# Patient Record
Sex: FEMALE | Race: WHITE | NOT HISPANIC OR LATINO | Employment: OTHER | URBAN - METROPOLITAN AREA
[De-identification: names, ages, dates, MRNs, and addresses within clinical notes are randomized per-mention and may not be internally consistent; named-entity substitution may affect disease eponyms.]

---

## 2017-07-28 ENCOUNTER — RECORDS - HEALTHEAST (OUTPATIENT)
Dept: LAB | Facility: CLINIC | Age: 67
End: 2017-07-28

## 2017-07-28 LAB
CHOLEST SERPL-MCNC: 140 MG/DL
FASTING STATUS PATIENT QL REPORTED: ABNORMAL
HDLC SERPL-MCNC: 29 MG/DL
LDLC SERPL CALC-MCNC: 86 MG/DL
TRIGL SERPL-MCNC: 124 MG/DL

## 2018-01-30 ENCOUNTER — RECORDS - HEALTHEAST (OUTPATIENT)
Dept: LAB | Facility: CLINIC | Age: 68
End: 2018-01-30

## 2018-01-30 LAB
ANION GAP SERPL CALCULATED.3IONS-SCNC: 10 MMOL/L (ref 5–18)
BUN SERPL-MCNC: 16 MG/DL (ref 8–22)
CALCIUM SERPL-MCNC: 9 MG/DL (ref 8.5–10.5)
CHLORIDE BLD-SCNC: 102 MMOL/L (ref 98–107)
CO2 SERPL-SCNC: 28 MMOL/L (ref 22–31)
CREAT SERPL-MCNC: 0.75 MG/DL (ref 0.6–1.1)
GFR SERPL CREATININE-BSD FRML MDRD: >60 ML/MIN/1.73M2
GLUCOSE BLD-MCNC: 167 MG/DL (ref 70–125)
POTASSIUM BLD-SCNC: 3.9 MMOL/L (ref 3.5–5)
SODIUM SERPL-SCNC: 140 MMOL/L (ref 136–145)

## 2018-07-31 ENCOUNTER — RECORDS - HEALTHEAST (OUTPATIENT)
Dept: LAB | Facility: CLINIC | Age: 68
End: 2018-07-31

## 2018-07-31 LAB
CHOLEST SERPL-MCNC: 157 MG/DL
CREAT UR-MCNC: 452.5 MG/DL
FASTING STATUS PATIENT QL REPORTED: ABNORMAL
HDLC SERPL-MCNC: 36 MG/DL
LDLC SERPL CALC-MCNC: 93 MG/DL
MICROALBUMIN UR-MCNC: 40.53 MG/DL (ref 0–1.99)
MICROALBUMIN/CREAT UR: 89.6 MG/G
TRIGL SERPL-MCNC: 140 MG/DL

## 2019-02-05 ENCOUNTER — RECORDS - HEALTHEAST (OUTPATIENT)
Dept: LAB | Facility: CLINIC | Age: 69
End: 2019-02-05

## 2019-02-05 LAB
ANION GAP SERPL CALCULATED.3IONS-SCNC: 12 MMOL/L (ref 5–18)
BUN SERPL-MCNC: 17 MG/DL (ref 8–22)
CALCIUM SERPL-MCNC: 9.6 MG/DL (ref 8.5–10.5)
CHLORIDE BLD-SCNC: 102 MMOL/L (ref 98–107)
CO2 SERPL-SCNC: 27 MMOL/L (ref 22–31)
CREAT SERPL-MCNC: 1.11 MG/DL (ref 0.6–1.1)
GFR SERPL CREATININE-BSD FRML MDRD: 49 ML/MIN/1.73M2
GLUCOSE BLD-MCNC: 118 MG/DL (ref 70–125)
POTASSIUM BLD-SCNC: 3.7 MMOL/L (ref 3.5–5)
SODIUM SERPL-SCNC: 141 MMOL/L (ref 136–145)
TSH SERPL DL<=0.005 MIU/L-ACNC: 4.24 UIU/ML (ref 0.3–5)

## 2019-08-19 ENCOUNTER — RECORDS - HEALTHEAST (OUTPATIENT)
Dept: LAB | Facility: CLINIC | Age: 69
End: 2019-08-19

## 2019-08-19 LAB
ANION GAP SERPL CALCULATED.3IONS-SCNC: 12 MMOL/L (ref 5–18)
BUN SERPL-MCNC: 20 MG/DL (ref 8–22)
CALCIUM SERPL-MCNC: 10.6 MG/DL (ref 8.5–10.5)
CHLORIDE BLD-SCNC: 98 MMOL/L (ref 98–107)
CHOLEST SERPL-MCNC: 150 MG/DL
CO2 SERPL-SCNC: 28 MMOL/L (ref 22–31)
CREAT SERPL-MCNC: 1.09 MG/DL (ref 0.6–1.1)
FASTING STATUS PATIENT QL REPORTED: ABNORMAL
GFR SERPL CREATININE-BSD FRML MDRD: 50 ML/MIN/1.73M2
GLUCOSE BLD-MCNC: 152 MG/DL (ref 70–125)
HDLC SERPL-MCNC: 34 MG/DL
LDLC SERPL CALC-MCNC: 71 MG/DL
POTASSIUM BLD-SCNC: 3.6 MMOL/L (ref 3.5–5)
SODIUM SERPL-SCNC: 138 MMOL/L (ref 136–145)
TRIGL SERPL-MCNC: 223 MG/DL

## 2020-02-25 ENCOUNTER — RECORDS - HEALTHEAST (OUTPATIENT)
Dept: LAB | Facility: CLINIC | Age: 70
End: 2020-02-25

## 2020-02-25 LAB
CREAT UR-MCNC: 154.8 MG/DL
MICROALBUMIN UR-MCNC: 32.85 MG/DL (ref 0–1.99)
MICROALBUMIN/CREAT UR: 212.2 MG/G

## 2020-08-19 ENCOUNTER — RECORDS - HEALTHEAST (OUTPATIENT)
Dept: LAB | Facility: CLINIC | Age: 70
End: 2020-08-19

## 2020-08-19 LAB
ANION GAP SERPL CALCULATED.3IONS-SCNC: 8 MMOL/L (ref 5–18)
BUN SERPL-MCNC: 12 MG/DL (ref 8–22)
CALCIUM SERPL-MCNC: 9.2 MG/DL (ref 8.5–10.5)
CHLORIDE BLD-SCNC: 102 MMOL/L (ref 98–107)
CHOLEST SERPL-MCNC: 132 MG/DL
CO2 SERPL-SCNC: 30 MMOL/L (ref 22–31)
CREAT SERPL-MCNC: 0.87 MG/DL (ref 0.6–1.1)
FASTING STATUS PATIENT QL REPORTED: ABNORMAL
GFR SERPL CREATININE-BSD FRML MDRD: >60 ML/MIN/1.73M2
GLUCOSE BLD-MCNC: 269 MG/DL (ref 70–125)
HDLC SERPL-MCNC: 29 MG/DL
LDLC SERPL CALC-MCNC: 79 MG/DL
POTASSIUM BLD-SCNC: 3.5 MMOL/L (ref 3.5–5)
SODIUM SERPL-SCNC: 140 MMOL/L (ref 136–145)
TRIGL SERPL-MCNC: 122 MG/DL

## 2021-10-04 ENCOUNTER — LAB REQUISITION (OUTPATIENT)
Dept: LAB | Facility: CLINIC | Age: 71
End: 2021-10-04
Payer: COMMERCIAL

## 2021-10-04 DIAGNOSIS — I10 ESSENTIAL (PRIMARY) HYPERTENSION: ICD-10-CM

## 2021-10-04 DIAGNOSIS — E78.00 PURE HYPERCHOLESTEROLEMIA, UNSPECIFIED: ICD-10-CM

## 2021-10-04 LAB
ANION GAP SERPL CALCULATED.3IONS-SCNC: 12 MMOL/L (ref 5–18)
BUN SERPL-MCNC: 12 MG/DL (ref 8–28)
CALCIUM SERPL-MCNC: 9.4 MG/DL (ref 8.5–10.5)
CHLORIDE BLD-SCNC: 100 MMOL/L (ref 98–107)
CHOLEST SERPL-MCNC: 130 MG/DL
CO2 SERPL-SCNC: 25 MMOL/L (ref 22–31)
CREAT SERPL-MCNC: 0.94 MG/DL (ref 0.6–1.1)
FASTING STATUS PATIENT QL REPORTED: ABNORMAL
GFR SERPL CREATININE-BSD FRML MDRD: 62 ML/MIN/1.73M2
GLUCOSE BLD-MCNC: 326 MG/DL (ref 70–125)
HDLC SERPL-MCNC: 27 MG/DL
LDLC SERPL CALC-MCNC: 76 MG/DL
POTASSIUM BLD-SCNC: 3.3 MMOL/L (ref 3.5–5)
SODIUM SERPL-SCNC: 137 MMOL/L (ref 136–145)
TRIGL SERPL-MCNC: 136 MG/DL

## 2021-10-04 PROCEDURE — 80061 LIPID PANEL: CPT | Performed by: FAMILY MEDICINE

## 2021-10-04 PROCEDURE — 80048 BASIC METABOLIC PNL TOTAL CA: CPT | Mod: ORL | Performed by: FAMILY MEDICINE

## 2022-01-12 ENCOUNTER — APPOINTMENT (OUTPATIENT)
Dept: RADIOLOGY | Facility: HOSPITAL | Age: 72
DRG: 085 | End: 2022-01-12
Attending: EMERGENCY MEDICINE
Payer: COMMERCIAL

## 2022-01-12 ENCOUNTER — HOSPITAL ENCOUNTER (INPATIENT)
Facility: HOSPITAL | Age: 72
LOS: 8 days | Discharge: HOME HEALTH | DRG: 085 | End: 2022-01-20
Attending: EMERGENCY MEDICINE | Admitting: HOSPITALIST
Payer: COMMERCIAL

## 2022-01-12 ENCOUNTER — APPOINTMENT (OUTPATIENT)
Dept: CT IMAGING | Facility: HOSPITAL | Age: 72
DRG: 085 | End: 2022-01-12
Attending: EMERGENCY MEDICINE
Payer: COMMERCIAL

## 2022-01-12 ENCOUNTER — APPOINTMENT (OUTPATIENT)
Dept: MRI IMAGING | Facility: HOSPITAL | Age: 72
DRG: 085 | End: 2022-01-12
Attending: HOSPITALIST
Payer: COMMERCIAL

## 2022-01-12 DIAGNOSIS — J18.9 PNEUMONIA DUE TO INFECTIOUS ORGANISM, UNSPECIFIED LATERALITY, UNSPECIFIED PART OF LUNG: ICD-10-CM

## 2022-01-12 DIAGNOSIS — D32.9 MENINGIOMA (H): ICD-10-CM

## 2022-01-12 DIAGNOSIS — S06.5XAA SDH (SUBDURAL HEMATOMA) (H): ICD-10-CM

## 2022-01-12 DIAGNOSIS — I10 PRIMARY HYPERTENSION: ICD-10-CM

## 2022-01-12 DIAGNOSIS — I63.9 ACUTE STROKE DUE TO ISCHEMIA (H): ICD-10-CM

## 2022-01-12 DIAGNOSIS — S09.90XA INJURY OF HEAD, INITIAL ENCOUNTER: ICD-10-CM

## 2022-01-12 DIAGNOSIS — Z78.9 ALCOHOL USE: ICD-10-CM

## 2022-01-12 DIAGNOSIS — R07.89 CHEST WALL PAIN: Primary | ICD-10-CM

## 2022-01-12 DIAGNOSIS — R55 SYNCOPE, UNSPECIFIED SYNCOPE TYPE: ICD-10-CM

## 2022-01-12 DIAGNOSIS — K21.00 GASTROESOPHAGEAL REFLUX DISEASE WITH ESOPHAGITIS WITHOUT HEMORRHAGE: ICD-10-CM
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
## 2022-01-12 LAB
ALBUMIN SERPL-MCNC: 3.2 G/DL (ref 3.5–5)
ALP SERPL-CCNC: 147 U/L (ref 45–120)
ALT SERPL W P-5'-P-CCNC: 33 U/L (ref 0–45)
ANION GAP SERPL CALCULATED.3IONS-SCNC: 13 MMOL/L (ref 5–18)
AST SERPL W P-5'-P-CCNC: 47 U/L (ref 0–40)
ATRIAL RATE - MUSE: 79 BPM
ATRIAL RATE - MUSE: 91 BPM
BASE EXCESS BLDV CALC-SCNC: 9.1 MMOL/L
BASOPHILS # BLD AUTO: 0 10E3/UL (ref 0–0.2)
BASOPHILS NFR BLD AUTO: 1 %
BILIRUB SERPL-MCNC: 0.8 MG/DL (ref 0–1)
BUN SERPL-MCNC: 13 MG/DL (ref 8–28)
CALCIUM SERPL-MCNC: 9.7 MG/DL (ref 8.5–10.5)
CHLORIDE BLD-SCNC: 101 MMOL/L (ref 98–107)
CO2 SERPL-SCNC: 25 MMOL/L (ref 22–31)
CREAT SERPL-MCNC: 0.86 MG/DL (ref 0.6–1.1)
DIASTOLIC BLOOD PRESSURE - MUSE: NORMAL MMHG
DIASTOLIC BLOOD PRESSURE - MUSE: NORMAL MMHG
EOSINOPHIL # BLD AUTO: 0.1 10E3/UL (ref 0–0.7)
EOSINOPHIL NFR BLD AUTO: 2 %
ERYTHROCYTE [DISTWIDTH] IN BLOOD BY AUTOMATED COUNT: 13.9 % (ref 10–15)
FLUAV RNA SPEC QL NAA+PROBE: NEGATIVE
FLUBV RNA RESP QL NAA+PROBE: NEGATIVE
GFR SERPL CREATININE-BSD FRML MDRD: 72 ML/MIN/1.73M2
GLUCOSE BLD-MCNC: 181 MG/DL (ref 70–125)
GLUCOSE BLDC GLUCOMTR-MCNC: 213 MG/DL (ref 70–99)
HBA1C MFR BLD: 7 %
HCO3 BLDV-SCNC: 32 MMOL/L (ref 24–30)
HCT VFR BLD AUTO: 39.1 % (ref 35–47)
HGB BLD-MCNC: 12.7 G/DL (ref 11.7–15.7)
IMM GRANULOCYTES # BLD: 0 10E3/UL
IMM GRANULOCYTES NFR BLD: 1 %
INTERPRETATION ECG - MUSE: NORMAL
INTERPRETATION ECG - MUSE: NORMAL
LACTATE SERPL-SCNC: 1.2 MMOL/L (ref 0.7–2)
LYMPHOCYTES # BLD AUTO: 1 10E3/UL (ref 0.8–5.3)
LYMPHOCYTES NFR BLD AUTO: 16 %
MCH RBC QN AUTO: 32.2 PG (ref 26.5–33)
MCHC RBC AUTO-ENTMCNC: 32.5 G/DL (ref 31.5–36.5)
MCV RBC AUTO: 99 FL (ref 78–100)
MONOCYTES # BLD AUTO: 0.6 10E3/UL (ref 0–1.3)
MONOCYTES NFR BLD AUTO: 10 %
NEUTROPHILS # BLD AUTO: 4.4 10E3/UL (ref 1.6–8.3)
NEUTROPHILS NFR BLD AUTO: 70 %
NRBC # BLD AUTO: 0 10E3/UL
NRBC BLD AUTO-RTO: 0 /100
OXYHGB MFR BLDV: 88.3 % (ref 70–75)
P AXIS - MUSE: 41 DEGREES
P AXIS - MUSE: 55 DEGREES
PCO2 BLDV: 37 MM HG (ref 35–50)
PH BLDV: 7.54 [PH] (ref 7.35–7.45)
PLATELET # BLD AUTO: 167 10E3/UL (ref 150–450)
PO2 BLDV: 94 MM HG (ref 25–47)
POTASSIUM BLD-SCNC: 3.4 MMOL/L (ref 3.5–5)
PR INTERVAL - MUSE: 162 MS
PR INTERVAL - MUSE: 164 MS
PROT SERPL-MCNC: 7.8 G/DL (ref 6–8)
QRS DURATION - MUSE: 90 MS
QRS DURATION - MUSE: 94 MS
QT - MUSE: 362 MS
QT - MUSE: 408 MS
QTC - MUSE: 445 MS
QTC - MUSE: 467 MS
R AXIS - MUSE: -19 DEGREES
R AXIS - MUSE: -27 DEGREES
RBC # BLD AUTO: 3.95 10E6/UL (ref 3.8–5.2)
SAO2 % BLDV: 90.1 % (ref 70–75)
SARS-COV-2 RNA RESP QL NAA+PROBE: NEGATIVE
SODIUM SERPL-SCNC: 139 MMOL/L (ref 136–145)
SYSTOLIC BLOOD PRESSURE - MUSE: NORMAL MMHG
SYSTOLIC BLOOD PRESSURE - MUSE: NORMAL MMHG
T AXIS - MUSE: 124 DEGREES
T AXIS - MUSE: 141 DEGREES
TROPONIN I SERPL-MCNC: 0.03 NG/ML (ref 0–0.29)
VENTRICULAR RATE- MUSE: 79 BPM
VENTRICULAR RATE- MUSE: 91 BPM
WBC # BLD AUTO: 6 10E3/UL (ref 4–11)

## 2022-01-12 PROCEDURE — 87636 SARSCOV2 & INF A&B AMP PRB: CPT | Performed by: EMERGENCY MEDICINE

## 2022-01-12 PROCEDURE — 93005 ELECTROCARDIOGRAM TRACING: CPT | Performed by: STUDENT IN AN ORGANIZED HEALTH CARE EDUCATION/TRAINING PROGRAM

## 2022-01-12 PROCEDURE — 96375 TX/PRO/DX INJ NEW DRUG ADDON: CPT

## 2022-01-12 PROCEDURE — 71045 X-RAY EXAM CHEST 1 VIEW: CPT

## 2022-01-12 PROCEDURE — 36415 COLL VENOUS BLD VENIPUNCTURE: CPT | Performed by: EMERGENCY MEDICINE

## 2022-01-12 PROCEDURE — 96374 THER/PROPH/DIAG INJ IV PUSH: CPT | Mod: 59

## 2022-01-12 PROCEDURE — 83036 HEMOGLOBIN GLYCOSYLATED A1C: CPT | Performed by: HOSPITALIST

## 2022-01-12 PROCEDURE — 250N000011 HC RX IP 250 OP 636: Performed by: EMERGENCY MEDICINE

## 2022-01-12 PROCEDURE — 36415 COLL VENOUS BLD VENIPUNCTURE: CPT | Performed by: HOSPITALIST

## 2022-01-12 PROCEDURE — 84145 PROCALCITONIN (PCT): CPT | Performed by: HOSPITALIST

## 2022-01-12 PROCEDURE — 84484 ASSAY OF TROPONIN QUANT: CPT | Performed by: EMERGENCY MEDICINE

## 2022-01-12 PROCEDURE — 93005 ELECTROCARDIOGRAM TRACING: CPT | Performed by: EMERGENCY MEDICINE

## 2022-01-12 PROCEDURE — C9803 HOPD COVID-19 SPEC COLLECT: HCPCS

## 2022-01-12 PROCEDURE — 80053 COMPREHEN METABOLIC PANEL: CPT | Performed by: EMERGENCY MEDICINE

## 2022-01-12 PROCEDURE — 82805 BLOOD GASES W/O2 SATURATION: CPT | Performed by: EMERGENCY MEDICINE

## 2022-01-12 PROCEDURE — 250N000013 HC RX MED GY IP 250 OP 250 PS 637: Performed by: EMERGENCY MEDICINE

## 2022-01-12 PROCEDURE — 96376 TX/PRO/DX INJ SAME DRUG ADON: CPT

## 2022-01-12 PROCEDURE — 120N000001 HC R&B MED SURG/OB

## 2022-01-12 PROCEDURE — 99285 EMERGENCY DEPT VISIT HI MDM: CPT | Mod: 25

## 2022-01-12 PROCEDURE — 83735 ASSAY OF MAGNESIUM: CPT | Performed by: HOSPITALIST

## 2022-01-12 PROCEDURE — 250N000013 HC RX MED GY IP 250 OP 250 PS 637: Performed by: HOSPITALIST

## 2022-01-12 PROCEDURE — 87040 BLOOD CULTURE FOR BACTERIA: CPT | Performed by: EMERGENCY MEDICINE

## 2022-01-12 PROCEDURE — 255N000002 HC RX 255 OP 636: Performed by: HOSPITALIST

## 2022-01-12 PROCEDURE — 70553 MRI BRAIN STEM W/O & W/DYE: CPT

## 2022-01-12 PROCEDURE — 250N000011 HC RX IP 250 OP 636: Performed by: HOSPITALIST

## 2022-01-12 PROCEDURE — 85025 COMPLETE CBC W/AUTO DIFF WBC: CPT | Performed by: EMERGENCY MEDICINE

## 2022-01-12 PROCEDURE — 250N000009 HC RX 250: Performed by: HOSPITALIST

## 2022-01-12 PROCEDURE — 70450 CT HEAD/BRAIN W/O DYE: CPT

## 2022-01-12 PROCEDURE — A9585 GADOBUTROL INJECTION: HCPCS | Performed by: HOSPITALIST

## 2022-01-12 PROCEDURE — 250N000012 HC RX MED GY IP 250 OP 636 PS 637: Performed by: HOSPITALIST

## 2022-01-12 PROCEDURE — 83605 ASSAY OF LACTIC ACID: CPT | Performed by: EMERGENCY MEDICINE

## 2022-01-12 PROCEDURE — 72125 CT NECK SPINE W/O DYE: CPT

## 2022-01-12 PROCEDURE — 99223 1ST HOSP IP/OBS HIGH 75: CPT | Mod: AI | Performed by: HOSPITALIST

## 2022-01-12 PROCEDURE — 258N000003 HC RX IP 258 OP 636: Performed by: EMERGENCY MEDICINE

## 2022-01-12 RX ORDER — POTASSIUM CHLORIDE 1500 MG/1
40 TABLET, EXTENDED RELEASE ORAL ONCE
Status: COMPLETED | OUTPATIENT
Start: 2022-01-12 | End: 2022-01-13

## 2022-01-12 RX ORDER — VITAMIN E 268 MG
400 CAPSULE ORAL DAILY
COMMUNITY

## 2022-01-12 RX ORDER — GLIMEPIRIDE 2 MG/1
2 TABLET ORAL
COMMUNITY
End: 2022-02-04

## 2022-01-12 RX ORDER — HYDRALAZINE HYDROCHLORIDE 20 MG/ML
5 INJECTION INTRAMUSCULAR; INTRAVENOUS ONCE
Status: COMPLETED | OUTPATIENT
Start: 2022-01-12 | End: 2022-01-12

## 2022-01-12 RX ORDER — DIAZEPAM 10 MG/2ML
5 INJECTION, SOLUTION INTRAMUSCULAR; INTRAVENOUS
Status: CANCELLED | OUTPATIENT
Start: 2022-01-12

## 2022-01-12 RX ORDER — MULTIPLE VITAMINS W/ MINERALS TAB 9MG-400MCG
1 TAB ORAL DAILY
COMMUNITY

## 2022-01-12 RX ORDER — DEXAMETHASONE SODIUM PHOSPHATE 4 MG/ML
4 INJECTION, SOLUTION INTRA-ARTICULAR; INTRALESIONAL; INTRAMUSCULAR; INTRAVENOUS; SOFT TISSUE EVERY 6 HOURS
Status: DISCONTINUED | OUTPATIENT
Start: 2022-01-12 | End: 2022-01-14

## 2022-01-12 RX ORDER — HYDROCHLOROTHIAZIDE 25 MG/1
25 TABLET ORAL DAILY
Status: DISCONTINUED | OUTPATIENT
Start: 2022-01-12 | End: 2022-01-20 | Stop reason: HOSPADM

## 2022-01-12 RX ORDER — PROCHLORPERAZINE MALEATE 5 MG
5 TABLET ORAL EVERY 6 HOURS PRN
Status: DISCONTINUED | OUTPATIENT
Start: 2022-01-12 | End: 2022-01-20 | Stop reason: HOSPADM

## 2022-01-12 RX ORDER — ACETAMINOPHEN 325 MG/1
650 TABLET ORAL EVERY 6 HOURS PRN
Status: DISCONTINUED | OUTPATIENT
Start: 2022-01-12 | End: 2022-01-18

## 2022-01-12 RX ORDER — CALCIUM CARBONATE 500 MG/1
1 TABLET, CHEWABLE ORAL 2 TIMES DAILY PRN
COMMUNITY

## 2022-01-12 RX ORDER — AMLODIPINE BESYLATE 10 MG/1
10 TABLET ORAL DAILY
COMMUNITY

## 2022-01-12 RX ORDER — ACETAMINOPHEN 650 MG/1
650 SUPPOSITORY RECTAL EVERY 6 HOURS PRN
Status: DISCONTINUED | OUTPATIENT
Start: 2022-01-12 | End: 2022-01-18

## 2022-01-12 RX ORDER — DEXTROSE MONOHYDRATE 25 G/50ML
25-50 INJECTION, SOLUTION INTRAVENOUS
Status: DISCONTINUED | OUTPATIENT
Start: 2022-01-12 | End: 2022-01-20 | Stop reason: HOSPADM

## 2022-01-12 RX ORDER — LIDOCAINE 4 G/G
1 PATCH TOPICAL
Status: DISCONTINUED | OUTPATIENT
Start: 2022-01-12 | End: 2022-01-20 | Stop reason: HOSPADM

## 2022-01-12 RX ORDER — AMLODIPINE BESYLATE 5 MG/1
10 TABLET ORAL DAILY
Status: DISCONTINUED | OUTPATIENT
Start: 2022-01-12 | End: 2022-01-20 | Stop reason: HOSPADM

## 2022-01-12 RX ORDER — POLYETHYLENE GLYCOL 3350 17 G/17G
17 POWDER, FOR SOLUTION ORAL DAILY
Status: DISCONTINUED | OUTPATIENT
Start: 2022-01-12 | End: 2022-01-20 | Stop reason: HOSPADM

## 2022-01-12 RX ORDER — NICOTINE POLACRILEX 4 MG
15-30 LOZENGE BUCCAL
Status: DISCONTINUED | OUTPATIENT
Start: 2022-01-12 | End: 2022-01-20 | Stop reason: HOSPADM

## 2022-01-12 RX ORDER — HYDRALAZINE HYDROCHLORIDE 20 MG/ML
10 INJECTION INTRAMUSCULAR; INTRAVENOUS EVERY 4 HOURS PRN
Status: DISCONTINUED | OUTPATIENT
Start: 2022-01-12 | End: 2022-01-17

## 2022-01-12 RX ORDER — LIDOCAINE 40 MG/G
CREAM TOPICAL
Status: DISCONTINUED | OUTPATIENT
Start: 2022-01-12 | End: 2022-01-20 | Stop reason: HOSPADM

## 2022-01-12 RX ORDER — OXYCODONE HYDROCHLORIDE 5 MG/1
5 TABLET ORAL EVERY 4 HOURS PRN
Status: DISCONTINUED | OUTPATIENT
Start: 2022-01-12 | End: 2022-01-20 | Stop reason: HOSPADM

## 2022-01-12 RX ORDER — ONDANSETRON 2 MG/ML
4 INJECTION INTRAMUSCULAR; INTRAVENOUS EVERY 6 HOURS PRN
Status: DISCONTINUED | OUTPATIENT
Start: 2022-01-12 | End: 2022-01-20 | Stop reason: HOSPADM

## 2022-01-12 RX ORDER — PROCHLORPERAZINE 25 MG
12.5 SUPPOSITORY, RECTAL RECTAL EVERY 12 HOURS PRN
Status: DISCONTINUED | OUTPATIENT
Start: 2022-01-12 | End: 2022-01-20 | Stop reason: HOSPADM

## 2022-01-12 RX ORDER — CITALOPRAM HYDROBROMIDE 40 MG/1
40 TABLET ORAL DAILY
COMMUNITY

## 2022-01-12 RX ORDER — GADOBUTROL 604.72 MG/ML
10 INJECTION INTRAVENOUS ONCE
Status: COMPLETED | OUTPATIENT
Start: 2022-01-12 | End: 2022-01-12

## 2022-01-12 RX ORDER — LEVOFLOXACIN 5 MG/ML
750 INJECTION, SOLUTION INTRAVENOUS EVERY 24 HOURS
Status: COMPLETED | OUTPATIENT
Start: 2022-01-13 | End: 2022-01-16

## 2022-01-12 RX ORDER — HYDROCHLOROTHIAZIDE 25 MG/1
25 TABLET ORAL DAILY
COMMUNITY
End: 2023-12-28

## 2022-01-12 RX ORDER — ONDANSETRON 4 MG/1
4 TABLET, ORALLY DISINTEGRATING ORAL EVERY 6 HOURS PRN
Status: DISCONTINUED | OUTPATIENT
Start: 2022-01-12 | End: 2022-01-20 | Stop reason: HOSPADM

## 2022-01-12 RX ORDER — CITALOPRAM HYDROBROMIDE 40 MG/1
40 TABLET ORAL DAILY
Status: DISCONTINUED | OUTPATIENT
Start: 2022-01-12 | End: 2022-01-20 | Stop reason: HOSPADM

## 2022-01-12 RX ORDER — GLIMEPIRIDE 1 MG/1
2 TABLET ORAL
Status: DISCONTINUED | OUTPATIENT
Start: 2022-01-13 | End: 2022-01-20 | Stop reason: HOSPADM

## 2022-01-12 RX ORDER — LEVOFLOXACIN 750 MG/1
750 TABLET, FILM COATED ORAL ONCE
Status: COMPLETED | OUTPATIENT
Start: 2022-01-12 | End: 2022-01-12

## 2022-01-12 RX ORDER — LORAZEPAM 2 MG/ML
1 INJECTION INTRAMUSCULAR EVERY 6 HOURS PRN
Status: DISCONTINUED | OUTPATIENT
Start: 2022-01-12 | End: 2022-01-20 | Stop reason: HOSPADM

## 2022-01-12 RX ORDER — DEXAMETHASONE SODIUM PHOSPHATE 10 MG/ML
10 INJECTION, SOLUTION INTRAMUSCULAR; INTRAVENOUS ONCE
Status: COMPLETED | OUTPATIENT
Start: 2022-01-12 | End: 2022-01-12

## 2022-01-12 RX ADMIN — CITALOPRAM HYDROBROMIDE 40 MG: 40 TABLET ORAL at 22:44

## 2022-01-12 RX ADMIN — LIDOCAINE 1 PATCH: 246 PATCH TOPICAL at 22:49

## 2022-01-12 RX ADMIN — LEVOFLOXACIN 750 MG: 750 TABLET, FILM COATED ORAL at 16:35

## 2022-01-12 RX ADMIN — INSULIN ASPART 1 UNITS: 100 INJECTION, SOLUTION INTRAVENOUS; SUBCUTANEOUS at 22:47

## 2022-01-12 RX ADMIN — DEXAMETHASONE SODIUM PHOSPHATE 4 MG: 4 INJECTION, SOLUTION INTRA-ARTICULAR; INTRALESIONAL; INTRAMUSCULAR; INTRAVENOUS; SOFT TISSUE at 22:45

## 2022-01-12 RX ADMIN — ENOXAPARIN SODIUM 40 MG: 40 INJECTION SUBCUTANEOUS at 22:49

## 2022-01-12 RX ADMIN — AMLODIPINE BESYLATE 10 MG: 5 TABLET ORAL at 22:43

## 2022-01-12 RX ADMIN — DEXAMETHASONE SODIUM PHOSPHATE 10 MG: 10 INJECTION, SOLUTION INTRAMUSCULAR; INTRAVENOUS at 17:21

## 2022-01-12 RX ADMIN — HYDRALAZINE HYDROCHLORIDE 10 MG: 20 INJECTION INTRAMUSCULAR; INTRAVENOUS at 19:17

## 2022-01-12 RX ADMIN — GADOBUTROL 10 ML: 604.72 INJECTION INTRAVENOUS at 20:54

## 2022-01-12 RX ADMIN — FAMOTIDINE 20 MG: 10 INJECTION, SOLUTION INTRAVENOUS at 19:20

## 2022-01-12 RX ADMIN — HYDROCHLOROTHIAZIDE 25 MG: 25 TABLET ORAL at 22:43

## 2022-01-12 RX ADMIN — HYDRALAZINE HYDROCHLORIDE 5 MG: 20 INJECTION INTRAMUSCULAR; INTRAVENOUS at 18:08

## 2022-01-12 RX ADMIN — ACETAMINOPHEN 650 MG: 325 TABLET ORAL at 19:19

## 2022-01-12 RX ADMIN — LEVETIRACETAM 1000 MG: 100 INJECTION, SOLUTION INTRAVENOUS at 17:36

## 2022-01-12 RX ADMIN — POLYETHYLENE GLYCOL 3350 17 G: 17 POWDER, FOR SOLUTION ORAL at 22:42

## 2022-01-12 ASSESSMENT — ACTIVITIES OF DAILY LIVING (ADL)
DOING_ERRANDS_INDEPENDENTLY_DIFFICULTY: NO
DRESSING/BATHING_DIFFICULTY: NO
NUMBER_OF_TIMES_PATIENT_HAS_FALLEN_WITHIN_LAST_SIX_MONTHS: 1
ADLS_ACUITY_SCORE: 6
CONCENTRATING,_REMEMBERING_OR_MAKING_DECISIONS_DIFFICULTY: NO
WALKING_OR_CLIMBING_STAIRS_DIFFICULTY: NO
HEARING_DIFFICULTY_OR_DEAF: NO
VISION_MANAGEMENT: GLASSES
ADLS_ACUITY_SCORE: 6
TOILETING_ISSUES: NO
ADLS_ACUITY_SCORE: 6
DIFFICULTY_EATING/SWALLOWING: NO
FALL_HISTORY_WITHIN_LAST_SIX_MONTHS: YES
ADLS_ACUITY_SCORE: 6
DIFFICULTY_COMMUNICATING: NO
ADLS_ACUITY_SCORE: 6
WHICH_OF_THE_ABOVE_FUNCTIONAL_RISKS_HAD_A_RECENT_ONSET_OR_CHANGE?: FALL HISTORY
WEAR_GLASSES_OR_BLIND: YES
PATIENT'S_PREFERRED_MEANS_OF_COMMUNICATION: VERBAL

## 2022-01-12 NOTE — ED NOTES
Patient was updated on the plan of care and states that she can not do MRI as she is exteremly claustrophobic.

## 2022-01-12 NOTE — ED TRIAGE NOTES
Pt arrives per medics from her apt where she was amb in the hallway when she fell. Hitting the back of her head on a wall. She is not on thinners does not remember the fall. Upon arrival to ED she is alert/ oriented x 3. She reports chest pain , upper back pain. She was given 324 mg ASA and NTG x 2 with relief of pain per medics.

## 2022-01-12 NOTE — PHARMACY-ADMISSION MEDICATION HISTORY
Pharmacy Note - Admission Medication History    Pertinent Provider Information: None     ______________________________________________________________________    Prior To Admission (PTA) med list completed and updated in EMR.       PTA Med List   Medication Sig Last Dose     amLODIPine (NORVASC) 10 MG tablet Take 10 mg by mouth daily 1/11/2022 at Unknown time     calcium carbonate (TUMS) 500 MG chewable tablet Take 1 chew tab by mouth 2 times daily as needed for heartburn      citalopram (CELEXA) 40 MG tablet Take 40 mg by mouth daily 1/11/2022 at Unknown time     glimepiride (AMARYL) 2 MG tablet Take 2 mg by mouth every morning (before breakfast) 1/11/2022 at Unknown time     hydrochlorothiazide (HYDRODIURIL) 25 MG tablet Take 25 mg by mouth daily 1/11/2022 at Unknown time     multivitamin w/minerals (THERA-VIT-M) tablet Take 1 tablet by mouth daily 1/11/2022 at Unknown time     vitamin E (TOCOPHEROL) 400 units (180 mg) capsule Take 400 Units by mouth daily 1/11/2022 at Unknown time       Information source(s): Patient and CareEverywhere/SureScripts  Method of interview communication: in-person    Summary of Changes to PTA Med List  New: All  Discontinued: None  Changed: None    Patient was asked about OTC/herbal products specifically.  PTA med list reflects this.    In the past week, patient estimated taking medication this percent of the time:  greater than 90%.    Allergies were reviewed, assessed, and updated with the patient.      Patient does not use any multi-dose medications prior to admission.    The information provided in this note is only as accurate as the sources available at the time of the update(s).    Thank you for the opportunity to participate in the care of this patient.    Maxine Zelaya, [facility]  1/12/2022 4:51 PM

## 2022-01-12 NOTE — ED PROVIDER NOTES
EMERGENCY DEPARTMENT ENCOUNTER            IMPRESSION:  Fall -syncope versus seizure  Hypertension  Pneumonia  Head injury  Meningioma      MEDICAL DECISION MAKING:  Patient brought in by ambulance after an unwitnessed collapse and fall.  The patient was not able to provide further details nor was there any witness or collateral history.  She does not have a seizure history.  She reports head injury and pain.  She has been well recently.  She has not taken her daily medications for high blood pressure    On exam her blood pressure is elevated over 200.  She has a occiput soft tissue hematoma.  She is otherwise awake and alert.  Normal cardiopulmonary and neurologic exam.  No evidence of trauma or injury.    She was seen in triage.    She was then roomed and placed on a cardiac monitor.  Blood pressure was noted to be persistently elevated and her home meds and a dose of hydralazine were ordered.    CT of the head showed evidence of a meningioma with some surrounding edema.  I spoke with on-call neurosurgery who recommended steroids, antiepileptics, and MRI.  They will formally consult    COVID was negative    Chest x-ray showed patchy infiltrates.  She has a fever so she was treated for infection.  She was given Levaquin.  She has a penicillin allergy.    Chemistry shows mildly depressed potassium    CBC is normal    CT cervical spine is unremarkable    Patient will be admitted to the hospital.    Patient is collapse and fall could be syncopal or possibly a seizure.  The meningioma with edema could be a potential source.    Patient treated for CAP pneumonia.    She has a history of hypertension.  She was noncompliant with her medication today.      =================================================================  CHIEF COMPLAINT:  Chief Complaint   Patient presents with     Fall         HPI  Josefina Dumont is a 71 year old female with a history of diverticulitis who presents to the ED by EMS for evaluation after  a fall.     Per EMS, patient was at her apartment walking through the hallway when she fell, hitting the back of her head on the wall. Patient does not recall the fall. Patient is not currently on blood thinners.     Here in the ED, the patient endorses chest pain and upper back pain.     I, Elilena Patricio am serving as a scribe to document services personally performed by Dr. Félix Mai MD, based on my observation and the provider's statements to me. I, Dr. Félix Mai MD attest that Eli Patricio is acting in a scribe capacity, has observed my performance of the services and has documented them in accordance with my direction.      REVIEW OF SYSTEMS   Constitutional: Denies fever, chills, unintentional weight loss or fatigue   Positive for fall   Eyes: Denies visual changes or discharge    HENT: Denies sore throat, ear pain or neck pain  Respiratory: Denies cough or shortness of breath    Cardiovascular: Denies palpitations or leg swelling  Positive for chest pain  GI: Denies abdominal pain, nausea, vomiting, or dark, bloody stools.    : Denies hematuria, dysuria, or flank pain  Musculoskeletal: Denies any new muscle/joint pains  Positive for back pain (upper)  Skin: Denies rash or wound  Neurologic: Denies current headache, new weakness, focal weakness, or sensory changes    Lymphatic: Denies swollen glands    Psychiatric: Denies depression, suicidal ideation or homicidal ideation.    Remainder of systems reviewed, unless noted in HPI all others negative.      PAST MEDICAL HISTORY:  No past medical history on file.    PAST SURGICAL HISTORY:  No past surgical history on file.    CURRENT MEDICATIONS:    amLODIPine (NORVASC) 10 MG tablet  calcium carbonate (TUMS) 500 MG chewable tablet  citalopram (CELEXA) 40 MG tablet  glimepiride (AMARYL) 2 MG tablet  hydrochlorothiazide (HYDRODIURIL) 25 MG tablet  multivitamin w/minerals (THERA-VIT-M) tablet  vitamin E (TOCOPHEROL) 400 units (180 mg)  capsule      ALLERGIES:  Allergies   Allergen Reactions     Atenolol Other (See Comments)     abd pain     Penicillins Hives     FAMILY HISTORY:  No family history on file.    SOCIAL HISTORY:   Social History     Socioeconomic History     Marital status:      Spouse name: Not on file     Number of children: Not on file     Years of education: Not on file     Highest education level: Not on file   Occupational History     Not on file   Tobacco Use     Smoking status: Not on file     Smokeless tobacco: Not on file   Substance and Sexual Activity     Alcohol use: Not on file     Drug use: Not on file     Sexual activity: Not on file   Other Topics Concern     Not on file   Social History Narrative     Not on file     Social Determinants of Health     Financial Resource Strain: Not on file   Food Insecurity: Not on file   Transportation Needs: Not on file   Physical Activity: Not on file   Stress: Not on file   Social Connections: Not on file   Intimate Partner Violence: Not on file   Housing Stability: Not on file       PHYSICAL EXAM:    BP (!) 221/94   Pulse 78   Temp 99.1  F (37.3  C) (Oral)   Resp 16   Wt 104.8 kg (231 lb)   SpO2 95%     Constitutional: Awake, alert, in no acute distress  Head: Occipital soft tissue hematoma  ENT: Mucous membranes moist. Posterior oropharynx appears normal.  Eyes: Pupils midrange and reactive ,no conjunctival discharge  Neck: No lymphadenopathy, no stridor, supple, soft tissue swelling  Chest: No tenderness   Respiratory: Respirations even, unlabored. Lungs clear to ascultation bilaterally, in no acute respiratory distress.  No chest wall or back tenderness  Cardiovascular: Regular rate and rhythm.+2 radial pulses, equal bilaterally.  No murmurs.   GI: Abdomen soft, non-tender to palpation in all 4 quadrants. No guarding or rebound. Bowel sounds intact on all 4 quadrants.   Back: No CVA tenderness.    No spinal tenderness  Musculoskeletal: Moves all 4 extremities  equally, strength symmetrical on bilateral uppers and lowers.  No peripheral edema  Integument: Warm, dry. No rash. No bruising or petechiae.  Lymphatic: No cervical lymphadenopathy  Neurologic: Alert & oriented x 3. Normal speech. Grossly normal motor and sensory function. No focal deficits noted.  NIHSS = 0  Psychiatric: Normal mood and affect. Normal judgement.      ED COURSE:    1:30 PM I met with the patient for the initial interview and physical examination. Discussed plan for treatment and workup in the ED.    4:32 PM I consulted neurology.   4:40 PM I consulted Sapna Aleman with neuro surgery.   5:45 PM I discussed the case with hospitalist, Dr Reardon, who accepts the patient for admission.        LAB:  All pertinent labs reviewed and interpreted.  Results for orders placed or performed during the hospital encounter of 01/12/22   CT Head w/o Contrast    Impression    CONCLUSION:  HEAD CT:  1.  Partially calcified left anterior parafalcine extra-axial mass lesion measuring 1.5 x 2.6 x 3.0 cm, with appearance most suggestive of meningioma. There is a low-attenuation parenchymal change in the adjacent left frontal lobe, likely vasogenic   edema. Recommend MRI brain without and with IV contrast for further characterization.  2.  No acute intracranial hemorrhage or CT evidence for acute infarction.  3.  No acute calvarial fracture or scalp hematoma.  4.  Mild global brain parenchymal volume loss with additional presumed sequelae of mild chronic small vessel ischemic disease.    CERVICAL SPINE CT:  1.  No acute fracture or traumatic subluxation of the cervical spine by CT imaging.  2.  No high-grade spinal canal or neural foraminal stenosis.   CT Cervical Spine w/o Contrast    Impression    CONCLUSION:  HEAD CT:  1.  Partially calcified left anterior parafalcine extra-axial mass lesion measuring 1.5 x 2.6 x 3.0 cm, with appearance most suggestive of meningioma. There is a low-attenuation parenchymal change in  the adjacent left frontal lobe, likely vasogenic   edema. Recommend MRI brain without and with IV contrast for further characterization.  2.  No acute intracranial hemorrhage or CT evidence for acute infarction.  3.  No acute calvarial fracture or scalp hematoma.  4.  Mild global brain parenchymal volume loss with additional presumed sequelae of mild chronic small vessel ischemic disease.    CERVICAL SPINE CT:  1.  No acute fracture or traumatic subluxation of the cervical spine by CT imaging.  2.  No high-grade spinal canal or neural foraminal stenosis.   XR Chest Port 1 View    Impression    IMPRESSION: Stable cardiomegaly with normal vascularity. Patchy right basilar opacities suggesting pneumonia. Left lung clear. No pneumothorax nor pleural effusion.    Consider follow-up radiographs to ensure resolution.   Comprehensive metabolic panel   Result Value Ref Range    Sodium 139 136 - 145 mmol/L    Potassium 3.4 (L) 3.5 - 5.0 mmol/L    Chloride 101 98 - 107 mmol/L    Carbon Dioxide (CO2) 25 22 - 31 mmol/L    Anion Gap 13 5 - 18 mmol/L    Urea Nitrogen 13 8 - 28 mg/dL    Creatinine 0.86 0.60 - 1.10 mg/dL    Calcium 9.7 8.5 - 10.5 mg/dL    Glucose 181 (H) 70 - 125 mg/dL    Alkaline Phosphatase 147 (H) 45 - 120 U/L    AST 47 (H) 0 - 40 U/L    ALT 33 0 - 45 U/L    Protein Total 7.8 6.0 - 8.0 g/dL    Albumin 3.2 (L) 3.5 - 5.0 g/dL    Bilirubin Total 0.8 0.0 - 1.0 mg/dL    GFR Estimate 72 >60 mL/min/1.73m2   Lactic acid whole blood   Result Value Ref Range    Lactic Acid 1.2 0.7 - 2.0 mmol/L   Blood gas venous   Result Value Ref Range    pH Venous 7.54 (H) 7.35 - 7.45    pCO2 Venous 37 35 - 50 mm Hg    pO2 Venous 94 (H) 25 - 47 mm Hg    Bicarbonate Venous 32 (H) 24 - 30 mmol/L    Base Excess/Deficit (+/-) 9.1   mmol/L    Oxyhemoglobin Venous 88.3 (H) 70.0 - 75.0 %    O2 Sat, Venous 90.1 (H) 70.0 - 75.0 %   Symptomatic; Unknown Influenza A/B & SARS-CoV2 (COVID-19) Virus PCR Multiplex Nasopharyngeal    Specimen:  Nasopharyngeal; Swab   Result Value Ref Range    Influenza A PCR Negative Negative    Influenza B PCR Negative Negative    SARS CoV2 PCR Negative Negative   CBC with platelets and differential   Result Value Ref Range    WBC Count 6.0 4.0 - 11.0 10e3/uL    RBC Count 3.95 3.80 - 5.20 10e6/uL    Hemoglobin 12.7 11.7 - 15.7 g/dL    Hematocrit 39.1 35.0 - 47.0 %    MCV 99 78 - 100 fL    MCH 32.2 26.5 - 33.0 pg    MCHC 32.5 31.5 - 36.5 g/dL    RDW 13.9 10.0 - 15.0 %    Platelet Count 167 150 - 450 10e3/uL    % Neutrophils 70 %    % Lymphocytes 16 %    % Monocytes 10 %    % Eosinophils 2 %    % Basophils 1 %    % Immature Granulocytes 1 %    NRBCs per 100 WBC 0 <1 /100    Absolute Neutrophils 4.4 1.6 - 8.3 10e3/uL    Absolute Lymphocytes 1.0 0.8 - 5.3 10e3/uL    Absolute Monocytes 0.6 0.0 - 1.3 10e3/uL    Absolute Eosinophils 0.1 0.0 - 0.7 10e3/uL    Absolute Basophils 0.0 0.0 - 0.2 10e3/uL    Absolute Immature Granulocytes 0.0 <=0.4 10e3/uL    Absolute NRBCs 0.0 10e3/uL   Result Value Ref Range    Troponin I 0.03 0.00 - 0.29 ng/mL       RADIOLOGY:  Reviewed all pertinent imaging. Please see official radiology report.  XR Chest Port 1 View   Final Result   IMPRESSION: Stable cardiomegaly with normal vascularity. Patchy right basilar opacities suggesting pneumonia. Left lung clear. No pneumothorax nor pleural effusion.      Consider follow-up radiographs to ensure resolution.      CT Cervical Spine w/o Contrast   Final Result   CONCLUSION:   HEAD CT:   1.  Partially calcified left anterior parafalcine extra-axial mass lesion measuring 1.5 x 2.6 x 3.0 cm, with appearance most suggestive of meningioma. There is a low-attenuation parenchymal change in the adjacent left frontal lobe, likely vasogenic    edema. Recommend MRI brain without and with IV contrast for further characterization.   2.  No acute intracranial hemorrhage or CT evidence for acute infarction.   3.  No acute calvarial fracture or scalp hematoma.   4.  Mild  global brain parenchymal volume loss with additional presumed sequelae of mild chronic small vessel ischemic disease.      CERVICAL SPINE CT:   1.  No acute fracture or traumatic subluxation of the cervical spine by CT imaging.   2.  No high-grade spinal canal or neural foraminal stenosis.      CT Head w/o Contrast   Final Result   CONCLUSION:   HEAD CT:   1.  Partially calcified left anterior parafalcine extra-axial mass lesion measuring 1.5 x 2.6 x 3.0 cm, with appearance most suggestive of meningioma. There is a low-attenuation parenchymal change in the adjacent left frontal lobe, likely vasogenic    edema. Recommend MRI brain without and with IV contrast for further characterization.   2.  No acute intracranial hemorrhage or CT evidence for acute infarction.   3.  No acute calvarial fracture or scalp hematoma.   4.  Mild global brain parenchymal volume loss with additional presumed sequelae of mild chronic small vessel ischemic disease.      CERVICAL SPINE CT:   1.  No acute fracture or traumatic subluxation of the cervical spine by CT imaging.   2.  No high-grade spinal canal or neural foraminal stenosis.           EKG:    Time: 12-JAN-2022 14:38:34    Impression: Sinus rhythm. Left ventricular hypertrophy with repolarization abnormality.   Rate: 91 BPM  QRS: 90 ms  QTC: 445 ms    No previous available for comparison.    I have independently reviewed and interpreted this ECG and agree with the above findings. See scanned document for further details.       PROCEDURES:   Critical Care Time 30 minutes excluding procedures      MEDICATIONS GIVEN IN THE EMERGENCY:  Medications   sodium chloride (PF) 0.9% PF flush 3 mL (has no administration in time range)   sodium chloride (PF) 0.9% PF flush 3 mL (3 mLs Intracatheter Not Given 1/12/22 1624)   hydrALAZINE (APRESOLINE) injection 5 mg (has no administration in time range)   levofloxacin (LEVAQUIN) tablet 750 mg (750 mg Oral Given 1/12/22 1635)   dexamethasone PF  (DECADRON) injection 10 mg (10 mg Intravenous Given 1/12/22 1721)   levETIRAcetam (KEPPRA) 1,000 mg in sodium chloride 0.9 % 100 mL intermittent infusion (1,000 mg Intravenous New Bag 1/12/22 1736)         NEW PRESCRIPTIONS STARTED AT TODAY'S ER VISIT:  New Prescriptions    No medications on file       FINAL DIAGNOSIS:    ICD-10-CM    1. Fall, initial encounter  W19.XXXA    2. Injury of head, initial encounter  S09.90XA    3. Syncope, unspecified syncope type  R55    4. Primary hypertension  I10    5. Pneumonia due to infectious organism, unspecified laterality, unspecified part of lung  J18.9    6. Meningioma (H)  D32.9             At the conclusion of the encounter I discussed the results of all of the tests and the disposition. The questions were answered. The patient or family acknowledged understanding and was agreeable with the care plan.     NAME: Josefina Dumont  AGE: 71 year old female  YOB: 1950  MRN: [id number]  EVALUATION DATE & TIME: 1/12/2022  2:52 PM    PCP: No primary care provider on file.    ED PROVIDER: Félix Mai M.D.      Eli MOONEY, am serving as a scribe to document services personally performed by Dr. Félix Mai based on my observation and the provider's statements to me. IFélix MD attest that Eli Patricio is acting in a scribe capacity, has observed my performance of the services and has documented them in accordance with my direction.    Félix Mai M.D.  Emergency Medicine  [facility] EMERGENCY DEPARTMENT  [address] [place] [id number]  [phone]  Dept: [phone]  1/12/2022        Félix Mai MD  01/12/22 [id number]

## 2022-01-12 NOTE — PROGRESS NOTES
Neurosurgery Note:    Called by Dr. Mai from [place] ED regarding 71 year old female with likely syncopal episode vs seizure, head trauma. She underwent a head CT that revealed left anterior parafalcine meningioma with surrounding vasogenic edema measuring 1.5 x 2.6 x 3.0 cm. She is also febrile, possible pneumonia. Imaging reviewed with Dr. Cullen who agrees with the following plan:    1. MRI with and without contrast head  2. Okay for dexamethasone 10mg now, then 4q6  3. Pepcid BID  4. Neurology consult for possible seizure episode  5. Load with keppra  6. No planned procedure while at [facility], okay for patient to remain at [facility].   7. Will have rad/onc weigh in after MRI to see if cyberknife candidate otherwise would plan discharge and return for surgical resection  8. Defer to hospitalist if she also needs work up for syncope, unclear if syncopal episode vs seizure    Attending: Dr. Cullen.     Sapna Aleman, CNP  [facility] Neurosurgery  O: [phone]

## 2022-01-13 ENCOUNTER — APPOINTMENT (OUTPATIENT)
Dept: PHYSICAL THERAPY | Facility: HOSPITAL | Age: 72
DRG: 085 | End: 2022-01-13
Attending: HOSPITALIST
Payer: COMMERCIAL

## 2022-01-13 ENCOUNTER — APPOINTMENT (OUTPATIENT)
Dept: CT IMAGING | Facility: HOSPITAL | Age: 72
DRG: 085 | End: 2022-01-13
Attending: PSYCHIATRY & NEUROLOGY
Payer: COMMERCIAL

## 2022-01-13 ENCOUNTER — APPOINTMENT (OUTPATIENT)
Dept: OCCUPATIONAL THERAPY | Facility: HOSPITAL | Age: 72
DRG: 085 | End: 2022-01-13
Attending: HOSPITALIST
Payer: COMMERCIAL

## 2022-01-13 ENCOUNTER — APPOINTMENT (OUTPATIENT)
Dept: CARDIOLOGY | Facility: HOSPITAL | Age: 72
DRG: 085 | End: 2022-01-13
Attending: INTERNAL MEDICINE
Payer: COMMERCIAL

## 2022-01-13 PROBLEM — I05.9 MITRAL VALVE DISEASE, RHEUMATIC: Status: ACTIVE | Noted: 2022-01-13

## 2022-01-13 PROBLEM — E11.9 TYPE 2 DIABETES MELLITUS, WITHOUT LONG-TERM CURRENT USE OF INSULIN (H): Status: ACTIVE | Noted: 2022-01-13

## 2022-01-13 LAB
ALBUMIN SERPL-MCNC: 3.2 G/DL (ref 3.5–5)
ALBUMIN UR-MCNC: 50 MG/DL
ALP SERPL-CCNC: 142 U/L (ref 45–120)
ALT SERPL W P-5'-P-CCNC: 30 U/L (ref 0–45)
ANION GAP SERPL CALCULATED.3IONS-SCNC: 12 MMOL/L (ref 5–18)
APPEARANCE UR: CLEAR
AST SERPL W P-5'-P-CCNC: 39 U/L (ref 0–40)
BACTERIA #/AREA URNS HPF: ABNORMAL /HPF
BILIRUB SERPL-MCNC: 0.6 MG/DL (ref 0–1)
BILIRUB UR QL STRIP: NEGATIVE
BUN SERPL-MCNC: 13 MG/DL (ref 8–28)
CALCIUM SERPL-MCNC: 9.6 MG/DL (ref 8.5–10.5)
CHLORIDE BLD-SCNC: 102 MMOL/L (ref 98–107)
CHOLEST SERPL-MCNC: 158 MG/DL
CO2 SERPL-SCNC: 22 MMOL/L (ref 22–31)
COLOR UR AUTO: ABNORMAL
CREAT SERPL-MCNC: 0.89 MG/DL (ref 0.6–1.1)
ERYTHROCYTE [DISTWIDTH] IN BLOOD BY AUTOMATED COUNT: 13.9 % (ref 10–15)
GFR SERPL CREATININE-BSD FRML MDRD: 69 ML/MIN/1.73M2
GLUCOSE BLD-MCNC: 244 MG/DL (ref 70–125)
GLUCOSE BLDC GLUCOMTR-MCNC: 195 MG/DL (ref 70–99)
GLUCOSE BLDC GLUCOMTR-MCNC: 199 MG/DL (ref 70–99)
GLUCOSE BLDC GLUCOMTR-MCNC: 206 MG/DL (ref 70–99)
GLUCOSE BLDC GLUCOMTR-MCNC: 229 MG/DL (ref 70–99)
GLUCOSE UR STRIP-MCNC: NEGATIVE MG/DL
HCT VFR BLD AUTO: 43 % (ref 35–47)
HDLC SERPL-MCNC: 35 MG/DL
HGB BLD-MCNC: 13.8 G/DL (ref 11.7–15.7)
HGB UR QL STRIP: ABNORMAL
HOLD SPECIMEN: NORMAL
KETONES UR STRIP-MCNC: ABNORMAL MG/DL
LDLC SERPL CALC-MCNC: 99 MG/DL
LEUKOCYTE ESTERASE UR QL STRIP: ABNORMAL
LVEF ECHO: NORMAL
MAGNESIUM SERPL-MCNC: 1.6 MG/DL (ref 1.8–2.6)
MAGNESIUM SERPL-MCNC: 1.7 MG/DL (ref 1.8–2.6)
MCH RBC QN AUTO: 32.2 PG (ref 26.5–33)
MCHC RBC AUTO-ENTMCNC: 32.1 G/DL (ref 31.5–36.5)
MCV RBC AUTO: 101 FL (ref 78–100)
NITRATE UR QL: NEGATIVE
PH UR STRIP: 7 [PH] (ref 5–7)
PLATELET # BLD AUTO: 168 10E3/UL (ref 150–450)
POTASSIUM BLD-SCNC: 4 MMOL/L (ref 3.5–5)
PROCALCITONIN SERPL-MCNC: 0.05 NG/ML (ref 0–0.49)
PROT SERPL-MCNC: 8 G/DL (ref 6–8)
RBC # BLD AUTO: 4.28 10E6/UL (ref 3.8–5.2)
RBC URINE: 1 /HPF
SODIUM SERPL-SCNC: 136 MMOL/L (ref 136–145)
SP GR UR STRIP: 1.02 (ref 1–1.03)
SQUAMOUS EPITHELIAL: 1 /HPF
TRIGL SERPL-MCNC: 118 MG/DL
UROBILINOGEN UR STRIP-MCNC: <2 MG/DL
WBC # BLD AUTO: 4.2 10E3/UL (ref 4–11)
WBC URINE: 9 /HPF

## 2022-01-13 PROCEDURE — 82040 ASSAY OF SERUM ALBUMIN: CPT | Performed by: HOSPITALIST

## 2022-01-13 PROCEDURE — 85027 COMPLETE CBC AUTOMATED: CPT | Performed by: HOSPITALIST

## 2022-01-13 PROCEDURE — 250N000013 HC RX MED GY IP 250 OP 250 PS 637: Performed by: FAMILY MEDICINE

## 2022-01-13 PROCEDURE — 93306 TTE W/DOPPLER COMPLETE: CPT | Mod: 26 | Performed by: INTERNAL MEDICINE

## 2022-01-13 PROCEDURE — 99221 1ST HOSP IP/OBS SF/LOW 40: CPT | Performed by: NURSE PRACTITIONER

## 2022-01-13 PROCEDURE — 97535 SELF CARE MNGMENT TRAINING: CPT | Mod: GO

## 2022-01-13 PROCEDURE — 80053 COMPREHEN METABOLIC PANEL: CPT | Performed by: HOSPITALIST

## 2022-01-13 PROCEDURE — 97116 GAIT TRAINING THERAPY: CPT | Mod: GP

## 2022-01-13 PROCEDURE — 250N000009 HC RX 250: Performed by: HOSPITALIST

## 2022-01-13 PROCEDURE — 99223 1ST HOSP IP/OBS HIGH 75: CPT | Performed by: STUDENT IN AN ORGANIZED HEALTH CARE EDUCATION/TRAINING PROGRAM

## 2022-01-13 PROCEDURE — 83735 ASSAY OF MAGNESIUM: CPT | Performed by: INTERNAL MEDICINE

## 2022-01-13 PROCEDURE — 97162 PT EVAL MOD COMPLEX 30 MIN: CPT | Mod: GP

## 2022-01-13 PROCEDURE — 97165 OT EVAL LOW COMPLEX 30 MIN: CPT | Mod: GO

## 2022-01-13 PROCEDURE — 80061 LIPID PANEL: CPT | Performed by: PSYCHIATRY & NEUROLOGY

## 2022-01-13 PROCEDURE — 999N000032 HC STATISTIC CHRONIC DISEASE SPECIALIST RT CONSULT

## 2022-01-13 PROCEDURE — 36415 COLL VENOUS BLD VENIPUNCTURE: CPT | Performed by: HOSPITALIST

## 2022-01-13 PROCEDURE — 250N000011 HC RX IP 250 OP 636: Performed by: INTERNAL MEDICINE

## 2022-01-13 PROCEDURE — 70496 CT ANGIOGRAPHY HEAD: CPT

## 2022-01-13 PROCEDURE — 250N000011 HC RX IP 250 OP 636: Performed by: HOSPITALIST

## 2022-01-13 PROCEDURE — 250N000013 HC RX MED GY IP 250 OP 250 PS 637: Performed by: PSYCHIATRY & NEUROLOGY

## 2022-01-13 PROCEDURE — 93306 TTE W/DOPPLER COMPLETE: CPT

## 2022-01-13 PROCEDURE — 99233 SBSQ HOSP IP/OBS HIGH 50: CPT | Performed by: INTERNAL MEDICINE

## 2022-01-13 PROCEDURE — 99223 1ST HOSP IP/OBS HIGH 75: CPT | Performed by: INTERNAL MEDICINE

## 2022-01-13 PROCEDURE — 250N000013 HC RX MED GY IP 250 OP 250 PS 637: Performed by: HOSPITALIST

## 2022-01-13 PROCEDURE — 97530 THERAPEUTIC ACTIVITIES: CPT | Mod: GO

## 2022-01-13 PROCEDURE — 81001 URINALYSIS AUTO W/SCOPE: CPT | Performed by: EMERGENCY MEDICINE

## 2022-01-13 PROCEDURE — 120N000001 HC R&B MED SURG/OB

## 2022-01-13 PROCEDURE — 97530 THERAPEUTIC ACTIVITIES: CPT | Mod: GP

## 2022-01-13 PROCEDURE — 70498 CT ANGIOGRAPHY NECK: CPT

## 2022-01-13 PROCEDURE — 99222 1ST HOSP IP/OBS MODERATE 55: CPT | Performed by: PSYCHIATRY & NEUROLOGY

## 2022-01-13 RX ORDER — CLOPIDOGREL BISULFATE 75 MG/1
300 TABLET ORAL ONCE
Status: COMPLETED | OUTPATIENT
Start: 2022-01-13 | End: 2022-01-13

## 2022-01-13 RX ORDER — CLOPIDOGREL BISULFATE 75 MG/1
75 TABLET ORAL DAILY
Status: DISCONTINUED | OUTPATIENT
Start: 2022-01-14 | End: 2022-01-15

## 2022-01-13 RX ORDER — MAGNESIUM SULFATE HEPTAHYDRATE 40 MG/ML
2 INJECTION, SOLUTION INTRAVENOUS ONCE
Status: COMPLETED | OUTPATIENT
Start: 2022-01-13 | End: 2022-01-13

## 2022-01-13 RX ORDER — IOPAMIDOL 755 MG/ML
75 INJECTION, SOLUTION INTRAVASCULAR ONCE
Status: COMPLETED | OUTPATIENT
Start: 2022-01-13 | End: 2022-01-13

## 2022-01-13 RX ORDER — LISINOPRIL 5 MG/1
10 TABLET ORAL DAILY
Status: DISCONTINUED | OUTPATIENT
Start: 2022-01-14 | End: 2022-01-15

## 2022-01-13 RX ADMIN — HYDROCHLOROTHIAZIDE 25 MG: 25 TABLET ORAL at 08:39

## 2022-01-13 RX ADMIN — HYDRALAZINE HYDROCHLORIDE 10 MG: 20 INJECTION INTRAMUSCULAR; INTRAVENOUS at 21:19

## 2022-01-13 RX ADMIN — CITALOPRAM HYDROBROMIDE 40 MG: 40 TABLET ORAL at 08:38

## 2022-01-13 RX ADMIN — POTASSIUM CHLORIDE 40 MEQ: 20 TABLET, EXTENDED RELEASE ORAL at 00:33

## 2022-01-13 RX ADMIN — IOPAMIDOL 75 ML: 755 INJECTION, SOLUTION INTRAVENOUS at 20:50

## 2022-01-13 RX ADMIN — GLIMEPIRIDE 2 MG: 1 TABLET ORAL at 08:38

## 2022-01-13 RX ADMIN — DEXAMETHASONE SODIUM PHOSPHATE 4 MG: 4 INJECTION, SOLUTION INTRA-ARTICULAR; INTRALESIONAL; INTRAMUSCULAR; INTRAVENOUS; SOFT TISSUE at 23:12

## 2022-01-13 RX ADMIN — POLYETHYLENE GLYCOL 3350 17 G: 17 POWDER, FOR SOLUTION ORAL at 08:37

## 2022-01-13 RX ADMIN — FAMOTIDINE 20 MG: 10 INJECTION, SOLUTION INTRAVENOUS at 18:01

## 2022-01-13 RX ADMIN — ENOXAPARIN SODIUM 40 MG: 40 INJECTION SUBCUTANEOUS at 18:00

## 2022-01-13 RX ADMIN — DEXAMETHASONE SODIUM PHOSPHATE 4 MG: 4 INJECTION, SOLUTION INTRA-ARTICULAR; INTRALESIONAL; INTRAMUSCULAR; INTRAVENOUS; SOFT TISSUE at 04:52

## 2022-01-13 RX ADMIN — ACETAMINOPHEN 650 MG: 325 TABLET ORAL at 10:04

## 2022-01-13 RX ADMIN — CLOPIDOGREL BISULFATE 300 MG: 75 TABLET ORAL at 17:59

## 2022-01-13 RX ADMIN — DEXAMETHASONE SODIUM PHOSPHATE 4 MG: 4 INJECTION, SOLUTION INTRA-ARTICULAR; INTRALESIONAL; INTRAMUSCULAR; INTRAVENOUS; SOFT TISSUE at 18:00

## 2022-01-13 RX ADMIN — ACETAMINOPHEN 650 MG: 325 TABLET ORAL at 23:09

## 2022-01-13 RX ADMIN — DEXAMETHASONE SODIUM PHOSPHATE 4 MG: 4 INJECTION, SOLUTION INTRA-ARTICULAR; INTRALESIONAL; INTRAMUSCULAR; INTRAVENOUS; SOFT TISSUE at 11:59

## 2022-01-13 RX ADMIN — LISINOPRIL 10 MG: 5 TABLET ORAL at 23:09

## 2022-01-13 RX ADMIN — INSULIN ASPART 1 UNITS: 100 INJECTION, SOLUTION INTRAVENOUS; SUBCUTANEOUS at 21:25

## 2022-01-13 RX ADMIN — FAMOTIDINE 20 MG: 10 INJECTION, SOLUTION INTRAVENOUS at 08:38

## 2022-01-13 RX ADMIN — AMLODIPINE BESYLATE 10 MG: 5 TABLET ORAL at 08:38

## 2022-01-13 RX ADMIN — MAGNESIUM SULFATE HEPTAHYDRATE 2 G: 40 INJECTION, SOLUTION INTRAVENOUS at 08:26

## 2022-01-13 RX ADMIN — HYDRALAZINE HYDROCHLORIDE 10 MG: 20 INJECTION INTRAMUSCULAR; INTRAVENOUS at 04:53

## 2022-01-13 RX ADMIN — LIDOCAINE 1 PATCH: 246 PATCH TOPICAL at 21:20

## 2022-01-13 RX ADMIN — LEVOFLOXACIN 750 MG: 5 INJECTION, SOLUTION INTRAVENOUS at 18:02

## 2022-01-13 ASSESSMENT — ACTIVITIES OF DAILY LIVING (ADL)
ADLS_ACUITY_SCORE: 11
ADLS_ACUITY_SCORE: 6
PREVIOUS_RESPONSIBILITIES: MEAL PREP;HOUSEKEEPING;LAUNDRY;SHOPPING;MEDICATION MANAGEMENT;FINANCES
ADLS_ACUITY_SCORE: 6
ADLS_ACUITY_SCORE: 9
ADLS_ACUITY_SCORE: 11
ADLS_ACUITY_SCORE: 11
ADLS_ACUITY_SCORE: 6
ADLS_ACUITY_SCORE: 9
ADLS_ACUITY_SCORE: 11
ADLS_ACUITY_SCORE: 11
ADLS_ACUITY_SCORE: 6
ADLS_ACUITY_SCORE: 6
ADLS_ACUITY_SCORE: 11
ADLS_ACUITY_SCORE: 6
ADLS_ACUITY_SCORE: 6
ADLS_ACUITY_SCORE: 11
ADLS_ACUITY_SCORE: 6
ADLS_ACUITY_SCORE: 11
ADLS_ACUITY_SCORE: 6
ADLS_ACUITY_SCORE: 11

## 2022-01-13 ASSESSMENT — MIFFLIN-ST. JEOR: SCORE: 1568.69

## 2022-01-13 NOTE — CONSULTS
NEUROSURGERY CONSULTATION NOTE    Josefina Dumont   [id number]   [facility] [place] [id number]  71 year old female  Admission Date/Time: 1/12/2022  2:52 PM  Primary Care Provider: Rafat Lee  [facility] Attending Physician: Nichole Aguilar MD    Neurosurgery was asked to see this patient by Nichole Aguilar MD for evaluation of brain lesion.     PROBLEM LIST:  Active Problems:    Primary hypertension    Meningioma (H)    Injury of head, initial encounter    Fall, initial encounter    Syncope, unspecified syncope type    Pneumonia due to infectious organism, unspecified laterality, unspecified part of lung       Neurosurgery Attending: The patient's clinical examination, laboratory data, and plan was discussed with Dr Cullen.     CONSULTATION ASSESSMENT AND PLAN:  Josefina Dumont is a 71 year old with what sounds like mechanical fall 1/12/2022. Part of her work up in the ED included head CT which reveals partially calcified dural lesion left frontal convexity, likely meningioma 1.5 x 2.6 x 3 cm. Vasogenic edema present - started on steroids. Mild mass effect. No midline herniation. Right mid frontal centrum semiovale shows 4 mm focus of acute infarct on MRI. Neurology is following. Defer to neurology benefit of keppra for meningioma and question of seizure prior to fall but talking to patient, sounds like she tripped and fell backwards.     No surgical plans for meningioma resection at this time. Continue steroids for now with PPI for protection. We will see patient in our clinic OP and determine next plan of care. Re-image in 6-12 weeks vs resection vs rad onc for cyberknife.     HPI:  Josefina Dumont is a 71 year old female who presented to the ED 1/12/2022 after a fall at home. She describes a mechanical fall backwards, tripping on her feet. She did not lose consciousness. As she was falling she was wondering why her sister who was in front of her did not reach out an arm to catch her. Part  of her work up included head CT and MRI. Incidental finding of partially calcified dural lesion left frontal convexity, likely meningioma 1.5 x 2.6 x 3 cm. Vasogenic edema present - started on steroids. Mild mass effect. No midline herniation. Right mid frontal centrum semiovale shows 4 mm focus of acute infarct. Keppra loaded in the ED. On exam today, she is alert. Oriented. CORRALES. Speech is at her baseline. Denies headache or visual disturbance. Denies numbness or tingling. Denies bowel or bladder issues. Seems a little unsteady with her gait during observed move from bed to chair. A little impulsive. Steroids?. Interestingly she states her sister had some brain surgery for a non cancerous lesion but she is not sure the pathology. Asked her to find out. Answered her questions today in terms of potential diagnosis and options of treatment to include monitoring vs removal. PMH: DM with A1C 7, HTN, mood disorder.     No past medical history on file.  No past surgical history on file.    REVIEW OF SYSTEMS:  Negative with exception of HPI    MEDICATIONS:  No current outpatient medications on file.         ALLERGIES/SENSITIVITIES:     Allergies   Allergen Reactions     Atenolol Other (See Comments)     abd pain     Penicillins Hives       PERTINENT SOCIAL HISTORY: personally reviewed   Social History     Socioeconomic History     Marital status:      Spouse name: None     Number of children: None     Years of education: None     Highest education level: None   Occupational History     None   Tobacco Use     Smoking status: Former Smoker     Quit date: 2012     Years since quitting: 10.0     Smokeless tobacco: Never Used   Substance and Sexual Activity     Alcohol use: None     Drug use: None     Sexual activity: None   Other Topics Concern     None   Social History Narrative     None     Social Determinants of Health     Financial Resource Strain: Not on file   Food Insecurity: Not on file   Transportation Needs:  "Not on file   Physical Activity: Not on file   Stress: Not on file   Social Connections: Not on file   Intimate Partner Violence: Not on file   Housing Stability: Not on file         FAMILY HISTORY:  No family history on file.     PHYSICAL EXAM:   Constitutional: BP (!) 156/81 (BP Location: Right arm)   Pulse 103   Temp 98  F (36.7  C) (Oral)   Resp 18   Ht 1.626 m (5' 4\")   Wt 104.8 kg (231 lb)   SpO2 94%   BMI 39.65 kg/m       Mental Status: A & O in no acute distress. Affect is appropriate. Speech is fluent. Recent and remote memory are intact.  Attention span and concentration are normal.     Cranial Nerves: CN2: No funduscopic exam performed. CN3,4 & 6: Pupillary light response, lateral and vertical gaze normal.  No nystagmus.  Visual fields are full to confrontation. CN5: Intact to touch CN7: No facial weakness, smile, facial symmetry intact. CN8: Intact to spoken voice. CN9&10: Gag reflex, uvula midline, palate rises with phonation. CN11: Shoulder shrug 5/5 intact bilaterally. CN12: Tongue midline and moves freely from side to side.     Motor: No pronator drift of upper extremity. Normal bulk and tone all muscle groups of upper and lower extremities.    Strength: slight weakness left hand grasp compared to right otherwise equal strength in all extremities      Sensory: Sensation intact bilaterally to light touch.     Coordination: finger to nose, heel to shin. Gait slightly unsteady.      Reflexes:  No hoffmans/babinski/ clonus.    IMAGING:  I personally reviewed all radiographic images, shared with patient.     (non critical care) I spent more than 45 minutes in this apt, examining the pt, reviewing the scans, reviewing notes from chart, discussing treatment options with risks and benefits and coordinating care. >50 % clinic time was spent in face to face counseling and coordinating care    THAD Pappas [facility] Neurosurgery        Cc:   No referring provider " defined for this encounter.    Rafat Lee  @[facility]@

## 2022-01-13 NOTE — PROGRESS NOTES
Occupational Therapy      01/13/22 1345   Quick Adds   Type of Visit Initial Occupational Therapy Evaluation   Living Environment   People in home alone   Current Living Arrangements independent living facility   Home Accessibility no concerns   Transportation Anticipated family or friend will provide   Living Environment Comments Tub/shower w/ assess to shower chair    Self-Care   Usual Activity Tolerance moderate   Current Activity Tolerance fair   Equipment Currently Used at Home walker, rolling   Activity/Exercise/Self-Care Comment Ind. w/ all ADLs    Instrumental Activities of Daily Living (IADL)   Previous Responsibilities meal prep;housekeeping;laundry;shopping;medication management;finances   Disability/Function   Hearing Difficulty or Deaf no   Wear Glasses or Blind yes   Vision Management glasses   Walking or Climbing Stairs Difficulty yes   Walking or Climbing Stairs ambulation difficulty, requires equipment   Dressing/Bathing Difficulty no   Toileting issues no   Doing Errands Independently Difficulty (such as shopping) no   Fall history within last six months yes   Number of times patient has fallen within last six months 1   General Information   Onset of Illness/Injury or Date of Surgery 01/12/22   Referring Physician Nichole Aguilar MD    Patient/Family Therapy Goal Statement (OT) To get back home and be safe/independent    Additional Occupational Profile Info/Pertinent History of Current Problem Pt presenting for fall w/ syncope episode, CT of head indicates dural lesion in left frontal lobe- Meningioma. PMHx DM, HTN, Mood disorder   Existing Precautions/Restrictions fall   Cognitive Status Examination   Orientation Status person;place   Range of Motion Comprehensive   General Range of Motion no range of motion deficits identified   Strength Comprehensive (MMT)   General Manual Muscle Testing (MMT) Assessment upper extremity strength deficits identified   Upper Extremity (Manual Muscle Testing)    Upper Extremity: Manual Muscle Testing (MMT) left shoulder strength deficit;right shoulder strength deficit   Transfers   Transfers toilet transfer;sit-stand transfer;bed-chair transfer   Transfer Skill: Bed to Chair/Chair to Bed   Bed-Chair [place] (Transfers) contact guard;verbal cues   Assistive Device (Bed-Chair Transfers) standard walker   Sit-Stand Transfer   Sit-Stand [place] (Transfers) contact guard;verbal cues;supervision   Assistive Device (Sit-Stand Transfers) walker, standard   Toilet Transfer   Type (Toilet Transfer) stand-sit;sit-stand   [place] Level (Toilet Transfer) contact guard;supervision   Assistive Device (Toilet Transfer) walker, standard   Balance   Balance Assessment standing balance: static;standing balance: dynamic   Standing Balance: Static fair balance;good balance   Standing Balance: Dynamic fair balance   Activities of Daily Living   BADL Assessment lower body dressing;grooming;toileting   Lower Body Dressing Assessment   [place] Level (Lower Body Dressing) verbal cues;supervision   Position (Lower Body Dressing) unsupported sitting   Grooming Assessment   [place] Level (Grooming) contact guard assist;verbal cues   Position (Grooming) unsupported standing   Toileting   [place] Level (Toileting) contact guard assist;supervision;verbal cues   Position (Toileting) unsupported sitting   Clinical Impression   Criteria for Skilled Therapeutic Interventions Met (OT) yes;skilled treatment is necessary   OT Diagnosis dizziness/decreased balance, transfer safety    OT Problem List-Impairments impacting ADL problems related to;activity tolerance impaired;balance;strength;mobility   Assessment of Occupational Performance 1-3 Performance Deficits   Identified Performance Deficits balance, trnf/mobility, toileting   Planned Therapy Interventions (OT) ADL retraining;IADL retraining;balance training;strengthening   Clinical Decision Making Complexity (OT) low  complexity   Therapy Frequency (OT) Daily   Predicted Duration of Therapy 3   Anticipated Equipment Needs Upon Discharge (OT) shower chair   Risk & Benefits of therapy have been explained evaluation/treatment results reviewed;patient   OT Discharge Planning    OT Discharge Recommendation (DC Rec) Home with assist   OT Rationale for DC Rec Assistx1 for trnf/mobility/self cares, pt demonstrating occasional dizziness/slight LOB w/ functional mobility/self cares. Pt lives alone pt stated her sister may be able to assist upon d/c   Total Evaluation Time (Minutes)   Total Evaluation Time (Minutes) 6

## 2022-01-13 NOTE — PLAN OF CARE
"  Problem: Pain Acute  Goal: Acceptable Pain Control and Functional Ability  Outcome: No Change     Problem: Hypertension Acute  Goal: Blood Pressure Within Desired Range  Outcome: No Change     Problem: Diabetes Comorbidity  Goal: Blood Glucose Level Within Targeted Range  Outcome: No Change     Pt admitted to unit shortly before 2100. BP elevated at 202/85, otherwise VSS. BP down to 181/81 at 2300. Pt alert and oriented x 4 with some intermittent confusion. Orthostatic BP note obtained as pt states she is feeling \"very dizzy\" and visibly swaying while seated at bedside. Pt stated pain 7/10 in back and shoulders, lidocaine patch applied to mid-lower back. HS , 1 unit sliding scale insulin admin. Will continue to monitor BP and dizziness; neurology consult ordered.     Esperanza Ferreira RN 11:33 PM 1/12/2022   "

## 2022-01-13 NOTE — H&P
[facility]    History and Physical - Hospitalist Service       Date of Admission:  1/12/2022    Assessment & Plan      Josefina Dumont is a 71 year old female admitted on 1/12/2022. She has history of diabetes, hypertension, mood disorder who presented today with unwitnessed fall, syncope.    Unwitnessed fall vrs syncope - unclear etiology  -Pt had a unwitnessed fall, unsure if fall vrs syncope vrs seizure  -CT head showed lesion measuring 1.5 x 2.6 x 3 cm s/o meningioma  -MRI brain with and without contrast ordered.  Patient states she is claustrophobic, Ativan ordered prior to MRI.  -Monitor on telemetry for any arrhythmias  -Obtain orthostatic vitals  -Placed on fall precautions,seizure precautions  -Neurology consulted  -PT/OT consulted    Intracranial lesion, suggestive of meningioma  -CT head showed a lesion measuring 1.5 x 2.6 x 3 cm most suggestive of meningioma with vasogenic edema.  -MRI with and without contrast ordered  -Continue Decadron 4 mg every 6 hours as per neurosurgery recommendation  -On Pepcid twice daily  -Keppra loading dose given in the ER, will defer further seizure prophylaxis to neurology/NSG      Back pain and chest pain-likely secondary to fall  -CT C-spine negative for any acute fractures  -Lidocaine patch and Oxy prn ordered  -Placed on fall precautions    CAP  -CXR shows R basilar opacity, s/o pneumonia  -Patient afebrile, no leukocytosis.  No respiratory symptoms  -Continue Levaquin for now, check Pro-Yamil.  Can discontinue antibiotics if Pro-Yamil normal.  -COVID, influenza AMB negative    Diabetes with hyperglycemia  -A1c 7  -Resume home glimepiride.  -Placed on medium resistance sliding scale as patient now on dexamethasone.  Can likely switch NovoLog to NPH if persistent hyperglycemia while on steroids.  -Monitor blood sugars closely    Hypertension-blood pressure significantly elevated on arrival to ED likely secondary to missing her medications  today  -Resume home amlodipine and hydrochlorothiazide  -Hydralazine as needed ordered      Mood disorder-  -Patient very tearful during my encounter.  -Continue PTA Celexa.  -Ativan as needed ordered    Hypokalemia-replete per protocol         Diet: Diabetic diet  DVT Prophylaxis: Enoxaparin (Lovenox) SQ  Machuca Catheter: Not present  Central Lines: None  Code Status:  Full code, d/w patient on admission    Disposition Plan   Expected Discharge: 2-3 days  Anticipated discharge location:  Awaiting care coordination huddle  Delays: Pending work up, consult evaluation     The patient's care was discussed with the Patient, bedside RN    Teresa Reardon MD  [facility]  ______________________________________________________________________    Chief Complaint   Syncope    History is obtained from the patient and chart review    History of Present Illness   Josefina Dumont is a 71 year old female with history of hypertension, diabetes, mood disorder presented from her apartment with possible syncope and unwitnessed fall.  Patient denies any recall of any events.  Patient states she was found by her sister and woke up when EMS arrived.  Does not remember for how long she was passed out.  Reports having back pain and chest pain due to the fall.  Reports having mild headaches. Emesis X1 in ER. Denies any  shortness of breath, abdominal pain, nausea, cough, diarrhea, constipation, dysuria or polyuria.  No dizziness, palpitations.  No tingling or numbness.  No recent travel.  No known exposures to Covid.  No sick contact.    ER, on arrival patient found to have significant elevated blood pressure.  Labs unremarkable except for mild hypokalemia.  Influenza AMB, COVID test negative.  Chest x-ray shows right basilar infiltrates suggestive of pneumonia.  CT head showed 1.5 x 2.6 x 3 cm suggestive of meningioma with vasogenic edema.  CT C-spine shows no acute injury or fracture.      Review of Systems     The 10 point Review of Systems is negative other than noted in the HPI or here.     Past Medical History    I have reviewed this patient's medical history and updated it with pertinent information if needed.   DM, HTN, Mood disorder    Past Surgical History   I have reviewed this patient's surgical history and updated it with pertinent information if needed.  Reviewed and noncontributory    Social History   I have reviewed this patient's social history and updated it with pertinent information if needed.  Social History     Tobacco Use     Smoking status: Not on file     Smokeless tobacco: Not on file   Substance Use Topics     Alcohol use: Not on file     Drug use: Not on file       Family History   Reviewed and noncontributory    Prior to Admission Medications   Prior to Admission Medications   Prescriptions Last Dose Informant Patient Reported? Taking?   amLODIPine (NORVASC) 10 MG tablet 1/11/2022 at Unknown time  Yes Yes   Sig: Take 10 mg by mouth daily   calcium carbonate (TUMS) 500 MG chewable tablet   Yes Yes   Sig: Take 1 chew tab by mouth 2 times daily as needed for heartburn   citalopram (CELEXA) 40 MG tablet 1/11/2022 at Unknown time  Yes Yes   Sig: Take 40 mg by mouth daily   glimepiride (AMARYL) 2 MG tablet 1/11/2022 at Unknown time  Yes Yes   Sig: Take 2 mg by mouth every morning (before breakfast)   hydrochlorothiazide (HYDRODIURIL) 25 MG tablet 1/11/2022 at Unknown time  Yes Yes   Sig: Take 25 mg by mouth daily   multivitamin w/minerals (THERA-VIT-M) tablet 1/11/2022 at Unknown time  Yes Yes   Sig: Take 1 tablet by mouth daily   vitamin E (TOCOPHEROL) 400 units (180 mg) capsule 1/11/2022 at Unknown time  Yes Yes   Sig: Take 400 Units by mouth daily      Facility-Administered Medications: None     Allergies   Allergies   Allergen Reactions     Atenolol Other (See Comments)     abd pain     Penicillins Hives       Physical Exam   Vital Signs: Temp: 99.1  F (37.3  C) Temp src: Oral BP: (!) 227/98  Pulse: 83   Resp: 21 SpO2: 93 % O2 Device: None (Room air)    Weight: 231 lbs 0 oz    General:  Appears stated age,  A&O x 3.  Skin:  Warm, dry. No rashes or lesions on exposed skin.  HEENT:  Normocephalic, atraumatic; EOMs grossly intact.  Neck:  Supple.  Chest:  Breath sounds CTA and no increased work of breathing on room air.Chest tenderness on palpation  Cardiovascular:  RRR,.No peripheral edema.  Abdomen:  Soft, non-tender, non-distended.  Musculoskeletal:  Moves all four extremities. No muscle atrophy.  Neurological:  CN 2-12 grossly intact.  Psychiatry:Pt tearful and very anxious    Data   Data reviewed today: I reviewed all medications, new labs and imaging results over the last 24 hours.  I personally reviewed labs, CT head      Recent Labs   Lab 01/12/22  1534   WBC 6.0   HGB 12.7   MCV 99         POTASSIUM 3.4*   CHLORIDE 101   CO2 25   BUN 13   CR 0.86   ANIONGAP 13   ALBARO 9.7   *   ALBUMIN 3.2*   PROTTOTAL 7.8   BILITOTAL 0.8   ALKPHOS 147*   ALT 33   AST 47*       Imaging:  Recent Results (from the past 24 hour(s))   CT Head w/o Contrast    [facility]  CT HEAD W/O CONTRAST, CT CERVICAL SPINE W/O CONTRAST  1/12/2022 2:23 PM     INDICATION: Traumatic head and neck injury. Fell and hit back of head on wall. Amnestic to event.  TECHNIQUE:   HEAD CT: Head CT without IV contrast. Dose reduction techniques were used.  CERVICAL SPINE CT: Helical images were obtained through the cervical spine and were evaluated in the axial plane with sagittal and coronal reformations. Dose reduction techniques were used.  SEDATION: None.  COMPARISON: None.      FINDINGS:   HEAD CT: No intracranial hemorrhage, extraaxial collection, or CT evidence of acute infarct. There is a partially calcified extra-axial mass lesion along the left aspect of the anterior hemispheric falx and anterior/inferior left frontal convexity   measuring up to 1.5 x 2.6 cm in maximum transaxial  dimensions and 3.0 cm CC. There is low-attenuation change in the adjacent parasagittal left frontal lobe. Mild presumed chronic small vessel ischemic changes. Mild generalized volume loss. The ventricles   are proportional to the sulci. Atherosclerotic calcifications of the cavernous internal carotid arteries. No acute calvarial fracture or scalp hematoma. The visualized orbits, paranasal sinuses, and mastoid air cells are free of significant disease.     CERVICAL SPINE CT:   Normal vertebral body heights. Straightening of usual cervical lordosis with 1 mm anterolistheses of C3 on C4 and C4 on C5. No evidence for acute fracture or posttraumatic subluxation. Mild-to-moderate degenerative change at the anterior C1-C2   articulation. Ankylosis of the left C3 facet joint. No high-grade spinal canal or neural foraminal stenosis. Grossly normal paraspinal soft tissues. Normal lung apices.         Impression    CONCLUSION:  HEAD CT:  1.  Partially calcified left anterior parafalcine extra-axial mass lesion measuring 1.5 x 2.6 x 3.0 cm, with appearance most suggestive of meningioma. There is a low-attenuation parenchymal change in the adjacent left frontal lobe, likely vasogenic   edema. Recommend MRI brain without and with IV contrast for further characterization.  2.  No acute intracranial hemorrhage or CT evidence for acute infarction.  3.  No acute calvarial fracture or scalp hematoma.  4.  Mild global brain parenchymal volume loss with additional presumed sequelae of mild chronic small vessel ischemic disease.    CERVICAL SPINE CT:  1.  No acute fracture or traumatic subluxation of the cervical spine by CT imaging.  2.  No high-grade spinal canal or neural foraminal stenosis.   CT Cervical Spine w/o Contrast    [facility] [place]  CT HEAD W/O CONTRAST, CT CERVICAL SPINE W/O CONTRAST  1/12/2022 2:23 PM     INDICATION: Traumatic head and neck injury. Fell and hit back of head on wall.  Amnestic to event.  TECHNIQUE:   HEAD CT: Head CT without IV contrast. Dose reduction techniques were used.  CERVICAL SPINE CT: Helical images were obtained through the cervical spine and were evaluated in the axial plane with sagittal and coronal reformations. Dose reduction techniques were used.  SEDATION: None.  COMPARISON: None.      FINDINGS:   HEAD CT: No intracranial hemorrhage, extraaxial collection, or CT evidence of acute infarct. There is a partially calcified extra-axial mass lesion along the left aspect of the anterior hemispheric falx and anterior/inferior left frontal convexity   measuring up to 1.5 x 2.6 cm in maximum transaxial dimensions and 3.0 cm CC. There is low-attenuation change in the adjacent parasagittal left frontal lobe. Mild presumed chronic small vessel ischemic changes. Mild generalized volume loss. The ventricles   are proportional to the sulci. Atherosclerotic calcifications of the cavernous internal carotid arteries. No acute calvarial fracture or scalp hematoma. The visualized orbits, paranasal sinuses, and mastoid air cells are free of significant disease.     CERVICAL SPINE CT:   Normal vertebral body heights. Straightening of usual cervical lordosis with 1 mm anterolistheses of C3 on C4 and C4 on C5. No evidence for acute fracture or posttraumatic subluxation. Mild-to-moderate degenerative change at the anterior C1-C2   articulation. Ankylosis of the left C3 facet joint. No high-grade spinal canal or neural foraminal stenosis. Grossly normal paraspinal soft tissues. Normal lung apices.         Impression    CONCLUSION:  HEAD CT:  1.  Partially calcified left anterior parafalcine extra-axial mass lesion measuring 1.5 x 2.6 x 3.0 cm, with appearance most suggestive of meningioma. There is a low-attenuation parenchymal change in the adjacent left frontal lobe, likely vasogenic   edema. Recommend MRI brain without and with IV contrast for further characterization.  2.  No acute  intracranial hemorrhage or CT evidence for acute infarction.  3.  No acute calvarial fracture or scalp hematoma.  4.  Mild global brain parenchymal volume loss with additional presumed sequelae of mild chronic small vessel ischemic disease.    CERVICAL SPINE CT:  1.  No acute fracture or traumatic subluxation of the cervical spine by CT imaging.  2.  No high-grade spinal canal or neural foraminal stenosis.   XR Chest Port 1 View    Narrative    EXAM: XR CHEST PORT 1 VIEW  LOCATION: [facility]  DATE/TIME: 1/12/2022 3:37 PM    INDICATION: Fever  COMPARISON: None.      Impression    IMPRESSION: Stable cardiomegaly with normal vascularity. Patchy right basilar opacities suggesting pneumonia. Left lung clear. No pneumothorax nor pleural effusion.    Consider follow-up radiographs to ensure resolution.

## 2022-01-13 NOTE — CONSULTS
CARDIOLOGY CONSULT NOTE         Assessment:   1.  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  Difficult to assess symptoms, patient chronically short of breath for 10 years maybe worse over the last year.  Given difficulty to assess symptoms as well as patient's feeling overwhelmed with diagnosis of meningioma, would anticipate evaluation of mitral valve as an outpatient.  Could arrange for outpatient LUC after more discussion of her.  Given that it is only mild to moderate with 3 mm gradient, doubt surgical intervention would not be needed.  Would also need to evaluate Garcia score to see possibility of potentially pursuing percutaneous valvuloplasty if needed.  2.  Primary hypertension-blood pressure elevated, on amlodipine 10 mg a day and given preserved renal function we will add some ACE inhibitor.  3.  Meningioma (H)-small to moderate anterior left frontal convexity meningioma with associated vasogenic edema resulting mid localized mass-effect.  No midline shift or herniation or hydrocephalus, defer to neurosurgery.     Plan:   1.  Add low-dose lisinopril for blood pressure control.  2.  We will discuss with patient and sister as an outpatient mitral stenosis, at that point time I consider scheduling LUC.  3.  Can follow with me as primary cardiologist     Current History:   [facility] has been requested by Dr. Nichole Aguilar to evaluate Josefina Dumont  who is a 71 year old year old white female for mitral valve disease.  Patient has been in her usual state of health which over the last 10 years is included some shortness of breath on activity.  She really cannot be very specific about this.  Maybe over the last year she thinks she is more short of breath.  There has been no syncope, presyncope, chest discomfort, palpitations, PND, orthopnea or peripheral edema or sudden weight change.  Day of presentation, apparently she was backing away  from her sister and her sisters apartment hallway, lost footing and fell backwards.  She hit her head and apparently had a syncopal spell and was out for quite some time until the paramedics arrived.  She was brought to the hospital, noted to have a new diagnosis of meningioma as well as echo suggesting mitral stenosis and cardiology consultation requested.    Past Medical History:   Benign essential hypertension  Diabetes mellitus    Past history is negative for cancer, tuberculosis, myocardial infarction,  rheumatic fever,  cerebrovascular accident, chronic kidney disease, peptic ulcer disease, chronic obstructive pulmonary disease, or thyroid disorder  and no lipid disorder.    Past Surgical History:   No past surgical history on file. She  has no past surgical history on file.    Family History:   Reviewed, and negative for coronary artery disease or cardiac issues in the family    Social History:   Reviewed, and she  reports that she quit smoking about 10 years ago.  She used to smoke up to a pack a day for about 30 years she has never used smokeless tobacco.  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 Lee.    Meds:       amLODIPine  10 mg Oral Daily     citalopram  40 mg Oral Daily     clopidogrel  300 mg Oral Once     [START ON 1/14/2022] clopidogrel  75 mg Oral Daily     dexamethasone  4 mg Intravenous Q6H     enoxaparin ANTICOAGULANT  40 mg Subcutaneous Q24H     famotidine  20 mg Intravenous Q12H     glimepiride  2 mg Oral QAM AC     hydrochlorothiazide  25 mg Oral Daily     insulin aspart  1-7 Units Subcutaneous TID AC     insulin aspart  1-5 Units Subcutaneous At Bedtime     levofloxacin  750 mg Intravenous Q24H     lidocaine  1 patch Transdermal Q24h    And     lidocaine   Transdermal Q8H     polyethylene glycol  17 g Oral Daily     sodium chloride (PF)  3 mL Intracatheter Q8H     sodium  "chloride (PF)  3 mL Intracatheter Q8H     Allergies:   Atenolol and Penicillins    Review of Systems:   A 12 point comprehensive review of systems was  as follows:   General: negative for fatigue, weight loss or weight gain  Constitutional: negative for anorexia, chills, fevers, malaise, night sweats   Eyes: negative for cataracts, color blindness, contacts/glasses, glaucoma, icterus, irritation, redness and visual disturbance  Ears, nose, mouth, throat, and face: negative for ear drainage, earaches, epistaxis, facial trauma, hearing loss, hoarseness, nasal congestion, snoring, sore mouth, sore throat, tinnitus and voice change  Respiratory: Positive dyspnea on exertion, negative hemoptysis, sputum production, pleurisy, stridor, wheezing, PND, orthopnea  Cardiovascular: negative for chest pain, chest pressure/discomfort, claudication, dyspnea, exertional chest pressure/discomfort, fatigue, irregular heart beat, lower extremity edema, near-syncope,  palpitations,  syncope   Gastrointestinal: negative for abdominal pain, change in bowel haibits, constipation, diarrhea, dyspepsia, dysphagia, jaundice, melena, nausea, odynophagia, reflux symptoms and vomiting  Genitourinary: negative for decreased stream  Hematologic/lymphatic: negative for bleeding  Musculoskeletal: negative for arthralgias, back pain, bone pain, muscle weakness, myalgias, neck pain and stiff joints  Neurological: negative for syncope, presyncope, coordination problems, dizziness, gait problems, headaches, memory problems, paresthesia, seizures, speech problems, tremors, vertigo and weakness    Objective:     Physical Exam:  BP (!) 179/79 (BP Location: Right arm)   Pulse 66   Temp 97.9  F (36.6  C) (Oral)   Resp 18   Ht 1.626 m (5' 4\")   Wt 104.8 kg (231 lb)   SpO2 96%   BMI 39.65 kg/m       Head:    Normocephalic, without obvious abnormality, atraumatic   Eyes:    PERRL, conjunctiva/corneas clear, EOM's intact,           Nose:   Nares normal, " septum midline, mucosa normal, no drainage  or sinus tenderness   Throat:   Lips, mucosa, and tongue normal; teeth and gums normal   Neck:   Supple, symmetrical, trachea midline, no adenopathy;        thyroid:  No enlargement/tenderness/nodules; no carotid    bruit or JVD   Back:     Symmetric, no curvature, ROM normal, no CVA tenderness   Lungs:     Clear to auscultation bilaterally, respirations unlabored   Chest wall:    No tenderness or deformity   Heart:    Regular rate and rhythm, S1 and S2 normal, no murmur, rub   or gallop   Abdomen:     Soft, non-tender, bowel sounds active all four quadrants,     no masses, no organomegaly   Extremities:   Extremities normal, atraumatic, no cyanosis or edema   Pulses:   2+ and symmetric all extremities   Skin:   Skin color, texture, turgor normal, no rashes or lesions   Neurologic:   CNII-XII intact. Normal strength, sensation and reflexes       throughout     Cardiographics and Imaging  Radiology Results: Chest x-ray shows Stable cardiomegaly with normal vascularity. Patchy right basilar opacities suggesting pneumonia. Left lung clear. No pneumothorax nor pleural effusion.   Consider follow-up radiographs to ensure resolution.    EKG Results: personally reviewed sinus rhythm, leftward axis, late transition, possible left ventricular hypertrophy by voltage.    Lab Review   Pertinent Labs  Lab Results: personally reviewed       Lab Results   Component Value Date    TROPONINI 0.03 01/12/2022       Lab Results   Component Value Date    BUN 13 01/13/2022     01/13/2022    CO2 22 01/13/2022       Lab Results   Component Value Date    WBC 4.2 01/13/2022    HGB 13.8 01/13/2022    HCT 43.0 01/13/2022     (H) 01/13/2022     01/13/2022       No results found for: BNP

## 2022-01-13 NOTE — PLAN OF CARE
Problem: Hypertension Comorbidity  Goal: Blood Pressure in Desired Range  Outcome: Not  Progressing.   BP continues to be elevated. 181/81 and 222/96. PRN hydralazine given, recheck 210/91. Resident notified. Awaiting orders.    Mag 1.6. Recheck tomorrow morning. Potassium 3.4, replacement given recheck this morning was 4.0.  Pt is up to the bathroom with A-1. Pt is dizzy when up ambulating and is very unsteady. Pt answers questions appropriately but has trouble finding the right words at times.   Randa Hernández RN

## 2022-01-13 NOTE — PROGRESS NOTES
[facility]    Medicine Progress Note - Hospitalist Service       Date of Admission:  1/12/2022    Assessment & Plan          Josefina Dumont is a 71 year old female admitted on 1/12/2022. She has history of diabetes, hypertension, mood disorder who presented today with unwitnessed fall, syncope.     1.Unwitnessed fall vrs syncope - unclear etiology  -Pt had a unwitnessed fall, unsure if fall vs syncope vrs seizure  -CT head showed lesion measuring 1.5 x 2.6 x 3 cm s/o meningioma  -MRI brain--showed mass suggestive of meningioma and acute cva  -Monitor on telemetry for any arrhythmias  -Obtain orthostatic vitals  -Placed on fall precautions,seizure precautions  -Neurology consulted-pending  -PT/OT consulted     2.Intracranial lesion, suggestive of meningioma  -CT head showed a lesion measuring 1.5 x 2.6 x 3 cm most suggestive of meningioma with vasogenic edema.  -MRI with and without contrast ordered  -Continue Decadron 4 mg every 6 hours as per neurosurgery recommendation  -On Pepcid twice daily  -Keppra loading dose given in the ER, will defer further seizure prophylaxis to neurology    3.Acute CVA  -on mri last night small cva  -tele  -check echo  -neurology to see  -pt/ot     4.Back pain and chest pain-likely secondary to fall  -CT C-spine negative for any acute fractures  -Lidocaine patch and Oxy prn ordered  -Placed on fall precautions     5.CAP  -CXR shows R basilar opacity, s/o pneumonia  -Patient afebrile, no leukocytosis.  No respiratory symptoms  -Continue Levaquin  -COVID, influenza AMB negative     6.Diabetes with hyperglycemia  -A1c 7  -Resume home glimepiride.  -Placed on medium resistance sliding scale as patient now on dexamethasone.  Can likely switch NovoLog to NPH if persistent hyperglycemia while on steroids.  -Monitor blood sugars closely     7.Hypertension-blood pressure significantly elevated on arrival to ED  -maybe from CVA  -Resume home amlodipine and  hydrochlorothiazide  -Hydralazine as needed ordered     8.Mood disorder-  -Continue PTA Celexa.  -Ativan as needed ordered     9.Hypokalemia-replete per protocol  -mag was still low replaced iv today    10. Obesity- BMI 39               Diet: Consistent Carbohydrate Diet Low Consistent Carb (45 g CHO per Meal) Diet    DVT Prophylaxis: Enoxaparin (Lovenox) SQ  Machuca Catheter: Not present  Central Lines: None  Code Status: Full Code      Disposition Plan   Expected Discharge: 01/15/2022     Anticipated discharge location:home vs tcu, she lives alone     The patient's care was discussed with the Care Coordinator/ and Patient. I called sister to update at 1434    Nichole Aguilar MD  Hospitalist Service  [facility]  Securely message with the Vocera Web Console (learn more here)  Text page via frooly Paging/Directory        ______________________________________________________________________    Interval History   She notes mild headache on top of head  Denied focal weakness, speech issues, or numbness  Eating ok      Data reviewed today: I reviewed all medications, new labs and imaging results over the last 24 hours. I personally reviewed no images or EKG's today.    Physical Exam   Vital Signs: Temp: 98.5  F (36.9  C) Temp src: Oral BP: (!) 158/75 Pulse: 73   Resp: 18 SpO2: 94 % O2 Device: None (Room air)    Weight: 231 lbs 0 oz  Constitutional: awake, fatigued, alert, cooperative and no apparent distress  Eyes: sclera clear  Respiratory: No increased work of breathing, good air exchange, clear to auscultation bilaterally, no crackles or wheezing  Cardiovascular: Normal apical impulse, regular rate and rhythm, normal S1 and S2, no S3 or S4, and no murmur noted  GI: normal bowel sounds, soft, non-distended and non-tender  Skin: no bruising or bleeding  Musculoskeletal: no lower extremity pitting edema present  Neurologic: Mental Status Exam:  Level of Alertness:   awake  Motor  Exam:  moves all extremities well and symmetrically, speech fluent,hand  equal  Neuropsychiatric: General: normal, calm and normal eye contact    Data   Recent Labs   Lab 01/13/22  1145 01/13/22  0758 01/13/22  0429 01/12/22  2201 01/12/22  1534   WBC  --   --  4.2  --  6.0   HGB  --   --  13.8  --  12.7   MCV  --   --  101*  --  99   PLT  --   --  168  --  167   NA  --   --  136  --  139   POTASSIUM  --   --  4.0  --  3.4*   CHLORIDE  --   --  102  --  101   CO2  --   --  22  --  25   BUN  --   --  13  --  13   CR  --   --  0.89  --  0.86   ANIONGAP  --   --  12  --  13   ALBARO  --   --  9.6  --  9.7   * 206* 244*   < > 181*   ALBUMIN  --   --  3.2*  --  3.2*   PROTTOTAL  --   --  8.0  --  7.8   BILITOTAL  --   --  0.6  --  0.8   ALKPHOS  --   --  142*  --  147*   ALT  --   --  30  --  33   AST  --   --  39  --  47*    < > = values in this interval not displayed.     Recent Results (from the past 24 hour(s))   CT Head w/o Contrast    [facility]  CT HEAD W/O CONTRAST, CT CERVICAL SPINE W/O CONTRAST  1/12/2022 2:23 PM     INDICATION: Traumatic head and neck injury. Fell and hit back of head on wall. Amnestic to event.  TECHNIQUE:   HEAD CT: Head CT without IV contrast. Dose reduction techniques were used.  CERVICAL SPINE CT: Helical images were obtained through the cervical spine and were evaluated in the axial plane with sagittal and coronal reformations. Dose reduction techniques were used.  SEDATION: None.  COMPARISON: None.      FINDINGS:   HEAD CT: No intracranial hemorrhage, extraaxial collection, or CT evidence of acute infarct. There is a partially calcified extra-axial mass lesion along the left aspect of the anterior hemispheric falx and anterior/inferior left frontal convexity   measuring up to 1.5 x 2.6 cm in maximum transaxial dimensions and 3.0 cm CC. There is low-attenuation change in the adjacent parasagittal left frontal lobe. Mild presumed chronic small  vessel ischemic changes. Mild generalized volume loss. The ventricles   are proportional to the sulci. Atherosclerotic calcifications of the cavernous internal carotid arteries. No acute calvarial fracture or scalp hematoma. The visualized orbits, paranasal sinuses, and mastoid air cells are free of significant disease.     CERVICAL SPINE CT:   Normal vertebral body heights. Straightening of usual cervical lordosis with 1 mm anterolistheses of C3 on C4 and C4 on C5. No evidence for acute fracture or posttraumatic subluxation. Mild-to-moderate degenerative change at the anterior C1-C2   articulation. Ankylosis of the left C3 facet joint. No high-grade spinal canal or neural foraminal stenosis. Grossly normal paraspinal soft tissues. Normal lung apices.         Impression    CONCLUSION:  HEAD CT:  1.  Partially calcified left anterior parafalcine extra-axial mass lesion measuring 1.5 x 2.6 x 3.0 cm, with appearance most suggestive of meningioma. There is a low-attenuation parenchymal change in the adjacent left frontal lobe, likely vasogenic   edema. Recommend MRI brain without and with IV contrast for further characterization.  2.  No acute intracranial hemorrhage or CT evidence for acute infarction.  3.  No acute calvarial fracture or scalp hematoma.  4.  Mild global brain parenchymal volume loss with additional presumed sequelae of mild chronic small vessel ischemic disease.    CERVICAL SPINE CT:  1.  No acute fracture or traumatic subluxation of the cervical spine by CT imaging.  2.  No high-grade spinal canal or neural foraminal stenosis.   CT Cervical Spine w/o Contrast    [facility]  CT HEAD W/O CONTRAST, CT CERVICAL SPINE W/O CONTRAST  1/12/2022 2:23 PM     INDICATION: Traumatic head and neck injury. Fell and hit back of head on wall. Amnestic to event.  TECHNIQUE:   HEAD CT: Head CT without IV contrast. Dose reduction techniques were used.  CERVICAL SPINE CT: Helical  images were obtained through the cervical spine and were evaluated in the axial plane with sagittal and coronal reformations. Dose reduction techniques were used.  SEDATION: None.  COMPARISON: None.      FINDINGS:   HEAD CT: No intracranial hemorrhage, extraaxial collection, or CT evidence of acute infarct. There is a partially calcified extra-axial mass lesion along the left aspect of the anterior hemispheric falx and anterior/inferior left frontal convexity   measuring up to 1.5 x 2.6 cm in maximum transaxial dimensions and 3.0 cm CC. There is low-attenuation change in the adjacent parasagittal left frontal lobe. Mild presumed chronic small vessel ischemic changes. Mild generalized volume loss. The ventricles   are proportional to the sulci. Atherosclerotic calcifications of the cavernous internal carotid arteries. No acute calvarial fracture or scalp hematoma. The visualized orbits, paranasal sinuses, and mastoid air cells are free of significant disease.     CERVICAL SPINE CT:   Normal vertebral body heights. Straightening of usual cervical lordosis with 1 mm anterolistheses of C3 on C4 and C4 on C5. No evidence for acute fracture or posttraumatic subluxation. Mild-to-moderate degenerative change at the anterior C1-C2   articulation. Ankylosis of the left C3 facet joint. No high-grade spinal canal or neural foraminal stenosis. Grossly normal paraspinal soft tissues. Normal lung apices.         Impression    CONCLUSION:  HEAD CT:  1.  Partially calcified left anterior parafalcine extra-axial mass lesion measuring 1.5 x 2.6 x 3.0 cm, with appearance most suggestive of meningioma. There is a low-attenuation parenchymal change in the adjacent left frontal lobe, likely vasogenic   edema. Recommend MRI brain without and with IV contrast for further characterization.  2.  No acute intracranial hemorrhage or CT evidence for acute infarction.  3.  No acute calvarial fracture or scalp hematoma.  4.  Mild global brain  parenchymal volume loss with additional presumed sequelae of mild chronic small vessel ischemic disease.    CERVICAL SPINE CT:  1.  No acute fracture or traumatic subluxation of the cervical spine by CT imaging.  2.  No high-grade spinal canal or neural foraminal stenosis.   XR Chest Port 1 View    Narrative    EXAM: XR CHEST PORT 1 VIEW  LOCATION: [facility]  DATE/TIME: 1/12/2022 3:37 PM    INDICATION: Fever  COMPARISON: None.      Impression    IMPRESSION: Stable cardiomegaly with normal vascularity. Patchy right basilar opacities suggesting pneumonia. Left lung clear. No pneumothorax nor pleural effusion.    Consider follow-up radiographs to ensure resolution.   MR Brain w/o & w Contrast    Narrative    EXAM: MR BRAIN W/O and W CONTRAST  LOCATION: [facility]  DATE/TIME: 1/12/2022 8:29 PM    INDICATION: Brain mass or lesion.  COMPARISON: Head CT 1/12/2022.  CONTRAST: 10 mL Gadavist.  TECHNIQUE: Routine multiplanar multisequence head MRI without and with intravenous contrast.    FINDINGS:  Several images are degraded by patient motion artifact, which mild to moderately limits evaluation.    INTRACRANIAL CONTENTS:   There is a 1.9 x 2.1 x 3.2 cm pedunculated dural-based solid enhancing calcified mass overlying the anterior left frontal convexity and extending into the interhemispheric fissure, consistent with a meningioma. There is underlying T2 hyperintense   vasogenic edema within the anterior parasagittal left frontal lobe. Mild associated localized mass effect with sulcal effacement. No midline shift, herniation or hydrocephalus.    4 mm punctate focus of acute infarction within the right mid frontal centrum semiovale. Mild to moderate chronic small vessel ischemic changes throughout the nathaniel and cerebral hemispheric white matter bilaterally. Few tiny chronic bilateral basal ganglia   lacunar infarcts. Mild generalized brain parenchymal volume loss. No  intracranial hemorrhage or abnormal extra axial fluid collection.    SELLA: No abnormality accounting for technique.    OSSEOUS STRUCTURES/SOFT TISSUES: Normal marrow signal. The major intracranial vascular flow voids are maintained.     ORBITS: No abnormality accounting for technique.     SINUSES/MASTOIDS: No paranasal sinus mucosal disease. No middle ear or mastoid effusion.       Impression    IMPRESSION:  1.  Tiny focus of acute infarction within the right mid frontal centrum semiovale.  2.  Small to moderate anterior left frontal convexity meningioma with associated vasogenic edema resulting in mild localized mass effect. No midline shift, herniation or hydrocephalus.  3.  Mild to moderate chronic small vessel ischemic disease and mild generalized brain parenchymal volume loss.

## 2022-01-13 NOTE — CONSULTS
[facility] Radiation Oncology Inpatient Consult Note    Patient: Josefina Dumont  MRN: [id number]  Date of Service: 1/13/2022      Reason for Visit  Extra-axial left frontal brain lesion    Assessment/Plan  71-year-old woman with extra-axial calcified left frontal lesion, likely meningioma    Active Problems:    Primary hypertension    Meningioma (H)    Injury of head, initial encounter    Fall, initial encounter    Syncope, unspecified syncope type    Pneumonia due to infectious organism, unspecified laterality, unspecified part of lung     Neurosurgery recommended follow-up with reimage in 6 to 12 weeks with plans for resection versus radiation, which would be possible for this lesion.  In agreement with this plan, and further treatment recommendations pending follow-up imaging and plan for surgery.  May also be reviewed at Neuro tumor board with neurosurgery.      Total time of this visit, including time spent face-to-face with patient and or via video/audio, and also in preparing for today's visit for MDM and documentation. Medical decision-making included consideration and possible diagnoses, management options, complex record review, review of diagnostic tests and information, consideration and discussion of significant complications based on comorbidities, discussion with providers involved in the care of the patient.     45 Minutes spent.     ECOG Performance Status: (1) Restricted in physically strenuous activity, ambulatory and able to do work of light nature      Staging History    Cancer Staging  No matching staging information was found for the patient.    History  Patient is a 71-year-old woman who presented to the ED 1/12/2022 after a fall.  Fall was witnessed by her sister.  She recalls talking to her sister and acting out when she had the sensation of falling backwards.  She reportedly hit her head on the wall.  No loss of consciousness per her sister which she does not recall from the  time she hit the wall.  She does not recall having any headache or dizziness prior to the fall.  She recently had a cold and did experience some dizziness which she thought could be related to sinus infection but no dizziness noted before.  CT head without contrast and cervical spine CT 1/12/2022 demonstrated a partially calcified left anterior parafalcine extra-axial mass lesion measuring 1.5 x 2.6 by 3 cm with appearance most suggestive of meningioma.  No acute intracranial hemorrhage or calvarial fracture or scalp hematoma.    MRI brain 1/12/2022 with a 1.9 x 2.1 x 3.2 cm pedunculated dural based solid enhancing calcified mass overlying the anterior left frontal convexity and extending into the interhemispheric fissure, consistent with meningioma.  There is vasogenic edema within the anterior parasagittal left frontal lobe.  Mild associated localized mass-effect with sulcal effacement.  No midline shift, no herniation or hydrocephalus.  4 mm punctate focus of acute infarction within the right mid frontal centrum semiovale.  Mild to moderate chronic small vessel ischemic disease and mild generalized brain parenchymal volume loss.    She has been started on steroids and Keppra loading dose given in ER and neurology consulted for possible seizure episode.    Her sister was recently diagnosed with meningioma and is status post surgical resection.    She has no prior history of radiation exposure or radiation therapy.    Review of Systems    Pertinent items are noted in HPI.    Past History    No past medical history on file. No family history on file.  No past surgical history on file.   Social History     Socioeconomic History     Marital status:      Spouse name: Not on file     Number of children: Not on file     Years of education: Not on file     Highest education level: Not on file   Occupational History     Not on file   Tobacco Use     Smoking status: Former Smoker     Quit date: 2012     Years since  quitting: 10.0     Smokeless tobacco: Never Used   Substance and Sexual Activity     Alcohol use: Not on file     Drug use: Not on file     Sexual activity: Not on file   Other Topics Concern     Not on file   Social History Narrative     Not on file     Social Determinants of Health     Financial Resource Strain: Not on file   Food Insecurity: Not on file   Transportation Needs: Not on file   Physical Activity: Not on file   Stress: Not on file   Social Connections: Not on file   Intimate Partner Violence: Not on file   Housing Stability: Not on file       Allergies    Allergies   Allergen Reactions     Atenolol Other (See Comments)     abd pain     Penicillins Hives        Physical Exam    Gen: Alert, in NAD pleasant interactive, well nourished  Eyes:  EOMI, sclera anicteric  Pulm: No wheezing, stridor or respiratory distress  Neurologic: A/Ox3, CN II-XII intact, face symmetric, speech fluent, no focal motor deficits  Psychiatric: Appropriate mood and affect     Lab Results     Results for orders placed or performed during the hospital encounter of 01/12/22   Comprehensive metabolic panel   Result Value Ref Range    Sodium 136 136 - 145 mmol/L    Potassium 4.0 3.5 - 5.0 mmol/L    Chloride 102 98 - 107 mmol/L    Carbon Dioxide (CO2) 22 22 - 31 mmol/L    Anion Gap 12 5 - 18 mmol/L    Urea Nitrogen 13 8 - 28 mg/dL    Creatinine 0.89 0.60 - 1.10 mg/dL    Calcium 9.6 8.5 - 10.5 mg/dL    Glucose 244 (H) 70 - 125 mg/dL    Alkaline Phosphatase 142 (H) 45 - 120 U/L    AST 39 0 - 40 U/L    ALT 30 0 - 45 U/L    Protein Total 8.0 6.0 - 8.0 g/dL    Albumin 3.2 (L) 3.5 - 5.0 g/dL    Bilirubin Total 0.6 0.0 - 1.0 mg/dL    GFR Estimate 69 >60 mL/min/1.73m2     No results found for this or any previous visit.     Personally reviewed diagnostic imaging MRI brain  Imaging Results    Echocardiogram Complete    Result Date: 1/13/2022  [id number]^LATANYA^TOI^A  [address]  [place] [address]  Name: SHAKIRA COBB MRN: [id number] : 1950 Study Date: 2022 11:15 AM Age: 71 yrs Gender: Female Patient Location: [facility] Reason For Study: Syncope Ordering Physician: TOI PELAEZ Performed By: JOSEFA  BSA: 2.1 m2 Height: 64 in Weight: 231 lb HR: 71 BP: 156/81 mmHg ______________________________________________________________________________ Procedure Complete Portable Echo Adult. ______________________________________________________________________________ Interpretation Summary  Left ventricular size, wall motion and function are normal. The ejection fraction is 60-65%. Normal right ventricle size and systolic function. There is severe concentric left ventricular hypertrophy. The ascending aorta is Borderline dilated. There is mild to moderate mitral stenosis. Would consider a transesophageal echocardiogram if clinically indicated to better assess mitral valve pathology. The study was technically difficult. Would consider a transesophageal echocardiogram if clinically indicated. ______________________________________________________________________________ Left Ventricle Left ventricular size, wall motion and function are normal. The ejection fraction is 60-65%. There is severe concentric left ventricular hypertrophy. Left ventricular diastolic function is normal. No regional wall motion abnormalities noted.  Right Ventricle Normal right ventricle size and systolic function. TAPSE is normal, which is consistent with normal right ventricular systolic function.  Atria The left atrium is moderately dilated. Right atrium not well visualized. There is no color Doppler evidence of an atrial shunt.  Mitral Valve There is severe mitral annular calcification. The mitral valve appears rheumatic. The mitral valve is not well visualized. There is trace mitral regurgitation. There is mild to moderate mitral stenosis.  Tricuspid Valve The tricuspid valve is not well visualized. Right ventricle  systolic pressure estimate normal.  Aortic Valve Aortic valve leaflets appear normal. There is no evidence of aortic stenosis or clinically significant aortic regurgitation.  Pulmonic Valve The pulmonic valve is not well visualized.  Vessels The aorta root is normal. The ascending aorta is Borderline dilated. Inferior vena cava not well visualized for estimation of right atrial pressure.  Pericardium There is no pericardial effusion.  ______________________________________________________________________________ MMode/2D Measurements & Calculations IVSd: 2.0 cm LVIDd: 4.2 cm LVIDs: 2.4 cm LVPWd: 1.5 cm FS: 43.3 %  LV mass(C)d: 326.7 grams LV mass(C)dI: 157.1 grams/m2 Ao root diam: 3.5 cm LA dimension: 4.2 cm asc Aorta Diam: 3.8 cm LA/Ao: 1.2 LVOT diam: 1.9 cm LVOT area: 2.9 cm2 LA Volume Indexed (AL/bp): 38.4 ml/m2 RWT: 0.71  Doppler Measurements & Calculations MV E max john: 106.0 cm/sec MV A max john: 139.7 cm/sec MV E/A: 0.76 MV max P.2 mmHg MV mean PG: 3.1 mmHg MV V2 VTI: 38.0 cm MVA(VTI): 2.4 cm2  MV P1/2t max john: 118.3 cm/sec MV P1/2t: 71.9 msec MVA(P1/2t): 3.1 cm2 MV dec slope: 481.9 cm/sec2 MV dec time: 0.28 sec LV V1 max P.3 mmHg LV V1 max: 144.4 cm/sec LV V1 VTI: 31.4 cm SV(LVOT): 92.2 ml SI(LVOT): 44.3 ml/m2 PA acc time: 0.12 sec E/E' av.2 Lateral E/e': 19.2 Medial E/e': 21.1  ______________________________________________________________________________ Report approved by: Naina Alexandre 2022 03:16 PM       XR Chest Port 1 View    Result Date: 2022  EXAM: XR CHEST PORT 1 VIEW LOCATION: [facility] DATE/TIME: 2022 3:37 PM INDICATION: Fever COMPARISON: None.     IMPRESSION: Stable cardiomegaly with normal vascularity. Patchy right basilar opacities suggesting pneumonia. Left lung clear. No pneumothorax nor pleural effusion. Consider follow-up radiographs to ensure resolution.    MR Brain w/o & w Contrast    Result Date: 2022  EXAM: MR BRAIN  W/O and W CONTRAST LOCATION: [facility] DATE/TIME: 1/12/2022 8:29 PM INDICATION: Brain mass or lesion. COMPARISON: Head CT 1/12/2022. CONTRAST: 10 mL Gadavist. TECHNIQUE: Routine multiplanar multisequence head MRI without and with intravenous contrast. FINDINGS: Several images are degraded by patient motion artifact, which mild to moderately limits evaluation. INTRACRANIAL CONTENTS: There is a 1.9 x 2.1 x 3.2 cm pedunculated dural-based solid enhancing calcified mass overlying the anterior left frontal convexity and extending into the interhemispheric fissure, consistent with a meningioma. There is underlying T2 hyperintense vasogenic edema within the anterior parasagittal left frontal lobe. Mild associated localized mass effect with sulcal effacement. No midline shift, herniation or hydrocephalus. 4 mm punctate focus of acute infarction within the right mid frontal centrum semiovale. Mild to moderate chronic small vessel ischemic changes throughout the nathaniel and cerebral hemispheric white matter bilaterally. Few tiny chronic bilateral basal ganglia  lacunar infarcts. Mild generalized brain parenchymal volume loss. No intracranial hemorrhage or abnormal extra axial fluid collection. SELLA: No abnormality accounting for technique. OSSEOUS STRUCTURES/SOFT TISSUES: Normal marrow signal. The major intracranial vascular flow voids are maintained. ORBITS: No abnormality accounting for technique. SINUSES/MASTOIDS: No paranasal sinus mucosal disease. No middle ear or mastoid effusion.     IMPRESSION: 1.  Tiny focus of acute infarction within the right mid frontal centrum semiovale. 2.  Small to moderate anterior left frontal convexity meningioma with associated vasogenic edema resulting in mild localized mass effect. No midline shift, herniation or hydrocephalus. 3.  Mild to moderate chronic small vessel ischemic disease and mild generalized brain parenchymal volume loss.    CT Cervical Spine w/o  Contrast    Result Date: 1/12/2022  [facility] CT HEAD W/O CONTRAST, CT CERVICAL SPINE W/O CONTRAST 1/12/2022 2:23 PM INDICATION: Traumatic head and neck injury. Fell and hit back of head on wall. Amnestic to event. TECHNIQUE: HEAD CT: Head CT without IV contrast. Dose reduction techniques were used. CERVICAL SPINE CT: Helical images were obtained through the cervical spine and were evaluated in the axial plane with sagittal and coronal reformations. Dose reduction techniques were used. SEDATION: None. COMPARISON: None. FINDINGS: HEAD CT: No intracranial hemorrhage, extraaxial collection, or CT evidence of acute infarct. There is a partially calcified extra-axial mass lesion along the left aspect of the anterior hemispheric falx and anterior/inferior left frontal convexity measuring up to 1.5 x 2.6 cm in maximum transaxial dimensions and 3.0 cm CC. There is low-attenuation change in the adjacent parasagittal left frontal lobe. Mild presumed chronic small vessel ischemic changes. Mild generalized volume loss. The ventricles  are proportional to the sulci. Atherosclerotic calcifications of the cavernous internal carotid arteries. No acute calvarial fracture or scalp hematoma. The visualized orbits, paranasal sinuses, and mastoid air cells are free of significant disease. CERVICAL SPINE CT: Normal vertebral body heights. Straightening of usual cervical lordosis with 1 mm anterolistheses of C3 on C4 and C4 on C5. No evidence for acute fracture or posttraumatic subluxation. Mild-to-moderate degenerative change at the anterior C1-C2 articulation. Ankylosis of the left C3 facet joint. No high-grade spinal canal or neural foraminal stenosis. Grossly normal paraspinal soft tissues. Normal lung apices.     CONCLUSION: HEAD CT: 1.  Partially calcified left anterior parafalcine extra-axial mass lesion measuring 1.5 x 2.6 x 3.0 cm, with appearance most suggestive of meningioma. There is a low-attenuation  parenchymal change in the adjacent left frontal lobe, likely vasogenic edema. Recommend MRI brain without and with IV contrast for further characterization. 2.  No acute intracranial hemorrhage or CT evidence for acute infarction. 3.  No acute calvarial fracture or scalp hematoma. 4.  Mild global brain parenchymal volume loss with additional presumed sequelae of mild chronic small vessel ischemic disease. CERVICAL SPINE CT: 1.  No acute fracture or traumatic subluxation of the cervical spine by CT imaging. 2.  No high-grade spinal canal or neural foraminal stenosis.    CT Head w/o Contrast    Result Date: 1/12/2022  [facility] CT HEAD W/O CONTRAST, CT CERVICAL SPINE W/O CONTRAST 1/12/2022 2:23 PM INDICATION: Traumatic head and neck injury. Fell and hit back of head on wall. Amnestic to event. TECHNIQUE: HEAD CT: Head CT without IV contrast. Dose reduction techniques were used. CERVICAL SPINE CT: Helical images were obtained through the cervical spine and were evaluated in the axial plane with sagittal and coronal reformations. Dose reduction techniques were used. SEDATION: None. COMPARISON: None. FINDINGS: HEAD CT: No intracranial hemorrhage, extraaxial collection, or CT evidence of acute infarct. There is a partially calcified extra-axial mass lesion along the left aspect of the anterior hemispheric falx and anterior/inferior left frontal convexity measuring up to 1.5 x 2.6 cm in maximum transaxial dimensions and 3.0 cm CC. There is low-attenuation change in the adjacent parasagittal left frontal lobe. Mild presumed chronic small vessel ischemic changes. Mild generalized volume loss. The ventricles  are proportional to the sulci. Atherosclerotic calcifications of the cavernous internal carotid arteries. No acute calvarial fracture or scalp hematoma. The visualized orbits, paranasal sinuses, and mastoid air cells are free of significant disease. CERVICAL SPINE CT: Normal vertebral body  heights. Straightening of usual cervical lordosis with 1 mm anterolistheses of C3 on C4 and C4 on C5. No evidence for acute fracture or posttraumatic subluxation. Mild-to-moderate degenerative change at the anterior C1-C2 articulation. Ankylosis of the left C3 facet joint. No high-grade spinal canal or neural foraminal stenosis. Grossly normal paraspinal soft tissues. Normal lung apices.     CONCLUSION: HEAD CT: 1.  Partially calcified left anterior parafalcine extra-axial mass lesion measuring 1.5 x 2.6 x 3.0 cm, with appearance most suggestive of meningioma. There is a low-attenuation parenchymal change in the adjacent left frontal lobe, likely vasogenic edema. Recommend MRI brain without and with IV contrast for further characterization. 2.  No acute intracranial hemorrhage or CT evidence for acute infarction. 3.  No acute calvarial fracture or scalp hematoma. 4.  Mild global brain parenchymal volume loss with additional presumed sequelae of mild chronic small vessel ischemic disease. CERVICAL SPINE CT: 1.  No acute fracture or traumatic subluxation of the cervical spine by CT imaging. 2.  No high-grade spinal canal or neural foraminal stenosis.      Pathology    No results found for this or any previous visit (from the past 8760 hour(s)).      Signed by: Jennifer Amato MD

## 2022-01-13 NOTE — ED NOTES
[facility] ED Handoff Report    ED Chief Complaint: Fall    ED Diagnosis:  (W19.XXXA) Fall, initial encounter  Comment:   Plan:     (S09.90XA) Injury of head, initial encounter  Comment:   Plan:     (R55) Syncope, unspecified syncope type  Comment:   Plan:     (I10) Primary hypertension  Comment:   Plan:     (J18.9) Pneumonia due to infectious organism, unspecified laterality, unspecified part of lung  Comment:   Plan:     (D32.9) Meningioma (H)  Comment:   Plan:        PMH:  No past medical history on file.     Code Status:  No Order     Falls Risk: Yes Band: Applied    Current Living Situation/Residence: lives alone     Elimination Status: Continent: Yes - Able to void     Activity Level: SBA    Patients Preferred Language:  English     Needed: No    Vital Signs:  BP (!) 214/96   Pulse 80   Temp 99.1  F (37.3  C) (Oral)   Resp 23   Wt 104.8 kg (231 lb)   SpO2 95%      Cardiac Rhythm: NSR  Pain Score: 0/10    Is the Patient Confused:  No    Last Food or Drink: 01/12/22 at 1730- Crackers    Focused Assessment:  Pt arrives per medics from her apt where she was amb in the hallway when she fell. Hitting the back of her head on a wall. ( Her sister witnessed it) She is not on thinners does not remember the fall. Upon arrival to ED she is alert/ oriented x 3. She reports chest pain , upper back pain. She was given 324 mg ASA and NTG x 2 with relief of pain per medics. Neuro consulted felt that her episode was  likely syncopal  vs seizure, head trauma. She underwent a head CT that revealed left anterior parafalcine meningioma with surrounding vasogenic edema measuring 1.5 x 2.6 x 3.0 cm. She is also febrile, possible pneumonia.     Tests Performed: Done: Labs and Imaging    Treatments Provided:  Medications, Therapeutic interventions, & consults     Family Dynamics/Concerns: No    Family Updated On Visitor Policy: Yes    Plan of Care Communicated to Family: Yes    Who Was Updated about Plan of  Care: sister Brenton has been being updated by patient and RN    Belongings Checklist Done and Signed by Patient: No    Covid: asymptomatic , resulting     Additional Information:     RN: Susanne Golden   1/12/2022 6:44 PM

## 2022-01-13 NOTE — CONSULTS
Tobacco Treatment Consult  1/13/2022, 10:42 AM    Patient Admitted for: Primary hypertension [I10]  Meningioma (H) [D32.9]  Injury of head, initial encounter [S09.90XA]  Fall, initial encounter [W19.XXXA]  Syncope, unspecified syncope type [R55]  Pneumonia due to infectious organism, unspecified laterality, unspecified part of lung [J18.9] on 1/12/2022      History of Tobacco Use:   Patient reports that she quit smoking approximately 10 years ago. Patient not appropriate for cessation counseling.      Morro Reyes RT, Chronic Pulmonary Disease Specialist, Tobacco Treatment Specialist  Phone [phone]

## 2022-01-13 NOTE — PROGRESS NOTES
01/13/22 0930   Quick Adds   Type of Visit Initial PT Evaluation   Living Environment   People in home alone   Current Living Arrangements independent living facility   Home Accessibility no concerns   Transportation Anticipated family or friend will provide   Self-Care   Usual Activity Tolerance moderate   Current Activity Tolerance fair   Equipment Currently Used at Home walker, rolling  (Has access to 4WW if needed )   Disability/Function   Walking or Climbing Stairs Difficulty no   Dressing/Bathing Difficulty no   Toileting issues no   Doing Errands Independently Difficulty (such as shopping) yes   General Information   Onset of Illness/Injury or Date of Surgery 01/12/22   Referring Physician Teresa Reardon MD   Patient/Family Therapy Goals Statement (PT) return home    Pertinent History of Current Problem (include personal factors and/or comorbidities that impact the POC) Fall, Syncope    Existing Precautions/Restrictions fall   Strength   Manual Muscle Testing Quick Adds Strength WFL   Bed Mobility   Comment (Bed Mobility) Pt up in the chair when PT arrived.    Transfers   Transfers sit-stand transfer;toilet transfer   Sit-Stand Transfer   Sit-Stand [place] (Transfers) supervision   Assistive Device (Sit-Stand Transfers) walker, front-wheeled   Toilet Transfer   Type (Toilet Transfer) sit-stand;stand-sit   [place] Level (Toilet Transfer) supervision   Assistive Device (Toilet Transfer) walker, front-wheeled   Gait/Stairs (Locomotion)   [place] Level (Gait) contact guard   Assistive Device (Gait) walker, front-wheeled   Distance in Feet (Required for LE Total Joints) 165'   Pattern (Gait) step-through   Clinical Impression   Criteria for Skilled Therapeutic Intervention yes, treatment indicated   PT Diagnosis (PT) Impaired functinal mobility    Influenced by the following impairments Dizziness, balance    Functional limitations due to impairments Impaired transfers, impaired gait    Clinical  Presentation Stable/Uncomplicated   Clinical Presentation Rationale Presents as diagnosed    Clinical Decision Making (Complexity) moderate complexity   Therapy Frequency (PT) Daily   Predicted Duration of Therapy Intervention (days/wks) 5 days    Planned Therapy Interventions (PT) balance training;bed mobility training;gait training;transfer training   Anticipated Equipment Needs at Discharge (PT) walker, rolling  (4WW)   Risk & Benefits of therapy have been explained patient   PT Discharge Planning    PT Discharge Recommendation (DC Rec) home with assist;home with home care physical therapy   Total Evaluation Time   Total Evaluation Time (Minutes) 10       Dana Siddiqui, PT, DPT  1/13/2022

## 2022-01-13 NOTE — PLAN OF CARE
Problem: Pain Acute  Goal: Acceptable Pain Control and Functional Ability  Outcome: Improving  Intervention: Develop Pain Management Plan  Recent Flowsheet Documentation  Taken 1/13/2022 1004 by Ana Brady RN  Pain Management Interventions: medication (see MAR)     Problem: Diabetes Comorbidity  Goal: Blood Glucose Level Within Targeted Range  Outcome: No Change   blood glucose 206,229,199,195 required correction Bolus.  Problem: Hypertension Comorbidity  Goal: Blood Pressure in Desired Range  Outcome: No change  High BP Prn and scheduled Bp given . Continue to monitor.  Abdomen fold reddened. Left stick note for MD .

## 2022-01-13 NOTE — CONSULTS
[facility]  Neurology Consultation    Patient Name:  Josefina Dumont  MRN:  [id number]    :  1950  Date of Service:  2022  Primary care provider:  Rafat Lee      Neurology consultation service was asked to see Josefina Dumont by Dr. Reardon to evaluate possible seizure.    Chief Complaint:  meningioma    History of Present Illness:   Josefina Dumont is a 71 year old female with history of hypertension, diabetes who presents with a fall.  Patient has limited memory for the event but is able to tell me she was at her sister's counter, when she started backing up towards the door.  She states she remembers falling, but then does not remember anything else.  She states she woke up when EMS was there and immediately recognized something was wrong.  She can recite a conversation she had with the EMS personnel about her head hitting the wall.  She denied significant muscle aches, tongue biting, incontinence.  She states she was not aware that she was in her sister's house, but did not have any interaction with her sister at that time after the event.    She denies any history of seizures.  She denies any history of strokes.  She denies any family history of seizures or seizure as a baby.  This denies any history of intracranial infections.  She was found to have a left frontal mass on head CT done after head trauma.  Subsequent MRI showed findings consistent with meningioma with vasogenic edema.  Brain MRI also showed a very small punctate ischemic stroke in the right centrum semiovale.  Patient was admitted for further work-up.    ROS  A comprehensive ROS was performed and pertinent findings were included in HPI.     PMH      Hypertension, diabetes type 2.  Recently had colonoscopy    Medications   I have personally reviewed the patient's medication list.     Allergies  I have personally reviewed the patient's allergy list.     Social History  States  "will have up to 2 cocktails a night, but never more.  Denies any drug or alcohol use.    Family History    Sister does have a history of meningioma.  Denies any other brain tumor history or family history of seizures      Physical Examination   Vitals: BP (!) 158/75 (BP Location: Left arm)   Pulse 73   Temp 98.5  F (36.9  C) (Oral)   Resp 18   Ht 1.626 m (5' 4\")   Wt 104.8 kg (231 lb)   SpO2 94%   BMI 39.65 kg/m    General: Sitting up in chair NAD  Head: NC/AT  Eyes: no icterus  Cardiac: Regular rate and rhythm, no murmurs heard  Respiratory: non-labored on RA, no wheezing or coarse breath sounds heard  GI: S/NT/ND  Skin: No rash or lesion on exposed skin  Psych: Mood pleasant, affect congruent  Neuro:  Mental status: Awake, alert, attentive, oriented to self, time, place, and circumstance. Language is fluent and coherent with intact comprehension of complex commands, naming and repetition.  Cranial nerves:  PERRL, conjugate gaze, EOMI, facial sensation intact, face symmetric, shoulder shrug strong, tongue/uvula midline, no dysarthria.   Motor: Normal bulk and tone. No abnormal movements. 5/5 strength bilaterally in deltoids, biceps, triceps, hand , hip flexors, hip extensors, knee flexion, knee extension, plantarflexion, dorsiflexion.   Reflexes: Normo-reflexic and symmetric biceps, brachioradialis, triceps, patellae.   Babinski difficult to assess due to pronounced withdrawal response.    Sensory: Intact to light touch, pin, throughout all 4 extremities  Coordination: Finger-nose-finger without dysmetria. Rapid toe tapping without obvious dysmetria. Finger-nose-finger without dysmetria, rapid toe tapping without dysmetria.  Gait: Ambulates with walker, with normal base, shuffling steps with gait belt on.    Investigations   I have personally reviewed pertinent labs, tests, and radiological imaging. Discussion of notable findings is included under Impression.     Was patient transferred from outside " hospital?   No    Impression  #Meningioma  #Vasogenic edema  #Brain compression  #Acute ischemic stroke    Ms. Dumont is a 71-year-old female with history of hypertension and diabetes who presents after a fall. I have attempted to call sister to corroborate history but based on my discussion with patient it does not seem consistent with a seizure. There is no shaking reported by EMS. No postictal state incontinence, tongue biting, or significant muscle aches. I suspect this was a mechanical fall based on her description and her memory of actually falling. At this time would not recommend antiepileptics as she has not had them previously. She does have significant edema so dexamethasone is reasonable, as well as close neurosurgery follow-up as a suspect eventually this will need resection.      She also has a very small acute ischemic stroke. This will require work-up. My suspicion is related to her vascular risk factors including hypertension and diabetes. However needs additional vessel imaging, echo, and  plavix and statin.  I discussed role of dual antiplatelets including asprin but patient states she has an allergy to aspirin and cannot tolerate.      Recommendations  -Agree with dexamethasone by neurosurgery  -Okay to discontinue antiepileptics  -CTA head and neck and echocardiogram   -Lipid panel ordered for you  -Would start on plavix 75mg   - Start atorvastatin if LDL >70  - Will attempt to call sister again tomorrow to [facility] history    Thank you for involving Neurology in the care of Josefina Dumont.      Theo Gamboa, DO

## 2022-01-14 ENCOUNTER — APPOINTMENT (OUTPATIENT)
Dept: PHYSICAL THERAPY | Facility: HOSPITAL | Age: 72
DRG: 085 | End: 2022-01-14
Payer: COMMERCIAL

## 2022-01-14 ENCOUNTER — TELEPHONE (OUTPATIENT)
Dept: CARDIOLOGY | Facility: CLINIC | Age: 72
End: 2022-01-14
Payer: COMMERCIAL

## 2022-01-14 ENCOUNTER — APPOINTMENT (OUTPATIENT)
Dept: ULTRASOUND IMAGING | Facility: HOSPITAL | Age: 72
DRG: 085 | End: 2022-01-14
Attending: INTERNAL MEDICINE
Payer: COMMERCIAL

## 2022-01-14 DIAGNOSIS — I05.0 MITRAL VALVE STENOSIS: Primary | ICD-10-CM

## 2022-01-14 LAB
ALBUMIN SERPL-MCNC: 3.1 G/DL (ref 3.5–5)
ALP SERPL-CCNC: 125 U/L (ref 45–120)
ALT SERPL W P-5'-P-CCNC: 24 U/L (ref 0–45)
ANION GAP SERPL CALCULATED.3IONS-SCNC: 9 MMOL/L (ref 5–18)
AST SERPL W P-5'-P-CCNC: 25 U/L (ref 0–40)
BILIRUB SERPL-MCNC: 0.5 MG/DL (ref 0–1)
BUN SERPL-MCNC: 20 MG/DL (ref 8–28)
CALCIUM SERPL-MCNC: 9 MG/DL (ref 8.5–10.5)
CHLORIDE BLD-SCNC: 103 MMOL/L (ref 98–107)
CO2 SERPL-SCNC: 24 MMOL/L (ref 22–31)
CREAT SERPL-MCNC: 0.85 MG/DL (ref 0.6–1.1)
ERYTHROCYTE [DISTWIDTH] IN BLOOD BY AUTOMATED COUNT: 13.8 % (ref 10–15)
GFR SERPL CREATININE-BSD FRML MDRD: 73 ML/MIN/1.73M2
GLUCOSE BLD-MCNC: 194 MG/DL (ref 70–125)
GLUCOSE BLDC GLUCOMTR-MCNC: 139 MG/DL (ref 70–99)
GLUCOSE BLDC GLUCOMTR-MCNC: 168 MG/DL (ref 70–99)
GLUCOSE BLDC GLUCOMTR-MCNC: 213 MG/DL (ref 70–99)
GLUCOSE BLDC GLUCOMTR-MCNC: 222 MG/DL (ref 70–99)
HCT VFR BLD AUTO: 39.4 % (ref 35–47)
HGB BLD-MCNC: 12.5 G/DL (ref 11.7–15.7)
MAGNESIUM SERPL-MCNC: 2.2 MG/DL (ref 1.8–2.6)
MCH RBC QN AUTO: 31.4 PG (ref 26.5–33)
MCHC RBC AUTO-ENTMCNC: 31.7 G/DL (ref 31.5–36.5)
MCV RBC AUTO: 99 FL (ref 78–100)
PLATELET # BLD AUTO: 174 10E3/UL (ref 150–450)
POTASSIUM BLD-SCNC: 3.7 MMOL/L (ref 3.5–5)
PROT SERPL-MCNC: 7.6 G/DL (ref 6–8)
RBC # BLD AUTO: 3.98 10E6/UL (ref 3.8–5.2)
SODIUM SERPL-SCNC: 136 MMOL/L (ref 136–145)
WBC # BLD AUTO: 7 10E3/UL (ref 4–11)

## 2022-01-14 PROCEDURE — 120N000001 HC R&B MED SURG/OB

## 2022-01-14 PROCEDURE — 97110 THERAPEUTIC EXERCISES: CPT | Mod: GP

## 2022-01-14 PROCEDURE — 250N000011 HC RX IP 250 OP 636: Performed by: HOSPITALIST

## 2022-01-14 PROCEDURE — 85027 COMPLETE CBC AUTOMATED: CPT | Performed by: HOSPITALIST

## 2022-01-14 PROCEDURE — 250N000011 HC RX IP 250 OP 636: Performed by: INTERNAL MEDICINE

## 2022-01-14 PROCEDURE — 80053 COMPREHEN METABOLIC PANEL: CPT | Performed by: HOSPITALIST

## 2022-01-14 PROCEDURE — 83735 ASSAY OF MAGNESIUM: CPT | Performed by: HOSPITALIST

## 2022-01-14 PROCEDURE — 250N000013 HC RX MED GY IP 250 OP 250 PS 637: Performed by: INTERNAL MEDICINE

## 2022-01-14 PROCEDURE — 99233 SBSQ HOSP IP/OBS HIGH 50: CPT | Performed by: INTERNAL MEDICINE

## 2022-01-14 PROCEDURE — 250N000011 HC RX IP 250 OP 636: Performed by: NURSE PRACTITIONER

## 2022-01-14 PROCEDURE — 250N000013 HC RX MED GY IP 250 OP 250 PS 637: Performed by: PSYCHIATRY & NEUROLOGY

## 2022-01-14 PROCEDURE — 36415 COLL VENOUS BLD VENIPUNCTURE: CPT | Performed by: HOSPITALIST

## 2022-01-14 PROCEDURE — 250N000013 HC RX MED GY IP 250 OP 250 PS 637: Performed by: FAMILY MEDICINE

## 2022-01-14 PROCEDURE — 250N000009 HC RX 250: Performed by: HOSPITALIST

## 2022-01-14 PROCEDURE — 250N000013 HC RX MED GY IP 250 OP 250 PS 637: Performed by: HOSPITALIST

## 2022-01-14 PROCEDURE — 97116 GAIT TRAINING THERAPY: CPT | Mod: GP

## 2022-01-14 PROCEDURE — 76536 US EXAM OF HEAD AND NECK: CPT

## 2022-01-14 PROCEDURE — 99232 SBSQ HOSP IP/OBS MODERATE 35: CPT | Performed by: PSYCHIATRY & NEUROLOGY

## 2022-01-14 RX ORDER — DEXAMETHASONE 0.5 MG/1
1 TABLET ORAL 2 TIMES DAILY
Status: DISCONTINUED | OUTPATIENT
Start: 2022-01-17 | End: 2022-01-14

## 2022-01-14 RX ORDER — DEXAMETHASONE 2 MG/1
2 TABLET ORAL 2 TIMES DAILY
Status: COMPLETED | OUTPATIENT
Start: 2022-01-15 | End: 2022-01-15

## 2022-01-14 RX ORDER — FAMOTIDINE 20 MG/1
20 TABLET, FILM COATED ORAL 2 TIMES DAILY
Qty: 60 TABLET | Refills: 0 | Status: SHIPPED | OUTPATIENT
Start: 2022-01-14 | End: 2022-02-04

## 2022-01-14 RX ORDER — DEXAMETHASONE 2 MG/1
TABLET ORAL
Qty: 10 TABLET | Refills: 0 | Status: SHIPPED | OUTPATIENT
Start: 2022-01-14 | End: 2022-01-19

## 2022-01-14 RX ORDER — FAMOTIDINE 20 MG/1
20 TABLET, FILM COATED ORAL 2 TIMES DAILY
Status: DISCONTINUED | OUTPATIENT
Start: 2022-01-14 | End: 2022-01-17

## 2022-01-14 RX ORDER — DEXAMETHASONE 0.5 MG/1
1 TABLET ORAL 2 TIMES DAILY
Status: ACTIVE | OUTPATIENT
Start: 2022-01-16 | End: 2022-01-19

## 2022-01-14 RX ORDER — DEXAMETHASONE 2 MG/1
2 TABLET ORAL EVERY 8 HOURS SCHEDULED
Status: COMPLETED | OUTPATIENT
Start: 2022-01-14 | End: 2022-01-14

## 2022-01-14 RX ORDER — ATORVASTATIN CALCIUM 10 MG/1
20 TABLET, FILM COATED ORAL EVERY EVENING
Status: DISCONTINUED | OUTPATIENT
Start: 2022-01-14 | End: 2022-01-20 | Stop reason: HOSPADM

## 2022-01-14 RX ADMIN — DEXAMETHASONE 2 MG: 2 TABLET ORAL at 14:02

## 2022-01-14 RX ADMIN — ENOXAPARIN SODIUM 40 MG: 40 INJECTION SUBCUTANEOUS at 20:00

## 2022-01-14 RX ADMIN — ACETAMINOPHEN 650 MG: 325 TABLET ORAL at 21:43

## 2022-01-14 RX ADMIN — GLIMEPIRIDE 2 MG: 1 TABLET ORAL at 08:02

## 2022-01-14 RX ADMIN — LEVOFLOXACIN 750 MG: 5 INJECTION, SOLUTION INTRAVENOUS at 17:21

## 2022-01-14 RX ADMIN — HYDROCHLOROTHIAZIDE 25 MG: 25 TABLET ORAL at 08:02

## 2022-01-14 RX ADMIN — LISINOPRIL 10 MG: 5 TABLET ORAL at 08:02

## 2022-01-14 RX ADMIN — CITALOPRAM HYDROBROMIDE 40 MG: 40 TABLET ORAL at 08:02

## 2022-01-14 RX ADMIN — FAMOTIDINE 20 MG: 10 INJECTION, SOLUTION INTRAVENOUS at 06:51

## 2022-01-14 RX ADMIN — DEXAMETHASONE SODIUM PHOSPHATE 4 MG: 4 INJECTION, SOLUTION INTRA-ARTICULAR; INTRALESIONAL; INTRAMUSCULAR; INTRAVENOUS; SOFT TISSUE at 04:46

## 2022-01-14 RX ADMIN — AMLODIPINE BESYLATE 10 MG: 5 TABLET ORAL at 08:06

## 2022-01-14 RX ADMIN — DEXAMETHASONE 2 MG: 2 TABLET ORAL at 21:45

## 2022-01-14 RX ADMIN — LIDOCAINE 1 PATCH: 246 PATCH TOPICAL at 19:58

## 2022-01-14 RX ADMIN — ATORVASTATIN CALCIUM 20 MG: 10 TABLET, FILM COATED ORAL at 21:44

## 2022-01-14 RX ADMIN — POLYETHYLENE GLYCOL 3350 17 G: 17 POWDER, FOR SOLUTION ORAL at 08:06

## 2022-01-14 RX ADMIN — CLOPIDOGREL BISULFATE 75 MG: 75 TABLET ORAL at 08:03

## 2022-01-14 RX ADMIN — MICONAZOLE NITRATE: 2 POWDER TOPICAL at 21:47

## 2022-01-14 RX ADMIN — FAMOTIDINE 20 MG: 20 TABLET, FILM COATED ORAL at 21:43

## 2022-01-14 ASSESSMENT — ACTIVITIES OF DAILY LIVING (ADL)
ADLS_ACUITY_SCORE: 9
ADLS_ACUITY_SCORE: 9
ADLS_ACUITY_SCORE: 11
ADLS_ACUITY_SCORE: 9
ADLS_ACUITY_SCORE: 11
ADLS_ACUITY_SCORE: 11
ADLS_ACUITY_SCORE: 9

## 2022-01-14 NOTE — PROGRESS NOTES
Neurosurgery Chart Check:    Okay to taper steroids if no significant headache, wrote for 2mg TID today, 2mg BID tomorrow, 1mg BID until full plan. Plan to discuss at tumor board this upcoming Monday for discussion on resection vs radiation though acute stroke on plavix may complicate timeline. Neurosurgery will sign off, please call with questions.     Sapna Aleman CNP  [facility] Neurosurgery  O: [phone]  P: [phone]

## 2022-01-14 NOTE — PLAN OF CARE
Problem: Pain Acute  Goal: Acceptable Pain Control and Functional Ability  Outcome: Improving  Pt denied pain during this shift. Appeared comfortable.     Problem: Hypertension Acute  Goal: Blood Pressure Within Desired Range  Outcome: Improving  /86, scheduled med given; rechecked /52.     Problem: Fall Injury Risk  Goal: Absence of Fall and Fall-Related Injury  Outcome: Improving   Pt reported feeling dizzy earlier in the shift while ambulating in the bathroom. Pt is assist of one to the bathroom with gait belt.  Staff educated about fall risk    Fall and seizure precaution in place. Pads on side rails.   , 213; sliding scale insulin given per order.   Tele: Normal sinus rhythm.  Neuro intact. Mag and k+ WNL; to be rechecked tomorrow.   O2 sat 96-97% RA.

## 2022-01-14 NOTE — PLAN OF CARE
Problem: Pain Acute  Goal: Acceptable Pain Control and Functional Ability  Outcome: Improving     Problem: Hypertension Acute  Goal: Blood Pressure Within Desired Range  Outcome: Improving  Pt denies pain.   Neuros intact. Up with stand by assist to the bathroom. Voiding frequently. Bed alarm on for safety. Reminded to call before getting up. On seizure precautions.   On iv levaquin for pneumonia. Pt on RA. NSR on tele monitor. BP elevated but not within parameters for prn hydralazine.

## 2022-01-14 NOTE — PROVIDER NOTIFICATION
PROVIDER NOTIFICATION    Reason for communication: High blood pressure  Of SBP  194- 202   Hydralazine prn given . Recheck /75  Team member name: Dr Ayala  Team member role: Cross over hosiptalist  Method of Communication: Phone   Response: Ordered extra BP medication   Response time: 1050.

## 2022-01-14 NOTE — PROGRESS NOTES
[facility]    Medicine Progress Note - Hospitalist Service       Date of Admission:  1/12/2022    Assessment & Plan            Josefina Dumont is a 71 year old female admitted on 1/12/2022. She has history of diabetes, hypertension, mood disorder who presented today with unwitnessed fall, syncope.     1.Unwitnessed fall vrs syncope -suspect now tumor and cva  -Pt had a unwitnessed fall, unsure if fall vs syncope vrs seizure--now concern cva, tumor  -CT head showed lesion measuring 1.5 x 2.6 x 3 cm s/o meningioma  -MRI brain--showed mass suggestive of meningioma and acute cva  -Monitor on telemetry for any arrhythmias  -Neurology /Neurosurg consulted-appreciated  -PT/OT consulted     2.Intracranial lesion, suggestive of meningioma  -CT head showed a lesion measuring 1.5 x 2.6 x 3 cm most suggestive of meningioma with vasogenic edema.  -MRI with and without contrast ordered  -Continue Decadron 4 mg every 6 hours as per neurosurgery recommendation--now they will start taper today  -On Pepcid twice daily  -Keppra loading dose given in the ER, neurology does not think she needs to continue this  -Neurosurgery will present her case to tumor board Monday, likely will need surgery at some point     3.Acute CVA  -on mri here small cva  -tele  -checked echo--Mitral Stenosis--cards seeing  -neurology following and added Plavix/statin  -pt/ot     4.Back pain and chest pain-likely secondary to fall  -CT C-spine negative for any acute fractures  -Lidocaine patch and Oxy prn ordered  -Placed on fall precautions     5.CAP  -CXR shows R basilar opacity, s/o pneumonia  -Patient afebrile, no leukocytosis.  No respiratory symptoms  -Continue Levaquin  -COVID, influenza AMB negative     6.Diabetes with hyperglycemia  -A1c 7  -Resume home glimepiride.  -Placed on medium resistance sliding scale as patient now on dexamethasone.  Can likely switch NovoLog to NPH if persistent hyperglycemia while on  steroids.  -Monitor blood sugars closely     7.Hypertension-blood pressure significantly elevated on arrival to ED  -maybe from CVA  -Resume home amlodipine and hydrochlorothiazide  -cards added ace-i     8.Mood disorder-  -Continue PTA Celexa.  -Ativan as needed ordered     9.Hypokalemia-replete per protocol  -mag was still low replaced 1/13/22    10.Mitral stenosis  -cards did see and will need outpt follow up with  who will consider oupt LUC  -antibiotic prophylaxis for dental cleaning will be needed     11. Obesity- BMI 39    12.Thyroid nodule on CTA neck  -check ultrasound and tsh           Diet: Consistent Carbohydrate Diet Low Consistent Carb (45 g CHO per Meal) Diet    DVT Prophylaxis: Enoxaparin (Lovenox) SQ  Machuca Catheter: Not present  Central Lines: None  Code Status: Full Code      Disposition Plan   Expected Discharge: 01/16/2022     Anticipated discharge location:taper steroids, watch dizziness         The patient's care was discussed with the Care Coordinator/ and Patient.  I called sister to update at 1700    Nichole Aguilar MD  Hospitalist Service  [facility]  Securely message with the Vocera Web Console (learn more here)  Text page via Swap[employer] Paging/Directory          ______________________________________________________________________    Interval History   She notes some dizziness this am  Denied headache  No cp  She feels she can't remember everything the specialist tell her  Eating ok      Data reviewed today: I reviewed all medications, new labs and imaging results over the last 24 hours. I personally reviewed no images or EKG's today.    Physical Exam   Vital Signs: Temp: 97.8  F (36.6  C) Temp src: Oral BP: (!) 173/71 Pulse: 64   Resp: 18 SpO2: 96 % O2 Device: None (Room air)    Weight: 234 lbs 8 oz  Constitutional: awake, alert, cooperative and no apparent distress  Respiratory: No increased work of breathing, good air exchange, clear to  auscultation bilaterally, no crackles or wheezing  Cardiovascular: Normal apical impulse, regular rate and rhythm, normal S1 and S2, no S3 or S4, and no murmur noted  GI: normal bowel sounds, soft, non-distended and non-tender  Skin: no bruising or bleeding  Musculoskeletal: no lower extremity pitting edema present  Neurologic: Mental Status Exam:  Level of Alertness:   awake  Neuropsychiatric: General: normal, calm and normal eye contact, speech fluent, motor equal    Data   Recent Labs   Lab 22  0813 22  0643 22  2110 22  0758 22  0429 22  2201 22  1534   WBC  --  7.0  --   --  4.2  --  6.0   HGB  --  12.5  --   --  13.8  --  12.7   MCV  --  99  --   --  101*  --  99   PLT  --  174  --   --  168  --  167   NA  --  136  --   --  136  --  139   POTASSIUM  --  3.7  --   --  4.0  --  3.4*   CHLORIDE  --  103  --   --  102  --  101   CO2  --  24  --   --  22  --  25   BUN  --  20  --   --  13  --  13   CR  --  0.85  --   --  0.89  --  0.86   ANIONGAP  --  9  --   --  12  --  13   ALBARO  --  9.0  --   --  9.6  --  9.7   * 194* 195*   < > 244*   < > 181*   ALBUMIN  --  3.1*  --   --  3.2*  --  3.2*   PROTTOTAL  --  7.6  --   --  8.0  --  7.8   BILITOTAL  --  0.5  --   --  0.6  --  0.8   ALKPHOS  --  125*  --   --  142*  --  147*   ALT  --  24  --   --  30  --  33   AST  --  25  --   --  39  --  47*    < > = values in this interval not displayed.     Recent Results (from the past 24 hour(s))   Echocardiogram Complete   Result Value    LVEF  60-65%    Narrative    [id number]  [id number]  [id number]  [id number]^[address] [address]     Name: SHAKIRA COBB  MRN: [id number]  : 1950  Study Date: 2022 11:15 AM  Age: 71 yrs  Gender: Female  Patient Location: [facility]  Reason For Study: Syncope  Ordering Physician: TOI PELAEZ  Performed By: JOSEFA     BSA: 2.1 m2  Height: 64 in  Weight: 231 lb  HR: 71  BP: 156/81  mmHg  ______________________________________________________________________________  Procedure  Complete Portable Echo Adult.  ______________________________________________________________________________  Interpretation Summary     Left ventricular size, wall motion and function are normal. The ejection  fraction is 60-65%.  Normal right ventricle size and systolic function.  There is severe concentric left ventricular hypertrophy.  The ascending aorta is Borderline dilated.  There is mild to moderate mitral stenosis.  Would consider a transesophageal echocardiogram if clinically indicated to  better assess mitral valve pathology.  The study was technically difficult. Would consider a transesophageal  echocardiogram if clinically indicated.  ______________________________________________________________________________  Left Ventricle  Left ventricular size, wall motion and function are normal. The ejection  fraction is 60-65%. There is severe concentric left ventricular hypertrophy.  Left ventricular diastolic function is normal. No regional wall motion  abnormalities noted.     Right Ventricle  Normal right ventricle size and systolic function. TAPSE is normal, which is  consistent with normal right ventricular systolic function.     Atria  The left atrium is moderately dilated. Right atrium not well visualized. There  is no color Doppler evidence of an atrial shunt.     Mitral Valve  There is severe mitral annular calcification. The mitral valve appears  rheumatic. The mitral valve is not well visualized. There is trace mitral  regurgitation. There is mild to moderate mitral stenosis.     Tricuspid Valve  The tricuspid valve is not well visualized. Right ventricle systolic pressure  estimate normal.     Aortic Valve  Aortic valve leaflets appear normal. There is no evidence of aortic stenosis  or clinically significant aortic regurgitation.     Pulmonic Valve  The pulmonic valve is not well visualized.      Vessels  The aorta root is normal. The ascending aorta is Borderline dilated. Inferior  vena cava not well visualized for estimation of right atrial pressure.     Pericardium  There is no pericardial effusion.     ______________________________________________________________________________  MMode/2D Measurements & Calculations  IVSd: 2.0 cm  LVIDd: 4.2 cm  LVIDs: 2.4 cm  LVPWd: 1.5 cm  FS: 43.3 %     LV mass(C)d: 326.7 grams  LV mass(C)dI: 157.1 grams/m2  Ao root diam: 3.5 cm  LA dimension: 4.2 cm  asc Aorta Diam: 3.8 cm  LA/Ao: 1.2  LVOT diam: 1.9 cm  LVOT area: 2.9 cm2  LA Volume Indexed (AL/bp): 38.4 ml/m2  RWT: 0.71     Doppler Measurements & Calculations  MV E max john: 106.0 cm/sec  MV A max john: 139.7 cm/sec  MV E/A: 0.76  MV max P.2 mmHg  MV mean PG: 3.1 mmHg  MV V2 VTI: 38.0 cm  MVA(VTI): 2.4 cm2     MV P1/2t max john: 118.3 cm/sec  MV P1/2t: 71.9 msec  MVA(P1/2t): 3.1 cm2  MV dec slope: 481.9 cm/sec2  MV dec time: 0.28 sec  LV V1 max P.3 mmHg  LV V1 max: 144.4 cm/sec  LV V1 VTI: 31.4 cm  SV(LVOT): 92.2 ml  SI(LVOT): 44.3 ml/m2  PA acc time: 0.12 sec  E/E' av.2  Lateral E/e': 19.2  Medial E/e': 21.1     ______________________________________________________________________________  Report approved by: Naina Alexandre 2022 03:16 PM         CTA Head Neck with Contrast    Narrative    EXAM: CTA  HEAD NECK WITH CONTRAST  LOCATION: [facility]  DATE/TIME: 2022 8:46 PM    INDICATION: Tiny focus of acute infarct right frontal centrum semiovale ovale and meningioma.  Evaluate cerebral vasculature for atherosclerotic disease.  COMPARISON: 2022.  CONTRAST: isovue 370 75ml  TECHNIQUE: Head and neck CT angiogram with IV contrast. Noncontrast head CT followed by axial helical CT images of the head and neck vessels obtained during the arterial phase of intravenous contrast administration. Axial 2D reconstructed images and   multiplanar 3D MIP reconstructed  images of the head and neck vessels were performed by the technologist. Dose reduction techniques were used. All stenosis measurements made according to NASCET criteria unless otherwise specified.    FINDINGS:   NONCONTRAST HEAD CT:   INTRACRANIAL CONTENTS: There is no evidence of a midline shift. There is no acute extra-axial fluid collection or acute intraparenchymal hemorrhage. Again visualized is the heavily calcified small meningioma in the anterior medial left frontal lobe with   associated vasogenic edema. The area of acute ischemia on the diffusion-weighted images is too small to visualize discretely by CT and is within the small vessel ischemic disease of the periventricular and subcortical white matter. The ventricular   system, basal cisterns and the cortical sulci are consistent with mild diffuse volume loss.    VISUALIZED ORBITS/SINUSES/MASTOIDS: No intraorbital abnormality. No paranasal sinus mucosal disease. No middle ear or mastoid effusion.    BONES/SOFT TISSUES: No acute abnormality.    HEAD CTA:  ANTERIOR CIRCULATION: No stenosis/occlusion, aneurysm, or high flow vascular malformation. There is a patent anterior communicating artery.    POSTERIOR CIRCULATION: No stenosis/occlusion, aneurysm, or high flow vascular malformation. The left vertebral artery is dominant but both vertebral arteries connect up to the basilar artery.     DURAL VENOUS SINUSES: Expected enhancement of the major dural venous sinuses.    NECK CTA:  RIGHT CAROTID: Approximately 40% stenosis involving the origin of the right internal carotid artery by NASCET criteria.    LEFT CAROTID: No measurable stenosis or dissection.    VERTEBRAL ARTERIES: No focal stenosis or dissection. The left vertebral artery dominant but both vertebral arteries are patent throughout the neck region.    AORTIC ARCH: Classic aortic arch anatomy with no significant stenosis at the origin of the great vessels.    NONVASCULAR STRUCTURES: The lung apices  are clear. There is a nodule of low attenuation involving the right thyroid lobe measuring approximately 1.9 cm x 0.9 cm. Recommend clinical correlation. There are mild diffuse degenerative changes of the cervical   spine.      Impression    IMPRESSION:   HEAD CT:  1.  No CT evidence of a midline shift, acute hemorrhage or focal area suggestive of acute anemia.  2.  Heavily calcified small meningioma with associated vasogenic edema anterior medial left frontal lobe.  3.  Stable diffuse age related changes.    HEAD CTA:   1.  No discrete vessel occlusion, significant stenosis, aneurysm or high flow vascular malformation involving the arteries of the [place].    NECK CTA:  1.  Configuration of the great vessels off the aortic arch with no significant stenosis of their origins.  2.  Approximately 40% stenosis of origin of right internal carotid artery by NASCET criteria.  3.  No significant stenosis or irregularity involving origin of the left internal carotid artery by NASCET criteria.  4.  No radiographic evidence of dissection.

## 2022-01-14 NOTE — PROGRESS NOTES
"[facility]  Neurology Consultation - Progress Note    Patient Name:  Josefina Dumont  Date of Service:  January 14, 2022    Subjective:    Patient denies any new strokelike or seizure-like symptoms.  No acute events noted overnight nursing notes.  Was seen by cardiology this morning for mitral valve stenosis.  I did talk to sister regarding the fall.  See assessment below.    Objective:    Vitals: /52 (BP Location: Left arm)   Pulse 67   Temp 98.2  F (36.8  C) (Oral)   Resp 18   Ht 1.626 m (5' 4\")   Wt 106.4 kg (234 lb 8 oz)   SpO2 97%   BMI 40.25 kg/m    General: Sitting in chair, NAD  Head: Atraumatic, normocephalic   Cardiac: Mild lower extremity edema  Neurologic:  Awake, alert, attentive, oriented to person place time and situation.  Language clear and fluent intact comprehension of simple commands.  Able to name appropriately.  Does appear moderately forgetful and repeats a couple of questions throughout the interview.  Conjugate gaze, face symmetric, no dysarthria.  No abnormal movements.  Moving all extremities equal and antigravity.    Pertinent Investigations:    I have personally reviewed most recent and pertinent labs, tests, and radiological images.     IMPRESSION:   HEAD CT:  1.  No CT evidence of a midline shift, acute hemorrhage or focal area suggestive of acute anemia.  2.  Heavily calcified small meningioma with associated vasogenic edema anterior medial left frontal lobe.  3.  Stable diffuse age related changes.     HEAD CTA:   1.  No discrete vessel occlusion, significant stenosis, aneurysm or high flow vascular malformation involving the arteries of the [place].     NECK CTA:  1.  Configuration of the great vessels off the aortic arch with no significant stenosis of their origins.  2.  Approximately 40% stenosis of origin of right internal carotid artery by NASCET criteria.  3.  No significant stenosis or irregularity involving origin " of the left internal carotid artery by NASCET criteria.  4.  No radiographic evidence of dissection.    MRI brain:   acute infarction within the right mid frontal centrum semiovale.  2.  Small to moderate anterior left frontal convexity meningioma with associated vasogenic edema resulting in mild localized mass effect. No midline shift, herniation or hydrocephalus.  3.  Mild to moderate chronic small vessel ischemic disease and mild generalized brain parenchymal volume loss.    Echo:Left ventricular size, wall motion and function are normal. The ejection  fraction is 60-65%.  Normal right ventricle size and systolic function.  There is severe concentric left ventricular hypertrophy.  The ascending aorta is Borderline dilated.  There is mild to moderate mitral stenosis.  Would consider a transesophageal echocardiogram if clinically indicated to  better assess mitral valve pathology.  The study was technically difficult. Would consider a transesophageal  echocardiogram if clinically indicated.  ______________________________________________________________________________  Left Ventricle  Left ventricular size, wall motion and function are normal. The ejection  fraction is 60-65%. There is severe concentric left ventricular hypertrophy.  Left ventricular diastolic function is normal. No regional wall motion  abnormalities noted.     Right Ventricle  Normal right ventricle size and systolic function. TAPSE is normal, which is  consistent with normal right ventricular systolic function.     Atria  The left atrium is moderately dilated. Right atrium not well visualized. There  is no color Doppler evidence of an atrial shunt.        Assessment  #Meningioma  #Vasogenic edema  #Brain compression  #Acute ischemic stroke: Secondary to small vessel disease  #Thyroid nodule    Ms. Dumont is a 71-year-old female with a history of hypertension and diabetes who presents after a fall.  I was able to speak with sister today.  She  describes a mechanical fall.  She thinks it was loss of consciousness for no more than a second or two.  No significant postictal state or seizure-like activity were noted.  She states she was not rigid.  Incidentally she was found to have a left frontal meningioma as well as a very small acute ischemic stroke.  Clinically she does not seem to have any deficits that localized to her ischemic stroke.  I do wonder if some of her more chronic cognitive concerns could be related to the edema from the meningioma.  She has completed stroke work-up and at this time most likely etiology is secondary to small vessel disease given her history of hypertension and diabetes as well as location.  I discussed dual antiplatelets, but she has had a reaction and is not willing to retry aspirin.  Therefore we will start on Plavix.  She also needs to start atorvastatin with goal LDL less than 70 given her diabetes.    Recommendations:   -Plavix 75 mg  -Atorvastatin 20 mg, goal LDL less than 70  -No need for AEDs at this time as she has never had a clinical seizure  -Dexamethasone management by neurosurgery  -Follow-up in neurosurgery indicated for her meningioma, and neurology referral on discharge for stroke and probable cognitive impairment  -Defer to primary regarding follow-up on thyroid nodule noted incidentally on CTA  - Inpatient neurology will sign off    Thank you for involving Neurology in the care of Josefina Dumont.      Theo Gamboa,

## 2022-01-14 NOTE — PROGRESS NOTES
CARDIOLOGY PROGRESS NOTE      Assessment/Plan:  1.  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  Difficult to assess symptoms, patient chronically short of breath for 10 years maybe worse over the last year.  Given difficulty to assess symptoms as well as patient's feeling overwhelmed with diagnosis of meningioma, would anticipate evaluation of mitral valve as an outpatient.  Could arrange for outpatient LUC after more discussion with her and her sister. Given that it is only mild to moderate with 3 mm gradient, doubt surgical intervention would be needed.  Would also need to evaluate Garcia score to see possibility of potentially pursuing percutaneous valvuloplasty if needed.  2.  Primary hypertension-blood pressure elevated, on amlodipine 10 mg a day and given preserved renal function added some ACE inhibitor.  3.  Meningioma (H)-small to moderate anterior left frontal convexity meningioma with associated vasogenic edema resulting in mid localized mass-effect.  No midline shift or herniation or hydrocephalus, defer to neurosurgery.  4.  Injury of head, initial encounter-not syncopal, most likely secondary to losing footing.  No specific therapy.  5.  Type 2 diabetes mellitus, without long-term current use of insulin (H)-so noted with hemoglobin A1c of 7.0.    Plan:  1.  No further inpatient cardiology work-up, cardiology will sign off, available as needed.  2.  Uptitrate meds for blood pressure as per neurology.    Discharge Plannin.  On discharge, patient should follow-up with me in the office with her sister.  2.  Most likely will need stress echo as well as LUC to evaluate mitral stenosis.  3.  Would use antibiotic prophylaxis for dental cleaning.     LOS: 2 days     Subjective:  Interval History:    71-year-old white female being seen on third day of hospitalization.  Still seems rather anxious.  Complains of heartburn and needing to belch.  No chest  "pains, palpitations, shortness of breath, PND, orthopnea, syncope, dizziness or peripheral edema.  Does complain of a headache from where she fell and hit her head.    Medications    amLODIPine  10 mg Oral Daily     citalopram  40 mg Oral Daily     clopidogrel  75 mg Oral Daily     dexamethasone  4 mg Intravenous Q6H     enoxaparin ANTICOAGULANT  40 mg Subcutaneous Q24H     famotidine  20 mg Oral BID     glimepiride  2 mg Oral QAM AC     hydrochlorothiazide  25 mg Oral Daily     insulin aspart  1-7 Units Subcutaneous TID AC     insulin aspart  1-5 Units Subcutaneous At Bedtime     levofloxacin  750 mg Intravenous Q24H     lidocaine  1 patch Transdermal Q24h    And     lidocaine   Transdermal Q8H     lisinopril  10 mg Oral Daily     polyethylene glycol  17 g Oral Daily     sodium chloride (PF)  3 mL Intracatheter Q8H     sodium chloride (PF)  3 mL Intracatheter Q8H     Objective:   Vital signs in last 24 hours:  Temp:  [97.7  F (36.5  C)-98.5  F (36.9  C)] 97.8  F (36.6  C)  Pulse:  [66-93] 93  Resp:  [16-18] 16  BP: (158-202)/(71-90) 192/86  SpO2:  [91 %-97 %] 91 %  Weight:   [unfilled]        Physical Exam:  BP (!) 192/86 (BP Location: Left arm)   Pulse 93   Temp 97.8  F (36.6  C) (Oral)   Resp 16   Ht 1.626 m (5' 4\")   Wt 106.4 kg (234 lb 8 oz)   SpO2 91%   BMI 40.25 kg/m      General Appearance:    Alert, cooperative, no distress, appears stated age   Head:    Normocephalic, without obvious abnormality, atraumatic   Throat:   Lips, mucosa, and tongue normal; teeth and gums normal   Neck:   Supple, symmetrical, trachea midline, no adenopathy;        thyroid:  No enlargement/tenderness/nodules; no carotid    bruit or JVD   Back:     Symmetric, no curvature, ROM normal, no CVA tenderness   Lungs:     Clear to auscultation bilaterally, respirations unlabored   Chest wall:    No tenderness or deformity   Heart:    Regular rate and rhythm, S1 and S2 normal, 1/6 systolic ejection murmur, rub   or gallop "   Abdomen:     Soft, non-tender, bowel sounds active all four quadrants,     no masses, no organomegaly   Extremities:   Normal, atraumatic, no cyanosis or edema   Pulses:   2+ and symmetric all extremities   Skin:   Skin color, texture, turgor normal, no rashes or lesions     Cardiographics:      ECG: Personally reviewed by myself shows sinus rhythm, leftward axis, probable LVH, no acute changes.    Echocardiogram:   Left ventricular size, wall motion and function are normal. The ejection fraction is 60-65%.  Normal right ventricle size and systolic function.  There is severe concentric left ventricular hypertrophy.  The ascending aorta is Borderline dilated.  There is mild to moderate mitral stenosis.  Would consider a transesophageal echocardiogram if clinically indicated to better assess mitral valve pathology.  The study was technically difficult. Would consider a transesophageal echocardiogram if clinically indicated.    Lab Results:   Recent Labs   Lab 01/14/22  0643   WBC 7.0   HGB 12.5   HCT 39.4        Recent Labs   Lab 01/14/22  0643      CO2 24   BUN 20   .       Lab Results   Component Value Date    TROPONINI 0.03 01/12/2022

## 2022-01-14 NOTE — TELEPHONE ENCOUNTER
Received message on hospital physician discharge line. Pt needs to follow up with Dr. Hess in 1-3 months with her sister present due to mitral stenosis. Order placed and and message sent to schedulers to please arrange. -[facility]

## 2022-01-15 ENCOUNTER — APPOINTMENT (OUTPATIENT)
Dept: CT IMAGING | Facility: HOSPITAL | Age: 72
DRG: 085 | End: 2022-01-15
Attending: INTERNAL MEDICINE
Payer: COMMERCIAL

## 2022-01-15 ENCOUNTER — APPOINTMENT (OUTPATIENT)
Dept: RADIOLOGY | Facility: HOSPITAL | Age: 72
DRG: 085 | End: 2022-01-15
Attending: INTERNAL MEDICINE
Payer: COMMERCIAL

## 2022-01-15 LAB
ALBUMIN SERPL-MCNC: 3.3 G/DL (ref 3.5–5)
ALP SERPL-CCNC: 122 U/L (ref 45–120)
ALT SERPL W P-5'-P-CCNC: 32 U/L (ref 0–45)
ANION GAP SERPL CALCULATED.3IONS-SCNC: 9 MMOL/L (ref 5–18)
AST SERPL W P-5'-P-CCNC: 37 U/L (ref 0–40)
BILIRUB SERPL-MCNC: 0.5 MG/DL (ref 0–1)
BUN SERPL-MCNC: 27 MG/DL (ref 8–28)
CALCIUM SERPL-MCNC: 9 MG/DL (ref 8.5–10.5)
CHLORIDE BLD-SCNC: 104 MMOL/L (ref 98–107)
CO2 SERPL-SCNC: 23 MMOL/L (ref 22–31)
CREAT SERPL-MCNC: 0.87 MG/DL (ref 0.6–1.1)
ERYTHROCYTE [DISTWIDTH] IN BLOOD BY AUTOMATED COUNT: 14 % (ref 10–15)
GFR SERPL CREATININE-BSD FRML MDRD: 71 ML/MIN/1.73M2
GLUCOSE BLD-MCNC: 161 MG/DL (ref 70–125)
GLUCOSE BLDC GLUCOMTR-MCNC: 119 MG/DL (ref 70–99)
GLUCOSE BLDC GLUCOMTR-MCNC: 138 MG/DL (ref 70–99)
GLUCOSE BLDC GLUCOMTR-MCNC: 144 MG/DL (ref 70–99)
GLUCOSE BLDC GLUCOMTR-MCNC: 159 MG/DL (ref 70–99)
HCT VFR BLD AUTO: 42.4 % (ref 35–47)
HGB BLD-MCNC: 13.6 G/DL (ref 11.7–15.7)
MAGNESIUM SERPL-MCNC: 2.2 MG/DL (ref 1.8–2.6)
MCH RBC QN AUTO: 32.5 PG (ref 26.5–33)
MCHC RBC AUTO-ENTMCNC: 32.1 G/DL (ref 31.5–36.5)
MCV RBC AUTO: 101 FL (ref 78–100)
PLATELET # BLD AUTO: 216 10E3/UL (ref 150–450)
POTASSIUM BLD-SCNC: 3.6 MMOL/L (ref 3.5–5)
PROT SERPL-MCNC: 7.8 G/DL (ref 6–8)
RBC # BLD AUTO: 4.19 10E6/UL (ref 3.8–5.2)
SODIUM SERPL-SCNC: 136 MMOL/L (ref 136–145)
TSH SERPL DL<=0.005 MIU/L-ACNC: 4.64 UIU/ML (ref 0.3–5)
WBC # BLD AUTO: 10 10E3/UL (ref 4–11)

## 2022-01-15 PROCEDURE — 250N000013 HC RX MED GY IP 250 OP 250 PS 637: Performed by: HOSPITALIST

## 2022-01-15 PROCEDURE — 120N000001 HC R&B MED SURG/OB

## 2022-01-15 PROCEDURE — 80053 COMPREHEN METABOLIC PANEL: CPT | Performed by: HOSPITALIST

## 2022-01-15 PROCEDURE — 36415 COLL VENOUS BLD VENIPUNCTURE: CPT | Performed by: HOSPITALIST

## 2022-01-15 PROCEDURE — 70450 CT HEAD/BRAIN W/O DYE: CPT | Mod: 76

## 2022-01-15 PROCEDURE — 250N000011 HC RX IP 250 OP 636: Performed by: INTERNAL MEDICINE

## 2022-01-15 PROCEDURE — 84443 ASSAY THYROID STIM HORMONE: CPT | Performed by: INTERNAL MEDICINE

## 2022-01-15 PROCEDURE — 250N000011 HC RX IP 250 OP 636: Performed by: HOSPITALIST

## 2022-01-15 PROCEDURE — 83735 ASSAY OF MAGNESIUM: CPT | Performed by: INTERNAL MEDICINE

## 2022-01-15 PROCEDURE — 99233 SBSQ HOSP IP/OBS HIGH 50: CPT | Performed by: PSYCHIATRY & NEUROLOGY

## 2022-01-15 PROCEDURE — 250N000013 HC RX MED GY IP 250 OP 250 PS 637: Performed by: FAMILY MEDICINE

## 2022-01-15 PROCEDURE — 99233 SBSQ HOSP IP/OBS HIGH 50: CPT | Performed by: INTERNAL MEDICINE

## 2022-01-15 PROCEDURE — 250N000013 HC RX MED GY IP 250 OP 250 PS 637: Performed by: PSYCHIATRY & NEUROLOGY

## 2022-01-15 PROCEDURE — 70450 CT HEAD/BRAIN W/O DYE: CPT

## 2022-01-15 PROCEDURE — 85027 COMPLETE CBC AUTOMATED: CPT | Performed by: HOSPITALIST

## 2022-01-15 PROCEDURE — 99233 SBSQ HOSP IP/OBS HIGH 50: CPT | Performed by: NURSE PRACTITIONER

## 2022-01-15 PROCEDURE — 250N000009 HC RX 250: Performed by: INTERNAL MEDICINE

## 2022-01-15 PROCEDURE — 250N000013 HC RX MED GY IP 250 OP 250 PS 637: Performed by: INTERNAL MEDICINE

## 2022-01-15 PROCEDURE — 71046 X-RAY EXAM CHEST 2 VIEWS: CPT

## 2022-01-15 PROCEDURE — 82040 ASSAY OF SERUM ALBUMIN: CPT | Performed by: HOSPITALIST

## 2022-01-15 RX ORDER — LISINOPRIL 5 MG/1
10 TABLET ORAL ONCE
Status: COMPLETED | OUTPATIENT
Start: 2022-01-15 | End: 2022-01-15

## 2022-01-15 RX ORDER — LISINOPRIL 20 MG/1
20 TABLET ORAL DAILY
Status: DISCONTINUED | OUTPATIENT
Start: 2022-01-16 | End: 2022-01-20 | Stop reason: HOSPADM

## 2022-01-15 RX ORDER — HYDRALAZINE HYDROCHLORIDE 20 MG/ML
10 INJECTION INTRAMUSCULAR; INTRAVENOUS EVERY 6 HOURS PRN
Status: DISCONTINUED | OUTPATIENT
Start: 2022-01-15 | End: 2022-01-17

## 2022-01-15 RX ADMIN — ATORVASTATIN CALCIUM 20 MG: 10 TABLET, FILM COATED ORAL at 21:41

## 2022-01-15 RX ADMIN — LISINOPRIL 10 MG: 5 TABLET ORAL at 17:37

## 2022-01-15 RX ADMIN — CITALOPRAM HYDROBROMIDE 40 MG: 40 TABLET ORAL at 08:24

## 2022-01-15 RX ADMIN — MICONAZOLE NITRATE: 2 POWDER TOPICAL at 21:46

## 2022-01-15 RX ADMIN — AMLODIPINE BESYLATE 10 MG: 5 TABLET ORAL at 08:24

## 2022-01-15 RX ADMIN — HYDROCHLOROTHIAZIDE 25 MG: 25 TABLET ORAL at 08:32

## 2022-01-15 RX ADMIN — FAMOTIDINE 20 MG: 20 TABLET, FILM COATED ORAL at 21:42

## 2022-01-15 RX ADMIN — FAMOTIDINE 20 MG: 20 TABLET, FILM COATED ORAL at 08:24

## 2022-01-15 RX ADMIN — GLIMEPIRIDE 2 MG: 1 TABLET ORAL at 08:23

## 2022-01-15 RX ADMIN — LEVOFLOXACIN 750 MG: 5 INJECTION, SOLUTION INTRAVENOUS at 17:39

## 2022-01-15 RX ADMIN — CLOPIDOGREL BISULFATE 75 MG: 75 TABLET ORAL at 08:24

## 2022-01-15 RX ADMIN — HYDRALAZINE HYDROCHLORIDE 10 MG: 20 INJECTION INTRAMUSCULAR; INTRAVENOUS at 03:35

## 2022-01-15 RX ADMIN — HYDRALAZINE HYDROCHLORIDE 10 MG: 20 INJECTION INTRAMUSCULAR; INTRAVENOUS at 13:00

## 2022-01-15 RX ADMIN — DEXAMETHASONE 2 MG: 2 TABLET ORAL at 21:40

## 2022-01-15 RX ADMIN — LIDOCAINE HYDROCHLORIDE 30 ML: 20 SOLUTION ORAL; TOPICAL at 10:07

## 2022-01-15 RX ADMIN — LIDOCAINE 1 PATCH: 246 PATCH TOPICAL at 21:42

## 2022-01-15 RX ADMIN — ACETAMINOPHEN 650 MG: 325 TABLET ORAL at 22:45

## 2022-01-15 RX ADMIN — MICONAZOLE NITRATE: 2 POWDER TOPICAL at 08:27

## 2022-01-15 RX ADMIN — DEXAMETHASONE 2 MG: 2 TABLET ORAL at 08:23

## 2022-01-15 RX ADMIN — ACETAMINOPHEN 650 MG: 325 TABLET ORAL at 10:04

## 2022-01-15 RX ADMIN — LISINOPRIL 10 MG: 5 TABLET ORAL at 08:23

## 2022-01-15 ASSESSMENT — ACTIVITIES OF DAILY LIVING (ADL)
ADLS_ACUITY_SCORE: 9

## 2022-01-15 NOTE — PLAN OF CARE
Problem: Adult Inpatient Plan of Care  Goal: Plan of Care Review  Outcome: Improving     Problem: Risk for Delirium  Goal: Optimal Coping  Outcome: Improving     Problem: Pain Acute  Goal: Acceptable Pain Control and Functional Ability  Outcome: Improving     Problem: Fall Injury Risk  Goal: Absence of Fall and Fall-Related Injury  Outcome: Improving  Intervention: Promote Injury-Free Environment  Recent Flowsheet Documentation  Taken 1/14/2022 1940 by Ebonie Obregon RN  Safety Promotion/Fall Prevention: activity supervised    Pt is alert and oriented x3, assist of 1 with the walker and gait belt. She c/o headache 7/10 and was given PRN Tylenol with some relief. Pt had Thyroid Ultrasound tonight, result has been posted. BG= 168 and 139 mg/dL. Pt was started on Miconazole powder for abdominal fold rash/redness. No seizure activity noted, pt remains on precaution. Will continue to monitor.

## 2022-01-15 NOTE — PLAN OF CARE
Problem: Pain Acute  Goal: Acceptable Pain Control and Functional Ability  Outcome: Improving.  Pt denied pain during this shift. C/o Exhaustion.   PRN Tylenol given per pt request. Pt reported comfortable afterwards.      Problem: Diabetes Comorbidity  Goal: Blood Glucose Level Within Targeted Range  Outcome: Improving   , 159 sliding scale insulin given per order.     Problem: Hypertension Comorbidity  Goal: Blood Pressure in Desired Range  Outcome: Improving   Telemetry: NSR. /83; scheduled med given.   BP recheck 182/77. PRN Hydralazin given; Rechecked 1 hour later  /70.   Pt had no safety incident. Staff assisted with gait belt to the bathroom. Pt now on bed rest with bathroom privileges per MD.   No seizure activity evidenced. Side rails padded. Bed alarm on.   Pt instructed on fall preventions.    Pt expressed concern about findings from CT scan; anticipating recheck CT at 3 pm. Staff reassured as appropriate. Pt assisted down to CT with wheelchair at 2:45 pm.

## 2022-01-15 NOTE — PLAN OF CARE
Problem: Adult Inpatient Plan of Care  Goal: Plan of Care Review  1/15/2022 0659 by Ebonie Obregon RN  Outcome: Improving  1/14/2022 2249 by Ebonie Obregon RN  Outcome: Improving     Problem: Risk for Delirium  Goal: Optimal Coping  1/15/2022 0659 by Ebonie Obregon RN  Outcome: Improving  1/14/2022 2249 by Ebonie Obregon RN  Outcome: Improving     Problem: Pain Acute  Goal: Acceptable Pain Control and Functional Ability  Outcome: Improving  Intervention: Develop Pain Management Plan  Recent Flowsheet Documentation  Taken 1/14/2022 2143 by Ebonie Obregon RN  Pain Management Interventions: medication (see MAR)     Problem: Fall Injury Risk  Goal: Absence of Fall and Fall-Related Injury  1/15/2022 0659 by Ebonie Obregon RN  Outcome: Improving  1/14/2022 2249 by Ebonie Obregon RN  Outcome: Improving  Intervention: Promote Injury-Free Environment  Recent Flowsheet Documentation  Taken 1/15/2022 0100 by Ebonie Obregon RN  Safety Promotion/Fall Prevention: activity supervised  Taken 1/14/2022 1940 by Ebonie Obregon RN  Safety Promotion/Fall Prevention: activity supervised    Pt was up early this morning and has been sitting in her recliner. She c/o heart burn and requested for TUMs but pt has no order. Writer plan to notify the MD. Otherwise, pt remains on tele and has been NSR. Assist of 1 with gait belt and walker. Will continue to monitor.

## 2022-01-15 NOTE — PROGRESS NOTES
[facility]    Medicine Progress Note - Hospitalist Service       Date of Admission:  1/12/2022    Assessment & Plan            Josefina Dumont is a 71 year old female admitted on 1/12/2022. She has history of diabetes, hypertension, mood disorder who presented today with unwitnessed fall, syncope.     1.Unwitnessed fall vrs syncope -suspect now tumor and cva  -Pt had a unwitnessed fall, unsure if fall vs syncope vrs seizure--now concern cva, tumor  -CT head showed lesion measuring 1.5 x 2.6 x 3 cm s/o meningioma  -MRI brain--showed mass suggestive of meningioma and acute cva  -Monitor on telemetry for any arrhythmias  -Neurology /Neurosurg consulted-appreciated  -PT/OT consulted     2.Intracranial lesion, suggestive of meningioma  -CT head showed a lesion measuring 1.5 x 2.6 x 3 cm most suggestive of meningioma with vasogenic edema.  -MRI with and without contrast ordered  -Continue Decadron 4 mg every 6 hours as per neurosurgery recommendation--now they will start taper today  -On Pepcid twice daily  -Keppra loading dose given in the ER, neurology does not think she needs to continue this  -Neurosurgery will present her case to tumor board Monday, likely will need surgery at some point     3.Acute CVA  -on mri here small cva  -tele  -checked echo--Mitral Stenosis--cards seeing  -neurology following and added Plavix/statin--on 1/15/22 will stop Plavix with evidence for small SD  -pt/ot    4.Small Subdural Hemorrhage  --CT head repeat today 1/15/22 due to headache overnight  --shows small area SD--5 mm, no shift  --stopped Plavix 1/15, last Lovenox dvt prevent 1/14  --Neurosurg paged out to update--they will review films, and want repeat head ct in 6 hours  --will page out neurology as well--hold Plavix 10 days and then will repeat head CT and if stable then restart Plavix then  --keep HOB up     5.Back pain and chest pain-likely secondary to fall  -CT C-spine negative for any acute  fractures  -Lidocaine patch and Oxy prn ordered  -Placed on fall precautions     6.CAP  -CXR shows R basilar opacity, s/o pneumonia  -Patient afebrile, no leukocytosis.  Some cough  -Continue Levaquin, recheck cxr today  -COVID, influenza AMB negative     7.Diabetes with hyperglycemia  -A1c 7  -Resume home glimepiride.  -Placed on medium resistance sliding scale as patient now on dexamethasone.  Can likely switch NovoLog to NPH if persistent hyperglycemia while on steroids.  -Monitor blood sugars closely     8.Hypertension-blood pressure significantly elevated on arrival to ED  -maybe from CVA  -Resumed home amlodipine and hydrochlorothiazide  -cards added ace-I--will increase lisinopril to 20 mg  -hydralazine prn     9.Mood disorder-  -Continue PTA Celexa.  -Ativan as needed ordered     10.Hypokalemia-replete per protocol  -mag was still low replaced 1/13/22     11.Mitral stenosis  -cards did see and will need outpt follow up with  who will consider oupt LUC  -antibiotic prophylaxis for dental cleaning will be needed     12. Obesity- BMI 39     13.Thyroid nodule on CTA neck  -check ultrasound and tsh  -tsh ok  -however thyroid nodule will need outpt work up and bx--sister notes she had bx a year ago--would still rec follow up                Diet: Consistent Carbohydrate Diet Low Consistent Carb (45 g CHO per Meal) Diet    DVT Prophylaxis: Pneumatic Compression Devices  Machuca Catheter: Not present  Central Lines: None  Code Status: Full Code      Disposition Plan   Expected Discharge: 01/17/2022     Anticipated discharge location:  Awaiting care coordination huddle  Delays:     Administering IV medications            The patient's care was discussed with the Care Coordinator/ and Patient. I called sister to update at [id number]    Nichole Aguilar MD  Hospitalist Service  [facility]  Securely message with the Vocera Web Console (learn more here)  Text page via [employer]  Koko/Directory          ______________________________________________________________________    Interval History   She notes some headache overnight  She thinks headache is from glasses being off, frame got bent yesterday  No nausea  Still some cough      Data reviewed today: I reviewed all medications, new labs and imaging results over the last 24 hours. I personally reviewed no images or EKG's today.    Physical Exam   Vital Signs: Temp: 98  F (36.7  C) Temp src: Oral BP: (!) 193/83 Pulse: 61   Resp: 14 SpO2: 100 % O2 Device: None (Room air)    Weight: 234 lbs 8 oz  Constitutional: awake, alert, cooperative, no apparent distress and appears stated age  Eyes: sclera clear  Respiratory: No increased work of breathing, good air exchange, clear to auscultation bilaterally, no crackles or wheezing  Cardiovascular: Normal apical impulse, regular rate and rhythm, normal S1 and S2, no S3 or S4, and no murmur noted  GI: normal bowel sounds, soft, non-distended and non-tender  Skin: no bruising or bleeding  Musculoskeletal: no lower extremity pitting edema present  Neurologic: Mental Status Exam:  Level of Alertness:   awake  Motor Exam:  moves all extremities well and symmetrically  Neuropsychiatric: General: normal, calm and normal eye contact    Data   Recent Labs   Lab 01/15/22  0817 01/15/22  0647 01/14/22  2139 01/14/22  0813 01/14/22  0643 01/13/22  0758 01/13/22  0429   WBC  --  10.0  --   --  7.0  --  4.2   HGB  --  13.6  --   --  12.5  --  13.8   MCV  --  101*  --   --  99  --  101*   PLT  --  216  --   --  174  --  168   NA  --  136  --   --  136  --  136   POTASSIUM  --  3.6  --   --  3.7  --  4.0   CHLORIDE  --  104  --   --  103  --  102   CO2  --  23  --   --  24  --  22   BUN  --  27  --   --  20  --  13   CR  --  0.87  --   --  0.85  --  0.89   ANIONGAP  --  9  --   --  9  --  12   ALBARO  --  9.0  --   --  9.0  --  9.6   * 161* 139*   < > 194*   < > 244*   ALBUMIN  --  3.3*  --   --  3.1*  --  3.2*    PROTTOTAL  --  7.8  --   --  7.6  --  8.0   BILITOTAL  --  0.5  --   --  0.5  --  0.6   ALKPHOS  --  122*  --   --  125*  --  142*   ALT  --  32  --   --  24  --  30   AST  --  37  --   --  25  --  39    < > = values in this interval not displayed.     Recent Results (from the past 24 hour(s))   US Thyroid    Narrative    EXAM: US THYROID  LOCATION: [facility]  DATE/TIME: 1/14/2022 9:00 PM    INDICATION: Follow-up nodule seen on CT.  COMPARISON: None.  TECHNIQUE: Thyroid ultrasound.     FINDINGS:    RIGHT lobe: 4.4 x 1.8 x 2.2 cm. Heterogeneous echotexture.    Isthmus: 5 mm.    LEFT lobe: 3.4 x 1.5 x 1.1 cm. Heterogeneous echotexture.    NECK: No cervical lymphadenopathy.    NODULES:    Nodule 1: Right thyroid heterogeneous 2.8 x 1.5 x 1.8 cm nodule.   Composition: Solid or almost completely solid, 2 points   Echogenicity: Unable to determine, 1 point   Shape: Wider-than-tall, 0 points   Margin: Lobulated or irregular, 2 points   Echogenic Foci: Macrocalcifications, 1 point   Point Total: 4-6 points. TI-RADS 4. If 1.5 cm or larger, recommend FNA; if 1 cm or larger, follow up US (annually for 5 years).      Nodule 2: Inferior left thyroid 0.8 x 0.6 x 0.5 cm nodule.  Composition: Solid or almost completely solid, 2 points   Echogenicity: Hyperechoic or isoechoic, 1 point   Shape: Wider-than-tall, 0 points   Margin: Smooth, 0 points   Echogenic Foci: None, or large comet-tail artifacts, 0 points   Point Total: 3 points. TI-RADS 3. If 2.5 cm or larger, recommend FNA; if 1.5 cm or larger, recommend follow up US at 1, 3, and 5 years.      Impression    IMPRESSION:    1.  Dominant right thyroid nodule meeting criteria for biopsy and fine-needle aspiration.      Nodules are characterized per  ACR Thyroid Imaging, Reporting and Data System (TI-RADS): White Paper of the ACR TI-RADS Committee  Anthony Wakefield et al. Journal of the American College of Radiology 2017. Volume 14 (2017), Issue 5,  587-595.      XR Chest 2 Views    Narrative    EXAM: XR CHEST 2 VW  LOCATION: [facility]  DATE/TIME: 1/15/2022 9:03 AM    INDICATION: Shortness of breath.  COMPARISON: 01/12/2022.      Impression    IMPRESSION: Heart size mildly enlarged. The previous mild patchy infiltrates right lung base have resolved. No remaining significant findings.   CT Head w/o Contrast    Narrative    EXAM: CT HEAD WITHOUT CONTRAST  LOCATION: [facility]  DATE/TIME: 01/15/2022, 9:05 AM    INDICATION: Headache, history of meningioma with edema.  COMPARISON: CT/CTA 01/13/2022. MRI 01/12/2022.  TECHNIQUE: Routine CT Head without IV contrast. Multiplanar reformats. Dose reduction techniques were used.    FINDINGS:  INTRACRANIAL CONTENTS: There is a small area of apparent acute hemorrhage along the anterolateral left middle cranial fossa seen best on series 2 images 15 and 16. This has an elliptical shape and presumably is subdural in location. It measures up to 5   mm in radial thickness. There is no mass effect on the adjacent left temporal lobe. No additional site of recent hemorrhage. Stable partially calcified presumed lobulated meningioma along the left anterior paramedian inferior frontal lobe with associated   stable vasogenic edema and mild localized mass effect in the left frontal area. No CT evidence of acute infarct. Mild presumed chronic small vessel ischemic changes. Mild generalized volume loss. No hydrocephalus.     VISUALIZED ORBITS/SINUSES/MASTOIDS: No intraorbital abnormality. No paranasal sinus mucosal disease. No middle ear or mastoid effusion.    BONES/SOFT TISSUES: No acute abnormality.      Impression    IMPRESSION:  1.  Since CT head 01/13/2022, a small extra-axial acute hemorrhage has developed along the anterolateral aspect of the left middle cranial fossa measuring up to 5 mm in radial thickness best visualized on axial series T2 image 16 and coronal reformatted    image 21. No mass effect on the anterior left temporal lobe. No new hemorrhage elsewhere.  2.  No definite change in the partially calcified presumed meningioma along the left anterior paramedian frontal region with associated stable vasogenic edema and localized mass effect.  3.  Stable mild age-related changes.  4.  Findings discussed with Dr. Watson at 9:45 AM on 01/15/2022.

## 2022-01-15 NOTE — PROGRESS NOTES
"Neurosurgery Progress Note  01/15/22    A/P: Josefina Dumont is a 71 year old female who was admitted on 1/12/2022 for syncopal episode, found to have left parafalcine meningioma likely incidental findings as MRI revealed acute stroke. Neurosurgery was asked to see again as today she complained of a headache and a repeat head CT showed small anterolateral middle cranial fossa subdural hematoma. Holding plavix for now. On exam, Josefina has no new complaints for me. No headache. Neurology was contact as well and recommends holding plavix for 10 days. Repeat head CT planned for this afternoon.   Plan to discuss meningioma at tumor board on Monday  If repeat CT stable, we will sign off - if worsens, may warrant transfer    Attending: Dr. Cullen, repeat head CT today discussed covering surgeon Dr. Chadwick    S: Reports the back of her head hurts where she hit the wall but no new headache today. She endorses being a bit forgetful and this troubles her sometime. Did not recall being told she had a stroke on her MRI.     PMH: No interval change  PSH: No interval change    ROS: *A full 14 point review of systems was otherwise completed and is negative aside from that mentioned above in the HPI    O:  BP (!) 182/77 (BP Location: Left arm)   Pulse 66   Temp 98  F (36.7  C) (Oral)   Resp 12   Ht 5' 4\" (1.626 m)   Wt 234 lb 8 oz (106.4 kg)   SpO2 95%   BMI 40.25 kg/m    General: Awake, alert, NAD  HEENT: AT/NC, EOMI, face symmetric, oral mucosa moist and pink  Heart: RRR  Lungs: Nonlabored respirations without accessory muscle use.  Abd: S, NT, ND  Neuro: Awake, alert, speech is clear and content is appropriate. MAEW x 4 with full strength in b/l UE and LE.   Musculoskeletal: Negative straight leg raise bl  Psych: Appropriatemood and affect, pleasant, cooperative, alert and oriented x 3  Skin: No rashes, lesions    Labs: personally reviewed   Lab Results   Component Value Date     01/15/2022     01/14/2022    " CO2 23 01/15/2022    CO2 24 01/14/2022    BUN 27 01/15/2022    BUN 20 01/14/2022     Recent Labs   Lab Test 01/15/22  0647 01/14/22  0643 01/13/22  0429 01/12/22  1534   WBC 10.0 7.0 4.2 6.0   HGB 13.6 12.5 13.8 12.7   HCT 42.4 39.4 43.0 39.1   * 99 101* 99    174 168 167     No results for input(s): INR, PTT in the last 80377 hours.    Invalid input(s): FIBRINOGEN      I personally visualized all imaging. Head CT from today reviewed and reviewed with Dr. Chadwick.     Sapna Aleman CNP  [facility]  O: [phone]  P: [phone]

## 2022-01-15 NOTE — PROGRESS NOTES
Sister is not able to care for pt. At discharge and pt. Lives alone. Feels pt. Will need TCU. TCU referrals sent to area's sister requested.

## 2022-01-15 NOTE — PROGRESS NOTES
"[facility]  Neurology Consultation - Progress Note    Patient Name:  Josefina Dumont  Date of Service:  January 15, 2022    Subjective:    Called by hospitalist that a repeat head CT for headache was done that showed a very small left-sided subdural hematoma.  Asked to reassess.  Patient denies any headache or any change in neurologic status today    Objective:    Vitals: BP (!) 193/83   Pulse 61   Temp 98  F (36.7  C) (Oral)   Resp 14   Ht 1.626 m (5' 4\")   Wt 106.4 kg (234 lb 8 oz)   SpO2 100%   BMI 40.25 kg/m    General: Lying in bed, NAD  Head: Atraumatic, normocephalic   Neurologic:  Awake, alert, attentive, oriented to person place time.  Language is clear and fluent.  Able to name appropriately.  No pronator drift.  No leg drift.  No dysmetria on finger-nose-finger.  No numbness or sensory extinction with to light touch.  No dysarthria.  Extraocular muscles intact.    Pertinent Investigations:    I have personally reviewed most recent and pertinent labs, tests, and radiological images.     Assessment  #Subdural hemorrhage  #vasogenic edema  #Brain compression  #meningioma  #Acute ischemic stroke  #thyroid nodule    71-year-old female with a history of hypertension and diabetes who presents after a fall.  Subsequent head CT showed meningioma and very small ischemic stroke.  Patient was started on Plavix due to history of intolerance to aspirin and neurology subsequently signed off.  She had a head CT, although she denies any headache today that showed a very small left probable subdural.  My suspicion is this is real based on appearance and not artifact.  I do not see this on the original head CT.  Suspect this may be related to her recent fall in the setting of initiation of Plavix.  Would hold Plavix at this time.  Repeat head CT indicated in approximately 6 hours.  If head CT stable would hold Plavix for 10 days, followed by repeat head CT.  If repeat head CT " stable or improved okay to reinitiate Plavix    Recommendations:   -Hold Plavix  -Repeat head CT 6 hours after previous head CT  -If repeat head CT stable recommend holding Plavix for 10 days, followed by repeat head CT.  If head CT stable or improved, okay to restart Plavix.  -Dexamethasone by neurosurgery  -Needs follow-up regarding thyroid nodule  - Neurology will sign off if repeat head CT stable.      Thank you for involving Neurology in the care of Josefina Dumont.      Theo Gamboa,   Neurology

## 2022-01-16 ENCOUNTER — APPOINTMENT (OUTPATIENT)
Dept: PHYSICAL THERAPY | Facility: HOSPITAL | Age: 72
DRG: 085 | End: 2022-01-16
Payer: COMMERCIAL

## 2022-01-16 ENCOUNTER — APPOINTMENT (OUTPATIENT)
Dept: OCCUPATIONAL THERAPY | Facility: HOSPITAL | Age: 72
DRG: 085 | End: 2022-01-16
Payer: COMMERCIAL

## 2022-01-16 LAB
ALBUMIN SERPL-MCNC: 3 G/DL (ref 3.5–5)
ALP SERPL-CCNC: 114 U/L (ref 45–120)
ALT SERPL W P-5'-P-CCNC: 35 U/L (ref 0–45)
ANION GAP SERPL CALCULATED.3IONS-SCNC: 8 MMOL/L (ref 5–18)
AST SERPL W P-5'-P-CCNC: 38 U/L (ref 0–40)
BILIRUB SERPL-MCNC: 0.5 MG/DL (ref 0–1)
BUN SERPL-MCNC: 24 MG/DL (ref 8–28)
CALCIUM SERPL-MCNC: 8.7 MG/DL (ref 8.5–10.5)
CHLORIDE BLD-SCNC: 107 MMOL/L (ref 98–107)
CO2 SERPL-SCNC: 23 MMOL/L (ref 22–31)
CREAT SERPL-MCNC: 0.87 MG/DL (ref 0.6–1.1)
ERYTHROCYTE [DISTWIDTH] IN BLOOD BY AUTOMATED COUNT: 13.9 % (ref 10–15)
GFR SERPL CREATININE-BSD FRML MDRD: 71 ML/MIN/1.73M2
GLUCOSE BLD-MCNC: 161 MG/DL (ref 70–125)
GLUCOSE BLDC GLUCOMTR-MCNC: 111 MG/DL (ref 70–99)
GLUCOSE BLDC GLUCOMTR-MCNC: 138 MG/DL (ref 70–99)
GLUCOSE BLDC GLUCOMTR-MCNC: 152 MG/DL (ref 70–99)
GLUCOSE BLDC GLUCOMTR-MCNC: 89 MG/DL (ref 70–99)
HCT VFR BLD AUTO: 41 % (ref 35–47)
HGB BLD-MCNC: 13.2 G/DL (ref 11.7–15.7)
MAGNESIUM SERPL-MCNC: 2.2 MG/DL (ref 1.8–2.6)
MCH RBC QN AUTO: 32.1 PG (ref 26.5–33)
MCHC RBC AUTO-ENTMCNC: 32.2 G/DL (ref 31.5–36.5)
MCV RBC AUTO: 100 FL (ref 78–100)
PLATELET # BLD AUTO: 179 10E3/UL (ref 150–450)
POTASSIUM BLD-SCNC: 3.7 MMOL/L (ref 3.5–5)
PROT SERPL-MCNC: 6.8 G/DL (ref 6–8)
RBC # BLD AUTO: 4.11 10E6/UL (ref 3.8–5.2)
SODIUM SERPL-SCNC: 138 MMOL/L (ref 136–145)
WBC # BLD AUTO: 6.6 10E3/UL (ref 4–11)

## 2022-01-16 PROCEDURE — 36415 COLL VENOUS BLD VENIPUNCTURE: CPT | Performed by: HOSPITALIST

## 2022-01-16 PROCEDURE — 83735 ASSAY OF MAGNESIUM: CPT | Performed by: INTERNAL MEDICINE

## 2022-01-16 PROCEDURE — 99233 SBSQ HOSP IP/OBS HIGH 50: CPT | Performed by: INTERNAL MEDICINE

## 2022-01-16 PROCEDURE — 120N000001 HC R&B MED SURG/OB

## 2022-01-16 PROCEDURE — 97110 THERAPEUTIC EXERCISES: CPT | Mod: GP

## 2022-01-16 PROCEDURE — 250N000013 HC RX MED GY IP 250 OP 250 PS 637: Performed by: HOSPITALIST

## 2022-01-16 PROCEDURE — 250N000013 HC RX MED GY IP 250 OP 250 PS 637: Performed by: INTERNAL MEDICINE

## 2022-01-16 PROCEDURE — 97535 SELF CARE MNGMENT TRAINING: CPT | Mod: GO

## 2022-01-16 PROCEDURE — 250N000011 HC RX IP 250 OP 636: Performed by: INTERNAL MEDICINE

## 2022-01-16 PROCEDURE — 250N000011 HC RX IP 250 OP 636: Performed by: HOSPITALIST

## 2022-01-16 PROCEDURE — 80053 COMPREHEN METABOLIC PANEL: CPT | Performed by: HOSPITALIST

## 2022-01-16 PROCEDURE — 99231 SBSQ HOSP IP/OBS SF/LOW 25: CPT | Performed by: PSYCHIATRY & NEUROLOGY

## 2022-01-16 PROCEDURE — 97116 GAIT TRAINING THERAPY: CPT | Mod: GP

## 2022-01-16 PROCEDURE — 250N000013 HC RX MED GY IP 250 OP 250 PS 637: Performed by: PSYCHIATRY & NEUROLOGY

## 2022-01-16 PROCEDURE — 85014 HEMATOCRIT: CPT | Performed by: HOSPITALIST

## 2022-01-16 RX ORDER — MAGNESIUM HYDROXIDE/ALUMINUM HYDROXICE/SIMETHICONE 120; 1200; 1200 MG/30ML; MG/30ML; MG/30ML
30 SUSPENSION ORAL EVERY 4 HOURS PRN
Status: DISCONTINUED | OUTPATIENT
Start: 2022-01-16 | End: 2022-01-20 | Stop reason: HOSPADM

## 2022-01-16 RX ORDER — HYDRALAZINE HYDROCHLORIDE 25 MG/1
25 TABLET, FILM COATED ORAL 3 TIMES DAILY
Status: DISCONTINUED | OUTPATIENT
Start: 2022-01-16 | End: 2022-01-20 | Stop reason: HOSPADM

## 2022-01-16 RX ADMIN — ALUMINUM HYDROXIDE, MAGNESIUM HYDROXIDE, AND SIMETHICONE 30 ML: 200; 200; 20 SUSPENSION ORAL at 18:11

## 2022-01-16 RX ADMIN — HYDROCHLOROTHIAZIDE 25 MG: 25 TABLET ORAL at 08:05

## 2022-01-16 RX ADMIN — HYDRALAZINE HYDROCHLORIDE 25 MG: 25 TABLET, FILM COATED ORAL at 12:36

## 2022-01-16 RX ADMIN — CITALOPRAM HYDROBROMIDE 40 MG: 40 TABLET ORAL at 08:05

## 2022-01-16 RX ADMIN — FAMOTIDINE 20 MG: 20 TABLET, FILM COATED ORAL at 20:00

## 2022-01-16 RX ADMIN — ATORVASTATIN CALCIUM 20 MG: 10 TABLET, FILM COATED ORAL at 20:00

## 2022-01-16 RX ADMIN — HYDRALAZINE HYDROCHLORIDE 25 MG: 25 TABLET, FILM COATED ORAL at 20:01

## 2022-01-16 RX ADMIN — GLIMEPIRIDE 2 MG: 1 TABLET ORAL at 08:06

## 2022-01-16 RX ADMIN — LIDOCAINE 1 PATCH: 246 PATCH TOPICAL at 19:58

## 2022-01-16 RX ADMIN — MICONAZOLE NITRATE: 2 POWDER TOPICAL at 20:06

## 2022-01-16 RX ADMIN — LISINOPRIL 20 MG: 20 TABLET ORAL at 08:05

## 2022-01-16 RX ADMIN — FAMOTIDINE 20 MG: 20 TABLET, FILM COATED ORAL at 08:05

## 2022-01-16 RX ADMIN — HYDRALAZINE HYDROCHLORIDE 10 MG: 20 INJECTION INTRAMUSCULAR; INTRAVENOUS at 23:29

## 2022-01-16 RX ADMIN — DEXAMETHASONE 1 MG: 0.5 TABLET ORAL at 08:05

## 2022-01-16 RX ADMIN — LEVOFLOXACIN 750 MG: 5 INJECTION, SOLUTION INTRAVENOUS at 16:21

## 2022-01-16 RX ADMIN — MICONAZOLE NITRATE: 2 POWDER TOPICAL at 08:14

## 2022-01-16 RX ADMIN — AMLODIPINE BESYLATE 10 MG: 5 TABLET ORAL at 08:05

## 2022-01-16 RX ADMIN — ACETAMINOPHEN 650 MG: 325 TABLET ORAL at 21:50

## 2022-01-16 RX ADMIN — HYDRALAZINE HYDROCHLORIDE 25 MG: 25 TABLET, FILM COATED ORAL at 16:20

## 2022-01-16 RX ADMIN — DEXAMETHASONE 1 MG: 0.5 TABLET ORAL at 20:00

## 2022-01-16 ASSESSMENT — ACTIVITIES OF DAILY LIVING (ADL)
ADLS_ACUITY_SCORE: 11
ADLS_ACUITY_SCORE: 11
ADLS_ACUITY_SCORE: 9
ADLS_ACUITY_SCORE: 9
ADLS_ACUITY_SCORE: 11
ADLS_ACUITY_SCORE: 11
ADLS_ACUITY_SCORE: 9
ADLS_ACUITY_SCORE: 11
ADLS_ACUITY_SCORE: 11
ADLS_ACUITY_SCORE: 9
ADLS_ACUITY_SCORE: 11
ADLS_ACUITY_SCORE: 9
ADLS_ACUITY_SCORE: 11
ADLS_ACUITY_SCORE: 11
ADLS_ACUITY_SCORE: 9

## 2022-01-16 NOTE — PLAN OF CARE
Problem: Pain Acute  Goal: Acceptable Pain Control and Functional Ability  Outcome: Improving   Pt denied pain during this shift.     Problem: Hypertension Acute  Goal: Blood Pressure Within Desired Range  Outcome: Improving  /88; BP med given; rechecked /67. Then 176/74, rechecked after new scheduled Hydralazin given; /67  Telemetry- Normal sinus rhythm.   O2 sat 93-94% RA.      Problem: Diabetes Comorbidity  Goal: Blood Glucose Level Within Targeted Range  Outcome: Improving   , 152; Sliding scale insulin given per order. K+ and Mag recheck tomorrow. A/O X 4.

## 2022-01-16 NOTE — PLAN OF CARE
Pt is alert and oriented x3, assist of 1 with the walker to the the bathroom. C/o lower back pain and was medicated with PRN Tylenol 2 tabs. BG= 138 and 119 mg/dL. Pt had a Head CT, result has been posted. Will continue to monitor.

## 2022-01-16 NOTE — PROGRESS NOTES
[facility]    Medicine Progress Note - Hospitalist Service       Date of Admission:  1/12/2022    Assessment & Plan            Josefina Dumont is a 71 year old female admitted on 1/12/2022. She has history of diabetes, hypertension, mood disorder who presented today with unwitnessed fall, syncope.Now she has meningioma with edema, ischemic cva as well as small SDH     1.Unwitnessed fall vrs syncope -suspect now tumor and cva  -Pt had a unwitnessed fall, unsure if fall vs syncope vrs seizure--now concern cva, tumor  -CT head showed lesion measuring 1.5 x 2.6 x 3 cm s/o meningioma  -MRI brain--showed mass suggestive of meningioma and acute cva  -Monitor on telemetry for any arrhythmias  -Neurology /Neurosurg consulted-appreciated  -PT/OT consulted     2.Intracranial lesion, suggestive of meningioma  -CT head showed a lesion measuring 1.5 x 2.6 x 3 cm most suggestive of meningioma with vasogenic edema.  -MRI with and without contrast ordered  -Continue Decadron 4 mg every 6 hours as per neurosurgery recommendation--now taper to dex 1 mg p bid and will stay on this til Surgery decides on plan  -On Pepcid twice daily  -Keppra loading dose given in the ER, neurology does not think she needs to continue this  -Neurosurgery will present her case to tumor board Monday, likely will need surgery at some point     3.Acute CVA  -on mri here small cva  -tele  -checked echo--Mitral Stenosis--cards seeing  -neurology following and added Plavix/statin--on 1/15/22 then had to stop Plavix with evidence for small SDH on 1/15  -pt/ot     4.Small Subdural Hemorrhage  --CT head repeat today 1/15/22 due to headache overnight  --shows small area SD--5 mm, no shift  --stopped Plavix 1/15, last Lovenox dvt prevent 1/14  --Neurosurg aware of plan repeat CT in 10 days--so due on 1/25/22--they will review films, and want repeat head ct in 6 hours  --per neurology--hold Plavix 10 days and then will repeat head CT  On  1/25/22 and if stable then restart Plavix then  --keep HOB up     5.Back pain and chest pain-likely secondary to fall  -CT C-spine negative for any acute fractures  -Lidocaine patch and Oxy prn ordered  -Placed on fall precautions     6.CAP  -CXR shows R basilar opacity, s/o pneumonia  -Patient afebrile, no leukocytosis.  Some cough  -Continue Levaquin x 5 days total today is last day  -COVID, influenza AMB negative     7.Diabetes with hyperglycemia  -A1c 7  -Resume home glimepiride.  -Placed on medium resistance sliding scale as patient now on steroids  -Monitor blood sugars closely     8.Hypertension-blood pressure significantly elevated on arrival to ED  -maybe from CVA  -Resumed home amlodipine and hydrochlorothiazide  -cards added ace-I--will increased lisinopril to 20 mg  -hydralazine will schedule 25 mg tid now     9.Mood disorder-  -Continue PTA Celexa.  -Ativan as needed ordered     10.Hypokalemia-replete per protocol  -mag was still low replaced 1/13/22     11.Mitral stenosis  -cards did see and will need outpt follow up with  who will consider oupt LUC  -antibiotic prophylaxis for dental cleaning will be needed     12. Obesity- BMI 39     13.Thyroid nodule on CTA neck  -check ultrasound and tsh  -tsh ok  -however thyroid nodule will need outpt work up and bx--sister notes she had bx a year ago--would still rec follow up                Diet: Consistent Carbohydrate Diet Low Consistent Carb (45 g CHO per Meal) Diet    DVT Prophylaxis: Pneumatic Compression Devices--off Lovenox due to SDH  Machuca Catheter: Not present  Central Lines: None  Code Status: Full Code      Disposition Plan   Expected Discharge: 01/17/2022     Anticipated discharge location:  Awaiting care coordination huddle  Delays:     Administering IV medications            The patient's care was discussed with the Care Coordinator/ and Patient. I called to update sister today at 1506    Nichole Aguilar MD  Hospitalist  [facility]  Securely message with the [employer] Web Console (learn more here)  Text page via [employer] Paging/Directory          ______________________________________________________________________    Interval History   She notes headache is better  She feels she slept better  She notes she feels better ambulating  Eating ok    Data reviewed today: I reviewed all medications, new labs and imaging results over the last 24 hours. I personally reviewed no images or EKG's today.    Physical Exam   Vital Signs: Temp: 98.4  F (36.9  C) Temp src: Oral BP: (!) 198/88 Pulse: 71   Resp: 18 SpO2: 93 % O2 Device: None (Room air)    Weight: 234 lbs 8 oz  Constitutional: awake, alert, cooperative and no apparent distress  Respiratory: No increased work of breathing, good air exchange, clear to auscultation bilaterally, no crackles or wheezing  Cardiovascular: Normal apical impulse, regular rate and rhythm, normal S1 and S2, no S3 or S4, and no murmur noted  GI: normal bowel sounds, soft, non-distended and non-tender  Skin: no bruising or bleeding  Musculoskeletal: no lower extremity pitting edema present  Neurologic: Mental Status Exam:  Level of Alertness:   awake  Attention/Concentration:  normal  Language:  normal  Neuropsychiatric: General: normal, calm and normal eye contact    Data   Recent Labs   Lab 01/16/22  0812 01/16/22  0603 01/15/22  2139 01/15/22  0817 01/15/22  0647 01/14/22  0813 01/14/22  0643   WBC  --  6.6  --   --  10.0  --  7.0   HGB  --  13.2  --   --  13.6  --  12.5   MCV  --  100  --   --  101*  --  99   PLT  --  179  --   --  216  --  174   NA  --  138  --   --  136  --  136   POTASSIUM  --  3.7  --   --  3.6  --  3.7   CHLORIDE  --  107  --   --  104  --  103   CO2  --  23  --   --  23  --  24   BUN  --  24  --   --  27  --  20   CR  --  0.87  --   --  0.87  --  0.85   ANIONGAP  --  8  --   --  9  --  9   ALBARO  --  8.7  --   --  9.0  --  9.0   * 161* 119*   < > 161*   <  > 194*   ALBUMIN  --  3.0*  --   --  3.3*  --  3.1*   PROTTOTAL  --  6.8  --   --  7.8  --  7.6   BILITOTAL  --  0.5  --   --  0.5  --  0.5   ALKPHOS  --  114  --   --  122*  --  125*   ALT  --  35  --   --  32  --  24   AST  --  38  --   --  37  --  25    < > = values in this interval not displayed.     Recent Results (from the past 24 hour(s))   CT Head w/o Contrast    Narrative    EXAM: CT HEAD W/O CONTRAST  LOCATION: [facility]  DATE/TIME: 1/15/2022 2:55 PM    INDICATION: Cerebral hemorrhage suspected, known meningioma and stroke. Headache.  COMPARISON: Head CT 1/15/2022 and head CT and CTA head neck 1/13/2022.  TECHNIQUE: Routine CT Head without IV contrast. Multiplanar reformats. Dose reduction techniques were used.    FINDINGS:  INTRACRANIAL CONTENTS: Stable hyperdense extra-axial mass lesion in the paramedian left anteroinferior frontal lobe with associated adjacent vasogenic edema insinuating within the left frontal lobe. There is also a stable 4 mm extra-axial focus of   hyperdensity along the anterolateral left middle cranial fossa, see image 16 of series 2. This is also identified on coronal image 35 of series 401.2. No associated mass effect. This suggests extra-axial acute subacute hemorrhage such as small subdural   hematoma. No other evidence of mass lesion and no other acute intracranial hemorrhage. No CT evidence of acute infarct. Mild to moderate presumed chronic small vessel ischemic changes. Mild to moderate generalized volume loss. No hydrocephalus.     VISUALIZED ORBITS/SINUSES/MASTOIDS: No intraorbital abnormality. No paranasal sinus mucosal disease. No middle ear or mastoid effusion.    BONES/SOFT TISSUES: No acute abnormality.      Impression    IMPRESSION:  1.  Stable acute hyperdense extra-axial hemorrhage left middle cranial fossa without associated mass effect. This may reflect a small subdural hematoma.    2.  Stable partially mineralized extra-axial mass  lesion along the anterior paramedian left frontal region, presumably related to meningioma with associated abnormal vasogenic edema left frontal lobe, also appearing stable.    3.  No new or increasing hemorrhage with presumed chronic small vessel vascular changes and brain volume loss.

## 2022-01-16 NOTE — PROGRESS NOTES
Neurosurgery Note:    Repeat CT yesterday stable. Agree with plan to hold plavix, repeat CT. Okay to discharge from neurosurgery perspective, will touch base after tumor conference discussion tomorrow. Please call with questions.     Sapna Aleman CNP  [facility] Neurosurgery  O: [phone]  P: [phone]

## 2022-01-16 NOTE — PROGRESS NOTES
"[facility]  Neurology Consultation - Progress Note    Patient Name:  Josefina Dumont  Date of Service:  January 16, 2022    Subjective:    Repeat head CT stable.  Patient denies any new symptoms.  I did discuss course and plan with patient and sister over the phone today.    Objective:    Vitals: BP (!) 198/88 (BP Location: Right arm)   Pulse 71   Temp 98.4  F (36.9  C) (Oral)   Resp 18   Ht 1.626 m (5' 4\")   Wt 106.4 kg (234 lb 8 oz)   SpO2 93%   BMI 40.25 kg/m    General: Sitting in chair, no acute distress  Head: Atraumatic, normocephalic   Respiratory: Breathing comfortably on room air  Neurologic:  Awake, alert, attentive, oriented to person place and time.  Language is clear and fluent.  Able to name appropriately.  No pronator drift.  Rises from the bed with no assistance but does use her upper extremities.        Pertinent Investigations:    I have personally reviewed most recent and pertinent labs, tests, and radiological images.     Assessment  #Subdural hemorrhage  #vasogenic edema  #Brain compression  #meningioma  #Acute ischemic stroke  #thyroid nodule      71-year-old female with history of hypertension and diabetes presents after a fall.  Subsequently was found to have a meningioma with vasogenic edema and small ischemic stroke.  Patient was started on Plavix but on a subsequent head CT done for headache was noted to have a very tiny subdural hemorrhage.  Her headache is since resolved without treatment.  We will hold Plavix for short course and repeat head CT.  If repeat head CT stable improved can reinitiate Plavix    Recommendations:   -Continue to hold Plavix  -Repeat head CT in 9 days  -If repeat head CT stable okay to restart Plavix  -Dexamethasone by neurosurgery  -Needs follow-up regarding thyroid nodule  -Neurology will sign off    Thank you for involving Neurology in the care of Josefina Dumont.     My total floor time today for this patient " was at least 20 minutes, greater than half of which was for counseling and coordination of care      Theo Gamboa DO  Neurology

## 2022-01-16 NOTE — PLAN OF CARE
Problem: Adult Inpatient Plan of Care  Goal: Optimal Comfort and Wellbeing  Outcome: No Change   Pt is alert and oriented. Pt using the call light appropriately. A-1 with a walker to the bathroom. PT denies any pain or discomfort overnight. Voiding well. Apeears more steady on her feet today than previous night. Uneventful shift.  Randa Hernández RN

## 2022-01-17 ENCOUNTER — APPOINTMENT (OUTPATIENT)
Dept: PHYSICAL THERAPY | Facility: HOSPITAL | Age: 72
DRG: 085 | End: 2022-01-17
Payer: COMMERCIAL

## 2022-01-17 ENCOUNTER — APPOINTMENT (OUTPATIENT)
Dept: OCCUPATIONAL THERAPY | Facility: HOSPITAL | Age: 72
DRG: 085 | End: 2022-01-17
Payer: COMMERCIAL

## 2022-01-17 LAB
ALBUMIN SERPL-MCNC: 3 G/DL (ref 3.5–5)
ALP SERPL-CCNC: 111 U/L (ref 45–120)
ALT SERPL W P-5'-P-CCNC: 41 U/L (ref 0–45)
ANION GAP SERPL CALCULATED.3IONS-SCNC: 8 MMOL/L (ref 5–18)
AST SERPL W P-5'-P-CCNC: 39 U/L (ref 0–40)
BACTERIA BLD CULT: NO GROWTH
BILIRUB SERPL-MCNC: 0.6 MG/DL (ref 0–1)
BUN SERPL-MCNC: 23 MG/DL (ref 8–28)
CALCIUM SERPL-MCNC: 8.1 MG/DL (ref 8.5–10.5)
CHLORIDE BLD-SCNC: 109 MMOL/L (ref 98–107)
CO2 SERPL-SCNC: 23 MMOL/L (ref 22–31)
CREAT SERPL-MCNC: 0.82 MG/DL (ref 0.6–1.1)
ERYTHROCYTE [DISTWIDTH] IN BLOOD BY AUTOMATED COUNT: 13.9 % (ref 10–15)
GFR SERPL CREATININE-BSD FRML MDRD: 77 ML/MIN/1.73M2
GLUCOSE BLD-MCNC: 125 MG/DL (ref 70–125)
GLUCOSE BLDC GLUCOMTR-MCNC: 122 MG/DL (ref 70–99)
GLUCOSE BLDC GLUCOMTR-MCNC: 125 MG/DL (ref 70–99)
GLUCOSE BLDC GLUCOMTR-MCNC: 157 MG/DL (ref 70–99)
GLUCOSE BLDC GLUCOMTR-MCNC: 173 MG/DL (ref 70–99)
HCT VFR BLD AUTO: 43.3 % (ref 35–47)
HGB BLD-MCNC: 13.7 G/DL (ref 11.7–15.7)
MAGNESIUM SERPL-MCNC: 2.1 MG/DL (ref 1.8–2.6)
MCH RBC QN AUTO: 31.7 PG (ref 26.5–33)
MCHC RBC AUTO-ENTMCNC: 31.6 G/DL (ref 31.5–36.5)
MCV RBC AUTO: 100 FL (ref 78–100)
PLATELET # BLD AUTO: 174 10E3/UL (ref 150–450)
POTASSIUM BLD-SCNC: 3.5 MMOL/L (ref 3.5–5)
PROT SERPL-MCNC: 6.9 G/DL (ref 6–8)
RBC # BLD AUTO: 4.32 10E6/UL (ref 3.8–5.2)
SODIUM SERPL-SCNC: 140 MMOL/L (ref 136–145)
TROPONIN I SERPL-MCNC: 0.03 NG/ML (ref 0–0.29)
WBC # BLD AUTO: 6.6 10E3/UL (ref 4–11)

## 2022-01-17 PROCEDURE — 99233 SBSQ HOSP IP/OBS HIGH 50: CPT | Performed by: INTERNAL MEDICINE

## 2022-01-17 PROCEDURE — 36415 COLL VENOUS BLD VENIPUNCTURE: CPT | Performed by: HOSPITALIST

## 2022-01-17 PROCEDURE — 36415 COLL VENOUS BLD VENIPUNCTURE: CPT | Performed by: INTERNAL MEDICINE

## 2022-01-17 PROCEDURE — 83735 ASSAY OF MAGNESIUM: CPT | Performed by: INTERNAL MEDICINE

## 2022-01-17 PROCEDURE — 250N000011 HC RX IP 250 OP 636: Performed by: INTERNAL MEDICINE

## 2022-01-17 PROCEDURE — 250N000013 HC RX MED GY IP 250 OP 250 PS 637: Performed by: PSYCHIATRY & NEUROLOGY

## 2022-01-17 PROCEDURE — 250N000013 HC RX MED GY IP 250 OP 250 PS 637: Performed by: INTERNAL MEDICINE

## 2022-01-17 PROCEDURE — 97535 SELF CARE MNGMENT TRAINING: CPT | Mod: GO

## 2022-01-17 PROCEDURE — 97116 GAIT TRAINING THERAPY: CPT | Mod: GP

## 2022-01-17 PROCEDURE — 80053 COMPREHEN METABOLIC PANEL: CPT | Performed by: HOSPITALIST

## 2022-01-17 PROCEDURE — 85027 COMPLETE CBC AUTOMATED: CPT | Performed by: HOSPITALIST

## 2022-01-17 PROCEDURE — 97110 THERAPEUTIC EXERCISES: CPT | Mod: GP

## 2022-01-17 PROCEDURE — 250N000013 HC RX MED GY IP 250 OP 250 PS 637: Performed by: HOSPITALIST

## 2022-01-17 PROCEDURE — 97530 THERAPEUTIC ACTIVITIES: CPT | Mod: GP

## 2022-01-17 PROCEDURE — 84484 ASSAY OF TROPONIN QUANT: CPT | Performed by: INTERNAL MEDICINE

## 2022-01-17 PROCEDURE — 97129 THER IVNTJ 1ST 15 MIN: CPT | Mod: GO

## 2022-01-17 PROCEDURE — C9113 INJ PANTOPRAZOLE SODIUM, VIA: HCPCS | Performed by: INTERNAL MEDICINE

## 2022-01-17 PROCEDURE — 120N000001 HC R&B MED SURG/OB

## 2022-01-17 RX ORDER — FOLIC ACID 1 MG/1
1 TABLET ORAL DAILY
Status: DISCONTINUED | OUTPATIENT
Start: 2022-01-17 | End: 2022-01-20 | Stop reason: HOSPADM

## 2022-01-17 RX ORDER — DEXAMETHASONE 0.5 MG/1
1 TABLET ORAL DAILY
Status: DISCONTINUED | OUTPATIENT
Start: 2022-01-19 | End: 2022-01-20 | Stop reason: HOSPADM

## 2022-01-17 RX ADMIN — Medication 100 MG: at 16:54

## 2022-01-17 RX ADMIN — FOLIC ACID 1 MG: 1 TABLET ORAL at 16:54

## 2022-01-17 RX ADMIN — HYDRALAZINE HYDROCHLORIDE 25 MG: 25 TABLET, FILM COATED ORAL at 14:12

## 2022-01-17 RX ADMIN — CITALOPRAM HYDROBROMIDE 40 MG: 40 TABLET ORAL at 08:04

## 2022-01-17 RX ADMIN — PANTOPRAZOLE SODIUM 40 MG: 40 INJECTION, POWDER, FOR SOLUTION INTRAVENOUS at 12:09

## 2022-01-17 RX ADMIN — PANTOPRAZOLE SODIUM 40 MG: 40 INJECTION, POWDER, FOR SOLUTION INTRAVENOUS at 21:37

## 2022-01-17 RX ADMIN — HYDRALAZINE HYDROCHLORIDE 10 MG: 20 INJECTION INTRAMUSCULAR; INTRAVENOUS at 08:00

## 2022-01-17 RX ADMIN — POLYETHYLENE GLYCOL 3350 17 G: 17 POWDER, FOR SOLUTION ORAL at 08:04

## 2022-01-17 RX ADMIN — LIDOCAINE 1 PATCH: 246 PATCH TOPICAL at 19:05

## 2022-01-17 RX ADMIN — DEXAMETHASONE 1 MG: 0.5 TABLET ORAL at 21:37

## 2022-01-17 RX ADMIN — FAMOTIDINE 20 MG: 20 TABLET, FILM COATED ORAL at 08:03

## 2022-01-17 RX ADMIN — ALUMINUM HYDROXIDE, MAGNESIUM HYDROXIDE, AND SIMETHICONE 30 ML: 200; 200; 20 SUSPENSION ORAL at 19:01

## 2022-01-17 RX ADMIN — MICONAZOLE NITRATE: 2 POWDER TOPICAL at 21:50

## 2022-01-17 RX ADMIN — GLIMEPIRIDE 2 MG: 1 TABLET ORAL at 08:03

## 2022-01-17 RX ADMIN — ACETAMINOPHEN 650 MG: 325 TABLET ORAL at 19:01

## 2022-01-17 RX ADMIN — HYDROCHLOROTHIAZIDE 25 MG: 25 TABLET ORAL at 08:04

## 2022-01-17 RX ADMIN — AMLODIPINE BESYLATE 10 MG: 5 TABLET ORAL at 08:04

## 2022-01-17 RX ADMIN — MICONAZOLE NITRATE: 2 POWDER TOPICAL at 08:05

## 2022-01-17 RX ADMIN — ATORVASTATIN CALCIUM 20 MG: 10 TABLET, FILM COATED ORAL at 21:37

## 2022-01-17 RX ADMIN — LISINOPRIL 20 MG: 20 TABLET ORAL at 09:12

## 2022-01-17 RX ADMIN — DEXAMETHASONE 1 MG: 0.5 TABLET ORAL at 09:12

## 2022-01-17 RX ADMIN — HYDRALAZINE HYDROCHLORIDE 25 MG: 25 TABLET, FILM COATED ORAL at 21:37

## 2022-01-17 RX ADMIN — ACETAMINOPHEN 650 MG: 325 TABLET ORAL at 08:16

## 2022-01-17 ASSESSMENT — ACTIVITIES OF DAILY LIVING (ADL)
ADLS_ACUITY_SCORE: 9

## 2022-01-17 NOTE — PROGRESS NOTES
9:50AM - CHW received delegation from Linda Cam RN CM, to follow up on TCU referrals.    [facility] - Left message on admissions voicemail to call back to [phone].    [facility] - Spoke to Rylee, who confirmed that referral has been received. Rylee states they might have availability today, will review and call back to [phone].    [facility] - Left message on admissions voicemail to call back to [phone].    [facility] - Spoke to Liane in admissions. No beds for today, but might have something for tomorrow. Liane stated to try back tomorrow.    [facility] - Left message on admissions voicemail to call back to [phone].    Updated destination comments on Care Management tab.    SHAWNEE Burns  Inpatient Community Health Worker  [facility], P2

## 2022-01-17 NOTE — PROGRESS NOTES
Care Management Follow Up    Length of Stay (days): 5    Expected Discharge Date: 01/19/2022     Concerns to be Addressed:    Tumor Conference done... see Neurosurg notes, continue therapies, to start weaning steroids.... monitor for any increased dizziness or balance issues ( will determine if HC vs TCU).    Patient plan of care discussed at interdisciplinary rounds: Yes    Anticipated Discharge Disposition:  HC vs TCU pending progression with weaning of steroids     Anticipated Discharge Services:    Anticipated Discharge DME:      Patient/family educated on Medicare website which has current facility and service quality ratings:    Education Provided on the Discharge Plan:    Patient/Family in Agreement with the Plan:      Referrals Placed by CM/SW:    Private pay costs discussed: Not applicable    Additional Information:  CM following for medical progression and will assist with discharge needs.          Linda Cam RN

## 2022-01-17 NOTE — PROGRESS NOTES
Neurosurgery Note:    Josefina's imaging was discussed at tumor conference this morning. Consensus was for three month follow up with repeat MRI, possible discussion for radiation at that time. We will arrange follow up. Please call with questions.     Sapna Aleman CNP  [facility] Neurosurgery  O: [phone]  P: [phone]

## 2022-01-17 NOTE — PLAN OF CARE
Problem: Hypertension Acute  Goal: Blood Pressure Within Desired Range  Outcome: No Change   Patient has had elevated BP this shift. Given Hydralazine IV 0800 for 195/88. Came down to 147/69. Manual pressure was also checked at 158/62. Patient has been up in chair. PT/OT in room. Alert and oriented . Neuro checks and Tele monitoring.  Accu checks bf-125, lunch-173. Lungs sounds diminished. IS started to 1250.

## 2022-01-17 NOTE — PLAN OF CARE
Problem: Pain Acute  Goal: Acceptable Pain Control and Functional Ability  Outcome: No Change  Intervention: Develop Pain Management Plan  Recent Flowsheet Documentation  Taken 1/16/2022 2150 by Randa Bowman, RN  Pain Management Interventions: medication (see MAR)  Taken 1/16/2022 1700 by Randa Bowman, RN  Pain Management Interventions: (maalox) medication (see MAR)   Right Chest pain managed with tylenol'  Heartburn managed with Maalox and pepcid.  Walked to bathroom with walker.  Sat up in chair most of the shift,  Ate 100% of dinner. Neuro checks every 4 hours with no change,  Elevated BP managed with hydralazine,

## 2022-01-17 NOTE — PROGRESS NOTES
[facility]    Medicine Progress Note - Hospitalist Service       Date of Admission:  1/12/2022    Assessment & Plan          Josefina Dumont is a 71 year old female admitted on 1/12/2022. She has history of diabetes, hypertension, mood disorder who presented today with unwitnessed fall, syncope. She was diagnosed with a new meningioma with edema, ischemic cva as well as small SDH     1.Unwitnessed fall vrs syncope -suspect now tumor and cva  -Pt had a unwitnessed fall, unsure if fall vs syncope vrs seizure--now concern cva, tumor  -CT head showed lesion measuring 1.5 x 2.6 x 3 cm s/o meningioma  -MRI brain--showed mass suggestive of meningioma and acute cva  -Monitor on telemetry for any arrhythmias  -Neurology /Neurosurg consulted-appreciated  -PT/OT consulted     2.Intracranial lesion, suggestive of meningioma  -CT head showed a lesion measuring 1.5 x 2.6 x 3 cm most suggestive of meningioma with vasogenic edema.  -MRI with and without contrast ordered  -Continue Decadron 4 mg every 6 hours as per neurosurgery recommendation--now taper to dex 1 mg p bid for few mores days then few days of once daily then stop--I placed order -I checked with neurosurg  today  -On Pepcid twice daily  -Keppra loading dose given in the ER, neurology does NOT think she needs to continue this  -Neurosurgery  presented her case to tumor board Monday, likely will need surgery at some point vs radiation--now plan 3 month follow yo with MRI then yaneth discuss radiation     3.Acute CVA  -on mri here small cva  -tele  -checked echo--Mitral Stenosis--cards seeing  -neurology following and added Plavix/statin--on 1/15/22 then had to stop Plavix with evidence for small SDH on 1/15. Neuro wants repeat CT on 1/25/22 and yaneth decide it they go back on Plavix then  -pt/ot     4.Small Subdural Hemorrhage  --CT head repeat today 1/15/22 due to headache overnight  --shows small area SD--5 mm, no shift  --stopped Plavix 1/15,  last Lovenox dvt prevent 1/14  --Neurosurg aware of plan repeat CT in 10 days--so due on 1/25/22  --per neurology--hold Plavix 10 days and then will repeat head CT  On 1/25/22 and if stable then restart Plavix then  --keep HOB up     5.Back pain and chest pain-likely secondary to fall  -CT C-spine negative for any acute fractures  -Lidocaine patch and Oxy prn ordered  -Placed on fall precautions     6.CAP  -CXR shows R basilar opacity, s/o pneumonia  -Patient afebrile, no leukocytosis.  Some cough  -Continue Levaquin x 5 days total today is last day  -COVID, influenza AMB negative     7.Diabetes with hyperglycemia  -A1c 7  -Resume home glimepiride.  -Placed on medium resistance sliding scale as patient now on steroids  -Monitor blood sugars closely     8.Hypertension-blood pressure significantly elevated on arrival to ED  -maybe from CVA  -Resumed home amlodipine and hydrochlorothiazide  -cards added ace-I-- increased lisinopril to 20 mg  -hydralazine will schedule 25 mg tid   -Will ask RN to check bp manual too     9.Mood disorder-  -Continue PTA Celexa.  -Ativan as needed ordered     10.Hypokalemia-replete per protocol  -mag was still low replaced 1/13/22     11.Mitral stenosis  -cards did see and will need outpt follow up with  who will consider oupt LUC  -antibiotic prophylaxis for dental cleaning will be needed     12. Obesity- BMI 39     13.Thyroid nodule on CTA neck  -check ultrasound and tsh  -tsh ok  -however thyroid nodule will need outpt work up and bx--sister notes she had bx a year ago--would still rec follow up     14.Gerd-still symptomatic  -at home taking tums  -stop pepcid and trial Protonix now    15.hx of etoh use  -no sign withdrawal  -thiamine/folic acid        Overall plan after tumor conference today  -wean steroids over next few days  -CT head 1/25/22 to check SDH to determine if can start Plavix again  -MRI head 3 months --neurosurg then to decide Radiation Vs  Surgery    Addendum--1438  Sister is over at sister's apartment and is trying to clean, she now tells me she is a hoarder and she is finding many alcohol bottles there too           Diet: Consistent Carbohydrate Diet Low Consistent Carb (45 g CHO per Meal) Diet    DVT Prophylaxis: Pneumatic Compression Devices(no Lovenox due to SDH)  Machuca Catheter: Not present  Central Lines: None  Code Status: Full Code      Disposition Plan   Expected Discharge: 01/18/2022     Anticipated discharge location: lives alone, I really think she needs TCU.Sister is limited on how much she can help       The patient's care was discussed with the Care Coordinator/ and Patient. I called sister to update at 1434    Nichole Aguilar MD  Hospitalist Service  [facility]  Securely message with the [employer]oc[employer] Web Console (learn more here)  Text page via [employer] Paging/Directory        Clinically Significant Risk Factors Present on Admission                    ______________________________________________________________________    Interval History   Headache is improved  Still having a lot of gerd  At home takes tums very freq  Eating ok  Feels balance better      Data reviewed today: I reviewed all medications, new labs and imaging results over the last 24 hours. I personally reviewed no images or EKG's today.    Physical Exam   Vital Signs: Temp: 98.5  F (36.9  C) Temp src: Oral BP: 138/65 Pulse: 71   Resp: 18 SpO2: 96 % O2 Device: None (Room air)    Weight: 234 lbs 8 oz  Constitutional: awake, alert, cooperative and no apparent distress  Respiratory: no increased work of breathing, good air exchange, no retractions and clear to auscultation  Cardiovascular: Normal apical impulse, regular rate and rhythm, normal S1 and S2, no S3 or S4, and no murmur noted  GI: normal bowel sounds, soft, non-distended and tenderness noted in the epigastric region  Skin: no bruising or bleeding  Musculoskeletal: no lower  extremity pitting edema present  Neurologic: Mental Status Exam:  Level of Alertness:   Awake, speech fluent, motor equal, sitting up in chair  Neuropsychiatric: General: normal, calm and normal eye contact    Data   Recent Labs   Lab 01/17/22  0758 01/17/22  0714 01/16/22  2141 01/16/22  0812 01/16/22  0603 01/15/22  0817 01/15/22  0647   WBC  --  6.6  --   --  6.6  --  10.0   HGB  --  13.7  --   --  13.2  --  13.6   MCV  --  100  --   --  100  --  101*   PLT  --  174  --   --  179  --  216   NA  --  140  --   --  138  --  136   POTASSIUM  --  3.5  --   --  3.7  --  3.6   CHLORIDE  --  109*  --   --  107  --  104   CO2  --  23  --   --  23  --  23   BUN  --  23  --   --  24  --  27   CR  --  0.82  --   --  0.87  --  0.87   ANIONGAP  --  8  --   --  8  --  9   ALBARO  --  8.1*  --   --  8.7  --  9.0   * 125 111*   < > 161*   < > 161*   ALBUMIN  --  3.0*  --   --  3.0*  --  3.3*   PROTTOTAL  --  6.9  --   --  6.8  --  7.8   BILITOTAL  --  0.6  --   --  0.5  --  0.5   ALKPHOS  --  111  --   --  114  --  122*   ALT  --  41  --   --  35  --  32   AST  --  39  --   --  38  --  37    < > = values in this interval not displayed.     No results found for this or any previous visit (from the past 24 hour(s)).   none

## 2022-01-17 NOTE — PLAN OF CARE
Problem: Adult Inpatient Plan of Care  Goal: Optimal Comfort and Wellbeing  Outcome: No Change   Pt is alert and oriented, able to make needs known. Up to the bathroom with A-1 and a walker.  PRN hydralazine given for bp 186/82. Recheck 161/78. Pt anxious about tumor board meeting today. Tele NSR. Uneventful shift.  Randa Hernández RN

## 2022-01-18 ENCOUNTER — TELEPHONE (OUTPATIENT)
Dept: NEUROSURGERY | Facility: CLINIC | Age: 72
End: 2022-01-18
Payer: COMMERCIAL

## 2022-01-18 ENCOUNTER — APPOINTMENT (OUTPATIENT)
Dept: OCCUPATIONAL THERAPY | Facility: HOSPITAL | Age: 72
DRG: 085 | End: 2022-01-18
Payer: COMMERCIAL

## 2022-01-18 DIAGNOSIS — D32.9 MENINGIOMA (H): Primary | ICD-10-CM

## 2022-01-18 LAB
ALBUMIN SERPL-MCNC: 3.1 G/DL (ref 3.5–5)
ALP SERPL-CCNC: 122 U/L (ref 45–120)
ALT SERPL W P-5'-P-CCNC: 47 U/L (ref 0–45)
ANION GAP SERPL CALCULATED.3IONS-SCNC: 7 MMOL/L (ref 5–18)
AST SERPL W P-5'-P-CCNC: 41 U/L (ref 0–40)
BILIRUB SERPL-MCNC: 0.7 MG/DL (ref 0–1)
BUN SERPL-MCNC: 19 MG/DL (ref 8–28)
CALCIUM SERPL-MCNC: 8.7 MG/DL (ref 8.5–10.5)
CHLORIDE BLD-SCNC: 108 MMOL/L (ref 98–107)
CO2 SERPL-SCNC: 24 MMOL/L (ref 22–31)
CREAT SERPL-MCNC: 0.82 MG/DL (ref 0.6–1.1)
ERYTHROCYTE [DISTWIDTH] IN BLOOD BY AUTOMATED COUNT: 14 % (ref 10–15)
GFR SERPL CREATININE-BSD FRML MDRD: 77 ML/MIN/1.73M2
GLUCOSE BLD-MCNC: 151 MG/DL (ref 70–125)
GLUCOSE BLDC GLUCOMTR-MCNC: 110 MG/DL (ref 70–99)
GLUCOSE BLDC GLUCOMTR-MCNC: 138 MG/DL (ref 70–99)
GLUCOSE BLDC GLUCOMTR-MCNC: 139 MG/DL (ref 70–99)
GLUCOSE BLDC GLUCOMTR-MCNC: 152 MG/DL (ref 70–99)
HCT VFR BLD AUTO: 42 % (ref 35–47)
HGB BLD-MCNC: 13.6 G/DL (ref 11.7–15.7)
MAGNESIUM SERPL-MCNC: 2 MG/DL (ref 1.8–2.6)
MCH RBC QN AUTO: 32.6 PG (ref 26.5–33)
MCHC RBC AUTO-ENTMCNC: 32.4 G/DL (ref 31.5–36.5)
MCV RBC AUTO: 101 FL (ref 78–100)
PLATELET # BLD AUTO: 179 10E3/UL (ref 150–450)
POTASSIUM BLD-SCNC: 3.6 MMOL/L (ref 3.5–5)
PROT SERPL-MCNC: 6.8 G/DL (ref 6–8)
RBC # BLD AUTO: 4.17 10E6/UL (ref 3.8–5.2)
SODIUM SERPL-SCNC: 139 MMOL/L (ref 136–145)
WBC # BLD AUTO: 6.8 10E3/UL (ref 4–11)

## 2022-01-18 PROCEDURE — 97129 THER IVNTJ 1ST 15 MIN: CPT | Mod: GO

## 2022-01-18 PROCEDURE — C9113 INJ PANTOPRAZOLE SODIUM, VIA: HCPCS | Performed by: INTERNAL MEDICINE

## 2022-01-18 PROCEDURE — 250N000009 HC RX 250: Performed by: INTERNAL MEDICINE

## 2022-01-18 PROCEDURE — 80053 COMPREHEN METABOLIC PANEL: CPT | Performed by: HOSPITALIST

## 2022-01-18 PROCEDURE — 99233 SBSQ HOSP IP/OBS HIGH 50: CPT | Performed by: INTERNAL MEDICINE

## 2022-01-18 PROCEDURE — 250N000013 HC RX MED GY IP 250 OP 250 PS 637: Performed by: PSYCHIATRY & NEUROLOGY

## 2022-01-18 PROCEDURE — 97130 THER IVNTJ EA ADDL 15 MIN: CPT | Mod: GO

## 2022-01-18 PROCEDURE — 85027 COMPLETE CBC AUTOMATED: CPT | Performed by: HOSPITALIST

## 2022-01-18 PROCEDURE — 250N000013 HC RX MED GY IP 250 OP 250 PS 637: Performed by: INTERNAL MEDICINE

## 2022-01-18 PROCEDURE — 120N000001 HC R&B MED SURG/OB

## 2022-01-18 PROCEDURE — 97535 SELF CARE MNGMENT TRAINING: CPT | Mod: GO

## 2022-01-18 PROCEDURE — 250N000011 HC RX IP 250 OP 636: Performed by: INTERNAL MEDICINE

## 2022-01-18 PROCEDURE — 36415 COLL VENOUS BLD VENIPUNCTURE: CPT | Performed by: HOSPITALIST

## 2022-01-18 PROCEDURE — 83735 ASSAY OF MAGNESIUM: CPT | Performed by: INTERNAL MEDICINE

## 2022-01-18 PROCEDURE — 250N000013 HC RX MED GY IP 250 OP 250 PS 637: Performed by: HOSPITALIST

## 2022-01-18 RX ORDER — ACETAMINOPHEN 325 MG/1
650 TABLET ORAL EVERY 6 HOURS
Status: DISCONTINUED | OUTPATIENT
Start: 2022-01-18 | End: 2022-01-20 | Stop reason: HOSPADM

## 2022-01-18 RX ORDER — ACETAMINOPHEN 650 MG/1
650 SUPPOSITORY RECTAL EVERY 6 HOURS
Status: DISCONTINUED | OUTPATIENT
Start: 2022-01-18 | End: 2022-01-20 | Stop reason: HOSPADM

## 2022-01-18 RX ORDER — LIDOCAINE 50 MG/G
OINTMENT TOPICAL 3 TIMES DAILY
Status: DISCONTINUED | OUTPATIENT
Start: 2022-01-18 | End: 2022-01-20 | Stop reason: HOSPADM

## 2022-01-18 RX ADMIN — ACETAMINOPHEN 650 MG: 325 TABLET ORAL at 23:09

## 2022-01-18 RX ADMIN — ACETAMINOPHEN 650 MG: 325 TABLET ORAL at 00:54

## 2022-01-18 RX ADMIN — Medication 100 MG: at 08:33

## 2022-01-18 RX ADMIN — ACETAMINOPHEN 650 MG: 325 TABLET ORAL at 11:32

## 2022-01-18 RX ADMIN — LISINOPRIL 20 MG: 20 TABLET ORAL at 08:35

## 2022-01-18 RX ADMIN — CITALOPRAM HYDROBROMIDE 40 MG: 40 TABLET ORAL at 08:33

## 2022-01-18 RX ADMIN — HYDROCHLOROTHIAZIDE 25 MG: 25 TABLET ORAL at 08:33

## 2022-01-18 RX ADMIN — LIDOCAINE 1 PATCH: 246 PATCH TOPICAL at 21:41

## 2022-01-18 RX ADMIN — ATORVASTATIN CALCIUM 20 MG: 10 TABLET, FILM COATED ORAL at 21:41

## 2022-01-18 RX ADMIN — LIDOCAINE: 50 OINTMENT TOPICAL at 15:07

## 2022-01-18 RX ADMIN — POLYETHYLENE GLYCOL 3350 17 G: 17 POWDER, FOR SOLUTION ORAL at 08:35

## 2022-01-18 RX ADMIN — FOLIC ACID 1 MG: 1 TABLET ORAL at 08:35

## 2022-01-18 RX ADMIN — MICONAZOLE NITRATE 1 APPLICATOR: 2 POWDER TOPICAL at 08:35

## 2022-01-18 RX ADMIN — ACETAMINOPHEN 650 MG: 325 TABLET ORAL at 17:16

## 2022-01-18 RX ADMIN — GLIMEPIRIDE 2 MG: 1 TABLET ORAL at 08:34

## 2022-01-18 RX ADMIN — HYDRALAZINE HYDROCHLORIDE 25 MG: 25 TABLET, FILM COATED ORAL at 21:41

## 2022-01-18 RX ADMIN — ALUMINUM HYDROXIDE, MAGNESIUM HYDROXIDE, AND SIMETHICONE 30 ML: 200; 200; 20 SUSPENSION ORAL at 15:14

## 2022-01-18 RX ADMIN — AMLODIPINE BESYLATE 10 MG: 5 TABLET ORAL at 08:34

## 2022-01-18 RX ADMIN — DEXAMETHASONE 1 MG: 0.5 TABLET ORAL at 21:41

## 2022-01-18 RX ADMIN — PANTOPRAZOLE SODIUM 40 MG: 40 INJECTION, POWDER, FOR SOLUTION INTRAVENOUS at 21:59

## 2022-01-18 RX ADMIN — HYDRALAZINE HYDROCHLORIDE 25 MG: 25 TABLET, FILM COATED ORAL at 08:34

## 2022-01-18 RX ADMIN — PANTOPRAZOLE SODIUM 40 MG: 40 INJECTION, POWDER, FOR SOLUTION INTRAVENOUS at 08:35

## 2022-01-18 RX ADMIN — LIDOCAINE: 50 OINTMENT TOPICAL at 21:42

## 2022-01-18 RX ADMIN — HYDRALAZINE HYDROCHLORIDE 25 MG: 25 TABLET, FILM COATED ORAL at 15:07

## 2022-01-18 RX ADMIN — MICONAZOLE NITRATE: 2 POWDER TOPICAL at 21:42

## 2022-01-18 RX ADMIN — DEXAMETHASONE 1 MG: 0.5 TABLET ORAL at 08:33

## 2022-01-18 ASSESSMENT — ACTIVITIES OF DAILY LIVING (ADL)
ADLS_ACUITY_SCORE: 9
ADLS_ACUITY_SCORE: 13
ADLS_ACUITY_SCORE: 9
ADLS_ACUITY_SCORE: 13
ADLS_ACUITY_SCORE: 9
ADLS_ACUITY_SCORE: 13
ADLS_ACUITY_SCORE: 13
ADLS_ACUITY_SCORE: 9
ADLS_ACUITY_SCORE: 13
ADLS_ACUITY_SCORE: 9

## 2022-01-18 NOTE — PLAN OF CARE
Problem: Adult Inpatient Plan of Care  Goal: Optimal Comfort and Wellbeing  Outcome: No Change   Pt is alert and oriented. Up to the bathroom with A-1 and a walker. More steady on her feet. /77 and 149/68. Denies dizzyness. Tylenol given for right sided chest discomfort which was helpful per pt. Seizure precautions in place. Tele is NSR.   Randa Hernández RN

## 2022-01-18 NOTE — PLAN OF CARE
Problem: Pain Acute  Goal: Acceptable Pain Control and Functional Ability  Outcome: Improving  Intervention: Develop Pain Management Plan  Recent Flowsheet Documentation  Taken 1/17/2022 1851 by Mary Carmen Zimmerman RN  Pain Management Interventions: medication (see MAR)  PRN Tylenol and Maalox given for RT upper chest discomfort.  Per pt helpful. Pt had no further complaint of pain.      Problem: Fall Injury Risk  Goal: Absence of Fall and Fall-Related Injury  Outcome: Improving  Intervention: Promote Injury-Free Environment  Recent Flowsheet Documentation  Taken 1/17/2022 2000 by Mary Carmen Zimmerman, RN  Safety Promotion/Fall Prevention:   activity supervised   assistive device/personal items within reach   bed alarm on   chair alarm on   nonskid shoes/slippers when out of bed   clutter free environment maintained   fall prevention program maintained   patient and family education   safety round/check completed   toileting scheduled  Taken 1/17/2022 1550 by Mary Carmen Zimmerman RN  Safety Promotion/Fall Prevention:   activity supervised   assistive device/personal items within reach   bed alarm on   chair alarm on   nonskid shoes/slippers when out of bed   clutter free environment maintained   fall prevention program maintained   patient and family education   safety round/check completed   toileting scheduled   Pt standby assist with walker to the bathroom. No safety incidents this shift.     Telemetry: Normal sinus rhythm. O2 sat 97-98% on RA. Pt achieved 1500  ml on the IS. Tolerated well.   , 157. No s/s insulin needed per order.  Pt is A/O.  Went to bed at 10:30 am per request. Resting quietly.

## 2022-01-18 NOTE — PLAN OF CARE
Occupational Therapy Discharge Summary    Reason for therapy discharge:    All goals and outcomes met, no further needs identified.    Progress towards therapy goal(s). See goals on Care Plan in [facility] electronic health record for goal details.  Goals met    Therapy recommendation(s):    Continued therapy is recommended.  Rationale/Recommendations:  home OT eval and continued assist from sister as needed.

## 2022-01-18 NOTE — PROGRESS NOTES
Care Management Follow Up    Length of Stay (days): 6    Expected Discharge Date: 01/19/2022     Concerns to be Addressed: clinical progression, CT  head 01/25/22; MD to review if patient will remain in hospital or able to discharge to TCU with follow up     Patient plan of care discussed at interdisciplinary rounds: Yes    Anticipated Discharge Disposition:  TCU, referrals pending     Anticipated Discharge Services: TCU referrals  Anticipated Discharge DME: not at this time    Patient/family educated on Medicare website which has current facility and service quality ratings: N/A  Education Provided on the Discharge Plan:  Per team  Patient/Family in Agreement with the Plan:  yes    Referrals Placed by CM/SW: not at this time   Private pay costs discussed: Not applicable    Additional Information:  Call out to pending TCU facilities to check bed availability.    [facility] declined. Left voicemails for [facility] and [facility].      Verito Armas RN

## 2022-01-18 NOTE — PROGRESS NOTES
Hospitalist Progress Note    Assessment/Plan  Josefina Dumont is a 71 year old female admitted on 1/12/2022. She has history of diabetes, hypertension, mood disorder who presented today with unwitnessed fall, syncope. She was diagnosed with a new meningioma with edema, ischemic cva as well as small SDH     1.Unwitnessed fall vrs syncope -suspect now tumor and cva  -Pt had a unwitnessed fall, unsure if fall vs syncope vrs seizure--now concern cva, tumor  -CT head showed lesion measuring 1.5 x 2.6 x 3 cm s/o meningioma  -MRI brain--showed mass suggestive of meningioma and acute cva  -Monitor on telemetry for any arrhythmias  -Neurology /Neurosurg consulted-appreciated  -PT/OT consulted     2.Intracranial lesion, suggestive of meningioma  -CT head showed a lesion measuring 1.5 x 2.6 x 3 cm most suggestive of meningioma with vasogenic edema.  -MRI with and without contrast ordered  -Continue Decadron 4 mg every 6 hours as per neurosurgery recommendation--now taper to dex 1 mg p bid for few mores days then few days of once daily then stop  -Keppra loading dose given in the ER, neurology does NOT think she needs to continue this  -Neurosurgery  presented her case to tumor board Monday, likely will need surgery at some point vs radiation--now plan 3 month follow yo with MRI then yaneth discuss radiation     3.Acute CVA  -on mri here small cva  -tele  -checked echo--Mitral Stenosis--cards seeing  -neurology following and added Plavix/statin--on 1/15/22 then had to stop Plavix with evidence for small SDH on 1/15. Neuro wants repeat CT on 1/25/22 and yaneth decide it they go back on Plavix then  -pt/ot     4.Small Subdural Hemorrhage  --CT head repeat today 1/15/22 due to headache overnight  --shows small area SD--5 mm, no shift  --stopped Plavix 1/15, last Lovenox dvt prevent 1/14  --Neurosurg aware of plan repeat CT in 10 days--so due on 1/25/22  --per neurology--hold Plavix 10 days and then will repeat head CT  On 1/25/22 and  if stable then restart Plavix then  --keep HOB up     5.Back pain and chest pain-likely secondary to fall  -CT C-spine negative for any acute fractures  -Lidocaine patch and Oxy prn ordered  -Placed on fall precautions     6.CAP  -CXR shows R basilar opacity, s/o pneumonia  -Patient afebrile, no leukocytosis.  Some cough  -Continue Levaquin x 5 days total today is last day  -COVID, influenza AMB negative     7.Diabetes with hyperglycemia  -A1c 7  -Resume home glimepiride.  -Placed on medium resistance sliding scale as patient now on steroids  -Monitor blood sugars closely     8.Hypertension-blood pressure significantly elevated on arrival to ED  -maybe from CVA  -Resumed home amlodipine and hydrochlorothiazide  -cards added ace-I-- increased lisinopril to 20 mg  -hydralazine will schedule 25 mg tid      9.Mood disorder-  -Continue PTA Celexa.  -Ativan as needed ordered     10.Hypokalemia-replete per protocol  -mag was still low replaced 1/13/22     11.Mitral stenosis  -cards did see and will need outpt follow up with  who will consider oupt LUC     12. Obesity- BMI 39     13.Thyroid nodule on CTA neck  -check ultrasound and tsh  -tsh ok  -however thyroid nodule will need outpt work up and bx--sister notes she had bx a year ago--would still rec follow up      14.Gerd-still symptomatic  -at home taking tums  -stop pepcid and trial Protonix now     15.hx of etoh use  -no sign withdrawal  -thiamine/folic acid    16.  Right-sided chest pain.  Reproducible.  Suspect musculoskeletal or related to recent fall.  And lidocaine gel         Barriers to Discharge:      Anticipated discharge date/Disposition: TCU versus home with home care tomorrow    Subjective  Patient complains of right-sided chest pain.  She states that Tylenol and Tums help.  She has longstanding history of acid reflux.  Denies having any headache, vision changes, tingling or numbness.  She lives at a senior living facility on the third floor, her  sister lives on the second floor.  Patient admits to drinking 1-2 drinks daily.    Objective    Vital signs in last 24 hours  Temp:  [98.2  F (36.8  C)-98.7  F (37.1  C)] 98.7  F (37.1  C)  Pulse:  [65-74] 65  Resp:  [18] 18  BP: (133-180)/(65-77) 166/69  SpO2:  [95 %-98 %] 95 % @LASTSAO2(12)@ O2 Device: None (Room air)    Weight:   Wt Readings from Last 3 Encounters:   01/13/22 106.4 kg (234 lb 8 oz)      Weight change:     Intake/Output last 3 shifts  I/O last 3 completed shifts:  In: 720 [P.O.:720]  Out: 2200 [Urine:2200]  Body mass index is 40.25 kg/m .    Physical Exam    General Appearance:    Alert, cooperative, no distress, appears stated age   Chest:    TTP over right anterior chest, lungs clear bilaterally    Cardiovascular:    Regular rate ands rhythm.  Nornal S1, S2.  No murmur, rub or gallop.  No edema   Abdomen:     Soft, non-tender, bowel sounds active all four quadrants,     no masses, no organomegaly   Neurologic:   Awake, alert, oriented x 3.  Moves all extremities     Pertinent Labs   Lab Results: personally reviewed.   Recent Labs   Lab 01/18/22  0709 01/17/22  0714 01/16/22  0603    140 138   CO2 24 23 23   BUN 19 23 24   ALBUMIN 3.1* 3.0* 3.0*   ALKPHOS 122* 111 114   ALT 47* 41 35   AST 41* 39 38     Recent Labs   Lab 01/18/22  0709 01/17/22  0714 01/16/22  0603   WBC 6.8 6.6 6.6   HGB 13.6 13.7 13.2   HCT 42.0 43.3 41.0    174 179     Recent Labs   Lab 01/17/22  1024 01/12/22  1534   TROPONINI 0.03 0.03     Invalid input(s): POCGLUFGR    Medications  Current Facility-Administered Medications   Medication     acetaminophen (TYLENOL) tablet 650 mg    Or     acetaminophen (TYLENOL) Suppository 650 mg     alum & mag hydroxide-simethicone (MAALOX) suspension 30 mL     amLODIPine (NORVASC) tablet 10 mg     atorvastatin (LIPITOR) tablet 20 mg     citalopram (celeXA) tablet 40 mg     [START ON 1/19/2022] dexamethasone (DECADRON) tablet 1 mg     dexamethasone (DECADRON) tablet 1 mg      glucose gel 15-30 g    Or     dextrose 50 % injection 25-50 mL    Or     glucagon injection 1 mg     folic acid (FOLVITE) tablet 1 mg     glimepiride (AMARYL) tablet 2 mg     hydrALAZINE (APRESOLINE) tablet 25 mg     hydrochlorothiazide (HYDRODIURIL) tablet 25 mg     insulin aspart (NovoLOG) injection (RAPID ACTING)     insulin aspart (NovoLOG) injection (RAPID ACTING)     Lidocaine (LIDOCARE) 4 % Patch 1 patch    And     lidocaine patch in PLACE     lidocaine (LMX4) cream     lidocaine (XYLOCAINE) 5 % ointment     lidocaine 1 % 0.1-1 mL     lisinopril (ZESTRIL) tablet 20 mg     LORazepam (ATIVAN) injection 1 mg     melatonin tablet 1 mg     miconazole (MICATIN) 2 % powder     ondansetron (ZOFRAN-ODT) ODT tab 4 mg    Or     ondansetron (ZOFRAN) injection 4 mg     oxyCODONE (ROXICODONE) tablet 5 mg     pantoprazole (PROTONIX) IV push injection 40 mg     polyethylene glycol (MIRALAX) Packet 17 g     prochlorperazine (COMPAZINE) injection 5 mg    Or     prochlorperazine (COMPAZINE) tablet 5 mg    Or     prochlorperazine (COMPAZINE) suppository 12.5 mg     sodium chloride (PF) 0.9% PF flush 3 mL     sodium chloride (PF) 0.9% PF flush 3 mL     sodium chloride (PF) 0.9% PF flush 3 mL     sodium chloride (PF) 0.9% PF flush 3 mL     thiamine (B-1) tablet 100 mg       Pertinent Radiology   Radiology Results: Personally reviewed   Results for orders placed or performed during the hospital encounter of 01/12/22   CT Head w/o Contrast    Impression    CONCLUSION:  HEAD CT:  1.  Partially calcified left anterior parafalcine extra-axial mass lesion measuring 1.5 x 2.6 x 3.0 cm, with appearance most suggestive of meningioma. There is a low-attenuation parenchymal change in the adjacent left frontal lobe, likely vasogenic   edema. Recommend MRI brain without and with IV contrast for further characterization.  2.  No acute intracranial hemorrhage or CT evidence for acute infarction.  3.  No acute calvarial fracture or scalp  hematoma.  4.  Mild global brain parenchymal volume loss with additional presumed sequelae of mild chronic small vessel ischemic disease.    CERVICAL SPINE CT:  1.  No acute fracture or traumatic subluxation of the cervical spine by CT imaging.  2.  No high-grade spinal canal or neural foraminal stenosis.   CT Cervical Spine w/o Contrast    Impression    CONCLUSION:  HEAD CT:  1.  Partially calcified left anterior parafalcine extra-axial mass lesion measuring 1.5 x 2.6 x 3.0 cm, with appearance most suggestive of meningioma. There is a low-attenuation parenchymal change in the adjacent left frontal lobe, likely vasogenic   edema. Recommend MRI brain without and with IV contrast for further characterization.  2.  No acute intracranial hemorrhage or CT evidence for acute infarction.  3.  No acute calvarial fracture or scalp hematoma.  4.  Mild global brain parenchymal volume loss with additional presumed sequelae of mild chronic small vessel ischemic disease.    CERVICAL SPINE CT:  1.  No acute fracture or traumatic subluxation of the cervical spine by CT imaging.  2.  No high-grade spinal canal or neural foraminal stenosis.   XR Chest Port 1 View    Impression    IMPRESSION: Stable cardiomegaly with normal vascularity. Patchy right basilar opacities suggesting pneumonia. Left lung clear. No pneumothorax nor pleural effusion.    Consider follow-up radiographs to ensure resolution.   MR Brain w/o & w Contrast    Impression    IMPRESSION:  1.  Tiny focus of acute infarction within the right mid frontal centrum semiovale.  2.  Small to moderate anterior left frontal convexity meningioma with associated vasogenic edema resulting in mild localized mass effect. No midline shift, herniation or hydrocephalus.  3.  Mild to moderate chronic small vessel ischemic disease and mild generalized brain parenchymal volume loss.   CTA Head Neck with Contrast    Impression    IMPRESSION:   HEAD CT:  1.  No CT evidence of a midline  shift, acute hemorrhage or focal area suggestive of acute anemia.  2.  Heavily calcified small meningioma with associated vasogenic edema anterior medial left frontal lobe.  3.  Stable diffuse age related changes.    HEAD CTA:   1.  No discrete vessel occlusion, significant stenosis, aneurysm or high flow vascular malformation involving the arteries of the [place].    NECK CTA:  1.  Configuration of the great vessels off the aortic arch with no significant stenosis of their origins.  2.  Approximately 40% stenosis of origin of right internal carotid artery by NASCET criteria.  3.  No significant stenosis or irregularity involving origin of the left internal carotid artery by NASCET criteria.  4.  No radiographic evidence of dissection.   US Thyroid    Impression    IMPRESSION:    1.  Dominant right thyroid nodule meeting criteria for biopsy and fine-needle aspiration.      Nodules are characterized per  ACR Thyroid Imaging, Reporting and Data System (TI-RADS): White Paper of the ACR TI-RADS Committee  Anthony Wakefield et al. Journal of the American College of Radiology 2017. Volume 14 (2017), Issue 5, 285-639.      CT Head w/o Contrast    Impression    IMPRESSION:  1.  Since CT head 01/13/2022, a small extra-axial acute hemorrhage has developed along the anterolateral aspect of the left middle cranial fossa measuring up to 5 mm in radial thickness best visualized on axial series T2 image 16 and coronal reformatted   image 21. No mass effect on the anterior left temporal lobe. No new hemorrhage elsewhere.  2.  No definite change in the partially calcified presumed meningioma along the left anterior paramedian frontal region with associated stable vasogenic edema and localized mass effect.  3.  Stable mild age-related changes.  4.  Findings discussed with Dr. Watson at 9:45 AM on 01/15/2022.     XR Chest 2 Views    Impression    IMPRESSION: Heart size mildly enlarged. The previous mild patchy  infiltrates right lung base have resolved. No remaining significant findings.   CT Head w/o Contrast    Impression    IMPRESSION:  1.  Stable acute hyperdense extra-axial hemorrhage left middle cranial fossa without associated mass effect. This may reflect a small subdural hematoma.    2.  Stable partially mineralized extra-axial mass lesion along the anterior paramedian left frontal region, presumably related to meningioma with associated abnormal vasogenic edema left frontal lobe, also appearing stable.    3.  No new or increasing hemorrhage with presumed chronic small vessel vascular changes and brain volume loss.   Echocardiogram Complete   Result Value Ref Range    LVEF  60-65%          Advanced Care Planning:  Discharge Planning discussed with patient, nursing staff, RN CM, patient's sister Yanira over the phone        Alysia Conn MD  Internal Medicine Hospitalist  1/18/2022

## 2022-01-18 NOTE — PLAN OF CARE
Problem: Adult Inpatient Plan of Care  Goal: Optimal Comfort and Wellbeing  Outcome: Improving     Problem: Adult Inpatient Plan of Care  Goal: Readiness for Transition of Care  Outcome: Improving   Patient reported feeling improvement today, with minimal right chest discomfort. Lidocaine ointment ordered and scheduled Tylenol. Up to chair for meals. Ate good. Sliding scale prior to lunch. Walked to BR with belt and walker. SBA. Alert and pleasant. Tele discontinued.

## 2022-01-18 NOTE — TELEPHONE ENCOUNTER
PATIENT NAME:  Josefina Dumont  YOB: 1950  MRN: [id number]  SURGEON: Dr. Cullen  DATE of CONSULT: 01/13/2022  Consult for meningioma    FOLLOW-UP PLAN:    Hospital Follow Up Visit: 3 months  Provider: Dr. Cullen    DIAGNOSTICS:  MR  DISPOSITION: pending    ADDITIONAL INSTRUCTIONS FOR MEDICAL STAFF:    Michelle Leos, RN, CNRN

## 2022-01-18 NOTE — PROGRESS NOTES
Neurosurgery chart check-      Per Marcus Aleman' note yesterday, plan for follow-up appt in 3mos with new brain mri. We will sign off today. Patient will be contacted after leaving the hospital to schedule this appt. Follow up info and red flag symptoms to call for have been placed in dispo orders area of chart.     Please feel free to reconsult us if questions or concerns arise.    Damaris MORANP-C  [facility] Neurosurgery  O. [phone]

## 2022-01-18 NOTE — PROGRESS NOTES
10:40AM - CHW received delegation from Verito Armas RN CM, to follow up on TCU referrals.    [facility] - Spoke with Marlen in admissions. Declined referral due     [facility] - Spoke with Rylee who stated that she will need to review with staff. Will call back to [phone].    [facility] - Spoke to Liane in admissions who states that she's not taking any more admissions for today. However, informed Liane that patient wouldn't be ready til tomorrow or the next day. Liane states we can try back tomorrow for availability.    [facility] - Left message on admissions voicemail to call back to [phone].    Updated destination comments on Care Management tab    SHAWNEE Burns  Inpatient Community Health Worker  [facility], P2

## 2022-01-19 ENCOUNTER — APPOINTMENT (OUTPATIENT)
Dept: PHYSICAL THERAPY | Facility: HOSPITAL | Age: 72
DRG: 085 | End: 2022-01-19
Payer: COMMERCIAL

## 2022-01-19 LAB
ALBUMIN SERPL-MCNC: 3 G/DL (ref 3.5–5)
ALP SERPL-CCNC: 110 U/L (ref 45–120)
ALT SERPL W P-5'-P-CCNC: 38 U/L (ref 0–45)
ANION GAP SERPL CALCULATED.3IONS-SCNC: 5 MMOL/L (ref 5–18)
AST SERPL W P-5'-P-CCNC: 35 U/L (ref 0–40)
BILIRUB SERPL-MCNC: 0.7 MG/DL (ref 0–1)
BUN SERPL-MCNC: 16 MG/DL (ref 8–28)
CALCIUM SERPL-MCNC: 8.8 MG/DL (ref 8.5–10.5)
CHLORIDE BLD-SCNC: 107 MMOL/L (ref 98–107)
CO2 SERPL-SCNC: 25 MMOL/L (ref 22–31)
CREAT SERPL-MCNC: 0.76 MG/DL (ref 0.6–1.1)
ERYTHROCYTE [DISTWIDTH] IN BLOOD BY AUTOMATED COUNT: 13.9 % (ref 10–15)
GFR SERPL CREATININE-BSD FRML MDRD: 84 ML/MIN/1.73M2
GLUCOSE BLD-MCNC: 133 MG/DL (ref 70–125)
GLUCOSE BLDC GLUCOMTR-MCNC: 104 MG/DL (ref 70–99)
GLUCOSE BLDC GLUCOMTR-MCNC: 129 MG/DL (ref 70–99)
GLUCOSE BLDC GLUCOMTR-MCNC: 136 MG/DL (ref 70–99)
GLUCOSE BLDC GLUCOMTR-MCNC: 143 MG/DL (ref 70–99)
HCT VFR BLD AUTO: 41.8 % (ref 35–47)
HGB BLD-MCNC: 13 G/DL (ref 11.7–15.7)
MAGNESIUM SERPL-MCNC: 2 MG/DL (ref 1.8–2.6)
MCH RBC QN AUTO: 32 PG (ref 26.5–33)
MCHC RBC AUTO-ENTMCNC: 31.1 G/DL (ref 31.5–36.5)
MCV RBC AUTO: 103 FL (ref 78–100)
PLATELET # BLD AUTO: 138 10E3/UL (ref 150–450)
POTASSIUM BLD-SCNC: 3.7 MMOL/L (ref 3.5–5)
PROT SERPL-MCNC: 6.9 G/DL (ref 6–8)
RBC # BLD AUTO: 4.06 10E6/UL (ref 3.8–5.2)
SARS-COV-2 RNA RESP QL NAA+PROBE: NEGATIVE
SODIUM SERPL-SCNC: 137 MMOL/L (ref 136–145)
WBC # BLD AUTO: 6.6 10E3/UL (ref 4–11)

## 2022-01-19 PROCEDURE — 250N000013 HC RX MED GY IP 250 OP 250 PS 637: Performed by: PSYCHIATRY & NEUROLOGY

## 2022-01-19 PROCEDURE — 120N000001 HC R&B MED SURG/OB

## 2022-01-19 PROCEDURE — 80053 COMPREHEN METABOLIC PANEL: CPT | Performed by: HOSPITALIST

## 2022-01-19 PROCEDURE — 250N000013 HC RX MED GY IP 250 OP 250 PS 637: Performed by: INTERNAL MEDICINE

## 2022-01-19 PROCEDURE — 85027 COMPLETE CBC AUTOMATED: CPT | Performed by: HOSPITALIST

## 2022-01-19 PROCEDURE — 99233 SBSQ HOSP IP/OBS HIGH 50: CPT | Performed by: INTERNAL MEDICINE

## 2022-01-19 PROCEDURE — 250N000013 HC RX MED GY IP 250 OP 250 PS 637: Performed by: HOSPITALIST

## 2022-01-19 PROCEDURE — 83735 ASSAY OF MAGNESIUM: CPT | Performed by: INTERNAL MEDICINE

## 2022-01-19 PROCEDURE — 87635 SARS-COV-2 COVID-19 AMP PRB: CPT | Performed by: INTERNAL MEDICINE

## 2022-01-19 PROCEDURE — 36415 COLL VENOUS BLD VENIPUNCTURE: CPT | Performed by: HOSPITALIST

## 2022-01-19 PROCEDURE — 97530 THERAPEUTIC ACTIVITIES: CPT | Mod: GP

## 2022-01-19 PROCEDURE — 97110 THERAPEUTIC EXERCISES: CPT | Mod: GP

## 2022-01-19 PROCEDURE — 250N000011 HC RX IP 250 OP 636: Performed by: INTERNAL MEDICINE

## 2022-01-19 PROCEDURE — 97116 GAIT TRAINING THERAPY: CPT | Mod: GP

## 2022-01-19 RX ORDER — PANTOPRAZOLE SODIUM 40 MG/1
40 TABLET, DELAYED RELEASE ORAL
Qty: 60 TABLET | Refills: 0 | Status: SHIPPED | OUTPATIENT
Start: 2022-01-19

## 2022-01-19 RX ORDER — FOLIC ACID 1 MG/1
1 TABLET ORAL DAILY
Qty: 30 TABLET | Refills: 0 | Status: SHIPPED | OUTPATIENT
Start: 2022-01-20 | End: 2023-12-28

## 2022-01-19 RX ORDER — LISINOPRIL 20 MG/1
20 TABLET ORAL DAILY
Qty: 30 TABLET | Refills: 0 | Status: SHIPPED | OUTPATIENT
Start: 2022-01-20 | End: 2022-08-30

## 2022-01-19 RX ORDER — ATORVASTATIN CALCIUM 20 MG/1
20 TABLET, FILM COATED ORAL EVERY EVENING
Qty: 30 TABLET | Refills: 0 | Status: SHIPPED | OUTPATIENT
Start: 2022-01-19 | End: 2022-03-29 | Stop reason: SINTOL

## 2022-01-19 RX ORDER — LIDOCAINE 4 G/G
2 PATCH TOPICAL EVERY 24 HOURS
Qty: 14 PATCH | Refills: 0 | Status: SHIPPED | OUTPATIENT
Start: 2022-01-19 | End: 2022-02-04

## 2022-01-19 RX ORDER — HYDRALAZINE HYDROCHLORIDE 25 MG/1
25 TABLET, FILM COATED ORAL 3 TIMES DAILY
Qty: 90 TABLET | Refills: 0 | Status: SHIPPED | OUTPATIENT
Start: 2022-01-19 | End: 2022-08-30

## 2022-01-19 RX ORDER — ACETAMINOPHEN 500 MG
1000 TABLET ORAL EVERY 8 HOURS PRN
COMMUNITY
Start: 2022-01-19

## 2022-01-19 RX ORDER — PANTOPRAZOLE SODIUM 40 MG/1
40 TABLET, DELAYED RELEASE ORAL
Status: DISCONTINUED | OUTPATIENT
Start: 2022-01-19 | End: 2022-01-20 | Stop reason: HOSPADM

## 2022-01-19 RX ORDER — LANOLIN ALCOHOL/MO/W.PET/CERES
100 CREAM (GRAM) TOPICAL DAILY
Qty: 30 TABLET | COMMUNITY
Start: 2022-01-20 | End: 2022-08-30

## 2022-01-19 RX ORDER — DEXAMETHASONE 1 MG
1 TABLET ORAL
Qty: 6 TABLET | Refills: 0 | Status: SHIPPED | OUTPATIENT
Start: 2022-01-19 | End: 2022-02-04

## 2022-01-19 RX ADMIN — ALUMINUM HYDROXIDE, MAGNESIUM HYDROXIDE, AND SIMETHICONE 30 ML: 200; 200; 20 SUSPENSION ORAL at 16:04

## 2022-01-19 RX ADMIN — ATORVASTATIN CALCIUM 20 MG: 10 TABLET, FILM COATED ORAL at 20:41

## 2022-01-19 RX ADMIN — FOLIC ACID 1 MG: 1 TABLET ORAL at 09:45

## 2022-01-19 RX ADMIN — MICONAZOLE NITRATE: 2 POWDER TOPICAL at 20:41

## 2022-01-19 RX ADMIN — MICONAZOLE NITRATE: 2 POWDER TOPICAL at 13:38

## 2022-01-19 RX ADMIN — PANTOPRAZOLE SODIUM 40 MG: 40 TABLET, DELAYED RELEASE ORAL at 08:14

## 2022-01-19 RX ADMIN — GLIMEPIRIDE 2 MG: 1 TABLET ORAL at 08:14

## 2022-01-19 RX ADMIN — ACETAMINOPHEN 650 MG: 325 TABLET ORAL at 05:09

## 2022-01-19 RX ADMIN — LIDOCAINE: 50 OINTMENT TOPICAL at 20:41

## 2022-01-19 RX ADMIN — LIDOCAINE: 50 OINTMENT TOPICAL at 09:55

## 2022-01-19 RX ADMIN — LIDOCAINE 1 PATCH: 246 PATCH TOPICAL at 20:41

## 2022-01-19 RX ADMIN — HYDRALAZINE HYDROCHLORIDE 25 MG: 25 TABLET, FILM COATED ORAL at 13:38

## 2022-01-19 RX ADMIN — HYDRALAZINE HYDROCHLORIDE 25 MG: 25 TABLET, FILM COATED ORAL at 20:41

## 2022-01-19 RX ADMIN — ACETAMINOPHEN 650 MG: 325 TABLET ORAL at 23:08

## 2022-01-19 RX ADMIN — Medication 100 MG: at 09:45

## 2022-01-19 RX ADMIN — ACETAMINOPHEN 650 MG: 325 TABLET ORAL at 16:08

## 2022-01-19 RX ADMIN — DEXAMETHASONE 1 MG: 0.5 TABLET ORAL at 09:44

## 2022-01-19 RX ADMIN — HYDROCHLOROTHIAZIDE 25 MG: 25 TABLET ORAL at 09:45

## 2022-01-19 RX ADMIN — AMLODIPINE BESYLATE 10 MG: 5 TABLET ORAL at 09:45

## 2022-01-19 RX ADMIN — LISINOPRIL 20 MG: 20 TABLET ORAL at 09:45

## 2022-01-19 RX ADMIN — LIDOCAINE: 50 OINTMENT TOPICAL at 13:39

## 2022-01-19 RX ADMIN — PANTOPRAZOLE SODIUM 40 MG: 40 TABLET, DELAYED RELEASE ORAL at 16:04

## 2022-01-19 RX ADMIN — CITALOPRAM HYDROBROMIDE 40 MG: 40 TABLET ORAL at 09:45

## 2022-01-19 RX ADMIN — HYDRALAZINE HYDROCHLORIDE 25 MG: 25 TABLET, FILM COATED ORAL at 09:45

## 2022-01-19 RX ADMIN — ACETAMINOPHEN 650 MG: 325 TABLET ORAL at 10:52

## 2022-01-19 ASSESSMENT — ACTIVITIES OF DAILY LIVING (ADL)
ADLS_ACUITY_SCORE: 9
ADLS_ACUITY_SCORE: 13
ADLS_ACUITY_SCORE: 9

## 2022-01-19 NOTE — PROGRESS NOTES
9:00AM - CHW received delegation from Verito Armas RN CM, to follow up on TCU referrals.    [facility] - Left message on admissions voicemail to call back to [phone].    [facility] - Left message on admissions voicemail to call back to [phone].    [facility] - Left message on admissions voicemail to call back to [phone].    Updated destination comments on Care Management tab.    SHAWNEE Burns  Inpatient Community Health Worker  [facility], P2

## 2022-01-19 NOTE — PROGRESS NOTES
1:00PM - CHW received delegation from Verito Armas RN CM, to contact home care agencies for RN/PT/OT.    [facility]  to [phone]    [facility] -      Critical access hospital  - Spoke to Celi, will review for availability. Orders have been faxed via [employer].    [facility] - Alannah fax over, intake will review    [facility] - Spoke to Kathi, they are at capacity for patients location. (declined)    Ronny Espinoza, SHAWNEE  Inpatient Community Health Worker  [facility], P2

## 2022-01-19 NOTE — PROGRESS NOTES
Care Management Discharge Note    Discharge Date: 01/19/2022       Discharge Disposition:  Home with home care services, referrals made. RNCM awaiting for call back.    Discharge Services:  Home care services-RN,OT/PT    Discharge DME: not at this time    Discharge Transportation: family or friend will provide    Private pay costs discussed: Not applicable    PAS Confirmation Code:  N/A  Patient/family educated on Medicare website which has current facility and service quality ratings:  Not at this time    Education Provided on the Discharge Plan: per team   Persons Notified of Discharge Plans: patient  Patient/Family in Agreement with the Plan: yes     Handoff Referral Completed: Yes    Additional Information:  OT/PT recommendations home with assistance and  home care services. MD update, home with home care services RN,OT/PT, sister in agreement with above discharge plan. Home Care agency referrals made, awaiting for call back.     1440 Spoke with Chloe in intake [phone] with [facility] reviewing patient for admission. Awaiting for call back.    1510 Home Care Agency updates:  -[facility] insurance is out of network  -Count includes the [facility] no availability until 1/27  -[facility]- spoke with Marlen at [facility]  -[facility]- Kiko reviewing, will try to provide writer update by end of day or by tomorrow.    MD updated.    1540 Additional referrals/discharge orders sent to following:  [facility]  [facility]  [facility]    Awaiting call back on accepting home care agency.    1610 [facility] declined at [facility]. MD updated, will hold discharge.          Verito Armas RN

## 2022-01-19 NOTE — PLAN OF CARE
Problem: Pain Acute  Goal: Acceptable Pain Control and Functional Ability  Outcome: Improving  Intervention: Develop Pain Management Plan  Pain Management Interventions:   medication (see MAR)   relaxation techniques promoted   quiet environment facilitated   repositioned   distraction   ambulation/increased activity  Intervention: Prevent or Manage Pain  Recent Flowsheet Documentation  Medication Review/Management: medications reviewed     Problem: Fall Injury Risk  Goal: Absence of Fall and Fall-Related Injury  Outcome: Improving  Intervention: Identify and Manage Contributors  Self-Care Promotion:   independence encouraged   safe use of adaptive equipment encouraged  Medication Review/Management: medications reviewed  Intervention: Promote Injury-Free Environment  Recent Flowsheet Documentation  Safety Promotion/Fall Prevention:   activity supervised   clutter free environment maintained   fall prevention program maintained   nonskid shoes/slippers when out of bed

## 2022-01-19 NOTE — PROVIDER NOTIFICATION
"Text page to Dr. Conn    Pt is reporting new left arm pain and is concerned. Says \"it might just be anxiety\". Current /73.  "

## 2022-01-19 NOTE — PROGRESS NOTES
Hospitalist Progress Note    Assessment/Plan  Josefina Dumont is a 71 year old female admitted on 1/12/2022. She has history of diabetes, hypertension, mood disorder who presented today with unwitnessed fall, syncope. She was diagnosed with a new meningioma with edema, ischemic cva as well as small SDH.  Patient was started on steroids for vasogenic edema, her case was presented on tumor board, recommendation was to follow-up with MRI in 3 months and then discuss treatment options.  Patient was started on Plavix for ischemic stroke due to history of intolerance to aspirin.  Subsequently due to a new headache CT head was obtained and showed small left side subdural hemorrhage.   Plavix has been held and repeat CT head ordered in 10 days.         1.Unwitnessed fall vrs syncope -suspect now tumor and cva  -Pt had a unwitnessed fall, unsure if fall vs syncope vrs seizure--now concern cva, tumor  -CT head showed lesion measuring 1.5 x 2.6 x 3 cm s/o meningioma  -MRI brain--showed mass suggestive of meningioma and acute cva  -Neurology /Neurosurg consulted-appreciated  -PT/OT consulted     2.Intracranial lesion, suggestive of meningioma  -CT head and MRI brain demonstrated left frontal convexity meningioma with associated vasogenic edema.  -Patient has been on Decadron initially 4 mg every 6 hours and now on a tapering dose.    -On pantoprazole twice daily which will be continued after discharge since patient is reporting GERD symptoms even prior to initiation of steroids  -Keppra loading dose given in the ER, neurology does NOT think she needs to continue this  -Josefina's case was discussed at tumor conference on 1/17.  Consensus was for 3 months follow-up with repeat MRI and possible discussion about radiation at that time     3.Acute CVA  -Initiated on Plavix and statin on 1/15, however Plavix was subsequently stopped due to development of SDH  -Repeat CT head in 10 days on 1/25/2022.  If improved or stable can resume  Plavix at that time  -No evidence of paroxysmal A. fib on telemetry so far, will discontinue telemetry     4.Small Subdural Hemorrhage - noted on the CT on 1/15/2022 which was obtained due to headache  -Needs Follow-up with CT head on 1/25/2022     5.Back pain and chest pain-likely secondary to fall  -CT C-spine negative for any acute fractures  -Lidocaine patch and Oxy prn ordered  -Placed on fall precautions     6.CAP - chest x-ray on 1/12 with right lower lobe opacity  -Completed 5 days of Levaquin    7.Diabetes with hyperglycemia  -A1c 7  -Continue home glimepiride.  -Placed on medium resistance sliding scale as patient now on steroids  -Monitor blood sugars closely     8.essential hypertension-blood pressure significantly elevated on arrival to ED, suspect due to CVA  -Resumed home amlodipine and hydrochlorothiazide  -Lisinopril and hydralazine added during this admission - might need to scale back on antihypertensives once patient is off steroids    9.Mood disorder-  -Continue PTA Celexa.  -Ativan as needed ordered     10.Hypokalemia, hypomagnesemia - repleted     11.Mild to moderate mitral stenosis  -Outpt follow up with ; LUC will be considered      12. Obesity- BMI 39     13.Right side thyroid nodule meeting criteria for biopsy and FNA.  TSH normal.   Outpatient follow up.       14.GERD  - Pantoprazole 40 mg BID     15.hx of etoh use  -no sign withdrawal  -thiamine/folic acid  -Discussed the importance of cessation given new diagnosis of meningioma, subdural hemorrhage and stroke     Addendum 2:50 PM:  Received a message from nursing staff about patient reporting new left arm pain.  Evaluated.  She states that arm pain is resolved.  She believes it might be anxiety.  She is very stressed as she does not know if she will be discharging home today and who will pick her up from the hospital.        Barriers to Discharge: Needs home care set up prior to discharge (lives alone, multiple new medications  during this admission, needs monitoring for potentially worsening SDH)    Anticipated discharge date/Disposition: Home with home care tomorrow    Subjective  Patient continues to report right side chest wall pain.  Denies having any headache, vision changes, new tingling or numbness.  She walked entire 3 hallways with PT today.      Objective    Vital signs in last 24 hours  Temp:  [97.8  F (36.6  C)-99  F (37.2  C)] 98.2  F (36.8  C)  Pulse:  [71-81] 76  Resp:  [12-18] 14  BP: (139-162)/(65-83) 139/65  SpO2:  [95 %-97 %] 96 % @LASTSAO2(12)@ O2 Device: None (Room air)    Weight:   Wt Readings from Last 3 Encounters:   01/13/22 106.4 kg (234 lb 8 oz)      Weight change:     Intake/Output last 3 shifts  I/O last 3 completed shifts:  In: 1506 [P.O.:1500; I.V.:6]  Out: 1650 [Urine:1650]  Body mass index is 40.25 kg/m .    Physical Exam    General Appearance:    Alert, cooperative, no distress, obese female   Chest:    TTP over right anterior chest, lungs clear bilaterally    Cardiovascular:    Regular rate ands rhythm.  Nornal S1, S2.  No murmur, rub or gallop.  No edema   Abdomen:     Soft, non-tender, bowel sounds active all four quadrants,     no masses, no organomegaly   Neurologic:   Awake, alert, oriented x 3.  Moves all extremities     Pertinent Labs   Lab Results: personally reviewed.   Recent Labs   Lab 01/19/22  0819 01/18/22  0709 01/17/22  0714    139 140   CO2 25 24 23   BUN 16 19 23   ALBUMIN 3.0* 3.1* 3.0*   ALKPHOS 110 122* 111   ALT 38 47* 41   AST 35 41* 39     Recent Labs   Lab 01/19/22  0648 01/18/22  0709 01/17/22  0714   WBC 6.6 6.8 6.6   HGB 13.0 13.6 13.7   HCT 41.8 42.0 43.3   * 179 174     Recent Labs   Lab 01/17/22  1024   TROPONINI 0.03     Invalid input(s): POCGLUFGR    Medications  Current Facility-Administered Medications   Medication     acetaminophen (TYLENOL) tablet 650 mg    Or     acetaminophen (TYLENOL) Suppository 650 mg     alum & mag hydroxide-simethicone (MAALOX)  suspension 30 mL     amLODIPine (NORVASC) tablet 10 mg     atorvastatin (LIPITOR) tablet 20 mg     citalopram (celeXA) tablet 40 mg     dexamethasone (DECADRON) tablet 1 mg     glucose gel 15-30 g    Or     dextrose 50 % injection 25-50 mL    Or     glucagon injection 1 mg     folic acid (FOLVITE) tablet 1 mg     glimepiride (AMARYL) tablet 2 mg     hydrALAZINE (APRESOLINE) tablet 25 mg     hydrochlorothiazide (HYDRODIURIL) tablet 25 mg     insulin aspart (NovoLOG) injection (RAPID ACTING)     insulin aspart (NovoLOG) injection (RAPID ACTING)     Lidocaine (LIDOCARE) 4 % Patch 1 patch    And     lidocaine patch in PLACE     lidocaine (LMX4) cream     lidocaine (XYLOCAINE) 5 % ointment     lidocaine 1 % 0.1-1 mL     lisinopril (ZESTRIL) tablet 20 mg     LORazepam (ATIVAN) injection 1 mg     melatonin tablet 1 mg     miconazole (MICATIN) 2 % powder     ondansetron (ZOFRAN-ODT) ODT tab 4 mg    Or     ondansetron (ZOFRAN) injection 4 mg     oxyCODONE (ROXICODONE) tablet 5 mg     pantoprazole (PROTONIX) EC tablet 40 mg     polyethylene glycol (MIRALAX) Packet 17 g     prochlorperazine (COMPAZINE) injection 5 mg    Or     prochlorperazine (COMPAZINE) tablet 5 mg    Or     prochlorperazine (COMPAZINE) suppository 12.5 mg     sodium chloride (PF) 0.9% PF flush 3 mL     sodium chloride (PF) 0.9% PF flush 3 mL     sodium chloride (PF) 0.9% PF flush 3 mL     sodium chloride (PF) 0.9% PF flush 3 mL     thiamine (B-1) tablet 100 mg       Pertinent Radiology   Radiology Results: Personally reviewed   Results for orders placed or performed during the hospital encounter of 01/12/22   CT Head w/o Contrast    Impression    CONCLUSION:  HEAD CT:  1.  Partially calcified left anterior parafalcine extra-axial mass lesion measuring 1.5 x 2.6 x 3.0 cm, with appearance most suggestive of meningioma. There is a low-attenuation parenchymal change in the adjacent left frontal lobe, likely vasogenic   edema. Recommend MRI brain without and  with IV contrast for further characterization.  2.  No acute intracranial hemorrhage or CT evidence for acute infarction.  3.  No acute calvarial fracture or scalp hematoma.  4.  Mild global brain parenchymal volume loss with additional presumed sequelae of mild chronic small vessel ischemic disease.    CERVICAL SPINE CT:  1.  No acute fracture or traumatic subluxation of the cervical spine by CT imaging.  2.  No high-grade spinal canal or neural foraminal stenosis.   CT Cervical Spine w/o Contrast    Impression    CONCLUSION:  HEAD CT:  1.  Partially calcified left anterior parafalcine extra-axial mass lesion measuring 1.5 x 2.6 x 3.0 cm, with appearance most suggestive of meningioma. There is a low-attenuation parenchymal change in the adjacent left frontal lobe, likely vasogenic   edema. Recommend MRI brain without and with IV contrast for further characterization.  2.  No acute intracranial hemorrhage or CT evidence for acute infarction.  3.  No acute calvarial fracture or scalp hematoma.  4.  Mild global brain parenchymal volume loss with additional presumed sequelae of mild chronic small vessel ischemic disease.    CERVICAL SPINE CT:  1.  No acute fracture or traumatic subluxation of the cervical spine by CT imaging.  2.  No high-grade spinal canal or neural foraminal stenosis.   XR Chest Port 1 View    Impression    IMPRESSION: Stable cardiomegaly with normal vascularity. Patchy right basilar opacities suggesting pneumonia. Left lung clear. No pneumothorax nor pleural effusion.    Consider follow-up radiographs to ensure resolution.   MR Brain w/o & w Contrast    Impression    IMPRESSION:  1.  Tiny focus of acute infarction within the right mid frontal centrum semiovale.  2.  Small to moderate anterior left frontal convexity meningioma with associated vasogenic edema resulting in mild localized mass effect. No midline shift, herniation or hydrocephalus.  3.  Mild to moderate chronic small vessel ischemic  disease and mild generalized brain parenchymal volume loss.   CTA Head Neck with Contrast    Impression    IMPRESSION:   HEAD CT:  1.  No CT evidence of a midline shift, acute hemorrhage or focal area suggestive of acute anemia.  2.  Heavily calcified small meningioma with associated vasogenic edema anterior medial left frontal lobe.  3.  Stable diffuse age related changes.    HEAD CTA:   1.  No discrete vessel occlusion, significant stenosis, aneurysm or high flow vascular malformation involving the arteries of the [place].    NECK CTA:  1.  Configuration of the great vessels off the aortic arch with no significant stenosis of their origins.  2.  Approximately 40% stenosis of origin of right internal carotid artery by NASCET criteria.  3.  No significant stenosis or irregularity involving origin of the left internal carotid artery by NASCET criteria.  4.  No radiographic evidence of dissection.   US Thyroid    Impression    IMPRESSION:    1.  Dominant right thyroid nodule meeting criteria for biopsy and fine-needle aspiration.      Nodules are characterized per  ACR Thyroid Imaging, Reporting and Data System (TI-RADS): White Paper of the ACR TI-RADS Committee  Anthony Wakefield et al. Journal of the American College of Radiology 2017. Volume 14 (2017), Issue 5, 587-012.      CT Head w/o Contrast    Impression    IMPRESSION:  1.  Since CT head 01/13/2022, a small extra-axial acute hemorrhage has developed along the anterolateral aspect of the left middle cranial fossa measuring up to 5 mm in radial thickness best visualized on axial series T2 image 16 and coronal reformatted   image 21. No mass effect on the anterior left temporal lobe. No new hemorrhage elsewhere.  2.  No definite change in the partially calcified presumed meningioma along the left anterior paramedian frontal region with associated stable vasogenic edema and localized mass effect.  3.  Stable mild age-related changes.  4.  Findings  discussed with Dr. Watson at 9:45 AM on 01/15/2022.     XR Chest 2 Views    Impression    IMPRESSION: Heart size mildly enlarged. The previous mild patchy infiltrates right lung base have resolved. No remaining significant findings.   CT Head w/o Contrast    Impression    IMPRESSION:  1.  Stable acute hyperdense extra-axial hemorrhage left middle cranial fossa without associated mass effect. This may reflect a small subdural hematoma.    2.  Stable partially mineralized extra-axial mass lesion along the anterior paramedian left frontal region, presumably related to meningioma with associated abnormal vasogenic edema left frontal lobe, also appearing stable.    3.  No new or increasing hemorrhage with presumed chronic small vessel vascular changes and brain volume loss.   Echocardiogram Complete   Result Value Ref Range    LVEF  60-65%          Advanced Care Planning:  Discharge Planning discussed with patient, nursing staff, RN CM.  patient's sister Yanira updated over the phone multiple times today        Alysia Conn MD  Internal Medicine Hospitalist  1/18/2022

## 2022-01-20 VITALS
HEART RATE: 64 BPM | HEIGHT: 64 IN | TEMPERATURE: 97.6 F | DIASTOLIC BLOOD PRESSURE: 69 MMHG | BODY MASS INDEX: 40.04 KG/M2 | OXYGEN SATURATION: 97 % | WEIGHT: 234.5 LBS | RESPIRATION RATE: 20 BRPM | SYSTOLIC BLOOD PRESSURE: 169 MMHG

## 2022-01-20 LAB
ALBUMIN SERPL-MCNC: 2.9 G/DL (ref 3.5–5)
ALP SERPL-CCNC: 123 U/L (ref 45–120)
ALT SERPL W P-5'-P-CCNC: 37 U/L (ref 0–45)
ANION GAP SERPL CALCULATED.3IONS-SCNC: 8 MMOL/L (ref 5–18)
AST SERPL W P-5'-P-CCNC: 33 U/L (ref 0–40)
BILIRUB SERPL-MCNC: 0.6 MG/DL (ref 0–1)
BUN SERPL-MCNC: 18 MG/DL (ref 8–28)
CALCIUM SERPL-MCNC: 8.7 MG/DL (ref 8.5–10.5)
CHLORIDE BLD-SCNC: 106 MMOL/L (ref 98–107)
CO2 SERPL-SCNC: 25 MMOL/L (ref 22–31)
CREAT SERPL-MCNC: 0.85 MG/DL (ref 0.6–1.1)
ERYTHROCYTE [DISTWIDTH] IN BLOOD BY AUTOMATED COUNT: 13.7 % (ref 10–15)
GFR SERPL CREATININE-BSD FRML MDRD: 73 ML/MIN/1.73M2
GLUCOSE BLD-MCNC: 101 MG/DL (ref 70–125)
GLUCOSE BLDC GLUCOMTR-MCNC: 177 MG/DL (ref 70–99)
GLUCOSE BLDC GLUCOMTR-MCNC: 82 MG/DL (ref 70–99)
HCT VFR BLD AUTO: 40.3 % (ref 35–47)
HGB BLD-MCNC: 13 G/DL (ref 11.7–15.7)
MAGNESIUM SERPL-MCNC: 2.1 MG/DL (ref 1.8–2.6)
MCH RBC QN AUTO: 32.3 PG (ref 26.5–33)
MCHC RBC AUTO-ENTMCNC: 32.3 G/DL (ref 31.5–36.5)
MCV RBC AUTO: 100 FL (ref 78–100)
PLATELET # BLD AUTO: 161 10E3/UL (ref 150–450)
POTASSIUM BLD-SCNC: 3.4 MMOL/L (ref 3.5–5)
PROT SERPL-MCNC: 6.7 G/DL (ref 6–8)
RBC # BLD AUTO: 4.02 10E6/UL (ref 3.8–5.2)
SODIUM SERPL-SCNC: 139 MMOL/L (ref 136–145)
WBC # BLD AUTO: 6.6 10E3/UL (ref 4–11)

## 2022-01-20 PROCEDURE — 80053 COMPREHEN METABOLIC PANEL: CPT | Performed by: HOSPITALIST

## 2022-01-20 PROCEDURE — 250N000011 HC RX IP 250 OP 636: Performed by: INTERNAL MEDICINE

## 2022-01-20 PROCEDURE — 250N000013 HC RX MED GY IP 250 OP 250 PS 637: Performed by: HOSPITALIST

## 2022-01-20 PROCEDURE — 82040 ASSAY OF SERUM ALBUMIN: CPT | Performed by: HOSPITALIST

## 2022-01-20 PROCEDURE — 250N000013 HC RX MED GY IP 250 OP 250 PS 637: Performed by: INTERNAL MEDICINE

## 2022-01-20 PROCEDURE — 36415 COLL VENOUS BLD VENIPUNCTURE: CPT | Performed by: HOSPITALIST

## 2022-01-20 PROCEDURE — 85027 COMPLETE CBC AUTOMATED: CPT | Performed by: HOSPITALIST

## 2022-01-20 PROCEDURE — 99239 HOSP IP/OBS DSCHRG MGMT >30: CPT | Performed by: INTERNAL MEDICINE

## 2022-01-20 PROCEDURE — 83735 ASSAY OF MAGNESIUM: CPT | Performed by: INTERNAL MEDICINE

## 2022-01-20 RX ORDER — POTASSIUM CHLORIDE 1500 MG/1
40 TABLET, EXTENDED RELEASE ORAL ONCE
Status: COMPLETED | OUTPATIENT
Start: 2022-01-20 | End: 2022-01-20

## 2022-01-20 RX ADMIN — DEXAMETHASONE 1 MG: 0.5 TABLET ORAL at 08:01

## 2022-01-20 RX ADMIN — AMLODIPINE BESYLATE 10 MG: 5 TABLET ORAL at 08:00

## 2022-01-20 RX ADMIN — GLIMEPIRIDE 2 MG: 1 TABLET ORAL at 08:01

## 2022-01-20 RX ADMIN — PANTOPRAZOLE SODIUM 40 MG: 40 TABLET, DELAYED RELEASE ORAL at 08:00

## 2022-01-20 RX ADMIN — ACETAMINOPHEN 650 MG: 325 TABLET ORAL at 10:15

## 2022-01-20 RX ADMIN — HYDROCHLOROTHIAZIDE 25 MG: 25 TABLET ORAL at 08:00

## 2022-01-20 RX ADMIN — ACETAMINOPHEN 650 MG: 325 TABLET ORAL at 05:02

## 2022-01-20 RX ADMIN — LIDOCAINE: 50 OINTMENT TOPICAL at 08:06

## 2022-01-20 RX ADMIN — MICONAZOLE NITRATE: 2 POWDER TOPICAL at 08:05

## 2022-01-20 RX ADMIN — HYDRALAZINE HYDROCHLORIDE 25 MG: 25 TABLET, FILM COATED ORAL at 08:00

## 2022-01-20 RX ADMIN — POLYETHYLENE GLYCOL 3350 17 G: 17 POWDER, FOR SOLUTION ORAL at 08:01

## 2022-01-20 RX ADMIN — Medication 100 MG: at 08:00

## 2022-01-20 RX ADMIN — POTASSIUM CHLORIDE 40 MEQ: 20 TABLET, EXTENDED RELEASE ORAL at 10:14

## 2022-01-20 RX ADMIN — CITALOPRAM HYDROBROMIDE 40 MG: 40 TABLET ORAL at 08:01

## 2022-01-20 RX ADMIN — LISINOPRIL 20 MG: 20 TABLET ORAL at 08:01

## 2022-01-20 RX ADMIN — FOLIC ACID 1 MG: 1 TABLET ORAL at 08:00

## 2022-01-20 ASSESSMENT — ACTIVITIES OF DAILY LIVING (ADL)
ADLS_ACUITY_SCORE: 9
ADLS_ACUITY_SCORE: 10
ADLS_ACUITY_SCORE: 10
ADLS_ACUITY_SCORE: 9
ADLS_ACUITY_SCORE: 10
ADLS_ACUITY_SCORE: 9
ADLS_ACUITY_SCORE: 10
ADLS_ACUITY_SCORE: 10

## 2022-01-20 NOTE — DISCHARGE INSTRUCTIONS
Neurosurgery will call you to set up your follow up appointment. [phone]. Please call with any questions or if you do not hear from us.     Call [phone] with the following symptoms:  *Worsening headache not relieved by the prescription given  *A headache that gets better or worse when you stand up or lie down  *Seizures  *Persistent nausea and vomiting  *Increased sleepiness or difficulty being awakened  *Change in ability to walk, see, think, or talk  *Worsening or new onset of weakness, or numbness and tingling  *Loss or change in your ability to control bowel or bladder function    .*******FOLLOW UP APPOINTMENT AT PRIMARY CLINIC IN [facility]-Dr. Culp on 1/26/2022 at 11:15 am*******      RNCM met with patient and family to discuss needs and plan they agreed upon is home with home care  Agency:[facility]  Phone: [phone]  Services to be provided: RN,OT/PT  Expected date of first visit: Friday 1/21   If not contacted by home care agency within 24 hours please contact agency at above number.  Verito Armas

## 2022-01-20 NOTE — PROGRESS NOTES
Care Management Discharge Note    Discharge Date: 01/20/2022       Discharge Disposition: home with home care services    Discharge Services:  Home RN,OT/PT    Discharge DME: N/A     Discharge Transportation: family or friend will provide    Private pay costs discussed: Not applicable    PAS Confirmation Code: N/A   Patient/family educated on Medicare website which has current facility and service quality ratings:  N/A    Education Provided on the Discharge Plan:  Per team  Persons Notified of Discharge Plans: patient   Patient/Family in Agreement with the Plan:  yes    Handoff Referral Completed: Yes    Additional Information:  Left message for [facility] regarding referral/availability for start of care. RNCM contact information provided.     Home Care referrals pending:  -[facility]  -[facility]  -[facility]    Additional referrals sent:  -[facility]  -[facility]  -[facility]    1000 Home Care referral update:  -[facility] is not accepting new referrals  -[facility]-no availability  -[facility] not contracted  -[facility] awaiting call back from [place] office  -[facility] pending  -[facility]-pending    Additional referrals made:  -[facility]-teams [facility] home care [facility], reviewing  -[facility]  -[facility]  -[facility]    1015 Call to [facility], unable to leave message at this time.    1050 Home Care referral updates:  -Bridget Oliva out of network.  -[facility] [place] Office (P: [phone]) reviewing. Full referral and discharge orders faxed to [phone] to review.    Additional referrals faxed to:  -[facility]  -[facility]    1135 Home Care Agency updates:  -[facility]- pt's responsible for 50% of out of pocket costs for home visits.  -[facility]-declined  -[facility]-declined  -[facility] at capacity  -[facility]- not in network  =[facility] will review    [facility] with  [facility] accepted patient for start of home PT tomorrow and skilled nursing home visit for next week. Patient updated and in agreement with above discharge plan. Sister Orin and neighbor to provide transport.    Verito Armas RN

## 2022-01-20 NOTE — PLAN OF CARE
Physical Therapy Discharge Summary    Reason for therapy discharge:    Discharged to home with home therapy.    Progress towards therapy goal(s). See goals on Care Plan in [facility] electronic health record for goal details.  Goals partially met.  Barriers to achieving goals:   discharge from facility.    Therapy recommendation(s):    Continued therapy is recommended.  Rationale/Recommendations:  to progress.

## 2022-01-20 NOTE — PROGRESS NOTES
9:40AM - CHW received delegation from Verito Armas, FRANCISCO BATISTA, to follow up on home care referrals. Patient needing RN/PT/OT services.    [facility] - Spoke with Gudelia. Not accepting new patients at this time.    [facility] - Spoke with Kristi, no new RN services available for next 2 weeks.    [facility] - Spoke with Elva who will review for availability and call back to [phone].    [facility] - Not contracted with [facility].    [facility] - Spoke with Amarilis. Out for 1 week for new patients.    [facility] - Left message with the , who will send a message for intake to call back at [phone].    [facility] - Left message on intake voicemail to call back to [phone].    11:30AM - Request from RN CM to follow up on additional home care referrals added.    [facility] - Spoke to Teddy. Declined referral due to staffing.    [facility] - Spoke with Denice, they are currently reviewing and will call back to [phone].    [facility] - Left message with intake line to call back to [phone].    [facility] - Spoke to Isaura. Not in network with [facility].    [facility] - Spoke with Jacqueline. At capacity, not accepting patient.    Updated home care comments on Care Management tab.    Ronny Espinoza, SHAWNEE  Inpatient Community Health Worker  [facility], P2

## 2022-01-20 NOTE — PLAN OF CARE
Problem: Pain Acute  Goal: Acceptable Pain Control and Functional Ability  Intervention: Develop Pain Management Plan  Recent Flowsheet Documentation  Taken 1/20/2022 0808 by Saray Puri RN  Pain Management Interventions:   medication (see MAR)   rest   Patient reports pain to right side of chest. MD is aware of this, thinks it is just skeletal pain. Lidocaine cream and tylenol are effective in keeping the pain tolerable.       Problem: Adult Inpatient Plan of Care  Goal: Plan of Care Review  Outcome: Improving  Flowsheets (Taken 1/20/2022 0955)  Plan of Care Reviewed With: patient  Progress: improving   Probably discharging later this shift if social work is able to find home care for patient. Will continue to monitor.     Saray Puri RN 1/20/2022 9:57 AM

## 2022-01-20 NOTE — DISCHARGE SUMMARY
[facility] MEDICINE  DISCHARGE SUMMARY     Primary Care Physician: Court Lee  Admission Date: 1/12/2022   Discharge Provider: Alysia Conn MD Discharge Date: 1/20/2022   Diet:   Active Diet and Nourishment Order   Procedures     Diet       Code Status: Full code   Activity: DCACTIVITY: Activity as tolerated        Condition at Discharge: Stable     REASON FOR PRESENTATION(See Admission Note for Details)     Syncope and fall    PRINCIPAL & ACTIVE DISCHARGE DIAGNOSES     Principal Problem:    Syncope, unspecified syncope type  Active Problems:    Primary hypertension    Meningioma (H)    Injury of head, initial encounter    Fall, initial encounter    Pneumonia of right lower lobe due to infectious organism    Mitral valve disease, rheumatic    Type 2 diabetes mellitus, without long-term current use of insulin (H)  Acute stroke due to ischemia  Subdural hemorrhage  Chest wall pain  Essential hypertension  GERD  Obesity Body mass index is 40.25 kg/m .  Excessive alcohol use  Mild to moderate mitral stenosis    PENDING LABS     Unresulted Labs Ordered in the Past 30 Days of this Admission     No orders found from 12/13/2021 to 1/13/2022.            PROCEDURES ( this hospitalization only)          RECOMMENDATIONS TO OUTPATIENT PROVIDER FOR F/U VISIT     Follow-up Appointments     Follow Up Care      The neurosurgery office will call you in 2-3 business days after   discharge to schedule your follow-up appointment. You should have a   provider visit scheduled with a new brain MRI in 3 months. If you have not   received a call in 2-3 business days, please call our office at [phone].         Follow-up and recommended labs and tests       Follow up with primary care provider, CUORT LEE, within 7 days for   hospital follow- up and to follow up on results.  The following labs/tests   are recommended: CT head to follow up on subdural hematoma.  If it has   resolved,  patient should be started on Plavix 75 mg daily .         Follow-up with cardiology Dr. Hess in 2 months    DISPOSITION     Home with home care    SUMMARY OF HOSPITAL COURSE:      Josefina Dumont is a 71 year old female with past medical history of diabetes mellitus, hypertension, mood disorder, obesity who presented to [facility] with unwitnessed fall, syncope.  Patient was found to have left frontal convexity meningioma with associated vasogenic edema and ischemic CVA.  She was started on dexamethasone, statin and Plavix (allergy to aspirin).  Josefina's case was discussed at tumor board conference on 1/17/2022.  Consensus was for 3 months follow-up with repeat MRI and possible discussion about radiation at that time.    On 1/15/2022 due to development of new headache CT head was obtained and showed a small subdural hemorrhage.  Plavix was held at that time.  Hemorrhage was stable on repeat imaging in 6 hours.  Patient needs follow-up CT of the head in 10 days (1/25/2022).  If SDH resolved or stable Plavix should be resumed.    During this hospital course patient was treated for RLL community-acquired pneumonia.  She continues to have intermittent right chest wall and back pain, suspect musculoskeletal related to recent fall.  Imaging did not show any acute fractures or other traumatic injuries.  She has been reporting persistent GERD symptoms therefore was started on PPI and H2 blocker.  Blood pressure has been elevated and 2 additional antihypertensives were added during this admission.    Echocardiogram was obtained as a part of routine stroke work-up, demonstrated mild to moderate mitral stenosis.  Outpatient follow-up with cardiology Dr. Naseem oliver was recommended, LUC will be considered at that time.  Incidental finding of right-sided thyroid nodule meeting criteria for biopsy and FNA was noted, TSH is normal.  Outpatient follow-up was recommended.  Importance of alcohol cessation was discussed with  the patient given new diagnosis of subdural hemorrhage and CVA.   Patient was evaluated by PT and OT and felt to be stable to be discharged back home with home care.        Discharge Medications with Med changes:     Discharge Medication List as of 1/20/2022 11:58 AM      START taking these medications    Details   acetaminophen (TYLENOL) 500 MG tablet Take 2 tablets (1,000 mg) by mouth every 8 hours as needed for mild pain, OTC      atorvastatin (LIPITOR) 20 MG tablet Take 1 tablet (20 mg) by mouth every evening, Disp-30 tablet, R-0, E-Prescribe      famotidine (PEPCID) 20 MG tablet Take 1 tablet (20 mg) by mouth 2 times daily While taking dexamethasone, Disp-60 tablet, R-0, E-Prescribe      folic acid (FOLVITE) 1 MG tablet Take 1 tablet (1 mg) by mouth daily, Disp-30 tablet, R-0, E-Prescribe      hydrALAZINE (APRESOLINE) 25 MG tablet Take 1 tablet (25 mg) by mouth 3 times daily, Disp-90 tablet, R-0, E-Prescribe      Lidocaine (LIDOCARE) 4 % Patch Place 2 patches onto the skin every 24 hours To prevent lidocaine toxicity, patient should be patch free for 12 hrs daily. Place one patch over the lower back and one path over the right anterior chest.Disp-14 patch, [id number]      lisinopril (ZESTRIL) 20 MG tablet Take 1 tablet (20 mg) by mouth daily, Disp-30 tablet, R-0, E-Prescribe      pantoprazole (PROTONIX) 40 MG EC tablet Take 1 tablet (40 mg) by mouth 2 times daily (before meals), Disp-60 tablet, R-0, E-Prescribe      thiamine (B-1) 100 MG tablet Take 1 tablet (100 mg) by mouth daily, Disp-30 tablet, OTC         CONTINUE these medications which have CHANGED    Details   dexamethasone (DECADRON) 1 MG tablet Take 1 tablet (1 mg) by mouth daily (with breakfast) for 6 days, Disp-6 tablet, R-0, E-Prescribe         CONTINUE these medications which have NOT CHANGED    Details   amLODIPine (NORVASC) 10 MG tablet Take 10 mg by mouth daily, Historical      calcium carbonate (TUMS) 500 MG chewable tablet Take 1 chew tab  by mouth 2 times daily as needed for heartburn, Historical      citalopram (CELEXA) 40 MG tablet Take 40 mg by mouth daily, Historical      glimepiride (AMARYL) 2 MG tablet Take 2 mg by mouth every morning (before breakfast), Historical      hydrochlorothiazide (HYDRODIURIL) 25 MG tablet Take 25 mg by mouth daily, Historical      multivitamin w/minerals (THERA-VIT-M) tablet Take 1 tablet by mouth daily, Historical      vitamin E (TOCOPHEROL) 400 units (180 mg) capsule Take 400 Units by mouth daily, Historical                   Rationale for medication changes:      Dexamethasone started for vasogenic edema  Tylenol and lidocaine patch for musculoskeletal pain  Lisinopril and hydralazine started for better hypertension control  Statin started for hyperlipidemia in the setting of stroke  Pantoprazole and famotidine for GERD symptoms - can likely scale down once patient is off steroids  Folate and thiamine given history of alcohol use          Consults       CARE MANAGEMENT / SOCIAL WORK IP CONSULT  SMOKING CESSATION PROGRAM IP CONSULT  PHYSICAL THERAPY ADULT IP CONSULT  OCCUPATIONAL THERAPY ADULT IP CONSULT  NEUROLOGY IP CONSULT  RADIATION ONCOLOGY IP CONSULT  CARDIOLOGY IP CONSULT  SOCIAL WORK IP CONSULT    Immunizations given this encounter     Most Recent Immunizations   Administered Date(s) Administered     COVID-19,PF,Moderna 11/19/2021     Influenza, Quad, High Dose, Pf, 65yr+ (Fluzone HD) 09/25/2021           Anticoagulation Information      Recent INR results: No results for input(s): INR in the last 168 hours.  Warfarin doses (if applicable) or name of other anticoagulant: none      SIGNIFICANT IMAGING FINDINGS     Results for orders placed or performed during the hospital encounter of 01/12/22   CT Head w/o Contrast    Impression    CONCLUSION:  HEAD CT:  1.  Partially calcified left anterior parafalcine extra-axial mass lesion measuring 1.5 x 2.6 x 3.0 cm, with appearance most suggestive of meningioma.  There is a low-attenuation parenchymal change in the adjacent left frontal lobe, likely vasogenic   edema. Recommend MRI brain without and with IV contrast for further characterization.  2.  No acute intracranial hemorrhage or CT evidence for acute infarction.  3.  No acute calvarial fracture or scalp hematoma.  4.  Mild global brain parenchymal volume loss with additional presumed sequelae of mild chronic small vessel ischemic disease.    CERVICAL SPINE CT:  1.  No acute fracture or traumatic subluxation of the cervical spine by CT imaging.  2.  No high-grade spinal canal or neural foraminal stenosis.   CT Cervical Spine w/o Contrast    Impression    CONCLUSION:  HEAD CT:  1.  Partially calcified left anterior parafalcine extra-axial mass lesion measuring 1.5 x 2.6 x 3.0 cm, with appearance most suggestive of meningioma. There is a low-attenuation parenchymal change in the adjacent left frontal lobe, likely vasogenic   edema. Recommend MRI brain without and with IV contrast for further characterization.  2.  No acute intracranial hemorrhage or CT evidence for acute infarction.  3.  No acute calvarial fracture or scalp hematoma.  4.  Mild global brain parenchymal volume loss with additional presumed sequelae of mild chronic small vessel ischemic disease.    CERVICAL SPINE CT:  1.  No acute fracture or traumatic subluxation of the cervical spine by CT imaging.  2.  No high-grade spinal canal or neural foraminal stenosis.   XR Chest Port 1 View    Impression    IMPRESSION: Stable cardiomegaly with normal vascularity. Patchy right basilar opacities suggesting pneumonia. Left lung clear. No pneumothorax nor pleural effusion.    Consider follow-up radiographs to ensure resolution.   MR Brain w/o & w Contrast    Impression    IMPRESSION:  1.  Tiny focus of acute infarction within the right mid frontal centrum semiovale.  2.  Small to moderate anterior left frontal convexity meningioma with associated vasogenic edema  resulting in mild localized mass effect. No midline shift, herniation or hydrocephalus.  3.  Mild to moderate chronic small vessel ischemic disease and mild generalized brain parenchymal volume loss.   CTA Head Neck with Contrast    Impression    IMPRESSION:   HEAD CT:  1.  No CT evidence of a midline shift, acute hemorrhage or focal area suggestive of acute anemia.  2.  Heavily calcified small meningioma with associated vasogenic edema anterior medial left frontal lobe.  3.  Stable diffuse age related changes.    HEAD CTA:   1.  No discrete vessel occlusion, significant stenosis, aneurysm or high flow vascular malformation involving the arteries of the [place].    NECK CTA:  1.  Configuration of the great vessels off the aortic arch with no significant stenosis of their origins.  2.  Approximately 40% stenosis of origin of right internal carotid artery by NASCET criteria.  3.  No significant stenosis or irregularity involving origin of the left internal carotid artery by NASCET criteria.  4.  No radiographic evidence of dissection.   US Thyroid    Impression    IMPRESSION:    1.  Dominant right thyroid nodule meeting criteria for biopsy and fine-needle aspiration.      Nodules are characterized per  ACR Thyroid Imaging, Reporting and Data System (TI-RADS): White Paper of the ACR TI-RADS Committee  Anthony Wakefield et al. Journal of the American College of Radiology 2017. Volume 14 (2017), Issue 5, 988-250.      CT Head w/o Contrast    Impression    IMPRESSION:  1.  Since CT head 01/13/2022, a small extra-axial acute hemorrhage has developed along the anterolateral aspect of the left middle cranial fossa measuring up to 5 mm in radial thickness best visualized on axial series T2 image 16 and coronal reformatted   image 21. No mass effect on the anterior left temporal lobe. No new hemorrhage elsewhere.  2.  No definite change in the partially calcified presumed meningioma along the left anterior  paramedian frontal region with associated stable vasogenic edema and localized mass effect.  3.  Stable mild age-related changes.  4.  Findings discussed with Dr. Watson at 9:45 AM on 01/15/2022.     XR Chest 2 Views    Impression    IMPRESSION: Heart size mildly enlarged. The previous mild patchy infiltrates right lung base have resolved. No remaining significant findings.   CT Head w/o Contrast    Impression    IMPRESSION:  1.  Stable acute hyperdense extra-axial hemorrhage left middle cranial fossa without associated mass effect. This may reflect a small subdural hematoma.    2.  Stable partially mineralized extra-axial mass lesion along the anterior paramedian left frontal region, presumably related to meningioma with associated abnormal vasogenic edema left frontal lobe, also appearing stable.    3.  No new or increasing hemorrhage with presumed chronic small vessel vascular changes and brain volume loss.   Echocardiogram Complete   Result Value Ref Range    LVEF  60-65%        SIGNIFICANT LABORATORY FINDINGS     Most Recent 3 CBC's:Recent Labs   Lab Test 01/20/22  0658 01/19/22  0648 01/18/22  0709   WBC 6.6 6.6 6.8   HGB 13.0 13.0 13.6    103* 101*    138* 179     Most Recent 3 BMP's:Recent Labs   Lab Test 01/20/22  1210 01/20/22  0831 01/20/22  0658 01/19/22  1140 01/19/22  0819 01/18/22  0823 01/18/22  0709   NA  --   --  139  --  137  --  139   POTASSIUM  --   --  3.4*  --  3.7  --  3.6   CHLORIDE  --   --  106  --  107  --  108*   CO2  --   --  25  --  25  --  24   BUN  --   --  18  --  16  --  19   CR  --   --  0.85  --  0.76  --  0.82   ANIONGAP  --   --  8  --  5  --  7   ALBARO  --   --  8.7  --  8.8  --  8.7   * 82 101   < > 133*   < > 151*    < > = values in this interval not displayed.     Most Recent 2 LFT's:Recent Labs   Lab Test 01/20/22  0658 01/19/22  0819   AST 33 35   ALT 37 38   ALKPHOS 123* 110   BILITOTAL 0.6 0.7     Most Recent 3 INR's:No lab results  found.      Discharge Orders        CT Head w/o Contrast     Home Care PT Referral for Hospital Discharge      Home Care OT Referral for Hospital Discharge      Care Coordination Referral      When to call - Contact Surgeon Team    Call [phone] with the following symptoms:      *Worsening pain not relieved by the pain prescription given  *Worsening or new onset of weakness, or numbness and tingling  *Loss or change in your ability to control bowel or bladder function  *Change in your ability to walk, talk, see or think      !!!! IF YOU HAVE A SERIOUS OR THREATENING EMERGENCY, CALL 911 OR COME TO THE EMERGENCY ROOM.     QUESTIONS OR CONCERNS:   OUR OFFICE HOURS ARE FROM 9:00 A.M -4:30 P.M. MONDAY-FRIDAY.  AFTER OFFICE HOURS, CALLS ARE ANSWERED BY THE ON-CALL PROVIDER. PLEASE CALL WITH ROUTINE QUESTIONS DURING OFFICE HOURS. THE ON-CALL PROVIDER WILL NOT REFILL PRESCRIPTIONS.     Follow Up Care    The neurosurgery office will call you in 2-3 business days after discharge to schedule your follow-up appointment. You should have a provider visit scheduled with a new brain MRI in 3 months. If you have not received a call in 2-3 business days, please call our office at [phone].     Reason for your hospital stay    Syncope, meningioma, acute stroke, subdural bleeding     Activity    Your activity upon discharge: activity as tolerated     Follow-up and recommended labs and tests     Follow up with primary care provider, COURT SERVIN, within 7 days for hospital follow- up and to follow up on results.  The following labs/tests are recommended: CT head to follow up on subdural hematoma.  If it has resolved, patient should be started on Plavix 75 mg daily .     MD face to face encounter    Documentation of Face to Face and Certification for Home Health Services    I certify that patient: Josefina Dumont is under my care and that I, or a nurse practitioner or physician's assistant working with me, had a  face-to-face encounter that meets the physician face-to-face encounter requirements with this patient on: 1/19/2022.    This encounter with the patient was in whole, or in part, for the following medical condition, which is the primary reason for home health care: Syncope, new diagnosis of meningioma, stroke, subdural hematoma .    I certify that, based on my findings, the following services are medically necessary home health services: Nursing, Occupational Therapy, and Physical Therapy.    My clinical findings support the need for the above services because: Nurse is needed: BP check and complex medication management (multiple new medications) .    Further, I certify that my clinical findings support that this patient is homebound (i.e. absences from home require considerable and taxing effort and are for medical reasons or [facility] services or infrequently or of short duration when for other reasons) because: Leaving home is medically contraindicated for the following reason(s): Recent stroke and subdural hematoma ..    Based on the above findings. I certify that this patient is confined to the home and needs intermittent skilled nursing care, physical therapy and/or speech therapy.  The patient is under my care, and I have initiated the establishment of the plan of care.  This patient will be followed by a physician who will periodically review the plan of care.  Physician/Provider to provide follow up care: Rafat Lee    Attending [facility] physician (the Medicare certified [place] provider): Alysia Conn MD  Physician Signature: See electronic signature associated with these discharge orders.  Date: 1/19/2022     Diet    Follow this diet upon discharge: Orders Placed This Encounter      Consistent Carbohydrate Diet Low Consistent Carb (45 g CHO per Meal) Diet       Examination   Physical Exam   Temp:  [97.6  F (36.4  C)-98  F (36.7  C)] (P) 97.8  F (36.6  C)  Pulse:  [64-80] (P) 65  Resp:  [16-20] (P)  16  BP: (156-171)/(69-86) (P) 147/70  SpO2:  [94 %-97 %] (P) 96 %  Wt Readings from Last 1 Encounters:   01/13/22 106.4 kg (234 lb 8 oz)       General Appearance:    Alert, cooperative, no distress, obese female   Chest:    TTP over right anterior chest, lungs clear bilaterally    Cardiovascular:    Regular rate ands rhythm.  Nornal S1, S2.  No murmur, rub or gallop.  No edema   Abdomen:     Soft, non-tender, bowel sounds active all four quadrants,     no masses, no organomegaly   Neurologic:   Awake, alert, oriented x 3.  Moves all extremities         Please see EMR for more detailed significant labs, imaging, consultant notes etc.    I, Alysia Conn MD, personally saw the patient today and spent greater than 30 minutes discharging this patient.    Alysia Conn MD  [facility]    CC:Rafat Lee

## 2022-01-20 NOTE — PLAN OF CARE
Problem: Adult Inpatient Plan of Care  Goal: Plan of Care Review  1/20/2022 1257 by Saray Puri RN  Outcome: Completed  Flowsheets (Taken 1/20/2022 1257)  Plan of Care Reviewed With:   patient   sibling  Outcome Summary: discharging to home  Progress: improving  1/20/2022 0955 by Saray Puri RN  Outcome: Improving  Flowsheets (Taken 1/20/2022 0955)  Plan of Care Reviewed With: patient  Progress: improving   Patient discharging to home with home care RN and therapy. Sister Orin will be picking her up about 1330 today. Reviewed AVS including medications, follow up appointments and when to call the provider. Addressed all questions. Included handouts for all new medications. Patient feels ready to go home and is stable for discharge. Also called the sister Orin and discussed the AVS instructions with her. Will send patient will all of her belongings.     Saray Puri RN 1/20/2022 1:01 PM

## 2022-01-20 NOTE — PLAN OF CARE
"  Problem: Adult Inpatient Plan of Care  Goal: Optimal Comfort and Wellbeing  Outcome: Improving     Problem: Risk for Delirium  Goal: Optimal Coping  Outcome: Improving    Pt pleasant and cooperative with cares. Stand-by assist up to bathroom, walking in halls with therapy, tolerating well. Ate 100% of meals. Pt reports continued pain to right chest, scheduled tylenol and lidocaine cream given, pt reports effective. This afternoon, pt reported new pain to left arm, \"it might just be anxiety\". Vitals stable, /73. MD notified and saw pt, but pain resolved shortly thereafter. No further complaints this evening.  "

## 2022-01-20 NOTE — PLAN OF CARE
Problem: Hypertension Acute  Goal: Blood Pressure Within Desired Range  Outcome: No Change, BP remains High despite taking sched BP med, Hydralazine po tab  Intervention: Normalize Blood Pressure  Flowsheets  Taken 1/20/2022 0413  Sensory Stimulation Regulation:   lighting decreased   care clustered   tactile stimulation minimized   visual stimulation minimized   quiet environment promoted   auditory stimulation minimized  Medication Review/Management: medications reviewed    Problem: Pain Acute  Goal: Acceptable Pain Control and Functional Ability  Outcome: Improving back pain, only taking a lidocaine patch and sched po tylenol which are effective in some way  Intervention: Develop Pain Management Plan  Flowsheets (Taken 1/20/2022 0413)  Pain Management Interventions: medication (see MAR)  Intervention: Prevent or Manage Pain  Flowsheets  Taken 1/20/2022 0413  Sensory Stimulation Regulation:   lighting decreased   care clustered   tactile stimulation minimized   visual stimulation minimized   quiet environment promoted   auditory stimulation minimized  Sleep/Rest Enhancement:   relaxation techniques promoted   room darkened   noise level reduced   awakenings minimized  Bowel Elimination Promotion:   ambulation promoted   adequate fluid intake promoted  Medication Review/Management: medications reviewed     Problem: Fall Injury Risk  Goal: Absence of Fall and Fall-Related Injury  Outcome: Improving  Intervention: Identify and Manage Contributors  Flowsheets  Taken 1/20/2022 0413  Self-Care Promotion: safe use of adaptive equipment encouraged  Medication Review/Management: medications reviewed  Self-Care Promotion: independence encouraged    Intervention: Promote Injury-Free Environment  Flowsheets  Taken 1/20/2022 0413  Safety Promotion/Fall Prevention:   activity supervised   mobility aid in reach   nonskid shoes/slippers when out of bed   patient and family education   fall prevention program maintained    clutter free environment maintained   bed alarm on   chair alarm on   assistive device/personal items within reach

## 2022-01-21 ENCOUNTER — TELEPHONE (OUTPATIENT)
Dept: NEUROSURGERY | Facility: CLINIC | Age: 72
End: 2022-01-21
Payer: COMMERCIAL

## 2022-01-21 NOTE — TELEPHONE ENCOUNTER
Phoned in Valium 5 mg #2 to take 1 tab PO 1 hour prior to MRI, may repeat another 1 tab 30 minutes prior to test prn.  Michelle Leos RN, CNRN

## 2022-01-21 NOTE — TELEPHONE ENCOUNTER
Patient would like oral sedation prescribed to take prior to MRI due to claustrophobia. Please send to [facility] [phone].

## 2022-01-25 ENCOUNTER — HOSPITAL ENCOUNTER (OUTPATIENT)
Dept: CT IMAGING | Facility: HOSPITAL | Age: 72
Discharge: HOME | End: 2022-01-25
Attending: INTERNAL MEDICINE | Admitting: INTERNAL MEDICINE
Payer: COMMERCIAL

## 2022-01-25 DIAGNOSIS — S06.5XAA SDH (SUBDURAL HEMATOMA) (H): ICD-10-CM

## 2022-01-25 PROCEDURE — 70450 CT HEAD/BRAIN W/O DYE: CPT

## 2022-02-02 ENCOUNTER — LAB REQUISITION (OUTPATIENT)
Dept: LAB | Facility: CLINIC | Age: 72
End: 2022-02-02
Payer: COMMERCIAL

## 2022-02-02 DIAGNOSIS — R82.2 BILIURIA: ICD-10-CM

## 2022-02-02 DIAGNOSIS — R82.998 OTHER ABNORMAL FINDINGS IN URINE: ICD-10-CM

## 2022-02-02 LAB
ALBUMIN SERPL-MCNC: 3.1 G/DL (ref 3.5–5)
ALP SERPL-CCNC: 170 U/L (ref 45–120)
ALT SERPL W P-5'-P-CCNC: 24 U/L (ref 0–45)
ANION GAP SERPL CALCULATED.3IONS-SCNC: 11 MMOL/L (ref 5–18)
AST SERPL W P-5'-P-CCNC: 25 U/L (ref 0–40)
BILIRUB SERPL-MCNC: 0.7 MG/DL (ref 0–1)
BUN SERPL-MCNC: 14 MG/DL (ref 8–28)
CALCIUM SERPL-MCNC: 9 MG/DL (ref 8.5–10.5)
CHLORIDE BLD-SCNC: 106 MMOL/L (ref 98–107)
CO2 SERPL-SCNC: 20 MMOL/L (ref 22–31)
CREAT SERPL-MCNC: 0.91 MG/DL (ref 0.6–1.1)
GFR SERPL CREATININE-BSD FRML MDRD: 68 ML/MIN/1.73M2
GLUCOSE BLD-MCNC: 128 MG/DL (ref 70–125)
POTASSIUM BLD-SCNC: 3.4 MMOL/L (ref 3.5–5)
PROT SERPL-MCNC: 6.8 G/DL (ref 6–8)
SODIUM SERPL-SCNC: 137 MMOL/L (ref 136–145)

## 2022-02-02 PROCEDURE — 87086 URINE CULTURE/COLONY COUNT: CPT | Mod: ORL | Performed by: NURSE PRACTITIONER

## 2022-02-02 PROCEDURE — 80053 COMPREHEN METABOLIC PANEL: CPT | Mod: ORL | Performed by: NURSE PRACTITIONER

## 2022-02-04 ENCOUNTER — APPOINTMENT (OUTPATIENT)
Dept: RADIOLOGY | Facility: HOSPITAL | Age: 72
End: 2022-02-04
Attending: EMERGENCY MEDICINE
Payer: COMMERCIAL

## 2022-02-04 ENCOUNTER — HOSPITAL ENCOUNTER (OUTPATIENT)
Facility: HOSPITAL | Age: 72
Setting detail: OBSERVATION
Discharge: HOME | End: 2022-02-05
Attending: EMERGENCY MEDICINE | Admitting: STUDENT IN AN ORGANIZED HEALTH CARE EDUCATION/TRAINING PROGRAM
Payer: COMMERCIAL

## 2022-02-04 ENCOUNTER — APPOINTMENT (OUTPATIENT)
Dept: CT IMAGING | Facility: HOSPITAL | Age: 72
End: 2022-02-04
Attending: EMERGENCY MEDICINE
Payer: COMMERCIAL

## 2022-02-04 DIAGNOSIS — R50.9 FEBRILE ILLNESS, ACUTE: ICD-10-CM

## 2022-02-04 DIAGNOSIS — N39.0 URINARY TRACT INFECTION WITHOUT HEMATURIA, SITE UNSPECIFIED: ICD-10-CM
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
## 2022-02-04 LAB
ALBUMIN SERPL-MCNC: 3.1 G/DL (ref 3.5–5)
ALBUMIN UR-MCNC: 30 MG/DL
ALP SERPL-CCNC: 206 U/L (ref 45–120)
ALT SERPL W P-5'-P-CCNC: 33 U/L (ref 0–45)
ANION GAP SERPL CALCULATED.3IONS-SCNC: 15 MMOL/L (ref 5–18)
APPEARANCE UR: CLEAR
APTT PPP: 35 SECONDS (ref 22–38)
AST SERPL W P-5'-P-CCNC: 51 U/L (ref 0–40)
BACTERIA #/AREA URNS HPF: ABNORMAL /HPF
BACTERIA UR CULT: NORMAL
BASOPHILS # BLD AUTO: 0 10E3/UL (ref 0–0.2)
BASOPHILS NFR BLD AUTO: 0 %
BILIRUB DIRECT SERPL-MCNC: 0.2 MG/DL
BILIRUB SERPL-MCNC: 0.7 MG/DL (ref 0–1)
BILIRUB UR QL STRIP: NEGATIVE
BNP SERPL-MCNC: 184 PG/ML (ref 0–120)
BUN SERPL-MCNC: 16 MG/DL (ref 8–28)
CALCIUM SERPL-MCNC: 9 MG/DL (ref 8.5–10.5)
CHLORIDE BLD-SCNC: 103 MMOL/L (ref 98–107)
CO2 SERPL-SCNC: 19 MMOL/L (ref 22–31)
COLOR UR AUTO: YELLOW
CREAT SERPL-MCNC: 0.99 MG/DL (ref 0.6–1.1)
EOSINOPHIL # BLD AUTO: 0.1 10E3/UL (ref 0–0.7)
EOSINOPHIL NFR BLD AUTO: 3 %
ERYTHROCYTE [DISTWIDTH] IN BLOOD BY AUTOMATED COUNT: 13 % (ref 10–15)
FLUAV RNA SPEC QL NAA+PROBE: NEGATIVE
FLUBV RNA RESP QL NAA+PROBE: NEGATIVE
GFR SERPL CREATININE-BSD FRML MDRD: 61 ML/MIN/1.73M2
GLUCOSE BLD-MCNC: 154 MG/DL (ref 70–125)
GLUCOSE BLDC GLUCOMTR-MCNC: 218 MG/DL (ref 70–99)
GLUCOSE UR STRIP-MCNC: NEGATIVE MG/DL
HCT VFR BLD AUTO: 36.7 % (ref 35–47)
HGB BLD-MCNC: 12.1 G/DL (ref 11.7–15.7)
HGB UR QL STRIP: NEGATIVE
HOLD SPECIMEN: NORMAL
HYALINE CASTS: 1 /LPF
IMM GRANULOCYTES # BLD: 0 10E3/UL
IMM GRANULOCYTES NFR BLD: 0 %
INR PPP: 1.15 (ref 0.85–1.15)
KETONES UR STRIP-MCNC: ABNORMAL MG/DL
LACTATE SERPL-SCNC: 1.5 MMOL/L (ref 0.7–2)
LEUKOCYTE ESTERASE UR QL STRIP: ABNORMAL
LYMPHOCYTES # BLD AUTO: 0.3 10E3/UL (ref 0.8–5.3)
LYMPHOCYTES NFR BLD AUTO: 5 %
MCH RBC QN AUTO: 32.1 PG (ref 26.5–33)
MCHC RBC AUTO-ENTMCNC: 33 G/DL (ref 31.5–36.5)
MCV RBC AUTO: 97 FL (ref 78–100)
MONOCYTES # BLD AUTO: 0.3 10E3/UL (ref 0–1.3)
MONOCYTES NFR BLD AUTO: 6 %
MUCOUS THREADS #/AREA URNS LPF: PRESENT /LPF
NEUTROPHILS # BLD AUTO: 4.4 10E3/UL (ref 1.6–8.3)
NEUTROPHILS NFR BLD AUTO: 86 %
NITRATE UR QL: NEGATIVE
NRBC # BLD AUTO: 0 10E3/UL
NRBC BLD AUTO-RTO: 0 /100
PH UR STRIP: 5.5 [PH] (ref 5–7)
PLATELET # BLD AUTO: 156 10E3/UL (ref 150–450)
POTASSIUM BLD-SCNC: 3.5 MMOL/L (ref 3.5–5)
PROT SERPL-MCNC: 7.4 G/DL (ref 6–8)
RBC # BLD AUTO: 3.77 10E6/UL (ref 3.8–5.2)
RBC URINE: 2 /HPF
SARS-COV-2 RNA RESP QL NAA+PROBE: NEGATIVE
SODIUM SERPL-SCNC: 137 MMOL/L (ref 136–145)
SP GR UR STRIP: 1.02 (ref 1–1.03)
SQUAMOUS EPITHELIAL: 1 /HPF
UROBILINOGEN UR STRIP-MCNC: 3 MG/DL
WBC # BLD AUTO: 5.1 10E3/UL (ref 4–11)
WBC URINE: 11 /HPF

## 2022-02-04 PROCEDURE — G0378 HOSPITAL OBSERVATION PER HR: HCPCS

## 2022-02-04 PROCEDURE — 87636 SARSCOV2 & INF A&B AMP PRB: CPT | Performed by: EMERGENCY MEDICINE

## 2022-02-04 PROCEDURE — 70450 CT HEAD/BRAIN W/O DYE: CPT

## 2022-02-04 PROCEDURE — 36415 COLL VENOUS BLD VENIPUNCTURE: CPT | Performed by: EMERGENCY MEDICINE

## 2022-02-04 PROCEDURE — 80053 COMPREHEN METABOLIC PANEL: CPT | Performed by: EMERGENCY MEDICINE

## 2022-02-04 PROCEDURE — C9803 HOPD COVID-19 SPEC COLLECT: HCPCS

## 2022-02-04 PROCEDURE — 258N000003 HC RX IP 258 OP 636: Performed by: EMERGENCY MEDICINE

## 2022-02-04 PROCEDURE — 81001 URINALYSIS AUTO W/SCOPE: CPT | Performed by: EMERGENCY MEDICINE

## 2022-02-04 PROCEDURE — 99218 PR INITIAL OBSERVATION CARE,LEVEL I: CPT | Performed by: STUDENT IN AN ORGANIZED HEALTH CARE EDUCATION/TRAINING PROGRAM

## 2022-02-04 PROCEDURE — 83880 ASSAY OF NATRIURETIC PEPTIDE: CPT | Mod: GZ | Performed by: EMERGENCY MEDICINE

## 2022-02-04 PROCEDURE — 83605 ASSAY OF LACTIC ACID: CPT | Performed by: EMERGENCY MEDICINE

## 2022-02-04 PROCEDURE — 36415 COLL VENOUS BLD VENIPUNCTURE: CPT | Performed by: STUDENT IN AN ORGANIZED HEALTH CARE EDUCATION/TRAINING PROGRAM

## 2022-02-04 PROCEDURE — 85025 COMPLETE CBC W/AUTO DIFF WBC: CPT | Performed by: EMERGENCY MEDICINE

## 2022-02-04 PROCEDURE — 99285 EMERGENCY DEPT VISIT HI MDM: CPT | Mod: 25

## 2022-02-04 PROCEDURE — 250N000011 HC RX IP 250 OP 636: Performed by: EMERGENCY MEDICINE

## 2022-02-04 PROCEDURE — 96374 THER/PROPH/DIAG INJ IV PUSH: CPT

## 2022-02-04 PROCEDURE — 87086 URINE CULTURE/COLONY COUNT: CPT | Performed by: EMERGENCY MEDICINE

## 2022-02-04 PROCEDURE — 250N000013 HC RX MED GY IP 250 OP 250 PS 637: Performed by: EMERGENCY MEDICINE

## 2022-02-04 PROCEDURE — 71045 X-RAY EXAM CHEST 1 VIEW: CPT

## 2022-02-04 PROCEDURE — 85730 THROMBOPLASTIN TIME PARTIAL: CPT | Mod: GZ | Performed by: EMERGENCY MEDICINE

## 2022-02-04 PROCEDURE — 87040 BLOOD CULTURE FOR BACTERIA: CPT | Performed by: EMERGENCY MEDICINE

## 2022-02-04 PROCEDURE — 82248 BILIRUBIN DIRECT: CPT | Performed by: EMERGENCY MEDICINE

## 2022-02-04 PROCEDURE — 82962 GLUCOSE BLOOD TEST: CPT

## 2022-02-04 PROCEDURE — 85610 PROTHROMBIN TIME: CPT | Performed by: EMERGENCY MEDICINE

## 2022-02-04 RX ORDER — HYDROCHLOROTHIAZIDE 25 MG/1
25 TABLET ORAL DAILY
Status: DISCONTINUED | OUTPATIENT
Start: 2022-02-05 | End: 2022-02-05 | Stop reason: HOSPADM

## 2022-02-04 RX ORDER — ONDANSETRON 4 MG/1
4 TABLET, ORALLY DISINTEGRATING ORAL EVERY 6 HOURS PRN
Status: DISCONTINUED | OUTPATIENT
Start: 2022-02-04 | End: 2022-02-05 | Stop reason: HOSPADM

## 2022-02-04 RX ORDER — ONDANSETRON 2 MG/ML
4 INJECTION INTRAMUSCULAR; INTRAVENOUS EVERY 6 HOURS PRN
Status: DISCONTINUED | OUTPATIENT
Start: 2022-02-04 | End: 2022-02-05 | Stop reason: HOSPADM

## 2022-02-04 RX ORDER — SODIUM CHLORIDE 9 MG/ML
INJECTION, SOLUTION INTRAVENOUS ONCE
Status: COMPLETED | OUTPATIENT
Start: 2022-02-04 | End: 2022-02-04

## 2022-02-04 RX ORDER — LISINOPRIL 20 MG/1
20 TABLET ORAL DAILY
Status: DISCONTINUED | OUTPATIENT
Start: 2022-02-05 | End: 2022-02-05 | Stop reason: HOSPADM

## 2022-02-04 RX ORDER — PANTOPRAZOLE SODIUM 40 MG/1
40 TABLET, DELAYED RELEASE ORAL
Status: DISCONTINUED | OUTPATIENT
Start: 2022-02-05 | End: 2022-02-05 | Stop reason: HOSPADM

## 2022-02-04 RX ORDER — NICOTINE POLACRILEX 4 MG
15-30 LOZENGE BUCCAL
Status: DISCONTINUED | OUTPATIENT
Start: 2022-02-04 | End: 2022-02-05 | Stop reason: HOSPADM

## 2022-02-04 RX ORDER — ATORVASTATIN CALCIUM 10 MG/1
20 TABLET, FILM COATED ORAL EVERY MORNING
Status: DISCONTINUED | OUTPATIENT
Start: 2022-02-05 | End: 2022-02-05 | Stop reason: HOSPADM

## 2022-02-04 RX ORDER — FOLIC ACID 1 MG/1
1 TABLET ORAL DAILY
Status: DISCONTINUED | OUTPATIENT
Start: 2022-02-05 | End: 2022-02-05 | Stop reason: HOSPADM

## 2022-02-04 RX ORDER — PROCHLORPERAZINE MALEATE 5 MG
5 TABLET ORAL EVERY 6 HOURS PRN
Status: DISCONTINUED | OUTPATIENT
Start: 2022-02-04 | End: 2022-02-05 | Stop reason: HOSPADM

## 2022-02-04 RX ORDER — ACETAMINOPHEN 650 MG/1
650 SUPPOSITORY RECTAL EVERY 6 HOURS PRN
Status: DISCONTINUED | OUTPATIENT
Start: 2022-02-04 | End: 2022-02-05 | Stop reason: HOSPADM

## 2022-02-04 RX ORDER — ACETAMINOPHEN 325 MG/1
650 TABLET ORAL ONCE
Status: COMPLETED | OUTPATIENT
Start: 2022-02-04 | End: 2022-02-04

## 2022-02-04 RX ORDER — DEXTROSE MONOHYDRATE 25 G/50ML
25-50 INJECTION, SOLUTION INTRAVENOUS
Status: DISCONTINUED | OUTPATIENT
Start: 2022-02-04 | End: 2022-02-05 | Stop reason: HOSPADM

## 2022-02-04 RX ORDER — HYDRALAZINE HYDROCHLORIDE 25 MG/1
25 TABLET, FILM COATED ORAL 3 TIMES DAILY
Status: DISCONTINUED | OUTPATIENT
Start: 2022-02-04 | End: 2022-02-04

## 2022-02-04 RX ORDER — CLOPIDOGREL BISULFATE 75 MG/1
75 TABLET ORAL DAILY
Status: DISCONTINUED | OUTPATIENT
Start: 2022-02-05 | End: 2022-02-05 | Stop reason: HOSPADM

## 2022-02-04 RX ORDER — NITROFURANTOIN 25; 75 MG/1; MG/1
100 CAPSULE ORAL 2 TIMES DAILY
Status: ON HOLD | COMMUNITY
Start: 2022-02-02 | End: 2022-02-05

## 2022-02-04 RX ORDER — PROCHLORPERAZINE 25 MG
12.5 SUPPOSITORY, RECTAL RECTAL EVERY 12 HOURS PRN
Status: DISCONTINUED | OUTPATIENT
Start: 2022-02-04 | End: 2022-02-05 | Stop reason: HOSPADM

## 2022-02-04 RX ORDER — CEFTRIAXONE 1 G/1
1 INJECTION, POWDER, FOR SOLUTION INTRAMUSCULAR; INTRAVENOUS ONCE
Status: COMPLETED | OUTPATIENT
Start: 2022-02-04 | End: 2022-02-05

## 2022-02-04 RX ORDER — CALCIUM CARBONATE 500 MG/1
500 TABLET, CHEWABLE ORAL 2 TIMES DAILY PRN
Status: DISCONTINUED | OUTPATIENT
Start: 2022-02-04 | End: 2022-02-05 | Stop reason: HOSPADM

## 2022-02-04 RX ORDER — GLIMEPIRIDE 4 MG/1
2 TABLET ORAL EVERY MORNING
COMMUNITY
End: 2022-08-30

## 2022-02-04 RX ORDER — MULTIPLE VITAMINS W/ MINERALS TAB 9MG-400MCG
1 TAB ORAL DAILY
Status: DISCONTINUED | OUTPATIENT
Start: 2022-02-05 | End: 2022-02-05 | Stop reason: HOSPADM

## 2022-02-04 RX ORDER — CEFTRIAXONE 1 G/1
1 INJECTION, POWDER, FOR SOLUTION INTRAMUSCULAR; INTRAVENOUS EVERY 24 HOURS
Status: DISCONTINUED | OUTPATIENT
Start: 2022-02-05 | End: 2022-02-05

## 2022-02-04 RX ORDER — CLOPIDOGREL BISULFATE 75 MG/1
75 TABLET ORAL DAILY
COMMUNITY
Start: 2022-01-26

## 2022-02-04 RX ORDER — AMLODIPINE BESYLATE 5 MG/1
10 TABLET ORAL DAILY
Status: DISCONTINUED | OUTPATIENT
Start: 2022-02-05 | End: 2022-02-05 | Stop reason: HOSPADM

## 2022-02-04 RX ORDER — CITALOPRAM HYDROBROMIDE 20 MG/1
40 TABLET ORAL DAILY
Status: DISCONTINUED | OUTPATIENT
Start: 2022-02-05 | End: 2022-02-05 | Stop reason: HOSPADM

## 2022-02-04 RX ORDER — ACETAMINOPHEN 325 MG/1
650 TABLET ORAL EVERY 6 HOURS PRN
Status: DISCONTINUED | OUTPATIENT
Start: 2022-02-04 | End: 2022-02-05 | Stop reason: HOSPADM

## 2022-02-04 RX ADMIN — ACETAMINOPHEN 650 MG: 325 TABLET ORAL at 19:33

## 2022-02-04 RX ADMIN — SODIUM CHLORIDE: 9 INJECTION, SOLUTION INTRAVENOUS at 22:46

## 2022-02-04 RX ADMIN — CEFTRIAXONE SODIUM 1 G: 1 INJECTION, POWDER, FOR SOLUTION INTRAMUSCULAR; INTRAVENOUS at 21:50

## 2022-02-04 ASSESSMENT — MIFFLIN-ST. JEOR: SCORE: 1541

## 2022-02-04 ASSESSMENT — ACTIVITIES OF DAILY LIVING (ADL): DEPENDENT_IADLS:: INDEPENDENT

## 2022-02-05 ENCOUNTER — APPOINTMENT (OUTPATIENT)
Dept: PHYSICAL THERAPY | Facility: HOSPITAL | Age: 72
End: 2022-02-05
Attending: STUDENT IN AN ORGANIZED HEALTH CARE EDUCATION/TRAINING PROGRAM
Payer: COMMERCIAL

## 2022-02-05 ENCOUNTER — APPOINTMENT (OUTPATIENT)
Dept: OCCUPATIONAL THERAPY | Facility: HOSPITAL | Age: 72
End: 2022-02-05
Attending: STUDENT IN AN ORGANIZED HEALTH CARE EDUCATION/TRAINING PROGRAM
Payer: COMMERCIAL

## 2022-02-05 VITALS
RESPIRATION RATE: 18 BRPM | DIASTOLIC BLOOD PRESSURE: 56 MMHG | BODY MASS INDEX: 38.99 KG/M2 | HEART RATE: 58 BPM | TEMPERATURE: 98.1 F | OXYGEN SATURATION: 92 % | SYSTOLIC BLOOD PRESSURE: 119 MMHG | HEIGHT: 64 IN | WEIGHT: 228.4 LBS

## 2022-02-05 LAB
C DIFF TOX B STL QL: NEGATIVE
ERYTHROCYTE [DISTWIDTH] IN BLOOD BY AUTOMATED COUNT: 13.2 % (ref 10–15)
GLUCOSE BLDC GLUCOMTR-MCNC: 125 MG/DL (ref 70–99)
GLUCOSE BLDC GLUCOMTR-MCNC: 125 MG/DL (ref 70–99)
GLUCOSE BLDC GLUCOMTR-MCNC: 167 MG/DL (ref 70–99)
GLUCOSE BLDC GLUCOMTR-MCNC: 42 MG/DL (ref 70–99)
GLUCOSE BLDC GLUCOMTR-MCNC: 61 MG/DL (ref 70–99)
HCT VFR BLD AUTO: 35 % (ref 35–47)
HGB BLD-MCNC: 11.2 G/DL (ref 11.7–15.7)
MCH RBC QN AUTO: 32 PG (ref 26.5–33)
MCHC RBC AUTO-ENTMCNC: 32 G/DL (ref 31.5–36.5)
MCV RBC AUTO: 100 FL (ref 78–100)
PLATELET # BLD AUTO: 128 10E3/UL (ref 150–450)
RBC # BLD AUTO: 3.5 10E6/UL (ref 3.8–5.2)
WBC # BLD AUTO: 3.3 10E3/UL (ref 4–11)

## 2022-02-05 PROCEDURE — 99217 PR OBSERVATION CARE DISCHARGE: CPT | Performed by: INTERNAL MEDICINE

## 2022-02-05 PROCEDURE — 82962 GLUCOSE BLOOD TEST: CPT | Mod: 91

## 2022-02-05 PROCEDURE — 97165 OT EVAL LOW COMPLEX 30 MIN: CPT | Mod: GO

## 2022-02-05 PROCEDURE — 97535 SELF CARE MNGMENT TRAINING: CPT | Mod: GO

## 2022-02-05 PROCEDURE — 97116 GAIT TRAINING THERAPY: CPT | Mod: GP

## 2022-02-05 PROCEDURE — G0378 HOSPITAL OBSERVATION PER HR: HCPCS

## 2022-02-05 PROCEDURE — 36415 COLL VENOUS BLD VENIPUNCTURE: CPT | Performed by: STUDENT IN AN ORGANIZED HEALTH CARE EDUCATION/TRAINING PROGRAM

## 2022-02-05 PROCEDURE — 82962 GLUCOSE BLOOD TEST: CPT

## 2022-02-05 PROCEDURE — 87493 C DIFF AMPLIFIED PROBE: CPT | Performed by: STUDENT IN AN ORGANIZED HEALTH CARE EDUCATION/TRAINING PROGRAM

## 2022-02-05 PROCEDURE — 250N000013 HC RX MED GY IP 250 OP 250 PS 637: Performed by: INTERNAL MEDICINE

## 2022-02-05 PROCEDURE — 97161 PT EVAL LOW COMPLEX 20 MIN: CPT | Mod: GP

## 2022-02-05 PROCEDURE — 85027 COMPLETE CBC AUTOMATED: CPT | Performed by: STUDENT IN AN ORGANIZED HEALTH CARE EDUCATION/TRAINING PROGRAM

## 2022-02-05 PROCEDURE — 250N000013 HC RX MED GY IP 250 OP 250 PS 637: Performed by: STUDENT IN AN ORGANIZED HEALTH CARE EDUCATION/TRAINING PROGRAM

## 2022-02-05 RX ORDER — CEFTRIAXONE 1 G/1
1 INJECTION, POWDER, FOR SOLUTION INTRAMUSCULAR; INTRAVENOUS EVERY 24 HOURS
Status: DISCONTINUED | OUTPATIENT
Start: 2022-02-05 | End: 2022-02-05 | Stop reason: ALTCHOICE

## 2022-02-05 RX ORDER — CEFDINIR 300 MG/1
300 CAPSULE ORAL EVERY 12 HOURS
Qty: 6 CAPSULE | Refills: 0 | Status: SHIPPED | OUTPATIENT
Start: 2022-02-05 | End: 2022-02-05

## 2022-02-05 RX ORDER — CEFDINIR 300 MG/1
300 CAPSULE ORAL EVERY 12 HOURS
Qty: 6 CAPSULE | Refills: 0 | Status: SHIPPED | OUTPATIENT
Start: 2022-02-05 | End: 2022-03-29

## 2022-02-05 RX ORDER — CEFDINIR 300 MG/1
300 CAPSULE ORAL EVERY 12 HOURS SCHEDULED
Status: DISCONTINUED | OUTPATIENT
Start: 2022-02-05 | End: 2022-02-05 | Stop reason: HOSPADM

## 2022-02-05 RX ADMIN — ATORVASTATIN CALCIUM 20 MG: 10 TABLET, FILM COATED ORAL at 08:53

## 2022-02-05 RX ADMIN — CLOPIDOGREL BISULFATE 75 MG: 75 TABLET ORAL at 08:53

## 2022-02-05 RX ADMIN — Medication 1 TABLET: at 08:53

## 2022-02-05 RX ADMIN — CITALOPRAM HYDROBROMIDE 40 MG: 20 TABLET ORAL at 08:53

## 2022-02-05 RX ADMIN — AMLODIPINE BESYLATE 10 MG: 5 TABLET ORAL at 08:53

## 2022-02-05 RX ADMIN — LISINOPRIL 20 MG: 20 TABLET ORAL at 08:53

## 2022-02-05 RX ADMIN — PANTOPRAZOLE SODIUM 40 MG: 40 TABLET, DELAYED RELEASE ORAL at 06:24

## 2022-02-05 RX ADMIN — FOLIC ACID 1 MG: 1 TABLET ORAL at 08:53

## 2022-02-05 RX ADMIN — Medication 100 MG: at 08:54

## 2022-02-05 RX ADMIN — CEFDINIR 300 MG: 300 CAPSULE ORAL at 09:40

## 2022-02-05 NOTE — ED PROVIDER NOTES
Emergency Department Encounter     Evaluation Date & Time:   2022  6:01 PM    CHIEF COMPLAINT:  Generalized Weakness      Triage Note:No notes on file      Impression and Plan       FINAL IMPRESSION:    ICD-10-CM    1. Febrile illness, acute  R50.9    2. Urinary tract infection without hematuria, site unspecified  N39.0          ED COURSE & MEDICAL DECISION MAKIN:02 PM I met with the patient, obtained history, performed an initial exam, and discussed options and plan for diagnostics and treatment here in the ED.   8:22 PM I spoke with Dr. Izaguirre, hospitalist.    71 year old female, history of meningioma, DM2, HTN and recent hospitalization for stroke and small SDH, who presents for evaluation of generalized weakness associated with chills since yesterday. She was seen at clinic 2 days ago for darker than normal urine and was started on Macrobid. She did vomit once yesterday and has had 2-3 episodes of diarrhea today; no associated abdominal pain.    She had a headache this morning with the chills, but denies headache currently. She also reports some chest pain only with movement, which she attributes to a recent fall while she was in the hospital; no associated SOB.     Patient febrile on presentation to 101.2 for which she was given Tylenol.    Patient placed on cardiac monitor, IV access established and blood sent for labs.    Labs remarkable for no leukocytosis (WBC 5.1) with normal lactate (1.5).  Urine is suggestive of possible infection with 25 LE, 11 WBC and few bacteria; blood and urine cultures pending and patient was given a dose of IV ceftriaxone.    Labs otherwise remarkable for no significant electrolyte derangements or renal impairment.  She does have elevated alk phos (206) with unremarkable liver enzymes and bilirubin.    COVID and influenza tests negative.    CXR performed and demonstrated mild cardiomegaly, likely unchanged; pulmonary vessels normal; lungs appear clear.    Given  headache this morning with recent stroke and SDH, head CT performed and demonstrated:  1.  Resolution of previously seen subdural hemorrhage adjacent to the left temporal lobe. No new intracranial hemorrhage.  2.  Unchanged presumed meningioma over the anterior left frontal lobe with adjacent parenchymal edema.    Patient admitted to the hospitalist service for further management and monitoring.  Patient hemodynamically stable throughout course.      At the conclusion of the encounter I discussed the results of all the tests and the disposition. The questions were answered. The patient acknowledged understanding and was agreeable with the care plan.      MEDICATIONS GIVEN IN THE EMERGENCY DEPARTMENT:  Medications   amLODIPine (NORVASC) tablet 10 mg (has no administration in time range)   atorvastatin (LIPITOR) tablet 20 mg (has no administration in time range)   calcium carbonate (TUMS) chewable tablet 500 mg (has no administration in time range)   citalopram (celeXA) tablet 40 mg (has no administration in time range)   clopidogrel (PLAVIX) tablet 75 mg (has no administration in time range)   folic acid (FOLVITE) tablet 1 mg (has no administration in time range)   hydrochlorothiazide (HYDRODIURIL) tablet 25 mg ( Oral Automatically Held 2/8/22 0900)   lisinopril (ZESTRIL) tablet 20 mg (has no administration in time range)   multivitamin w/minerals (THERA-VIT-M) tablet 1 tablet (has no administration in time range)   pantoprazole (PROTONIX) EC tablet 40 mg (40 mg Oral Given 2/5/22 0624)   thiamine (B-1) tablet 100 mg (has no administration in time range)   melatonin tablet 1 mg (has no administration in time range)   ondansetron (ZOFRAN-ODT) ODT tab 4 mg (has no administration in time range)     Or   ondansetron (ZOFRAN) injection 4 mg (has no administration in time range)   acetaminophen (TYLENOL) tablet 650 mg (has no administration in time range)     Or   acetaminophen (TYLENOL) Suppository 650 mg (has no  administration in time range)   prochlorperazine (COMPAZINE) injection 5 mg (has no administration in time range)     Or   prochlorperazine (COMPAZINE) tablet 5 mg (has no administration in time range)     Or   prochlorperazine (COMPAZINE) suppository 12.5 mg (has no administration in time range)   glucose gel 15-30 g (has no administration in time range)     Or   dextrose 50 % injection 25-50 mL (has no administration in time range)     Or   glucagon injection 1 mg (has no administration in time range)   insulin aspart (NovoLOG) injection (RAPID ACTING) (has no administration in time range)   insulin aspart (NovoLOG) injection (RAPID ACTING) (1 Units Subcutaneous Not Given 2/5/22 0039)   cefdinir (OMNICEF) capsule 300 mg (has no administration in time range)   acetaminophen (TYLENOL) tablet 650 mg (650 mg Oral Given 2/4/22 1933)   cefTRIAXone (ROCEPHIN) 1 g vial to attach to  mL bag for ADULTS or NS 50 mL bag for PEDS (1 g Intravenous New Bag 2/4/22 2150)   sodium chloride 0.9% infusion (0 mLs Intravenous Stopped 2/4/22 2259)       NEW PRESCRIPTIONS STARTED AT TODAY'S ED VISIT:  Current Discharge Medication List          HPI     HPI     Josefina Dumont is a 71 year old female, history of meningioma, DM2, HTN and recent hospitalization for stroke, who presents to this ED via EMS for evaluation of generalized weakness. She was seen at clinic on Wednesday (2 days ago) for darker than normal colored urine for which she was started on an antibiotic. She then developed chills / rigors yesterday and they continued today associated with generalized weakness. She did vomit once yesterday and has had 2-3 episodes of diarrhea today; no associated abdominal pain.    She had a headache this morning with the chills, but denies headache currently. She also reports some chest pain only with movement, which she attributes to a recent fall while she was in the hospital; no associated SOB.     Per chart review: Patient  "admitted to [facility] 1/12/2022-1/20/2022 (8 days) for syncope. Patient found with left frontal convexity meningioma with associated vasogenic edema and ischemic CVA. She was started on dexamethasone, statin, and Plavix. Tumor board conference on 1/17/2022 discussed 3 months follow-up for repeat MRI and potential radiation. Echocardiogram demonstrated mild to moderate mitral stenosis, outpatient follow-up with cardiology Dr. Hess was recommended, LUC will be considered. Patient was diagnosed with subdural hemorrhage and CVA, was evaluated by PT and OT, was discharged home with home care.      REVIEW OF SYSTEMS:  All other systems reviewed and are negative.      Medical History     No past medical history on file.    No past surgical history on file.    No family history on file.    Social History     Tobacco Use     Smoking status: Former Smoker     Quit date: 2012     Years since quitting: 10.1     Smokeless tobacco: Never Used   Substance Use Topics     Alcohol use: Not on file     Drug use: Not on file       No current outpatient medications on file.      Physical Exam     First Vitals:  Patient Vitals for the past 24 hrs:   BP Temp Temp src Pulse Resp SpO2 Height Weight   02/05/22 0830 138/62 98.3  F (36.8  C) Oral 65 18 93 % -- --   02/05/22 0400 132/60 97.9  F (36.6  C) Oral 66 18 95 % -- --   02/04/22 2331 128/60 97.8  F (36.6  C) Oral 67 18 92 % -- --   02/04/22 2210 115/54 98  F (36.7  C) Oral 69 19 92 % 1.626 m (5' 4\") 103.6 kg (228 lb 6.3 oz)   02/04/22 2135 -- 99.2  F (37.3  C) Oral -- -- -- -- --   02/04/22 1955 -- -- -- 75 22 95 % -- --   02/04/22 1934 (!) 142/60 -- -- 76 20 95 % -- --   02/04/22 1930 (!) 98/39 -- -- 79 24 94 % -- --   02/04/22 1915 115/53 -- -- 81 24 94 % -- --   02/04/22 1845 121/56 -- -- 85 20 95 % -- --   02/04/22 1830 136/61 -- -- 94 25 93 % -- --   02/04/22 1815 (!) 154/54 -- -- 96 27 93 % -- --   02/04/22 1808 (!) 164/70 (!) 101.2  F (38.4  C) Temporal 95 18 91 % -- " 106.1 kg (234 lb)       PHYSICAL EXAM:   Physical Exam    GENERAL: Awake, alert.  In no acute distress.   HEENT: Normocephalic, atraumatic. Pupils equal, round and reactive. Conjunctiva normal.  NECK: No stridor.  PULMONARY: Symmetrical breath sounds without distress.  Lungs clear to auscultation bilaterally without wheezes, rhonchi or rales.  CARDIO: Borderline tachycardic rate with regular rhythm.  Systolic murmur present; no significant rub or gallop.  Radial pulses strong and symmetrical.  ABDOMINAL: Abdomen soft, non-distended and non-tender to palpation.  No CVAT, BL.  EXTREMITIES: No lower extremity swelling or edema.      NEURO: Alert and oriented to person, place and time.  Cranial nerves grossly intact.  No focal motor deficit.  PSYCH: Normal mood and affect.  SKIN: No rashes.     Results     LAB:  All pertinent labs reviewed and interpreted  Labs Ordered and Resulted from Time of ED Arrival to Time of ED Departure   BASIC METABOLIC PANEL - Abnormal       Result Value    Sodium 137      Potassium 3.5      Chloride 103      Carbon Dioxide (CO2) 19 (*)     Anion Gap 15      Urea Nitrogen 16      Creatinine 0.99      Calcium 9.0      Glucose 154 (*)     GFR Estimate 61     HEPATIC FUNCTION PANEL - Abnormal    Bilirubin Total 0.7      Bilirubin Direct 0.2      Protein Total 7.4      Albumin 3.1 (*)     Alkaline Phosphatase 206 (*)     AST 51 (*)     ALT 33     ROUTINE UA WITH MICROSCOPIC REFLEX TO CULTURE - Abnormal    Color Urine Yellow      Appearance Urine Clear      Glucose Urine Negative      Bilirubin Urine Negative      Ketones Urine Trace (*)     Specific Gravity Urine 1.023      Blood Urine Negative      pH Urine 5.5      Protein Albumin Urine 30  (*)     Urobilinogen Urine 3.0 (*)     Nitrite Urine Negative      Leukocyte Esterase Urine 25 Martín/uL (*)     Bacteria Urine Few (*)     Mucus Urine Present (*)     RBC Urine 2      WBC Urine 11 (*)     Squamous Epithelials Urine 1      Hyaline Casts Urine 1      CBC WITH PLATELETS AND DIFFERENTIAL - Abnormal    WBC Count 5.1      RBC Count 3.77 (*)     Hemoglobin 12.1      Hematocrit 36.7      MCV 97      MCH 32.1      MCHC 33.0      RDW 13.0      Platelet Count 156      % Neutrophils 86      % Lymphocytes 5      % Monocytes 6      % Eosinophils 3      % Basophils 0      % Immature Granulocytes 0      NRBCs per 100 WBC 0      Absolute Neutrophils 4.4      Absolute Lymphocytes 0.3 (*)     Absolute Monocytes 0.3      Absolute Eosinophils 0.1      Absolute Basophils 0.0      Absolute Immature Granulocytes 0.0      Absolute NRBCs 0.0     B-TYPE NATRIURETIC PEPTIDE ([facility] ONLY) - Abnormal     (*)    GLUCOSE BY METER - Abnormal    GLUCOSE BY METER POCT 218 (*)    LACTIC ACID WHOLE BLOOD - Normal    Lactic Acid 1.5     INR - Normal    INR 1.15     PARTIAL THROMBOPLASTIN TIME - Normal    aPTT 35     INFLUENZA A/B & SARS-COV2 PCR MULTIPLEX - Normal    Influenza A PCR Negative      Influenza B PCR Negative      SARS CoV2 PCR Negative     BLOOD CULTURE   BLOOD CULTURE   URINE CULTURE   URINE CULTURE       RADIOLOGY:  Head CT w/o contrast   Final Result   IMPRESSION:   1.  Resolution of previously seen subdural hemorrhage adjacent to the left temporal lobe. No new intracranial hemorrhage.   2.  Unchanged presumed meningioma over the anterior left frontal lobe with adjacent parenchymal edema.      XR Chest Port 1 View   Final Result   IMPRESSION: Heart magnified on portable exam, mild cardiomegaly likely unchanged. Pulmonary vessels normal. Lungs appear clear.          I, Suad Leyva, am serving as a scribe to document services personally performed by Damaris Davila MD based on my observation and the provider's statements to me. I, Damaris Davila MD attest that Suad Leyva is acting in a scribe capacity, has observed my performance of the services and has documented them in accordance with my direction.    Damaris Davila MD  Emergency Medicine  [facility]  [facility] EMERGENCY DEPARTMENT         Damaris Davila MD  02/05/22 0803

## 2022-02-05 NOTE — ED NOTES
..[facility] ED Handoff Report    ED Chief Complaint: Generalized Weakness    ED Diagnosis:  (R50.9) Febrile illness, acute  Comment: Temperature now 99.2  Plan: Admission    (N39.0) Urinary tract infection without hematuria, site unspecified  Comment: Abx  Plan: Admission       PMH:  No past medical history on file.     Code Status:  Prior     Falls Risk: Yes Band: Applied    Current Living Situation/Residence: lives in an apartment     Elimination Status: Continent: wears briefs     Activity Level: SBA w/ walker    Patients Preferred Language:  English     Needed: No    Vital Signs:  BP (!) 142/60   Pulse 75   Temp (!) 101.2  F (38.4  C) (Temporal)   Resp 22   Wt 106.1 kg (234 lb)   SpO2 95%   BMI 40.17 kg/m       Cardiac Rhythm: N/A    Pain Score: 0/10    Is the Patient Confused:  No    Last Food or Drink: 2/3/22 at Evening    Focused Assessment:  Patient has hx of diabetes. Recent Blood glucose is 218.    Tests Performed: Done: Labs and Imaging    Treatments Provided:  Supportive Cares, Abx    Family Dynamics/Concerns: No    Family Updated On Visitor Policy: Yes    Plan of Care Communicated to Family: Yes    Who Was Updated about Plan of Care: Queenie Quiros Checklist Done and Signed by Patient: Yes    Medications sent with patient: Yes    Covid: symptomatic, negative    Additional Information: Patient states that she is hungry and hasn't eaten all day. Refer above for blood glucose. No diet ordered for patient.    RN: Malathi Ramires   2/4/2022 9:34 PM

## 2022-02-05 NOTE — ED NOTES
Bed: JNED-12  Expected date: 2/4/22  Expected time: 5:39 PM  Means of arrival: Ambulance  Comments:  [facility] 71 F fevers, weakness, recent UTI diagnosis

## 2022-02-05 NOTE — PROGRESS NOTES
[facility]      OUTPATIENT OCCUPATIONAL THERAPY  EVALUATION  PLAN OF TREATMENT FOR OUTPATIENT REHABILITATION  (COMPLETE FOR INITIAL CLAIMS ONLY)  Patient's Last Name, First Name, M.I.  YOB: 1950  Josefina Dumont                          Provider's Name  [facility] Medical Record No.  [id number]                               Onset Date:  02/04/22   Start of Care Date:  02/05/22     Type:     ___PT   _X_OT   ___SLP Medical Diagnosis:  UTI                        OT Diagnosis:  Impaired ADL independence   Visits from SOC:  1   _________________________________________________________________________________  Plan of Treatment/Functional Goals    Planned Interventions: ADL retraining,cognition,transfer training   Goals: See Occupational Therapy Goals on Care Plan in [employer] electronic health record.    Therapy Frequency: Daily  Predicted Duration of Therapy Intervention: 2-3 days  _________________________________________________________________________________    I CERTIFY THE NEED FOR THESE SERVICES FURNISHED UNDER        THIS PLAN OF TREATMENT AND WHILE UNDER MY CARE     (Physician co-signature of this document indicates review and certification of the therapy plan).                Certification date from: 02/05/22, Certification date to: 02/12/22    Referring Physician: Dmitriy            Initial Assessment        See Occupational Therapy evaluation dated 02/05/22 in Epic electronic health record.

## 2022-02-05 NOTE — ED NOTES
Called [facility] and notified [facility] that note was in. [facility] will notify Monique SINGH that note is in.

## 2022-02-05 NOTE — DISCHARGE INSTRUCTIONS
Home care services have been arranged for the patient.  Home Care Agency: [facility]  Home Care Phone Number: [phone]  Services: Nursing and social work  Instructions: Home care will call to set up next visit.

## 2022-02-05 NOTE — PLAN OF CARE
PRIMARY DIAGNOSIS: GENERALIZED WEAKNESS    OUTPATIENT/OBSERVATION GOALS TO BE MET BEFORE DISCHARGE  1. Orthostatic performed:NA    2. Tolerating PO medications: Yes    3. Return to near baseline physical activity: No    4. Cleared for discharge by consultants (if involved): No    Discharge Planner Nurse   Safe discharge environment identified: Yes  Barriers to discharge: Yes, pass therapy.  Has not had any loose stool since admit       Entered by: Elva Rock 02/05/2022 9:26 AM     Please review provider order for any additional goals.   Nurse to notify provider when observation goals have been met and patient is ready for discharge.

## 2022-02-05 NOTE — PLAN OF CARE
PRIMARY DIAGNOSIS: GENERALIZED WEAKNESS    OUTPATIENT/OBSERVATION GOALS TO BE MET BEFORE DISCHARGE  1. Orthostatic performed: N/A    2. Tolerating PO medications: Yes    3. Return to near baseline physical activity: Yes    4. Cleared for discharge by consultants (if involved): N/A    Discharge Planner Nurse   Safe discharge environment identified: Yes  Barriers to discharge: Yes       Entered by: Dc Brown 02/05/2022 7:46 AM     Please review provider order for any additional goals.   Nurse to notify provider when observation goals have been met and patient is ready for discharge.

## 2022-02-05 NOTE — PHARMACY-ADMISSION MEDICATION HISTORY
Pharmacy Note - Admission Medication History    Pertinent Provider Information:      ______________________________________________________________________    Prior To Admission (PTA) med list completed and updated in EMR.       PTA Med List   Medication Sig Note Last Dose     acetaminophen (TYLENOL) 500 MG tablet Take 2 tablets (1,000 mg) by mouth every 8 hours as needed for mild pain  2/4/2022 at 1900     amLODIPine (NORVASC) 10 MG tablet Take 10 mg by mouth daily  2/4/2022 at Unknown time     atorvastatin (LIPITOR) 20 MG tablet Take 1 tablet (20 mg) by mouth every evening (Patient taking differently: Take 20 mg by mouth every morning )  2/4/2022 at Unknown time     calcium carbonate (TUMS) 500 MG chewable tablet Take 1 chew tab by mouth 2 times daily as needed for heartburn  prn     citalopram (CELEXA) 40 MG tablet Take 40 mg by mouth daily  2/4/2022 at Unknown time     clopidogrel (PLAVIX) 75 MG tablet Take 75 mg by mouth daily  2/4/2022 at Unknown time     folic acid (FOLVITE) 1 MG tablet Take 1 tablet (1 mg) by mouth daily  2/4/2022 at Unknown time     glimepiride (AMARYL) 4 MG tablet Take 2 mg by mouth every morning  2/4/2022 at Unknown time     hydrochlorothiazide (HYDRODIURIL) 25 MG tablet Take 25 mg by mouth daily  2/4/2022 at Unknown time     lisinopril (ZESTRIL) 20 MG tablet Take 1 tablet (20 mg) by mouth daily  2/4/2022 at Unknown time     multivitamin w/minerals (THERA-VIT-M) tablet Take 1 tablet by mouth daily  2/3/2022 at Unknown time     nitroFURantoin macrocrystal-monohydrate (MACROBID) 100 MG capsule Take 100 mg by mouth 2 times daily X 5 days 2/4/2022: Started on 2/2/22 in the evening for 5 days 2/4/2022 at x1     pantoprazole (PROTONIX) 40 MG EC tablet Take 1 tablet (40 mg) by mouth 2 times daily (before meals)  2/4/2022 at x1     thiamine (B-1) 100 MG tablet Take 1 tablet (100 mg) by mouth daily  2/4/2022 at Unknown time     vitamin E (TOCOPHEROL) 400 units (180 mg) capsule Take 400 Units by  mouth daily  2/3/2022 at Unknown time       Information source(s): Patient and CareEverywhere/SureScripts  Method of interview communication: in-person    Summary of Changes to PTA Med List  New: clopidogrel, nitrofurantoin  Discontinued: dexamethasone, famotidine, lidocaine patch, hydralazine (marked as not taking)  Changed: atorvastatin    Patient was asked about OTC/herbal products specifically.  PTA med list reflects this.    In the past week, patient estimated taking medication this percent of the time:  greater than 90%.    Allergies were reviewed, assessed, and updated with the patient.      Patient does not use any multi-dose medications prior to admission.    The information provided in this note is only as accurate as the sources available at the time of the update(s).    Thank you for the opportunity to participate in the care of this patient.    Jennifer Billy [facility]  2/4/2022 9:03 PM

## 2022-02-05 NOTE — PLAN OF CARE
Physical Therapy Discharge Summary    Reason for therapy discharge:    Discharged to home with home therapy.    Progress towards therapy goal(s). See goals on Care Plan in [facility] electronic health record for goal details.  Goals partially met.  Barriers to achieving goals:   discharge from facility.    Therapy recommendation(s):    Continued therapy is recommended.  Rationale/Recommendations:  to progress with strengthening, activity tolerance.

## 2022-02-05 NOTE — PLAN OF CARE
"PRIMARY DIAGNOSIS: \"GENERIC\" NURSING  OUTPATIENT/OBSERVATION GOALS TO BE MET BEFORE DISCHARGE:  ADLs back to baseline: Yes    Activity and level of assistance: Up with standby assistance.    Pain status: Improved-controlled with oral pain medications.    Return to near baseline physical activity: Yes     Discharge Planner Nurse   Safe discharge environment identified: Yes  Barriers to discharge: Yes       Entered by: Monique Sanchez 02/04/2022 11:02 PM   Patient arrived on unit at 2215. She is A&O. Denies pain at this time.   Please review provider order for any additional goals.   Nurse to notify provider when observation goals have been met and patient is ready for discharge.  "

## 2022-02-05 NOTE — DISCHARGE SUMMARY
[facility]  Hospitalist Discharge Summary      Date of Admission:  2/4/2022  Date of Discharge:  2/5/2022  Discharging Provider: Galina Holm MD  Discharge Service: Hospitalist Service  Discharge Diagnoses   Active Problems:    Febrile illness, acute    Urinary tract infection without hematuria, site unspecified    Follow-ups Needed After Discharge   Follow-up Appointments     Follow-up and recommended labs and tests       Follow up with primary care provider, [facility], within 3-5days, to   evaluate medication change and for hospital follow- up. The following   labs/tests are recommended: CBC and BMP.             Unresulted Labs Ordered in the Past 30 Days of this Admission     Date and Time Order Name Status Description    2/4/2022 10:58 PM Clostridium difficile toxin B PCR In process     2/4/2022  7:44 PM Urine Culture In process     2/4/2022  6:25 PM Blood Culture Peripheral Blood Preliminary     2/4/2022  6:25 PM Blood Culture Peripheral Blood Preliminary       These results will be followed up by PCP and [facility]    Discharge Disposition   Admited to home care:   Agency: [facility]  Condition at discharge: Stable      Hospital Course     71-year-old female with history of meningioma, type II DM, hypertension and recent hospitalization for stroke and small subdural presented for evaluation of generalized weakness and fever and found to have UTI      Urinary tract infection  -Presented with fever generalized weakness, U/A suggestive of UTI, failed outpatient oral antibiotic treatment   - significantly improved and was given IV ceftriaxone in ED and ely shave PCN allergy but tolerated ceftriaxone no side effects. Given cefdinir this am and no problems.  - send out on 3 days of cefdinir   - Ucx pending but she is feeling significantly improved and no further dysuria or fevers      Diarrhea  - no frequent and final got a sample this am  - Cdiff pending  - informed patient that if this  does come back positive I would call her and send a Rx      Intracranial meningioma  History of CVA  History of subdural hemorrhage  -Patient was recently admitted here from 1/12-1/20/2022 after unwitnessed fall and syncope and found to have left frontal convexity meningioma with associated vasogenic edema and ischemic CVA she was started on dexamethasone statin and Plavix and discharged home.  During hospital stay she developed headache and repeat CT showed a small subdural which was managed conservatively with Plavix held until subdural size  stable/resolved.    -Repeat CT today showing decreased size of previous subdural bleed  -Resumed home Plavix, allergic to aspirin     Hypoxia, transient  -Chest x-ray unremarkable for any acute changes, possibly from atelectasis.   - no recurrence      Type II DM  - resume homemeds      Hypertension  -Resume home amlodipine and hydrochlorothiazide at home      Mood disorder  -Continue PTA Celexa, Ativan as needed     Mild to moderate MS  -Outpatient follow-up     Thyroid nodule  -Outpatient follow-up     History of alcohol abuse  -no alcohol use since her last hospitalization, no risk of withdrawal    Consultations This Hospital Stay   SOCIAL WORK IP CONSULT  PHYSICAL THERAPY ADULT IP CONSULT  OCCUPATIONAL THERAPY ADULT IP CONSULT    Code Status   Full Code    Time Spent on this Encounter          Galina Holm MD  [address] [address]  Phone: [phone]  Fax: [phone]  ______________________________________________________________________    Physical Exam   Vital Signs: Temp: 98.1  F (36.7  C) Temp src: Oral BP: 119/56 Pulse: 58   Resp: 18 SpO2: 92 % O2 Device: None (Room air) Oxygen Delivery: 1 LPM  Weight: 228 lbs 6.34 oz  Physical Exam  Constitutional:       Appearance: Normal appearance.   HENT:      Head: Normocephalic and atraumatic.   Cardiovascular:      Rate and Rhythm: Normal rate and regular  rhythm.      Pulses: Normal pulses.      Comments: CHATO  Pulmonary:      Effort: Pulmonary effort is normal.      Breath sounds: Normal breath sounds.   Abdominal:      General: Bowel sounds are normal.      Palpations: Abdomen is soft.   Musculoskeletal:         General: Normal range of motion.   Skin:     General: Skin is warm and dry.      Capillary Refill: Capillary refill takes less than 2 seconds.   Neurological:      General: No focal deficit present.      Mental Status: She is alert and oriented to person, place, and time. Mental status is at baseline.            Primary Care Physician   [facility]    Discharge Orders      Home care nursing referral      Care Coordination Referral      Reason for your hospital stay    Active Problems:    Febrile illness, acute    Urinary tract infection without hematuria, site unspecified     Follow-up and recommended labs and tests     Follow up with primary care provider, [facility], within 3-5days, to evaluate medication change and for hospital follow- up. The following labs/tests are recommended: CBC and BMP.     Activity    Your activity upon discharge: activity as tolerated     MD face to face encounter    Documentation of Face to Face and Certification for Home Health Services    I certify that patient: Josefina Dumont is under my care and that I, or a nurse practitioner or physician's assistant working with me, had a face-to-face encounter that meets the physician face-to-face encounter requirements with this patient on: February 5, 2022. Resumption of previous services     This encounter with the patient was in whole, or in part, for the following medical condition, which is the primary reason for home health care: weakness and medication management.    I certify that, based on my findings, the following services are medically necessary home health services: Nursing, Physical Therapy, and Social Work.    My clinical findings support the need for the above  services because: Nurse is needed: To provide assessment and oversight required in the home to assure adherence to the medical plan due to: complexity.. and Physical Therapy Services are needed to assess and treat the following functional impairments: weakness.    Further, I certify that my clinical findings support that this patient is homebound (i.e. absences from home require considerable and taxing effort and are for medical reasons or [employer] services or infrequently or of short duration when for other reasons) because: Leaving home is medically contraindicated for the following reason(s): Dyspnea on exertion that makes it so they cannot leave their home for needed services without clinical deterioration...    Based on the above findings. I certify that this patient is confined to the home and needs intermittent skilled nursing care, physical therapy and/or speech therapy.  The patient is under my care, and I have initiated the establishment of the plan of care.  This patient will be followed by a physician who will periodically review the plan of care.  Physician/Provider to provide follow up care: Rafat Lee    Attending [facility] physician (the Medicare certified [place] provider): Galina Holm MD  Physician Signature: See electronic signature associated with these discharge orders.  Date: 2/5/2022     Diet    Follow this diet upon discharge: Orders Placed This Encounter      Combination Diet Regular Diet; Moderate Consistent Carb (60 g CHO per Meal) Diet       Significant Results and Procedures   Most Recent 3 CBC's:  Recent Labs   Lab Test 02/05/22  0642 02/04/22  1810 01/20/22  0658   WBC 3.3* 5.1 6.6   HGB 11.2* 12.1 13.0    97 100   * 156 161     Most Recent 3 BMP's:  Recent Labs   Lab Test 02/05/22  1209 02/05/22  1025 02/05/22  0925 02/04/22  2141 02/04/22  1810 02/02/22  1135 01/20/22  0831 01/20/22  0658   NA  --   --   --   --  137 137  --  139   POTASSIUM  --   --   --   --   3.5 3.4*  --  3.4*   CHLORIDE  --   --   --   --  103 106  --  106   CO2  --   --   --   --  19* 20*  --  25   BUN  --   --   --   --  16 14  --  18   CR  --   --   --   --  0.99 0.91  --  0.85   ANIONGAP  --   --   --   --  15 11  --  8   ALBARO  --   --   --   --  9.0 9.0  --  8.7   * 125* 61*   < > 154* 128*   < > 101    < > = values in this interval not displayed.     Most Recent 2 LFT's:  Recent Labs   Lab Test 02/04/22  1810 02/02/22  1135   AST 51* 25   ALT 33 24   ALKPHOS 206* 170*   BILITOTAL 0.7 0.7   ,   Results for orders placed or performed during the hospital encounter of 02/04/22   XR Chest Port 1 View    Narrative    EXAM: XR CHEST PORT 1 VIEW  LOCATION: [facility]  DATE/TIME: 2/4/2022 6:48 PM    INDICATION: fever  COMPARISON: 1/15/2022      Impression    IMPRESSION: Heart magnified on portable exam, mild cardiomegaly likely unchanged. Pulmonary vessels normal. Lungs appear clear.   Head CT w/o contrast    Narrative    EXAM: CT HEAD W/O CONTRAST  LOCATION: [facility]  DATE/TIME: 2/4/2022 8:37 PM    INDICATION: Subdural hematoma follow-up.  COMPARISON: CT head without contrast 01/25/2022.  TECHNIQUE: Routine CT Head without IV contrast. Multiplanar reformats. Dose reduction techniques were used.    FINDINGS:  INTRACRANIAL CONTENTS: Resolution of previously seen trace residual subdural hemorrhage along the lateral left temporal lobe. No new intracranial hemorrhage. Hyperdense extra-axial mass over the anteromedial left frontal lobe with left frontal lobe   vasogenic edema, similar in appearance to previous exam. Mild presumed chronic small vessel ischemic changes. Mild generalized volume loss. No hydrocephalus.     VISUALIZED ORBITS/SINUSES/MASTOIDS: No intraorbital abnormality. No paranasal sinus mucosal disease. No middle ear or mastoid effusion.    BONES/SOFT TISSUES: No acute abnormality.      Impression    IMPRESSION:  1.  Resolution of  previously seen subdural hemorrhage adjacent to the left temporal lobe. No new intracranial hemorrhage.  2.  Unchanged presumed meningioma over the anterior left frontal lobe with adjacent parenchymal edema.       Discharge Medications   Current Discharge Medication List      START taking these medications    Details   cefdinir (OMNICEF) 300 MG capsule Take 1 capsule (300 mg) by mouth every 12 hours  Qty: 6 capsule, Refills: 0    Associated Diagnoses: Urinary tract infection without hematuria, site unspecified         CONTINUE these medications which have NOT CHANGED    Details   acetaminophen (TYLENOL) 500 MG tablet Take 2 tablets (1,000 mg) by mouth every 8 hours as needed for mild pain    Associated Diagnoses: Chest wall pain      amLODIPine (NORVASC) 10 MG tablet Take 10 mg by mouth daily      atorvastatin (LIPITOR) 20 MG tablet Take 1 tablet (20 mg) by mouth every evening  Qty: 30 tablet, Refills: 0    Associated Diagnoses: Acute stroke due to ischemia (H)      calcium carbonate (TUMS) 500 MG chewable tablet Take 1 chew tab by mouth 2 times daily as needed for heartburn      citalopram (CELEXA) 40 MG tablet Take 40 mg by mouth daily      clopidogrel (PLAVIX) 75 MG tablet Take 75 mg by mouth daily      folic acid (FOLVITE) 1 MG tablet Take 1 tablet (1 mg) by mouth daily  Qty: 30 tablet, Refills: 0    Associated Diagnoses: Alcohol use      glimepiride (AMARYL) 4 MG tablet Take 2 mg by mouth every morning      hydrochlorothiazide (HYDRODIURIL) 25 MG tablet Take 25 mg by mouth daily      lisinopril (ZESTRIL) 20 MG tablet Take 1 tablet (20 mg) by mouth daily  Qty: 30 tablet, Refills: 0    Associated Diagnoses: Primary hypertension      multivitamin w/minerals (THERA-VIT-M) tablet Take 1 tablet by mouth daily      pantoprazole (PROTONIX) 40 MG EC tablet Take 1 tablet (40 mg) by mouth 2 times daily (before meals)  Qty: 60 tablet, Refills: 0    Associated Diagnoses: Gastroesophageal reflux disease with esophagitis  without hemorrhage      thiamine (B-1) 100 MG tablet Take 1 tablet (100 mg) by mouth daily  Qty: 30 tablet    Associated Diagnoses: Alcohol use      vitamin E (TOCOPHEROL) 400 units (180 mg) capsule Take 400 Units by mouth daily      hydrALAZINE (APRESOLINE) 25 MG tablet Take 1 tablet (25 mg) by mouth 3 times daily  Qty: 90 tablet, Refills: 0    Associated Diagnoses: Primary hypertension         STOP taking these medications       nitroFURantoin macrocrystal-monohydrate (MACROBID) 100 MG capsule Comments:   Reason for Stopping:             Allergies   Allergies   Allergen Reactions     Atenolol Other (See Comments)     abd pain     Penicillins Hives     Tolerated ceftriaxone

## 2022-02-05 NOTE — CONSULTS
Care Management Initial Consult    General Information  Assessment completed with: Josefnia Shook  Type of CM/SW Visit: Initial Assessment    Primary Care Provider verified and updated as needed: Yes   Readmission within the last 30 days: current reason for admission unrelated to previous admission   Return Category: New Diagnosis  Reason for Consult: discharge planning  Advance Care Planning: Advance Care Planning Reviewed: other (comment) (pt declines, has at home to fill out)          Communication Assessment  Patient's communication style: spoken language (English or Bilingual)             Cognitive  Cognitive/Neuro/Behavioral: WDL                      Living Environment:   People in home: alone     Current living Arrangements: apartment      Able to return to prior arrangements:         Family/Social Support:  Care provided by: self  Provides care for: no one  Marital Status: Single  Sibling(s)          Description of Support System: Supportive,Involved    Support Assessment: Adequate family and caregiver support,Adequate social supports,Patient communicates needs well met    Current Resources:   Patient receiving home care services: Yes  Skilled Home Care Services: Physicial Therapy,Skilled Nursing  Community Resources:    Equipment currently used at home: shower chair,grab bar, tub/shower,glucometer,walker, rolling  Supplies currently used at home: Diabetic Supplies    Employment/Financial:  Employment Status: retired        Financial Concerns:             Lifestyle & Psychosocial Needs:  Social Determinants of Health     Tobacco Use: Medium Risk     Smoking Tobacco Use: Former Smoker     Smokeless Tobacco Use: Never Used   Alcohol Use: Not on file   Financial Resource Strain: Not on file   Food Insecurity: Not on file   Transportation Needs: Not on file   Physical Activity: Not on file   Stress: Not on file   Social Connections: Not on file   Intimate Partner Violence: Not on file   Depression: Not on file    Housing Stability: Not on file       Functional Status:  Prior to admission patient needed assistance:   Dependent ADLs:: Independent,Ambulation-walker  Dependent IADLs:: Independent  Assesssment of Functional Status: At functional baseline    Mental Health Status:          Chemical Dependency Status:                Values/Beliefs:  Spiritual, Cultural Beliefs, Congregation Practices, Values that affect care:                 Additional Information:    CROW assessed.  Pt lives alone in senior apartment.  Pt receives home PT/RN services.  Pt will need these to resume at time of discharge.  Pt has HCD paperwork at home to be filled out.  Pt will need transportation home at time of discharge.      Ester Seals RN

## 2022-02-05 NOTE — PROGRESS NOTES
02/05/22 1100   Quick Adds   Quick Adds Certification   Type of Visit Initial PT Evaluation   Living Environment   People in home alone   Current Living Arrangements apartment  (independent senior living)   Home Accessibility no concerns  (elevator)   Living Environment Comments sister lives on another floor same building   Self-Care   Usual Activity Tolerance good   Current Activity Tolerance fair   Equipment Currently Used at Home other (see comments)  (4WW got it from her mother, uses in the apartment)   Activity/Exercise/Self-Care Comment gets home health care- RN and PT. has exercise room but doesn't like to use it due to other people/community room. I with IADLs, waiting for the ok from doctor to drive again   Disability/Function   Hearing Difficulty or Deaf no   Wear Glasses or Blind yes  (glasses)   Number of times patient has fallen within last six months 1   General Information   Onset of Illness/Injury or Date of Surgery 02/04/22   Referring Physician Nikhil Izaguirre MD   Patient/Family Therapy Goals Statement (PT) goal is home   General Observations per H&P: 71-year-old female with history of meningioma, type II DM, hypertension and recent hospitalization for stroke and small subdural presented for evaluation of generalized weakness and fever and found to have UTI   Cognition   Orientation Status (Cognition) oriented x 3   Affect/Mental Status (Cognition) WNL   Follows Commands (Cognition) WNL   Range of Motion (ROM)   ROM Comment BLE WFL   Strength   Strength Comments BLE grossly decreased, equal   Transfers   Transfers No deficits identified   Transfer Safety Comments verbally reviewed safe seated transfer using seat of 4WW/against wall/brakes   Gait/Stairs (Locomotion)   Distance in Feet (Required for LE Total Joints) 300   Clinical Impression   Criteria for Skilled Therapeutic Intervention yes, treatment indicated   PT Diagnosis (PT) impaired functional mobility   Influenced by the following  impairments activity tolerance   Functional limitations due to impairments gait   Clinical Presentation Stable/Uncomplicated   Clinical Presentation Rationale patient presents as medically diagnosed   Clinical Decision Making (Complexity) low complexity   Therapy Frequency (PT) Daily   Predicted Duration of Therapy Intervention (days/wks) 5 days   Planned Therapy Interventions (PT) gait training   Anticipated Equipment Needs at Discharge (PT) walker, rolling  (4WW)   Risk & Benefits of therapy have been explained patient   PT Discharge Planning    PT Discharge Recommendation (DC Rec) home with assist;home with home care physical therapy  (discussed, patient agrees)   PT Rationale for DC Rec Ambulating household distances safely with 4WW, sister lives in same building, patient was already getting home care. may benefit from life line/alert.   Therapy Certification   Start of care date 02/05/22   Certification date from 02/05/22   Certification date to 02/12/22   Medical Diagnosis UTI   Total Evaluation Time   Total Evaluation Time (Minutes) 15

## 2022-02-05 NOTE — H&P
[facility]    History and Physical - Hospitalist Service       Date of Admission:  2/4/2022    Assessment & Plan      Josefina Dumont is a 71 year old female admitted on 2/4/2022.       71-year-old female with history of meningioma, type II DM, hypertension and recent hospitalization for stroke and small subdural presented for evaluation of generalized weakness and fever and found to have UTI        Urinary tract infection  -Presented with fever generalized weakness, U/A suggestive of UTI, failed outpatient oral antibiotic treatment   -We will start on ceftriaxone pending urine culture      Diarrhea  -Check C. difficile, enteric precautions      Intracranial meningioma  History of CVA  History of subdural hemorrhage  -Patient was recently admitted here from 1/12-1/20/2022 after unwitnessed fall and syncope and found to have left frontal convexity meningioma with associated vasogenic edema and ischemic CVA she was started on dexamethasone statin and Plavix and discharged home.  During hospital stay he developed headache and repeat CT showed a small subdural which was managed conservatively with Plavix held until subdural size  stable/resolved.    -Repeat CT today showing decreased size of previous subdural bleed  -Resumed home Plavix, allergic to aspirin        Hypoxia, transient  -Chest x-ray unremarkable for any acute changes, possibly from atelectasis.  Monitor closely.      Type II DM  -Sliding scale insulin    Hypertension  -Resume home amlodipine, hold hydrochlorothiazide      Mood disorder  -Continue PTA Celexa, Ativan as needed    Mild to moderate MS  -Outpatient follow-up      Obesity-BMI 39    Thyroid nodule  -Outpatient follow-up    History of alcohol abuse  -no alcohol use since her last hospitalization, no risk of withdrawal.       Diet:  Consistent carb, 2 g sodium diet  DVT Prophylaxis: Pneumatic Compression Devices  [place] Catheter: Not present  Central Lines: None  Cardiac  "Monitoring: None  Code Status:   Full     Clinically Significant Risk Factors Present on Admission            # Anion Gap Metabolic Acidosis: AG = 15 mmol/L (Ref range: 5 - 18 mmol/L) on admission, will monitor and treat as appropriate    # Platelet Defect: home medication list includes an antiplatelet medication   # Diabetes, type II: last A1C 7.0 % (Ref range: <=5.6 %)  # Severe Obesity: Estimated body mass index is 40.17 kg/m  as calculated from the following:    Height as of 1/12/22: 1.626 m (5' 4\").    Weight as of this encounter: 106.1 kg (234 lb).      Disposition Plan   Expected Discharge:        The patient's care was discussed with the Patient.    Nikhil Izaguirre MD  Hospitalist Service  [facility]  Securely message with the Vocera Web Console (learn more here)  Text page via [employer] Paging/Directory         ______________________________________________________________________    Chief Complaint   Generalized weakness and fever    History is obtained from the patient    History of Present Illness   Josefina Dumont is a 71 year old female who   71-year-old female with history of meningioma, type II DM, hypertension and recent hospitalization for stroke and small subdural presented for evaluation of generalized weakness and fever.  Patient initially went to her primary care 2 days ago and was started on nitrofurantoin for UTI but with no improvement, weakness became worse and started to have vomiting and diarrhea today and presented here.  Work-up at ED  UA suggestive of UTI, chest x-ray negative for infiltrates.  Started on IV antibiotics and admitted for further management.    Of note patient was recently admitted here from 1/12-1/20/2022 after unwitnessed fall and syncope and found to have left frontal convexity meningioma with associated vasogenic edema and ischemic CVA she was started on dexamethasone statin and Plavix and discharged home.  During hospital stay he developed " headache and repeat CT showed a small subdural which was managed conservatively with Plavix held until subdural size  stable/resolved.  On repeat CT today subdural size  improved.    Review of Systems    The 10 point Review of Systems is negative other than noted in the HPI or here.     Past Medical History    I have reviewed this patient's medical history and updated it with pertinent information if needed.   No past medical history on file.    Past Surgical History   I have reviewed this patient's surgical history and updated it with pertinent information if needed.  No past surgical history on file.    Social History   I have reviewed this patient's social history and updated it with pertinent information if needed.  Social History     Tobacco Use     Smoking status: Former Smoker     Quit date: 2012     Years since quitting: 10.1     Smokeless tobacco: Never Used   Substance Use Topics     Alcohol use: Not on file     Drug use: Not on file       Family History         Prior to Admission Medications   Prior to Admission Medications   Prescriptions Last Dose Informant Patient Reported? Taking?   acetaminophen (TYLENOL) 500 MG tablet 2/4/2022 at 1900  No Yes   Sig: Take 2 tablets (1,000 mg) by mouth every 8 hours as needed for mild pain   amLODIPine (NORVASC) 10 MG tablet 2/4/2022 at Unknown time  Yes Yes   Sig: Take 10 mg by mouth daily   atorvastatin (LIPITOR) 20 MG tablet 2/4/2022 at Unknown time  No Yes   Sig: Take 1 tablet (20 mg) by mouth every evening   Patient taking differently: Take 20 mg by mouth every morning    calcium carbonate (TUMS) 500 MG chewable tablet prn  Yes Yes   Sig: Take 1 chew tab by mouth 2 times daily as needed for heartburn   citalopram (CELEXA) 40 MG tablet 2/4/2022 at Unknown time  Yes Yes   Sig: Take 40 mg by mouth daily   clopidogrel (PLAVIX) 75 MG tablet 2/4/2022 at Unknown time  Yes Yes   Sig: Take 75 mg by mouth daily   folic acid (FOLVITE) 1 MG tablet 2/4/2022 at Unknown time   No Yes   Sig: Take 1 tablet (1 mg) by mouth daily   glimepiride (AMARYL) 4 MG tablet 2/4/2022 at Unknown time  Yes Yes   Sig: Take 2 mg by mouth every morning   hydrALAZINE (APRESOLINE) 25 MG tablet Not Taking at Unknown time  No No   Sig: Take 1 tablet (25 mg) by mouth 3 times daily   Patient not taking: Reported on 2/4/2022   hydrochlorothiazide (HYDRODIURIL) 25 MG tablet 2/4/2022 at Unknown time  Yes Yes   Sig: Take 25 mg by mouth daily   lisinopril (ZESTRIL) 20 MG tablet 2/4/2022 at Unknown time  No Yes   Sig: Take 1 tablet (20 mg) by mouth daily   multivitamin w/minerals (THERA-VIT-M) tablet 2/3/2022 at Unknown time  Yes Yes   Sig: Take 1 tablet by mouth daily   nitroFURantoin macrocrystal-monohydrate (MACROBID) 100 MG capsule 2/4/2022 at x1  Yes Yes   Sig: Take 100 mg by mouth 2 times daily X 5 days   pantoprazole (PROTONIX) 40 MG EC tablet 2/4/2022 at x1  No Yes   Sig: Take 1 tablet (40 mg) by mouth 2 times daily (before meals)   thiamine (B-1) 100 MG tablet 2/4/2022 at Unknown time  No Yes   Sig: Take 1 tablet (100 mg) by mouth daily   vitamin E (TOCOPHEROL) 400 units (180 mg) capsule 2/3/2022 at Unknown time  Yes Yes   Sig: Take 400 Units by mouth daily      Facility-Administered Medications: None     Allergies   Allergies   Allergen Reactions     Atenolol Other (See Comments)     abd pain     Penicillins Hives       Physical Exam   Vital Signs: Temp: (!) 101.2  F (38.4  C) Temp src: Temporal BP: (!) 142/60 Pulse: 75   Resp: 22 SpO2: 95 % O2 Device: Nasal cannula Oxygen Delivery: 1 LPM  Weight: 234 lbs 0 oz    General Appearance: Alert oriented  Eyes: Pink conjunctiva, NIS  HEENT: PERRLA, EOMI  Respiratory: Bilateral clear chest  Cardiovascular: S1-S2 regular rhythm, no murmur heard  GI: Soft nontender abdomen  Genitourinary: No CVA or SP tenderness  Skin: No rashes  Musculoskeletal: No peripheral edema  Neurologic: AOx3      Data   Data reviewed today: I reviewed all medications, new labs and imaging  results over the last 24 hours. I personally reviewed     Recent Labs   Lab 02/04/22  1810 02/02/22  1135   WBC 5.1  --    HGB 12.1  --    MCV 97  --      --    INR 1.15  --     137   POTASSIUM 3.5 3.4*   CHLORIDE 103 106   CO2 19* 20*   BUN 16 14   CR 0.99 0.91   ANIONGAP 15 11   ALBARO 9.0 9.0   * 128*   ALBUMIN 3.1* 3.1*   PROTTOTAL 7.4 6.8   BILITOTAL 0.7 0.7   ALKPHOS 206* 170*   ALT 33 24   AST 51* 25     Recent Results (from the past 24 hour(s))   XR Chest Port 1 View    Narrative    EXAM: XR CHEST PORT 1 VIEW  LOCATION: [facility]  DATE/TIME: 2/4/2022 6:48 PM    INDICATION: fever  COMPARISON: 1/15/2022      Impression    IMPRESSION: Heart magnified on portable exam, mild cardiomegaly likely unchanged. Pulmonary vessels normal. Lungs appear clear.   Head CT w/o contrast    Narrative    EXAM: CT HEAD W/O CONTRAST  LOCATION: [facility]  DATE/TIME: 2/4/2022 8:37 PM    INDICATION: Subdural hematoma follow-up.  COMPARISON: CT head without contrast 01/25/2022.  TECHNIQUE: Routine CT Head without IV contrast. Multiplanar reformats. Dose reduction techniques were used.    FINDINGS:  INTRACRANIAL CONTENTS: Resolution of previously seen trace residual subdural hemorrhage along the lateral left temporal lobe. No new intracranial hemorrhage. Hyperdense extra-axial mass over the anteromedial left frontal lobe with left frontal lobe   vasogenic edema, similar in appearance to previous exam. Mild presumed chronic small vessel ischemic changes. Mild generalized volume loss. No hydrocephalus.     VISUALIZED ORBITS/SINUSES/MASTOIDS: No intraorbital abnormality. No paranasal sinus mucosal disease. No middle ear or mastoid effusion.    BONES/SOFT TISSUES: No acute abnormality.      Impression    IMPRESSION:  1.  Resolution of previously seen subdural hemorrhage adjacent to the left temporal lobe. No new intracranial hemorrhage.  2.  Unchanged presumed meningioma  over the anterior left frontal lobe with adjacent parenchymal edema.

## 2022-02-05 NOTE — PROGRESS NOTES
[facility]      OUTPATIENT PHYSICAL THERAPY EVALUATION  PLAN OF TREATMENT FOR OUTPATIENT REHABILITATION  (COMPLETE FOR INITIAL CLAIMS ONLY)  Patient's Last Name, First Name, M.I.  YOB: 1950  Josefina Dumont                        Provider's Name  [facility] Medical Record No.  [id number]                               Onset Date:  02/04/22   Start of Care Date:  (P) 02/05/22      Type:     _X_PT   ___OT   ___SLP Medical Diagnosis:  (P) UTI                        PT Diagnosis:  impaired functional mobility   Visits from SOC:  1   _________________________________________________________________________________  Plan of Treatment/Functional Goals    Planned Interventions: gait training     Goals: See Physical Therapy Goals on Care Plan in [facility] electronic health record.    Therapy Frequency: Daily  Predicted Duration of Therapy Intervention: 5 days  _________________________________________________________________________________    I CERTIFY THE NEED FOR THESE SERVICES FURNISHED UNDER        THIS PLAN OF TREATMENT AND WHILE UNDER MY CARE     (Physician co-signature of this document indicates review and certification of the therapy plan).                Certification date from: (P) 02/05/22, Certification date to: (P) 02/12/22    Referring Physician: Nikhil Izaguirre MD            Initial Assessment        See Physical Therapy evaluation dated (P) 02/05/22 in Epic electronic health record.

## 2022-02-05 NOTE — ED TRIAGE NOTES
PT presents via EMS for weakness and fever.  Pt had CVA 3 weeks ago and yesterday became weak and then again today with PT pt became increasingly weak and was unable to get toilet. pt is currently being treated for UTI

## 2022-02-05 NOTE — PROGRESS NOTES
Care Management Discharge Note    Discharge Date: 02/05/2022       Discharge Disposition:  Home    Discharge Services:  [facility]      Private pay costs discussed: Not applicable      Handoff Referral Completed: Yes    Additional Information:  Home with resumption of [facility].    SAURAV Zazueta

## 2022-02-05 NOTE — PROGRESS NOTES
Care Management Follow Up    Length of Stay (days): 0    Expected Discharge Date: 02/05/2022     Concerns to be Addressed:    Discharge planning   Patient plan of care discussed at interdisciplinary rounds: Yes    Anticipated Discharge Disposition:  Home with home care pending PT/OT recs    Additional Information:  SW reviewed chart. SW placed call to [facility]. They confirm that pt is open to them for services and is currently receiving RN, PT, SW. They report that they will watch for orders when pt is discharged.      DONAVON ZazuetaSW

## 2022-02-05 NOTE — ED NOTES
Pt saturation has dropped to 88% room air. Nasal canula 1L oxygen therapy started. Saturation improves to 94% on this therapy.

## 2022-02-05 NOTE — PLAN OF CARE
PRIMARY DIAGNOSIS: GENERALIZED WEAKNESS    OUTPATIENT/OBSERVATION GOALS TO BE MET BEFORE DISCHARGE  1. Orthostatic performed: N/A    2. Tolerating PO medications: Yes    3. Return to near baseline physical activity: Yes    4. Cleared for discharge by consultants (if involved): N/A    Discharge Planner Nurse   Safe discharge environment identified: Yes  Barriers to discharge: Yes       Entered by: Dc Brown 02/05/2022 7:51 AM   Patient reports feeling better. Vital signs stable. Up to the bathroom, voiding large amounts. No C-Diff sample sent, no diarrhea.  Please review provider order for any additional goals.   Nurse to notify provider when observation goals have been met and patient is ready for discharge.

## 2022-02-05 NOTE — PROGRESS NOTES
02/05/22 0830   Quick Adds   Type of Visit Initial Occupational Therapy Evaluation       Present no   Living Environment   People in home alone   Current Living Arrangements independent living facility   Home Accessibility no concerns   Self-Care   Usual Activity Tolerance moderate   Current Activity Tolerance moderate   Activity/Exercise/Self-Care Comment Pt is independent with ADLs and IADLs.  Pt drives.   Disability/Function   Change in Functional Status Since Onset of Current Illness/Injury yes   General Information   Onset of Illness/Injury or Date of Surgery 02/04/22   Referring Physician Dmitriy   Patient/Family Therapy Goal Statement (OT) home   Existing Precautions/Restrictions fall   Cognitive Status Examination   Orientation Status person;place   Cognitive Status Comments Pt has difficutly giving history   Visual Perception   Visual Impairment/Limitations WFL   Range of Motion Comprehensive   General Range of Motion no range of motion deficits identified   Strength Comprehensive (MMT)   General Manual Muscle Testing (MMT) Assessment no strength deficits identified   Coordination   Upper Extremity Coordination No deficits were identified   Bed Mobility   Comment (Bed Mobility) SBA   Transfers   Transfer Comments SBA   Balance   Balance Comments SBA with standing   Activities of Daily Living   BADL Assessment   (SBA with toileting, G/H, and LB dressing)   Clinical Impression   Criteria for Skilled Therapeutic Interventions Met (OT) yes;meets criteria;skilled treatment is necessary   OT Diagnosis Impaired ADL independence   OT Problem List-Impairments impacting ADL activity tolerance impaired;cognition;strength   Assessment of Occupational Performance 1-3 Performance Deficits   Planned Therapy Interventions (OT) ADL retraining;cognition;transfer training   Clinical Decision Making Complexity (OT) low complexity   Therapy Frequency (OT) Daily   Predicted Duration of Therapy 2-3 days    Risk & Benefits of therapy have been explained evaluation/treatment results reviewed;care plan/treatment goals reviewed;participants included;patient   Comment-Clinical Impression Pt seen bedside for OT eval and treatment.  Pt demonstrates decreased activity tolerance and decreasaed cognition.  OT to continue to address.  Recommend home with assist.  Pt would bendfit from driving assessment.   OT Discharge Planning    OT Discharge Recommendation (DC Rec) Home with assist   OT Rationale for DC Rec Per previous cog assessment, pt would benefit from 24 hour supervision.  PT does drive and should have driving assessment.   Therapy Certification   Start of Care Date 02/05/22   Certification date from 02/05/22   Certification date to 02/12/22   Medical Diagnosis UTI   Total Evaluation Time (Minutes)   Total Evaluation Time (Minutes) 10

## 2022-02-06 LAB — BACTERIA UR CULT: NO GROWTH

## 2022-02-08 ENCOUNTER — PATIENT OUTREACH (OUTPATIENT)
Dept: CARE COORDINATION | Facility: CLINIC | Age: 72
End: 2022-02-08
Payer: COMMERCIAL

## 2022-02-08 NOTE — PROGRESS NOTES
Clinic Care Coordination Contact  Care Team Conversations    Patient identified for care management outreach, however [facility] RN staff has already followed up with patient to ensure they are following up with PCP and have needs and resources met. Clinic RN will refer back to GENA CRANE if needed/appropriate.    IVON Johnson  Social Work Care Coordinator - [facility]  Care Coordination  [email or web address]  Cell Phone: [phone]  Gender pronouns: she/her  Employed by [employer]

## 2022-02-09 LAB — BACTERIA BLD CULT: NO GROWTH

## 2022-02-10 ENCOUNTER — LAB REQUISITION (OUTPATIENT)
Dept: LAB | Facility: CLINIC | Age: 72
End: 2022-02-10
Payer: COMMERCIAL

## 2022-02-10 DIAGNOSIS — N18.31 CHRONIC KIDNEY DISEASE, STAGE 3A (H): ICD-10-CM

## 2022-02-10 LAB
ANION GAP SERPL CALCULATED.3IONS-SCNC: 12 MMOL/L (ref 5–18)
BACTERIA BLD CULT: NO GROWTH
BUN SERPL-MCNC: 13 MG/DL (ref 8–28)
CALCIUM SERPL-MCNC: 8.8 MG/DL (ref 8.5–10.5)
CHLORIDE BLD-SCNC: 106 MMOL/L (ref 98–107)
CO2 SERPL-SCNC: 21 MMOL/L (ref 22–31)
CREAT SERPL-MCNC: 0.78 MG/DL (ref 0.6–1.1)
GFR SERPL CREATININE-BSD FRML MDRD: 81 ML/MIN/1.73M2
GLUCOSE BLD-MCNC: 149 MG/DL (ref 70–125)
POTASSIUM BLD-SCNC: 3.2 MMOL/L (ref 3.5–5)
SODIUM SERPL-SCNC: 139 MMOL/L (ref 136–145)

## 2022-02-10 PROCEDURE — 80048 BASIC METABOLIC PNL TOTAL CA: CPT | Mod: ORL | Performed by: FAMILY MEDICINE

## 2022-03-11 ENCOUNTER — LAB REQUISITION (OUTPATIENT)
Dept: LAB | Facility: CLINIC | Age: 72
End: 2022-03-11
Payer: COMMERCIAL

## 2022-03-11 DIAGNOSIS — N30.01 ACUTE CYSTITIS WITH HEMATURIA: ICD-10-CM

## 2022-03-11 PROCEDURE — 87086 URINE CULTURE/COLONY COUNT: CPT | Mod: ORL | Performed by: FAMILY MEDICINE

## 2022-03-13 LAB — BACTERIA UR CULT: NORMAL

## 2022-03-29 ENCOUNTER — OFFICE VISIT (OUTPATIENT)
Dept: CARDIOLOGY | Facility: CLINIC | Age: 72
End: 2022-03-29
Payer: COMMERCIAL

## 2022-03-29 VITALS
DIASTOLIC BLOOD PRESSURE: 62 MMHG | WEIGHT: 224 LBS | SYSTOLIC BLOOD PRESSURE: 146 MMHG | BODY MASS INDEX: 38.45 KG/M2 | OXYGEN SATURATION: 96 % | HEART RATE: 90 BPM | RESPIRATION RATE: 18 BRPM

## 2022-03-29 DIAGNOSIS — R55 SYNCOPE, UNSPECIFIED SYNCOPE TYPE: ICD-10-CM

## 2022-03-29 DIAGNOSIS — I10 PRIMARY HYPERTENSION: ICD-10-CM

## 2022-03-29 DIAGNOSIS — D32.9 MENINGIOMA (H): ICD-10-CM

## 2022-03-29 DIAGNOSIS — I05.9 MITRAL VALVE DISEASE, RHEUMATIC: Primary | ICD-10-CM

## 2022-03-29 DIAGNOSIS — E11.10 TYPE 2 DIABETES MELLITUS WITH KETOACIDOSIS WITHOUT COMA, WITHOUT LONG-TERM CURRENT USE OF INSULIN (H): ICD-10-CM

## 2022-03-29 PROBLEM — I05.0 MITRAL VALVE STENOSIS: Status: ACTIVE | Noted: 2022-03-29
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
## 2022-03-29 PROCEDURE — 99214 OFFICE O/P EST MOD 30 MIN: CPT | Performed by: INTERNAL MEDICINE

## 2022-03-29 RX ORDER — LOSARTAN POTASSIUM 50 MG/1
50 TABLET ORAL DAILY
Status: ON HOLD | COMMUNITY
Start: 2022-03-08 | End: 2023-12-31

## 2022-03-29 NOTE — LETTER
3/29/2022    COURT SERVIN MD  [facility] [place] [address]  [facility] [address]    RE: Josefina Dumont       Dear Colleague,     I had the pleasure of seeing Josefina Dumont in the [facility].      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  Difficult to assess symptoms, patient chronically short of breath for 10 years maybe worse over the last year.  Given difficulty to assess symptoms as well as patient's feeling overwhelmed with diagnosis of meningioma, would anticipate evaluation of mitral valve in several months with in outpatient LUC. Given that it is only mild to moderate with 3 mm gradient, doubt surgical intervention would be needed.  Would also need to evaluate Garcia score to see possibility of potentially pursuing percutaneous valvuloplasty if needed.    (I10) Primary hypertension  Comment: Blood pressure elevated today, she is taking HCTZ, losartan she thinks, on amlodipine, 25 and 10 mg respectively.  We will have her increase losartan up to 50 mg, confirm that she is taking all of these.    (E11.10) Type 2 diabetes mellitus with ketoacidosis without coma, without long-term current use of insulin (H)  Comment: So noted on Amaryl with a hemoglobin A1c of 7.0.    (D32.9) Meningioma (H)  Comment: Small to moderate anterior left frontal convexity meningioma with associated vasogenic edema with a mid localized mass-effect, no midline shift or herniation or hydrocephalus, and possible CVA.  Being seen by neurosurgery for possible intervention.  No cardiac contraindication to proceeding with surgery if need be.    (R55) Syncope, unspecified syncope type  Comment: Most likely secondary to above.    Obesity-strongly suspect she has sleep apnea and would recommend sleep study.    Plan  1.   Patient will confirm medications and call her nurse if not accurate.  2.  Patient will increase losartan to 50 mg a day.  3.  Weight loss.  4.  LUC in about a month.  5.  Follow-up with me in about 4 months to make sure she does not fall through the cracks.    Subjective  CC: 72-year-old white female here for post hospital discharge follow-up today.  Since I saw her she was readmitted the following month for UTI.  That was in February with the initial hospitalization in January.  She is here today with her sister.  She lives independently alone in an apartment, her sister lives in the same building.  The sister is applied to be the patient's medical power of  but the patient has declined thus far.  The patient does sleep a lot, falls asleep in a chair, does not sleep well at night.  She is extremely fatigued, she has shortness of breath on activity maybe worse over the last year.  She is unsteady on her feet needing to walk with a walker.  She has difficulty stepping down off curbs.  There is no chest discomfort, palpitations, true syncope since hospitalization, PND or orthopnea.    Medications  Current Outpatient Medications   Medication Sig Dispense Refill     acetaminophen (TYLENOL) 500 MG tablet Take 2 tablets (1,000 mg) by mouth every 8 hours as needed for mild pain       amLODIPine (NORVASC) 10 MG tablet Take 10 mg by mouth daily       calcium carbonate (TUMS) 500 MG chewable tablet Take 1 chew tab by mouth 2 times daily as needed for heartburn       citalopram (CELEXA) 40 MG tablet Take 40 mg by mouth daily       clopidogrel (PLAVIX) 75 MG tablet Take 75 mg by mouth daily       folic acid (FOLVITE) 1 MG tablet Take 1 tablet (1 mg) by mouth daily 30 tablet 0     glimepiride (AMARYL) 4 MG tablet Take 2 mg by mouth every morning       hydrochlorothiazide (HYDRODIURIL) 25 MG tablet Take 25 mg by mouth daily       losartan (COZAAR) 25 MG tablet Take 25 mg by mouth daily       multivitamin w/minerals  (THERA-VIT-M) tablet Take 1 tablet by mouth daily       pantoprazole (PROTONIX) 40 MG EC tablet Take 1 tablet (40 mg) by mouth 2 times daily (before meals) 60 tablet 0     vitamin E (TOCOPHEROL) 400 units (180 mg) capsule Take 400 Units by mouth daily       hydrALAZINE (APRESOLINE) 25 MG tablet Take 1 tablet (25 mg) by mouth 3 times daily (Patient not taking: Reported on 2/4/2022) 90 tablet 0     lisinopril (ZESTRIL) 20 MG tablet Take 1 tablet (20 mg) by mouth daily (Patient not taking: Reported on 3/29/2022) 30 tablet 0     thiamine (B-1) 100 MG tablet Take 1 tablet (100 mg) by mouth daily (Patient not taking: Reported on 3/29/2022) 30 tablet        Objective  BP (!) 146/62 (BP Location: Left arm, Patient Position: Sitting, Cuff Size: Adult Large)   Pulse 90   Resp 18   Wt 101.6 kg (224 lb)   SpO2 96%   BMI 38.45 kg/m      General Appearance:    Alert, cooperative, no distress, appears stated age   Head:    Normocephalic, without obvious abnormality, atraumatic   Throat:   Lips, mucosa, and tongue normal; teeth and gums normal   Neck:   Supple, symmetrical, trachea midline, no adenopathy;        thyroid:  No enlargement/tenderness/nodules; no carotid    bruit or JVD   Back:     Symmetric, no curvature, ROM normal, no CVA tenderness   Lungs:     Clear to auscultation bilaterally, respirations unlabored   Chest wall:    No tenderness or deformity   Heart:    Regular rate and rhythm, S1 loud and S2 normal, 1/6 systolic ejection murmur, no  rub   or gallop   Abdomen:     Soft, non-tender, bowel sounds active all four quadrants,     no masses, no organomegaly   Extremities:   Normal, atraumatic, no cyanosis or edema   Pulses:   2+ and symmetric all extremities   Skin:   Skin color, texture, turgor normal, no rashes or lesions     Results    Lab Results personally reviewed   Lab Results   Component Value Date    CHOL 158 01/13/2022    CHOL 130 10/04/2021     Lab Results   Component Value Date    HDL 35 (L)  01/13/2022    HDL 27 (L) 10/04/2021     No components found for: LDLCALC  Lab Results   Component Value Date    TRIG 118 01/13/2022    TRIG 136 10/04/2021     Lab Results   Component Value Date    WBC 3.3 (L) 02/05/2022    HGB 11.2 (L) 02/05/2022    HCT 35.0 02/05/2022     (L) 02/05/2022     Lab Results   Component Value Date    BUN 13 02/10/2022     02/10/2022    CO2 21 (L) 02/10/2022               Thank you for allowing me to participate in the care of your patient.      Sincerely,     NAIMA HESS MD     [facility]  cc:   Naima Hess MD  [address] [place] [address]

## 2022-03-29 NOTE — PROGRESS NOTES
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  Difficult to assess symptoms, patient chronically short of breath for 10 years maybe worse over the last year.  Given difficulty to assess symptoms as well as patient's feeling overwhelmed with diagnosis of meningioma, would anticipate evaluation of mitral valve in several months with in outpatient LUC. Given that it is only mild to moderate with 3 mm gradient, doubt surgical intervention would be needed.  Would also need to evaluate Garcia score to see possibility of potentially pursuing percutaneous valvuloplasty if needed.    (I10) Primary hypertension  Comment: Blood pressure elevated today, she is taking HCTZ, losartan she thinks, on amlodipine, 25 and 10 mg respectively.  We will have her increase losartan up to 50 mg, confirm that she is taking all of these.    (E11.10) Type 2 diabetes mellitus with ketoacidosis without coma, without long-term current use of insulin (H)  Comment: So noted on Amaryl with a hemoglobin A1c of 7.0.    (D32.9) Meningioma (H)  Comment: Small to moderate anterior left frontal convexity meningioma with associated vasogenic edema with a mid localized mass-effect, no midline shift or herniation or hydrocephalus, and possible CVA.  Being seen by neurosurgery for possible intervention.  No cardiac contraindication to proceeding with surgery if need be.    (R55) Syncope, unspecified syncope type  Comment: Most likely secondary to above.    Obesity-strongly suspect she has sleep apnea and would recommend sleep study.    Plan  1.  Patient will confirm medications and call her nurse if not accurate.  2.  Patient will increase losartan to 50 mg a day.  3.  Weight loss.  4.  LUC in about a month.  5.  Follow-up with me in about 4 months to make sure she does not  fall through the cracks.    Subjective  CC: 72-year-old white female here for post hospital discharge follow-up today.  Since I saw her she was readmitted the following month for UTI.  That was in February with the initial hospitalization in January.  She is here today with her sister.  She lives independently alone in an apartment, her sister lives in the same building.  The sister is applied to be the patient's medical power of  but the patient has declined thus far.  The patient does sleep a lot, falls asleep in a chair, does not sleep well at night.  She is extremely fatigued, she has shortness of breath on activity maybe worse over the last year.  She is unsteady on her feet needing to walk with a walker.  She has difficulty stepping down off curbs.  There is no chest discomfort, palpitations, true syncope since hospitalization, PND or orthopnea.    Medications  Current Outpatient Medications   Medication Sig Dispense Refill     acetaminophen (TYLENOL) 500 MG tablet Take 2 tablets (1,000 mg) by mouth every 8 hours as needed for mild pain       amLODIPine (NORVASC) 10 MG tablet Take 10 mg by mouth daily       calcium carbonate (TUMS) 500 MG chewable tablet Take 1 chew tab by mouth 2 times daily as needed for heartburn       citalopram (CELEXA) 40 MG tablet Take 40 mg by mouth daily       clopidogrel (PLAVIX) 75 MG tablet Take 75 mg by mouth daily       folic acid (FOLVITE) 1 MG tablet Take 1 tablet (1 mg) by mouth daily 30 tablet 0     glimepiride (AMARYL) 4 MG tablet Take 2 mg by mouth every morning       hydrochlorothiazide (HYDRODIURIL) 25 MG tablet Take 25 mg by mouth daily       losartan (COZAAR) 25 MG tablet Take 25 mg by mouth daily       multivitamin w/minerals (THERA-VIT-M) tablet Take 1 tablet by mouth daily       pantoprazole (PROTONIX) 40 MG EC tablet Take 1 tablet (40 mg) by mouth 2 times daily (before meals) 60 tablet 0     vitamin E (TOCOPHEROL) 400 units (180 mg) capsule Take 400 Units by  mouth daily       hydrALAZINE (APRESOLINE) 25 MG tablet Take 1 tablet (25 mg) by mouth 3 times daily (Patient not taking: Reported on 2/4/2022) 90 tablet 0     lisinopril (ZESTRIL) 20 MG tablet Take 1 tablet (20 mg) by mouth daily (Patient not taking: Reported on 3/29/2022) 30 tablet 0     thiamine (B-1) 100 MG tablet Take 1 tablet (100 mg) by mouth daily (Patient not taking: Reported on 3/29/2022) 30 tablet        Objective  BP (!) 146/62 (BP Location: Left arm, Patient Position: Sitting, Cuff Size: Adult Large)   Pulse 90   Resp 18   Wt 101.6 kg (224 lb)   SpO2 96%   BMI 38.45 kg/m      General Appearance:    Alert, cooperative, no distress, appears stated age   Head:    Normocephalic, without obvious abnormality, atraumatic   Throat:   Lips, mucosa, and tongue normal; teeth and gums normal   Neck:   Supple, symmetrical, trachea midline, no adenopathy;        thyroid:  No enlargement/tenderness/nodules; no carotid    bruit or JVD   Back:     Symmetric, no curvature, ROM normal, no CVA tenderness   Lungs:     Clear to auscultation bilaterally, respirations unlabored   Chest wall:    No tenderness or deformity   Heart:    Regular rate and rhythm, S1 loud and S2 normal, 1/6 systolic ejection murmur, no  rub   or gallop   Abdomen:     Soft, non-tender, bowel sounds active all four quadrants,     no masses, no organomegaly   Extremities:   Normal, atraumatic, no cyanosis or edema   Pulses:   2+ and symmetric all extremities   Skin:   Skin color, texture, turgor normal, no rashes or lesions     Results    Lab Results personally reviewed   Lab Results   Component Value Date    CHOL 158 01/13/2022    CHOL 130 10/04/2021     Lab Results   Component Value Date    HDL 35 (L) 01/13/2022    HDL 27 (L) 10/04/2021     No components found for: LDLCALC  Lab Results   Component Value Date    TRIG 118 01/13/2022    TRIG 136 10/04/2021     Lab Results   Component Value Date    WBC 3.3 (L) 02/05/2022    HGB 11.2 (L) 02/05/2022     HCT 35.0 02/05/2022     (L) 02/05/2022     Lab Results   Component Value Date    BUN 13 02/10/2022     02/10/2022    CO2 21 (L) 02/10/2022

## 2022-03-29 NOTE — PATIENT INSTRUCTIONS
Ms Josefina Dumont,  I enjoyed visiting with you again today.  I am glad to hear you are doing well.  Per our conversation deal with the MENINGIOMA first and increase your LOSARTAN to 2 tablets a day if taking.  Please check this list of meds and if not accurate call [phone].  We will set up the echo of the valve called a LUC.  I will plan on seeing you thereafter.  Bairon Hess

## 2022-04-18 ENCOUNTER — HOSPITAL ENCOUNTER (OUTPATIENT)
Dept: MRI IMAGING | Facility: CLINIC | Age: 72
Discharge: HOME | End: 2022-04-18
Attending: SURGERY | Admitting: SURGERY
Payer: COMMERCIAL

## 2022-04-18 DIAGNOSIS — D32.9 MENINGIOMA (H): ICD-10-CM

## 2022-04-18 PROCEDURE — 70553 MRI BRAIN STEM W/O & W/DYE: CPT

## 2022-04-18 PROCEDURE — A9585 GADOBUTROL INJECTION: HCPCS | Performed by: SURGERY

## 2022-04-18 PROCEDURE — 255N000002 HC RX 255 OP 636: Performed by: SURGERY

## 2022-04-18 RX ORDER — GADOBUTROL 604.72 MG/ML
10 INJECTION INTRAVENOUS ONCE
Status: COMPLETED | OUTPATIENT
Start: 2022-04-18 | End: 2022-04-18

## 2022-04-18 RX ADMIN — GADOBUTROL 10 ML: 604.72 INJECTION INTRAVENOUS at 10:56

## 2022-04-20 ENCOUNTER — OFFICE VISIT (OUTPATIENT)
Dept: NEUROSURGERY | Facility: CLINIC | Age: 72
End: 2022-04-20
Payer: COMMERCIAL

## 2022-04-20 VITALS
BODY MASS INDEX: 38.24 KG/M2 | HEART RATE: 75 BPM | WEIGHT: 224 LBS | SYSTOLIC BLOOD PRESSURE: 152 MMHG | DIASTOLIC BLOOD PRESSURE: 65 MMHG | HEIGHT: 64 IN | OXYGEN SATURATION: 97 %

## 2022-04-20 DIAGNOSIS — D32.9 MENINGIOMA (H): Primary | ICD-10-CM

## 2022-04-20 DIAGNOSIS — E66.01 MORBID OBESITY (H): ICD-10-CM

## 2022-04-20 PROCEDURE — 99203 OFFICE O/P NEW LOW 30 MIN: CPT | Performed by: SURGERY

## 2022-04-20 NOTE — PROGRESS NOTES
The patient is a 72-year-old female.  In January she had an episode.  She was seen by a neurologist from the [facility].  She had a CT and MRI of the head.  She has a meningioma.  She had a follow-up MRI 4/18/2022.  The tumor is stable in size.  I did show the pictures to the patient and the person with her.  Plan follow-up MRI in 6 months, sooner as needed.  If the tumor gets bigger it should probably be removed surgically.  In the meantime we are arranging a follow-up with neurology.  The patient is satisfied with the plan.  Total time 20 minutes, more than 50% spent counseling and/or coordinating care.

## 2022-04-20 NOTE — LETTER
4/20/2022         RE: Josefina Dumont  [address]  [facility] [address]        Dear Colleague,    Thank you for referring your patient, Josefina Dumont, to the [facility]. Please see a copy of my visit note below.    The patient is a 72-year-old female.  In January she had an episode.  She was seen by a neurologist from the [facility].  She had a CT and MRI of the head.  She has a meningioma.  She had a follow-up MRI 4/18/2022.  The tumor is stable in size.  I did show the pictures to the patient and the person with her.  Plan follow-up MRI in 6 months, sooner as needed.  If the tumor gets bigger it should probably be removed surgically.  In the meantime we are arranging a follow-up with neurology.  The patient is satisfied with the plan.  Total time 20 minutes, more than 50% spent counseling and/or coordinating care.      Again, thank you for allowing me to participate in the care of your patient.        Sincerely,        Abhishek Cullen MD

## 2022-06-16 ENCOUNTER — TELEPHONE (OUTPATIENT)
Dept: NEUROSURGERY | Facility: CLINIC | Age: 72
End: 2022-06-16
Payer: COMMERCIAL

## 2022-06-16 NOTE — TELEPHONE ENCOUNTER
Returned call to pt and explained to her that based on her MRI results from January 2022 to April 2022 the meningioma was stable. Based on this, Dr. Cullen would like to monitor again 6 months from the last scan. Confirmed it is safe to wait this long for another scan as her syncopal episodes have not returned.     Pt verbalized understanding and confirmed her next MRI will need to be in October 2022. She requested oral sedation prior to the test. Informed her that our scheduling team will call as the time gets closer and we can provide sedation for her at that time.     Jaz Manzanares RN

## 2022-06-16 NOTE — TELEPHONE ENCOUNTER
Patient is concerned about having MRI so far out and has other questions about the plan. Patient just seems confused about next steps. Would like a call to discuss #[phone].

## 2022-07-28 ENCOUNTER — LAB REQUISITION (OUTPATIENT)
Dept: LAB | Facility: CLINIC | Age: 72
End: 2022-07-28
Payer: COMMERCIAL

## 2022-07-28 ENCOUNTER — TRANSFERRED RECORDS (OUTPATIENT)
Dept: HEALTH INFORMATION MANAGEMENT | Facility: CLINIC | Age: 72
End: 2022-07-28

## 2022-07-28 DIAGNOSIS — E11.649 TYPE 2 DIABETES MELLITUS WITH HYPOGLYCEMIA WITHOUT COMA (H): ICD-10-CM

## 2022-07-28 DIAGNOSIS — R53.1 WEAKNESS: ICD-10-CM

## 2022-07-28 LAB
ALBUMIN SERPL BCG-MCNC: 3.6 G/DL (ref 3.5–5.2)
ALP SERPL-CCNC: 213 U/L (ref 35–104)
ALT SERPL W P-5'-P-CCNC: 28 U/L (ref 10–35)
ANION GAP SERPL CALCULATED.3IONS-SCNC: 11 MMOL/L (ref 7–15)
AST SERPL W P-5'-P-CCNC: 38 U/L (ref 10–35)
BILIRUB SERPL-MCNC: 0.4 MG/DL
BUN SERPL-MCNC: 13.6 MG/DL (ref 8–23)
CALCIUM SERPL-MCNC: 8.9 MG/DL (ref 8.8–10.2)
CHLORIDE SERPL-SCNC: 104 MMOL/L (ref 98–107)
CREAT SERPL-MCNC: 0.81 MG/DL (ref 0.51–0.95)
DEPRECATED HCO3 PLAS-SCNC: 25 MMOL/L (ref 22–29)
GFR SERPL CREATININE-BSD FRML MDRD: 77 ML/MIN/1.73M2
GLUCOSE SERPL-MCNC: 202 MG/DL (ref 70–99)
POTASSIUM SERPL-SCNC: 3.4 MMOL/L (ref 3.4–5.3)
PROT SERPL-MCNC: 7 G/DL (ref 6.4–8.3)
SODIUM SERPL-SCNC: 140 MMOL/L (ref 136–145)

## 2022-07-28 PROCEDURE — 87086 URINE CULTURE/COLONY COUNT: CPT | Mod: ORL | Performed by: NURSE PRACTITIONER

## 2022-07-28 PROCEDURE — 80053 COMPREHEN METABOLIC PANEL: CPT | Mod: ORL | Performed by: NURSE PRACTITIONER

## 2022-07-30 LAB — BACTERIA UR CULT: NORMAL

## 2022-08-03 ENCOUNTER — TELEPHONE (OUTPATIENT)
Dept: NEUROSURGERY | Facility: CLINIC | Age: 72
End: 2022-08-03

## 2022-08-08 ENCOUNTER — TRANSFERRED RECORDS (OUTPATIENT)
Dept: HEALTH INFORMATION MANAGEMENT | Facility: CLINIC | Age: 72
End: 2022-08-08

## 2022-08-11 ENCOUNTER — HOSPITAL ENCOUNTER (OUTPATIENT)
Dept: CARDIOLOGY | Facility: HOSPITAL | Age: 72
Discharge: HOME | End: 2022-08-11
Attending: INTERNAL MEDICINE | Admitting: INTERNAL MEDICINE
Payer: COMMERCIAL

## 2022-08-11 VITALS
RESPIRATION RATE: 20 BRPM | TEMPERATURE: 98.6 F | BODY MASS INDEX: 36.19 KG/M2 | WEIGHT: 212 LBS | HEIGHT: 64 IN | HEART RATE: 62 BPM | DIASTOLIC BLOOD PRESSURE: 66 MMHG | SYSTOLIC BLOOD PRESSURE: 148 MMHG | OXYGEN SATURATION: 93 %

## 2022-08-11 DIAGNOSIS — I05.9 MITRAL VALVE DISEASE, RHEUMATIC: ICD-10-CM

## 2022-08-11 DIAGNOSIS — R55 SYNCOPE, UNSPECIFIED SYNCOPE TYPE: ICD-10-CM

## 2022-08-11 LAB — LVEF ECHO: NORMAL

## 2022-08-11 PROCEDURE — 258N000003 HC RX IP 258 OP 636: Performed by: INTERNAL MEDICINE

## 2022-08-11 PROCEDURE — 93312 ECHO TRANSESOPHAGEAL: CPT | Mod: 26 | Performed by: INTERNAL MEDICINE

## 2022-08-11 PROCEDURE — 99152 MOD SED SAME PHYS/QHP 5/>YRS: CPT

## 2022-08-11 PROCEDURE — 250N000009 HC RX 250: Performed by: INTERNAL MEDICINE

## 2022-08-11 PROCEDURE — 93321 DOPPLER ECHO F-UP/LMTD STD: CPT | Performed by: INTERNAL MEDICINE

## 2022-08-11 PROCEDURE — 99152 MOD SED SAME PHYS/QHP 5/>YRS: CPT | Performed by: INTERNAL MEDICINE

## 2022-08-11 PROCEDURE — 250N000011 HC RX IP 250 OP 636: Performed by: INTERNAL MEDICINE

## 2022-08-11 PROCEDURE — 93325 DOPPLER ECHO COLOR FLOW MAPG: CPT | Performed by: INTERNAL MEDICINE

## 2022-08-11 PROCEDURE — 93312 ECHO TRANSESOPHAGEAL: CPT

## 2022-08-11 RX ORDER — SODIUM CHLORIDE 9 MG/ML
INJECTION, SOLUTION INTRAVENOUS CONTINUOUS
Status: DISCONTINUED | OUTPATIENT
Start: 2022-08-11 | End: 2022-08-11 | Stop reason: HOSPADM

## 2022-08-11 RX ORDER — LIDOCAINE HYDROCHLORIDE 20 MG/ML
SOLUTION OROPHARYNGEAL
Status: COMPLETED | OUTPATIENT
Start: 2022-08-11 | End: 2022-08-11

## 2022-08-11 RX ORDER — FENTANYL CITRATE 50 UG/ML
INJECTION, SOLUTION INTRAMUSCULAR; INTRAVENOUS
Status: COMPLETED | OUTPATIENT
Start: 2022-08-11 | End: 2022-08-11

## 2022-08-11 RX ORDER — LIDOCAINE 40 MG/G
CREAM TOPICAL
Status: DISCONTINUED | OUTPATIENT
Start: 2022-08-11 | End: 2022-08-11 | Stop reason: HOSPADM

## 2022-08-11 RX ADMIN — MIDAZOLAM 2 MG: 1 INJECTION INTRAMUSCULAR; INTRAVENOUS at 08:34

## 2022-08-11 RX ADMIN — MIDAZOLAM 1 MG: 1 INJECTION INTRAMUSCULAR; INTRAVENOUS at 08:41

## 2022-08-11 RX ADMIN — LIDOCAINE HYDROCHLORIDE 10 ML: 20 SOLUTION ORAL; TOPICAL at 08:29

## 2022-08-11 RX ADMIN — FENTANYL CITRATE 25 MCG: 50 INJECTION, SOLUTION INTRAMUSCULAR; INTRAVENOUS at 08:35

## 2022-08-11 RX ADMIN — FENTANYL CITRATE 25 MCG: 50 INJECTION, SOLUTION INTRAMUSCULAR; INTRAVENOUS at 08:42

## 2022-08-11 RX ADMIN — BENZOCAINE 3 SPRAY: 220 SPRAY, METERED PERIODONTAL at 08:31

## 2022-08-11 RX ADMIN — FENTANYL CITRATE 25 MCG: 50 INJECTION, SOLUTION INTRAMUSCULAR; INTRAVENOUS at 08:38

## 2022-08-11 RX ADMIN — SODIUM CHLORIDE: 9 INJECTION, SOLUTION INTRAVENOUS at 08:18

## 2022-08-11 RX ADMIN — MIDAZOLAM 1 MG: 1 INJECTION INTRAMUSCULAR; INTRAVENOUS at 08:39

## 2022-08-11 ASSESSMENT — ACTIVITIES OF DAILY LIVING (ADL): ADLS_ACUITY_SCORE: 35

## 2022-08-11 NOTE — SEDATION DOCUMENTATION
LUC:  Pt tolerated procedure well.  Received:  4 mg IV versed and 75 mcg IV fentanyl.  NPO till 9:35 am.  Nothing hot to eat or drink until 2: 35 pm today.  Pt sleeping at intervals.  VSS:  HR; 59-76 SR.  RR:  12-23 with sats @ 2LNC:  %.  Now on room air with sats 92-98%.  BP:  139-220/65-92.

## 2022-08-11 NOTE — DISCHARGE INSTRUCTIONS
.1. You are required to have someone accompany you home.    2. Rest today. Do not drive or operate machinery today. Over-activity may produce nausea and dizziness.    3. You should follow your normal diet. Drink plenty of fluids. Do not drink any alcoholic beverages for 24 hours. *(Alcohol may interact with the medications you received today).    4. NO HOT FOODS or LIQUIDS FOR 6 HOURS after the procedure.  Nothing hot to eat or drink until after 2:35 pm today    5. You may have a sore throat or cough. This is normal. These symptoms should resolve in 24 hours.     6. If you have further questions call your doctor: Dr. Hess

## 2022-08-30 ENCOUNTER — OFFICE VISIT (OUTPATIENT)
Dept: CARDIOLOGY | Facility: CLINIC | Age: 72
End: 2022-08-30
Attending: INTERNAL MEDICINE
Payer: COMMERCIAL

## 2022-08-30 VITALS
HEART RATE: 81 BPM | OXYGEN SATURATION: 96 % | SYSTOLIC BLOOD PRESSURE: 145 MMHG | BODY MASS INDEX: 36.05 KG/M2 | WEIGHT: 210 LBS | RESPIRATION RATE: 18 BRPM | DIASTOLIC BLOOD PRESSURE: 70 MMHG

## 2022-08-30 DIAGNOSIS — I10 PRIMARY HYPERTENSION: ICD-10-CM

## 2022-08-30 DIAGNOSIS — D32.9 MENINGIOMA (H): ICD-10-CM

## 2022-08-30 DIAGNOSIS — I34.2 NONRHEUMATIC MITRAL VALVE STENOSIS: Primary | ICD-10-CM

## 2022-08-30 DIAGNOSIS — E11.10 TYPE 2 DIABETES MELLITUS WITH KETOACIDOSIS WITHOUT COMA, WITHOUT LONG-TERM CURRENT USE OF INSULIN (H): ICD-10-CM

## 2022-08-30 DIAGNOSIS — E66.01 MORBID OBESITY (H): ICD-10-CM

## 2022-08-30 PROBLEM — I05.9 MITRAL VALVE DISEASE, RHEUMATIC: Status: RESOLVED | Noted: 2022-01-13 | Resolved: 2022-08-30

## 2022-08-30 PROCEDURE — 99214 OFFICE O/P EST MOD 30 MIN: CPT | Performed by: INTERNAL MEDICINE

## 2022-08-30 RX ORDER — ALBUTEROL SULFATE 90 UG/1
2 AEROSOL, METERED RESPIRATORY (INHALATION) EVERY 6 HOURS PRN
COMMUNITY
Start: 2022-04-26

## 2022-08-30 NOTE — PROGRESS NOTES
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  Difficult to assess symptoms, patient chronically short of breath for 10 years maybe worse over the last year.  LUC confirms gradient only about 2 to 3 mmHg.  Not much calcification.  Given that it is only mild to moderate with 3 mm gradient, doubt surgical intervention would be needed.  Recheck echo in 1 year.    (I10) Primary hypertension  Comment: Slightly elevated today, she tells me she has not taken antihypertensive therapy yet today.  I have asked her to check some blood pressures at home and if running elevated will increase losartan from 25 to 50 mg, currently on amlodipine 10 mg a day.    (E11.10) Type 2 diabetes mellitus with ketoacidosis without coma, without long-term current use of insulin (H)  Comment: Stopped Amaryl secondary to hypoglycemia but suspect she will need to go back on this.    (D32.9) Meningioma (H)  Comment: Small to moderate anterior left frontal convexity meningioma with associated vasogenic edema with a mid localized mass-effect, no midline shift or herniation or hydrocephalus, and possible CVA.  Being seen by neurosurgery for possible intervention.  No cardiac contraindication to proceeding with surgery if need be.    (E66.01) Morbid obesity (H)  Comment: Not quite morbid and work on weight loss    Plan  1.  Check blood pressures at home and call into me and adjust losartan up if needed to 50 mg.  2.  Work on weight loss.  3.  Echo in 1 year.  4.  Follow-up me 1 year or sooner if needed.    Subjective  CC: 72-year-old white female here for follow-up.  Since I saw her she had a LUC showing only mild mitral stenosis with a 3 mm gradient.  She still does admit to shortness of breath walking across a parking lot that might need her to stop.  No  chest pains, palpitations, PND, orthopnea, syncope, dizziness or peripheral edema.    Medications  Current Outpatient Medications   Medication Sig Dispense Refill     acetaminophen (TYLENOL) 500 MG tablet Take 2 tablets (1,000 mg) by mouth every 8 hours as needed for mild pain       albuterol (PROAIR HFA/PROVENTIL HFA/VENTOLIN HFA) 108 (90 Base) MCG/ACT inhaler Inhale 2 puffs into the lungs as needed       amLODIPine (NORVASC) 10 MG tablet Take 10 mg by mouth daily       calcium carbonate (TUMS) 500 MG chewable tablet Take 1 chew tab by mouth 2 times daily as needed for heartburn       citalopram (CELEXA) 40 MG tablet Take 40 mg by mouth daily       clopidogrel (PLAVIX) 75 MG tablet Take 75 mg by mouth daily       folic acid (FOLVITE) 1 MG tablet Take 1 tablet (1 mg) by mouth daily 30 tablet 0     hydrochlorothiazide (HYDRODIURIL) 25 MG tablet Take 25 mg by mouth daily       losartan (COZAAR) 25 MG tablet Take 50 mg by mouth daily       multivitamin w/minerals (THERA-VIT-M) tablet Take 1 tablet by mouth daily       pantoprazole (PROTONIX) 40 MG EC tablet Take 1 tablet (40 mg) by mouth 2 times daily (before meals) 60 tablet 0     vitamin E (TOCOPHEROL) 400 units (180 mg) capsule Take 400 Units by mouth daily         Objective  BP (!) 145/70 (BP Location: Left arm, Patient Position: Sitting, Cuff Size: Adult Large)   Pulse 81   Resp 18   Wt 95.3 kg (210 lb)   SpO2 96%   BMI 36.05 kg/m      General Appearance:    Alert, cooperative, no distress, appears stated age   Head:    Normocephalic, without obvious abnormality, atraumatic   Throat:   Lips, mucosa, and tongue normal; teeth and gums normal   Neck:   Supple, symmetrical, trachea midline, no adenopathy;        thyroid:  No enlargement/tenderness/nodules; no carotid    bruit or JVD   Back:     Symmetric, no curvature, ROM normal, no CVA tenderness   Lungs:     Clear to auscultation bilaterally, respirations unlabored   Chest wall:    No tenderness or deformity    Heart:    Regular rate and rhythm, S1 and S2 normal, 1/6 systolic ejection murmur, no  rub   or gallop   Abdomen:     Soft, non-tender, bowel sounds active all four quadrants,     no masses, no organomegaly   Extremities:   Normal, atraumatic, no cyanosis or edema   Pulses:   2+ and symmetric all extremities   Skin:   Skin color, texture, turgor normal, no rashes or lesions     Results    Lab Results personally reviewed   Lab Results   Component Value Date    CHOL 158 01/13/2022    CHOL 130 10/04/2021     Lab Results   Component Value Date    HDL 35 (L) 01/13/2022    HDL 27 (L) 10/04/2021     No components found for: LDLCALC  Lab Results   Component Value Date    TRIG 118 01/13/2022    TRIG 136 10/04/2021     Lab Results   Component Value Date    WBC 3.3 (L) 02/05/2022    HGB 11.2 (L) 02/05/2022    HCT 35.0 02/05/2022     (L) 02/05/2022     Lab Results   Component Value Date    BUN 13.6 07/28/2022     07/28/2022    CO2 25 07/28/2022

## 2022-08-30 NOTE — PATIENT INSTRUCTIONS
Ms Josefina Dumont,  I enjoyed visiting with you again today.  I am glad to hear you are doing well.  Per our conversation check a few blood pressures at home, maybe 3 a week and call to me at [phone].  I will plan on seeing you 1 year.  Bairon Hess

## 2022-08-30 NOTE — LETTER
8/30/2022    COURT SERVIN MD  [facility] [address]  [place] [facility] [address]    RE: Josefina Dumont       Dear Colleague,     I had the pleasure of seeing Josefina Dumont in the [facility].      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  Difficult to assess symptoms, patient chronically short of breath for 10 years maybe worse over the last year.  LUC confirms gradient only about 2 to 3 mmHg.  Not much calcification.  Given that it is only mild to moderate with 3 mm gradient, doubt surgical intervention would be needed.  Recheck echo in 1 year.    (I10) Primary hypertension  Comment: Slightly elevated today, she tells me she has not taken antihypertensive therapy yet today.  I have asked her to check some blood pressures at home and if running elevated will increase losartan from 25 to 50 mg, currently on amlodipine 10 mg a day.    (E11.10) Type 2 diabetes mellitus with ketoacidosis without coma, without long-term current use of insulin (H)  Comment: Stopped Amaryl secondary to hypoglycemia but suspect she will need to go back on this.    (D32.9) Meningioma (H)  Comment: Small to moderate anterior left frontal convexity meningioma with associated vasogenic edema with a mid localized mass-effect, no midline shift or herniation or hydrocephalus, and possible CVA.  Being seen by neurosurgery for possible intervention.  No cardiac contraindication to proceeding with surgery if need be.    (E66.01) Morbid obesity (H)  Comment: Not quite morbid and work on weight loss    Plan  1.  Check blood pressures at home and call into me and adjust losartan up if needed to 50 mg.  2.  Work on weight loss.  3.  Echo in 1 year.  4.  Follow-up me 1 year or sooner if needed.    Subjective  CC:  72-year-old white female here for follow-up.  Since I saw her she had a LUC showing only mild mitral stenosis with a 3 mm gradient.  She still does admit to shortness of breath walking across a parking lot that might need her to stop.  No chest pains, palpitations, PND, orthopnea, syncope, dizziness or peripheral edema.    Medications  Current Outpatient Medications   Medication Sig Dispense Refill     acetaminophen (TYLENOL) 500 MG tablet Take 2 tablets (1,000 mg) by mouth every 8 hours as needed for mild pain       albuterol (PROAIR HFA/PROVENTIL HFA/VENTOLIN HFA) 108 (90 Base) MCG/ACT inhaler Inhale 2 puffs into the lungs as needed       amLODIPine (NORVASC) 10 MG tablet Take 10 mg by mouth daily       calcium carbonate (TUMS) 500 MG chewable tablet Take 1 chew tab by mouth 2 times daily as needed for heartburn       citalopram (CELEXA) 40 MG tablet Take 40 mg by mouth daily       clopidogrel (PLAVIX) 75 MG tablet Take 75 mg by mouth daily       folic acid (FOLVITE) 1 MG tablet Take 1 tablet (1 mg) by mouth daily 30 tablet 0     hydrochlorothiazide (HYDRODIURIL) 25 MG tablet Take 25 mg by mouth daily       losartan (COZAAR) 25 MG tablet Take 50 mg by mouth daily       multivitamin w/minerals (THERA-VIT-M) tablet Take 1 tablet by mouth daily       pantoprazole (PROTONIX) 40 MG EC tablet Take 1 tablet (40 mg) by mouth 2 times daily (before meals) 60 tablet 0     vitamin E (TOCOPHEROL) 400 units (180 mg) capsule Take 400 Units by mouth daily         Objective  BP (!) 145/70 (BP Location: Left arm, Patient Position: Sitting, Cuff Size: Adult Large)   Pulse 81   Resp 18   Wt 95.3 kg (210 lb)   SpO2 96%   BMI 36.05 kg/m      General Appearance:    Alert, cooperative, no distress, appears stated age   Head:    Normocephalic, without obvious abnormality, atraumatic   Throat:   Lips, mucosa, and tongue normal; teeth and gums normal   Neck:   Supple, symmetrical, trachea midline, no adenopathy;        thyroid:  No  enlargement/tenderness/nodules; no carotid    bruit or JVD   Back:     Symmetric, no curvature, ROM normal, no CVA tenderness   Lungs:     Clear to auscultation bilaterally, respirations unlabored   Chest wall:    No tenderness or deformity   Heart:    Regular rate and rhythm, S1 and S2 normal, 1/6 systolic ejection murmur, no  rub   or gallop   Abdomen:     Soft, non-tender, bowel sounds active all four quadrants,     no masses, no organomegaly   Extremities:   Normal, atraumatic, no cyanosis or edema   Pulses:   2+ and symmetric all extremities   Skin:   Skin color, texture, turgor normal, no rashes or lesions     Results    Lab Results personally reviewed   Lab Results   Component Value Date    CHOL 158 01/13/2022    CHOL 130 10/04/2021     Lab Results   Component Value Date    HDL 35 (L) 01/13/2022    HDL 27 (L) 10/04/2021     No components found for: LDLCALC  Lab Results   Component Value Date    TRIG 118 01/13/2022    TRIG 136 10/04/2021     Lab Results   Component Value Date    WBC 3.3 (L) 02/05/2022    HGB 11.2 (L) 02/05/2022    HCT 35.0 02/05/2022     (L) 02/05/2022     Lab Results   Component Value Date    BUN 13.6 07/28/2022     07/28/2022    CO2 25 07/28/2022               Thank you for allowing me to participate in the care of your patient.      Sincerely,     NAIMA HESS MD     [facility]  cc:   Naima Hess MD  [address] [facility], [address]  [place] [address]

## 2022-09-12 ENCOUNTER — OFFICE VISIT (OUTPATIENT)
Dept: NEUROLOGY | Facility: CLINIC | Age: 72
End: 2022-09-12

## 2022-09-12 ENCOUNTER — LAB (OUTPATIENT)
Dept: LAB | Facility: CLINIC | Age: 72
End: 2022-09-12
Payer: COMMERCIAL

## 2022-09-12 VITALS — DIASTOLIC BLOOD PRESSURE: 92 MMHG | SYSTOLIC BLOOD PRESSURE: 167 MMHG | HEART RATE: 69 BPM

## 2022-09-12 DIAGNOSIS — R79.89 OTHER SPECIFIED ABNORMAL FINDINGS OF BLOOD CHEMISTRY: ICD-10-CM

## 2022-09-12 DIAGNOSIS — I63.9 CEREBROVASCULAR ACCIDENT (CVA), UNSPECIFIED MECHANISM (H): ICD-10-CM

## 2022-09-12 DIAGNOSIS — D32.9 MENINGIOMA (H): ICD-10-CM

## 2022-09-12 DIAGNOSIS — I63.9 CEREBROVASCULAR ACCIDENT (CVA), UNSPECIFIED MECHANISM (H): Primary | ICD-10-CM

## 2022-09-12 LAB
CHOLEST SERPL-MCNC: 151 MG/DL
FASTING STATUS PATIENT QL REPORTED: NO
HBA1C MFR BLD: 7.3 %
HDLC SERPL-MCNC: 26 MG/DL
LDLC SERPL CALC-MCNC: 99 MG/DL
TRIGL SERPL-MCNC: 130 MG/DL

## 2022-09-12 PROCEDURE — 99205 OFFICE O/P NEW HI 60 MIN: CPT | Performed by: PSYCHIATRY & NEUROLOGY

## 2022-09-12 PROCEDURE — 83036 HEMOGLOBIN GLYCOSYLATED A1C: CPT

## 2022-09-12 PROCEDURE — 36415 COLL VENOUS BLD VENIPUNCTURE: CPT

## 2022-09-12 PROCEDURE — 80061 LIPID PANEL: CPT

## 2022-09-12 NOTE — LETTER
9/12/2022         RE: Josefina Dumont  [address]  [facility] [place] [address]        Dear Colleague,    Thank you for referring your patient, Josefina Dumont, to the [facility]. Please see a copy of my visit note below.    [facility] Neurology Consultation    Josefina Dumont MRN# [id number]   Age: 72 year old YOB: 1950     Requesting physician: Rafat Zurita     Reason for Consultation: Meningioma      History of Presenting Symptoms:   Josefina Dumont is a 72 year old female who presents today for evaluation of Meningioma.  She has a pertinent medical history of DMII, and HTN.    The patient was previously seen with [facility] general neurology 1/13/2022 for evaluation of possible seizure.  She had an acute event of altered consciousness with poor recall/amnesia following.  This led to an ED visit, and then a Head CT showed left frontal mass.  MRI brain showed a L-frontal convexity meningioma with vasogenic edema, as well as very small punctate ischemia stroke in the right centrum semi-ovale.  Given the infarction, she was started on Plavix (ASA intolerant), but then developed a tiny SDH leading to a headache.  She was to hold Plavix (10 days) until a repeat Head CT showed stable SDH.  Dexamethasone was being given for vasogenic edema through Neurosurgery.  Repeat Head CT 4/18/2022 showed stable meningioma.  Repeat MRI in 10/2022 through Neurosurgery was recommended to monitor meningioma and if the tumor was growing, a surgical resection was to be recommended.    After discharge, the patient was admitted 8/11/2022 with syncope.  Overall findings on echocardiogram showed a likely non-rheumatic MV stenosis.    She had side-effects from statin drugs in the past, sore-hips when walking.     Today, the patient still has a constant head pressure above her left eye.  It can ache, and feels better with touching her skin.  There is no LOC, no  new shooting/stabbing head pain, no nausea/vomiting/photophobia/phonophobia, no weakness or sensory loss, no imbalance, no slurred speech, and no episodes of syncope.     Medications:   Plavix  Citalopram  Amlodipine     Physical Exam:   Vitals: BP (!) 167/92 (BP Location: Right arm, Patient Position: Sitting)   Pulse 69    General: Seated comfortably in no acute distress.  Neurologic:     Mental Status: Fully alert, attentive and oriented.      Cranial Nerves: Visual fields intact. PERRL. EOMI with normal smooth pursuit. Facial sensation intact/symmetric. Facial movements symmetric. Hearing not formally tested but intact to conversation.      Motor: No tremors or other abnormal movements observed.      Coordination: Finger-nose-finger and heel-shin intact without dysmetria.      Gait: Normal, steady casual gait.          Assessment and Plan:   Assessment:  SDH - resolved  CVA, small vessel or possibly embolic (ESUS)  L-frontal meningioma    The patient's head pain is most likely related to her L-frontal meningioma.  At this time, other than head pain, she has no focal deficits related to the meningoma.  Steroid treatment is unlikely a good short or even long-term option given her sensitivity to it with her diabetes.  I would defer further surgical intervention to the patient's neurosurgeon as well as pending repeat imaging and symptom progression.    Her CVA was small and possibly embolic in nature given her heart valve related disease. She has been taking plavix, but is not on a statin or diabetes medication.  To better address her stroke prophylaxis, she will need close monitoring with her PCP relating to LDL, A1c, and BP reporting.      Plan:  - LDL, A1c  - Await MRI mariela scheduled for 10/2022    Follow up in Neurology clinic in 5-6 months, video, or should new concerns arise.    LUTHER Villegas D.O.   of Neurology    Total time today (87 min) in this patient encounter was spent on  pre-charting, counseling and/or coordination of care.  The patient is in agreement with this plan and has no further questions.        Again, thank you for allowing me to participate in the care of your patient.        Sincerely,        Jacob Villegas, DO

## 2022-09-12 NOTE — PROGRESS NOTES
[facility] Neurology Consultation    Josefina Dumont MRN# [id number]   Age: 72 year old YOB: 1950     Requesting physician: Rafat Zurita     Reason for Consultation: Meningioma      History of Presenting Symptoms:   Josefina Dumont is a 72 year old female who presents today for evaluation of Meningioma.  She has a pertinent medical history of DMII, and HTN.    The patient was previously seen with [facility] general neurology 1/13/2022 for evaluation of possible seizure.  She had an acute event of altered consciousness with poor recall/amnesia following.  This led to an ED visit, and then a Head CT showed left frontal mass.  MRI brain showed a L-frontal convexity meningioma with vasogenic edema, as well as very small punctate ischemia stroke in the right centrum semi-ovale.  Given the infarction, she was started on Plavix (ASA intolerant), but then developed a tiny SDH leading to a headache.  She was to hold Plavix (10 days) until a repeat Head CT showed stable SDH.  Dexamethasone was being given for vasogenic edema through Neurosurgery.  Repeat Head CT 4/18/2022 showed stable meningioma.  Repeat MRI in 10/2022 through Neurosurgery was recommended to monitor meningioma and if the tumor was growing, a surgical resection was to be recommended.    After discharge, the patient was admitted 8/11/2022 with syncope.  Overall findings on echocardiogram showed a likely non-rheumatic MV stenosis.    She had side-effects from statin drugs in the past, sore-hips when walking.     Today, the patient still has a constant head pressure above her left eye.  It can ache, and feels better with touching her skin.  There is no LOC, no new shooting/stabbing head pain, no nausea/vomiting/photophobia/phonophobia, no weakness or sensory loss, no imbalance, no slurred speech, and no episodes of syncope.     Medications:   Plavix  Citalopram  Amlodipine     Physical Exam:   Vitals: BP (!) 167/92 (BP Location: Right  arm, Patient Position: Sitting)   Pulse 69    General: Seated comfortably in no acute distress.  Neurologic:     Mental Status: Fully alert, attentive and oriented.      Cranial Nerves: Visual fields intact. PERRL. EOMI with normal smooth pursuit. Facial sensation intact/symmetric. Facial movements symmetric. Hearing not formally tested but intact to conversation.      Motor: No tremors or other abnormal movements observed.      Coordination: Finger-nose-finger and heel-shin intact without dysmetria.      Gait: Normal, steady casual gait.          Assessment and Plan:   Assessment:  SDH - resolved  CVA, small vessel or possibly embolic (ESUS)  L-frontal meningioma    The patient's head pain is most likely related to her L-frontal meningioma.  At this time, other than head pain, she has no focal deficits related to the meningoma.  Steroid treatment is unlikely a good short or even long-term option given her sensitivity to it with her diabetes.  I would defer further surgical intervention to the patient's neurosurgeon as well as pending repeat imaging and symptom progression.    Her CVA was small and possibly embolic in nature given her heart valve related disease. She has been taking plavix, but is not on a statin or diabetes medication.  To better address her stroke prophylaxis, she will need close monitoring with her PCP relating to LDL, A1c, and BP reporting.      Plan:  - LDL, A1c  - Await MRI mariela scheduled for 10/2022    Follow up in Neurology clinic in 5-6 months, video, or should new concerns arise.    LUTHER Villegas D.O.   of Neurology    Total time today (87 min) in this patient encounter was spent on pre-charting, counseling and/or coordination of care.  The patient is in agreement with this plan and has no further questions.

## 2022-09-12 NOTE — NURSING NOTE
Chief Complaint   Patient presents with     Stroke     Referred by ERICH Issa on 9/12/2022 at 10:52 AM

## 2022-09-12 NOTE — PATIENT INSTRUCTIONS
In the hospital:  - you were found to have a few issues:   1. You had a stroke.  This type of stroke is ischemic.  It was likely due to a small blockages in a blood vessel of middle right part of the brain.  This is why you started Plavix.  This is why you should monitor your LDL, blood pressure, and diabetes as you are.   - LDL, A1c   - Continue with Plavix   - Continue with Blood pressure goals under 130/80     2. You had a Subdural hematoma.  This likely occurred after your fall.  It has resolved with repeat imaging done a few months after.  There is no further recommendation regarding this issue.     3.  You have a left frontal meningioma. It is still present. It is likely leading to some headaches you have.  This is why you are having repeat imaging in October.  If you continue to have headaches, it may need to be removed, especially if the new images show that the meningioma is worsening over time (growing).

## 2022-10-03 ENCOUNTER — TELEPHONE (OUTPATIENT)
Dept: PHYSICAL MEDICINE AND REHAB | Facility: CLINIC | Age: 72
End: 2022-10-03

## 2022-10-03 NOTE — TELEPHONE ENCOUNTER
Returned call to pt and informed her a prescription for oral sedation will be sent to [facility] pharmacy.     Jaz Manzanares RN

## 2022-10-03 NOTE — TELEPHONE ENCOUNTER
Phoned in Valium 5 mg #2 to take 1 tab PO 1 hour prior to MRI, may repeat another 1 tab 30 minutes prior to test prn. No refills. Dated to fill on 10/10/2022 as the MRI is not until 10/14/2022.     Jaz Manzanares RN

## 2022-10-03 NOTE — TELEPHONE ENCOUNTER
Attempted to call [facility], they are closed and will reopen at 2pm. Will call again this afternoon.     Jaz Manzanares RN

## 2022-10-14 ENCOUNTER — HOSPITAL ENCOUNTER (OUTPATIENT)
Dept: MRI IMAGING | Facility: CLINIC | Age: 72
Discharge: HOME | End: 2022-10-14
Attending: SURGERY | Admitting: SURGERY
Payer: COMMERCIAL

## 2022-10-14 DIAGNOSIS — D32.9 MENINGIOMA (H): ICD-10-CM

## 2022-10-14 PROCEDURE — 255N000002 HC RX 255 OP 636: Performed by: SURGERY

## 2022-10-14 PROCEDURE — A9585 GADOBUTROL INJECTION: HCPCS | Performed by: SURGERY

## 2022-10-14 PROCEDURE — 70553 MRI BRAIN STEM W/O & W/DYE: CPT

## 2022-10-14 RX ORDER — GADOBUTROL 604.72 MG/ML
10 INJECTION INTRAVENOUS ONCE
Status: COMPLETED | OUTPATIENT
Start: 2022-10-14 | End: 2022-10-14

## 2022-10-14 RX ADMIN — GADOBUTROL 10 ML: 604.72 INJECTION INTRAVENOUS at 10:34

## 2022-10-18 ENCOUNTER — TELEPHONE (OUTPATIENT)
Dept: NEUROSURGERY | Facility: CLINIC | Age: 72
End: 2022-10-18

## 2022-10-18 NOTE — TELEPHONE ENCOUNTER
Patient states that she has tested postivie for COVID and has the flu; unable to make it to her appt tomorrow.  If possible wonders if a phone visit would be ok to discuss imaging results.  Best contact is Patient states that she has tested postivie for COVID and has the flu

## 2022-10-19 ENCOUNTER — VIRTUAL VISIT (OUTPATIENT)
Dept: NEUROSURGERY | Facility: CLINIC | Age: 72
End: 2022-10-19
Payer: COMMERCIAL

## 2022-10-19 VITALS — WEIGHT: 210 LBS | BODY MASS INDEX: 35.85 KG/M2 | HEIGHT: 64 IN

## 2022-10-19 DIAGNOSIS — D32.9 MENINGIOMA (H): Primary | ICD-10-CM

## 2022-10-19 PROCEDURE — 99442 PR PHYSICIAN TELEPHONE EVALUATION 11-20 MIN: CPT | Performed by: NURSE PRACTITIONER

## 2022-10-19 NOTE — PROGRESS NOTES
Neurosurgery telephone visit    A/P:  Josefina is a 71 yo F who presents for follow up visit for surveillance MRI results for a small left frontal meningioma incidentally found on a head CT done in the ER for unwitnessed fall on 1/12/22. Imaging stable in April. 6mo repeat scan recommended. Dr Cullen patient.    Josefina is doing well. She does complain of several months of headaches, in the forehead and behind the eyes. Feels like a sinus infection. The timing is random, typically happens 4x per week. Sometimes they are really bad, other times not that bad. They go away on their own. She has had follow up with neurology and their opinion is that its the meningioma. She has stable chronic dizziness with turning and walking which is unchanged. No new seizures, n/v, dizziness, change in vision.    We reviewed Josefina's new brain MRI which shows the tumor is stable in size after 3 mos. There is local edema which is unchanged compared to January 2022. Would recommend repeating another MRI in another 6 mos. Per DR Cullen's last note, if the tumor gets bigger it should likely be surgically removed.    Plan:  1. follow-up brain MRI w wo contrast in 6 months, sooner as needed  2. follow-up with neurology.      No exam performed since this was a telephone visit      Imaging:  Personally reviewed images. Report reviewed with patient  EXAM: MR BRAIN WITHOUT AND WITH CONTRAST  LOCATION: [facility]  DATE/TIME: 10/14/2022, 10:55 AM     INDICATION: Meningioma (H).  COMPARISON: 04/18/2022, 01/12/2022 MR brain evaluations.  CONTRAST: 10 mL Frank.  TECHNIQUE: Routine multiplanar multisequence head MRI without and with intravenous contrast.     FINDINGS: Redemonstration of dural-based homogeneous enhancing extra-axial mass within the left paramedian anterior cranial fossa extending superiorly with some extension to the interhemispheric level. This mass measures 33 mm SI by 15 mm AP by 25 mm ML   and remains  compatible with a stable extra-axial benign meningioma. There is some adjacent nonenhancing T2/FLAIR hyperintense signal abnormality as before at the adjacent paramedian left frontal lobe level which is fairly stable in appearance, and   extends confluently along the margin of the frontal horn of the left lateral ventricle series 4 image 14. Dural tail sign is present extending to the left of midline series 10 image 15, and into the interhemispheric fissure level series 1101 image 43.   Mild mass effect upon the parenchyma best seen on the FLAIR sequence series 5 image 14, stable. No evidence for enlargement or aggressive features. No destructive changes of the inner table. No additional pathologic enhancement intracranially. Dural   venous sinus patterns of enhancement are satisfactory including at the level of the anterior margin of the superior sagittal sinus series 11 image 59. No extension of this mass to the cribriform plate is evident.     There is no evidence for acute or subacute infarction. There is some restricted diffusion abnormality associated with the extra-axial mass/meningioma likely due to inherent cellularity of this lesion. Otherwise, no additional evidence for mass, mass   effect, recent or chronic hemorrhage, or extra-axial blood products/fluid collections. Patchy nonspecific T2/FLAIR hyperintensities within the cerebral white matter most consistent with mild to moderate chronic microvascular ischemic change. Moderate   generalized cerebral atrophy. No hydrocephalus. Normal position of the cerebellar tonsils. Corpus callosum is normal.     SELLA: No abnormality accounting for technique.     OSSEOUS STRUCTURES/SOFT TISSUES: Nothing for a marrow infiltrative process. The major intracranial vascular flow voids are maintained.      ORBITS: No abnormality accounting for technique.      SINUSES/MASTOIDS: Mucosal thickening primarily involving the ethmoid air cells. Scattered fluid/membrane thickening  in the mastoid air cells bilaterally.                                                                       IMPRESSION:  1.  Overall stable appearance from prior comparison imaging listed above, with stable extra-axial dural based homogeneous enhancing mass compatible with meningioma within the anterior cranial fossa left paramedian aspect superiorly.     2.  No significant change in size, degree of mass effect, or signal/enhancement characteristics.     3.  No aggressive features are noted.     4.  Nothing for recent or evolving infarction.     5.  No additional pathologic enhancement.     6.  Stable chronic ischemic changes noted deep white matter both cerebral hemispheres.     7.  Please see above for additional details and full description.     Damaris Ovalle FNP-C  [facility] Neurosurgery  O. [phone]      16mins spent over the phone with farrah

## 2022-10-19 NOTE — LETTER
10/19/2022         RE: Josefina Dumont  [address]  [facility] [place] [address]        Dear Colleague,    Thank you for referring your patient, Josefina Dumont, to the [facility]. Please see a copy of my visit note below.    Neurosurgery telephone visit    A/P:  Josefina is a 73 yo F who presents for follow up visit for surveillance MRI results for a small left frontal meningioma incidentally found on a head CT done in the ER for unwitnessed fall on 1/12/22. Imaging stable in April. 6mo repeat scan recommended. Dr Cullen patient.    Josefina is doing well. She does complain of several months of headaches, in the forehead and behind the eyes. Feels like a sinus infection. The timing is random, typically happens 4x per week. Sometimes they are really bad, other times not that bad. They go away on their own. She has had follow up with neurology and their opinion is that its the meningioma. She has stable chronic dizziness with turning and walking which is unchanged. No new seizures, n/v, dizziness, change in vision.    We reviewed Josefina's new brain MRI which shows the tumor is stable in size after 3 mos. There is local edema which is unchanged compared to January 2022. Would recommend repeating another MRI in another 6 mos. Per DR Cullen's last note, if the tumor gets bigger it should likely be surgically removed.    Plan:  1. follow-up brain MRI w wo contrast in 6 months, sooner as needed  2. follow-up with neurology.      No exam performed since this was a telephone visit      Imaging:  Personally reviewed images. Report reviewed with patient  EXAM: MR BRAIN WITHOUT AND WITH CONTRAST  LOCATION: [facility]  DATE/TIME: 10/14/2022, 10:55 AM     INDICATION: Meningioma (H).  COMPARISON: 04/18/2022, 01/12/2022 MR brain evaluations.  CONTRAST: 10 mL Frank.  TECHNIQUE: Routine multiplanar multisequence head MRI without and with intravenous  contrast.     FINDINGS: Redemonstration of dural-based homogeneous enhancing extra-axial mass within the left paramedian anterior cranial fossa extending superiorly with some extension to the interhemispheric level. This mass measures 33 mm SI by 15 mm AP by 25 mm ML   and remains compatible with a stable extra-axial benign meningioma. There is some adjacent nonenhancing T2/FLAIR hyperintense signal abnormality as before at the adjacent paramedian left frontal lobe level which is fairly stable in appearance, and   extends confluently along the margin of the frontal horn of the left lateral ventricle series 4 image 14. Dural tail sign is present extending to the left of midline series 10 image 15, and into the interhemispheric fissure level series 1101 image 43.   Mild mass effect upon the parenchyma best seen on the FLAIR sequence series 5 image 14, stable. No evidence for enlargement or aggressive features. No destructive changes of the inner table. No additional pathologic enhancement intracranially. Dural   venous sinus patterns of enhancement are satisfactory including at the level of the anterior margin of the superior sagittal sinus series 11 image 59. No extension of this mass to the cribriform plate is evident.     There is no evidence for acute or subacute infarction. There is some restricted diffusion abnormality associated with the extra-axial mass/meningioma likely due to inherent cellularity of this lesion. Otherwise, no additional evidence for mass, mass   effect, recent or chronic hemorrhage, or extra-axial blood products/fluid collections. Patchy nonspecific T2/FLAIR hyperintensities within the cerebral white matter most consistent with mild to moderate chronic microvascular ischemic change. Moderate   generalized cerebral atrophy. No hydrocephalus. Normal position of the cerebellar tonsils. Corpus callosum is normal.     SELLA: No abnormality accounting for technique.     OSSEOUS STRUCTURES/SOFT  TISSUES: Nothing for a marrow infiltrative process. The major intracranial vascular flow voids are maintained.      ORBITS: No abnormality accounting for technique.      SINUSES/MASTOIDS: Mucosal thickening primarily involving the ethmoid air cells. Scattered fluid/membrane thickening in the mastoid air cells bilaterally.                                                                       IMPRESSION:  1.  Overall stable appearance from prior comparison imaging listed above, with stable extra-axial dural based homogeneous enhancing mass compatible with meningioma within the anterior cranial fossa left paramedian aspect superiorly.     2.  No significant change in size, degree of mass effect, or signal/enhancement characteristics.     3.  No aggressive features are noted.     4.  Nothing for recent or evolving infarction.     5.  No additional pathologic enhancement.     6.  Stable chronic ischemic changes noted deep white matter both cerebral hemispheres.     7.  Please see above for additional details and full description.     Damaris MATHIS-C  [facility] Neurosurgery  O. [phone]      16mins spent over the phone with patietn      Again, thank you for allowing me to participate in the care of your patient.        Sincerely,        THAD Mclean CNP

## 2023-01-10 ENCOUNTER — LAB REQUISITION (OUTPATIENT)
Dept: LAB | Facility: CLINIC | Age: 73
End: 2023-01-10
Payer: COMMERCIAL

## 2023-01-10 DIAGNOSIS — E78.2 MIXED HYPERLIPIDEMIA: ICD-10-CM

## 2023-01-10 DIAGNOSIS — I12.9 HYPERTENSIVE CHRONIC KIDNEY DISEASE WITH STAGE 1 THROUGH STAGE 4 CHRONIC KIDNEY DISEASE, OR UNSPECIFIED CHRONIC KIDNEY DISEASE: ICD-10-CM

## 2023-01-10 LAB
ANION GAP SERPL CALCULATED.3IONS-SCNC: 12 MMOL/L (ref 7–15)
BUN SERPL-MCNC: 11.4 MG/DL (ref 8–23)
CALCIUM SERPL-MCNC: 9.3 MG/DL (ref 8.8–10.2)
CHLORIDE SERPL-SCNC: 104 MMOL/L (ref 98–107)
CHOLEST SERPL-MCNC: 169 MG/DL
CREAT SERPL-MCNC: 0.67 MG/DL (ref 0.51–0.95)
DEPRECATED HCO3 PLAS-SCNC: 24 MMOL/L (ref 22–29)
GFR SERPL CREATININE-BSD FRML MDRD: >90 ML/MIN/1.73M2
GLUCOSE SERPL-MCNC: 189 MG/DL (ref 70–99)
HDLC SERPL-MCNC: 35 MG/DL
LDLC SERPL CALC-MCNC: 111 MG/DL
NONHDLC SERPL-MCNC: 134 MG/DL
POTASSIUM SERPL-SCNC: 3.8 MMOL/L (ref 3.4–5.3)
SODIUM SERPL-SCNC: 140 MMOL/L (ref 136–145)
TRIGL SERPL-MCNC: 113 MG/DL

## 2023-01-10 PROCEDURE — 80048 BASIC METABOLIC PNL TOTAL CA: CPT | Mod: ORL | Performed by: FAMILY MEDICINE

## 2023-01-10 PROCEDURE — 80061 LIPID PANEL: CPT | Mod: ORL | Performed by: FAMILY MEDICINE

## 2023-03-21 ENCOUNTER — TELEPHONE (OUTPATIENT)
Dept: NEUROSURGERY | Facility: CLINIC | Age: 73
End: 2023-03-21
Payer: COMMERCIAL

## 2023-03-21 NOTE — TELEPHONE ENCOUNTER
Patient scheduled appointment for MRI, will need sedation for claustrophobia.   Patient requested a call once order has been placed so she knows when ti pick it up.

## 2023-04-18 ENCOUNTER — TRANSFERRED RECORDS (OUTPATIENT)
Dept: HEALTH INFORMATION MANAGEMENT | Facility: CLINIC | Age: 73
End: 2023-04-18

## 2023-04-18 LAB — RETINOPATHY: NEGATIVE

## 2023-04-25 ENCOUNTER — HOSPITAL ENCOUNTER (OUTPATIENT)
Dept: MRI IMAGING | Facility: CLINIC | Age: 73
Discharge: HOME | End: 2023-04-25
Attending: NURSE PRACTITIONER | Admitting: NURSE PRACTITIONER
Payer: COMMERCIAL

## 2023-04-25 DIAGNOSIS — D32.9 MENINGIOMA (H): ICD-10-CM

## 2023-04-25 PROCEDURE — A9585 GADOBUTROL INJECTION: HCPCS | Performed by: NURSE PRACTITIONER

## 2023-04-25 PROCEDURE — 70553 MRI BRAIN STEM W/O & W/DYE: CPT

## 2023-04-25 PROCEDURE — 255N000002 HC RX 255 OP 636: Performed by: NURSE PRACTITIONER

## 2023-04-25 RX ORDER — GADOBUTROL 604.72 MG/ML
10 INJECTION INTRAVENOUS ONCE
Status: COMPLETED | OUTPATIENT
Start: 2023-04-25 | End: 2023-04-25

## 2023-04-25 RX ADMIN — GADOBUTROL 10 ML: 604.72 INJECTION INTRAVENOUS at 10:46

## 2023-05-01 ENCOUNTER — OFFICE VISIT (OUTPATIENT)
Dept: NEUROLOGY | Facility: CLINIC | Age: 73
End: 2023-05-01
Payer: COMMERCIAL

## 2023-05-01 VITALS — SYSTOLIC BLOOD PRESSURE: 190 MMHG | DIASTOLIC BLOOD PRESSURE: 97 MMHG | HEART RATE: 83 BPM

## 2023-05-01 DIAGNOSIS — D32.9 MENINGIOMA (H): Primary | ICD-10-CM

## 2023-05-01 PROCEDURE — 99214 OFFICE O/P EST MOD 30 MIN: CPT | Performed by: PSYCHIATRY & NEUROLOGY

## 2023-05-01 NOTE — PATIENT INSTRUCTIONS
I feel your meningioma is symptomatic and leading to head pain, and now possibly a seizure. I would like you to obtain an EEG to determine if you have localized brain dysfunction.    I do think a follow up with your neurosurgeon is needed.  The tumor hasn't grown, but it remains symptomatic.  I do not feel the tumor related pain will respond to typical headache/migraine management at this time.

## 2023-05-01 NOTE — LETTER
"    5/1/2023         RE: Josefina Dumont  [address]  [facility] [place] [address]        Dear Colleague,    Thank you for referring your patient, Josefina Dumont, to the [facility] NEUROLOGY CLINIC [facility]. Please see a copy of my visit note below.    [facility] Neurology Follow Up Visit    Josefina Dumont MRN# [id number]   Age: 73 year old YOB: 1950     Brief history of symptoms: The patient was initially seen in neurologic consultation on 9/12/2022 for evaluation of meningioma. Please see the comprehensive neurologic consultation notes from those dates in the Epic records for details.     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      Interval history:   - repeat MRI brain 4/25/2023 revealed stable left frontal convexity meningioma ( 1.9 cm x 3.5 cm x 3.7 cm) compared to imaging 10/14/2022 of (1.5 cm x 3.3 cm).  - LDL 1/10/2023 was 111  - A1c  9/12/2022 was 7.3    Today, the patient is still having pain in the forehead and possibly over her left eye at times.  The pain is constant and unrelenting. She gets eye twitching from the pain.  The pain is improved with ibuprofen or tylenol, and she is taking tylenol every evening at this point.  She has dizziness at times, which can be present when walking or standing quickly.  She has had events of turning grey or pale and being confused which lasted 10 minutes or so.  Her friends who witness the event recently indicates she looked confused upon standing quickly and stopped talking, \"like a deer in headlights\".  There was no atypical movements, LOC, passing out, or memory loss noted.     Physical Exam:   Vitals: BP (!) 190/97 (BP Location: Right arm, Patient Position: Sitting, Cuff Size: Adult Regular)   Pulse 83    General: Seated comfortably in no acute distress.  Neurologic:     Mental Status: Fully alert, attentive and oriented. Speech clear " and fluent, no paraphasic errors.     Cranial Nerves: EOMI with normal smooth pursuit. Facial movements symmetric. Hearing not formally tested but intact to conversation.  No dysarthria.     Motor: No tremors or other abnormal movements observed.      Coordination: Finger-nose-finger without dysmetria.     Gait: Normal, steady casual gait.         Assessment and Plan:   Assessment:  L-frontal meningioma, symptomatic     I suspect the patient's pain is referred from meningeal irritation and not that of a primary headache condition, therefore it wouldn't respond well to primary headache related medications.  She may benefit from seeing her neurosurgery providers given the pain is a symptom of her meningioma, and even through her meningioma is stable in terms of size, it is still symptomatic and may require resection due to medical treatment likely requiring long term pain medications.  Her event of zoning out is concerning for a seizure, and I asked her to obtain an EEG to better determine if her left frontal meningioma is leading to focal dysfunction or cortical irritation.    Plan:  EEG 3 hour sleep deprived  Follow up with neurosurgery to consider intervention on meningioma leading to head pain    Follow up in Neurology clinic in 3 months (video or phone) or earlier as needed should new concerns arise.    LUTHER Villegas D.O.   of Neurology    Total time today (35 min) in this patient encounter was spent on pre-charting, counseling and/or coordination of care.         Again, thank you for allowing me to participate in the care of your patient.        Sincerely,        Jacob Villegas, DO

## 2023-05-03 ENCOUNTER — TELEPHONE (OUTPATIENT)
Dept: NEUROSURGERY | Facility: CLINIC | Age: 73
End: 2023-05-03
Payer: COMMERCIAL

## 2023-05-03 NOTE — TELEPHONE ENCOUNTER
Patient called asking to schedule an appt with Dr Cullen. She has a 3 hour EEG schedule Jun 16th.  Do you think she should wait until then or can she come sooner?  Thank you

## 2023-05-03 NOTE — TELEPHONE ENCOUNTER
Called and let Josefina know Dr. Cullen would like her to follow up after her EEG test.  Michelle Leos RN, CNRN

## 2023-06-16 ENCOUNTER — ANCILLARY PROCEDURE (OUTPATIENT)
Dept: NEUROLOGY | Facility: CLINIC | Age: 73
End: 2023-06-16
Attending: PSYCHIATRY & NEUROLOGY
Payer: COMMERCIAL

## 2023-06-16 DIAGNOSIS — D32.9 MENINGIOMA (H): ICD-10-CM

## 2023-06-16 PROCEDURE — 95816 EEG AWAKE AND DROWSY: CPT | Performed by: PSYCHIATRY & NEUROLOGY

## 2023-06-23 ENCOUNTER — OFFICE VISIT (OUTPATIENT)
Dept: NEUROSURGERY | Facility: CLINIC | Age: 73
End: 2023-06-23
Payer: COMMERCIAL

## 2023-06-23 VITALS — OXYGEN SATURATION: 96 % | SYSTOLIC BLOOD PRESSURE: 175 MMHG | DIASTOLIC BLOOD PRESSURE: 80 MMHG | HEART RATE: 61 BPM

## 2023-06-23 DIAGNOSIS — D32.9 MENINGIOMA (H): Primary | ICD-10-CM

## 2023-06-23 PROCEDURE — 99214 OFFICE O/P EST MOD 30 MIN: CPT | Performed by: SURGERY

## 2023-06-23 ASSESSMENT — PAIN SCALES - GENERAL: PAINLEVEL: SEVERE PAIN (7)

## 2023-06-23 NOTE — LETTER
6/23/2023         RE: Josefina Dumont  [address]  [place] [id number]        Dear Colleague,    Thank you for referring your patient, Josefina Dumont, to the [facility]. Please see a copy of my visit note below.    The patient is 73 years old.  She is here with a friend.  She complains of left frontal headache.  On MRI she has a left frontal meningioma with associated edema. Maximum dimension is 3.7 cm.  It has remained unchanged since January 2022.  I showed the MRI pictures to the patient and her friend.  We talked about surgery.  We included the nature of the problem, nature of the surgery, rationale for doing it, goals, benefits, alternatives, and significant risks and complications.  I answered all their questions.  They said they were satisfied with the explanation and understood.  The argument in favor of surgery is the symptom of headache and the edema associated with the tumor.  The argument against surgery is that the tumor has remained unchanged since it was first diagnosed January 2022.  The patient is going to get a follow-up MRI 6 months from the last with a comparison back to January 2022.  If there is tumor progression that would tip the balance toward surgery.  Right now the patient would like to avoid surgery if possible.  She is going to visit her neurologist to talk about seizures and her EEG result.  The patient and her friend are satisfied with the plan.  Total time 25 minutes, more than 50% spent counseling and/or coordinating care.      Again, thank you for allowing me to participate in the care of your patient.        Sincerely,        Abhishek Cullen MD

## 2023-06-23 NOTE — PROGRESS NOTES
The patient is 73 years old.  She is here with a friend.  She complains of left frontal headache.  On MRI she has a left frontal meningioma with associated edema. Maximum dimension is 3.7 cm.  It has remained unchanged since January 2022.  I showed the MRI pictures to the patient and her friend.  We talked about surgery.  We included the nature of the problem, nature of the surgery, rationale for doing it, goals, benefits, alternatives, and significant risks and complications.  I answered all their questions.  They said they were satisfied with the explanation and understood.  The argument in favor of surgery is the symptom of headache and the edema associated with the tumor.  The argument against surgery is that the tumor has remained unchanged since it was first diagnosed January 2022.  The patient is going to get a follow-up MRI 6 months from the last with a comparison back to January 2022.  If there is tumor progression that would tip the balance toward surgery.  Right now the patient would like to avoid surgery if possible.  She is going to visit her neurologist to talk about seizures and her EEG result.  The patient and her friend are satisfied with the plan.  Total time 25 minutes, more than 50% spent counseling and/or coordinating care.

## 2023-06-23 NOTE — NURSING NOTE
"Josefina Dumont is a 73 year old female who presents for:  Chief Complaint   Patient presents with     Follow Up     Review MRI and EEG results  Meningioma   Headaches   Pain in left forehead and behind left eye        Initial Vitals:  BP (!) 175/80   Pulse 61   SpO2 96%  Estimated body mass index is 36.05 kg/m  as calculated from the following:    Height as of 10/19/22: 5' 4\" (1.626 m).    Weight as of 10/19/22: 210 lb (95.3 kg).. There is no height or weight on file to calculate BSA. BP completed using cuff size: regular  Severe Pain (7)    Nursing Comments: Josefina to follow up with Primary Care provider regarding elevated blood pressure.      Xavi Cunha  "

## 2023-09-26 ENCOUNTER — TELEPHONE (OUTPATIENT)
Dept: NEUROSURGERY | Facility: CLINIC | Age: 73
End: 2023-09-26

## 2023-09-26 NOTE — TELEPHONE ENCOUNTER
Phoned in Valium 5 mg #2 to take 1 tab PO 1 hour prior to MRI, may repeat another 1 tab 30 minutes prior to test prn.  Josefina was informed.  Michelle Leos, RN, CNRN

## 2023-09-26 NOTE — TELEPHONE ENCOUNTER
Please contact patient back with VALIUM order for her MRI scheduled on 10/25/23 @ 9:45 am & with instructions on how to prep for her MRI.    Call back #: [phone]

## 2023-10-19 ENCOUNTER — LAB REQUISITION (OUTPATIENT)
Dept: LAB | Facility: CLINIC | Age: 73
End: 2023-10-19
Payer: COMMERCIAL

## 2023-10-19 DIAGNOSIS — E11.42 TYPE 2 DIABETES MELLITUS WITH DIABETIC POLYNEUROPATHY (H): ICD-10-CM

## 2023-10-19 DIAGNOSIS — I12.9 HYPERTENSIVE CHRONIC KIDNEY DISEASE WITH STAGE 1 THROUGH STAGE 4 CHRONIC KIDNEY DISEASE, OR UNSPECIFIED CHRONIC KIDNEY DISEASE: ICD-10-CM

## 2023-10-19 LAB
ANION GAP SERPL CALCULATED.3IONS-SCNC: 14 MMOL/L (ref 7–15)
BUN SERPL-MCNC: 16.3 MG/DL (ref 8–23)
CALCIUM SERPL-MCNC: 9 MG/DL (ref 8.8–10.2)
CHLORIDE SERPL-SCNC: 103 MMOL/L (ref 98–107)
CREAT SERPL-MCNC: 0.82 MG/DL (ref 0.51–0.95)
CREAT UR-MCNC: 106 MG/DL
DEPRECATED HCO3 PLAS-SCNC: 24 MMOL/L (ref 22–29)
EGFRCR SERPLBLD CKD-EPI 2021: 76 ML/MIN/1.73M2
GLUCOSE SERPL-MCNC: 96 MG/DL (ref 70–99)
MICROALBUMIN UR-MCNC: 500 MG/L
MICROALBUMIN/CREAT UR: 471.7 MG/G CR (ref 0–25)
POTASSIUM SERPL-SCNC: 3.8 MMOL/L (ref 3.4–5.3)
SODIUM SERPL-SCNC: 141 MMOL/L (ref 135–145)

## 2023-10-19 PROCEDURE — 82570 ASSAY OF URINE CREATININE: CPT | Mod: ORL | Performed by: STUDENT IN AN ORGANIZED HEALTH CARE EDUCATION/TRAINING PROGRAM

## 2023-10-19 PROCEDURE — 80048 BASIC METABOLIC PNL TOTAL CA: CPT | Mod: ORL | Performed by: STUDENT IN AN ORGANIZED HEALTH CARE EDUCATION/TRAINING PROGRAM

## 2023-10-29 ENCOUNTER — HOSPITAL ENCOUNTER (OUTPATIENT)
Dept: MRI IMAGING | Facility: CLINIC | Age: 73
Discharge: HOME | End: 2023-10-29
Attending: SURGERY | Admitting: SURGERY
Payer: COMMERCIAL

## 2023-10-29 DIAGNOSIS — D32.9 MENINGIOMA (H): ICD-10-CM

## 2023-10-29 PROCEDURE — A9585 GADOBUTROL INJECTION: HCPCS | Performed by: SURGERY

## 2023-10-29 PROCEDURE — 70553 MRI BRAIN STEM W/O & W/DYE: CPT

## 2023-10-29 PROCEDURE — 255N000002 HC RX 255 OP 636: Performed by: SURGERY

## 2023-10-29 RX ORDER — GADOBUTROL 604.72 MG/ML
10 INJECTION INTRAVENOUS ONCE
Status: COMPLETED | OUTPATIENT
Start: 2023-10-29 | End: 2023-10-29

## 2023-10-29 RX ADMIN — GADOBUTROL 10 ML: 604.72 INJECTION INTRAVENOUS at 15:45

## 2023-11-29 ENCOUNTER — OFFICE VISIT (OUTPATIENT)
Dept: NEUROSURGERY | Facility: CLINIC | Age: 73
End: 2023-11-29
Payer: COMMERCIAL

## 2023-11-29 VITALS — DIASTOLIC BLOOD PRESSURE: 86 MMHG | SYSTOLIC BLOOD PRESSURE: 160 MMHG | OXYGEN SATURATION: 95 % | HEART RATE: 76 BPM

## 2023-11-29 DIAGNOSIS — D32.9 MENINGIOMA (H): Primary | ICD-10-CM

## 2023-11-29 PROCEDURE — 99213 OFFICE O/P EST LOW 20 MIN: CPT | Performed by: SURGERY

## 2023-11-29 ASSESSMENT — PAIN SCALES - GENERAL: PAINLEVEL: NO PAIN (0)

## 2023-11-29 NOTE — LETTER
11/29/2023         RE: Josefina Dumont  [address]  [facility] [place] [address]        Dear Colleague,    Thank you for referring your patient, Josefina Dumont, to the [facility]UR[facility]ERY. Please see a copy of my visit note below.    The patient is a 73-year-old female.  She is here with a friend.  She complains of headaches which are mild.  She complains of a feeling of pressure behind her left eye.  When  her present head MRI is compared to previous scans she has a left frontal meningioma which is growing slightly.  I did show the pictures to the patient and her friend.  We talked about pros and cons of surgery.  For now she wants to wait and get another scan in 6 months, sooner as needed.  Ordered.  She is satisfied with the plan.  Total time 15 minutes, more than 50% spent counseling and/or coordinating care.      Again, thank you for allowing me to participate in the care of your patient.        Sincerely,        Abhishek Cullen MD

## 2023-11-29 NOTE — PROGRESS NOTES
The patient is a 73-year-old female.  She is here with a friend.  She complains of headaches which are mild.  She complains of a feeling of pressure behind her left eye.  When  her present head MRI is compared to previous scans she has a left frontal meningioma which is growing slightly.  I did show the pictures to the patient and her friend.  We talked about pros and cons of surgery.  For now she wants to wait and get another scan in 6 months, sooner as needed.  Ordered.  She is satisfied with the plan.  Total time 15 minutes, more than 50% spent counseling and/or coordinating care.

## 2023-12-12 NOTE — PROGRESS NOTES
"[facility] Neurology Follow Up Visit    Josefina Dumont MRN# [id number]   Age: 73 year old YOB: 1950     Brief history of symptoms: The patient was initially seen in neurologic consultation on 9/12/2022 for evaluation of meningioma. Please see the comprehensive neurologic consultation notes from those dates in the Epic records for details.     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      Interval history:   - repeat MRI brain 4/25/2023 revealed stable left frontal convexity meningioma ( 1.9 cm x 3.5 cm x 3.7 cm) compared to imaging 10/14/2022 of (1.5 cm x 3.3 cm).  - LDL 1/10/2023 was 111  - A1c  9/12/2022 was 7.3    Today, the patient is still having pain in the forehead and possibly over her left eye at times.  The pain is constant and unrelenting. She gets eye twitching from the pain.  The pain is improved with ibuprofen or tylenol, and she is taking tylenol every evening at this point.  She has dizziness at times, which can be present when walking or standing quickly.  She has had events of turning grey or pale and being confused which lasted 10 minutes or so.  Her friends who witness the event recently indicates she looked confused upon standing quickly and stopped talking, \"like a deer in headlights\".  There was no atypical movements, LOC, passing out, or memory loss noted.     Physical Exam:   Vitals: BP (!) 190/97 (BP Location: Right arm, Patient Position: Sitting, Cuff Size: Adult Regular)   Pulse 83    General: Seated comfortably in no acute distress.  Neurologic:     Mental Status: Fully alert, attentive and oriented. Speech clear and fluent, no paraphasic errors.     Cranial Nerves: EOMI with normal smooth pursuit. Facial movements symmetric. Hearing not formally tested but intact to conversation.  No dysarthria.     Motor: No tremors or other abnormal movements observed.      Coordination: Finger-nose-finger " [facility] - Outpatient Consultation  Jovon Perez 48 y.o. female MRN: [id number]  Encounter: [id number]          ASSESSMENT AND PLAN:      1. Gastroesophageal reflux disease with esophagitis, unspecified whether hemorrhage  She has gastroesophageal reflux disease and since esomeprazole 40 mg daily is helping of asked her to continue this. She can try decreasing the dose to 20 mg daily in the future but if she develops worsening symptoms she can increase back up to 40 mg daily. - Ambulatory referral to Gastroenterology    2. Helicobacter pylori (H. pylori) infection  She has a history of Helicobacter pylori infection and since this has not had confirmation of eradication I offered we could do a Helicobacter pylori stool antigen test after her esomeprazole is held for 2 weeks. She does not want to hold the esomeprazole for 2 weeks since it is clearly helping her so we will not pursue this testing at this time. 3. Colon cancer screening  She is due for screening colonoscopy so I will schedule this for her now. - Colonoscopy; Future    4. Acquired hypothyroidism  She has hypothyroidism and her Synthroid dose can continue to be adjusted to ensure it is within the therapeutic range. 5. Fatty liver  She has a history of fatty liver diagnosed on CT scan and since she denies significant alcohol intake this is probably due to metabolic dysfunction associated steatotic disease. I encouraged her to lose weight through a combination of diet and exercise.    ______________________________________________________________________    HPI: She presents for evaluation because of a history of reflux disease since 2010. She has been on proton pump inhibitors since then and is currently taking esomeprazole 40 mg by mouth daily. She feels this helps and has not had any recent reflux symptoms. She also denies difficulty swallowing, nausea, vomiting, and abdominal pain.     Her last upper endoscopy was in [facility] [place] in 2019 and at that time she was found to have Helicobacter pylori infection. She was treated soon after that but has not had documentation that this was eradicated. She has never previously had a colonoscopy and she denies any current GI symptoms such as diarrhea, constipation, bleeding, or weight loss. She has a history of hypothyroidism and had some mild diarrhea symptoms until her Synthroid dose was adjusted recently. REVIEW OF SYSTEMS:    CONSTITUTIONAL: Denies any fever, chills, rigors, and weight loss. HEENT: No earache or tinnitus. Denies hearing loss or visual disturbances. CARDIOVASCULAR: No chest pain or palpitations. RESPIRATORY: Denies any cough, hemoptysis, shortness of breath or dyspnea on exertion. GASTROINTESTINAL: As noted in the History of Present Illness. GENITOURINARY: No problems with urination. Denies any hematuria or dysuria. NEUROLOGIC: No dizziness or vertigo, denies headaches. MUSCULOSKELETAL: Denies any muscle or joint pain. SKIN: Denies skin rashes or itching. ENDOCRINE: Denies excessive thirst. Denies intolerance to heat or cold. PSYCHOSOCIAL: Denies depression or anxiety. Denies any recent memory loss.        Historical Information   Past Medical History:   Diagnosis Date    Heartburn     Hypertension     Hypothyroidism      Past Surgical History:   Procedure Laterality Date    PELVIC FLOOR REPAIR  2023     Social History   Social History     Substance and Sexual Activity   Alcohol Use Yes    Alcohol/week: 1.0 standard drink of alcohol    Types: 1 Standard drinks or equivalent per week    Comment: social drinker     Social History     Substance and Sexual Activity   Drug Use Never     Social History     Tobacco Use   Smoking Status Former    Packs/day: 1.00    Years: 20.00    Total pack years: 20.00    Types: Cigarettes    Quit date: 8/15/2007    Years since quittin.3   Smokeless Tobacco Never     Family History   Problem without dysmetria.     Gait: Normal, steady casual gait.         Assessment and Plan:   Assessment:  L-frontal meningioma, symptomatic     I suspect the patient's pain is referred from meningeal irritation and not that of a primary headache condition, therefore it wouldn't respond well to primary headache related medications.  She may benefit from seeing her neurosurgery providers given the pain is a symptom of her meningioma, and even through her meningioma is stable in terms of size, it is still symptomatic and may require resection due to medical treatment likely requiring long term pain medications.  Her event of zoning out is concerning for a seizure, and I asked her to obtain an EEG to better determine if her left frontal meningioma is leading to focal dysfunction or cortical irritation.    Plan:  EEG 3 hour sleep deprived  Follow up with neurosurgery to consider intervention on meningioma leading to head pain    Follow up in Neurology clinic in 3 months (video or phone) or earlier as needed should new concerns arise.    LUTHER Villegas D.O.   of Neurology    Total time today (35 min) in this patient encounter was spent on pre-charting, counseling and/or coordination of care.      Relation Age of Onset    Hypertension Mother     Hypertension Father     Thrombosis Father     Breast cancer Sister        Meds/Allergies       Current Outpatient Medications:     amLODIPine (NORVASC) 5 mg tablet    esomeprazole (NexIUM) 40 MG capsule    ibuprofen (MOTRIN) 600 mg tablet    levothyroxine 112 mcg tablet    albuterol (PROVENTIL HFA,VENTOLIN HFA) 90 mcg/act inhaler    Allergies   Allergen Reactions    Lisinopril Cough     Developed cough after 2 doses of 10 mg           Objective     Blood pressure 134/88, temperature 99.3 °F (37.4 °C), temperature source Tympanic, height 5' 4.5" (1.638 m), weight 85.3 kg (188 lb). Body mass index is 31.77 kg/m². PHYSICAL EXAM:      General Appearance:   Alert, cooperative, no distress   HEENT:   Normocephalic, atraumatic, anicteric. Neck:  Supple, symmetrical, trachea midline   Lungs:   Clear to auscultation bilaterally; no rales, rhonchi or wheezing; respirations unlabored    Heart[de-identified]   Regular rate and rhythm; no murmur, rub, or gallop. Abdomen:   Soft, non-tender, non-distended; normal bowel sounds; no masses, no organomegaly    Genitalia:   Deferred    Rectal:   Deferred    Extremities:  No cyanosis, clubbing or edema    Pulses:  2+ and symmetric    Skin:  No jaundice, rashes, or lesions    Lymph nodes:  No palpable cervical lymphadenopathy        Lab Results:   No visits with results within 1 Day(s) from this visit.    Latest known visit with results is:   Office Visit on 11/17/2023   Component Date Value    Case Report 11/17/2023                      Value:Gynecologic Cytology Report                       Case: [id number]                                  Authorizing Provider:  Christine Hatch MD          Collected:           11/17/2023 1252              Ordering Location:     [facility]  Received:            11/17/2023 [address] First Screen:          [facility]                                                                Specimen:    LIQUID-BASED PAP, SCREENING, Cervix                                                        Primary Interpretation 11/17/2023 Negative for intraepithelial lesion or malignancy     Specimen Adequacy 11/17/2023 Satisfactory for evaluation. Endocervical/transformation zone component present. Additional Information 11/17/2023                      Value: This result contains rich text formatting which cannot be displayed here. Urine Culture 11/17/2023 >100,000 cfu/ml Escherichia coli (A)     BLOOD,UA 11/17/2023 positive     SPECIFIC GRAVITY,UA 11/17/2023 1.015      COLOR,UA 11/17/2023 jonathan     CLARITY,UA 11/17/2023 clear     HPV Other HR 11/17/2023 Negative     HPV16 11/17/2023 Negative     HPV18 11/17/2023 Negative          Radiology Results:   No results found.   Answers submitted by the patient for this visit:  Abdominal Pain Questionnaire (Submitted on 12/11/2023)  Chief Complaint: Abdominal pain  Chronicity: recurrent  Onset: more than 1 month ago  Onset quality: undetermined  Frequency: every several days  Progression since onset: resolved

## 2023-12-18 ENCOUNTER — TRANSFERRED RECORDS (OUTPATIENT)
Dept: HEALTH INFORMATION MANAGEMENT | Facility: CLINIC | Age: 73
End: 2023-12-18

## 2023-12-18 ENCOUNTER — MEDICAL CORRESPONDENCE (OUTPATIENT)
Dept: HEALTH INFORMATION MANAGEMENT | Facility: CLINIC | Age: 73
End: 2023-12-18

## 2023-12-18 ENCOUNTER — LAB REQUISITION (OUTPATIENT)
Dept: LAB | Facility: CLINIC | Age: 73
End: 2023-12-18
Payer: COMMERCIAL

## 2023-12-18 DIAGNOSIS — R05.1 ACUTE COUGH: ICD-10-CM

## 2023-12-18 PROCEDURE — 87635 SARS-COV-2 COVID-19 AMP PRB: CPT | Mod: ORL | Performed by: PHYSICIAN ASSISTANT

## 2023-12-20 LAB — SARS-COV-2 RNA RESP QL NAA+PROBE: NEGATIVE

## 2023-12-28 ENCOUNTER — APPOINTMENT (OUTPATIENT)
Dept: CT IMAGING | Facility: CLINIC | Age: 73
DRG: 194 | End: 2023-12-28
Attending: EMERGENCY MEDICINE
Payer: COMMERCIAL

## 2023-12-28 ENCOUNTER — HOSPITAL ENCOUNTER (INPATIENT)
Facility: CLINIC | Age: 73
LOS: 3 days | Discharge: HOME HEALTH | DRG: 194 | End: 2023-12-31
Attending: EMERGENCY MEDICINE | Admitting: STUDENT IN AN ORGANIZED HEALTH CARE EDUCATION/TRAINING PROGRAM
Payer: COMMERCIAL

## 2023-12-28 ENCOUNTER — APPOINTMENT (OUTPATIENT)
Dept: RADIOLOGY | Facility: CLINIC | Age: 73
DRG: 194 | End: 2023-12-28
Attending: EMERGENCY MEDICINE
Payer: COMMERCIAL

## 2023-12-28 DIAGNOSIS — J18.9 COMMUNITY ACQUIRED PNEUMONIA OF LEFT LUNG, UNSPECIFIED PART OF LUNG: ICD-10-CM

## 2023-12-28 DIAGNOSIS — R79.89 ELEVATED TROPONIN: ICD-10-CM

## 2023-12-28 DIAGNOSIS — I16.1 HYPERTENSIVE EMERGENCY: Primary | ICD-10-CM

## 2023-12-28 DIAGNOSIS — R06.02 SOB (SHORTNESS OF BREATH): ICD-10-CM

## 2023-12-28 DIAGNOSIS — N17.9 AKI (ACUTE KIDNEY INJURY) (H): ICD-10-CM

## 2023-12-28 LAB
ANION GAP SERPL CALCULATED.3IONS-SCNC: 9 MMOL/L (ref 7–15)
APTT PPP: 202 SECONDS (ref 22–38)
ATRIAL RATE - MUSE: 80 BPM
BASOPHILS # BLD AUTO: 0 10E3/UL (ref 0–0.2)
BASOPHILS NFR BLD AUTO: 0 %
BUN SERPL-MCNC: 11.4 MG/DL (ref 8–23)
C PNEUM DNA SPEC QL NAA+PROBE: NOT DETECTED
CALCIUM SERPL-MCNC: 9.2 MG/DL (ref 8.8–10.2)
CHLORIDE SERPL-SCNC: 106 MMOL/L (ref 98–107)
CREAT SERPL-MCNC: 0.74 MG/DL (ref 0.51–0.95)
DEPRECATED HCO3 PLAS-SCNC: 24 MMOL/L (ref 22–29)
DIASTOLIC BLOOD PRESSURE - MUSE: 88 MMHG
EGFRCR SERPLBLD CKD-EPI 2021: 85 ML/MIN/1.73M2
EOSINOPHIL # BLD AUTO: 0.1 10E3/UL (ref 0–0.7)
EOSINOPHIL NFR BLD AUTO: 2 %
ERYTHROCYTE [DISTWIDTH] IN BLOOD BY AUTOMATED COUNT: 15.4 % (ref 10–15)
FLUAV H1 2009 PAND RNA SPEC QL NAA+PROBE: NOT DETECTED
FLUAV H1 RNA SPEC QL NAA+PROBE: NOT DETECTED
FLUAV H3 RNA SPEC QL NAA+PROBE: NOT DETECTED
FLUAV RNA SPEC QL NAA+PROBE: NEGATIVE
FLUAV RNA SPEC QL NAA+PROBE: NOT DETECTED
FLUBV RNA RESP QL NAA+PROBE: NEGATIVE
FLUBV RNA SPEC QL NAA+PROBE: NOT DETECTED
GLUCOSE BLDC GLUCOMTR-MCNC: 125 MG/DL (ref 70–99)
GLUCOSE BLDC GLUCOMTR-MCNC: 74 MG/DL (ref 70–99)
GLUCOSE BLDC GLUCOMTR-MCNC: 95 MG/DL (ref 70–99)
GLUCOSE SERPL-MCNC: 138 MG/DL (ref 70–99)
HADV DNA SPEC QL NAA+PROBE: NOT DETECTED
HBA1C MFR BLD: 6.6 %
HCOV PNL SPEC NAA+PROBE: NOT DETECTED
HCT VFR BLD AUTO: 34.3 % (ref 35–47)
HGB BLD-MCNC: 10.7 G/DL (ref 11.7–15.7)
HMPV RNA SPEC QL NAA+PROBE: NOT DETECTED
HOLD SPECIMEN: NORMAL
HPIV1 RNA SPEC QL NAA+PROBE: NOT DETECTED
HPIV2 RNA SPEC QL NAA+PROBE: NOT DETECTED
HPIV3 RNA SPEC QL NAA+PROBE: NOT DETECTED
HPIV4 RNA SPEC QL NAA+PROBE: NOT DETECTED
IMM GRANULOCYTES # BLD: 0 10E3/UL
IMM GRANULOCYTES NFR BLD: 0 %
INTERPRETATION ECG - MUSE: NORMAL
LACTATE SERPL-SCNC: 0.9 MMOL/L (ref 0.7–2)
LYMPHOCYTES # BLD AUTO: 0.9 10E3/UL (ref 0.8–5.3)
LYMPHOCYTES NFR BLD AUTO: 15 %
M PNEUMO DNA SPEC QL NAA+PROBE: NOT DETECTED
MAGNESIUM SERPL-MCNC: 1.9 MG/DL (ref 1.7–2.3)
MCH RBC QN AUTO: 29 PG (ref 26.5–33)
MCHC RBC AUTO-ENTMCNC: 31.2 G/DL (ref 31.5–36.5)
MCV RBC AUTO: 93 FL (ref 78–100)
MONOCYTES # BLD AUTO: 0.4 10E3/UL (ref 0–1.3)
MONOCYTES NFR BLD AUTO: 8 %
NEUTROPHILS # BLD AUTO: 4.2 10E3/UL (ref 1.6–8.3)
NEUTROPHILS NFR BLD AUTO: 75 %
NRBC # BLD AUTO: 0 10E3/UL
NRBC BLD AUTO-RTO: 0 /100
NT-PROBNP SERPL-MCNC: 1982 PG/ML (ref 0–900)
P AXIS - MUSE: 48 DEGREES
PLATELET # BLD AUTO: 154 10E3/UL (ref 150–450)
POTASSIUM SERPL-SCNC: 3.6 MMOL/L (ref 3.4–5.3)
PR INTERVAL - MUSE: 184 MS
PROCALCITONIN SERPL IA-MCNC: 0.07 NG/ML
QRS DURATION - MUSE: 100 MS
QT - MUSE: 418 MS
QTC - MUSE: 482 MS
R AXIS - MUSE: -3 DEGREES
RBC # BLD AUTO: 3.69 10E6/UL (ref 3.8–5.2)
RSV RNA SPEC NAA+PROBE: NEGATIVE
RSV RNA SPEC QL NAA+PROBE: NOT DETECTED
RSV RNA SPEC QL NAA+PROBE: NOT DETECTED
RV+EV RNA SPEC QL NAA+PROBE: NOT DETECTED
SARS-COV-2 RNA RESP QL NAA+PROBE: NEGATIVE
SODIUM SERPL-SCNC: 139 MMOL/L (ref 135–145)
SYSTOLIC BLOOD PRESSURE - MUSE: 201 MMHG
T AXIS - MUSE: 127 DEGREES
T4 FREE SERPL-MCNC: 1.04 NG/DL (ref 0.9–1.7)
TROPONIN T SERPL HS-MCNC: 204 NG/L
TROPONIN T SERPL HS-MCNC: 239 NG/L
TROPONIN T SERPL HS-MCNC: 241 NG/L
TROPONIN T SERPL HS-MCNC: 265 NG/L
TSH SERPL DL<=0.005 MIU/L-ACNC: 6.1 UIU/ML (ref 0.3–4.2)
VENTRICULAR RATE- MUSE: 80 BPM
WBC # BLD AUTO: 5.7 10E3/UL (ref 4–11)

## 2023-12-28 PROCEDURE — 250N000013 HC RX MED GY IP 250 OP 250 PS 637: Performed by: INTERNAL MEDICINE

## 2023-12-28 PROCEDURE — 82962 GLUCOSE BLOOD TEST: CPT

## 2023-12-28 PROCEDURE — 87637 SARSCOV2&INF A&B&RSV AMP PRB: CPT | Performed by: EMERGENCY MEDICINE

## 2023-12-28 PROCEDURE — 80061 LIPID PANEL: CPT | Performed by: STUDENT IN AN ORGANIZED HEALTH CARE EDUCATION/TRAINING PROGRAM

## 2023-12-28 PROCEDURE — 93005 ELECTROCARDIOGRAM TRACING: CPT | Performed by: EMERGENCY MEDICINE

## 2023-12-28 PROCEDURE — 87633 RESP VIRUS 12-25 TARGETS: CPT | Performed by: STUDENT IN AN ORGANIZED HEALTH CARE EDUCATION/TRAINING PROGRAM

## 2023-12-28 PROCEDURE — 99223 1ST HOSP IP/OBS HIGH 75: CPT | Performed by: STUDENT IN AN ORGANIZED HEALTH CARE EDUCATION/TRAINING PROGRAM

## 2023-12-28 PROCEDURE — 84484 ASSAY OF TROPONIN QUANT: CPT | Performed by: STUDENT IN AN ORGANIZED HEALTH CARE EDUCATION/TRAINING PROGRAM

## 2023-12-28 PROCEDURE — 87581 M.PNEUMON DNA AMP PROBE: CPT | Performed by: STUDENT IN AN ORGANIZED HEALTH CARE EDUCATION/TRAINING PROGRAM

## 2023-12-28 PROCEDURE — 83735 ASSAY OF MAGNESIUM: CPT | Performed by: EMERGENCY MEDICINE

## 2023-12-28 PROCEDURE — 85025 COMPLETE CBC W/AUTO DIFF WBC: CPT | Performed by: EMERGENCY MEDICINE

## 2023-12-28 PROCEDURE — 83036 HEMOGLOBIN GLYCOSYLATED A1C: CPT | Performed by: STUDENT IN AN ORGANIZED HEALTH CARE EDUCATION/TRAINING PROGRAM

## 2023-12-28 PROCEDURE — 80048 BASIC METABOLIC PNL TOTAL CA: CPT | Performed by: EMERGENCY MEDICINE

## 2023-12-28 PROCEDURE — 36415 COLL VENOUS BLD VENIPUNCTURE: CPT | Performed by: STUDENT IN AN ORGANIZED HEALTH CARE EDUCATION/TRAINING PROGRAM

## 2023-12-28 PROCEDURE — 250N000013 HC RX MED GY IP 250 OP 250 PS 637: Performed by: EMERGENCY MEDICINE

## 2023-12-28 PROCEDURE — 250N000011 HC RX IP 250 OP 636: Performed by: STUDENT IN AN ORGANIZED HEALTH CARE EDUCATION/TRAINING PROGRAM

## 2023-12-28 PROCEDURE — 83605 ASSAY OF LACTIC ACID: CPT | Performed by: EMERGENCY MEDICINE

## 2023-12-28 PROCEDURE — 250N000013 HC RX MED GY IP 250 OP 250 PS 637: Performed by: STUDENT IN AN ORGANIZED HEALTH CARE EDUCATION/TRAINING PROGRAM

## 2023-12-28 PROCEDURE — 71275 CT ANGIOGRAPHY CHEST: CPT

## 2023-12-28 PROCEDURE — 85730 THROMBOPLASTIN TIME PARTIAL: CPT | Performed by: STUDENT IN AN ORGANIZED HEALTH CARE EDUCATION/TRAINING PROGRAM

## 2023-12-28 PROCEDURE — 99285 EMERGENCY DEPT VISIT HI MDM: CPT | Mod: 25

## 2023-12-28 PROCEDURE — 84484 ASSAY OF TROPONIN QUANT: CPT | Performed by: EMERGENCY MEDICINE

## 2023-12-28 PROCEDURE — 84439 ASSAY OF FREE THYROXINE: CPT | Performed by: STUDENT IN AN ORGANIZED HEALTH CARE EDUCATION/TRAINING PROGRAM

## 2023-12-28 PROCEDURE — 84443 ASSAY THYROID STIM HORMONE: CPT | Performed by: STUDENT IN AN ORGANIZED HEALTH CARE EDUCATION/TRAINING PROGRAM

## 2023-12-28 PROCEDURE — 83880 ASSAY OF NATRIURETIC PEPTIDE: CPT | Performed by: EMERGENCY MEDICINE

## 2023-12-28 PROCEDURE — 71046 X-RAY EXAM CHEST 2 VIEWS: CPT

## 2023-12-28 PROCEDURE — 99222 1ST HOSP IP/OBS MODERATE 55: CPT | Performed by: INTERNAL MEDICINE

## 2023-12-28 PROCEDURE — 36415 COLL VENOUS BLD VENIPUNCTURE: CPT | Performed by: EMERGENCY MEDICINE

## 2023-12-28 PROCEDURE — 210N000002 HC R&B HEART CARE

## 2023-12-28 PROCEDURE — 84145 PROCALCITONIN (PCT): CPT | Performed by: EMERGENCY MEDICINE

## 2023-12-28 RX ORDER — NALOXONE HYDROCHLORIDE 0.4 MG/ML
0.2 INJECTION, SOLUTION INTRAMUSCULAR; INTRAVENOUS; SUBCUTANEOUS
Status: DISCONTINUED | OUTPATIENT
Start: 2023-12-28 | End: 2023-12-31 | Stop reason: HOSPADM

## 2023-12-28 RX ORDER — ONDANSETRON 2 MG/ML
4 INJECTION INTRAMUSCULAR; INTRAVENOUS EVERY 6 HOURS PRN
Status: DISCONTINUED | OUTPATIENT
Start: 2023-12-28 | End: 2023-12-31 | Stop reason: HOSPADM

## 2023-12-28 RX ORDER — ACETAMINOPHEN 325 MG/1
650 TABLET ORAL EVERY 6 HOURS PRN
Status: DISCONTINUED | OUTPATIENT
Start: 2023-12-28 | End: 2023-12-31 | Stop reason: HOSPADM

## 2023-12-28 RX ORDER — NICOTINE POLACRILEX 4 MG
15-30 LOZENGE BUCCAL
Status: DISCONTINUED | OUTPATIENT
Start: 2023-12-28 | End: 2023-12-31 | Stop reason: HOSPADM

## 2023-12-28 RX ORDER — LOSARTAN POTASSIUM 50 MG/1
50 TABLET ORAL DAILY
Status: DISCONTINUED | OUTPATIENT
Start: 2023-12-29 | End: 2023-12-29

## 2023-12-28 RX ORDER — CLOPIDOGREL BISULFATE 75 MG/1
75 TABLET ORAL DAILY
Status: DISCONTINUED | OUTPATIENT
Start: 2023-12-29 | End: 2023-12-31 | Stop reason: HOSPADM

## 2023-12-28 RX ORDER — ALBUTEROL SULFATE 90 UG/1
2 AEROSOL, METERED RESPIRATORY (INHALATION) EVERY 6 HOURS PRN
Status: DISCONTINUED | OUTPATIENT
Start: 2023-12-28 | End: 2023-12-31 | Stop reason: HOSPADM

## 2023-12-28 RX ORDER — HEPARIN SODIUM 10000 [USP'U]/100ML
0-5000 INJECTION, SOLUTION INTRAVENOUS CONTINUOUS
Status: DISCONTINUED | OUTPATIENT
Start: 2023-12-28 | End: 2023-12-29

## 2023-12-28 RX ORDER — NALOXONE HYDROCHLORIDE 0.4 MG/ML
0.4 INJECTION, SOLUTION INTRAMUSCULAR; INTRAVENOUS; SUBCUTANEOUS
Status: DISCONTINUED | OUTPATIENT
Start: 2023-12-28 | End: 2023-12-31 | Stop reason: HOSPADM

## 2023-12-28 RX ORDER — GLIPIZIDE 2.5 MG/1
2.5 TABLET, EXTENDED RELEASE ORAL
Status: ON HOLD | COMMUNITY
End: 2024-02-28

## 2023-12-28 RX ORDER — LANOLIN ALCOHOL/MO/W.PET/CERES
3 CREAM (GRAM) TOPICAL
Status: DISCONTINUED | OUTPATIENT
Start: 2023-12-28 | End: 2023-12-31 | Stop reason: HOSPADM

## 2023-12-28 RX ORDER — DEXTROSE MONOHYDRATE 25 G/50ML
25-50 INJECTION, SOLUTION INTRAVENOUS
Status: DISCONTINUED | OUTPATIENT
Start: 2023-12-28 | End: 2023-12-31 | Stop reason: HOSPADM

## 2023-12-28 RX ORDER — FUROSEMIDE 10 MG/ML
40 INJECTION INTRAMUSCULAR; INTRAVENOUS EVERY 8 HOURS
Status: DISCONTINUED | OUTPATIENT
Start: 2023-12-28 | End: 2023-12-29

## 2023-12-28 RX ORDER — GUAIFENESIN 200 MG/10ML
200 LIQUID ORAL EVERY 4 HOURS PRN
Status: DISCONTINUED | OUTPATIENT
Start: 2023-12-28 | End: 2023-12-31 | Stop reason: HOSPADM

## 2023-12-28 RX ORDER — ACETAMINOPHEN 650 MG/1
650 SUPPOSITORY RECTAL EVERY 6 HOURS PRN
Status: DISCONTINUED | OUTPATIENT
Start: 2023-12-28 | End: 2023-12-31 | Stop reason: HOSPADM

## 2023-12-28 RX ORDER — CALCIUM CARBONATE 500 MG/1
1000 TABLET, CHEWABLE ORAL 4 TIMES DAILY PRN
Status: DISCONTINUED | OUTPATIENT
Start: 2023-12-28 | End: 2023-12-31 | Stop reason: HOSPADM

## 2023-12-28 RX ORDER — PROCHLORPERAZINE 25 MG
12.5 SUPPOSITORY, RECTAL RECTAL EVERY 12 HOURS PRN
Status: DISCONTINUED | OUTPATIENT
Start: 2023-12-28 | End: 2023-12-31 | Stop reason: HOSPADM

## 2023-12-28 RX ORDER — PROCHLORPERAZINE MALEATE 5 MG
5 TABLET ORAL EVERY 6 HOURS PRN
Status: DISCONTINUED | OUTPATIENT
Start: 2023-12-28 | End: 2023-12-31 | Stop reason: HOSPADM

## 2023-12-28 RX ORDER — METOPROLOL TARTRATE 50 MG
50 TABLET ORAL 2 TIMES DAILY
Status: DISCONTINUED | OUTPATIENT
Start: 2023-12-28 | End: 2023-12-30

## 2023-12-28 RX ORDER — PANTOPRAZOLE SODIUM 40 MG/1
40 TABLET, DELAYED RELEASE ORAL
Status: DISCONTINUED | OUTPATIENT
Start: 2023-12-28 | End: 2023-12-31 | Stop reason: HOSPADM

## 2023-12-28 RX ORDER — ONDANSETRON 4 MG/1
4 TABLET, ORALLY DISINTEGRATING ORAL EVERY 6 HOURS PRN
Status: DISCONTINUED | OUTPATIENT
Start: 2023-12-28 | End: 2023-12-31 | Stop reason: HOSPADM

## 2023-12-28 RX ORDER — AMOXICILLIN 250 MG
1 CAPSULE ORAL
COMMUNITY

## 2023-12-28 RX ORDER — CITALOPRAM HYDROBROMIDE 20 MG/1
40 TABLET ORAL DAILY
Status: DISCONTINUED | OUTPATIENT
Start: 2023-12-29 | End: 2023-12-31 | Stop reason: HOSPADM

## 2023-12-28 RX ORDER — IOPAMIDOL 755 MG/ML
80 INJECTION, SOLUTION INTRAVASCULAR ONCE
Status: COMPLETED | OUTPATIENT
Start: 2023-12-28 | End: 2023-12-28

## 2023-12-28 RX ORDER — OXYCODONE HYDROCHLORIDE 5 MG/1
5 TABLET ORAL EVERY 4 HOURS PRN
Status: DISCONTINUED | OUTPATIENT
Start: 2023-12-28 | End: 2023-12-31 | Stop reason: HOSPADM

## 2023-12-28 RX ORDER — AMOXICILLIN 250 MG
1 CAPSULE ORAL 2 TIMES DAILY
Status: DISCONTINUED | OUTPATIENT
Start: 2023-12-28 | End: 2023-12-31 | Stop reason: HOSPADM

## 2023-12-28 RX ORDER — LIDOCAINE 40 MG/G
CREAM TOPICAL
Status: DISCONTINUED | OUTPATIENT
Start: 2023-12-28 | End: 2023-12-31 | Stop reason: HOSPADM

## 2023-12-28 RX ORDER — HYDRALAZINE HYDROCHLORIDE 20 MG/ML
10 INJECTION INTRAMUSCULAR; INTRAVENOUS EVERY 6 HOURS PRN
Status: DISCONTINUED | OUTPATIENT
Start: 2023-12-28 | End: 2023-12-29

## 2023-12-28 RX ORDER — AMLODIPINE BESYLATE 10 MG/1
10 TABLET ORAL DAILY
Status: DISCONTINUED | OUTPATIENT
Start: 2023-12-29 | End: 2023-12-31 | Stop reason: HOSPADM

## 2023-12-28 RX ORDER — ACETAMINOPHEN 325 MG/1
975 TABLET ORAL ONCE
Status: COMPLETED | OUTPATIENT
Start: 2023-12-28 | End: 2023-12-28

## 2023-12-28 RX ORDER — FUROSEMIDE 10 MG/ML
40 INJECTION INTRAMUSCULAR; INTRAVENOUS ONCE
Status: DISCONTINUED | OUTPATIENT
Start: 2023-12-28 | End: 2023-12-28

## 2023-12-28 RX ORDER — AMOXICILLIN 250 MG
2 CAPSULE ORAL 2 TIMES DAILY
Status: DISCONTINUED | OUTPATIENT
Start: 2023-12-28 | End: 2023-12-31 | Stop reason: HOSPADM

## 2023-12-28 RX ADMIN — PANTOPRAZOLE SODIUM 40 MG: 40 TABLET, DELAYED RELEASE ORAL at 16:36

## 2023-12-28 RX ADMIN — NITROGLYCERIN 7.5 MG: 20 OINTMENT TOPICAL at 13:05

## 2023-12-28 RX ADMIN — ACETAMINOPHEN 650 MG: 325 TABLET ORAL at 21:41

## 2023-12-28 RX ADMIN — Medication 3 MG: at 21:41

## 2023-12-28 RX ADMIN — SENNOSIDES AND DOCUSATE SODIUM 1 TABLET: 8.6; 5 TABLET ORAL at 20:05

## 2023-12-28 RX ADMIN — ACETAMINOPHEN 975 MG: 325 TABLET ORAL at 12:44

## 2023-12-28 RX ADMIN — FUROSEMIDE 40 MG: 10 INJECTION, SOLUTION INTRAMUSCULAR; INTRAVENOUS at 21:41

## 2023-12-28 RX ADMIN — METOPROLOL TARTRATE 50 MG: 50 TABLET, FILM COATED ORAL at 17:52

## 2023-12-28 RX ADMIN — FUROSEMIDE 40 MG: 10 INJECTION, SOLUTION INTRAMUSCULAR; INTRAVENOUS at 13:19

## 2023-12-28 RX ADMIN — HYDRALAZINE HYDROCHLORIDE 10 MG: 20 INJECTION, SOLUTION INTRAMUSCULAR; INTRAVENOUS at 14:42

## 2023-12-28 RX ADMIN — HEPARIN SODIUM 1200 UNITS/HR: 10000 INJECTION, SOLUTION INTRAVENOUS at 15:26

## 2023-12-28 RX ADMIN — HYDRALAZINE HYDROCHLORIDE 10 MG: 20 INJECTION, SOLUTION INTRAMUSCULAR; INTRAVENOUS at 21:51

## 2023-12-28 RX ADMIN — IOPAMIDOL 80 ML: 755 INJECTION, SOLUTION INTRAVENOUS at 13:43

## 2023-12-28 ASSESSMENT — ACTIVITIES OF DAILY LIVING (ADL)
DEPENDENT_IADLS:: INDEPENDENT
ADLS_ACUITY_SCORE: 21
ADLS_ACUITY_SCORE: 35
ADLS_ACUITY_SCORE: 21
ADLS_ACUITY_SCORE: 21
ADLS_ACUITY_SCORE: 35
ADLS_ACUITY_SCORE: 35

## 2023-12-28 ASSESSMENT — ENCOUNTER SYMPTOMS
VOMITING: 0
FATIGUE: 1
NAUSEA: 0
ABDOMINAL PAIN: 0
FEVER: 0
DIARRHEA: 0
SHORTNESS OF BREATH: 1
DYSURIA: 0
COUGH: 1

## 2023-12-28 NOTE — H&P
[facility] MEDICINE ADMISSION HISTORY AND PHYSICAL   Date of Admission: 12/28/2023  Josefina Dumont, 1950, [id number]    Identification/Summary:   Josefina Dumont is a 73 year old female with PMH signficant for DM type 2, HTN, meningioma, hx of stroke 2022 with R leg residual weakness, valvular fibroelastoma 2x3 mm.  Presented with dyspnea and chest discomfort on exertion. In the ED, ECG without ST-T elevation but troponin is 260+. Cardiology wants to start heparin drip.      Assessment and Plan:  Acute on chronic congestive heart failure  NSTEMI?  I think most likely she has acute on chronic congestive heart failure. NSTEMI cannot be ruled out. Other differential include HTN pulmonary flash edema, acute bronchitis from viral illness  - cardiology consulted in the ER  - CT PE ordered in the ER  - will also get Echo, repeat trop, COVID/flu swab, TSH, lipid profile, A1c  - will start heparin drip when CT PE is done, allergic to aspirin and atorvastatin, will start home Plavix, will start IV lasix 40mg q8h, strict I&O, fluid restriction, low sodium diet  - will need sleep study as an outpatient  - ECG prn    HTH urgency, emergency?   on admission, came down to 180 itself  - nitroglycerin paste ordered in the ED  - will resume home losartan  - hold home hydrochlorothiazide  - hadralazine IV prn ordered for BP > 175/100 for now, if trop is uptrending, will have better BP control    Rhinorrhea  Headache  Productive cough  - COVID/flu swab    Vaginal bleeding  Says intermittently for a few months  - outpatient urgent GYN referral    Obesity  Has recent weight gain from over eating during holiday  - follow up with PCP        Code Status: Full Code    IVF: none    Anticoagulation   Possible heparin drip soon    FoleyNot present    Disposition: Inpatient     Clinically Significant Risk Factors Present on Admission                # Drug Induced Platelet Defect: home medication list  "includes an antiplatelet medication   # Hypertension: Noted on problem list      # Obesity: Estimated body mass index is 37.42 kg/m  as calculated from the following:    Height as of this encounter: 1.626 m (5' 4\").    Weight as of this encounter: 98.9 kg (218 lb).              Chief Complaint  SHAH and chest discomfort     HISTORY   Josefina Dumont is a 73 year old female with PMH signficant for HTN, DM type 2, meningioma, hx of stroke 2022 with R leg residual weakness, valvular fibroelastoma 2x3 mm.  Presented with dyspnea and chest discomfort on exertion.    Patient started to have difficulty breathing on exertion since a year ago.  She was started on hydrochlorothiazide but was not compliant with it because she had urinary incontinence.  Over the last 2 weeks, he is feeling more short of breath and is also having chest discomfort on exertion, wheezing and orthopnea.  At the same time, she is having rhinorrhea, productive cough with brown-greenish sputum.  She was tested for COVID which was negative.  She has been using Advil for her headache 200 mg twice a day recently . over the holiday, she has been eating more and has gained some weight.  She has not been monitoring her blood pressure at home and in the ER it was up to 240 mmHg. She denies chest pain or chest pressure or leg swelling.  She used to smoke and quit 9 years ago.  Alcohol use only on social occasion last time was X-mas. She snores but has not had a sleep study.     In the ER, she had a swab for COVID and flu which cause bleeding from her left nostril right away.  Found to have troponin of 260s+ with no ST-T change on EKG but left ventricular hypertrophy.     She lives alone.  Her sister lives in the same building and is at bedside when I saw her.  She does not walk with a walker or cane but has felt more weak on the right leg since the stroke in 2022.     She also noticed vaginal bleeding which she thought is yeast infection intermittently in the " past few months.      Past Medical History     No past medical history on file.     Patient Active Problem List    Diagnosis Date Noted    SOB (shortness of breath) 2023     Priority: Medium    Elevated troponin 2023     Priority: Medium    Morbid obesity (H) 2022     Priority: Medium    Mitral valve stenosis 2022     Priority: Medium    Febrile illness, acute 2022     Priority: Medium    Urinary tract infection without hematuria, site unspecified 2022     Priority: Medium    Type 2 diabetes mellitus, without long-term current use of insulin (H) 2022     Priority: Medium    Primary hypertension 2022     Priority: Medium    Meningioma (H) 2022     Priority: Medium    Injury of head, initial encounter 2022     Priority: Medium    Syncope, unspecified syncope type 2022     Priority: Medium    Pneumonia due to infectious organism, unspecified laterality, unspecified part of lung 2022     Priority: Medium     Surgical History   History reviewed. No pertinent surgical history.  Family History    History reviewed. No pertinent family history.   Social History      Social History     Tobacco Use    Smoking status: Former     Types: Cigarettes     Quit date:      Years since quittin.9    Smokeless tobacco: Never      Allergies     Allergies   Allergen Reactions    Aspirin      Other reaction(s): stomach upset    Atenolol Other (See Comments)     abd pain    Lisinopril      Other reaction(s): stomach aches    Penicillins Hives     Tolerated ceftriaxone    Statins      Other reaction(s): myalgias     Prior to Admission Medications      Prior to Admission Medications   Prescriptions Last Dose Informant Patient Reported? Taking?   acetaminophen (TYLENOL) 500 MG tablet   No No   Sig: Take 2 tablets (1,000 mg) by mouth every 8 hours as needed for mild pain   albuterol (PROAIR HFA/PROVENTIL HFA/VENTOLIN HFA) 108 (90 Base) MCG/ACT inhaler   Yes No    Sig: Inhale 2 puffs into the lungs as needed   Patient not taking: Reported on 5/1/2023   amLODIPine (NORVASC) 10 MG tablet   Yes No   Sig: Take 10 mg by mouth daily   calcium carbonate (TUMS) 500 MG chewable tablet   Yes No   Sig: Take 1 chew tab by mouth 2 times daily as needed for heartburn   citalopram (CELEXA) 40 MG tablet   Yes No   Sig: Take 40 mg by mouth daily   clopidogrel (PLAVIX) 75 MG tablet   Yes No   Sig: Take 75 mg by mouth daily   folic acid (FOLVITE) 1 MG tablet   No No   Sig: Take 1 tablet (1 mg) by mouth daily   Patient not taking: Reported on 5/1/2023   hydrochlorothiazide (HYDRODIURIL) 25 MG tablet   Yes No   Sig: Take 25 mg by mouth daily   losartan (COZAAR) 25 MG tablet   Yes No   Sig: Take 25 mg by mouth 2 times daily   multivitamin w/minerals (THERA-VIT-M) tablet   Yes No   Sig: Take 1 tablet by mouth daily   pantoprazole (PROTONIX) 40 MG EC tablet   No No   Sig: Take 1 tablet (40 mg) by mouth 2 times daily (before meals)   vitamin E (TOCOPHEROL) 400 units (180 mg) capsule   Yes No   Sig: Take 400 Units by mouth daily      Facility-Administered Medications: None      Review of Systems     A 12 point comprehensive review of systems was negative except as noted above in HPI.    PHYSICAL EXAMINATION     Vitals      Temp:  [98.3  F (36.8  C)] 98.3  F (36.8  C)  Pulse:  [79-93] 80  Resp:  [18-20] 18  BP: (181-241)/() 181/75  SpO2:  [91 %-98 %] 98 %    Examination     General Appearance: Alert and wake, in mild respiratory distress  Respiratory: clear lungs, no crackles or wheezing  Cardiovascular: rhythmic, normal S1 and S2, systolic murmur 3 out of 6 best heard at right second intercostal space.  GI: soft, non-tender, normal bowel sound  Neurology: oriented x 3, forgetful per sister  Psych: cooperative and anxious looking, normal affect      Pertinent Lab     Results for orders placed or performed during the hospital encounter of 12/28/23 (from the past 24 hour(s))   ECG 12-LEAD WITH  MUSE (LHE)   Result Value Ref Range    Systolic Blood Pressure 201 mmHg    Diastolic Blood Pressure 88 mmHg    Ventricular Rate 80 BPM    Atrial Rate 80 BPM    DE Interval 184 ms    QRS Duration 100 ms     ms    QTc 482 ms    P Axis 48 degrees    R AXIS -3 degrees    T Axis 127 degrees    Interpretation ECG       Sinus rhythm  Left ventricular hypertrophy with repolarization abnormality  Abnormal ECG  When compared with ECG of 12-JAN-2022 17:23,  No significant change was found  Confirmed by SEE ED PROVIDER NOTE FOR, ECG INTERPRETATION ([id number]),  CHEMO EASTMAN ([id number]) on 12/28/2023 11:27:00 AM     [place] Draw    Narrative    The following orders were created for panel order [place] Draw.  Procedure                               Abnormality         Status                     ---------                               -----------         ------                     Extra Blue Top Tube[[id number]]                              Final result               Extra Red Top Tube[[id number]]                               Final result               Extra Green Top (Lithium...[[id number]]                      Final result               Extra Purple Top Tube[[id number]]                            Final result                 Please view results for these tests on the individual orders.   Extra Blue Top Tube   Result Value Ref Range    Hold Specimen JIC    Extra Red Top Tube   Result Value Ref Range    Hold Specimen JIC    Extra Green Top (Lithium Heparin) Tube   Result Value Ref Range    Hold Specimen JIC    Extra Purple Top Tube   Result Value Ref Range    Hold Specimen     CBC with platelets + differential    Narrative    The following orders were created for panel order CBC with platelets + differential.  Procedure                               Abnormality         Status                     ---------                               -----------         ------                     CBC with platelets and d...[[id number]]   Abnormal            Final result                 Please view results for these tests on the individual orders.   Basic metabolic panel   Result Value Ref Range    Sodium 139 135 - 145 mmol/L    Potassium 3.6 3.4 - 5.3 mmol/L    Chloride 106 98 - 107 mmol/L    Carbon Dioxide (CO2) 24 22 - 29 mmol/L    Anion Gap 9 7 - 15 mmol/L    Urea Nitrogen 11.4 8.0 - 23.0 mg/dL    Creatinine 0.74 0.51 - 0.95 mg/dL    GFR Estimate 85 >60 mL/min/1.73m2    Calcium 9.2 8.8 - 10.2 mg/dL    Glucose 138 (H) 70 - 99 mg/dL   Troponin T, High Sensitivity (now)   Result Value Ref Range    Troponin T, High Sensitivity 265 (HH) <=14 ng/L   Nt probnp inpatient   Result Value Ref Range    N terminal Pro BNP Inpatient 1,982 (H) 0 - 900 pg/mL   Magnesium   Result Value Ref Range    Magnesium 1.9 1.7 - 2.3 mg/dL   CBC with platelets and differential   Result Value Ref Range    WBC Count 5.7 4.0 - 11.0 10e3/uL    RBC Count 3.69 (L) 3.80 - 5.20 10e6/uL    Hemoglobin 10.7 (L) 11.7 - 15.7 g/dL    Hematocrit 34.3 (L) 35.0 - 47.0 %    MCV 93 78 - 100 fL    MCH 29.0 26.5 - 33.0 pg    MCHC 31.2 (L) 31.5 - 36.5 g/dL    RDW 15.4 (H) 10.0 - 15.0 %    Platelet Count 154 150 - 450 10e3/uL    % Neutrophils 75 %    % Lymphocytes 15 %    % Monocytes 8 %    % Eosinophils 2 %    % Basophils 0 %    % Immature Granulocytes 0 %    NRBCs per 100 WBC 0 <1 /100    Absolute Neutrophils 4.2 1.6 - 8.3 10e3/uL    Absolute Lymphocytes 0.9 0.8 - 5.3 10e3/uL    Absolute Monocytes 0.4 0.0 - 1.3 10e3/uL    Absolute Eosinophils 0.1 0.0 - 0.7 10e3/uL    Absolute Basophils 0.0 0.0 - 0.2 10e3/uL    Absolute Immature Granulocytes 0.0 <=0.4 10e3/uL    Absolute NRBCs 0.0 10e3/uL   Procalcitonin   Result Value Ref Range    Procalcitonin 0.07 <0.50 ng/mL   Chest XR,  PA & LAT    Narrative    EXAM: XR CHEST 2 VIEWS  LOCATION: [facility]  DATE: 12/28/2023    INDICATION: SOB, cough, chest pain  COMPARISON: 12/18/2023      Impression    IMPRESSION: Stable enlarged  cardiac silhouette. No definite pulmonary vascular congestion, pleural effusion, or pneumonia seen.   Symptomatic Influenza A/B, RSV, & SARS-CoV2 PCR (COVID-19) Nasopharyngeal    Specimen: Nasopharyngeal; Swab   Result Value Ref Range    Influenza A PCR Negative Negative    Influenza B PCR Negative Negative    RSV PCR Negative Negative    SARS CoV2 PCR Negative Negative    Narrative    Testing was performed using the Xpert Xpress CoV2/Flu/RSV Assay on the [employer] GeneXpert Instrument. This test should be ordered for the detection of SARS-CoV-2, influenza, and RSV viruses in individuals who meet clinical and/or epidemiological criteria. Test performance is unknown in asymptomatic patients. This test is for in vitro diagnostic use under the FDA EUA for laboratories certified under CLIA to perform high or moderate complexity testing. This test has not been FDA cleared or approved. A negative result does not rule out the presence of PCR inhibitors in the specimen or target RNA in concentration below the limit of detection for the assay. If only one viral target is positive but coinfection with multiple targets is suspected, the sample should be re-tested with another FDA cleared, approved, or authorized test, if coinfection would change clinical management. This test was validated by the [facility]. These laboratories are certified under the Clinical Laboratory Improvement Amendments of 1988 (CLIA-88) as qualified to perform high complexity laboratory testing.   Troponin T, High Sensitivity (now)   Result Value Ref Range    Troponin T, High Sensitivity 241 (HH) <=14 ng/L   Lactic acid whole blood   Result Value Ref Range    Lactic Acid 0.9 0.7 - 2.0 mmol/L       Pertinent Radiology     Radiology Results:   Recent Results (from the past 24 hour(s))   Chest XR,  PA & LAT    Narrative    EXAM: XR CHEST 2 VIEWS  LOCATION: [facility]  DATE: 12/28/2023    INDICATION: SOB, cough,  chest pain  COMPARISON: 12/18/2023      Impression    IMPRESSION: Stable enlarged cardiac silhouette. No definite pulmonary vascular congestion, pleural effusion, or pneumonia seen.       EKG Results: personally reviewed.     I spoke with ER doctor about patient's management.     AYAD MEDRANO MD  [facility]  [facility]  [facility]   Phone: #[phone]

## 2023-12-28 NOTE — ED NOTES
Bed: WWED-19  Expected date:   Expected time:   Means of arrival:   Comments:  Following patient in G

## 2023-12-28 NOTE — CONSULTS
" [facility]   [address], [place] [address]   [email or web address]   OFFICE: [phone]        Impression and Plan     1.  Elevated troponin.  Josefina on presentation is noted to have elevated troponin though second troponin actually measures somewhat less than initial (265  ? 241 ng/L).  ECG with subtle J-point changes and T wave changes consistent with left ventricular hypertrophy (not significantly changed from 12 January 2022).  CT scan this admission does reveal coronary calcification, but only mild.      Elevated troponin may represent  \"demand ischemia\" particularly in setting of significant hypertension ( i.e. type 2 MI secondary to ischemia due to either an increased oxygen demand or a decreased supply in the absence of an acute primary coronary thrombotic event).      She has not reported any chest pain symptoms now or in the recent past concerning for angina.      Parenthetically, she reports no true allergy to aspirin per se, but states that she had \"severe\" GI upset on the therapy in the past.  In addition, she has atenolol listed on her allergy list though on review with Josefina she states that she had no true allergy with this as well, but simply GI upset on this therapy, too.  Continue clopidogrel which she had been on at home.  Topical nitrate felt reasonable (started in Emergency Department).  Will also initiate metoprolol vis-à-vis problem #3 as well.  Echocardiogram.  Further recommendations pending echocardiographic findings and clinical course.    2.  Shortness of breath.  May be multifactorial in nature.  CT scan of chest this admission does reveal left lower lobe consolidation felt by radiology most likely representing pneumonia.  There may be a component of pulmonary edema though would not appear to be overt on exam.  B-type natriuretic peptide elevated at 1982 pg/mL suggesting some myocardial strain though may be related to " hypertension.  She does have a softer systolic murmur on exam and suspect some degree of mitral insufficiency though difficult to quantify on auscultation vis-à-vis distant heart sounds.  Feel trial of diuresis as proposed by Dr. Joyce Kumar is reasonable and if pulmonary edema a contributor should improve with diuresis.  Will defer evaluation of possible pneumonia to primary service.    3.  Hypertension.  Josefina states that on her own volition she had stopped her hydrochlorothiazide a few weeks ago.  Trial of furosemide as per problem #2.  Topical nitrate & ?-blocker therapy as per problem #1.  Agree with as needed hydralazine.      Primary Cardiologist: Dr. Alice Melgar (initial consultation this admission)     History of Present Illness    Josefina Dumont is a 73 year old female who presents with shortness of breath.  Patient states she has had progressive shortness of breath for several months though over the last 2 weeks became more progressive.  Also had reported some chest discomfort with activity.  Also some shortness of breath when laying flat.  She had recently developed a cough as well.  On initial presentation she denied chest discomfort.      Further review of systems is otherwise negative/noncontributory (medical record and 13 point review of systems reviewed as well and pertinent positives noted).    Cardiac Diagnostics   Telemetry (personally reviewed): Sinus rhythm.    Twelve-lead ECG (personally reviewed) 28 December 2023: Sinus rhythm.  Left ventricular hypertrophy with associated T wave changes.  There is very subtle ST elevation in lead III.  ECG findings, however, are similar to 12 January 2022 ECG.    Twelve-lead ECG (personally reviewed) 12 January 2022: Sinus rhythm.  Left ventricular hypertrophy with associated T wave changes.  Very slight J-point elevation in leads III and aVF.    Medical History  Surgical History   Diabetes mellitus type 2  Hypertension  History of meningioma    "       Family History/Social History/Risk Factors   Patient does not smoke having quit 9 years prior.  Family history reviewed.      Physical Examination   BP (!) 181/75   Pulse 80   Temp 98.3  F (36.8  C) (Temporal)   Resp 18   Ht 1.626 m (5' 4\")   Wt 98.9 kg (218 lb)   SpO2 98%   BMI 37.42 kg/m    Wt Readings from Last 5 Encounters:   12/28/23 98.9 kg (218 lb)   10/19/22 95.3 kg (210 lb)   08/30/22 95.3 kg (210 lb)   08/11/22 96.2 kg (212 lb)   04/20/22 101.6 kg (224 lb)     Wt Readings from Last 3 Encounters:   12/28/23 98.9 kg (218 lb)   10/19/22 95.3 kg (210 lb)   08/30/22 95.3 kg (210 lb)       Intake/Output Summary (Last 24 hours) at 12/28/2023 1415  Last data filed at 12/28/2023 1401  Gross per 24 hour   Intake --   Output 475 ml   Net -475 ml     The patient is alert and oriented times three. Sclerae are anicteric. Mucosal membranes are moist. No significant adenopathy/thyromegally appreciated. Lungs are diminished, though there are no overt \"wet\" sounding breath sounds.  On cardiovascular exam, the patient has a regular S1 and S2.  There is a soft systolic murmur heard somewhat in a sash-like distribution abdomen is soft and non-tender. Extremities reveal no clubbing, cyanosis, minimal edema.         Imaging     CT scan of chest 27 December 2023:  1.  No evidence pulmonary embolism.  2.  Small left lower lobe consolidation. Pneumonia most likely. Recommend 3 month follow-up CT to confirm resolution.  3.  Right lung 5 mm nodule.  4.  Concentric left ventricular hypertrophy.  5.  Small pericardial effusion.  6.  Cirrhosis.  7.  Coronary calcification: Mild.    Chest radiograph 28 December 2023:  Stable enlarged cardiac silhouette.   No definite pulmonary vascular congestion, pleural effusion, or pneumonia seen.      Lab Results     Recent Labs   Lab 12/28/23  1052   WBC 5.7   HGB 10.7*   MCV 93         POTASSIUM 3.6   CHLORIDE 106   CO2 24   BUN 11.4   CR 0.74   ANIONGAP 9   ALBARO 9.2   GLC " "138*     Recent Labs   Lab Test 12/28/23  1052 02/04/22  1810   NTBNPI 1,982*  --    BNP  --  184*     No lab results found in last 7 days.    Invalid input(s): \"LDLCALC\"  Lab Results   Component Value Date     12/28/2023    CO2 24 12/28/2023    CO2 21 02/10/2022    BUN 11.4 12/28/2023    BUN 13 02/10/2022     Lab Results   Component Value Date    WBC 5.7 12/28/2023    HGB 10.7 12/28/2023    HCT 34.3 12/28/2023    MCV 93 12/28/2023     12/28/2023     Lab Results   Component Value Date    CHOL 169 01/10/2023    TRIG 113 01/10/2023    HDL 35 01/10/2023     Recent Labs   Lab Test 01/10/23  1011   CHOL 169   HDL 35*   *   TRIG 113     Lab Results   Component Value Date    INR 1.15 02/04/2022     Lab Results   Component Value Date    TROPONINI 0.03 01/17/2022    TROPONINI 0.03 01/12/2022     Lab Results   Component Value Date    TSH 6.10 12/28/2023    TSH 4.64 01/15/2022         Current Inpatient Scheduled Medications   Scheduled Meds:   furosemide  40 mg Intravenous Q8H    insulin aspart  1-3 Units Subcutaneous TID AC    insulin aspart  1-3 Units Subcutaneous At Bedtime    nitroGLYcerin  0.5 inch Transdermal Once    senna-docusate  1 tablet Oral BID    Or    senna-docusate  2 tablet Oral BID    sodium chloride (PF)  3 mL Intracatheter Q8H     Continuous Infusions:   - MEDICATION INSTRUCTIONS -            Medications Prior to Admission   Prior to Admission medications    Medication Sig Start Date End Date Taking? Authorizing Provider   acetaminophen (TYLENOL) 500 MG tablet Take 2 tablets (1,000 mg) by mouth every 8 hours as needed for mild pain 1/19/22  Yes Alysia Conn MD   albuterol (PROAIR HFA/PROVENTIL HFA/VENTOLIN HFA) 108 (90 Base) MCG/ACT inhaler Inhale 2 puffs into the lungs every 6 hours as needed 4/26/22  Yes Reported, Patient   amLODIPine (NORVASC) 10 MG tablet Take 10 mg by mouth daily   Yes Unknown, Entered By History   calcium carbonate (TUMS) 500 MG chewable tablet Take 1 chew " "tab by mouth 2 times daily as needed for heartburn   Yes Unknown, Entered By History   citalopram (CELEXA) 40 MG tablet Take 40 mg by mouth daily   Yes Unknown, Entered By History   clopidogrel (PLAVIX) 75 MG tablet Take 75 mg by mouth daily 1/26/22  Yes Unknown, Entered By History   glipiZIDE (GLUCOTROL XL) 2.5 MG 24 hr tablet Take 2.5 mg by mouth daily (with breakfast)   Yes Unknown, Entered By History   losartan (COZAAR) 50 MG tablet Take 50 mg by mouth daily 3/8/22  Yes Reported, Patient   multivitamin w/minerals (THERA-VIT-M) tablet Take 1 tablet by mouth daily   Yes Unknown, Entered By History   pantoprazole (PROTONIX) 40 MG EC tablet Take 1 tablet (40 mg) by mouth 2 times daily (before meals) 1/19/22  Yes Alysia Conn MD   senna-docusate (SENOKOT-S/PERICOLACE) 8.6-50 MG tablet Take 1 tablet by mouth at bedtime   Yes Unknown, Entered By History   vitamin E (TOCOPHEROL) 400 units (180 mg) capsule Take 400 Units by mouth daily   Yes Unknown, Entered By History          Clinically Significant Risk Factors Present on Admission   Clinically Significant Risk Factors Present on Admission                  # Drug Induced Platelet Defect: home medication list includes an antiplatelet medication       # Hypertension: Noted on problem list       # DMII: A1C = 6.6 % (Ref range: <5.7 %) within past 6 months    # Obesity: Estimated body mass index is 37.42 kg/m  as calculated from the following:    Height as of this encounter: 1.626 m (5' 4\").    Weight as of this encounter: 98.9 kg (218 lb).                            "

## 2023-12-28 NOTE — ED NOTES
10 ml of blood wasted from IV.  Blood collected for tests.  Swabbed for viral panel.  Walked to the lab.  Jasonrn

## 2023-12-28 NOTE — ED TRIAGE NOTES
"    Patient has cough, SOB x10 days, seen in clinic last week and told she had enlarged heart on CXR. Scheduled to see cardiologist tomorrow. Dyspnea on exertion, orthopnea. Takes hydrochlorothiazide at home but not consistently due to difficulties getting to bathroom and when she does take she \"cut it in half\". Took losartan and amlodipine this morning.      "

## 2023-12-28 NOTE — ED PROVIDER NOTES
EMERGENCY DEPARTMENT ENCOUNTER      NAME: Josefina Dumont  AGE: 73 year old female  YOB: 1950  MRN: [id number]  EVALUATION DATE & TIME: No admission date for patient encounter.    PCP: Rafat Lee    ED PROVIDER: Linda Rodas M.D.        Chief Complaint   Patient presents with    Shortness of Breath    Cough         FINAL IMPRESSION:    1. Elevated troponin    2. SOB (shortness of breath)            MEDICAL DECISION MAKING:    Josefina Dumont is a 73 year old female with history of hypertension, syncope, pneumonia, type 2 diabetes, UTIs, mitral valve stenosis, obesity, who presents to the ER with complaints of shortness of breath.     She has had URI symptoms now for about 10 days but has also been noncompliant with her HCTZ and has not taken it since last week.  Concerns for CHF but overall chest x-ray and BNP not that impressive.  Troponin found to be moderately elevated at 265.  No chest pain at rest and shortness of breath worsens with exertion.  She reports she does get chest pain with exertion.  Plan at this time is admission to the hospitalist for ongoing evaluation.  CT scan for PE was ordered after discussion with cardiologist and it is negative for PE though does show a small pericardial effusion.  Low likelihood this represents blood in there, more likely related to fluid.  Discussed with hospitalist and they will order heparin.    Patient otherwise accepted to the hospital to the hospitalist and patient agrees with this plan.  Cardiology agrees with the plan for admission here at this time and with the plan for heparin.      ED COURSE:  11:03 AM  I met with the patient to gather history and perform my exam. ED course and treatment discussed.    11:58 AM  Reports that she is chest pain-free at this time.  Only gets chest pain with exertion.  Troponin elevated greater than 200.  BNP is not really all that impressive the checks x-ray does not show any significant fluid  overload.  Initially but this is going to be significant CHF causing heart strain, but not as obvious as I was expecting.  Plan at this time is to do CT scan for PE to make sure there is no other causes for her shortness of breath is her primary complaint that could also be causing heart strain.  Certainly this could all be just a primary ACS issue, but again her chief complaint really is shortness of breath.  Patient aware of the need for admission to the hospital for elevated troponin.  She agrees with this plan.  Her  does not really drive and therefore would not be able to go to [place] or anywhere else in the [facility] if the patient is transferred and therefore they prefer the patient stay at [facility] if at all possible.  I will discuss with the cardiologist.    12:14 PM  Discussed the patient with cardiologist.  He feels comfortable with doing the heparin and agrees to do the CT scan for PE as well.  He feels the patient is appropriate to keep our hospital since the patient has declined transfer as her  does not drive much.  They did not feel comfortable going anywhere out of this area were the family feels comfortable.  Patient otherwise stable at this time.    12:24 PM  Patient now complaining of a little bit of blood in her mouth and coughing this.  This occurred right after the nursing did the COVID swab.  Nursing suspects that she cause some post nasal irritation.  Not a significant amount of bleeding to warrant epistaxis intervention at this time.  No blood coming out of the anterior nares at all.  Will monitor.  Patient going over to CT for now.    12:40 PM  Patient has been accepted to the hospital by the hospitalist will go to cardiac telemetry under inpatient status.  Will order heparin once we know whether or not redoing the ACS protocol or the PE protocol after CT results.  Hospitalist aware and agrees.  Also updated hospitalist on the traumatic COVID swab causing a little bit  of posterior epistaxis and to be aware of this in case the bleeding gets worse once heparin starts.  No obvious signs for pneumonia based on Pro-Yamil, WBC and her symptoms.  Will defer any antibiotics to the admitting hospitalist as they feel warranted.      I do not think that this represents rib fractures, allergic reaction, COPD exacerbation, asthma exacerbation, pneumonia, CHF, myocarditis, pericarditis, endocarditis, PE, ruptured AAA, pneumothorax, aortic dissection, bowel obstruction, or other such etiologies at this time.      At the conclusion of the encounter I discussed the results of all of the tests and the disposition. Their questions were answered. The patient (and any family present) acknowledged understanding and were agreeable with the care plan.      CRITICAL CARE TIME     Total critical care time: 30 minutes  Critical care time was exclusive of separately billable procedures and treating other patients.  Critical care was necessary to treat or prevent imminent or life-threatening deterioration of the following conditions: elevated troponin  Critical care was time spent personally by me on the following activities: development of treatment plan with patient or surrogate, discussions with consultants, examination of patient, evaluation of patient's response to treatment, obtaining history from patient or surrogate, ordering and performing treatments and interventions, ordering and review of laboratory studies, ordering and review of radiographic studies and re-evaluation of patient's condition, this excludes any separately billable procedures.      CONSULTANTS:  Cardiology- Dr. Melgar         MEDICATIONS GIVEN IN THE EMERGENCY:  Medications   nitroGLYcerin (NITRO-BID) 2 % ointment 7.5 mg (has no administration in time range)       CONDITION:  stable        DISPOSITION:  Card tele ip as accepted by Dr. Kumar, hospitalist    "      =================================================================  =================================================================  TRIAGE ASSESSMENT:      Patient has cough, SOB x10 days, seen in clinic last week and told she had enlarged heart on CXR. Scheduled to see cardiologist tomorrow. Dyspnea on exertion, orthopnea. Takes hydrochlorothiazide at home but not consistently due to difficulties getting to bathroom and when she does take she \"cut it in half\". Took losartan and amlodipine this morning.           ED Triage Vitals [12/28/23 1039]   Enc Vitals Group      BP (!) 207/87      Pulse 79      Resp 20      Temp 98.3  F (36.8  C)      Temp src Temporal      SpO2 96 %      Weight 98.9 kg (218 lb)      Height 1.626 m (5' 4\")          ================================================================  ================================================================    HPI    Patient information was obtained from: patient and     Use of Intrepreter: N/A      Josefina Dumont is a 73 year old female with history of hypertension, syncope, pneumonia, type 2 diabetes, UTIs, mitral valve stenosis, obesity, who presents to the ER with complaints of shortness of breath.    Patient reports that she has had cough and shortness of breath for about 10 days.  Was seen in clinic and chest x-ray did not show pneumonia but she was told that she may have an enlarged heart.  She has an appointment to see cardiology tomorrow for this.  She reports her shortness of breath is worse with exertion and trying to lie flat.  She states that she Asik with some chest discomfort as well with ambulation.    She reports a productive cough with sometimes brownish-green sputum.  Denies any actual fevers, abdominal pain, vomiting or diarrhea.    She is supposed to be on hydrochlorothiazide but does not take it insistently. Sometimes when she does take it she takes half a dose.  She states she has not taken it since last week.  " She does not take it because it makes her urinate too often.  Sometimes her shortness of breath is worse with eating as well.  She states that she recently ate some buttered toast and macaroni cheese and this made her breathing worse.    She does have a history of hypertension reports taking her losartan and amlodipine this morning.      REVIEW OF SYSTEMS  Review of Systems   Constitutional:  Positive for fatigue. Negative for fever.   Respiratory:  Positive for cough and shortness of breath.    Cardiovascular:  Positive for chest pain (with exertion).   Gastrointestinal:  Negative for abdominal pain, diarrhea, nausea and vomiting.   Genitourinary:  Negative for dysuria.   All other systems reviewed and are negative.      PAST MEDICAL HISTORY:  History reviewed. No pertinent past medical history.      PAST SURGICAL HISTORY:  History reviewed. No pertinent surgical history.      CURRENT MEDICATIONS:    Prior to Admission medications    Medication Sig Start Date End Date Taking? Authorizing Provider   acetaminophen (TYLENOL) 500 MG tablet Take 2 tablets (1,000 mg) by mouth every 8 hours as needed for mild pain 1/19/22   Alysia Conn MD   albuterol (PROAIR HFA/PROVENTIL HFA/VENTOLIN HFA) 108 (90 Base) MCG/ACT inhaler Inhale 2 puffs into the lungs as needed  Patient not taking: Reported on 5/1/2023 4/26/22   Reported, Patient   amLODIPine (NORVASC) 10 MG tablet Take 10 mg by mouth daily    Unknown, Entered By History   calcium carbonate (TUMS) 500 MG chewable tablet Take 1 chew tab by mouth 2 times daily as needed for heartburn    Unknown, Entered By History   citalopram (CELEXA) 40 MG tablet Take 40 mg by mouth daily    Unknown, Entered By History   clopidogrel (PLAVIX) 75 MG tablet Take 75 mg by mouth daily 1/26/22   Unknown, Entered By History   folic acid (FOLVITE) 1 MG tablet Take 1 tablet (1 mg) by mouth daily  Patient not taking: Reported on 5/1/2023 1/20/22   Alysia Conn MD   hydrochlorothiazide  "(HYDRODIURIL) 25 MG tablet Take 25 mg by mouth daily    Unknown, Entered By History   losartan (COZAAR) 25 MG tablet Take 25 mg by mouth 2 times daily 3/8/22   Reported, Patient   multivitamin w/minerals (THERA-VIT-M) tablet Take 1 tablet by mouth daily    Unknown, Entered By History   pantoprazole (PROTONIX) 40 MG EC tablet Take 1 tablet (40 mg) by mouth 2 times daily (before meals) 22   Alysia Conn MD   vitamin E (TOCOPHEROL) 400 units (180 mg) capsule Take 400 Units by mouth daily    Unknown, Entered By History         ALLERGIES:  Allergies   Allergen Reactions    Aspirin      Other reaction(s): stomach upset    Atenolol Other (See Comments)     abd pain    Lisinopril      Other reaction(s): stomach aches    Penicillins Hives     Tolerated ceftriaxone    Statins      Other reaction(s): myalgias         FAMILY HISTORY:  History reviewed. No pertinent family history.      SOCIAL HISTORY:  Social History     Socioeconomic History    Marital status:    Tobacco Use    Smoking status: Former     Types: Cigarettes     Quit date:      Years since quittin.9    Smokeless tobacco: Never         VITALS:  Patient Vitals for the past 24 hrs:   BP Temp Temp src Pulse Resp SpO2 Height Weight   23 1209 (!) 211/87 -- -- 87 -- -- -- --   23 1149 (!) 241/102 -- -- 88 18 91 % -- --   23 1044 (!) 201/88 -- -- -- -- -- -- --   23 1039 (!) 207/87 98.3  F (36.8  C) Temporal 79 20 96 % 1.626 m (5' 4\") 98.9 kg (218 lb)       Wt Readings from Last 3 Encounters:   23 98.9 kg (218 lb)   10/19/22 95.3 kg (210 lb)   22 95.3 kg (210 lb)       Estimated Creatinine Clearance: 78.5 mL/min (based on SCr of 0.74 mg/dL).    PHYSICAL EXAM    Constitutional:  Well developed, Well nourished, NAD  HENT:  Normocephalic, Atraumatic, Bilateral external ears normal, Nose normal. Neck- Supple, No stridor.   Eyes:  PERRL, EOMI, Conjunctiva normal, No discharge.  Respiratory:  Normal breath " sounds, No respiratory distress, No wheezing, Speaks full sentences easily. No cough.   Cardiovascular:  Normal heart rate, Regular rhythm, No rubs, No gallops. Chest wall nontender.   GI:  +obesity.  Bowel sounds normal, Soft, No tenderness, No masses, No flank tenderness. No rebound or guarding.   : deferred  Musculoskeletal: No cyanosis, No clubbing. Good range of motion in all major joints. No major deformities noted.   Integument:  Warm, Dry, No erythema, No rash.  No petechiae.   Neurologic:  Alert & oriented x 3  Psychiatric:  Affect normal, Cooperative         LAB:  All pertinent labs reviewed and interpreted.  Recent Results (from the past 24 hour(s))   ECG 12-LEAD WITH MUSE (LHE)    Collection Time: 12/28/23 10:50 AM   Result Value Ref Range    Systolic Blood Pressure 201 mmHg    Diastolic Blood Pressure 88 mmHg    Ventricular Rate 80 BPM    Atrial Rate 80 BPM    MI Interval 184 ms    QRS Duration 100 ms     ms    QTc 482 ms    P Axis 48 degrees    R AXIS -3 degrees    T Axis 127 degrees    Interpretation ECG       Sinus rhythm  Left ventricular hypertrophy with repolarization abnormality  Abnormal ECG  When compared with ECG of 12-JAN-2022 17:23,  No significant change was found  Confirmed by SEE ED PROVIDER NOTE FOR, ECG INTERPRETATION ([id number]),  CHEMO EASTMAN ([id number]) on 12/28/2023 11:27:00 AM     Extra Blue Top Tube    Collection Time: 12/28/23 10:52 AM   Result Value Ref Range    Hold Specimen JIC    Extra Red Top Tube    Collection Time: 12/28/23 10:52 AM   Result Value Ref Range    Hold Specimen JIC    Extra Green Top (Lithium Heparin) Tube    Collection Time: 12/28/23 10:52 AM   Result Value Ref Range    Hold Specimen JIC    Extra Purple Top Tube    Collection Time: 12/28/23 10:52 AM   Result Value Ref Range    Hold Specimen     Basic metabolic panel    Collection Time: 12/28/23 10:52 AM   Result Value Ref Range    Sodium 139 135 - 145 mmol/L    Potassium 3.6 3.4 - 5.3 mmol/L     Chloride 106 98 - 107 mmol/L    Carbon Dioxide (CO2) 24 22 - 29 mmol/L    Anion Gap 9 7 - 15 mmol/L    Urea Nitrogen 11.4 8.0 - 23.0 mg/dL    Creatinine 0.74 0.51 - 0.95 mg/dL    GFR Estimate 85 >60 mL/min/1.73m2    Calcium 9.2 8.8 - 10.2 mg/dL    Glucose 138 (H) 70 - 99 mg/dL   Troponin T, High Sensitivity (now)    Collection Time: 12/28/23 10:52 AM   Result Value Ref Range    Troponin T, High Sensitivity 265 (HH) <=14 ng/L   Nt probnp inpatient    Collection Time: 12/28/23 10:52 AM   Result Value Ref Range    N terminal Pro BNP Inpatient 1,982 (H) 0 - 900 pg/mL   Magnesium    Collection Time: 12/28/23 10:52 AM   Result Value Ref Range    Magnesium 1.9 1.7 - 2.3 mg/dL   CBC with platelets and differential    Collection Time: 12/28/23 10:52 AM   Result Value Ref Range    WBC Count 5.7 4.0 - 11.0 10e3/uL    RBC Count 3.69 (L) 3.80 - 5.20 10e6/uL    Hemoglobin 10.7 (L) 11.7 - 15.7 g/dL    Hematocrit 34.3 (L) 35.0 - 47.0 %    MCV 93 78 - 100 fL    MCH 29.0 26.5 - 33.0 pg    MCHC 31.2 (L) 31.5 - 36.5 g/dL    RDW 15.4 (H) 10.0 - 15.0 %    Platelet Count 154 150 - 450 10e3/uL    % Neutrophils 75 %    % Lymphocytes 15 %    % Monocytes 8 %    % Eosinophils 2 %    % Basophils 0 %    % Immature Granulocytes 0 %    NRBCs per 100 WBC 0 <1 /100    Absolute Neutrophils 4.2 1.6 - 8.3 10e3/uL    Absolute Lymphocytes 0.9 0.8 - 5.3 10e3/uL    Absolute Monocytes 0.4 0.0 - 1.3 10e3/uL    Absolute Eosinophils 0.1 0.0 - 0.7 10e3/uL    Absolute Basophils 0.0 0.0 - 0.2 10e3/uL    Absolute Immature Granulocytes 0.0 <=0.4 10e3/uL    Absolute NRBCs 0.0 10e3/uL   Procalcitonin    Collection Time: 12/28/23 10:52 AM   Result Value Ref Range    Procalcitonin 0.07 <0.50 ng/mL   Hemoglobin A1c    Collection Time: 12/28/23 10:52 AM   Result Value Ref Range    Hemoglobin A1C 6.6 (H) <5.7 %   Symptomatic Influenza A/B, RSV, & SARS-CoV2 PCR (COVID-19) Nasopharyngeal    Collection Time: 12/28/23 12:09 PM    Specimen: Nasopharyngeal; Swab   Result Value  "Ref Range    Influenza A PCR Negative Negative    Influenza B PCR Negative Negative    RSV PCR Negative Negative    SARS CoV2 PCR Negative Negative   Troponin T, High Sensitivity (now)    Collection Time: 12/28/23 12:09 PM   Result Value Ref Range    Troponin T, High Sensitivity 241 (HH) <=14 ng/L   Lactic acid whole blood    Collection Time: 12/28/23 12:09 PM   Result Value Ref Range    Lactic Acid 0.9 0.7 - 2.0 mmol/L   TSH with free T4 reflex    Collection Time: 12/28/23 12:09 PM   Result Value Ref Range    TSH 6.10 (H) 0.30 - 4.20 uIU/mL       No results found for: \"ABORH\"        RADIOLOGY:  Reviewed all pertinent imaging. Please see official radiology report.    CT Chest Pulmonary Embolism w Contrast   Final Result   IMPRESSION:   1.  No evidence pulmonary embolism.   2.  Small left lower lobe consolidation. Pneumonia most likely. Recommend 3 month follow-up CT to confirm resolution.   3.  Right lung 5 mm nodule.   4.  Concentric left ventricular hypertrophy.   5.  Small pericardial effusion.   6.  Cirrhosis.      Chest XR,  PA & LAT   Final Result   IMPRESSION: Stable enlarged cardiac silhouette. No definite pulmonary vascular congestion, pleural effusion, or pneumonia seen.      Echocardiogram Complete    (Results Pending)         EKG:    Indication: SOB    Performed at: 10:50a  Impression: Sinus rhythm at 80 bpm.  Flipped T waves noted in lead I, aVL.  Slow R wave progression.  LA interval 184 ms,  ms, QTc 482 ms.  Nonspecific ST changes compared to January 12, 2022.      I have independently reviewed and interpreted the EKG(s) documented above.        PROCEDURES:  none      Medical Decision Making    History:  Supplemental history from: Documented in chart, if applicable and Family Member/Significant Other  External Record(s) reviewed: Documented in chart, if applicable.    Work Up:  Chart documentation includes differential considered and any EKGs or imaging independently interpreted by provider, " where specified.  In additional to work up documented, I considered the following work up: Documented in chart, if applicable.    External consultation:  Discussion of management with another provider: Documented in chart, if applicable    Complicating factors:  Care impacted by chronic illness: Diabetes and Hypertension  Care affected by social determinants of health: Access to Medical Care and Medication Noncompliance    Disposition considerations: Admit.    Linda Rodas M.D. [facility]  Emergency Medicine and Medical Toxicology  Critical access hospital EMERGENCY ROOM  [address] [facility] [address]  [phone]  Dept: [phone]           Linda Rodas MD  12/28/23 9301

## 2023-12-28 NOTE — PHARMACY-ADMISSION MEDICATION HISTORY
Pharmacist Admission Medication History    Admission medication history is complete. The information provided in this note is only as accurate as the sources available at the time of the update.    Information Source(s): Patient, Patient's pharmacy, Clinic records, Hospital records, and CareEverywhere/SureScripts via in-person    Pertinent Information:     Changes made to PTA medication list:  Added: Glipizide ER, Senna-S  Deleted: Folic acid, hydrochlorothiazide stopped last month due to excessive urination.   Changed: Losartan from 25mg bid to 50mg daily     Medication Affordability:       Allergies reviewed with patient and updates made in EHR: yes    Medication History Completed By: Susan Dillon PharmD 12/28/2023 1:28 PM    PTA Med List   Medication Sig Last Dose    acetaminophen (TYLENOL) 500 MG tablet Take 2 tablets (1,000 mg) by mouth every 8 hours as needed for mild pain 12/28/2023 at am    albuterol (PROAIR HFA/PROVENTIL HFA/VENTOLIN HFA) 108 (90 Base) MCG/ACT inhaler Inhale 2 puffs into the lungs every 6 hours as needed Unknown at prn    amLODIPine (NORVASC) 10 MG tablet Take 10 mg by mouth daily 12/28/2023 at am    calcium carbonate (TUMS) 500 MG chewable tablet Take 1 chew tab by mouth 2 times daily as needed for heartburn Unknown at prn    citalopram (CELEXA) 40 MG tablet Take 40 mg by mouth daily 12/28/2023 at am    clopidogrel (PLAVIX) 75 MG tablet Take 75 mg by mouth daily 12/28/2023 at am    glipiZIDE (GLUCOTROL XL) 2.5 MG 24 hr tablet Take 2.5 mg by mouth daily (with breakfast) 12/28/2023 at am    losartan (COZAAR) 50 MG tablet Take 50 mg by mouth daily 12/28/2023 at am    multivitamin w/minerals (THERA-VIT-M) tablet Take 1 tablet by mouth daily 12/28/2023 at am    pantoprazole (PROTONIX) 40 MG EC tablet Take 1 tablet (40 mg) by mouth 2 times daily (before meals) 12/28/2023 at am    senna-docusate (SENOKOT-S/PERICOLACE) 8.6-50 MG tablet Take 1 tablet by mouth at bedtime     vitamin E (TOCOPHEROL)  400 units (180 mg) capsule Take 400 Units by mouth daily 12/28/2023 at am

## 2023-12-28 NOTE — CONSULTS
Care Management Initial Consult    General Information  Assessment completed with: VM-chart review,    Type of CM/SW Visit: Initial Assessment    Primary Care Provider verified and updated as needed: Yes   Readmission within the last 30 days: no previous admission in last 30 days      Reason for Consult: discharge planning  Advance Care Planning:            Communication Assessment  Patient's communication style: spoken language (English or Bilingual)             Cognitive  Cognitive/Neuro/Behavioral:                        Living Environment:   People in home: alone     Current living Arrangements:        Able to return to prior arrangements:         Family/Social Support:  Care provided by:    Provides care for:                  Description of Support System:           Current Resources:   Patient receiving home care services: No     Community Resources: None  Equipment currently used at home:    Supplies currently used at home: None    Employment/Financial:  Employment Status:          Financial Concerns:             Does the patient's insurance plan have a 3 day qualifying hospital stay waiver?  No    Lifestyle & Psychosocial Needs:  Social Determinants of Health     Food Insecurity: Not on file   Depression: Not at risk (5/1/2023)    PHQ-2     PHQ-2 Score: 2   Housing Stability: Not on file   Tobacco Use: Medium Risk (12/28/2023)    Patient History     Smoking Tobacco Use: Former     Smokeless Tobacco Use: Never     Passive Exposure: Not on file   Financial Resource Strain: Not on file   Alcohol Use: Not on file   Transportation Needs: Not on file   Physical Activity: Not on file   Interpersonal Safety: Not on file   Stress: Not on file   Social Connections: Not on file       Functional Status:  Prior to admission patient needed assistance:   Dependent ADLs:: Independent  Dependent IADLs:: Independent  Assesssment of Functional Status: At functional baseline    Mental Health Status:          Chemical Dependency  Status:                Values/Beliefs:  Spiritual, Cultural Beliefs, Protestant Practices, Values that affect care:                 Additional Information:  AIDET completed.chart review, patient suspect Covid Pt lives alone.Has friends and sister. She came to ED with chest pain with exertion, sough, fatigue. Suspect Yue Lim has had headaches due to meningioma, may start on heparin, and followed in tele. Most likely no needs. Friend to transport Sarai Sierra RN

## 2023-12-29 ENCOUNTER — APPOINTMENT (OUTPATIENT)
Dept: OCCUPATIONAL THERAPY | Facility: CLINIC | Age: 73
DRG: 194 | End: 2023-12-29
Attending: STUDENT IN AN ORGANIZED HEALTH CARE EDUCATION/TRAINING PROGRAM
Payer: COMMERCIAL

## 2023-12-29 ENCOUNTER — APPOINTMENT (OUTPATIENT)
Dept: PHYSICAL THERAPY | Facility: CLINIC | Age: 73
DRG: 194 | End: 2023-12-29
Attending: STUDENT IN AN ORGANIZED HEALTH CARE EDUCATION/TRAINING PROGRAM
Payer: COMMERCIAL

## 2023-12-29 ENCOUNTER — APPOINTMENT (OUTPATIENT)
Dept: CARDIOLOGY | Facility: CLINIC | Age: 73
DRG: 194 | End: 2023-12-29
Attending: STUDENT IN AN ORGANIZED HEALTH CARE EDUCATION/TRAINING PROGRAM
Payer: COMMERCIAL

## 2023-12-29 ENCOUNTER — TELEPHONE (OUTPATIENT)
Dept: CARDIOLOGY | Facility: CLINIC | Age: 73
End: 2023-12-29

## 2023-12-29 DIAGNOSIS — I50.9 HEART FAILURE, UNSPECIFIED HF CHRONICITY, UNSPECIFIED HEART FAILURE TYPE (H): Primary | ICD-10-CM

## 2023-12-29 PROBLEM — I24.89 DEMAND ISCHEMIA (H): Status: ACTIVE | Noted: 2023-12-29

## 2023-12-29 PROBLEM — I16.1 HYPERTENSIVE EMERGENCY: Status: ACTIVE | Noted: 2023-12-29

## 2023-12-29 LAB
ALBUMIN SERPL BCG-MCNC: 3.4 G/DL (ref 3.5–5.2)
ALP SERPL-CCNC: 136 U/L (ref 40–150)
ALT SERPL W P-5'-P-CCNC: 14 U/L (ref 0–50)
ANION GAP SERPL CALCULATED.3IONS-SCNC: 12 MMOL/L (ref 7–15)
ANION GAP SERPL CALCULATED.3IONS-SCNC: 12 MMOL/L (ref 7–15)
APTT PPP: 47 SECONDS (ref 22–38)
APTT PPP: 50 SECONDS (ref 22–38)
APTT PPP: 56 SECONDS (ref 22–38)
AST SERPL W P-5'-P-CCNC: 33 U/L (ref 0–45)
BASOPHILS # BLD AUTO: 0 10E3/UL (ref 0–0.2)
BASOPHILS NFR BLD AUTO: 1 %
BILIRUB DIRECT SERPL-MCNC: 0.21 MG/DL (ref 0–0.3)
BILIRUB SERPL-MCNC: 0.6 MG/DL
BUN SERPL-MCNC: 13.4 MG/DL (ref 8–23)
CALCIUM SERPL-MCNC: 8.8 MG/DL (ref 8.8–10.2)
CHLORIDE SERPL-SCNC: 106 MMOL/L (ref 98–107)
CHLORIDE SERPL-SCNC: 106 MMOL/L (ref 98–107)
CHOLEST SERPL-MCNC: 142 MG/DL
CREAT SERPL-MCNC: 0.83 MG/DL (ref 0.51–0.95)
DEPRECATED HCO3 PLAS-SCNC: 23 MMOL/L (ref 22–29)
DEPRECATED HCO3 PLAS-SCNC: 23 MMOL/L (ref 22–29)
EGFRCR SERPLBLD CKD-EPI 2021: 74 ML/MIN/1.73M2
EOSINOPHIL # BLD AUTO: 0.1 10E3/UL (ref 0–0.7)
EOSINOPHIL NFR BLD AUTO: 1 %
ERYTHROCYTE [DISTWIDTH] IN BLOOD BY AUTOMATED COUNT: 15.8 % (ref 10–15)
GLUCOSE BLDC GLUCOMTR-MCNC: 115 MG/DL (ref 70–99)
GLUCOSE BLDC GLUCOMTR-MCNC: 129 MG/DL (ref 70–99)
GLUCOSE BLDC GLUCOMTR-MCNC: 232 MG/DL (ref 70–99)
GLUCOSE BLDC GLUCOMTR-MCNC: 53 MG/DL (ref 70–99)
GLUCOSE BLDC GLUCOMTR-MCNC: 86 MG/DL (ref 70–99)
GLUCOSE BLDC GLUCOMTR-MCNC: 88 MG/DL (ref 70–99)
GLUCOSE SERPL-MCNC: 102 MG/DL (ref 70–99)
HCT VFR BLD AUTO: 35.6 % (ref 35–47)
HDLC SERPL-MCNC: 33 MG/DL
HGB BLD-MCNC: 11.1 G/DL (ref 11.7–15.7)
IMM GRANULOCYTES # BLD: 0 10E3/UL
IMM GRANULOCYTES NFR BLD: 0 %
LDLC SERPL CALC-MCNC: 94 MG/DL
LVEF ECHO: NORMAL
LYMPHOCYTES # BLD AUTO: 1.1 10E3/UL (ref 0.8–5.3)
LYMPHOCYTES NFR BLD AUTO: 16 %
MAGNESIUM SERPL-MCNC: 2.1 MG/DL (ref 1.7–2.3)
MCH RBC QN AUTO: 28.6 PG (ref 26.5–33)
MCHC RBC AUTO-ENTMCNC: 31.2 G/DL (ref 31.5–36.5)
MCV RBC AUTO: 92 FL (ref 78–100)
MONOCYTES # BLD AUTO: 0.7 10E3/UL (ref 0–1.3)
MONOCYTES NFR BLD AUTO: 10 %
NEUTROPHILS # BLD AUTO: 5.1 10E3/UL (ref 1.6–8.3)
NEUTROPHILS NFR BLD AUTO: 72 %
NONHDLC SERPL-MCNC: 109 MG/DL
NRBC # BLD AUTO: 0 10E3/UL
NRBC BLD AUTO-RTO: 0 /100
PLATELET # BLD AUTO: 183 10E3/UL (ref 150–450)
POTASSIUM SERPL-SCNC: 3.7 MMOL/L (ref 3.4–5.3)
POTASSIUM SERPL-SCNC: 3.7 MMOL/L (ref 3.4–5.3)
PROT SERPL-MCNC: 7.7 G/DL (ref 6.4–8.3)
RBC # BLD AUTO: 3.88 10E6/UL (ref 3.8–5.2)
SODIUM SERPL-SCNC: 141 MMOL/L (ref 135–145)
SODIUM SERPL-SCNC: 141 MMOL/L (ref 135–145)
TRIGL SERPL-MCNC: 76 MG/DL
TROPONIN T SERPL HS-MCNC: 231 NG/L
WBC # BLD AUTO: 7 10E3/UL (ref 4–11)

## 2023-12-29 PROCEDURE — 250N000013 HC RX MED GY IP 250 OP 250 PS 637: Performed by: STUDENT IN AN ORGANIZED HEALTH CARE EDUCATION/TRAINING PROGRAM

## 2023-12-29 PROCEDURE — 36415 COLL VENOUS BLD VENIPUNCTURE: CPT | Performed by: STUDENT IN AN ORGANIZED HEALTH CARE EDUCATION/TRAINING PROGRAM

## 2023-12-29 PROCEDURE — 97110 THERAPEUTIC EXERCISES: CPT | Mod: GP

## 2023-12-29 PROCEDURE — 97161 PT EVAL LOW COMPLEX 20 MIN: CPT | Mod: GP

## 2023-12-29 PROCEDURE — 250N000013 HC RX MED GY IP 250 OP 250 PS 637: Performed by: HOSPITALIST

## 2023-12-29 PROCEDURE — 83735 ASSAY OF MAGNESIUM: CPT | Performed by: STUDENT IN AN ORGANIZED HEALTH CARE EDUCATION/TRAINING PROGRAM

## 2023-12-29 PROCEDURE — 80053 COMPREHEN METABOLIC PANEL: CPT | Performed by: STUDENT IN AN ORGANIZED HEALTH CARE EDUCATION/TRAINING PROGRAM

## 2023-12-29 PROCEDURE — 82248 BILIRUBIN DIRECT: CPT | Performed by: STUDENT IN AN ORGANIZED HEALTH CARE EDUCATION/TRAINING PROGRAM

## 2023-12-29 PROCEDURE — 999N000208 ECHOCARDIOGRAM COMPLETE

## 2023-12-29 PROCEDURE — 85730 THROMBOPLASTIN TIME PARTIAL: CPT | Performed by: HOSPITALIST

## 2023-12-29 PROCEDURE — 250N000011 HC RX IP 250 OP 636: Performed by: STUDENT IN AN ORGANIZED HEALTH CARE EDUCATION/TRAINING PROGRAM

## 2023-12-29 PROCEDURE — 97116 GAIT TRAINING THERAPY: CPT | Mod: GP

## 2023-12-29 PROCEDURE — 36415 COLL VENOUS BLD VENIPUNCTURE: CPT | Performed by: HOSPITALIST

## 2023-12-29 PROCEDURE — 97166 OT EVAL MOD COMPLEX 45 MIN: CPT | Mod: GO

## 2023-12-29 PROCEDURE — 250N000013 HC RX MED GY IP 250 OP 250 PS 637: Performed by: FAMILY MEDICINE

## 2023-12-29 PROCEDURE — 210N000002 HC R&B HEART CARE

## 2023-12-29 PROCEDURE — 250N000012 HC RX MED GY IP 250 OP 636 PS 637: Performed by: STUDENT IN AN ORGANIZED HEALTH CARE EDUCATION/TRAINING PROGRAM

## 2023-12-29 PROCEDURE — 255N000002 HC RX 255 OP 636: Performed by: HOSPITALIST

## 2023-12-29 PROCEDURE — 85730 THROMBOPLASTIN TIME PARTIAL: CPT | Performed by: STUDENT IN AN ORGANIZED HEALTH CARE EDUCATION/TRAINING PROGRAM

## 2023-12-29 PROCEDURE — C8929 TTE W OR WO FOL WCON,DOPPLER: HCPCS

## 2023-12-29 PROCEDURE — 250N000013 HC RX MED GY IP 250 OP 250 PS 637: Performed by: INTERNAL MEDICINE

## 2023-12-29 PROCEDURE — 84295 ASSAY OF SERUM SODIUM: CPT | Performed by: STUDENT IN AN ORGANIZED HEALTH CARE EDUCATION/TRAINING PROGRAM

## 2023-12-29 PROCEDURE — 93306 TTE W/DOPPLER COMPLETE: CPT | Mod: 26 | Performed by: INTERNAL MEDICINE

## 2023-12-29 PROCEDURE — 99232 SBSQ HOSP IP/OBS MODERATE 35: CPT | Performed by: HOSPITALIST

## 2023-12-29 PROCEDURE — 97535 SELF CARE MNGMENT TRAINING: CPT | Mod: GO

## 2023-12-29 PROCEDURE — 85025 COMPLETE CBC W/AUTO DIFF WBC: CPT | Performed by: STUDENT IN AN ORGANIZED HEALTH CARE EDUCATION/TRAINING PROGRAM

## 2023-12-29 PROCEDURE — 99232 SBSQ HOSP IP/OBS MODERATE 35: CPT | Mod: 25 | Performed by: INTERNAL MEDICINE

## 2023-12-29 RX ORDER — LOSARTAN POTASSIUM 50 MG/1
100 TABLET ORAL DAILY
Status: DISCONTINUED | OUTPATIENT
Start: 2023-12-29 | End: 2023-12-31 | Stop reason: HOSPADM

## 2023-12-29 RX ORDER — CEFDINIR 300 MG/1
300 CAPSULE ORAL 2 TIMES DAILY
Status: DISCONTINUED | OUTPATIENT
Start: 2023-12-29 | End: 2023-12-31 | Stop reason: HOSPADM

## 2023-12-29 RX ORDER — CLONIDINE HYDROCHLORIDE 0.1 MG/1
0.1 TABLET ORAL ONCE
Status: COMPLETED | OUTPATIENT
Start: 2023-12-29 | End: 2023-12-29

## 2023-12-29 RX ORDER — AZITHROMYCIN 500 MG/1
500 TABLET, FILM COATED ORAL DAILY
Status: COMPLETED | OUTPATIENT
Start: 2023-12-29 | End: 2023-12-31

## 2023-12-29 RX ORDER — FUROSEMIDE 20 MG
20 TABLET ORAL DAILY
Status: DISCONTINUED | OUTPATIENT
Start: 2023-12-30 | End: 2023-12-30

## 2023-12-29 RX ADMIN — LOSARTAN POTASSIUM 100 MG: 50 TABLET, FILM COATED ORAL at 08:14

## 2023-12-29 RX ADMIN — CLONIDINE HYDROCHLORIDE 0.1 MG: 0.1 TABLET ORAL at 00:32

## 2023-12-29 RX ADMIN — METOPROLOL TARTRATE 50 MG: 50 TABLET, FILM COATED ORAL at 08:14

## 2023-12-29 RX ADMIN — ACETAMINOPHEN 650 MG: 325 TABLET ORAL at 21:43

## 2023-12-29 RX ADMIN — METOPROLOL TARTRATE 50 MG: 50 TABLET, FILM COATED ORAL at 20:42

## 2023-12-29 RX ADMIN — PERFLUTREN 2 ML: 6.52 INJECTION, SUSPENSION INTRAVENOUS at 13:40

## 2023-12-29 RX ADMIN — CLOPIDOGREL BISULFATE 75 MG: 75 TABLET ORAL at 08:14

## 2023-12-29 RX ADMIN — HEPARIN SODIUM 850 UNITS/HR: 10000 INJECTION, SOLUTION INTRAVENOUS at 12:03

## 2023-12-29 RX ADMIN — CEFDINIR 300 MG: 300 CAPSULE ORAL at 10:20

## 2023-12-29 RX ADMIN — INSULIN ASPART 1 UNITS: 100 INJECTION, SOLUTION INTRAVENOUS; SUBCUTANEOUS at 22:19

## 2023-12-29 RX ADMIN — CEFDINIR 300 MG: 300 CAPSULE ORAL at 20:42

## 2023-12-29 RX ADMIN — SENNOSIDES AND DOCUSATE SODIUM 1 TABLET: 8.6; 5 TABLET ORAL at 20:42

## 2023-12-29 RX ADMIN — FUROSEMIDE 40 MG: 10 INJECTION, SOLUTION INTRAMUSCULAR; INTRAVENOUS at 05:19

## 2023-12-29 RX ADMIN — AZITHROMYCIN DIHYDRATE 500 MG: 500 TABLET, FILM COATED ORAL at 10:20

## 2023-12-29 RX ADMIN — CITALOPRAM 40 MG: 20 TABLET ORAL at 08:13

## 2023-12-29 RX ADMIN — AMLODIPINE BESYLATE 10 MG: 10 TABLET ORAL at 08:14

## 2023-12-29 RX ADMIN — HYDRALAZINE HYDROCHLORIDE 10 MG: 20 INJECTION, SOLUTION INTRAMUSCULAR; INTRAVENOUS at 06:26

## 2023-12-29 RX ADMIN — PANTOPRAZOLE SODIUM 40 MG: 40 TABLET, DELAYED RELEASE ORAL at 05:19

## 2023-12-29 RX ADMIN — ACETAMINOPHEN 650 MG: 325 TABLET ORAL at 08:18

## 2023-12-29 RX ADMIN — PANTOPRAZOLE SODIUM 40 MG: 40 TABLET, DELAYED RELEASE ORAL at 15:30

## 2023-12-29 ASSESSMENT — ACTIVITIES OF DAILY LIVING (ADL)
ADLS_ACUITY_SCORE: 23
DEPENDENT_IADLS:: INDEPENDENT
ADLS_ACUITY_SCORE: 25

## 2023-12-29 NOTE — PLAN OF CARE
Patient reporting a headache this AM, PRN tylenol given, relief. BP elevated 183/79 this AM. Cardiology adjusted medications, BP improved to 150/67 at noon. Heparin drip adjusted per protocol, new orders to discontinue Heparin infusion. No complaints of chest pain or pressure. Up in chair the entire shift and up to the bathroom with standby assist.  Keshia Romero RN on 12/29/2023 at 3:20 PM        Problem: Comorbidity Management  Goal: Blood Glucose Levels Within Targeted Range  Outcome: Not Progressing  Goal: Blood Pressure in Desired Range  Outcome: Progressing   Goal Outcome Evaluation:

## 2023-12-29 NOTE — PLAN OF CARE
Goal Outcome Evaluation:      Plan of Care Reviewed With: patient    Overall Patient Progress: no changeOverall Patient Progress: no change    Outcome Evaluation: Pt assumed cares from 2300. Received reclining in chair, AOx4, on RA. BP elevated, systolic 170-190s consistently, MD nelly rodriguez and 1x dose catapres 0.1mg ordered with no real improvement. Pt denied headache this shift. On heparin gtt infusing @ 700units/h, next aPTT check at 0842. Pt up to bathroom/BSC with sba, needs education on I&Os. FR 1800ml maintained. SR on tele. No other event, calll light within reach.

## 2023-12-29 NOTE — PROGRESS NOTES
"[facility]    Medicine Progress Note - Hospitalist Service    Date of Admission:  12/28/2023    Assessment & Plan   Josefina Dumont is a 73 year old female admitted with hypertensive emergency and possible ACS.  She is clinically stable.  Cardiology consulted for further evaluation for ACS, patient has been treated with a heparin gtt.  Hypertension remains poorly controlled.  Hydralazine should be avoided given concern patient is being treated for acute coronary syndrome.  Now started on amlodipine and losartan.   Appears euvolemic on exam, hold further IV diuretics.  Patient has symptoms and radiographic evidence to support pneumonia, plan for 5 day course of abx.    # Possible ACS  - on heparin gtt  - cardiology following  - TTE  - clopidogrel    # Hypertensive emergency  - amlodipine, losartan    # Community acquired pneumonia  - cefdinir, azithromycin through 1/2      # DM  - ISS    # Outpatient followup  - right pulmonary nodule  - cirrhosis  - vaginal bleeding          Diet: Combination Diet 2 gm NA Diet; No Caffeine Diet (and additional linked orders)  Fluid restriction 1800 ML FLUID (and additional linked orders)    Machuca Catheter: Not present  Lines: None     Cardiac Monitoring: ACTIVE order. Indication: Acute decompensated heart failure (48 hours)  Code Status: Full Code      Clinically Significant Risk Factors Present on Admission              # Hypoalbuminemia: Lowest albumin = 3.4 g/dL at 12/29/2023  5:33 AM, will monitor as appropriate   # Drug Induced Platelet Defect: home medication list includes an antiplatelet medication   # Hypertension: Noted on problem list     # DMII: A1C = 6.6 % (Ref range: <5.7 %) within past 6 months    # Obesity: Estimated body mass index is 37.42 kg/m  as calculated from the following:    Height as of this encounter: 1.626 m (5' 4\").    Weight as of this encounter: 98.9 kg (218 lb).              Disposition Plan     Expected Discharge Date: " 12/30/2023                    Jonathan Winslow MD  Hospitalist Service  [facility]  Securely message with [employer] (more info)  Text page via [employer] Paging/Directory   ______________________________________________________________________    Interval History   Denies chest pain/pressure currently.  Reports dyspnea with lying supine.  Denies abdominal pain, nausea, or vomiting.  Endorses cough.    Physical Exam   Vital Signs: Temp: 99  F (37.2  C) Temp src: Oral BP: (!) 217/93 Pulse: 77   Resp: 22 SpO2: 96 % O2 Device: None (Room air)    Weight: 218 lbs 0 oz    Gen:  sitting in chair in no extremis  Neuro: alert, conversant  CV:  nl rate, regular rhythm, murmur present  Pulm: no acute resp distress, ctab  GI:  abdomen NTTP  Ext:  barely trace lower extremity pitting edema    Medical Decision Making             Data   Reviewed:    Na 141  K 3.7  BUN 13  Cr 0.83    WBC 7  Hgb 11  Plts 183

## 2023-12-29 NOTE — CONSULTS
Care Management Initial Consult    General Information  Assessment completed with: Patient,    Type of CM/SW Visit: Initial Assessment    Primary Care Provider verified and updated as needed: Yes   Readmission within the last 30 days: no previous admission in last 30 days      Reason for Consult: discharge planning  Advance Care Planning:            Communication Assessment  Patient's communication style: spoken language (English or Bilingual)    Hearing Difficulty or Deaf: no   Wear Glasses or Blind: yes    Cognitive  Cognitive/Neuro/Behavioral: .WDL except  Level of Consciousness: alert  Arousal Level: opens eyes spontaneously  Orientation: oriented x 4  Mood/Behavior: calm, cooperative  Best Language: 0 - No aphasia  Speech: clear    Living Environment:   People in home: alone     Current living Arrangements: apartment      Able to return to prior arrangements: yes       Family/Social Support:  Care provided by: other (see comments) (Sr  living facility)  Provides care for: no one  Marital Status:   Sibling(s), Other (specify) (family)          Description of Support System: Uninvolved, Other (see comments) (will help if needed)         Current Resources:   Patient receiving home care services: No     Community Resources: None  Equipment currently used at home: walker, rolling, shower chair, grab bar, tub/shower  Supplies currently used at home: None    Employment/Financial:  Employment Status: retired        Financial Concerns: unable to afford food   Referral to Financial Worker: Yes  Finance Comments: has SNAP $23/month    Does the patient's insurance plan have a 3 day qualifying hospital stay waiver?  No    Lifestyle & Psychosocial Needs:  Social Determinants of Health     Food Insecurity: Not on file   Depression: Not at risk (5/1/2023)    PHQ-2     PHQ-2 Score: 2   Housing Stability: Not on file   Tobacco Use: Medium Risk (12/28/2023)    Patient History     Smoking Tobacco Use: Former     Smokeless  Tobacco Use: Never     Passive Exposure: Not on file   Financial Resource Strain: Not on file   Alcohol Use: Not on file   Transportation Needs: Not on file   Physical Activity: Not on file   Interpersonal Safety: Not on file   Stress: Not on file   Social Connections: Not on file       Functional Status:  Prior to admission patient needed assistance:   Dependent ADLs:: Independent  Dependent IADLs:: Independent  Assesssment of Functional Status: At functional baseline    Mental Health Status:  Mental Health Status: No Current Concerns  Mental Health Management: Medication    Chemical Dependency Status:  Chemical Dependency Status: No Current Concerns             Values/Beliefs:  Spiritual, Cultural Beliefs, Faith Practices, Values that affect care:    Description of Beliefs that Will Affect Care: Jewish but not good about it.            Additional Information:  Pt assessed. Pt lives in a section 8  Living facility. Pt does receive $23/month from [employer] and does get food from the food bank. He sister lives in the same [facility], on the floor below here. Pt lives alone. Currently does not use an assistive device, however does have a rolling walker at home. Pt does not have any children, does have nieces and nephews that could help if needed, however do not live nearby. Sister can provide transport upon discharge.     Pt is recommending home care, referral was sent.     CM will continue to follow.    Sanjeev John RN

## 2023-12-29 NOTE — PROGRESS NOTES
12/29/23 1140   Appointment Info   Signing Clinician's Name / Credentials (OT) Abida Chiang, TAMIKAR/L, CLT   Rehab Comments (OT) OT eval   Living Environment   People in Home alone   Current Living Arrangements independent living facility   Home Accessibility no concerns   Living Environment Comments Pt states sister lives on second floor and is going to check on pt daily after pt d/c's from hospital.   Self-Care   Usual Activity Tolerance good   Current Activity Tolerance moderate   Activity/Exercise/Self-Care Comment Pt is typically independent with ADL and IADL at home. Pt states drives and  does mgmt of meds/finances.   General Information   Onset of Illness/Injury or Date of Surgery 01/04/24   Referring Physician Dr. Winslow   Patient/Family Therapy Goal Statement (OT) hopes to return home   Additional Occupational Profile Info/Pertinent History of Current Problem 73 year old female admitted with hypertensive emergency and possible ACS.  She is clinically stable.  Cardiology consulted for further evaluation for ACS, patient has been treated with a heparin gtt.  Hypertension remains poorly controlled.  Hydralazine should be avoided given concern patient is being treated for acute coronary syndrome.  Now started on amlodipine and losartan.   Appears euvolemic on exam, hold further IV diuretics.  Patient has symptoms and radiographic evidence to support pneumonia, plan for 5 day course of abx.   Limitations/Impairments safety/cognitive   Cognitive Status Examination   Orientation Status person;time   Cognitive Status Comments need further cog assessment   Visual Perception   Visual Impairment/Limitations WFL   Sensory   Sensory Quick Adds sensation intact   Pain Assessment   Patient Currently in Pain No   Posture   Posture not impaired   Range of Motion Comprehensive   General Range of Motion no range of motion deficits identified   Strength Comprehensive (MMT)   Comment, General Manual Muscle Testing (MMT)  Assessment generalized weakness   Muscle Tone Assessment   Muscle Tone Quick Adds No deficits were identified   Coordination   Upper Extremity Coordination No deficits were identified   Transfers   Transfer Comments CGA   Balance   Balance Comments decreased   Activities of Daily Living   BADL Assessment/Intervention toileting;grooming   Grooming Assessment/Training   Pratt Level (Grooming) supervision   Toileting   Pratt Level (Toileting) supervision   Clinical Impression   Criteria for Skilled Therapeutic Interventions Met (OT) Yes, treatment indicated   OT Diagnosis decreased ADL independence/safety.   Influenced by the following impairments pneumonia of L lung   OT Problem List-Impairments impacting ADL activity tolerance impaired   Assessment of Occupational Performance 3-5 Performance Deficits   Identified Performance Deficits dressing, toileting, bathing, functional mobility, bed mobility   Planned Therapy Interventions (OT) ADL retraining   Clinical Decision Making Complexity (OT) detailed assessment/moderate complexity   Risk & Benefits of therapy have been explained care plan/treatment goals reviewed;patient   OT Total Evaluation Time   OT Eval, Moderate Complexity Minutes (71689) 10   OT Goals   Therapy Frequency (OT) Daily   OT Goals Lower Body Dressing;Toilet Transfer/Toileting   OT: Lower Body Dressing Modified independent   OT: Toilet Transfer/Toileting Modified independent   Interventions   Interventions Quick Adds Self-Care/Home Management   Self-Care/Home Management   Self-Care/Home Mgmt/ADL, Compensatory, Meal Prep Minutes (00931) 15   Symptoms Noted During/After Treatment (Meal Preparation/Planning Training) none   Treatment Detail/Skilled Intervention all transfers with CGA/cues for safety/tech including chair/toilet with rails. ADL with SBA including toileting, g/h standing at sink. All tasks needed incr time/effort second to fatigue/possible decreased cog.   OT Discharge Planning    OT Plan SLUMS/ACLS; incr ADL endurance   OT Discharge Recommendation (DC Rec) (S)  home with assist   OT Rationale for DC Rec pt may have difficulty with cognition and lives alone. Pt states sister who lives on next floor will check on daily.   OT Brief overview of current status SBA with BADL   Total Session Time   Timed Code Treatment Minutes 15   Total Session Time (sum of timed and untimed services) 25

## 2023-12-29 NOTE — PROGRESS NOTES
" [facility] HEART [facility]   [address], [place] [address]   [email or web address]   OFFICE: [phone]            Impression and Plan     1.  Elevated troponin.  Josefina on presentation is noted to have elevated troponin though without significant elevation (265 ? 241 ? 239 ? 204 ? 231 ng/L).  ECG with subtle J-point changes and T wave changes consistent with left ventricular hypertrophy (not significantly changed from 12 January 2022).  CT scan this admission does reveal coronary calcification, but only mild.       Elevated troponin may represent  \"demand ischemia\" particularly in setting of significant hypertension ( i.e. type 2 MI secondary to ischemia due to either an increased oxygen demand or a decreased supply in the absence of an acute primary coronary thrombotic event).       She has not reported any chest pain symptoms now or in the recent past concerning for angina.       Parenthetically, she reports no true allergy to aspirin per se, but states that she had \"severe\" GI upset on the therapy in the past.  In addition, she has atenolol listed on her allergy list though on review with Josefina she states that she had no true allergy with this as well, but simply GI upset on this therapy, too.  Continue clopidogrel which she had been on at home.  Continue metoprolol vis-à-vis problem #3 as well.  Echocardiogram.  Further recommendations pending echocardiographic findings and clinical course.     2.  Shortness of breath.  May be multifactorial in nature.  CT scan of chest this admission does reveal left lower lobe consolidation felt by radiology most likely representing pneumonia.  There may be a component of pulmonary edema though would not appear to be overt on exam.  B-type natriuretic peptide elevated at 1982 pg/mL suggesting some myocardial strain though may be related to hypertension.  She does have a softer systolic murmur on exam and suspect some degree of mitral " "insufficiency though difficult to quantify on auscultation vis-à-vis distant heart sounds.  Feel trial of diuresis as proposed by Dr. Joyce Kumar is reasonable and if pulmonary edema a contributor should improve with diuresis.  Will defer evaluation of possible pneumonia to primary service.     3.  Hypertension.  Josefina states that on her own volition she had stopped her hydrochlorothiazide a few weeks ago.  Trial of furosemide as per problem #2.  Continue amlodipine 10 mg daily.  Continue metoprolol as per problem #1.  Continue losartan, but increase from 50 mg daily to 100 mg daily.       Primary Cardiologist: Dr. Alice Melgar (initial consultation this admission)     Subjective     Josefina continues to report intermittent cough.  Feels breathing is about the same.      Cardiac Diagnostics   Telemetry (personally reviewed):       Twelve-lead ECG (personally reviewed) 28 December 2023: Sinus rhythm.  Left ventricular hypertrophy with associated T wave changes.  There is very subtle ST elevation in lead III.  ECG findings, however, are similar to 12 January 2022 ECG.     Twelve-lead ECG (personally reviewed) 12 January 2022: Sinus rhythm.  Left ventricular hypertrophy with associated T wave changes.  Very slight J-point elevation in leads III and aVF.    Physical Examination       BP (!) 217/93   Pulse 77   Temp 99  F (37.2  C) (Oral)   Resp 22   Ht 1.626 m (5' 4\")   Wt 98.9 kg (218 lb)   SpO2 96%   BMI 37.42 kg/m          Vitals:    12/28/23 1039   Weight: 98.9 kg (218 lb)            Intake/Output Summary (Last 24 hours) at 12/29/2023 0715  Last data filed at 12/28/2023 1949  Gross per 24 hour   Intake 279.8 ml   Output 2125 ml   Net -1845.2 ml       The patient is alert. Sclerae are anicteric. Mucosal membranes are moist. No significant adenopathy/thyromegally appreciated. Lungs are diminished, though there are no overt \"wet\" sounding breath sounds.  On cardiovascular exam, the patient has a regular " "S1 and S2.  There is a soft systolic murmur heard somewhat in a sash-like distribution abdomen is soft and non-tender. Extremities reveal no clubbing, cyanosis, minimal edema.          Imaging         CT scan of chest 27 December 2023:  1.  No evidence pulmonary embolism.  2.  Small left lower lobe consolidation. Pneumonia most likely. Recommend 3 month follow-up CT to confirm resolution.  3.  Right lung 5 mm nodule.  4.  Concentric left ventricular hypertrophy.  5.  Small pericardial effusion.  6.  Cirrhosis.  7.  Coronary calcification: Mild.     Chest radiograph 28 December 2023:  Stable enlarged cardiac silhouette.   No definite pulmonary vascular congestion, pleural effusion, or pneumonia seen.       Lab Results     Recent Labs   Lab 12/29/23  0533 12/29/23  0223 12/28/23  2125 12/28/23  1639 12/28/23  1052   WBC 7.0  --   --   --  5.7   HGB 11.1*  --   --   --  10.7*   MCV 92  --   --   --  93     --   --   --  154     141  --   --   --  139   POTASSIUM 3.7  3.7  --   --   --  3.6   CHLORIDE 106  106  --   --   --  106   CO2 23 23  --   --   --  24   BUN 13.4  --   --   --  11.4   CR 0.83  --   --   --  0.74   ANIONGAP 12  12  --   --   --  9   ALBARO 8.8  --   --   --  9.2   * 115* 125*   < > 138*   ALBUMIN 3.4*  --   --   --   --    PROTTOTAL 7.7  --   --   --   --    BILITOTAL 0.6  --   --   --   --    ALKPHOS 136  --   --   --   --    ALT 14  --   --   --   --    AST 33  --   --   --   --     < > = values in this interval not displayed.     Recent Labs   Lab Test 12/28/23  1052 02/04/22  1810   NTBNPI 1,982*  --    BNP  --  184*     No lab results found in last 7 days.    Invalid input(s): \"LDLCALC\"  Lab Results   Component Value Date     12/29/2023     12/29/2023    CO2 23 12/29/2023    CO2 23 12/29/2023    CO2 21 02/10/2022    BUN 13.4 12/29/2023    BUN 13 02/10/2022     Lab Results   Component Value Date    WBC 7.0 12/29/2023    HGB 11.1 12/29/2023    HCT 35.6 " 12/29/2023    MCV 92 12/29/2023     12/29/2023     Lab Results   Component Value Date    CHOL 169 01/10/2023    TRIG 113 01/10/2023    HDL 35 01/10/2023     Lab Results   Component Value Date    INR 1.15 02/04/2022     Lab Results   Component Value Date    TROPONINI 0.03 01/17/2022    TROPONINI 0.03 01/12/2022     Lab Results   Component Value Date    TSH 6.10 12/28/2023    TSH 4.64 01/15/2022           Current Inpatient Scheduled Medications   Scheduled Meds:   amLODIPine  10 mg Oral Daily    citalopram  40 mg Oral Daily    clopidogrel  75 mg Oral Daily    furosemide  40 mg Intravenous Q8H    insulin aspart  1-3 Units Subcutaneous TID AC    insulin aspart  1-3 Units Subcutaneous At Bedtime    losartan  50 mg Oral Daily    metoprolol tartrate  50 mg Oral BID    pantoprazole  40 mg Oral BID AC    senna-docusate  1 tablet Oral BID    Or    senna-docusate  2 tablet Oral BID    sodium chloride (PF)  3 mL Intracatheter Q8H     Continuous Infusions:   - MEDICATION INSTRUCTIONS -      heparin 700 Units/hr (12/29/23 0020)            Medications Prior to Admission   Prior to Admission medications    Medication Sig Start Date End Date Taking? Authorizing Provider   acetaminophen (TYLENOL) 500 MG tablet Take 2 tablets (1,000 mg) by mouth every 8 hours as needed for mild pain 1/19/22  Yes Alysia Conn MD   albuterol (PROAIR HFA/PROVENTIL HFA/VENTOLIN HFA) 108 (90 Base) MCG/ACT inhaler Inhale 2 puffs into the lungs every 6 hours as needed 4/26/22  Yes Reported, Patient   amLODIPine (NORVASC) 10 MG tablet Take 10 mg by mouth daily   Yes Unknown, Entered By History   calcium carbonate (TUMS) 500 MG chewable tablet Take 1 chew tab by mouth 2 times daily as needed for heartburn   Yes Unknown, Entered By History   citalopram (CELEXA) 40 MG tablet Take 40 mg by mouth daily   Yes Unknown, Entered By History   clopidogrel (PLAVIX) 75 MG tablet Take 75 mg by mouth daily 1/26/22  Yes Unknown, Entered By History   glipiZIDE  (GLUCOTROL XL) 2.5 MG 24 hr tablet Take 2.5 mg by mouth daily (with breakfast)   Yes Unknown, Entered By History   losartan (COZAAR) 50 MG tablet Take 50 mg by mouth daily 3/8/22  Yes Reported, Patient   multivitamin w/minerals (THERA-VIT-M) tablet Take 1 tablet by mouth daily   Yes Unknown, Entered By History   pantoprazole (PROTONIX) 40 MG EC tablet Take 1 tablet (40 mg) by mouth 2 times daily (before meals) 1/19/22  Yes Alysia Conn MD   senna-docusate (SENOKOT-S/PERICOLACE) 8.6-50 MG tablet Take 1 tablet by mouth at bedtime   Yes Unknown, Entered By History   vitamin E (TOCOPHEROL) 400 units (180 mg) capsule Take 400 Units by mouth daily   Yes Unknown, Entered By History

## 2023-12-29 NOTE — PROGRESS NOTES
[facility] HEART CARE [address], [place] [address]   [email or web address]   OFFICE: [phone]            Cardiology Addendum  (Please see full Progress Note from earlier today as well)     Echocardiogram reviewed.  This reveals well-preserved left ventricular systolic performance with ejection fraction of 55-60% and no evidence of wall motion abnormality.    Given favorable echocardiogram findings, will discontinue IV heparin.

## 2023-12-29 NOTE — PROGRESS NOTES
12/29/23 0900   Appointment Info   Signing Clinician's Name / Credentials (PT) Sosa Elam, PT, DPT   Living Environment   People in Home alone   Current Living Arrangements independent living facility   Home Accessibility no concerns   Self-Care   Equipment Currently Used at Home none   Activity/Exercise/Self-Care Comment pt reports has a 4WW at home   General Information   Onset of Illness/Injury or Date of Surgery 12/28/23   Referring Physician Jonathan Winslow MD   Patient/Family Therapy Goals Statement (PT) to get better   Pertinent History of Current Problem (include personal factors and/or comorbidities that impact the POC) Community acquired pneumonia of left lung   Existing Precautions/Restrictions no known precautions/restrictions   Weight-Bearing Status - LLE full weight-bearing   Weight-Bearing Status - RLE full weight-bearing   Transfers   Transfers sit-stand transfer   Comment, (Transfers) CGA with FWW for sit<>stand transfers. Verbal cues for safety and safe hand placement.   Sit-Stand Transfer   Sit-Stand [place] (Transfers) verbal cues;contact guard   Assistive Device (Sit-Stand Transfers) walker, front-wheeled   Comment, (Sit-Stand Transfer) CGA with FWW for sit<>stand transfers. Verbal cues for safety and safe hand placement.   Gait/Stairs (Locomotion)   [place] Level (Gait) supervision;verbal cues   Assistive Device (Gait) walker, front-wheeled   Distance in Feet (Gait) 10'   Pattern (Gait) step-through   Deviations/Abnormal Patterns (Gait) base of support, wide;radha decreased;gait speed decreased   Comment, (Gait/Stairs) Pt ambulates with SBA and FWW. Verbal cues for safety and posture. Cues for breathing techniques. Pt on RA throughout session.   Clinical Impression   Criteria for Skilled Therapeutic Intervention Yes, treatment indicated   PT Diagnosis (PT) impaired functional mobility   Influenced by the following impairments weakness, SOB, pain   Functional limitations due to  impairments transfers, ambulation   Clinical Presentation (PT Evaluation Complexity) stable   Clinical Presentation Rationale Pt presents as medically diagnosed   Clinical Decision Making (Complexity) low complexity   Planned Therapy Interventions (PT) balance training;bed mobility training;gait training;home exercise program;patient/family education;ROM (range of motion);strengthening;stretching;transfer training   Risk & Benefits of therapy have been explained patient;evaluation/treatment results reviewed;care plan/treatment goals reviewed   PT Total Evaluation Time   PT Eval, Low Complexity Minutes (12847) 10   Physical Therapy Goals   PT Frequency Daily   PT Predicted Duration/Target Date for Goal Attainment 01/03/24   PT Goals Transfers;Gait   PT: Transfers Modified independent;Sit to/from stand;Assistive device  (FWW)   PT: Gait Modified independent;Assistive device;Rolling walker;Greater than 200 feet  (FWW)   Interventions   Interventions Quick Adds Gait Training;Therapeutic Activity;Therapeutic Procedure   Therapeutic Procedure/Exercise   Ther. Procedure: strength, endurance, ROM, flexibillity Minutes (48440) 8   Treatment Detail/Skilled Intervention Education on supine BLE strengthening exercises, ankle pumps x 10B, glute squeezes x 10, heel slides x 10B, hip abduction x 10B, SLR x 10B, verbal cues, tactile cues and assist with correct technique.   Therapeutic Activity   Treatment Detail/Skilled Intervention CGA with FWW for sit<>stand transfers. Verbal cues for safety and safe hand placement. Pt educated on breathing techniques. Pt on RA thorughout session and 100% SpO2 at end of session. Pt in chair with chair alarm on and call light within reach.   Gait Training   Gait Training Minutes (26385) 15   Treatment Detail/Skilled Intervention Pt ambulates with SBA and FWW. Verbal cues for safety and posutre. Cues for breathing technique. Pt requires frequent standing rest breaks due to fatigue.   Distance in  Feet 125'   [place] Level (Gait Training) stand-by assist   Physical Assistance Level (Gait Training) supervision;verbal cues   Weight Bearing (Gait Training) full weight-bearing   Assistive Device (Gait Training) rolling walker   Pattern Analysis (Gait Training) other (see comments)  (step through)   Impairments (Gait Analysis/Training) strength decreased;balance impaired   PT Discharge Planning   PT Plan progress transfers and ambulation, therex   PT Discharge Recommendation (DC Rec) (S)  home with assist;home with home care physical therapy   PT Rationale for DC Rec Pt reports good home setup and support. Pt not at baseline. Pt would benefit from continued PT with home PT to improve functional mobility   PT Brief overview of current status CGA with sit<>stand trnasfers, SBA with 125' wtih FWW   PT Equipment Needed at Discharge walker, rolling   Total Session Time   Timed Code Treatment Minutes 23   Total Session Time (sum of timed and untimed services) 33     Sosa Elam, PT, DPT

## 2023-12-29 NOTE — PLAN OF CARE
Goal Outcome Evaluation:      Plan of Care Reviewed With: patient    Overall Patient Progress: improvingOverall Patient Progress: improving    Outcome Evaluation: BP elevated 220s SBP. Scheduled metoprolol given. Heparin per protocol. Chair alarm for patient safety. Voiding. Taking in PO foods.      Problem: Adult Inpatient Plan of Care  Goal: Readiness for Transition of Care  Intervention: Mutually Develop Transition Plan  Recent Flowsheet Documentation  Taken 12/28/2023 1730 by Danna Locke, RN  Equipment Currently Used at Home: (I have a walker but I don't use it) none     Problem: Comorbidity Management  Goal: Blood Glucose Levels Within Targeted Range  Outcome: Progressing     Problem: Comorbidity Management  Goal: Blood Pressure in Desired Range  Outcome: Progressing

## 2023-12-30 ENCOUNTER — APPOINTMENT (OUTPATIENT)
Dept: PHYSICAL THERAPY | Facility: CLINIC | Age: 73
DRG: 194 | End: 2023-12-30
Payer: COMMERCIAL

## 2023-12-30 PROBLEM — N17.9 AKI (ACUTE KIDNEY INJURY) (H): Status: ACTIVE | Noted: 2023-12-30

## 2023-12-30 LAB
ANION GAP SERPL CALCULATED.3IONS-SCNC: 11 MMOL/L (ref 7–15)
ANION GAP SERPL CALCULATED.3IONS-SCNC: 12 MMOL/L (ref 7–15)
BUN SERPL-MCNC: 23.8 MG/DL (ref 8–23)
BUN SERPL-MCNC: 26.6 MG/DL (ref 8–23)
CALCIUM SERPL-MCNC: 9.2 MG/DL (ref 8.8–10.2)
CALCIUM SERPL-MCNC: 9.5 MG/DL (ref 8.8–10.2)
CHLORIDE SERPL-SCNC: 103 MMOL/L (ref 98–107)
CHLORIDE SERPL-SCNC: 103 MMOL/L (ref 98–107)
CREAT SERPL-MCNC: 1.36 MG/DL (ref 0.51–0.95)
CREAT SERPL-MCNC: 1.47 MG/DL (ref 0.51–0.95)
DEPRECATED HCO3 PLAS-SCNC: 24 MMOL/L (ref 22–29)
DEPRECATED HCO3 PLAS-SCNC: 24 MMOL/L (ref 22–29)
EGFRCR SERPLBLD CKD-EPI 2021: 38 ML/MIN/1.73M2
EGFRCR SERPLBLD CKD-EPI 2021: 41 ML/MIN/1.73M2
ERYTHROCYTE [DISTWIDTH] IN BLOOD BY AUTOMATED COUNT: 15.9 % (ref 10–15)
GLUCOSE BLDC GLUCOMTR-MCNC: 100 MG/DL (ref 70–99)
GLUCOSE BLDC GLUCOMTR-MCNC: 101 MG/DL (ref 70–99)
GLUCOSE BLDC GLUCOMTR-MCNC: 107 MG/DL (ref 70–99)
GLUCOSE BLDC GLUCOMTR-MCNC: 82 MG/DL (ref 70–99)
GLUCOSE BLDC GLUCOMTR-MCNC: 94 MG/DL (ref 70–99)
GLUCOSE SERPL-MCNC: 111 MG/DL (ref 70–99)
GLUCOSE SERPL-MCNC: 96 MG/DL (ref 70–99)
HCT VFR BLD AUTO: 32.9 % (ref 35–47)
HGB BLD-MCNC: 10.4 G/DL (ref 11.7–15.7)
MAGNESIUM SERPL-MCNC: 2 MG/DL (ref 1.7–2.3)
MCH RBC QN AUTO: 28.7 PG (ref 26.5–33)
MCHC RBC AUTO-ENTMCNC: 31.6 G/DL (ref 31.5–36.5)
MCV RBC AUTO: 91 FL (ref 78–100)
PLATELET # BLD AUTO: 180 10E3/UL (ref 150–450)
POTASSIUM SERPL-SCNC: 3.1 MMOL/L (ref 3.4–5.3)
POTASSIUM SERPL-SCNC: 3.7 MMOL/L (ref 3.4–5.3)
POTASSIUM SERPL-SCNC: 3.9 MMOL/L (ref 3.4–5.3)
RBC # BLD AUTO: 3.62 10E6/UL (ref 3.8–5.2)
SODIUM SERPL-SCNC: 138 MMOL/L (ref 135–145)
SODIUM SERPL-SCNC: 139 MMOL/L (ref 135–145)
WBC # BLD AUTO: 5.8 10E3/UL (ref 4–11)

## 2023-12-30 PROCEDURE — 36415 COLL VENOUS BLD VENIPUNCTURE: CPT | Performed by: HOSPITALIST

## 2023-12-30 PROCEDURE — 97116 GAIT TRAINING THERAPY: CPT | Mod: GP

## 2023-12-30 PROCEDURE — 250N000013 HC RX MED GY IP 250 OP 250 PS 637: Performed by: HOSPITALIST

## 2023-12-30 PROCEDURE — 250N000011 HC RX IP 250 OP 636: Performed by: HOSPITALIST

## 2023-12-30 PROCEDURE — 250N000013 HC RX MED GY IP 250 OP 250 PS 637: Performed by: INTERNAL MEDICINE

## 2023-12-30 PROCEDURE — 99232 SBSQ HOSP IP/OBS MODERATE 35: CPT | Performed by: HOSPITALIST

## 2023-12-30 PROCEDURE — 99232 SBSQ HOSP IP/OBS MODERATE 35: CPT | Performed by: INTERNAL MEDICINE

## 2023-12-30 PROCEDURE — 36415 COLL VENOUS BLD VENIPUNCTURE: CPT | Performed by: STUDENT IN AN ORGANIZED HEALTH CARE EDUCATION/TRAINING PROGRAM

## 2023-12-30 PROCEDURE — 80048 BASIC METABOLIC PNL TOTAL CA: CPT | Performed by: HOSPITALIST

## 2023-12-30 PROCEDURE — 258N000003 HC RX IP 258 OP 636: Performed by: HOSPITALIST

## 2023-12-30 PROCEDURE — 80048 BASIC METABOLIC PNL TOTAL CA: CPT | Performed by: STUDENT IN AN ORGANIZED HEALTH CARE EDUCATION/TRAINING PROGRAM

## 2023-12-30 PROCEDURE — 83735 ASSAY OF MAGNESIUM: CPT | Performed by: HOSPITALIST

## 2023-12-30 PROCEDURE — 84132 ASSAY OF SERUM POTASSIUM: CPT | Performed by: HOSPITALIST

## 2023-12-30 PROCEDURE — 85027 COMPLETE CBC AUTOMATED: CPT | Performed by: STUDENT IN AN ORGANIZED HEALTH CARE EDUCATION/TRAINING PROGRAM

## 2023-12-30 PROCEDURE — 210N000002 HC R&B HEART CARE

## 2023-12-30 PROCEDURE — 250N000013 HC RX MED GY IP 250 OP 250 PS 637: Performed by: STUDENT IN AN ORGANIZED HEALTH CARE EDUCATION/TRAINING PROGRAM

## 2023-12-30 RX ORDER — LOPERAMIDE HCL 2 MG
2 CAPSULE ORAL ONCE
Status: COMPLETED | OUTPATIENT
Start: 2023-12-30 | End: 2023-12-30

## 2023-12-30 RX ORDER — POTASSIUM CHLORIDE 1.5 G/1.58G
40 POWDER, FOR SOLUTION ORAL ONCE
Status: COMPLETED | OUTPATIENT
Start: 2023-12-30 | End: 2023-12-30

## 2023-12-30 RX ORDER — HEPARIN SODIUM 5000 [USP'U]/.5ML
5000 INJECTION, SOLUTION INTRAVENOUS; SUBCUTANEOUS 2 TIMES DAILY
Status: DISCONTINUED | OUTPATIENT
Start: 2023-12-30 | End: 2023-12-31 | Stop reason: HOSPADM

## 2023-12-30 RX ADMIN — CEFDINIR 300 MG: 300 CAPSULE ORAL at 09:42

## 2023-12-30 RX ADMIN — CITALOPRAM 40 MG: 20 TABLET ORAL at 09:42

## 2023-12-30 RX ADMIN — METOPROLOL TARTRATE 75 MG: 50 TABLET, FILM COATED ORAL at 09:42

## 2023-12-30 RX ADMIN — CALCIUM CARBONATE (ANTACID) CHEW TAB 500 MG 1000 MG: 500 CHEW TAB at 12:49

## 2023-12-30 RX ADMIN — Medication 3 MG: at 00:43

## 2023-12-30 RX ADMIN — CLOPIDOGREL BISULFATE 75 MG: 75 TABLET ORAL at 09:42

## 2023-12-30 RX ADMIN — AMLODIPINE BESYLATE 10 MG: 10 TABLET ORAL at 09:42

## 2023-12-30 RX ADMIN — AZITHROMYCIN DIHYDRATE 500 MG: 500 TABLET, FILM COATED ORAL at 09:48

## 2023-12-30 RX ADMIN — PANTOPRAZOLE SODIUM 40 MG: 40 TABLET, DELAYED RELEASE ORAL at 16:13

## 2023-12-30 RX ADMIN — HEPARIN SODIUM 5000 UNITS: 5000 INJECTION, SOLUTION INTRAVENOUS; SUBCUTANEOUS at 20:17

## 2023-12-30 RX ADMIN — ACETAMINOPHEN 650 MG: 325 TABLET ORAL at 12:49

## 2023-12-30 RX ADMIN — SODIUM CHLORIDE 500 ML: 9 INJECTION, SOLUTION INTRAVENOUS at 16:13

## 2023-12-30 RX ADMIN — POTASSIUM CHLORIDE 40 MEQ: 1.5 POWDER, FOR SOLUTION ORAL at 06:27

## 2023-12-30 RX ADMIN — LOPERAMIDE HYDROCHLORIDE 2 MG: 2 CAPSULE ORAL at 16:13

## 2023-12-30 RX ADMIN — CEFDINIR 300 MG: 300 CAPSULE ORAL at 20:17

## 2023-12-30 RX ADMIN — HEPARIN SODIUM 5000 UNITS: 5000 INJECTION, SOLUTION INTRAVENOUS; SUBCUTANEOUS at 09:42

## 2023-12-30 RX ADMIN — PANTOPRAZOLE SODIUM 40 MG: 40 TABLET, DELAYED RELEASE ORAL at 06:27

## 2023-12-30 RX ADMIN — ACETAMINOPHEN 650 MG: 325 TABLET ORAL at 20:16

## 2023-12-30 RX ADMIN — BENZOCAINE AND MENTHOL 1 LOZENGE: 15; 3.6 LOZENGE ORAL at 09:49

## 2023-12-30 RX ADMIN — LOSARTAN POTASSIUM 100 MG: 50 TABLET, FILM COATED ORAL at 09:42

## 2023-12-30 RX ADMIN — METOPROLOL TARTRATE 75 MG: 50 TABLET, FILM COATED ORAL at 20:16

## 2023-12-30 ASSESSMENT — ACTIVITIES OF DAILY LIVING (ADL)
ADLS_ACUITY_SCORE: 29
ADLS_ACUITY_SCORE: 25
ADLS_ACUITY_SCORE: 30
ADLS_ACUITY_SCORE: 25
ADLS_ACUITY_SCORE: 29
ADLS_ACUITY_SCORE: 25
ADLS_ACUITY_SCORE: 29

## 2023-12-30 NOTE — PROGRESS NOTES
" [facility] HEART [facility]   [address], [place] [address]   [email or web address]   OFFICE: [phone]            Impression and Plan     1.  Elevated troponin.  Josefina on presentation is noted to have elevated troponin though without significant elevation (265 ? 241 ? 239 ? 204 ? 231 ng/L).  ECG with subtle J-point changes and T wave changes consistent with left ventricular hypertrophy (not significantly changed from 12 January 2022).  CT scan this admission does reveal coronary calcification, but only mild.       Elevated troponin may represent  \"demand ischemia\" particularly in setting of significant hypertension ( i.e. type 2 MI secondary to ischemia due to either an increased oxygen demand or a decreased supply in the absence of an acute primary coronary thrombotic event).       She has not reported any chest pain symptoms now or in the recent past concerning for angina.  Echocardiogram yesterday, 29 December 2023, revealed well-preserved left ventricular systolic performance without wall motion abnormality.     Parenthetically, she reports no true allergy to aspirin per se, but states that she had \"severe\" GI upset on the therapy in the past.  In addition, she has atenolol listed on her allergy list though on review with Josefina she states that she had no true allergy with this as well, but simply GI upset on this therapy, too.  Continue clopidogrel which she had been on at home.  Continue metoprolol vis-à-vis problem #3 as well.  Eventual ischemic workup, but no urgency (i.e. a CT coronary angiogram versus perfusion study).     2.  Shortness of breath.  May be multifactorial in nature.  CT scan of chest this admission does reveal left lower lobe consolidation felt by radiology most likely representing pneumonia.   B-type natriuretic peptide elevated at 1982 pg/mL on admission suggesting some myocardial strain though may be related to hypertension. Clinically, I do not " "get the sense that she presently has any significant fluid retention.    Creatinine increased this morning to 1.36 mg/dL.    Agree with discontinuation of intravenous furosemide with initiation of enteral therapy at 20 mg daily.     3.  Hypertension.  Josefina states that on her own volition she had stopped her hydrochlorothiazide a few weeks ago.  Continue furosemide as per problem #2.  Continue amlodipine 10 mg daily.  Continue metoprolol, but will increase to 75 mg twice daily this morning.  Continue losartan (Increased from 50 mg daily to 100 mg daily yesterday).  Continue to follow creatinine on increased dose of losartan, but hopefully will not further rise with change in diuretic regimen as per problem #2.       Primary Cardiologist: Dr. Alice Melgar (initial consultation this admission)     Subjective     Josefina continues to report intermittent cough.  Feels breathing is is gradually improving overall.  Continues to deny any chest pain symptoms concerning for angina.      Cardiac Diagnostics   Telemetry (personally reviewed):       Twelve-lead ECG (personally reviewed) 28 December 2023: Sinus rhythm.  Left ventricular hypertrophy with associated T wave changes.  There is very subtle ST elevation in lead III.  ECG findings, however, are similar to 12 January 2022 ECG.     Twelve-lead ECG (personally reviewed) 12 January 2022: Sinus rhythm.  Left ventricular hypertrophy with associated T wave changes.  Very slight J-point elevation in leads III and aVF.    Physical Examination       BP (!) 166/72 (BP Location: Left arm)   Pulse 65   Temp 98.2  F (36.8  C) (Oral)   Resp 19   Ht 1.626 m (5' 4\")   Wt 98.6 kg (217 lb 6.4 oz)   SpO2 93%   BMI 37.32 kg/m          Vitals:    12/28/23 1039 12/30/23 0332   Weight: 98.9 kg (218 lb) 98.6 kg (217 lb 6.4 oz)            Intake/Output Summary (Last 24 hours) at 12/29/2023 0715  Last data filed at 12/28/2023 1949  Gross per 24 hour   Intake 279.8 ml   Output 2125 " "ml   Net -1845.2 ml     The patient is alert. Sclerae are anicteric. Mucosal membranes are moist. No significant adenopathy/thyromegally appreciated. Lungs are diminished, though there are no overt \"wet\" sounding breath sounds.  On cardiovascular exam, the patient has a regular S1 and S2.  There is a soft systolic murmur heard somewhat in a sash-like distribution abdomen is soft and non-tender. Extremities reveal no clubbing, cyanosis, without significant edema.         Imaging         CT scan of chest 27 December 2023:  1.  No evidence pulmonary embolism.  2.  Small left lower lobe consolidation. Pneumonia most likely. Recommend 3 month follow-up CT to confirm resolution.  3.  Right lung 5 mm nodule.  4.  Concentric left ventricular hypertrophy.  5.  Small pericardial effusion.  6.  Cirrhosis.  7.  Coronary calcification: Mild.     Chest radiograph 28 December 2023:  Stable enlarged cardiac silhouette.   No definite pulmonary vascular congestion, pleural effusion, or pneumonia seen.       Lab Results     Recent Labs   Lab 12/30/23  0504 12/30/23  0205 12/29/23  2116 12/29/23  0829 12/29/23  0533 12/28/23  1639 12/28/23  1052   WBC 5.8  --   --   --  7.0  --  5.7   HGB 10.4*  --   --   --  11.1*  --  10.7*   MCV 91  --   --   --  92  --  93     --   --   --  183  --  154     --   --   --  141  141  --  139   POTASSIUM 3.1*  --   --   --  3.7  3.7  --  3.6   CHLORIDE 103  --   --   --  106  106  --  106   CO2 24  --   --   --  23  23  --  24   BUN 23.8*  --   --   --  13.4  --  11.4   CR 1.36*  --   --   --  0.83  --  0.74   ANIONGAP 12  --   --   --  12  12  --  9   ALBARO 9.2  --   --   --  8.8  --  9.2   GLC 96 82 232*   < > 102*   < > 138*   ALBUMIN  --   --   --   --  3.4*  --   --    PROTTOTAL  --   --   --   --  7.7  --   --    BILITOTAL  --   --   --   --  0.6  --   --    ALKPHOS  --   --   --   --  136  --   --    ALT  --   --   --   --  14  --   --    AST  --   --   --   --  33  --   --     < " > = values in this interval not displayed.     Recent Labs   Lab Test 12/28/23  1052 02/04/22  1810   NTBNPI 1,982*  --    BNP  --  184*     Recent Labs   Lab 12/28/23  2352   CHOL 142   TRIG 76   HDL 33*     Lab Results   Component Value Date     12/29/2023     12/29/2023    CO2 23 12/29/2023    CO2 23 12/29/2023    CO2 21 02/10/2022    BUN 13.4 12/29/2023    BUN 13 02/10/2022     Lab Results   Component Value Date    WBC 7.0 12/29/2023    HGB 11.1 12/29/2023    HCT 35.6 12/29/2023    MCV 92 12/29/2023     12/29/2023     Lab Results   Component Value Date    CHOL 169 01/10/2023    TRIG 113 01/10/2023    HDL 35 01/10/2023     Lab Results   Component Value Date    INR 1.15 02/04/2022     Lab Results   Component Value Date    TROPONINI 0.03 01/17/2022    TROPONINI 0.03 01/12/2022     Lab Results   Component Value Date    TSH 6.10 12/28/2023    TSH 4.64 01/15/2022           Current Inpatient Scheduled Medications   Scheduled Meds:   amLODIPine  10 mg Oral Daily    azithromycin  500 mg Oral Daily    cefdinir  300 mg Oral BID    citalopram  40 mg Oral Daily    clopidogrel  75 mg Oral Daily    furosemide  20 mg Oral Daily    insulin aspart  1-3 Units Subcutaneous TID AC    insulin aspart  1-3 Units Subcutaneous At Bedtime    losartan  100 mg Oral Daily    metoprolol tartrate  50 mg Oral BID    pantoprazole  40 mg Oral BID AC    senna-docusate  1 tablet Oral BID    Or    senna-docusate  2 tablet Oral BID    sodium chloride (PF)  3 mL Intracatheter Q8H     Continuous Infusions:   - MEDICATION INSTRUCTIONS -              Medications Prior to Admission   Prior to Admission medications    Medication Sig Start Date End Date Taking? Authorizing Provider   acetaminophen (TYLENOL) 500 MG tablet Take 2 tablets (1,000 mg) by mouth every 8 hours as needed for mild pain 1/19/22  Yes Alysia Conn MD   albuterol (PROAIR HFA/PROVENTIL HFA/VENTOLIN HFA) 108 (90 Base) MCG/ACT inhaler Inhale 2 puffs into the lungs  every 6 hours as needed 4/26/22  Yes Reported, Patient   amLODIPine (NORVASC) 10 MG tablet Take 10 mg by mouth daily   Yes Unknown, Entered By History   calcium carbonate (TUMS) 500 MG chewable tablet Take 1 chew tab by mouth 2 times daily as needed for heartburn   Yes Unknown, Entered By History   citalopram (CELEXA) 40 MG tablet Take 40 mg by mouth daily   Yes Unknown, Entered By History   clopidogrel (PLAVIX) 75 MG tablet Take 75 mg by mouth daily 1/26/22  Yes Unknown, Entered By History   glipiZIDE (GLUCOTROL XL) 2.5 MG 24 hr tablet Take 2.5 mg by mouth daily (with breakfast)   Yes Unknown, Entered By History   losartan (COZAAR) 50 MG tablet Take 50 mg by mouth daily 3/8/22  Yes Reported, Patient   multivitamin w/minerals (THERA-VIT-M) tablet Take 1 tablet by mouth daily   Yes Unknown, Entered By History   pantoprazole (PROTONIX) 40 MG EC tablet Take 1 tablet (40 mg) by mouth 2 times daily (before meals) 1/19/22  Yes Alysia Conn MD   senna-docusate (SENOKOT-S/PERICOLACE) 8.6-50 MG tablet Take 1 tablet by mouth at bedtime   Yes Unknown, Entered By History   vitamin E (TOCOPHEROL) 400 units (180 mg) capsule Take 400 Units by mouth daily   Yes Unknown, Entered By History

## 2023-12-30 NOTE — PROGRESS NOTES
[facility]    Medicine Progress Note - Hospitalist Service    Date of Admission:  12/28/2023    Assessment & Plan   Josefina Dumont is a 73 year old female admitted with hypertensive emergency in the setting of community acquired pneumonia.  Course complicated by demand ischemia.  She is clinically stable.  Trace edema on exam today.  She received IV diuresis and now has developed FELIPA, suggestive of intravascular volume depletion.  Hold diuretics for now and monitor renal function.    # FELIPA  - hold diuretics  - monitor renal function    # Diastolic dysfunction  - hold diuretics given FELIPA, plan to resume furosemide 20mg po daily following improvement in renal function     # Hypertensive emergency, improved  # Demand ischemia  - amlodipine, losartan     # Community acquired pneumonia  - cefdinir, azithromycin through 1/2     # DM  - ISS    # Hx ischemic CVA  - clopidogrel     # Outpatient followup  - right pulmonary nodule  - cirrhosis  - vaginal bleeding          Diet: Combination Diet 2 gm NA Diet; No Caffeine Diet (and additional linked orders)  Fluid restriction 1800 ML FLUID (and additional linked orders)    [place] Catheter: Not present  Lines: None     Cardiac Monitoring: None  Code Status: Full Code      Clinically Significant Risk Factors        # Hypokalemia: Lowest K = 3.1 mmol/L in last 2 days, will replace as needed       # Hypoalbuminemia: Lowest albumin = 3.4 g/dL at 12/29/2023  5:33 AM, will monitor as appropriate  # Coagulation Defect: INR = N/A and/or PTT = 47 Seconds (Ref range: 22 - 38 Seconds), will monitor for bleeding   # Acute Kidney Injury, unspecified: based on a >150% or 0.3 mg/dL increase in last creatinine compared to past 90 day average, will monitor renal function  # Hypertension: Noted on problem list       # DMII: A1C = 6.6 % (Ref range: <5.7 %) within past 6 months, PRESENT ON ADMISSION  # Obesity: Estimated body mass index is 37.32 kg/m  as calculated from  "the following:    Height as of this encounter: 1.626 m (5' 4\").    Weight as of this encounter: 98.6 kg (217 lb 6.4 oz)., PRESENT ON ADMISSION     # Financial/Environmental Concerns: unable to afford food         Disposition Plan      Expected Discharge Date: 12/31/2023    Discharge Delays: IV Medication - consider oral or Home Infusion    Discharge Comments: IV ABX.  Home with assist pending clinical progression.            Jonathan Winslow MD  Hospitalist Service  [facility]  Securely message with [employer] (more info)  Text page via [employer] Paging/Directory   ______________________________________________________________________    Interval History   Denies dyspnea, chest pain, or chest pressure.  Wheezing improved.  Reports a productive cough.    Physical Exam   Vital Signs: Temp: 98.2  F (36.8  C) Temp src: Oral BP: (!) 166/72 Pulse: 65   Resp: 19 SpO2: 93 % O2 Device: None (Room air)    Weight: 217 lbs 6.4 oz    Gen:  sitting in chair in no extremis  Neuro: alert, conversant  CV:  nl rate, regular rhythm, murmur present  Pulm: no acute resp distress, ctab  Ext:  trace lower extremity edema    Medical Decision Making             Data   Reviewed:    Na 139  K 3.1  BUN 24  Cr 1.4    WBC 5.8  Hgb 10  Plts 180  "

## 2023-12-30 NOTE — PLAN OF CARE
Goal Outcome Evaluation:      Plan of Care Reviewed With: patient    Overall Patient Progress: improvingOverall Patient Progress: improving    Outcome Evaluation: Pt is AOx4, on RA, VSS. Tele discontinued this shift. Pt c/o headache 5/10, improved with PRN tylenol. Up to bathroom with SBA and FWW. FR maintained 1800ml. K replaced this am. No other event, pt sleeping on and off in chair.

## 2023-12-30 NOTE — DISCHARGE INSTRUCTIONS
HOME HEALTH CARE [facility] ([facility])   Phone [phone]     This agency has accepted you for home care. They will call you to schedule the home care visits.

## 2023-12-30 NOTE — CONSULTS
REASON FOR ASSESSMENT:  CHF Consult for 2 gm NA Diet Education    NUTRITION HISTORY:    Information obtained from:  Patient, pt's sister    Living situation:   Lives in same apartment building as sister, lives alone    Grocery shopping:  [employer] - recommended pt shop here, as they provide resources for pt's on medical diets    Meal preparation:  Prepares meals for self, sometimes with sister, also reports eating out a lot    Breakfast:  Bautista, toast    Lunch:  Soups, sandwiches    Dinner:   Variable - typical [facility] fare like pasta, meat and veg, pizza, salads    Previous diet instructions:  None    CURRENT DIET:  2 gm Na, No caffeine, FR 1800 mL    NUTRITION DIAGNOSIS:  Food- and nutrition-related knowledge deficit R/t no exposure to low sodium guidelines or medical rationale      INTERVENTIONS:    Nutrition Prescription:  Na <2300 mg/day    Implementation:  Assessed learning needs, learning preferences, and willingness to learn  Nutrition Education (Content):  Provided handouts:  Tips for Low Na Diet  Label Reading  Low Na Foods/Drinks  Seasoning Your Food Without Salt  Discussed rational for limiting Na for CHF and stressed importance of following 2 gm Na guidelines   Encouraged patient to keep a daily food record  Nutrition Education (Application):  Discussed current eating habits and recommended alternative food choices  Anticipated good compliance  Diet Education - refer to Education Flowsheet    Goals:  Patient verbalizes understanding of diet by identifying high sodium items in diet and suggesting alternatives  All of the above goals met during the education session    Follow Up:  Provided RD contact information for future questions.  Recommend Out-Patient Nutrition Referral, if further diet instructions are needed.

## 2023-12-31 ENCOUNTER — APPOINTMENT (OUTPATIENT)
Dept: PHYSICAL THERAPY | Facility: CLINIC | Age: 73
DRG: 194 | End: 2023-12-31
Payer: COMMERCIAL

## 2023-12-31 VITALS
DIASTOLIC BLOOD PRESSURE: 62 MMHG | HEIGHT: 64 IN | HEART RATE: 50 BPM | TEMPERATURE: 99 F | SYSTOLIC BLOOD PRESSURE: 136 MMHG | BODY MASS INDEX: 37.4 KG/M2 | RESPIRATION RATE: 18 BRPM | OXYGEN SATURATION: 96 % | WEIGHT: 219.1 LBS

## 2023-12-31 LAB
ANION GAP SERPL CALCULATED.3IONS-SCNC: 10 MMOL/L (ref 7–15)
BUN SERPL-MCNC: 28.5 MG/DL (ref 8–23)
CALCIUM SERPL-MCNC: 9.3 MG/DL (ref 8.8–10.2)
CHLORIDE SERPL-SCNC: 105 MMOL/L (ref 98–107)
CREAT SERPL-MCNC: 1.36 MG/DL (ref 0.51–0.95)
DEPRECATED HCO3 PLAS-SCNC: 24 MMOL/L (ref 22–29)
EGFRCR SERPLBLD CKD-EPI 2021: 41 ML/MIN/1.73M2
ERYTHROCYTE [DISTWIDTH] IN BLOOD BY AUTOMATED COUNT: 15.9 % (ref 10–15)
GLUCOSE BLDC GLUCOMTR-MCNC: 86 MG/DL (ref 70–99)
GLUCOSE SERPL-MCNC: 107 MG/DL (ref 70–99)
HCT VFR BLD AUTO: 31.6 % (ref 35–47)
HGB BLD-MCNC: 9.9 G/DL (ref 11.7–15.7)
MAGNESIUM SERPL-MCNC: 2.1 MG/DL (ref 1.7–2.3)
MCH RBC QN AUTO: 28.9 PG (ref 26.5–33)
MCHC RBC AUTO-ENTMCNC: 31.3 G/DL (ref 31.5–36.5)
MCV RBC AUTO: 92 FL (ref 78–100)
PLATELET # BLD AUTO: 184 10E3/UL (ref 150–450)
POTASSIUM SERPL-SCNC: 3.7 MMOL/L (ref 3.4–5.3)
RBC # BLD AUTO: 3.42 10E6/UL (ref 3.8–5.2)
SODIUM SERPL-SCNC: 139 MMOL/L (ref 135–145)
WBC # BLD AUTO: 5.4 10E3/UL (ref 4–11)

## 2023-12-31 PROCEDURE — 83735 ASSAY OF MAGNESIUM: CPT | Performed by: HOSPITALIST

## 2023-12-31 PROCEDURE — 250N000013 HC RX MED GY IP 250 OP 250 PS 637: Performed by: INTERNAL MEDICINE

## 2023-12-31 PROCEDURE — 250N000013 HC RX MED GY IP 250 OP 250 PS 637: Performed by: STUDENT IN AN ORGANIZED HEALTH CARE EDUCATION/TRAINING PROGRAM

## 2023-12-31 PROCEDURE — 36415 COLL VENOUS BLD VENIPUNCTURE: CPT | Performed by: STUDENT IN AN ORGANIZED HEALTH CARE EDUCATION/TRAINING PROGRAM

## 2023-12-31 PROCEDURE — 99232 SBSQ HOSP IP/OBS MODERATE 35: CPT | Performed by: INTERNAL MEDICINE

## 2023-12-31 PROCEDURE — 250N000013 HC RX MED GY IP 250 OP 250 PS 637: Performed by: HOSPITALIST

## 2023-12-31 PROCEDURE — 97116 GAIT TRAINING THERAPY: CPT | Mod: GP

## 2023-12-31 PROCEDURE — 80048 BASIC METABOLIC PNL TOTAL CA: CPT | Performed by: STUDENT IN AN ORGANIZED HEALTH CARE EDUCATION/TRAINING PROGRAM

## 2023-12-31 PROCEDURE — 250N000011 HC RX IP 250 OP 636: Performed by: HOSPITALIST

## 2023-12-31 PROCEDURE — 99238 HOSP IP/OBS DSCHRG MGMT 30/<: CPT | Performed by: HOSPITALIST

## 2023-12-31 PROCEDURE — 85014 HEMATOCRIT: CPT | Performed by: STUDENT IN AN ORGANIZED HEALTH CARE EDUCATION/TRAINING PROGRAM

## 2023-12-31 RX ORDER — LOSARTAN POTASSIUM 50 MG/1
100 TABLET ORAL DAILY
Qty: 60 TABLET | Refills: 0 | Status: SHIPPED | OUTPATIENT
Start: 2023-12-31

## 2023-12-31 RX ORDER — METOPROLOL TARTRATE 75 MG/1
75 TABLET, FILM COATED ORAL 2 TIMES DAILY
Qty: 60 TABLET | Refills: 0 | Status: SHIPPED | OUTPATIENT
Start: 2023-12-31 | End: 2024-04-04

## 2023-12-31 RX ORDER — FUROSEMIDE 20 MG
20 TABLET ORAL DAILY
Qty: 30 TABLET | Refills: 0 | Status: SHIPPED | OUTPATIENT
Start: 2024-01-01 | End: 2024-04-04

## 2023-12-31 RX ORDER — HYDRALAZINE HYDROCHLORIDE 20 MG/ML
10 INJECTION INTRAMUSCULAR; INTRAVENOUS ONCE
Status: COMPLETED | OUTPATIENT
Start: 2023-12-31 | End: 2023-12-31

## 2023-12-31 RX ORDER — CEFDINIR 300 MG/1
300 CAPSULE ORAL 2 TIMES DAILY
Qty: 4 CAPSULE | Refills: 0 | Status: SHIPPED | OUTPATIENT
Start: 2023-12-31 | End: 2024-01-02

## 2023-12-31 RX ORDER — FUROSEMIDE 20 MG
20 TABLET ORAL DAILY
Status: DISCONTINUED | OUTPATIENT
Start: 2023-12-31 | End: 2023-12-31 | Stop reason: HOSPADM

## 2023-12-31 RX ADMIN — Medication 3 MG: at 00:10

## 2023-12-31 RX ADMIN — HEPARIN SODIUM 5000 UNITS: 5000 INJECTION, SOLUTION INTRAVENOUS; SUBCUTANEOUS at 09:20

## 2023-12-31 RX ADMIN — FUROSEMIDE 20 MG: 20 TABLET ORAL at 10:05

## 2023-12-31 RX ADMIN — CITALOPRAM 40 MG: 20 TABLET ORAL at 09:20

## 2023-12-31 RX ADMIN — AZITHROMYCIN DIHYDRATE 500 MG: 500 TABLET, FILM COATED ORAL at 09:19

## 2023-12-31 RX ADMIN — PANTOPRAZOLE SODIUM 40 MG: 40 TABLET, DELAYED RELEASE ORAL at 09:20

## 2023-12-31 RX ADMIN — ACETAMINOPHEN 650 MG: 325 TABLET ORAL at 11:40

## 2023-12-31 RX ADMIN — METOPROLOL TARTRATE 75 MG: 50 TABLET, FILM COATED ORAL at 09:20

## 2023-12-31 RX ADMIN — CLOPIDOGREL BISULFATE 75 MG: 75 TABLET ORAL at 09:20

## 2023-12-31 RX ADMIN — AMLODIPINE BESYLATE 10 MG: 10 TABLET ORAL at 09:20

## 2023-12-31 RX ADMIN — CEFDINIR 300 MG: 300 CAPSULE ORAL at 09:19

## 2023-12-31 RX ADMIN — SENNOSIDES AND DOCUSATE SODIUM 1 TABLET: 8.6; 5 TABLET ORAL at 09:20

## 2023-12-31 RX ADMIN — LOSARTAN POTASSIUM 100 MG: 50 TABLET, FILM COATED ORAL at 09:19

## 2023-12-31 ASSESSMENT — ACTIVITIES OF DAILY LIVING (ADL)
ADLS_ACUITY_SCORE: 30

## 2023-12-31 NOTE — PROGRESS NOTES
"[facility]    Medicine Progress Note - Hospitalist Service    Date of Admission:  12/28/2023    Assessment & Plan   Josefina Dumont is a 73 year old female admitted with hypertensive emergency in the setting of community acquired pneumonia.  Course complicated by demand ischemia.  She is clinically stable.  Serum creatinine stable to modestly improved compared to yesterday afternoon.  Blood pressure improved with IV hydralazine.  She is now on amlodipine, losartan, and metoprolol and will need outpatient followup for management of HTN.    # FELIPA, stable     # Hypertensive emergency, improved  # Demand ischemia  # Diastolic dysfunction  - furosemide 20mg po daily on discharge  - amlodipine, losartan, metoprolol     # Community acquired pneumonia  - cefdinir, azithromycin through 1/2    # Antibiotic associated diarrhea     # DM  - ISS     # Hx ischemic CVA  - clopidogrel     # Outpatient followup  - right pulmonary nodule  - cirrhosis  - vaginal bleeding        Diet: Combination Diet 2 gm NA Diet; No Caffeine Diet (and additional linked orders)  Fluid restriction 1800 ML FLUID (and additional linked orders)      Machuca Catheter: Not present  Lines: None     Cardiac Monitoring: None  Code Status: Full Code      Clinically Significant Risk Factors        # Hypokalemia: Lowest K = 3.1 mmol/L in last 2 days, will replace as needed       # Hypoalbuminemia: Lowest albumin = 3.4 g/dL at 12/29/2023  5:33 AM, will monitor as appropriate     # Hypertension: Noted on problem list       # DMII: A1C = 6.6 % (Ref range: <5.7 %) within past 6 months, PRESENT ON ADMISSION  # Obesity: Estimated body mass index is 37.61 kg/m  as calculated from the following:    Height as of this encounter: 1.626 m (5' 4\").    Weight as of this encounter: 99.4 kg (219 lb 1.6 oz)., PRESENT ON ADMISSION       # Financial/Environmental Concerns: unable to afford food         Disposition Plan      Expected Discharge Date: " 01/01/2024    Discharge Delays: IV Medication - consider oral or Home Infusion    Discharge Comments: IV ABX.  FELIPA Winslow MD  Hospitalist Service  [facility]  Securely message with [employer] (more info)  Text page via [employer] Paging/Directory   ______________________________________________________________________    Interval History   Reports her breathing is better.  Breathing gets worse with coughing or lying supine.  Denies chest pain/pressure.    Physical Exam   Vital Signs: Temp: 99  F (37.2  C) Temp src: Oral BP: 136/62 Pulse: 50   Resp: 18 SpO2: 96 % O2 Device: None (Room air)    Weight: 219 lbs 1.6 oz    Gen: sitting comfortably in chair, nad  Neuro: alert, conversant  CV:  nl rate, regular rhythm, murmur present  Pulm: no acute resp distress, decreased breath sounds and faint wheezing at left upper field posteriorly  GI: abdomen soft, NTTP  Ext:  trace lower extremity edema    Medical Decision Making             Data   Reviewed:    Na 139  K 3.7  BUN 29  Cr 1.4    WBC 5.4  Hgb 9.9  Plts 184

## 2023-12-31 NOTE — PLAN OF CARE
"  Problem: Adult Inpatient Plan of Care  Goal: Plan of Care Review  Description: The Plan of Care Review/Shift note should be completed every shift.  The Outcome Evaluation is a brief statement about your assessment that the patient is improving, declining, or no change.  This information will be displayed automatically on your shift  note.  Outcome: Met  Goal: Patient-Specific Goal (Individualized)  Description: You can add care plan individualizations to a care plan. Examples of Individualization might be:  \"Parent requests to be called daily at 9am for status\", \"I have a hard time hearing out of my right ear\", or \"Do not touch me to wake me up as it startles  me\".  Outcome: Met  Goal: Absence of Hospital-Acquired Illness or Injury  Outcome: Met  Intervention: Identify and Manage Fall Risk  Recent Flowsheet Documentation  Taken 12/31/2023 0800 by Emily Potter, RN  Safety Promotion/Fall Prevention:   safety round/check completed   room near nurse's station   clutter free environment maintained   mobility aid in reach   nonskid shoes/slippers when out of bed  Goal: Optimal Comfort and Wellbeing  Outcome: Met  Goal: Readiness for Transition of Care  Outcome: Met     Problem: Risk for Delirium  Goal: Optimal Coping  Outcome: Met  Goal: Improved Behavioral Control  Outcome: Met  Intervention: Minimize Safety Risk  Recent Flowsheet Documentation  Taken 12/31/2023 0800 by Emily Potter, RN  Enhanced Safety Measures: room near unit station  Goal: Improved Attention and Thought Clarity  Outcome: Met  Goal: Improved Sleep  Outcome: Met     Problem: Comorbidity Management  Goal: Blood Glucose Levels Within Targeted Range  Outcome: Met  Goal: Blood Pressure in Desired Range  Outcome: Met   Goal Outcome Evaluation:    Patient is alert and oriented. Pt endorses a headache, relieved with medication. Pt denies SOB. Pt expressed readiness to get back home. Pt up to bathroom with SBA. Plan is for Pt to discharge home with " family to transport. Discharge education was provided to Pt. Pt makes needs known.    Emily Potter RN

## 2023-12-31 NOTE — PROGRESS NOTES
Care Management Discharge Note    Discharge Date: 12/31/2023       Discharge Disposition: Home    Discharge Services: None    Discharge DME: None    Discharge Transportation: family or friend will provide     Education Provided on the Discharge Plan: Yes (AVS per bedside RN)    Persons Notified of Discharge Plans: patient    Patient/Family in Agreement with the Plan: yes        Additional Information:  CM reviewed chart. Patient discharge per bedside RN prior to meeting with CM today. No CM needs identified. No home care orders at discharge. Family to provide transportation home at discharge.       [facility] referral sent per protocol.    Radha Bergeron RN

## 2023-12-31 NOTE — PROGRESS NOTES
Occupational Therapy Discharge Summary    Reason for therapy discharge:    Discharged to home.    Progress towards therapy goal(s). See goals on Care Plan in [facility] electronic health record for goal details.  Goals not met.  Barriers to achieving goals:   discharge from facility.    Therapy recommendation(s):    No further therapy is recommended.

## 2023-12-31 NOTE — PROGRESS NOTES
" [facility] [address], [place] [address]   [email or web address]   OFFICE: [phone]            Impression and Plan     1.  Elevated troponin.  Josefina on presentation is noted to have elevated troponin though without significant elevation (265 ? 241 ? 239 ? 204 ? 231 ng/L).  ECG with subtle J-point changes and T wave changes consistent with left ventricular hypertrophy (not significantly changed from 12 January 2022).  CT scan this admission does reveal coronary calcification, but only mild.       Elevated troponin may represent  \"demand ischemia\" particularly in setting of significant hypertension ( i.e. type 2 MI secondary to ischemia due to either an increased oxygen demand or a decreased supply in the absence of an acute primary coronary thrombotic event).       She has not reported any chest pain symptoms now or in the recent past concerning for angina.  Echocardiogram yesterday, 29 December 2023, revealed well-preserved left ventricular systolic performance without wall motion abnormality.     Parenthetically, she reports no true allergy to aspirin per se, but states that she had \"severe\" GI upset on the therapy in the past.  In addition, she has atenolol listed on her allergy list though on review with Josefina she states that she had no true allergy with this as well, but simply GI upset on this therapy, too.  Continue clopidogrel which she had been on at home.  Continue metoprolol vis-à-vis problem #3 as well.  Eventual ischemic workup, but no urgency.  I will arrange for Josefina to have outpatient CT coronary angiogram (discussed with patient this morning).     2.  Shortness of breath.  May be multifactorial in nature.  CT scan of chest this admission does reveal left lower lobe consolidation felt by radiology most likely representing pneumonia.   B-type natriuretic peptide elevated at 1982 pg/mL on admission suggesting some myocardial strain though " may be related to hypertension. Clinically, I do not get the sense that she presently has any significant fluid retention.    Creatinine this morning to 1.36 mg/dL (1.47 mg/dL yesterday afternoon).    Will initiate low-dose scheduled enteral furosemide at 20 mg daily.     3.  Hypertension.  Josefina states that on her own volition she had stopped her hydrochlorothiazide a few weeks ago.  Continue furosemide as per problem #2.  Continue amlodipine 10 mg daily.  Continue metoprolol (increased to 75 mg twice daily yesterday)  Continue losartan (Increased from 50 mg daily to 100 mg daily 29 December 2023.    Although blood pressure remains somewhat elevated, could likely dismiss from hospital today with follow-up with primary provider to reassess blood pressure.  In addition, as aforementioned; I will make arrangements for patient to have CT coronary angiogram performed (order/request already submitted through epic).  Will arrange follow-up in our office pending CT angiogram results.     Primary Cardiologist: Dr. Alice Melgar (initial consultation this admission)     Subjective     Josefina continues to report intermittent cough gradually improving.  Feels breathing is is gradually improving overall.  Continues to deny any chest pain symptoms concerning for angina.      Cardiac Diagnostics   Telemetry (personally reviewed):     Echocardiogram 29 December 2023:  The left ventricle is normal in size.  Left ventricular function is normal.The ejection fraction is 55-60%.  There is moderate concentric left ventricular hypertrophy.  The left atrium is moderately dilated.  There is mild mitral stenosis.  The aortic valve is trileaflet with aortic valve sclerosis.    Twelve-lead ECG (personally reviewed) 28 December 2023: Sinus rhythm.  Left ventricular hypertrophy with associated T wave changes.  There is very subtle ST elevation in lead III.  ECG findings, however, are similar to 12 January 2022 ECG.     Twelve-lead ECG  "(personally reviewed) 12 January 2022: Sinus rhythm.  Left ventricular hypertrophy with associated T wave changes.  Very slight J-point elevation in leads III and aVF.    Physical Examination       BP (!) 185/88   Pulse 60   Temp 98.6  F (37  C) (Oral)   Resp 18   Ht 1.626 m (5' 4\")   Wt 99.4 kg (219 lb 1.6 oz)   SpO2 93%   BMI 37.61 kg/m          Vitals:    12/28/23 1039 12/30/23 0332 12/31/23 0500   Weight: 98.9 kg (218 lb) 98.6 kg (217 lb 6.4 oz) 99.4 kg (219 lb 1.6 oz)            Intake/Output Summary (Last 24 hours) at 12/29/2023 0715  Last data filed at 12/28/2023 1949  Gross per 24 hour   Intake 279.8 ml   Output 2125 ml   Net -1845.2 ml     The patient is alert. Sclerae are anicteric. Mucosal membranes are moist. No significant adenopathy/thyromegally appreciated. Lungs are diminished, though there are no overt \"wet\" sounding breath sounds.  On cardiovascular exam, the patient has a regular S1 and S2.  There is a soft systolic murmur heard somewhat in a sash-like distribution abdomen is soft and non-tender. Extremities reveal no clubbing, cyanosis, without significant edema.         Imaging         CT scan of chest 27 December 2023:  1.  No evidence pulmonary embolism.  2.  Small left lower lobe consolidation. Pneumonia most likely. Recommend 3 month follow-up CT to confirm resolution.  3.  Right lung 5 mm nodule.  4.  Concentric left ventricular hypertrophy.  5.  Small pericardial effusion.  6.  Cirrhosis.  7.  Coronary calcification: Mild.     Chest radiograph 28 December 2023:  Stable enlarged cardiac silhouette.   No definite pulmonary vascular congestion, pleural effusion, or pneumonia seen.       Lab Results     Recent Labs   Lab 12/31/23  0539 12/30/23  2149 12/30/23  1755 12/30/23  1343 12/30/23  1214 12/30/23  1052 12/30/23  0833 12/30/23  0504 12/29/23  0829 12/29/23  0533   WBC 5.4  --   --   --   --   --   --  5.8  --  7.0   HGB 9.9*  --   --   --   --   --   --  10.4*  --  11.1*   MCV 92  " --   --   --   --   --   --  91  --  92     --   --   --   --   --   --  180  --  183     --   --  138  --   --   --  139  --  141  141   POTASSIUM 3.7  --   --  3.9  --  3.7  --  3.1*  --  3.7  3.7   CHLORIDE 105  --   --  103  --   --   --  103  --  106  106   CO2 24  --   --  24  --   --   --  24  --  23  23   BUN 28.5*  --   --  26.6*  --   --   --  23.8*  --  13.4   CR 1.36*  --   --  1.47*  --   --   --  1.36*  --  0.83   ANIONGAP 10  --   --  11  --   --   --  12  --  12  12   ALBARO 9.3  --   --  9.5  --   --   --  9.2  --  8.8   * 94 101* 111*   < >  --    < > 96   < > 102*   ALBUMIN  --   --   --   --   --   --   --   --   --  3.4*   PROTTOTAL  --   --   --   --   --   --   --   --   --  7.7   BILITOTAL  --   --   --   --   --   --   --   --   --  0.6   ALKPHOS  --   --   --   --   --   --   --   --   --  136   ALT  --   --   --   --   --   --   --   --   --  14   AST  --   --   --   --   --   --   --   --   --  33    < > = values in this interval not displayed.     Recent Labs   Lab Test 12/28/23  1052 02/04/22  1810   NTBNPI 1,982*  --    BNP  --  184*     Recent Labs   Lab 12/28/23  2352   CHOL 142   TRIG 76   HDL 33*     Lab Results   Component Value Date     12/29/2023     12/29/2023    CO2 23 12/29/2023    CO2 23 12/29/2023    CO2 21 02/10/2022    BUN 13.4 12/29/2023    BUN 13 02/10/2022     Lab Results   Component Value Date    WBC 7.0 12/29/2023    HGB 11.1 12/29/2023    HCT 35.6 12/29/2023    MCV 92 12/29/2023     12/29/2023     Lab Results   Component Value Date    CHOL 169 01/10/2023    TRIG 113 01/10/2023    HDL 35 01/10/2023     Lab Results   Component Value Date    INR 1.15 02/04/2022     Lab Results   Component Value Date    TROPONINI 0.03 01/17/2022    TROPONINI 0.03 01/12/2022     Lab Results   Component Value Date    TSH 6.10 12/28/2023    TSH 4.64 01/15/2022           Current Inpatient Scheduled Medications   Scheduled Meds:   amLODIPine  10 mg  Oral Daily    azithromycin  500 mg Oral Daily    cefdinir  300 mg Oral BID    citalopram  40 mg Oral Daily    clopidogrel  75 mg Oral Daily    heparin ANTICOAGULANT  5,000 Units Subcutaneous BID    insulin aspart  1-3 Units Subcutaneous TID AC    insulin aspart  1-3 Units Subcutaneous At Bedtime    losartan  100 mg Oral Daily    metoprolol tartrate  75 mg Oral BID    pantoprazole  40 mg Oral BID AC    senna-docusate  1 tablet Oral BID    Or    senna-docusate  2 tablet Oral BID    sodium chloride (PF)  3 mL Intracatheter Q8H     Continuous Infusions:   - MEDICATION INSTRUCTIONS -              Medications Prior to Admission   Prior to Admission medications    Medication Sig Start Date End Date Taking? Authorizing Provider   acetaminophen (TYLENOL) 500 MG tablet Take 2 tablets (1,000 mg) by mouth every 8 hours as needed for mild pain 1/19/22  Yes Alysia Conn MD   albuterol (PROAIR HFA/PROVENTIL HFA/VENTOLIN HFA) 108 (90 Base) MCG/ACT inhaler Inhale 2 puffs into the lungs every 6 hours as needed 4/26/22  Yes Reported, Patient   amLODIPine (NORVASC) 10 MG tablet Take 10 mg by mouth daily   Yes Unknown, Entered By History   calcium carbonate (TUMS) 500 MG chewable tablet Take 1 chew tab by mouth 2 times daily as needed for heartburn   Yes Unknown, Entered By History   citalopram (CELEXA) 40 MG tablet Take 40 mg by mouth daily   Yes Unknown, Entered By History   clopidogrel (PLAVIX) 75 MG tablet Take 75 mg by mouth daily 1/26/22  Yes Unknown, Entered By History   glipiZIDE (GLUCOTROL XL) 2.5 MG 24 hr tablet Take 2.5 mg by mouth daily (with breakfast)   Yes Unknown, Entered By History   losartan (COZAAR) 50 MG tablet Take 50 mg by mouth daily 3/8/22  Yes Reported, Patient   multivitamin w/minerals (THERA-VIT-M) tablet Take 1 tablet by mouth daily   Yes Unknown, Entered By History   pantoprazole (PROTONIX) 40 MG EC tablet Take 1 tablet (40 mg) by mouth 2 times daily (before meals) 1/19/22  Yes Alysia Conn MD    senna-docusate (SENOKOT-S/PERICOLACE) 8.6-50 MG tablet Take 1 tablet by mouth at bedtime   Yes Unknown, Entered By History   vitamin E (TOCOPHEROL) 400 units (180 mg) capsule Take 400 Units by mouth daily   Yes Unknown, Entered By History

## 2023-12-31 NOTE — PLAN OF CARE
Goal Outcome Evaluation:      Plan of Care Reviewed With: patient    Overall Patient Progress: no changeOverall Patient Progress: no change    Outcome Evaluation: Pt is AOx4, on RA, VSS. NSR/ayo this shift, high 50s. Pt c/o headache, received PRN Tylenol. Up to bathroom with SBA and FWW. FR 1800ml maintained. No other event, in chair, call light within reach.

## 2023-12-31 NOTE — DISCHARGE SUMMARY
"[facility]  Hospitalist Discharge Summary      Date of Admission:  12/28/2023  Date of Discharge:  12/31/2023  Discharging Provider: Jonathan Winslow MD  Discharge Service: Hospitalist Service    Discharge Diagnoses   Hypertensive emergency  Demand cardiac ischemia  Community acquired pneumonia  FELIPA    Clinically Significant Risk Factors     # DMII: A1C = 6.6 % (Ref range: <5.7 %) within past 6 months    # Obesity: Estimated body mass index is 37.61 kg/m  as calculated from the following:    Height as of this encounter: 1.626 m (5' 4\").    Weight as of this encounter: 99.4 kg (219 lb 1.6 oz).       Follow-ups Needed After Discharge   Follow-up Appointments     Follow-up and recommended labs and tests       Follow up with primary care provider, COURT SERVIN, within 7 days for   hospital follow- up.  The following labs/tests are recommended:  - BMP by 1/5/23  - consider dose reduction of citalopram given risk for QT prolongation  - followup pulmonary nodule  - followup cirrhosis  - followup vaginal bleeding  - consider sleep study if clinically appropriate    Cardiology followup for evaluation of coronary disease and hypertension.  - titrate blood pressure medication as needed, may need further workup for   etiology of hypertension  - followup left ventricular hypertrophy, possible diastolic dysfunction on   TTE  - followup volume status and titrate furosemide as needed    Nephrology followup for FELIPA            Unresulted Labs Ordered in the Past 30 Days of this Admission       No orders found from 11/28/2023 to 12/29/2023.            Discharge Disposition   Discharged to home  Condition at discharge: Stable    Hospital Course   The patient was admitted for evaluation of chest pain and dyspnea.  She was ultimately diagnosed with community acquired pneumonia and hypertensive emergency.  She had associated demand cardiac ischemia.  She was treated with antibiotics for the pneumonia.  Cardiology " was consulted and ultimately had low concern for ACS.  She will have ongoing evaluation as an outpatient with cardiology.  She received IV diuresis for concern for associated volume overload.  She developed an FELIPA which was stable by discharge.  Hypertension demonstrated modest improvement, though blood pressure remained elevated by discharge.    Consultations This Hospital Stay   PHYSICAL THERAPY ADULT IP CONSULT  OCCUPATIONAL THERAPY ADULT IP CONSULT  CORE CLINIC EVALUATION IP CONSULT  OCCUPATIONAL THERAPY ADULT IP CONSULT  NUTRITION SERVICES ADULT IP CONSULT  CARE MANAGEMENT / SOCIAL WORK IP CONSULT  CARDIOLOGY IP CONSULT  SOCIAL WORK IP CONSULT  PHARMACY IP CONSULT    Code Status   Full Code    Time Spent on this Encounter   I, Jonathan Winslow MD, personally saw the patient today and spent less than or equal to 30 minutes discharging this patient.       Jonathan Winslow MD  [facility]  [address] [place] [id number]  Phone: [phone]  Fax: [phone]  ______________________________________________________________________    Physical Exam   Vital Signs: Temp: 99  F (37.2  C) Temp src: Oral BP: 136/62 Pulse: 50   Resp: 18 SpO2: 96 % O2 Device: None (Room air)    Weight: 219 lbs 1.6 oz    See progress note       Primary Care Physician   COURT SERVIN    Discharge Orders      Basic metabolic panel  (Ca, Cl, CO2, Creat, Gluc, K, Na, BUN)     Adult Nephrology  Referral      Activity    Your activity upon discharge: activity as tolerated     When to contact your care team    Seek medical attention if you have any of the following: chest pain, chest pressure, worsening breathing, leg swelling, weight gain, or persistent fevers.     Reason for your hospital stay    You were admitted to the hospital for evaluation of chest pain and dyspnea.  You were diagnosed with pneumonia and elevated blood pressure.  You were evaluated by cardiology given concern for  strain on your heart.  Testing was not concerning for a heart attack.  Your blood pressure showed improvement by discharge.  You also developed worsening kidney function which was stable by discharge.  You should have followup with your primary care provider, cardiology, and nephrology.     Follow-up and recommended labs and tests     Follow up with primary care provider, COURT SERVIN, within 7 days for hospital follow- up.  The following labs/tests are recommended:  - BMP by 1/5/23  - consider dose reduction of citalopram given risk for QT prolongation  - followup pulmonary nodule  - followup cirrhosis  - followup vaginal bleeding  - consider sleep study if clinically appropriate    Cardiology followup for evaluation of coronary disease and hypertension.  - titrate blood pressure medication as needed, may need further workup for etiology of hypertension  - followup left ventricular hypertrophy, possible diastolic dysfunction on TTE  - followup volume status and titrate furosemide as needed    Nephrology followup for FELIPA     Diet    Follow this diet upon discharge: low salt diet, limit water intake to less than 2L (half gallon) per day     CTA Angiogram coronary artery       Significant Results and Procedures   Most Recent 3 CBC's:  Recent Labs   Lab Test 12/31/23  0539 12/30/23  0504 12/29/23  0533   WBC 5.4 5.8 7.0   HGB 9.9* 10.4* 11.1*   MCV 92 91 92    180 183     Most Recent 3 BMP's:  Recent Labs   Lab Test 12/31/23  0803 12/31/23  0539 12/30/23  2149 12/30/23  1755 12/30/23  1343 12/30/23  1214 12/30/23  1052 12/30/23  0833 12/30/23  0504   NA  --  139  --   --  138  --   --   --  139   POTASSIUM  --  3.7  --   --  3.9  --  3.7  --  3.1*   CHLORIDE  --  105  --   --  103  --   --   --  103   CO2  --  24  --   --  24  --   --   --  24   BUN  --  28.5*  --   --  26.6*  --   --   --  23.8*   CR  --  1.36*  --   --  1.47*  --   --   --  1.36*   ANIONGAP  --  10  --   --  11  --   --   --  12   ALBARO  --  9.3   --   --  9.5  --   --   --  9.2   GLC 86 107* 94   < > 111*   < >  --    < > 96    < > = values in this interval not displayed.   ,   Results for orders placed or performed during the hospital encounter of 12/28/23   Chest XR,  PA & LAT    Narrative    EXAM: XR CHEST 2 VIEWS  LOCATION: [facility]  DATE: 12/28/2023    INDICATION: SOB, cough, chest pain  COMPARISON: 12/18/2023      Impression    IMPRESSION: Stable enlarged cardiac silhouette. No definite pulmonary vascular congestion, pleural effusion, or pneumonia seen.   CT Chest Pulmonary Embolism w Contrast    Narrative    EXAM: CT CHEST PULMONARY EMBOLISM W CONTRAST  LOCATION: [facility]  DATE: 12/28/2023    INDICATION: CP, SOB  COMPARISON: None.  TECHNIQUE: CT chest pulmonary angiogram during arterial phase injection of IV contrast. Multiplanar reformats and MIP reconstructions were performed. Dose reduction techniques were used.   CONTRAST: 80ml Isovue 370    FINDINGS:  ANGIOGRAM CHEST: Pulmonary arteries are normal caliber and negative for pulmonary emboli. Thoracic aorta is negative for dissection. No CT evidence of right heart strain. There is left ventricular concentric hypertrophy.    LUNGS AND PLEURA: The left lower lobe has a small rounded focal consolidation. Pneumonia is most likely. No effusion.    There is a right middle lobe 5 mm subpleural nodule (series 7, image 137).    MEDIASTINUM/AXILLAE: No adenopathy. Small pericardial effusion.    CORONARY ARTERY CALCIFICATION: Mild.    UPPER ABDOMEN: Cirrhosis. Left renal 2.3 cm fluid density lesion does not need further evaluation.    MUSCULOSKELETAL: Normal.      Impression    IMPRESSION:  1.  No evidence pulmonary embolism.  2.  Small left lower lobe consolidation. Pneumonia most likely. Recommend 3 month follow-up CT to confirm resolution.  3.  Right lung 5 mm nodule.  4.  Concentric left ventricular hypertrophy.  5.  Small pericardial effusion.  6.   Cirrhosis.   Echocardiogram Complete     Value    LVEF  55-60%    [facility]    [id number]  [id number]  [id number]  [id number] [id number]     Name: SHAKIRA COBB  MRN: [id number]  : 1950  Study Date: 2023 01:14 PM  Age: 73 yrs  Gender: Female  Patient Location: [facility]  Reason For Study: Dyspnea  Ordering Physician: AYAD MEDRANO  Performed By: ERIC     BSA: 2.0 m2  Height: 64 in  Weight: 218 lb  HR: 73  BP: 150/67 mmHg  ______________________________________________________________________________  Procedure  Complete Echo Adult. Definity (NDC #14444-577) given intravenously.  ______________________________________________________________________________  Interpretation Summary     The left ventricle is normal in size.  Left ventricular function is normal.The ejection fraction is 55-60%.  There is moderate concentric left ventricular hypertrophy.  The left atrium is moderately dilated.  There is mild mitral stenosis.  The aortic valve is trileaflet with aortic valve sclerosis.  ______________________________________________________________________________  Left Ventricle  The left ventricle is normal in size. Left ventricular function is normal.The  ejection fraction is 55-60%. There is moderate concentric left ventricular  hypertrophy.     Right Ventricle  The right ventricle is not well visualized.     Atria  The left atrium is moderately dilated. Right atrium not well visualized.     Mitral Valve  There is mild mitral annular calcification. There is trace mitral  regurgitation. There is mild mitral stenosis. The mean mitral valve gradient  is 3.6mmHg.     Tricuspid Valve  The tricuspid valve is not well visualized.     Aortic Valve  The aortic valve is trileaflet with aortic valve sclerosis. No aortic  regurgitation is present.     Pulmonic Valve  The pulmonic valve is not well visualized.     Vessels  The aorta root is normal.      Pericardium  There is no pericardial effusion.     ______________________________________________________________________________  MMode/2D Measurements & Calculations  IVSd: 2.1 cm  LVIDd: 4.1 cm  LVIDs: 2.8 cm  LVPWd: 2.0 cm  FS: 31.0 %     LV mass(C)d: 406.3 grams  LV mass(C)dI: 200.3 grams/m2  Ao root diam: 2.9 cm  asc Aorta Diam: 3.7 cm  LVOT diam: 2.0 cm  LVOT area: 3.1 cm2  Ao root diam index Ht(cm/m): 1.8  Ao root diam index BSA (cm/m2): 1.4  Asc Ao diam index BSA (cm/m2): 1.8  Asc Ao diam index Ht(cm/m): 2.2  RWT: 0.96     Doppler Measurements & Calculations  MV E max aditya: 135.0 cm/sec  MV A max aditya: 133.4 cm/sec  MV E/A: 1.0  MV max P.7 mmHg  MV mean PG: 3.6 mmHg  MV V2 VTI: 43.4 cm  MVA(VTI): 2.1 cm2  MV dec slope: 495.1 cm/sec2  MV dec time: 0.27 sec  LV V1 max P.3 mmHg  LV V1 max: 144.0 cm/sec  LV V1 VTI: 29.6 cm  SV(LVOT): 91.6 ml  SI(LVOT): 45.1 ml/m2     PA V2 max: 74.7 cm/sec  PA max P.2 mmHg  E/E': 34.6  E/E' av.9  Lateral E/e': 23.4  Medial E/e': 34.5  Peak E' Aditya: 3.9 cm/sec     ______________________________________________________________________________  Report approved by: Naina Eller 2023 03:00 PM             Discharge Medications   Current Discharge Medication List        START taking these medications    Details   cefdinir (OMNICEF) 300 MG capsule Take 1 capsule (300 mg) by mouth 2 times daily for 2 days  Qty: 4 capsule, Refills: 0    Associated Diagnoses: Community acquired pneumonia of left lung, unspecified part of lung      furosemide (LASIX) 20 MG tablet Take 1 tablet (20 mg) by mouth daily  Qty: 30 tablet, Refills: 0    Associated Diagnoses: Hypertensive emergency      metoprolol tartrate 75 MG TABS Take 75 mg by mouth 2 times daily  Qty: 60 tablet, Refills: 0    Associated Diagnoses: Hypertensive emergency           CONTINUE these medications which have CHANGED    Details   losartan (COZAAR) 50 MG tablet Take 2 tablets (100 mg) by mouth  daily  Qty: 60 tablet, Refills: 0    Associated Diagnoses: Hypertensive emergency           CONTINUE these medications which have NOT CHANGED    Details   acetaminophen (TYLENOL) 500 MG tablet Take 2 tablets (1,000 mg) by mouth every 8 hours as needed for mild pain    Associated Diagnoses: Chest wall pain      albuterol (PROAIR HFA/PROVENTIL HFA/VENTOLIN HFA) 108 (90 Base) MCG/ACT inhaler Inhale 2 puffs into the lungs every 6 hours as needed      amLODIPine (NORVASC) 10 MG tablet Take 10 mg by mouth daily      calcium carbonate (TUMS) 500 MG chewable tablet Take 1 chew tab by mouth 2 times daily as needed for heartburn      citalopram (CELEXA) 40 MG tablet Take 40 mg by mouth daily      clopidogrel (PLAVIX) 75 MG tablet Take 75 mg by mouth daily      glipiZIDE (GLUCOTROL XL) 2.5 MG 24 hr tablet Take 2.5 mg by mouth daily (with breakfast)      multivitamin w/minerals (THERA-VIT-M) tablet Take 1 tablet by mouth daily      pantoprazole (PROTONIX) 40 MG EC tablet Take 1 tablet (40 mg) by mouth 2 times daily (before meals)  Qty: 60 tablet, Refills: 0    Associated Diagnoses: Gastroesophageal reflux disease with esophagitis without hemorrhage      senna-docusate (SENOKOT-S/PERICOLACE) 8.6-50 MG tablet Take 1 tablet by mouth at bedtime      vitamin E (TOCOPHEROL) 400 units (180 mg) capsule Take 400 Units by mouth daily           Allergies   Allergies   Allergen Reactions    Aspirin      Other reaction(s): stomach upset    Atenolol Other (See Comments)     abd pain    Lisinopril      Other reaction(s): stomach aches    Penicillins Hives     Tolerated ceftriaxone    Statins      Other reaction(s): myalgias

## 2024-01-02 ENCOUNTER — TELEPHONE (OUTPATIENT)
Dept: CARDIOLOGY | Facility: CLINIC | Age: 74
End: 2024-01-02
Payer: COMMERCIAL

## 2024-01-02 DIAGNOSIS — R79.89 ELEVATED TROPONIN: ICD-10-CM

## 2024-01-02 DIAGNOSIS — I10 HTN (HYPERTENSION): ICD-10-CM

## 2024-01-02 DIAGNOSIS — R55 SYNCOPE, UNSPECIFIED SYNCOPE TYPE: Primary | ICD-10-CM

## 2024-01-02 DIAGNOSIS — R06.02 SOB (SHORTNESS OF BREATH): ICD-10-CM

## 2024-01-02 DIAGNOSIS — R07.9 CHEST PAIN ON EXERTION: ICD-10-CM

## 2024-01-02 NOTE — TELEPHONE ENCOUNTER
"New order placed and message sent to Elizabeth to arrange.  ---------------------------  Per 12/31/23 Progress Note:  Parenthetically, she reports no true allergy to aspirin per se, but states that she had \"severe\" GI upset on the therapy in the past.  In addition, she has atenolol listed on her allergy list though on review with Josefina she states that she had no true allergy with this as well, but simply GI upset on this therapy, too.  Continue clopidogrel which she had been on at home.  Continue metoprolol vis-à-vis problem #3 as well.  Eventual ischemic workup, but no urgency.  I will arrange for Josefina to have outpatient CT coronary angiogram (discussed with patient this morning).    "

## 2024-01-02 NOTE — TELEPHONE ENCOUNTER
----- Message from Elizabeth Kaur sent at 1/2/2024  1:03 PM CST -----  Regarding: Mindy Tolentino,    I am getting an ABN for the CCTA that Dr. Melgar ordered. Are you able to put in a new order?

## 2024-01-10 ENCOUNTER — TRANSFERRED RECORDS (OUTPATIENT)
Dept: HEALTH INFORMATION MANAGEMENT | Facility: CLINIC | Age: 74
End: 2024-01-10

## 2024-01-10 ENCOUNTER — LAB REQUISITION (OUTPATIENT)
Dept: LAB | Facility: CLINIC | Age: 74
End: 2024-01-10
Payer: COMMERCIAL

## 2024-01-10 DIAGNOSIS — I12.9 HYPERTENSIVE CHRONIC KIDNEY DISEASE WITH STAGE 1 THROUGH STAGE 4 CHRONIC KIDNEY DISEASE, OR UNSPECIFIED CHRONIC KIDNEY DISEASE: ICD-10-CM

## 2024-01-10 LAB
ANION GAP SERPL CALCULATED.3IONS-SCNC: 11 MMOL/L (ref 7–15)
BUN SERPL-MCNC: 9.5 MG/DL (ref 8–23)
CALCIUM SERPL-MCNC: 8.7 MG/DL (ref 8.8–10.2)
CHLORIDE SERPL-SCNC: 107 MMOL/L (ref 98–107)
CREAT SERPL-MCNC: 0.71 MG/DL (ref 0.51–0.95)
DEPRECATED HCO3 PLAS-SCNC: 23 MMOL/L (ref 22–29)
EGFRCR SERPLBLD CKD-EPI 2021: 90 ML/MIN/1.73M2
GLUCOSE SERPL-MCNC: 123 MG/DL (ref 70–99)
POTASSIUM SERPL-SCNC: 3.6 MMOL/L (ref 3.4–5.3)
SODIUM SERPL-SCNC: 141 MMOL/L (ref 135–145)

## 2024-01-10 PROCEDURE — 80048 BASIC METABOLIC PNL TOTAL CA: CPT | Mod: ORL | Performed by: FAMILY MEDICINE

## 2024-01-16 ENCOUNTER — HOSPITAL ENCOUNTER (OUTPATIENT)
Dept: CT IMAGING | Facility: CLINIC | Age: 74
Discharge: HOME | End: 2024-01-16
Attending: INTERNAL MEDICINE | Admitting: INTERNAL MEDICINE
Payer: COMMERCIAL

## 2024-01-16 VITALS — SYSTOLIC BLOOD PRESSURE: 157 MMHG | DIASTOLIC BLOOD PRESSURE: 67 MMHG

## 2024-01-16 DIAGNOSIS — R55 SYNCOPE, UNSPECIFIED SYNCOPE TYPE: ICD-10-CM

## 2024-01-16 DIAGNOSIS — R79.89 ELEVATED TROPONIN: ICD-10-CM

## 2024-01-16 DIAGNOSIS — I10 HTN (HYPERTENSION): ICD-10-CM

## 2024-01-16 DIAGNOSIS — R06.02 SOB (SHORTNESS OF BREATH): ICD-10-CM

## 2024-01-16 DIAGNOSIS — R07.9 CHEST PAIN ON EXERTION: ICD-10-CM

## 2024-01-16 LAB — BSA FOR ECHO PROCEDURE: 0 M2

## 2024-01-16 PROCEDURE — 250N000011 HC RX IP 250 OP 636: Performed by: INTERNAL MEDICINE

## 2024-01-16 PROCEDURE — 75574 CT ANGIO HRT W/3D IMAGE: CPT

## 2024-01-16 PROCEDURE — 75574 CT ANGIO HRT W/3D IMAGE: CPT | Mod: 26 | Performed by: INTERNAL MEDICINE

## 2024-01-16 PROCEDURE — 250N000013 HC RX MED GY IP 250 OP 250 PS 637: Performed by: INTERNAL MEDICINE

## 2024-01-16 RX ORDER — LIDOCAINE 40 MG/G
CREAM TOPICAL
Status: DISCONTINUED | OUTPATIENT
Start: 2024-01-16 | End: 2024-01-17 | Stop reason: HOSPADM

## 2024-01-16 RX ORDER — DILTIAZEM HYDROCHLORIDE 5 MG/ML
5 INJECTION INTRAVENOUS
Status: DISCONTINUED | OUTPATIENT
Start: 2024-01-16 | End: 2024-01-17 | Stop reason: HOSPADM

## 2024-01-16 RX ORDER — METOPROLOL TARTRATE 1 MG/ML
5 INJECTION, SOLUTION INTRAVENOUS
Status: DISCONTINUED | OUTPATIENT
Start: 2024-01-16 | End: 2024-01-17 | Stop reason: HOSPADM

## 2024-01-16 RX ORDER — DILTIAZEM HYDROCHLORIDE 5 MG/ML
10 INJECTION INTRAVENOUS
Status: DISCONTINUED | OUTPATIENT
Start: 2024-01-16 | End: 2024-01-17 | Stop reason: HOSPADM

## 2024-01-16 RX ORDER — NITROGLYCERIN 0.4 MG/1
0.4 TABLET SUBLINGUAL ONCE
Status: COMPLETED | OUTPATIENT
Start: 2024-01-16 | End: 2024-01-16

## 2024-01-16 RX ORDER — IOPAMIDOL 755 MG/ML
100 INJECTION, SOLUTION INTRAVASCULAR ONCE
Status: COMPLETED | OUTPATIENT
Start: 2024-01-16 | End: 2024-01-16

## 2024-01-16 RX ADMIN — IOPAMIDOL 100 ML: 755 INJECTION, SOLUTION INTRAVENOUS at 10:55

## 2024-01-16 RX ADMIN — NITROGLYCERIN 0.4 MG: 0.4 TABLET SUBLINGUAL at 10:47

## 2024-01-22 ENCOUNTER — TRANSFERRED RECORDS (OUTPATIENT)
Dept: HEALTH INFORMATION MANAGEMENT | Facility: CLINIC | Age: 74
End: 2024-01-22
Payer: COMMERCIAL

## 2024-01-29 ENCOUNTER — APPOINTMENT (OUTPATIENT)
Dept: CARDIOLOGY | Facility: CLINIC | Age: 74
End: 2024-01-29
Attending: INTERNAL MEDICINE
Payer: COMMERCIAL

## 2024-01-29 VITALS
HEART RATE: 48 BPM | DIASTOLIC BLOOD PRESSURE: 60 MMHG | WEIGHT: 218.7 LBS | BODY MASS INDEX: 37.54 KG/M2 | OXYGEN SATURATION: 96 % | RESPIRATION RATE: 16 BRPM | SYSTOLIC BLOOD PRESSURE: 168 MMHG

## 2024-01-29 DIAGNOSIS — I34.2 NONRHEUMATIC MITRAL VALVE STENOSIS: ICD-10-CM

## 2024-01-29 DIAGNOSIS — I10 PRIMARY HYPERTENSION: Primary | ICD-10-CM

## 2024-01-29 DIAGNOSIS — E66.01 MORBID OBESITY (H): ICD-10-CM

## 2024-01-29 DIAGNOSIS — N17.9 AKI (ACUTE KIDNEY INJURY) (H): ICD-10-CM

## 2024-01-29 PROBLEM — I50.32 CHRONIC HEART FAILURE WITH PRESERVED EJECTION FRACTION (H): Status: RESOLVED | Noted: 2024-01-29 | Resolved: 2024-01-29

## 2024-01-29 PROBLEM — I50.32 CHRONIC HEART FAILURE WITH PRESERVED EJECTION FRACTION (H): Status: ACTIVE | Noted: 2024-01-29

## 2024-01-29 PROCEDURE — 99214 OFFICE O/P EST MOD 30 MIN: CPT | Performed by: NURSE PRACTITIONER

## 2024-01-29 RX ORDER — HYDRALAZINE HYDROCHLORIDE 25 MG/1
25 TABLET, FILM COATED ORAL 3 TIMES DAILY
Qty: 270 TABLET | Refills: 0 | Status: ON HOLD | OUTPATIENT
Start: 2024-01-29 | End: 2024-02-28

## 2024-01-29 NOTE — PATIENT INSTRUCTIONS
Josefina Dumont,    It was a pleasure to see you today at [facility].     My recommendations after this visit include:  - Please follow up with heart care as needed   - Start hydralazine 25 mg three time a day  - Purchase a blood pressure cuff with a goal of 130/80    Ofelia Mayfield, CNP

## 2024-01-29 NOTE — LETTER
1/29/2024    COU[facility] SERV[facility]  [address]  [address] [facility] [address]    RE: Josefina Dumont       Dear Colleague,     I had the pleasure of seeing Josefina Dumont in the [facility].        Assessment/Recommendations   Assessment:    1.  Hypertension: Elevated.  Blood pressure 168/60.  2.  Obesity: BMI 37.54  3.  Valvular heart disease: Mild mitral stenosis noted on echocardiogram  4.  CKD stage IIIa: She had labs a few weeks ago which were stable with creatinine 0.71    Plan:  1.  Start hydralazine 25 mg 3 times a day  2.  Recommended purchasing a blood pressure cuff to monitor blood pressure at home with a goal of less than 130/80  3.  Work on weight loss    Josefina Dumont will follow up with heart clinic as needed.     History of Present Illness/Subjective    Ms. Josefina Dumont is a 73 year old female seen at [facility] heart failure clinic today for continued follow-up.  Her sister accompanies her today.  She was hospitalized the end of December due to chest pain, dyspnea, pneumonia and hypertensive urgency.  Her blood pressure was 210/90.  She had stopped taking her hydrochlorothiazide.  She was given IV Lasix and developed acute kidney injury.  She had an echocardiogram which showed ejection fraction of 55 to 60% with moderate left ventricular hypertrophy and mild mitral stenosis.  She also had a CTA coronary angiogram January 16 which showed mild to moderate plaque in Dr. Melgar recommended following up in the heart clinic as needed.  She has a past medical history significant for hypertension, obesity, diabetes, CKD stage III.    Today, she continues to have dyspnea on exertion.  She denies lightheadedness, shortness of breath, orthopnea, PND, palpitations, chest pain, abdominal fullness/bloating, and lower extremity edema.         ECHOCARDIOGRAM: 12/29/2023-reviewed  Interpretation Summary     The left ventricle is normal in size.  Left ventricular function is  normal.The ejection fraction is 55-60%.  There is moderate concentric left ventricular hypertrophy.  The left atrium is moderately dilated.  There is mild mitral stenosis.  The aortic valve is trileaflet with aortic valve sclerosis.    CTA Angiogram coronary artery: 1/16/2024-reviewed  Interpretation Summary    Angiography: codominant system - noted RCA small but feeds small PDA  LM mild calcification ostium with min narrowing  LAD proximal calcification mild with mild plaque  Circ proximal to mid moderate calcification and mild to mod plaque  RCA normal, calcified section of aorta near RCA ostium     Physical Examination Review of Systems   BP (!) 168/60 (BP Location: Left arm, Patient Position: Sitting, Cuff Size: Adult Large)   Pulse (!) 48   Resp 16   Wt 99.2 kg (218 lb 11.2 oz)   SpO2 96%   BMI 37.54 kg/m    Body mass index is 37.54 kg/m .  Wt Readings from Last 3 Encounters:   01/29/24 99.2 kg (218 lb 11.2 oz)   12/31/23 99.4 kg (219 lb 1.6 oz)   10/19/22 95.3 kg (210 lb)       General Appearance:   no acute distress   ENT/Mouth: No abnormalities   EYES:  no scleral icterus, normal conjunctivae   Neck: no thyromegaly   Chest/Lungs:   lungs are clear to auscultation, no rales or wheezing, equal chest wall expansion    Cardiovascular:   Regular. Normal first and second heart sounds with grade 2/6 systolic murmur, no rubs, or gallops, no edema bilaterally    Abdomen:  Obese, bowel sounds are present   Extremities: no cyanosis or clubbing   Skin: warm   Neurologic: no tremors     Psychiatric: alert and oriented x3                                              Medical History  Surgical History Family History Social History   Past Medical History:   Diagnosis Date    Chronic kidney disease     Diabetes (H)     Hypertension     Obese     History reviewed. No pertinent surgical history. No family history on file. Social History     Socioeconomic History    Marital status:      Spouse name: Not on file     Number of children: Not on file    Years of education: Not on file    Highest education level: Not on file   Occupational History    Not on file   Tobacco Use    Smoking status: Former     Types: Cigarettes     Quit date:      Years since quittin.0    Smokeless tobacco: Never   Substance and Sexual Activity    Alcohol use: Not on file    Drug use: Not on file    Sexual activity: Not on file   Other Topics Concern    Not on file   Social History Narrative    Not on file     Social Determinants of Health     Financial Resource Strain: Not on file   Food Insecurity: Not on file   Transportation Needs: Not on file   Physical Activity: Not on file   Stress: Not on file   Social Connections: Not on file   Interpersonal Safety: Not on file   Housing Stability: Not on file          Medications  Allergies   Current Outpatient Medications   Medication Sig Dispense Refill    acetaminophen (TYLENOL) 500 MG tablet Take 2 tablets (1,000 mg) by mouth every 8 hours as needed for mild pain      albuterol (PROAIR HFA/PROVENTIL HFA/VENTOLIN HFA) 108 (90 Base) MCG/ACT inhaler Inhale 2 puffs into the lungs every 6 hours as needed      amLODIPine (NORVASC) 10 MG tablet Take 10 mg by mouth daily      calcium carbonate (TUMS) 500 MG chewable tablet Take 1 chew tab by mouth 2 times daily as needed for heartburn      citalopram (CELEXA) 40 MG tablet Take 40 mg by mouth daily      clopidogrel (PLAVIX) 75 MG tablet Take 75 mg by mouth daily      furosemide (LASIX) 20 MG tablet Take 1 tablet (20 mg) by mouth daily 30 tablet 0    glipiZIDE (GLUCOTROL XL) 2.5 MG 24 hr tablet Take 2.5 mg by mouth daily (with breakfast)      hydrALAZINE (APRESOLINE) 25 MG tablet Take 1 tablet (25 mg) by mouth 3 times daily 270 tablet 0    losartan (COZAAR) 50 MG tablet Take 2 tablets (100 mg) by mouth daily 60 tablet 0    metoprolol tartrate 75 MG TABS Take 75 mg by mouth 2 times daily 60 tablet 0    multivitamin w/minerals (THERA-VIT-M) tablet Take 1  tablet by mouth daily      pantoprazole (PROTONIX) 40 MG EC tablet Take 1 tablet (40 mg) by mouth 2 times daily (before meals) 60 tablet 0    senna-docusate (SENOKOT-S/PERICOLACE) 8.6-50 MG tablet Take 1 tablet by mouth at bedtime      vitamin E (TOCOPHEROL) 400 units (180 mg) capsule Take 400 Units by mouth daily      Allergies   Allergen Reactions    Aspirin      Other reaction(s): stomach upset    Atenolol Other (See Comments)     abd pain    Lisinopril      Other reaction(s): stomach aches    Penicillins Hives     Tolerated ceftriaxone    Statins      Other reaction(s): myalgias         Lab Results    Chemistry/lipid CBC Cardiac Enzymes/BNP/TSH/INR   Lab Results   Component Value Date    CHOL 142 12/28/2023    HDL 33 (L) 12/28/2023    TRIG 76 12/28/2023    BUN 9.5 01/10/2024     01/10/2024    CO2 23 01/10/2024    Lab Results   Component Value Date    WBC 5.4 12/31/2023    HGB 9.9 (L) 12/31/2023    HCT 31.6 (L) 12/31/2023    MCV 92 12/31/2023     12/31/2023    Lab Results   Component Value Date    TROPONINI 0.03 01/17/2022     (H) 02/04/2022    TSH 6.10 (H) 12/28/2023    INR 1.15 02/04/2022             This note has been dictated using voice recognition software. Any grammatical, typographical, or context distortions are unintentional and inherent to the software    30 minutes spent on the date of encounter doing chart review, review of outside records, review of test results, interpretation with above tests, patient visit, documentation, and discussion with family.                    Thank you for allowing me to participate in the care of your patient.      Sincerely,     Ofelia Mayfield, THAD [facility]  cc:   Lilian Olivares MD  [address] [facility] [address]  [place] [address]

## 2024-01-29 NOTE — PROGRESS NOTES
Assessment/Recommendations   Assessment:    1.  Hypertension: Elevated.  Blood pressure 168/60.  2.  Obesity: BMI 37.54  3.  Valvular heart disease: Mild mitral stenosis noted on echocardiogram  4.  CKD stage IIIa: She had labs a few weeks ago which were stable with creatinine 0.71    Plan:  1.  Start hydralazine 25 mg 3 times a day  2.  Recommended purchasing a blood pressure cuff to monitor blood pressure at home with a goal of less than 130/80  3.  Work on weight loss    Josefina Dumont will follow up with heart clinic as needed.     History of Present Illness/Subjective    Ms. Josefina Dumont is a 73 year old female seen at [facility] heart failure clinic today for continued follow-up.  Her sister accompanies her today.  She was hospitalized the end of December due to chest pain, dyspnea, pneumonia and hypertensive urgency.  Her blood pressure was 210/90.  She had stopped taking her hydrochlorothiazide.  She was given IV Lasix and developed acute kidney injury.  She had an echocardiogram which showed ejection fraction of 55 to 60% with moderate left ventricular hypertrophy and mild mitral stenosis.  She also had a CTA coronary angiogram January 16 which showed mild to moderate plaque in Dr. Melgar recommended following up in the heart clinic as needed.  She has a past medical history significant for hypertension, obesity, diabetes, CKD stage III.    Today, she continues to have dyspnea on exertion.  She denies lightheadedness, shortness of breath, orthopnea, PND, palpitations, chest pain, abdominal fullness/bloating, and lower extremity edema.         ECHOCARDIOGRAM: 12/29/2023-reviewed  Interpretation Summary     The left ventricle is normal in size.  Left ventricular function is normal.The ejection fraction is 55-60%.  There is moderate concentric left ventricular hypertrophy.  The left atrium is moderately dilated.  There is mild mitral stenosis.  The aortic valve is trileaflet with aortic  valve sclerosis.    CTA Angiogram coronary artery: 1/16/2024-reviewed  Interpretation Summary    Angiography: codominant system - noted RCA small but feeds small PDA  LM mild calcification ostium with min narrowing  LAD proximal calcification mild with mild plaque  Circ proximal to mid moderate calcification and mild to mod plaque  RCA normal, calcified section of aorta near RCA ostium     Physical Examination Review of Systems   BP (!) 168/60 (BP Location: Left arm, Patient Position: Sitting, Cuff Size: Adult Large)   Pulse (!) 48   Resp 16   Wt 99.2 kg (218 lb 11.2 oz)   SpO2 96%   BMI 37.54 kg/m    Body mass index is 37.54 kg/m .  Wt Readings from Last 3 Encounters:   01/29/24 99.2 kg (218 lb 11.2 oz)   12/31/23 99.4 kg (219 lb 1.6 oz)   10/19/22 95.3 kg (210 lb)       General Appearance:   no acute distress   ENT/Mouth: No abnormalities   EYES:  no scleral icterus, normal conjunctivae   Neck: no thyromegaly   Chest/Lungs:   lungs are clear to auscultation, no rales or wheezing, equal chest wall expansion    Cardiovascular:   Regular. Normal first and second heart sounds with grade 2/6 systolic murmur, no rubs, or gallops, no edema bilaterally    Abdomen:  Obese, bowel sounds are present   Extremities: no cyanosis or clubbing   Skin: warm   Neurologic: no tremors     Psychiatric: alert and oriented x3                                              Medical History  Surgical History Family History Social History   Past Medical History:   Diagnosis Date    Chronic kidney disease     Diabetes (H)     Hypertension     Obese     History reviewed. No pertinent surgical history. No family history on file. Social History     Socioeconomic History    Marital status:      Spouse name: Not on file    Number of children: Not on file    Years of education: Not on file    Highest education level: Not on file   Occupational History    Not on file   Tobacco Use    Smoking status: Former     Types: Cigarettes     Quit  date:      Years since quittin.0    Smokeless tobacco: Never   Substance and Sexual Activity    Alcohol use: Not on file    Drug use: Not on file    Sexual activity: Not on file   Other Topics Concern    Not on file   Social History Narrative    Not on file     Social Determinants of Health     Financial Resource Strain: Not on file   Food Insecurity: Not on file   Transportation Needs: Not on file   Physical Activity: Not on file   Stress: Not on file   Social Connections: Not on file   Interpersonal Safety: Not on file   Housing Stability: Not on file          Medications  Allergies   Current Outpatient Medications   Medication Sig Dispense Refill    acetaminophen (TYLENOL) 500 MG tablet Take 2 tablets (1,000 mg) by mouth every 8 hours as needed for mild pain      albuterol (PROAIR HFA/PROVENTIL HFA/VENTOLIN HFA) 108 (90 Base) MCG/ACT inhaler Inhale 2 puffs into the lungs every 6 hours as needed      amLODIPine (NORVASC) 10 MG tablet Take 10 mg by mouth daily      calcium carbonate (TUMS) 500 MG chewable tablet Take 1 chew tab by mouth 2 times daily as needed for heartburn      citalopram (CELEXA) 40 MG tablet Take 40 mg by mouth daily      clopidogrel (PLAVIX) 75 MG tablet Take 75 mg by mouth daily      furosemide (LASIX) 20 MG tablet Take 1 tablet (20 mg) by mouth daily 30 tablet 0    glipiZIDE (GLUCOTROL XL) 2.5 MG 24 hr tablet Take 2.5 mg by mouth daily (with breakfast)      hydrALAZINE (APRESOLINE) 25 MG tablet Take 1 tablet (25 mg) by mouth 3 times daily 270 tablet 0    losartan (COZAAR) 50 MG tablet Take 2 tablets (100 mg) by mouth daily 60 tablet 0    metoprolol tartrate 75 MG TABS Take 75 mg by mouth 2 times daily 60 tablet 0    multivitamin w/minerals (THERA-VIT-M) tablet Take 1 tablet by mouth daily      pantoprazole (PROTONIX) 40 MG EC tablet Take 1 tablet (40 mg) by mouth 2 times daily (before meals) 60 tablet 0    senna-docusate (SENOKOT-S/PERICOLACE) 8.6-50 MG tablet Take 1 tablet by mouth  at bedtime      vitamin E (TOCOPHEROL) 400 units (180 mg) capsule Take 400 Units by mouth daily      Allergies   Allergen Reactions    Aspirin      Other reaction(s): stomach upset    Atenolol Other (See Comments)     abd pain    Lisinopril      Other reaction(s): stomach aches    Penicillins Hives     Tolerated ceftriaxone    Statins      Other reaction(s): myalgias         Lab Results    Chemistry/lipid CBC Cardiac Enzymes/BNP/TSH/INR   Lab Results   Component Value Date    CHOL 142 12/28/2023    HDL 33 (L) 12/28/2023    TRIG 76 12/28/2023    BUN 9.5 01/10/2024     01/10/2024    CO2 23 01/10/2024    Lab Results   Component Value Date    WBC 5.4 12/31/2023    HGB 9.9 (L) 12/31/2023    HCT 31.6 (L) 12/31/2023    MCV 92 12/31/2023     12/31/2023    Lab Results   Component Value Date    TROPONINI 0.03 01/17/2022     (H) 02/04/2022    TSH 6.10 (H) 12/28/2023    INR 1.15 02/04/2022             This note has been dictated using voice recognition software. Any grammatical, typographical, or context distortions are unintentional and inherent to the software    30 minutes spent on the date of encounter doing chart review, review of outside records, review of test results, interpretation with above tests, patient visit, documentation, and discussion with family.

## 2024-02-02 ENCOUNTER — HOSPITAL ENCOUNTER (INPATIENT)
Facility: CLINIC | Age: 74
LOS: 24 days | Discharge: NURSING, REHAB OR LONG-TERM CARE | DRG: 871 | End: 2024-02-28
Attending: EMERGENCY MEDICINE | Admitting: STUDENT IN AN ORGANIZED HEALTH CARE EDUCATION/TRAINING PROGRAM
Payer: COMMERCIAL

## 2024-02-02 ENCOUNTER — APPOINTMENT (OUTPATIENT)
Dept: RADIOLOGY | Facility: CLINIC | Age: 74
DRG: 871 | End: 2024-02-02
Attending: EMERGENCY MEDICINE
Payer: COMMERCIAL

## 2024-02-02 ENCOUNTER — APPOINTMENT (OUTPATIENT)
Dept: CT IMAGING | Facility: CLINIC | Age: 74
DRG: 871 | End: 2024-02-02
Payer: COMMERCIAL

## 2024-02-02 DIAGNOSIS — R50.9 FEVER, UNSPECIFIED FEVER CAUSE: ICD-10-CM

## 2024-02-02 DIAGNOSIS — N17.0 ACUTE RENAL FAILURE WITH TUBULAR NECROSIS (H): Primary | ICD-10-CM

## 2024-02-02 DIAGNOSIS — I50.31 ACUTE DIASTOLIC HEART FAILURE (H): ICD-10-CM

## 2024-02-02 LAB
ALBUMIN UR-MCNC: 70 MG/DL
ANION GAP SERPL CALCULATED.3IONS-SCNC: 13 MMOL/L (ref 7–15)
APPEARANCE UR: CLEAR
ATRIAL RATE - MUSE: 82 BPM
BASOPHILS # BLD AUTO: 0 10E3/UL (ref 0–0.2)
BASOPHILS NFR BLD AUTO: 0 %
BILIRUB UR QL STRIP: NEGATIVE
BUN SERPL-MCNC: 15.2 MG/DL (ref 8–23)
CALCIUM SERPL-MCNC: 9 MG/DL (ref 8.8–10.2)
CHLORIDE SERPL-SCNC: 105 MMOL/L (ref 98–107)
COLOR UR AUTO: YELLOW
CREAT SERPL-MCNC: 0.8 MG/DL (ref 0.51–0.95)
DEPRECATED HCO3 PLAS-SCNC: 20 MMOL/L (ref 22–29)
DIASTOLIC BLOOD PRESSURE - MUSE: 79 MMHG
EGFRCR SERPLBLD CKD-EPI 2021: 78 ML/MIN/1.73M2
EOSINOPHIL # BLD AUTO: 0.1 10E3/UL (ref 0–0.7)
EOSINOPHIL NFR BLD AUTO: 1 %
ERYTHROCYTE [DISTWIDTH] IN BLOOD BY AUTOMATED COUNT: 15.8 % (ref 10–15)
FLUAV RNA SPEC QL NAA+PROBE: NEGATIVE
FLUBV RNA RESP QL NAA+PROBE: NEGATIVE
GLUCOSE SERPL-MCNC: 116 MG/DL (ref 70–99)
GLUCOSE UR STRIP-MCNC: NEGATIVE MG/DL
HCT VFR BLD AUTO: 35.4 % (ref 35–47)
HGB BLD-MCNC: 11.2 G/DL (ref 11.7–15.7)
HGB UR QL STRIP: NEGATIVE
HYALINE CASTS: 6 /LPF
IMM GRANULOCYTES # BLD: 0 10E3/UL
IMM GRANULOCYTES NFR BLD: 0 %
INTERPRETATION ECG - MUSE: NORMAL
KETONES UR STRIP-MCNC: NEGATIVE MG/DL
LACTATE SERPL-SCNC: 1 MMOL/L (ref 0.7–2)
LEUKOCYTE ESTERASE UR QL STRIP: NEGATIVE
LYMPHOCYTES # BLD AUTO: 0.3 10E3/UL (ref 0.8–5.3)
LYMPHOCYTES NFR BLD AUTO: 3 %
MCH RBC QN AUTO: 28.5 PG (ref 26.5–33)
MCHC RBC AUTO-ENTMCNC: 31.6 G/DL (ref 31.5–36.5)
MCV RBC AUTO: 90 FL (ref 78–100)
MONOCYTES # BLD AUTO: 0.4 10E3/UL (ref 0–1.3)
MONOCYTES NFR BLD AUTO: 5 %
MUCOUS THREADS #/AREA URNS LPF: PRESENT /LPF
NEUTROPHILS # BLD AUTO: 8.1 10E3/UL (ref 1.6–8.3)
NEUTROPHILS NFR BLD AUTO: 91 %
NITRATE UR QL: NEGATIVE
NRBC # BLD AUTO: 0 10E3/UL
NRBC BLD AUTO-RTO: 0 /100
NT-PROBNP SERPL-MCNC: 1579 PG/ML (ref 0–900)
P AXIS - MUSE: -4 DEGREES
PH UR STRIP: 5.5 [PH] (ref 5–7)
PLATELET # BLD AUTO: 154 10E3/UL (ref 150–450)
POTASSIUM SERPL-SCNC: 3.2 MMOL/L (ref 3.4–5.3)
PR INTERVAL - MUSE: 154 MS
QRS DURATION - MUSE: 98 MS
QT - MUSE: 404 MS
QTC - MUSE: 472 MS
R AXIS - MUSE: -14 DEGREES
RBC # BLD AUTO: 3.93 10E6/UL (ref 3.8–5.2)
RBC URINE: 2 /HPF
RSV RNA SPEC NAA+PROBE: NEGATIVE
SARS-COV-2 RNA RESP QL NAA+PROBE: NEGATIVE
SODIUM SERPL-SCNC: 138 MMOL/L (ref 135–145)
SP GR UR STRIP: 1.02 (ref 1–1.03)
SQUAMOUS EPITHELIAL: <1 /HPF
SYSTOLIC BLOOD PRESSURE - MUSE: 109 MMHG
T AXIS - MUSE: 123 DEGREES
TROPONIN T SERPL HS-MCNC: 19 NG/L
UROBILINOGEN UR STRIP-MCNC: <2 MG/DL
VENTRICULAR RATE- MUSE: 82 BPM
WBC # BLD AUTO: 8.9 10E3/UL (ref 4–11)
WBC URINE: 3 /HPF

## 2024-02-02 PROCEDURE — 96374 THER/PROPH/DIAG INJ IV PUSH: CPT | Mod: 59

## 2024-02-02 PROCEDURE — 87637 SARSCOV2&INF A&B&RSV AMP PRB: CPT | Performed by: EMERGENCY MEDICINE

## 2024-02-02 PROCEDURE — 250N000011 HC RX IP 250 OP 636: Performed by: EMERGENCY MEDICINE

## 2024-02-02 PROCEDURE — 85025 COMPLETE CBC W/AUTO DIFF WBC: CPT | Performed by: EMERGENCY MEDICINE

## 2024-02-02 PROCEDURE — G0378 HOSPITAL OBSERVATION PER HR: HCPCS

## 2024-02-02 PROCEDURE — 71045 X-RAY EXAM CHEST 1 VIEW: CPT

## 2024-02-02 PROCEDURE — 80048 BASIC METABOLIC PNL TOTAL CA: CPT | Performed by: EMERGENCY MEDICINE

## 2024-02-02 PROCEDURE — 83880 ASSAY OF NATRIURETIC PEPTIDE: CPT | Performed by: EMERGENCY MEDICINE

## 2024-02-02 PROCEDURE — 83605 ASSAY OF LACTIC ACID: CPT | Performed by: EMERGENCY MEDICINE

## 2024-02-02 PROCEDURE — 81003 URINALYSIS AUTO W/O SCOPE: CPT | Performed by: EMERGENCY MEDICINE

## 2024-02-02 PROCEDURE — 96375 TX/PRO/DX INJ NEW DRUG ADDON: CPT

## 2024-02-02 PROCEDURE — 84484 ASSAY OF TROPONIN QUANT: CPT | Performed by: EMERGENCY MEDICINE

## 2024-02-02 PROCEDURE — 71250 CT THORAX DX C-: CPT

## 2024-02-02 PROCEDURE — 36415 COLL VENOUS BLD VENIPUNCTURE: CPT | Performed by: EMERGENCY MEDICINE

## 2024-02-02 PROCEDURE — 93005 ELECTROCARDIOGRAM TRACING: CPT | Performed by: EMERGENCY MEDICINE

## 2024-02-02 PROCEDURE — 99285 EMERGENCY DEPT VISIT HI MDM: CPT | Mod: 25

## 2024-02-02 PROCEDURE — 250N000013 HC RX MED GY IP 250 OP 250 PS 637: Performed by: EMERGENCY MEDICINE

## 2024-02-02 RX ORDER — AMOXICILLIN 250 MG
1 CAPSULE ORAL 2 TIMES DAILY PRN
Status: DISCONTINUED | OUTPATIENT
Start: 2024-02-02 | End: 2024-02-28 | Stop reason: HOSPADM

## 2024-02-02 RX ORDER — IBUPROFEN 600 MG/1
600 TABLET, FILM COATED ORAL ONCE
Status: COMPLETED | OUTPATIENT
Start: 2024-02-02 | End: 2024-02-02

## 2024-02-02 RX ORDER — ACETAMINOPHEN 325 MG/1
650 TABLET ORAL ONCE
Status: COMPLETED | OUTPATIENT
Start: 2024-02-02 | End: 2024-02-02

## 2024-02-02 RX ORDER — HYDRALAZINE HYDROCHLORIDE 25 MG/1
25 TABLET, FILM COATED ORAL 3 TIMES DAILY
Status: DISCONTINUED | OUTPATIENT
Start: 2024-02-03 | End: 2024-02-07

## 2024-02-02 RX ORDER — AMLODIPINE BESYLATE 10 MG/1
10 TABLET ORAL DAILY
Status: DISCONTINUED | OUTPATIENT
Start: 2024-02-03 | End: 2024-02-28 | Stop reason: HOSPADM

## 2024-02-02 RX ORDER — CITALOPRAM HYDROBROMIDE 20 MG/1
20 TABLET ORAL DAILY
Status: DISCONTINUED | OUTPATIENT
Start: 2024-02-03 | End: 2024-02-28 | Stop reason: HOSPADM

## 2024-02-02 RX ORDER — FUROSEMIDE 10 MG/ML
20 INJECTION INTRAMUSCULAR; INTRAVENOUS DAILY
Status: DISCONTINUED | OUTPATIENT
Start: 2024-02-03 | End: 2024-02-23

## 2024-02-02 RX ORDER — AMOXICILLIN 250 MG
1 CAPSULE ORAL AT BEDTIME
Status: DISCONTINUED | OUTPATIENT
Start: 2024-02-02 | End: 2024-02-28 | Stop reason: HOSPADM

## 2024-02-02 RX ORDER — ONDANSETRON 2 MG/ML
4 INJECTION INTRAMUSCULAR; INTRAVENOUS ONCE
Status: COMPLETED | OUTPATIENT
Start: 2024-02-02 | End: 2024-02-02

## 2024-02-02 RX ORDER — ACETAMINOPHEN 500 MG
1000 TABLET ORAL EVERY 8 HOURS PRN
Status: DISCONTINUED | OUTPATIENT
Start: 2024-02-02 | End: 2024-02-28 | Stop reason: HOSPADM

## 2024-02-02 RX ORDER — AMOXICILLIN 250 MG
2 CAPSULE ORAL 2 TIMES DAILY PRN
Status: DISCONTINUED | OUTPATIENT
Start: 2024-02-02 | End: 2024-02-28 | Stop reason: HOSPADM

## 2024-02-02 RX ORDER — FUROSEMIDE 10 MG/ML
20 INJECTION INTRAMUSCULAR; INTRAVENOUS ONCE
Status: COMPLETED | OUTPATIENT
Start: 2024-02-02 | End: 2024-02-02

## 2024-02-02 RX ORDER — NICOTINE POLACRILEX 4 MG
15-30 LOZENGE BUCCAL
Status: DISCONTINUED | OUTPATIENT
Start: 2024-02-02 | End: 2024-02-28 | Stop reason: HOSPADM

## 2024-02-02 RX ORDER — ALBUTEROL SULFATE 90 UG/1
2 AEROSOL, METERED RESPIRATORY (INHALATION) EVERY 6 HOURS PRN
Status: DISCONTINUED | OUTPATIENT
Start: 2024-02-02 | End: 2024-02-28 | Stop reason: HOSPADM

## 2024-02-02 RX ORDER — CLOPIDOGREL BISULFATE 75 MG/1
75 TABLET ORAL DAILY
Status: DISCONTINUED | OUTPATIENT
Start: 2024-02-03 | End: 2024-02-28 | Stop reason: HOSPADM

## 2024-02-02 RX ORDER — LOSARTAN POTASSIUM 50 MG/1
100 TABLET ORAL DAILY
Status: DISCONTINUED | OUTPATIENT
Start: 2024-02-03 | End: 2024-02-24

## 2024-02-02 RX ORDER — METOPROLOL TARTRATE 25 MG/1
75 TABLET, FILM COATED ORAL 2 TIMES DAILY
Status: DISCONTINUED | OUTPATIENT
Start: 2024-02-02 | End: 2024-02-20

## 2024-02-02 RX ORDER — PANTOPRAZOLE SODIUM 40 MG/1
40 TABLET, DELAYED RELEASE ORAL
Status: DISCONTINUED | OUTPATIENT
Start: 2024-02-03 | End: 2024-02-07

## 2024-02-02 RX ORDER — CALCIUM CARBONATE 500 MG/1
500 TABLET, CHEWABLE ORAL 2 TIMES DAILY PRN
Status: DISCONTINUED | OUTPATIENT
Start: 2024-02-02 | End: 2024-02-21

## 2024-02-02 RX ORDER — DEXTROSE MONOHYDRATE 25 G/50ML
25-50 INJECTION, SOLUTION INTRAVENOUS
Status: DISCONTINUED | OUTPATIENT
Start: 2024-02-02 | End: 2024-02-28 | Stop reason: HOSPADM

## 2024-02-02 RX ADMIN — ONDANSETRON 4 MG: 2 INJECTION INTRAMUSCULAR; INTRAVENOUS at 18:58

## 2024-02-02 RX ADMIN — ACETAMINOPHEN 650 MG: 325 TABLET ORAL at 20:43

## 2024-02-02 RX ADMIN — FUROSEMIDE 20 MG: 10 INJECTION, SOLUTION INTRAMUSCULAR; INTRAVENOUS at 18:53

## 2024-02-02 RX ADMIN — IBUPROFEN 600 MG: 600 TABLET ORAL at 22:16

## 2024-02-02 ASSESSMENT — ACTIVITIES OF DAILY LIVING (ADL)
ADLS_ACUITY_SCORE: 35

## 2024-02-03 ENCOUNTER — APPOINTMENT (OUTPATIENT)
Dept: RADIOLOGY | Facility: CLINIC | Age: 74
DRG: 871 | End: 2024-02-03
Payer: COMMERCIAL

## 2024-02-03 ENCOUNTER — APPOINTMENT (OUTPATIENT)
Dept: CT IMAGING | Facility: CLINIC | Age: 74
DRG: 871 | End: 2024-02-03
Attending: STUDENT IN AN ORGANIZED HEALTH CARE EDUCATION/TRAINING PROGRAM
Payer: COMMERCIAL

## 2024-02-03 LAB
ALBUMIN SERPL BCG-MCNC: 3.4 G/DL (ref 3.5–5.2)
ALBUMIN SERPL BCG-MCNC: 3.6 G/DL (ref 3.5–5.2)
ALP SERPL-CCNC: 108 U/L (ref 40–150)
ALP SERPL-CCNC: 121 U/L (ref 40–150)
ALT SERPL W P-5'-P-CCNC: 10 U/L (ref 0–50)
ALT SERPL W P-5'-P-CCNC: 11 U/L (ref 0–50)
ANION GAP SERPL CALCULATED.3IONS-SCNC: 12 MMOL/L (ref 7–15)
ANION GAP SERPL CALCULATED.3IONS-SCNC: 9 MMOL/L (ref 7–15)
AST SERPL W P-5'-P-CCNC: 32 U/L (ref 0–45)
AST SERPL W P-5'-P-CCNC: 35 U/L (ref 0–45)
ATRIAL RATE - MUSE: 67 BPM
BASE EXCESS BLDA CALC-SCNC: -3.9 MMOL/L (ref -3–3)
BASE EXCESS BLDA CALC-SCNC: -4.2 MMOL/L (ref -3–3)
BASE EXCESS BLDV CALC-SCNC: -6 MMOL/L (ref -3–3)
BASOPHILS # BLD AUTO: 0 10E3/UL (ref 0–0.2)
BASOPHILS # BLD AUTO: 0 10E3/UL (ref 0–0.2)
BASOPHILS NFR BLD AUTO: 0 %
BASOPHILS NFR BLD AUTO: 0 %
BILIRUB DIRECT SERPL-MCNC: 0.25 MG/DL (ref 0–0.3)
BILIRUB SERPL-MCNC: 0.6 MG/DL
BILIRUB SERPL-MCNC: 0.8 MG/DL
BUN SERPL-MCNC: 21.2 MG/DL (ref 8–23)
BUN SERPL-MCNC: 27.2 MG/DL (ref 8–23)
C PNEUM DNA SPEC QL NAA+PROBE: NOT DETECTED
CALCIUM SERPL-MCNC: 9.1 MG/DL (ref 8.8–10.2)
CALCIUM SERPL-MCNC: 9.3 MG/DL (ref 8.8–10.2)
CHLORIDE SERPL-SCNC: 101 MMOL/L (ref 98–107)
CHLORIDE SERPL-SCNC: 105 MMOL/L (ref 98–107)
COHGB MFR BLD: 21.7 % (ref 95–96)
COHGB MFR BLD: 97.8 % (ref 95–96)
CREAT SERPL-MCNC: 1.38 MG/DL (ref 0.51–0.95)
CREAT SERPL-MCNC: 1.77 MG/DL (ref 0.51–0.95)
CRP SERPL-MCNC: 28.1 MG/L
D DIMER PPP FEU-MCNC: 0.49 UG/ML FEU (ref 0–0.5)
DEPRECATED HCO3 PLAS-SCNC: 22 MMOL/L (ref 22–29)
DEPRECATED HCO3 PLAS-SCNC: 25 MMOL/L (ref 22–29)
DIASTOLIC BLOOD PRESSURE - MUSE: 71 MMHG
EGFRCR SERPLBLD CKD-EPI 2021: 30 ML/MIN/1.73M2
EGFRCR SERPLBLD CKD-EPI 2021: 40 ML/MIN/1.73M2
EOSINOPHIL # BLD AUTO: 0 10E3/UL (ref 0–0.7)
EOSINOPHIL # BLD AUTO: 0 10E3/UL (ref 0–0.7)
EOSINOPHIL NFR BLD AUTO: 0 %
EOSINOPHIL NFR BLD AUTO: 1 %
ERYTHROCYTE [DISTWIDTH] IN BLOOD BY AUTOMATED COUNT: 16 % (ref 10–15)
ERYTHROCYTE [DISTWIDTH] IN BLOOD BY AUTOMATED COUNT: 16.1 % (ref 10–15)
FLUAV H1 2009 PAND RNA SPEC QL NAA+PROBE: NOT DETECTED
FLUAV H1 RNA SPEC QL NAA+PROBE: NOT DETECTED
FLUAV H3 RNA SPEC QL NAA+PROBE: NOT DETECTED
FLUAV RNA SPEC QL NAA+PROBE: NOT DETECTED
FLUBV RNA SPEC QL NAA+PROBE: NOT DETECTED
GLUCOSE BLDC GLUCOMTR-MCNC: 152 MG/DL (ref 70–99)
GLUCOSE BLDC GLUCOMTR-MCNC: 162 MG/DL (ref 70–99)
GLUCOSE BLDC GLUCOMTR-MCNC: 174 MG/DL (ref 70–99)
GLUCOSE BLDC GLUCOMTR-MCNC: 183 MG/DL (ref 70–99)
GLUCOSE BLDC GLUCOMTR-MCNC: 27 MG/DL (ref 70–99)
GLUCOSE BLDC GLUCOMTR-MCNC: 46 MG/DL (ref 70–99)
GLUCOSE SERPL-MCNC: 147 MG/DL (ref 70–99)
GLUCOSE SERPL-MCNC: 86 MG/DL (ref 70–99)
HADV DNA SPEC QL NAA+PROBE: NOT DETECTED
HCO3 BLD-SCNC: 21 MMOL/L (ref 21–28)
HCO3 BLD-SCNC: 23 MMOL/L (ref 21–28)
HCO3 BLDV-SCNC: 23 MMOL/L (ref 21–28)
HCOV PNL SPEC NAA+PROBE: NOT DETECTED
HCT VFR BLD AUTO: 31.7 % (ref 35–47)
HCT VFR BLD AUTO: 35.2 % (ref 35–47)
HGB BLD-MCNC: 10 G/DL (ref 11.7–15.7)
HGB BLD-MCNC: 10.8 G/DL (ref 11.7–15.7)
HMPV RNA SPEC QL NAA+PROBE: NOT DETECTED
HOLD SPECIMEN: NORMAL
HPIV1 RNA SPEC QL NAA+PROBE: NOT DETECTED
HPIV2 RNA SPEC QL NAA+PROBE: NOT DETECTED
HPIV3 RNA SPEC QL NAA+PROBE: NOT DETECTED
HPIV4 RNA SPEC QL NAA+PROBE: NOT DETECTED
IMM GRANULOCYTES # BLD: 0 10E3/UL
IMM GRANULOCYTES # BLD: 0 10E3/UL
IMM GRANULOCYTES NFR BLD: 1 %
IMM GRANULOCYTES NFR BLD: 1 %
INTERPRETATION ECG - MUSE: NORMAL
LACTATE SERPL-SCNC: 2.2 MMOL/L (ref 0.7–2)
LACTATE SERPL-SCNC: 3.2 MMOL/L (ref 0.7–2)
LYMPHOCYTES # BLD AUTO: 0.3 10E3/UL (ref 0.8–5.3)
LYMPHOCYTES # BLD AUTO: 0.3 10E3/UL (ref 0.8–5.3)
LYMPHOCYTES NFR BLD AUTO: 5 %
LYMPHOCYTES NFR BLD AUTO: 6 %
M PNEUMO DNA SPEC QL NAA+PROBE: NOT DETECTED
MCH RBC QN AUTO: 28.6 PG (ref 26.5–33)
MCH RBC QN AUTO: 28.7 PG (ref 26.5–33)
MCHC RBC AUTO-ENTMCNC: 30.7 G/DL (ref 31.5–36.5)
MCHC RBC AUTO-ENTMCNC: 31.5 G/DL (ref 31.5–36.5)
MCV RBC AUTO: 91 FL (ref 78–100)
MCV RBC AUTO: 94 FL (ref 78–100)
MONOCYTES # BLD AUTO: 0.1 10E3/UL (ref 0–1.3)
MONOCYTES # BLD AUTO: 0.2 10E3/UL (ref 0–1.3)
MONOCYTES NFR BLD AUTO: 2 %
MONOCYTES NFR BLD AUTO: 5 %
MRSA DNA SPEC QL NAA+PROBE: NEGATIVE
NEUTROPHILS # BLD AUTO: 4.5 10E3/UL (ref 1.6–8.3)
NEUTROPHILS # BLD AUTO: 4.9 10E3/UL (ref 1.6–8.3)
NEUTROPHILS NFR BLD AUTO: 89 %
NEUTROPHILS NFR BLD AUTO: 90 %
NRBC # BLD AUTO: 0 10E3/UL
NRBC # BLD AUTO: 0 10E3/UL
NRBC BLD AUTO-RTO: 0 /100
NRBC BLD AUTO-RTO: 0 /100
NT-PROBNP SERPL-MCNC: 4474 PG/ML (ref 0–900)
O2/TOTAL GAS SETTING VFR VENT: 2 %
O2/TOTAL GAS SETTING VFR VENT: 4 %
O2/TOTAL GAS SETTING VFR VENT: 9 %
OXYHGB MFR BLDV: 14 % (ref 70–75)
P AXIS - MUSE: 65 DEGREES
PCO2 BLD: 33 MM HG (ref 35–45)
PCO2 BLD: 50 MM HG (ref 35–45)
PCO2 BLDV: 61 MM HG (ref 40–50)
PH BLD: 7.27 [PH] (ref 7.35–7.45)
PH BLD: 7.41 [PH] (ref 7.35–7.45)
PH BLDV: 7.17 [PH] (ref 7.32–7.43)
PLATELET # BLD AUTO: 119 10E3/UL (ref 150–450)
PLATELET # BLD AUTO: 134 10E3/UL (ref 150–450)
PO2 BLD: 20 MM HG (ref 80–105)
PO2 BLD: 88 MM HG (ref 80–105)
PO2 BLDV: 19 MM HG (ref 25–47)
POTASSIUM SERPL-SCNC: 3.6 MMOL/L (ref 3.4–5.3)
POTASSIUM SERPL-SCNC: 3.9 MMOL/L (ref 3.4–5.3)
PR INTERVAL - MUSE: 168 MS
PROCALCITONIN SERPL IA-MCNC: 5.8 NG/ML
PROT SERPL-MCNC: 7.1 G/DL (ref 6.4–8.3)
PROT SERPL-MCNC: 7.6 G/DL (ref 6.4–8.3)
QRS DURATION - MUSE: 94 MS
QT - MUSE: 396 MS
QTC - MUSE: 418 MS
R AXIS - MUSE: -3 DEGREES
RBC # BLD AUTO: 3.5 10E6/UL (ref 3.8–5.2)
RBC # BLD AUTO: 3.76 10E6/UL (ref 3.8–5.2)
RSV RNA SPEC QL NAA+PROBE: NOT DETECTED
RSV RNA SPEC QL NAA+PROBE: NOT DETECTED
RV+EV RNA SPEC QL NAA+PROBE: NOT DETECTED
SA TARGET DNA: NEGATIVE
SAO2 % BLDA: 21 % (ref 92–100)
SAO2 % BLDA: 95 % (ref 92–100)
SAO2 % BLDV: 14.2 % (ref 70–75)
SODIUM SERPL-SCNC: 135 MMOL/L (ref 135–145)
SODIUM SERPL-SCNC: 139 MMOL/L (ref 135–145)
SYSTOLIC BLOOD PRESSURE - MUSE: 123 MMHG
T AXIS - MUSE: 115 DEGREES
TROPONIN T SERPL HS-MCNC: 35 NG/L
VENTRICULAR RATE- MUSE: 67 BPM
WBC # BLD AUTO: 5 10E3/UL (ref 4–11)
WBC # BLD AUTO: 5.4 10E3/UL (ref 4–11)

## 2024-02-03 PROCEDURE — 999N000157 HC STATISTIC RCP TIME EA 10 MIN

## 2024-02-03 PROCEDURE — 87040 BLOOD CULTURE FOR BACTERIA: CPT | Performed by: STUDENT IN AN ORGANIZED HEALTH CARE EDUCATION/TRAINING PROGRAM

## 2024-02-03 PROCEDURE — 250N000011 HC RX IP 250 OP 636: Performed by: STUDENT IN AN ORGANIZED HEALTH CARE EDUCATION/TRAINING PROGRAM

## 2024-02-03 PROCEDURE — 80053 COMPREHEN METABOLIC PANEL: CPT

## 2024-02-03 PROCEDURE — 87633 RESP VIRUS 12-25 TARGETS: CPT | Performed by: STUDENT IN AN ORGANIZED HEALTH CARE EDUCATION/TRAINING PROGRAM

## 2024-02-03 PROCEDURE — 85379 FIBRIN DEGRADATION QUANT: CPT

## 2024-02-03 PROCEDURE — 258N000003 HC RX IP 258 OP 636: Performed by: STUDENT IN AN ORGANIZED HEALTH CARE EDUCATION/TRAINING PROGRAM

## 2024-02-03 PROCEDURE — 84484 ASSAY OF TROPONIN QUANT: CPT | Performed by: STUDENT IN AN ORGANIZED HEALTH CARE EDUCATION/TRAINING PROGRAM

## 2024-02-03 PROCEDURE — 93005 ELECTROCARDIOGRAM TRACING: CPT

## 2024-02-03 PROCEDURE — 82962 GLUCOSE BLOOD TEST: CPT

## 2024-02-03 PROCEDURE — 82248 BILIRUBIN DIRECT: CPT | Performed by: STUDENT IN AN ORGANIZED HEALTH CARE EDUCATION/TRAINING PROGRAM

## 2024-02-03 PROCEDURE — 96372 THER/PROPH/DIAG INJ SC/IM: CPT | Performed by: STUDENT IN AN ORGANIZED HEALTH CARE EDUCATION/TRAINING PROGRAM

## 2024-02-03 PROCEDURE — 83880 ASSAY OF NATRIURETIC PEPTIDE: CPT | Performed by: STUDENT IN AN ORGANIZED HEALTH CARE EDUCATION/TRAINING PROGRAM

## 2024-02-03 PROCEDURE — 36415 COLL VENOUS BLD VENIPUNCTURE: CPT | Performed by: STUDENT IN AN ORGANIZED HEALTH CARE EDUCATION/TRAINING PROGRAM

## 2024-02-03 PROCEDURE — 82805 BLOOD GASES W/O2 SATURATION: CPT | Performed by: STUDENT IN AN ORGANIZED HEALTH CARE EDUCATION/TRAINING PROGRAM

## 2024-02-03 PROCEDURE — 80048 BASIC METABOLIC PNL TOTAL CA: CPT | Performed by: STUDENT IN AN ORGANIZED HEALTH CARE EDUCATION/TRAINING PROGRAM

## 2024-02-03 PROCEDURE — 82805 BLOOD GASES W/O2 SATURATION: CPT | Performed by: INTERNAL MEDICINE

## 2024-02-03 PROCEDURE — 36600 WITHDRAWAL OF ARTERIAL BLOOD: CPT

## 2024-02-03 PROCEDURE — 87641 MR-STAPH DNA AMP PROBE: CPT | Performed by: STUDENT IN AN ORGANIZED HEALTH CARE EDUCATION/TRAINING PROGRAM

## 2024-02-03 PROCEDURE — 71045 X-RAY EXAM CHEST 1 VIEW: CPT

## 2024-02-03 PROCEDURE — 83605 ASSAY OF LACTIC ACID: CPT | Performed by: STUDENT IN AN ORGANIZED HEALTH CARE EDUCATION/TRAINING PROGRAM

## 2024-02-03 PROCEDURE — G0378 HOSPITAL OBSERVATION PER HR: HCPCS

## 2024-02-03 PROCEDURE — 258N000001 HC RX 258

## 2024-02-03 PROCEDURE — 87581 M.PNEUMON DNA AMP PROBE: CPT | Performed by: STUDENT IN AN ORGANIZED HEALTH CARE EDUCATION/TRAINING PROGRAM

## 2024-02-03 PROCEDURE — 36415 COLL VENOUS BLD VENIPUNCTURE: CPT

## 2024-02-03 PROCEDURE — 85004 AUTOMATED DIFF WBC COUNT: CPT

## 2024-02-03 PROCEDURE — 250N000013 HC RX MED GY IP 250 OP 250 PS 637

## 2024-02-03 PROCEDURE — 96365 THER/PROPH/DIAG IV INF INIT: CPT

## 2024-02-03 PROCEDURE — 86140 C-REACTIVE PROTEIN: CPT

## 2024-02-03 PROCEDURE — 84145 PROCALCITONIN (PCT): CPT

## 2024-02-03 PROCEDURE — 250N000012 HC RX MED GY IP 250 OP 636 PS 637

## 2024-02-03 PROCEDURE — 85025 COMPLETE CBC W/AUTO DIFF WBC: CPT | Performed by: STUDENT IN AN ORGANIZED HEALTH CARE EDUCATION/TRAINING PROGRAM

## 2024-02-03 PROCEDURE — 87640 STAPH A DNA AMP PROBE: CPT | Performed by: STUDENT IN AN ORGANIZED HEALTH CARE EDUCATION/TRAINING PROGRAM

## 2024-02-03 PROCEDURE — 99222 1ST HOSP IP/OBS MODERATE 55: CPT | Mod: AI

## 2024-02-03 PROCEDURE — 258N000003 HC RX IP 258 OP 636

## 2024-02-03 PROCEDURE — 80048 BASIC METABOLIC PNL TOTAL CA: CPT

## 2024-02-03 PROCEDURE — 71250 CT THORAX DX C-: CPT

## 2024-02-03 PROCEDURE — 96375 TX/PRO/DX INJ NEW DRUG ADDON: CPT

## 2024-02-03 RX ORDER — HEPARIN SODIUM 5000 [USP'U]/.5ML
5000 INJECTION, SOLUTION INTRAVENOUS; SUBCUTANEOUS EVERY 8 HOURS
Status: DISCONTINUED | OUTPATIENT
Start: 2024-02-03 | End: 2024-02-28 | Stop reason: HOSPADM

## 2024-02-03 RX ORDER — CEFEPIME HYDROCHLORIDE 2 G/1
2 INJECTION, POWDER, FOR SOLUTION INTRAVENOUS EVERY 12 HOURS
Status: DISCONTINUED | OUTPATIENT
Start: 2024-02-03 | End: 2024-02-04

## 2024-02-03 RX ORDER — PIPERACILLIN SODIUM, TAZOBACTAM SODIUM 3; .375 G/15ML; G/15ML
3.38 INJECTION, POWDER, LYOPHILIZED, FOR SOLUTION INTRAVENOUS EVERY 8 HOURS
Status: DISCONTINUED | OUTPATIENT
Start: 2024-02-03 | End: 2024-02-03

## 2024-02-03 RX ADMIN — HYDRALAZINE HYDROCHLORIDE 25 MG: 25 TABLET, FILM COATED ORAL at 09:01

## 2024-02-03 RX ADMIN — HEPARIN SODIUM 5000 UNITS: 5000 INJECTION, SOLUTION INTRAVENOUS; SUBCUTANEOUS at 23:24

## 2024-02-03 RX ADMIN — PANTOPRAZOLE SODIUM 40 MG: 40 TABLET, DELAYED RELEASE ORAL at 17:32

## 2024-02-03 RX ADMIN — CITALOPRAM 20 MG: 20 TABLET ORAL at 09:01

## 2024-02-03 RX ADMIN — METOPROLOL TARTRATE 75 MG: 25 TABLET, FILM COATED ORAL at 00:37

## 2024-02-03 RX ADMIN — CEFEPIME 2 G: 2 INJECTION, POWDER, FOR SOLUTION INTRAVENOUS at 14:38

## 2024-02-03 RX ADMIN — SENNOSIDES AND DOCUSATE SODIUM 1 TABLET: 8.6; 5 TABLET ORAL at 21:21

## 2024-02-03 RX ADMIN — Medication: at 18:04

## 2024-02-03 RX ADMIN — SODIUM CHLORIDE 500 ML: 9 INJECTION, SOLUTION INTRAVENOUS at 14:37

## 2024-02-03 RX ADMIN — Medication 1 MG: at 21:21

## 2024-02-03 RX ADMIN — ACETAMINOPHEN 500 MG: 500 TABLET ORAL at 21:43

## 2024-02-03 RX ADMIN — HYDRALAZINE HYDROCHLORIDE 25 MG: 25 TABLET, FILM COATED ORAL at 21:21

## 2024-02-03 RX ADMIN — HEPARIN SODIUM 5000 UNITS: 5000 INJECTION, SOLUTION INTRAVENOUS; SUBCUTANEOUS at 17:32

## 2024-02-03 RX ADMIN — CLOPIDOGREL BISULFATE 75 MG: 75 TABLET ORAL at 09:01

## 2024-02-03 RX ADMIN — METOPROLOL TARTRATE 75 MG: 25 TABLET, FILM COATED ORAL at 09:01

## 2024-02-03 RX ADMIN — AMLODIPINE BESYLATE 10 MG: 10 TABLET ORAL at 09:01

## 2024-02-03 RX ADMIN — VANCOMYCIN HYDROCHLORIDE 2000 MG: 5 INJECTION, POWDER, LYOPHILIZED, FOR SOLUTION INTRAVENOUS at 15:10

## 2024-02-03 RX ADMIN — DEXTROSE MONOHYDRATE 50 ML: 25 INJECTION, SOLUTION INTRAVENOUS at 14:06

## 2024-02-03 RX ADMIN — METOPROLOL TARTRATE 75 MG: 25 TABLET, FILM COATED ORAL at 21:20

## 2024-02-03 RX ADMIN — INSULIN ASPART 1 UNITS: 100 INJECTION, SOLUTION INTRAVENOUS; SUBCUTANEOUS at 18:12

## 2024-02-03 RX ADMIN — SENNOSIDES AND DOCUSATE SODIUM 1 TABLET: 8.6; 5 TABLET ORAL at 00:37

## 2024-02-03 RX ADMIN — Medication: at 21:32

## 2024-02-03 RX ADMIN — PANTOPRAZOLE SODIUM 40 MG: 40 TABLET, DELAYED RELEASE ORAL at 09:00

## 2024-02-03 RX ADMIN — LOSARTAN POTASSIUM 100 MG: 50 TABLET, FILM COATED ORAL at 09:01

## 2024-02-03 ASSESSMENT — ACTIVITIES OF DAILY LIVING (ADL)
ADLS_ACUITY_SCORE: 47
ADLS_ACUITY_SCORE: 30
ADLS_ACUITY_SCORE: 35
ADLS_ACUITY_SCORE: 47
ADLS_ACUITY_SCORE: 32
ADLS_ACUITY_SCORE: 47
ADLS_ACUITY_SCORE: 32
ADLS_ACUITY_SCORE: 47

## 2024-02-03 NOTE — ED TRIAGE NOTES
"Pt arrives by EMS with c/o shortness of breath. Denies chest pain. Missed her dose of Lasix today because she \"ran out.\" Hx of Heart failure. Recently hospitalized for pneumonia. Denies cough. VS WNL except temp: 100.7   Triage Assessment (Adult)       Row Name 02/02/24 5080          Triage Assessment    Airway WDL WDL        Respiratory WDL    Respiratory WDL X;rhythm/pattern     Rhythm/Pattern, Respiratory shortness of breath        Cardiac WDL    Cardiac WDL WDL        Peripheral/Neurovascular WDL    Peripheral Neurovascular WDL WDL        Cognitive/Neuro/Behavioral WDL    Cognitive/Neuro/Behavioral WDL WDL                     "

## 2024-02-03 NOTE — PROGRESS NOTES
At 0640hrs, RN notified the provider. Pt heart rate sustained between 45-50. Pt is asymptomatic and A/O. Continue to monitor and contact provider is there's any changes.  Cardiac: SB

## 2024-02-03 NOTE — H&P
[facility]    History and Physical - Hospitalist Service       Date of Admission:  2/2/2024    Assessment & Plan      Josefina Dumont is a 73 year old female admitted on 2/2/2024. She has a history of CHF with recent hospitalization 12/28/2023 - 12/31/2023, recently required pneumonia, history of ischemic CVA, history of meningioma, history of CAD, type 2 diabetes mellitus and is admitted for shortness of breath.    -Workup in the emergency department revealed a normal BMP with the exception of mild hypokalemia to 3.2 and a decreased CO2 to 20.  Lactic acid was normal at 1.0.  Mild elevation in N-terminal pro BNP at 1579.  Troponin mildly elevated to 19 but significantly reduced from last hospitalization where it was in the low to mid 200s.  White blood cell count is normal at 8.9.  Mildly decreased hemoglobin to 11.2, but increased from previous hospitalization.  Chest x-ray 02/02/2024 showed cardiac enlargement with no pulmonary edema.  -Echocardiogram performed 12/28/2023 showed normal LV size and function with estimated ejection fraction 55 to 60%.  Moderate concentric LV hypertrophy.     Acute dyspnea  Exacerbation of heart failure with preserved ejection fraction   -Given the patient is noted worsening shortness of breath, orthopnea, dyspnea on exertion, in the setting of elevated BNP and missed dose of Lasix, CHF exacerbation is high on the differential.  1 view chest x-ray did not reveal clear pneumonia, however given fever, CT chest was ordered to further investigate if infectious pneumonia is a possibility.  Given troponin is only mildly elevated to 17 and is significantly improved from last admission when it was in the 200s, ischemic cardiac causes of patient's presentation appears unlikely, especially since EKG showed normal sinus rhythm with a ventricular rate of 82, LVH, and no contiguous ST segment elevations or depressions to suggest ischemia.  QTc elevated at  472.  -Admit to observation  -Cardiac telemetry  -CT chest without contrast ordered-pending  -AM CBC  -AM BMP  -IV Lasix 20 mg daily  -Cardiac/low-sodium diet  -If CT chest is suggestive of pneumonia, will consider initiating ceftriaxone and doxycycline  -Consider cardiology consult if clinically worsening    Acute fever of unknown origin  -The etiology of patient's fever is unclear at this time.  RSV, COVID, influenza negative.  Will order chest CT without contrast to determine if community-acquired pneumonia is a possibility.  Possible a viral enteritis is causing her fever given her recent nausea and vomiting.  Urinalysis was bland.  -Continue to monitor vitals  -As needed Tylenol for fever    Hypokalemia  -Mild hypokalemia to 3.2.  -Standard replacement protocol initiated    Mild normocytic anemia  -Hemoglobin 11.2 on admission.  This is improved from 9.9 on 12/31/2023  -AM CBC    History of type 2 diabetes  -Hold PTA glipizide  -Low sliding scale insulin    History of ischemic CVA  -Continue PTA Plavix    History of GERD  -Continue PTA Tums  -Continue PTA Protonix    History of constipation  -Continue PTA senna docusate    History of hypertension  -Continue PTA amlodipine  -Continue PTA hydralazine  -Continue PTA losartan  -Continue PTA metoprolol tartrate    History of anxiety/depression  -Given history of prolonged QTc noted to be elevated at 472, decreasing dose of Celexa to 20 mg while hospitalized    History of asthma?  -Continue PTA albuterol as needed       Observation Goals: -diagnostic tests and consults completed and resulted, -vital signs normal or at patient baseline, Nurse to notify provider when observation goals have been met and patient is ready for discharge.  Diet: Combination Diet  DVT Prophylaxis: DOAC  Machuca Catheter: Not present  Fluids: PO  Lines: None     Cardiac Monitoring: ACTIVE order. Indication: Acute decompensated heart failure (48 hours)  Code Status: Full Code    Clinically  Significant Risk Factors Present on Admission        # Hypokalemia: Lowest K = 3.2 mmol/L in last 2 days, will replace as needed         # Drug Induced Platelet Defect: home medication list includes an antiplatelet medication   # Hypertension: Noted on problem list     # DMII: A1C = 6.6 % (Ref range: <5.7 %) within past 6 months          # Financial/Environmental Concerns:           Disposition Plan      Expected Discharge Date: 02/03/2024                    Augustine Blanco DO  Hospitalist Service  [facility]  Securely message with [employer] (more info)  Text page via [facility] Paging/Directory   ______________________________________________________________________    Chief Complaint   Shortness of breath and chills    History is obtained from the patient    History of Present Illness   Josefina Dumont is a 73 year old female who presents with history of worsening shortness of breath, dyspnea on exertion, and orthopnea.  Patient states that since she was discharged about a month ago she has had continued shortness of breath.  She also notes that over the past 2 days she has been feeling chilled and notes nausea and a few episodes of nonbloody nonbilious emesis.  She denies chest pain, palpitations, abdominal pain, changes in bowel or bladder patterns, urinary urgency, urinary frequency, dysuria.  She thinks she may have a slight increase in swelling in her lower extremities.  She reports a decrease in appetite.  She reports no additional concerns at this time.      Past Medical History    Past Medical History:   Diagnosis Date    Chronic kidney disease     Diabetes (H)     Hypertension     Obese        Past Surgical History   No past surgical history on file.    Prior to Admission Medications   Prior to Admission Medications   Prescriptions Last Dose Informant Patient Reported? Taking?   acetaminophen (TYLENOL) 500 MG tablet Unknown  No Yes   Sig: Take 2 tablets (1,000 mg) by mouth every 8  hours as needed for mild pain   albuterol (PROAIR HFA/PROVENTIL HFA/VENTOLIN HFA) 108 (90 Base) MCG/ACT inhaler Unknown at prn - sister will bring in  Yes Yes   Sig: Inhale 2 puffs into the lungs every 6 hours as needed   amLODIPine (NORVASC) 10 MG tablet 2/2/2024 at am  Yes Yes   Sig: Take 10 mg by mouth daily   calcium carbonate (TUMS) 500 MG chewable tablet Unknown at prn  Yes Yes   Sig: Take 1 chew tab by mouth 2 times daily as needed for heartburn   citalopram (CELEXA) 40 MG tablet 2/2/2024 at am  Yes Yes   Sig: Take 40 mg by mouth daily   clopidogrel (PLAVIX) 75 MG tablet 2/2/2024 at am  Yes Yes   Sig: Take 75 mg by mouth daily   furosemide (LASIX) 20 MG tablet 2/1/2024 at am - ran out  No Yes   Sig: Take 1 tablet (20 mg) by mouth daily   glipiZIDE (GLUCOTROL XL) 2.5 MG 24 hr tablet 2/2/2024 at am  Yes Yes   Sig: Take 2.5 mg by mouth daily (with breakfast)   hydrALAZINE (APRESOLINE) 25 MG tablet 2/2/2024 at am  No Yes   Sig: Take 1 tablet (25 mg) by mouth 3 times daily   losartan (COZAAR) 50 MG tablet 2/2/2024 at am  No Yes   Sig: Take 2 tablets (100 mg) by mouth daily   metoprolol tartrate 75 MG TABS 2/2/2024 at am  No Yes   Sig: Take 75 mg by mouth 2 times daily   multivitamin w/minerals (THERA-VIT-M) tablet 2/2/2024 at am  Yes Yes   Sig: Take 1 tablet by mouth daily   pantoprazole (PROTONIX) 40 MG EC tablet 2/2/2024 at am  No Yes   Sig: Take 1 tablet (40 mg) by mouth 2 times daily (before meals)   senna-docusate (SENOKOT-S/PERICOLACE) 8.6-50 MG tablet 2/1/2024 at hs  Yes Yes   Sig: Take 1 tablet by mouth at bedtime   vitamin E (TOCOPHEROL) 400 units (180 mg) capsule 2/2/2024 at am  Yes Yes   Sig: Take 400 Units by mouth daily      Facility-Administered Medications: None        Review of Systems    The 10 point Review of Systems is negative other than noted in the HPI or here.     Social History   I have reviewed this patient's social history and updated it with pertinent information if needed.  Social  History     Tobacco Use    Smoking status: Former     Types: Cigarettes     Quit date:      Years since quittin.0    Smokeless tobacco: Never             Allergies   Allergies   Allergen Reactions    Aspirin      Other reaction(s): stomach upset    Atenolol Other (See Comments)     abd pain    Lisinopril      Other reaction(s): stomach aches    Penicillins Hives     Tolerated ceftriaxone    Statins      Other reaction(s): myalgias        Physical Exam   Vital Signs: Temp: (!) 101.3  F (38.5  C) Temp src: Oral BP: (!) 152/67 Pulse: 71   Resp: 22 SpO2: 96 % O2 Device: Nasal cannula Oxygen Delivery: 4 LPM  Weight: 0 lbs 0 oz    Physical Exam  Constitutional:       General: She is not in acute distress.     Appearance: Normal appearance. She is not toxic-appearing.   HENT:      Head: Normocephalic and atraumatic.      Right Ear: External ear normal.      Left Ear: External ear normal.      Nose: Nose normal.      Mouth/Throat:      Mouth: Mucous membranes are moist.      Pharynx: No oropharyngeal exudate or posterior oropharyngeal erythema.   Eyes:      Pupils: Pupils are equal, round, and reactive to light.   Cardiovascular:      Rate and Rhythm: Normal rate and regular rhythm.      Pulses: Normal pulses.      Heart sounds: Normal heart sounds. No murmur heard.     No friction rub. No gallop.   Pulmonary:      Effort: Pulmonary effort is normal. No respiratory distress.      Breath sounds: Normal breath sounds. No wheezing.      Comments: -Trace bilateral lower lobar crackles  Abdominal:      General: There is no distension.      Palpations: Abdomen is soft.      Tenderness: There is no abdominal tenderness. There is no guarding.   Skin:     Coloration: Skin is not jaundiced.      Findings: No rash.   Neurological:      General: No focal deficit present.      Mental Status: She is alert and oriented to person, place, and time.   Psychiatric:         Mood and Affect: Mood normal.         Behavior: Behavior  normal.          Medical Decision Making       Please see A&P for additional details of medical decision making.      Data     I have personally reviewed the following data over the past 24 hrs:    8.9  \   11.2 (L)   / 154     138 105 15.2 /  116 (H)   3.2 (L) 20 (L) 0.80 \     Trop: 19 (H) BNP: 1,579 (H)     Procal: N/A CRP: N/A Lactic Acid: 1.0         Imaging results reviewed over the past 24 hrs:   Recent Results (from the past 24 hour(s))   XR Chest Port 1 View    Narrative    EXAM: XR CHEST PORT 1 VIEW  LOCATION: [facility]  DATE: 2/2/2024    INDICATION: SOB  COMPARISON: 12/28/2023      Impression    IMPRESSION: Cardiac enlargement. No pulmonary edema. No change.

## 2024-02-03 NOTE — PHARMACY-VANCOMYCIN DOSING SERVICE
Pharmacy Vancomycin Initial Note  Date of Service February 3, 2024  Patient's  1950  73 year old, female    Indication: Sepsis    Current estimated CrCl = Estimated Creatinine Clearance: 42.2 mL/min (A) (based on SCr of 1.38 mg/dL (H)).    Creatinine for last 3 days  2024:  6:43 PM Creatinine 0.80 mg/dL  2/3/2024:  5:50 AM Creatinine 1.38 mg/dL    Recent Vancomycin Level(s) for last 3 days  No results found for requested labs within last 3 days.      Vancomycin IV Administrations (past 72 hours)        No vancomycin orders with administrations in past 72 hours.                    Nephrotoxins and other renal medications (From now, onward)      Start     Dose/Rate Route Frequency Ordered Stop    24 1430  vancomycin (VANCOCIN) 1,250 mg in 0.9% NaCl 250 mL intermittent infusion        See Hyperspace for full Linked Orders Report.    1,250 mg  over 90 Minutes Intravenous EVERY 24 HOURS 24 1425      24 1430  vancomycin (VANCOCIN) 2,000 mg in 0.9% NaCl 500 mL intermittent infusion        See Hyperspace for full Linked Orders Report.    2,000 mg  over 2 Hours Intravenous ONCE 24 1425      24 0800  [Held by provider]  furosemide (LASIX) injection 20 mg        (On hold since today at 0655 until manually unheld; held by Augustine Blanco, DOLandy Reason: Acute Kidney Injury)    20 mg  over 1-3 Minutes Intravenous DAILY 24 2245              Contrast Orders - past 72 hours (72h ago, onward)      None            [employer] Prediction of Planned Initial Vancomycin Regimen  Loading dose: 2000 mg at 14:23 2024.  Regimen: 1250 mg IV every 24 hours.  Start time: 14:23 on 2024  Exposure target: AUC24 (range)400-600 mg/L.hr   AUC24,ss: 493 mg/L.hr  Probability of AUC24 > 400: 72 %  Ctrough,ss: 15.6 mg/L  Probability of Ctrough,ss > 20: 29 %  Probability of nephrotoxicity (Lodise NETTIE ): 11 %          Plan:  Start vancomycin  2000 mg IV load once, followed by 1250mg Q24H.    Vancomycin monitoring method: AUC  Vancomycin therapeutic monitoring goal: 400-600 mg*h/L  Pharmacy will check vancomycin levels as appropriate in 1-3 Days.    Serum creatinine levels will be ordered daily for the first week of therapy and at least twice weekly for subsequent weeks.      Zoraida Rizvi, PharmD

## 2024-02-03 NOTE — ED PROVIDER NOTES
EMERGENCY DEPARTMENT ENCOUNTER      NAME: Josefina Dumont  AGE: 73 year old female  YOB: 1950  MRN: [id number]  EVALUATION DATE & TIME: 2024  6:04 PM    PCP: Rafat Lee    ED PROVIDER: Bubba Vuong M.D.      Chief Complaint   Patient presents with    Shortness of Breath         FINAL IMPRESSION:  1.  Acute dyspnea.  2.  Acute CHF.  3.  Acute fever of unknown origin.    ED COURSE & MEDICAL DECISION MAKIN:10 PM.  I met with the patient to gather history and to perform my initial exam. We discussed plans for the ED course, including diagnostic testing and treatment. PPE worn: cloth mask.  Patient not feeling well since yesterday and shortness of breath today.  Ran out of her Lasix yesterday.  Recent admission to the hospital in December for pneumonia, CHF and renal failure.  Not feeling well for 2 days and shortness of breath today.  No cough or fever or other complaints.  She does note dyspnea on exertion and orthopnea.  9:35 PM.  Chest x-ray showing cardiomegaly only.  EKG Lasix IV.  Plan admit patient to cardiac telemetry.  Will discuss inpatient or outpatient with the hospitalist.  Patient in agreement.  Lactate level pending.  10 PM.  Admitting service in agreement to cardiac telemetry observation.    Pertinent Labs & Imaging studies reviewed. (See chart for details)  73 year old female presents to the Emergency Department for evaluation of shortness of breath.    At the conclusion of the encounter I discussed the results of all of the tests and the disposition. The questions were answered. The patient or family acknowledged understanding and was agreeable with the care plan.              Medical Decision Making  Obtained supplemental history:Supplemental history obtained?: No  Reviewed external records: Both inpatient and outpatient [facility] records were reviewed.  Care impacted by chronic illness: Chronic renal failure, hypertension, CHF, pneumonia  Care significantly affected by  social determinants of health:Access to Medical Care  Did you consider but not order tests?: Work up considered but not performed and documented in chart, if applicable  Did you interpret images independently?: Independent interpretation of ECG and images noted in documentation, when applicable.  Consultation discussion with other provider:Did you involve another provider (consultant, , pharmacy, etc.)?: I discussed the care with another health care provider, see documentation for details.  Admit.      MEDICATIONS GIVEN IN THE EMERGENCY:  Medications   furosemide (LASIX) injection 20 mg (has no administration in time range)       NEW PRESCRIPTIONS STARTED AT TODAY'S ER VISIT  New Prescriptions    No medications on file          =================================================================    HPI    Patient information was obtained from: EMS and the patient.    Use of : N/A         Josefina SHEBA Dumont is a 73 year old female with a pertinent history of recent admission to the hospital last month for CHF, hypertension, pneumonia, acute renal failure who presents to this ED today for evaluation of shortness of breath.  Increasing shortness of breath not feeling well since yesterday.  Ran out of her Lasix.  Notes orthopnea as well as dyspnea on exertion.    She does not identify any waxing or waning symptoms otherwise, exacerbating or alleviating features, associated symptoms except as mentioned.  She denies any pain related complaints.    REVIEW OF SYSTEMS   Review of Systems patient complaining of shortness of breath.  No chest pain or other symptoms.    PAST MEDICAL HISTORY:  Past Medical History:   Diagnosis Date    Chronic kidney disease     Diabetes (H)     Hypertension     Obese        PAST SURGICAL HISTORY:  No past surgical history on file.        CURRENT MEDICATIONS:    acetaminophen (TYLENOL) 500 MG tablet  albuterol (PROAIR HFA/PROVENTIL HFA/VENTOLIN HFA) 108 (90 Base) MCG/ACT  inhaler  amLODIPine (NORVASC) 10 MG tablet  calcium carbonate (TUMS) 500 MG chewable tablet  citalopram (CELEXA) 40 MG tablet  clopidogrel (PLAVIX) 75 MG tablet  furosemide (LASIX) 20 MG tablet  glipiZIDE (GLUCOTROL XL) 2.5 MG 24 hr tablet  hydrALAZINE (APRESOLINE) 25 MG tablet  losartan (COZAAR) 50 MG tablet  metoprolol tartrate 75 MG TABS  multivitamin w/minerals (THERA-VIT-M) tablet  pantoprazole (PROTONIX) 40 MG EC tablet  senna-docusate (SENOKOT-S/PERICOLACE) 8.6-50 MG tablet  vitamin E (TOCOPHEROL) 400 units (180 mg) capsule        ALLERGIES:  Allergies   Allergen Reactions    Aspirin      Other reaction(s): stomach upset    Atenolol Other (See Comments)     abd pain    Lisinopril      Other reaction(s): stomach aches    Penicillins Hives     Tolerated ceftriaxone    Statins      Other reaction(s): myalgias       FAMILY HISTORY:  No family history on file.    SOCIAL HISTORY:   Social History     Socioeconomic History    Marital status:    Tobacco Use    Smoking status: Former     Types: Cigarettes     Quit date:      Years since quittin.0    Smokeless tobacco: Never   Former smoker.  No current tobacco, alcohol, or drugs.    VITALS:  /79   Pulse 89   Resp 22     PHYSICAL EXAM    Vital Signs:  /79   Pulse 89   Resp 22   General:  On entering the room she is in no apparent distress.    Neck:  Neck supple with full range of motion and nontender.    Back:  Back and spine are nontender.  No costovertebral angle tenderness.    HEENT:  Oropharynx clear with moist mucous membranes.  HEENT unremarkable.  8 cm JVD  Pulmonary:  Chest clear to auscultation without rhonchi rales or wheezing.    Cardiovascular:  Cardiac regular rate and rhythm without murmurs rubs or gallops.    Abdomen:  Abdomen soft nontender.  There is no rebound or guarding.    Muskuloskeletal:  She moves all 4 without any difficulty and has normal neurovascular exams.  Extremities without clubbing, cyanosis, and 1+  edema.  Legs and calves are nontender.    Neuro:  She is alert and oriented ×3 and moves all extremities symmetrically.    Psych:  Normal affect.    Skin:  Unremarkable and warm and dry.       LAB:  All pertinent labs reviewed and interpreted.  Labs Ordered and Resulted from Time of ED Arrival to Time of ED Departure   BASIC METABOLIC PANEL - Abnormal       Result Value    Sodium 138      Potassium 3.2 (*)     Chloride 105      Carbon Dioxide (CO2) 20 (*)     Anion Gap 13      Urea Nitrogen 15.2      Creatinine 0.80      GFR Estimate 78      Calcium 9.0      Glucose 116 (*)    TROPONIN T, HIGH SENSITIVITY - Abnormal    Troponin T, High Sensitivity 19 (*)    NT PROBNP INPATIENT - Abnormal    N terminal Pro BNP Inpatient 1,579 (*)    CBC WITH PLATELETS AND DIFFERENTIAL - Abnormal    WBC Count 8.9      RBC Count 3.93      Hemoglobin 11.2 (*)     Hematocrit 35.4      MCV 90      MCH 28.5      MCHC 31.6      RDW 15.8 (*)     Platelet Count 154      % Neutrophils 91      % Lymphocytes 3      % Monocytes 5      % Eosinophils 1      % Basophils 0      % Immature Granulocytes 0      NRBCs per 100 WBC 0      Absolute Neutrophils 8.1      Absolute Lymphocytes 0.3 (*)     Absolute Monocytes 0.4      Absolute Eosinophils 0.1      Absolute Basophils 0.0      Absolute Immature Granulocytes 0.0      Absolute NRBCs 0.0     ROUTINE UA WITH MICROSCOPIC REFLEX TO CULTURE - Abnormal    Color Urine Yellow      Appearance Urine Clear      Glucose Urine Negative      Bilirubin Urine Negative      Ketones Urine Negative      Specific Gravity Urine 1.016      Blood Urine Negative      pH Urine 5.5      Protein Albumin Urine 70 (*)     Urobilinogen Urine <2.0      Nitrite Urine Negative      Leukocyte Esterase Urine Negative      Mucus Urine Present (*)     RBC Urine 2      WBC Urine 3      Squamous Epithelials Urine <1      Hyaline Casts Urine 6 (*)    INFLUENZA A/B, RSV, & SARS-COV2 PCR - Normal    Influenza A PCR Negative      Influenza B  PCR Negative      RSV PCR Negative      SARS CoV2 PCR Negative     LACTIC ACID WHOLE BLOOD       RADIOLOGY:  Reviewed all pertinent imaging. Please see official radiology report.  XR Chest Port 1 View   Final Result   IMPRESSION: Cardiac enlargement. No pulmonary edema. No change.                 EKG:    Normal sinus rhythm at 80, LVH, no acute findings.  Same as previous EKG of January 2022.    I have independently reviewed and interpreted the EKG(s) documented above.        Bubba Vuong M.D.  Emergency Medicine  [facility] EMERGENCY ROOM  [facility] [facility] [id number] [phone]  Dept: [phone]       Bubba Vuong MD  02/02/24 2136       Bubba Vuong MD  02/02/24 2200

## 2024-02-03 NOTE — PROGRESS NOTES
PRIMARY DIAGNOSIS: GENERALIZED WEAKNESS    OUTPATIENT/OBSERVATION GOALS TO BE MET BEFORE DISCHARGE  1. Orthostatic performed: No    2. Tolerating PO medications: Yes    3. Return to near baseline physical activity: No    4. Cleared for discharge by consultants (if involved): No    Discharge Planner Nurse   Safe discharge environment identified: Yes  Barriers to discharge: Yes       Entered by: Sabi Reyes RN 02/03/2024 4:16 AM     Please review provider order for any additional goals.   Nurse to notify provider when observation goals have been met and patient is ready for discharge.  Pt is a/ox4, forgetful. Pt was able to ambulate to the bathroom and back to bed with assist x1 and walker/gait belt. Pt denied any pain, n/v, numbness/tingling. Pt is on 3L of O2 via NC, stating above 95%. Cardiac NSR.

## 2024-02-03 NOTE — PROGRESS NOTES
[facility]    Progress Note - Hospitalist Service       Date of Admission:  2/2/2024    Assessment & Plan   Josefina Dumont is a 73 year old female admitted on 2/2/2024. She has a history of CHF with recent hospitalization 12/28/2023 - 12/31/2023, recently required pneumonia, history of ischemic CVA, history of meningioma, history of CAD, type 2 diabetes mellitus and is admitted for shortness of breath and found to be in sever sepsis    Severe sepsis due to unknown source   Acute hypoxic respiratory failure  Acute fever of unknown origin  Concern for CHF exacerbation  Patient initially presented with worsening shortness of breath, orthopnea, dyspnea on exertion, in the setting of elevated BNP and missed dose of Lasix, CHF exacerbation was initially high on the differential. She was febrile w/Tmax of 101.3. CT chest with no acute findings, mild pulmonary interstitial edema, small pericardial effusion. Inflammatory markers were added this AM. CRP elevated to 28.10 and procalcitonin of 5.80. D-Dimer normal of 0.49. A rapid response was called for the patient and she will be transferred to the ICU. See rapid response note for more details.  -Cardiac telemetry  -AM CBC  -AM BMP  -IV Lasix 20 mg daily  -Cardiac/low-sodium diet  -Intensivist consulted, appreciate recommendations    -ICU transfer  -Started Vancomycin and cefepime  -Continue supplemental oxygen as needed     FELIPA  Baseline creatinine around 0.8-0.9, 1.38 today  -Avoid nephrotoxic medications    Hypokalemia  Mild hypokalemia to 3.2, resolved this am.  -Standard replacement protocol initiated     Mild normocytic anemia  Hemoglobin 11.2 on admission.  This is improved from 9.9 on 12/31/2023  -AM CBC    Hypertension  -Continue PTA amlodipine  -Continue PTA hydralazine  -Continue PTA losartan  -Continue PTA metoprolol tartrate     Prolonged QT  QTc of 472.  -Avoid QT prolonging medications    DM2   Most recent A1c 6.6 1 month  ago.  -Holdin PTA glipizide  -Low sliding scale insulin      Chronic:  History of ischemic CVA: continue PTA Plavix  GERD: Continue PTA Tums, continue PTA Protonix  Constipation: Continue PTA senna docusate  Anxiety/depression: Given history of prolonged QTc noted to be elevated at 472, decreasing dose of Celexa to 20 mg while hospitalized  Asthma: Continue PTA albuterol as needed         Observation Goals: -diagnostic tests and consults completed and resulted, -vital signs normal or at patient baseline, Nurse to notify provider when observation goals have been met and patient is ready for discharge.  Diet: Combination Diet    DVT Prophylaxis: Heparin  Machuca Catheter: Not present  Fluids: po  Lines: None     Cardiac Monitoring: ACTIVE order. Indication: Acute decompensated heart failure (48 hours)  Code Status: Full Code      Clinically Significant Risk Factors Present on Admission        # Hypokalemia: Lowest K = 3.2 mmol/L in last 2 days, will replace as needed         # Drug Induced Platelet Defect: home medication list includes an antiplatelet medication   # Hypertension: Noted on problem list     # DMII: A1C = 6.6 % (Ref range: <5.7 %) within past 6 months        # Financial/Environmental Concerns:           Disposition Plan      Expected Discharge Date: 02/03/2024                The patient's care was discussed with the Attending Physician, Dr. Olivares .    Renetta Kahn MD PGY1  Hospitalist Service  [facility]  Securely message with [employer] (more info)  Text page via [facility] Paging/Directory   ______________________________________________________________________    Interval History   Initially when I saw the patient this morning she was doing well and had no complaints. She denied palpitations, chest pain, SOB, abdominal pain. Last BM yesterday, no dysuria.    Around 2 pm a rapid response was called on the patient. See RR note for more details.     Physical Exam   Vital Signs:  Temp: 97.9  F (36.6  C) Temp src: Oral BP: 138/60 Pulse: 58   Resp: 20 SpO2: 97 % O2 Device: Nasal cannula Oxygen Delivery: 3 LPM  Weight: 0 lbs 0 oz    GENERAL: alert and no acute distress, AO x3  EYES: eyes grossly normal to inspection  HENT: hearing grossly intact, moist mucus membranes  RESP: Bilateral fine crackles on exam, no wheezing, no rhonchi  CV: bradycardic around 50s, systolic murmur appreciated  ABDOMEN: soft, nontender, and bowel sounds normal  MS: no gross musculoskeletal defects noted, no edema  PSYCH: mentation appears normal, affect normal/bright      Data     I have personally reviewed the following data over the past 24 hrs:    5.4  \   10.8 (L)   / 134 (L)     135 101 27.2 (H) /  147 (H)   3.9 22 1.77 (H) \     ALT: 11 AST: 35 AP: 121 TBILI: 0.8   ALB: 3.6 TOT PROTEIN: 7.6 LIPASE: N/A     Trop: 19 (H) BNP: 1,579 (H)     Procal: 5.80 (HH) CRP: 28.10 (H) Lactic Acid: 3.2 (H)       INR:  N/A PTT:  N/A   D-dimer:  0.49 Fibrinogen:  N/A       Imaging results reviewed over the past 24 hrs:   Recent Results (from the past 24 hour(s))   XR Chest Port 1 View    Narrative    EXAM: XR CHEST PORT 1 VIEW  LOCATION: [facility]  DATE: 2/2/2024    INDICATION: SOB  COMPARISON: 12/28/2023      Impression    IMPRESSION: Cardiac enlargement. No pulmonary edema. No change.   CT Chest w/o Contrast    Narrative    EXAM: CT CHEST W/O CONTRAST  LOCATION: [facility]  DATE: 2/2/2024    INDICATION: fever of unknown etiology, hx of recent pneumonia dec 2023  COMPARISON: CTA chest 12/20/2023.  TECHNIQUE: CT chest without IV contrast. Multiplanar reformats were obtained. Dose reduction techniques were used.  CONTRAST: None.    FINDINGS:   LUNGS AND PLEURA: The small focus of consolidation in the left lower lobe on 12/20/2023 has resolved. Mild interstitial thickening most prominent in the left upper lobe consistent with mild interstitial edema. Mild atelectasis in both  lower lungs.    MEDIASTINUM/AXILLAE: Small pericardial effusion. Moderate aortic calcification.    CORONARY ARTERY CALCIFICATION: Moderate.    UPPER ABDOMEN: Hepatic cirrhosis. Small benign left renal cyst not warranting follow-up.    MUSCULOSKELETAL: Unremarkable.      Impression    IMPRESSION:   1.  Small focus of consolidation in the left lower lobe on 12/20/2023 has resolved.  2.  Mild pulmonary interstitial edema.  3.  Small pericardial effusion.  4.  Coronary artery calcification.  5.  Hepatic cirrhosis.     XR Chest Port 1 View    Narrative    EXAM: XR CHEST PORT 1 VIEW  LOCATION: [facility]  DATE: 2/3/2024    INDICATION: Acute hypoxic resp failure  COMPARISON: 02/02/2024      Impression    IMPRESSION: Cardiomegaly. New mild pulmonary edema. No pneumothorax. No large effusions or acute bony abnormalities.

## 2024-02-03 NOTE — SIGNIFICANT EVENT
RAPID RESPONSE DOCUMENTATION    Date: 02/03/24   Reason for Rapid Response: Hypoglycemia  Start Time: 14:08  End Time: 14:50    Assessment/Plan:  Josefina Dumont is a 73 year old female with a past medical history of DM2, hypertension, meningioma, history of stroke 2022 with residual right leg weakness, CAD who was admitted on 2/2/2024 for shortness of breath    Patient was found to be hypoglycemic of 46, then 27 on POC redraw.  Glucose by blood draw was repeated and found to be 183 after D5. Patients oxygen was initially not tracing, found to be hypoxic down to the mid to low 80% and an oxymask was applied and oxygen levels increased. Lactic acid came back elevated and 500 mL NS bolus was given, care given to concern for CHF exacerbation. Vancomycin and meropenem started. Patient stabilized to go to CT scan to look for source of infection before transfering to ICU. Patient discussed with intensivist.     Severe sepsis from unknown source  -Continue vancomycin and cefepime.  -Trend Lactic acid  -Continue supplemental O2 as needed  -Monitor blood cultures    Initial Assessment:  Temp:  [97.9  F (36.6  C)-101.3  F (38.5  C)] 99.9  F (37.7  C)  Pulse:  [49-89] 65  Resp:  [19-39] 34  BP: (109-198)/() 156/56  SpO2:  [85 %-100 %] 100 %    General: In acute distress   HEENT: Atraumatic normocephalic. Conjunctiva normal, pupils equal and reactive to light. No nasal drainage. Mouth and throat mucosa moist and non-erythematous.   Cardiovascular: Initially tachycardic, went into atrial flutter before flipping back to sinus rhythm.  Systolic murmur  Respiratory: Tachypneic, trace crackles in lower lobes.   Abdominal: Bowel sounds present. Abdomen soft and non-tender to palpation with no masses noted.  Extremities: No tenderness. Edema not present.  Skin: Mottled, gray skin    Interventions:  -Lactic acid, troponin, VBG, arterial blood gas, BNP, CBC, CMP  -D5 given  -STAT chest xray  -EKG  -Oxymask  -Repeat blood  cultures  -Started vancomycin and cefepime  -500 NS bolus  -ICU consult  -CT chest, abdomen pelvis when patient stabilized    Pertinent Labs and Results:  Component      Latest Ref Rng 2/2/2024  10:02 PM 2/3/2024  2:30 PM 2/3/2024  3:21 PM   Lactic Acid      0.7 - 2.0 mmol/L 1.0  3.2 (H)  2.2 (H)       Component      Latest Ref Rng 2/3/2024  12:36 AM 2/3/2024  1:14 PM 2/3/2024  2:01 PM 2/3/2024  2:14 PM   GLUCOSE BY METER POCT      70 - 99 mg/dL 174 (H)  46 (LL)  27 (LL)  183 (H)      Component      Latest Ref Rng 2/3/2024  2:30 PM   Sodium      135 - 145 mmol/L 135    Anion Gap      7 - 15 mmol/L 12    Calcium      8.8 - 10.2 mg/dL 9.3    Creatinine      0.51 - 0.95 mg/dL 1.77 (H)    GFR Estimate      >60 mL/min/1.73m2 30 (L)    Alkaline Phosphatase      40 - 150 U/L 121    AST      0 - 45 U/L 35    ALT      0 - 50 U/L 11    Protein Total      6.4 - 8.3 g/dL 7.6    Bilirubin Total      <=1.2 mg/dL 0.8    Potassium      3.4 - 5.3 mmol/L 3.9    Urea Nitrogen      8.0 - 23.0 mg/dL 27.2 (H)    Chloride      98 - 107 mmol/L 101    Carbon Dioxide (CO2)      22 - 29 mmol/L 22    Glucose      70 - 99 mg/dL 147 (H)    Albumin      3.5 - 5.2 g/dL 3.6      Component      Latest Ref Rng 2/3/2024  2:30 PM   N-Terminal Pro Bnp      0 - 900 pg/mL 4,474 (H)       Patient was discussed with attending Dr. Olivares who was present during the rapid response.    Renetta Kahn MD PGY1 2/3/2024  [facility] Medicine Residency Program

## 2024-02-03 NOTE — ED NOTES
Bed: [facility]ED-04  Expected date: 2/3/24  Expected time: 12:03 AM  Means of arrival:   Comments:  ED 18

## 2024-02-03 NOTE — ED NOTES
Ambulated patient to bedside commode, Patient did drop on oxygen saturation while ambulating. She was on 4Ls nasal cannula. Outpul 75ML. UA was sent, patient is back in bed, call light in reach,on the monitor, and side rails up. No other needs at this time, gave patient her room phone to call her sister.

## 2024-02-03 NOTE — PROGRESS NOTES
PRIMARY DIAGNOSIS: CONGESTIVE HEART FAILURE  OUTPATIENT/OBSERVATION GOALS TO BE MET BEFORE DISCHARGE:  Dyspnea improved and O2 sats >88% at RA or at prior home O2 therapy level: Yes        SpO2: 97 %, O2 Device: Nasal cannula  There were no vitals filed for this visit.     ECHO and other diagnostic testing complete (if applicable): No    Return to near baseline physical activity: No    Discharge Planner Nurse   Safe discharge environment identified: Yes  Barriers to discharge: Yes       Entered by: Sabi Reyes RN 02/03/2024 4:19 AM     Please review provider order for any additional goals.   Nurse to notify provider when observation goals have been met and patient is ready for discharge.  Pt is a/ox4. Pt is now on 2L O2 from 4L of admission. Pt denied any pain, more discomfort, sitting on a recliner. Pt still SOB when ambulating. Cardiac, SB.

## 2024-02-03 NOTE — PHARMACY-ADMISSION MEDICATION HISTORY
Pharmacist Admission Medication History    Admission medication history is complete. The information provided in this note is only as accurate as the sources available at the time of the update.    Medication reconciliation/reorder completed by provider prior to medication history? No    Information Source(s): Patient and CareEverywhere/SureScripts via in-person    Pertinent Information:     Changes made to PTA medication list: no changes    Medication Affordability:       Allergies reviewed with patient and updates made in EHR: yes    Medications available for use during hospital stay: NONE.     Medication History Completed By: Malathi Flores [facility] 2/2/2024 10:15 PM    PTA Med List   Medication Sig Last Dose    acetaminophen (TYLENOL) 500 MG tablet Take 2 tablets (1,000 mg) by mouth every 8 hours as needed for mild pain Unknown    albuterol (PROAIR HFA/PROVENTIL HFA/VENTOLIN HFA) 108 (90 Base) MCG/ACT inhaler Inhale 2 puffs into the lungs every 6 hours as needed Unknown at prn - sister will bring in    amLODIPine (NORVASC) 10 MG tablet Take 10 mg by mouth daily 2/2/2024 at am    calcium carbonate (TUMS) 500 MG chewable tablet Take 1 chew tab by mouth 2 times daily as needed for heartburn Unknown at prn    citalopram (CELEXA) 40 MG tablet Take 40 mg by mouth daily 2/2/2024 at am    clopidogrel (PLAVIX) 75 MG tablet Take 75 mg by mouth daily 2/2/2024 at am    furosemide (LASIX) 20 MG tablet Take 1 tablet (20 mg) by mouth daily 2/1/2024 at am - ran out    glipiZIDE (GLUCOTROL XL) 2.5 MG 24 hr tablet Take 2.5 mg by mouth daily (with breakfast) 2/2/2024 at am    hydrALAZINE (APRESOLINE) 25 MG tablet Take 1 tablet (25 mg) by mouth 3 times daily 2/2/2024 at am    losartan (COZAAR) 50 MG tablet Take 2 tablets (100 mg) by mouth daily 2/2/2024 at am    metoprolol tartrate 75 MG TABS Take 75 mg by mouth 2 times daily 2/2/2024 at am    multivitamin w/minerals (THERA-VIT-M) tablet Take 1 tablet by mouth daily 2/2/2024 at am     pantoprazole (PROTONIX) 40 MG EC tablet Take 1 tablet (40 mg) by mouth 2 times daily (before meals) 2/2/2024 at am    senna-docusate (SENOKOT-S/PERICOLACE) 8.6-50 MG tablet Take 1 tablet by mouth at bedtime 2/1/2024 at hs    vitamin E (TOCOPHEROL) 400 units (180 mg) capsule Take 400 Units by mouth daily 2/2/2024 at am

## 2024-02-04 ENCOUNTER — APPOINTMENT (OUTPATIENT)
Dept: OCCUPATIONAL THERAPY | Facility: CLINIC | Age: 74
DRG: 871 | End: 2024-02-04
Payer: COMMERCIAL

## 2024-02-04 ENCOUNTER — APPOINTMENT (OUTPATIENT)
Dept: MRI IMAGING | Facility: CLINIC | Age: 74
DRG: 871 | End: 2024-02-04
Payer: COMMERCIAL

## 2024-02-04 ENCOUNTER — APPOINTMENT (OUTPATIENT)
Dept: PHYSICAL THERAPY | Facility: CLINIC | Age: 74
DRG: 871 | End: 2024-02-04
Payer: COMMERCIAL

## 2024-02-04 ENCOUNTER — APPOINTMENT (OUTPATIENT)
Dept: CT IMAGING | Facility: CLINIC | Age: 74
DRG: 871 | End: 2024-02-04
Payer: COMMERCIAL

## 2024-02-04 LAB
ALBUMIN SERPL BCG-MCNC: 3 G/DL (ref 3.5–5.2)
ALP SERPL-CCNC: 117 U/L (ref 40–150)
ALT SERPL W P-5'-P-CCNC: 14 U/L (ref 0–50)
ANION GAP SERPL CALCULATED.3IONS-SCNC: 13 MMOL/L (ref 7–15)
ANION GAP SERPL CALCULATED.3IONS-SCNC: 8 MMOL/L (ref 7–15)
APTT PPP: 36 SECONDS (ref 22–38)
AST SERPL W P-5'-P-CCNC: 40 U/L (ref 0–45)
BILIRUB DIRECT SERPL-MCNC: 0.32 MG/DL (ref 0–0.3)
BILIRUB SERPL-MCNC: 0.6 MG/DL
BUN SERPL-MCNC: 35 MG/DL (ref 8–23)
BUN SERPL-MCNC: 41.2 MG/DL (ref 8–23)
CALCIUM SERPL-MCNC: 8.9 MG/DL (ref 8.8–10.2)
CALCIUM SERPL-MCNC: 9.1 MG/DL (ref 8.8–10.2)
CHLORIDE SERPL-SCNC: 102 MMOL/L (ref 98–107)
CHLORIDE SERPL-SCNC: 103 MMOL/L (ref 98–107)
CREAT SERPL-MCNC: 1.75 MG/DL (ref 0.51–0.95)
CREAT SERPL-MCNC: 1.78 MG/DL (ref 0.51–0.95)
CREAT UR-MCNC: 195 MG/DL
CRP SERPL-MCNC: 53.8 MG/L
DEPRECATED HCO3 PLAS-SCNC: 18 MMOL/L (ref 22–29)
DEPRECATED HCO3 PLAS-SCNC: 23 MMOL/L (ref 22–29)
EGFRCR SERPLBLD CKD-EPI 2021: 30 ML/MIN/1.73M2
EGFRCR SERPLBLD CKD-EPI 2021: 30 ML/MIN/1.73M2
ERYTHROCYTE [DISTWIDTH] IN BLOOD BY AUTOMATED COUNT: 15.9 % (ref 10–15)
ERYTHROCYTE [DISTWIDTH] IN BLOOD BY AUTOMATED COUNT: 16 % (ref 10–15)
FRACT EXCRET NA UR+SERPL-RTO: NORMAL %
GLUCOSE BLDC GLUCOMTR-MCNC: 116 MG/DL (ref 70–99)
GLUCOSE BLDC GLUCOMTR-MCNC: 122 MG/DL (ref 70–99)
GLUCOSE BLDC GLUCOMTR-MCNC: 130 MG/DL (ref 70–99)
GLUCOSE BLDC GLUCOMTR-MCNC: 137 MG/DL (ref 70–99)
GLUCOSE BLDC GLUCOMTR-MCNC: 65 MG/DL (ref 70–99)
GLUCOSE BLDC GLUCOMTR-MCNC: 70 MG/DL (ref 70–99)
GLUCOSE BLDC GLUCOMTR-MCNC: 79 MG/DL (ref 70–99)
GLUCOSE BLDC GLUCOMTR-MCNC: 83 MG/DL (ref 70–99)
GLUCOSE BLDC GLUCOMTR-MCNC: 87 MG/DL (ref 70–99)
GLUCOSE BLDC GLUCOMTR-MCNC: 92 MG/DL (ref 70–99)
GLUCOSE BLDC GLUCOMTR-MCNC: 98 MG/DL (ref 70–99)
GLUCOSE SERPL-MCNC: 119 MG/DL (ref 70–99)
GLUCOSE SERPL-MCNC: 88 MG/DL (ref 70–99)
HCT VFR BLD AUTO: 31.2 % (ref 35–47)
HCT VFR BLD AUTO: 31.7 % (ref 35–47)
HGB BLD-MCNC: 10.4 G/DL (ref 11.7–15.7)
HGB BLD-MCNC: 9.6 G/DL (ref 11.7–15.7)
HOLD SPECIMEN: NORMAL
INR PPP: 1.23 (ref 0.85–1.15)
LACTATE SERPL-SCNC: 0.8 MMOL/L (ref 0.7–2)
MCH RBC QN AUTO: 28.4 PG (ref 26.5–33)
MCH RBC QN AUTO: 29.2 PG (ref 26.5–33)
MCHC RBC AUTO-ENTMCNC: 30.8 G/DL (ref 31.5–36.5)
MCHC RBC AUTO-ENTMCNC: 32.8 G/DL (ref 31.5–36.5)
MCV RBC AUTO: 89 FL (ref 78–100)
MCV RBC AUTO: 92 FL (ref 78–100)
PLATELET # BLD AUTO: 100 10E3/UL (ref 150–450)
PLATELET # BLD AUTO: 119 10E3/UL (ref 150–450)
POTASSIUM SERPL-SCNC: 3.5 MMOL/L (ref 3.4–5.3)
POTASSIUM SERPL-SCNC: 3.6 MMOL/L (ref 3.4–5.3)
PROCALCITONIN SERPL IA-MCNC: 10.9 NG/ML
PROT SERPL-MCNC: 6.7 G/DL (ref 6.4–8.3)
RBC # BLD AUTO: 3.38 10E6/UL (ref 3.8–5.2)
RBC # BLD AUTO: 3.56 10E6/UL (ref 3.8–5.2)
SODIUM SERPL-SCNC: 133 MMOL/L (ref 135–145)
SODIUM SERPL-SCNC: 134 MMOL/L (ref 135–145)
SODIUM UR-SCNC: <20 MMOL/L
TROPONIN T SERPL HS-MCNC: 58 NG/L
WBC # BLD AUTO: 4.2 10E3/UL (ref 4–11)
WBC # BLD AUTO: 5.9 10E3/UL (ref 4–11)

## 2024-02-04 PROCEDURE — 87040 BLOOD CULTURE FOR BACTERIA: CPT

## 2024-02-04 PROCEDURE — 36415 COLL VENOUS BLD VENIPUNCTURE: CPT

## 2024-02-04 PROCEDURE — 82962 GLUCOSE BLOOD TEST: CPT

## 2024-02-04 PROCEDURE — 250N000011 HC RX IP 250 OP 636: Performed by: STUDENT IN AN ORGANIZED HEALTH CARE EDUCATION/TRAINING PROGRAM

## 2024-02-04 PROCEDURE — 80053 COMPREHEN METABOLIC PANEL: CPT

## 2024-02-04 PROCEDURE — 84145 PROCALCITONIN (PCT): CPT

## 2024-02-04 PROCEDURE — 258N000003 HC RX IP 258 OP 636

## 2024-02-04 PROCEDURE — 82310 ASSAY OF CALCIUM: CPT

## 2024-02-04 PROCEDURE — 258N000001 HC RX 258

## 2024-02-04 PROCEDURE — 250N000011 HC RX IP 250 OP 636

## 2024-02-04 PROCEDURE — 84300 ASSAY OF URINE SODIUM: CPT

## 2024-02-04 PROCEDURE — 120N000004 HC R&B MS OVERFLOW

## 2024-02-04 PROCEDURE — G0378 HOSPITAL OBSERVATION PER HR: HCPCS

## 2024-02-04 PROCEDURE — 85610 PROTHROMBIN TIME: CPT

## 2024-02-04 PROCEDURE — 85730 THROMBOPLASTIN TIME PARTIAL: CPT

## 2024-02-04 PROCEDURE — 96376 TX/PRO/DX INJ SAME DRUG ADON: CPT

## 2024-02-04 PROCEDURE — 97166 OT EVAL MOD COMPLEX 45 MIN: CPT | Mod: GO

## 2024-02-04 PROCEDURE — 86140 C-REACTIVE PROTEIN: CPT

## 2024-02-04 PROCEDURE — 83605 ASSAY OF LACTIC ACID: CPT

## 2024-02-04 PROCEDURE — 85027 COMPLETE CBC AUTOMATED: CPT

## 2024-02-04 PROCEDURE — 97161 PT EVAL LOW COMPLEX 20 MIN: CPT | Mod: GP

## 2024-02-04 PROCEDURE — 84484 ASSAY OF TROPONIN QUANT: CPT

## 2024-02-04 PROCEDURE — 70496 CT ANGIOGRAPHY HEAD: CPT

## 2024-02-04 PROCEDURE — A9585 GADOBUTROL INJECTION: HCPCS | Performed by: STUDENT IN AN ORGANIZED HEALTH CARE EDUCATION/TRAINING PROGRAM

## 2024-02-04 PROCEDURE — 82248 BILIRUBIN DIRECT: CPT | Performed by: STUDENT IN AN ORGANIZED HEALTH CARE EDUCATION/TRAINING PROGRAM

## 2024-02-04 PROCEDURE — 80048 BASIC METABOLIC PNL TOTAL CA: CPT

## 2024-02-04 PROCEDURE — 93005 ELECTROCARDIOGRAM TRACING: CPT

## 2024-02-04 PROCEDURE — 99233 SBSQ HOSP IP/OBS HIGH 50: CPT | Mod: GC

## 2024-02-04 PROCEDURE — 96372 THER/PROPH/DIAG INJ SC/IM: CPT | Performed by: STUDENT IN AN ORGANIZED HEALTH CARE EDUCATION/TRAINING PROGRAM

## 2024-02-04 PROCEDURE — 70450 CT HEAD/BRAIN W/O DYE: CPT

## 2024-02-04 PROCEDURE — 97535 SELF CARE MNGMENT TRAINING: CPT | Mod: GO

## 2024-02-04 PROCEDURE — 70553 MRI BRAIN STEM W/O & W/DYE: CPT

## 2024-02-04 PROCEDURE — 250N000013 HC RX MED GY IP 250 OP 250 PS 637

## 2024-02-04 PROCEDURE — 97530 THERAPEUTIC ACTIVITIES: CPT | Mod: GP

## 2024-02-04 PROCEDURE — 258N000003 HC RX IP 258 OP 636: Performed by: STUDENT IN AN ORGANIZED HEALTH CARE EDUCATION/TRAINING PROGRAM

## 2024-02-04 PROCEDURE — 255N000002 HC RX 255 OP 636: Performed by: STUDENT IN AN ORGANIZED HEALTH CARE EDUCATION/TRAINING PROGRAM

## 2024-02-04 RX ORDER — ONDANSETRON 2 MG/ML
4 INJECTION INTRAMUSCULAR; INTRAVENOUS EVERY 6 HOURS PRN
Status: DISCONTINUED | OUTPATIENT
Start: 2024-02-04 | End: 2024-02-28 | Stop reason: HOSPADM

## 2024-02-04 RX ORDER — GADOBUTROL 604.72 MG/ML
10 INJECTION INTRAVENOUS ONCE
Status: COMPLETED | OUTPATIENT
Start: 2024-02-04 | End: 2024-02-04

## 2024-02-04 RX ORDER — PIPERACILLIN SODIUM, TAZOBACTAM SODIUM 3; .375 G/15ML; G/15ML
3.38 INJECTION, POWDER, LYOPHILIZED, FOR SOLUTION INTRAVENOUS EVERY 8 HOURS
Status: DISCONTINUED | OUTPATIENT
Start: 2024-02-05 | End: 2024-02-04

## 2024-02-04 RX ORDER — IOPAMIDOL 755 MG/ML
75 INJECTION, SOLUTION INTRAVASCULAR ONCE
Status: COMPLETED | OUTPATIENT
Start: 2024-02-04 | End: 2024-02-04

## 2024-02-04 RX ORDER — LORAZEPAM 2 MG/ML
2 INJECTION INTRAMUSCULAR ONCE
Status: COMPLETED | OUTPATIENT
Start: 2024-02-04 | End: 2024-02-04

## 2024-02-04 RX ORDER — PIPERACILLIN SODIUM, TAZOBACTAM SODIUM 3; .375 G/15ML; G/15ML
3.38 INJECTION, POWDER, LYOPHILIZED, FOR SOLUTION INTRAVENOUS ONCE
Status: DISCONTINUED | OUTPATIENT
Start: 2024-02-04 | End: 2024-02-04

## 2024-02-04 RX ORDER — ONDANSETRON 2 MG/ML
INJECTION INTRAMUSCULAR; INTRAVENOUS
Status: COMPLETED
Start: 2024-02-04 | End: 2024-02-04

## 2024-02-04 RX ADMIN — PANTOPRAZOLE SODIUM 40 MG: 40 TABLET, DELAYED RELEASE ORAL at 10:45

## 2024-02-04 RX ADMIN — HEPARIN SODIUM 5000 UNITS: 5000 INJECTION, SOLUTION INTRAVENOUS; SUBCUTANEOUS at 11:33

## 2024-02-04 RX ADMIN — CEFEPIME 2 G: 2 INJECTION, POWDER, FOR SOLUTION INTRAVENOUS at 17:23

## 2024-02-04 RX ADMIN — CLOPIDOGREL BISULFATE 75 MG: 75 TABLET ORAL at 09:58

## 2024-02-04 RX ADMIN — VANCOMYCIN HYDROCHLORIDE 1250 MG: 5 INJECTION, POWDER, LYOPHILIZED, FOR SOLUTION INTRAVENOUS at 14:58

## 2024-02-04 RX ADMIN — DEXTROSE MONOHYDRATE 25 ML: 25 INJECTION, SOLUTION INTRAVENOUS at 06:01

## 2024-02-04 RX ADMIN — METOPROLOL TARTRATE 75 MG: 25 TABLET, FILM COATED ORAL at 20:56

## 2024-02-04 RX ADMIN — Medication: at 22:19

## 2024-02-04 RX ADMIN — IOPAMIDOL 75 ML: 755 INJECTION, SOLUTION INTRAVENOUS at 18:59

## 2024-02-04 RX ADMIN — ONDANSETRON 4 MG: 2 INJECTION INTRAMUSCULAR; INTRAVENOUS at 18:50

## 2024-02-04 RX ADMIN — ACETAMINOPHEN 1000 MG: 500 TABLET ORAL at 18:07

## 2024-02-04 RX ADMIN — HEPARIN SODIUM 5000 UNITS: 5000 INJECTION, SOLUTION INTRAVENOUS; SUBCUTANEOUS at 20:56

## 2024-02-04 RX ADMIN — CITALOPRAM 20 MG: 20 TABLET ORAL at 09:58

## 2024-02-04 RX ADMIN — CEFEPIME 2 G: 2 INJECTION, POWDER, FOR SOLUTION INTRAVENOUS at 05:00

## 2024-02-04 RX ADMIN — MEROPENEM 2 G: 1 INJECTION, POWDER, FOR SOLUTION INTRAVENOUS at 23:23

## 2024-02-04 RX ADMIN — HYDRALAZINE HYDROCHLORIDE 25 MG: 25 TABLET, FILM COATED ORAL at 14:56

## 2024-02-04 RX ADMIN — CALCIUM CARBONATE (ANTACID) CHEW TAB 500 MG 500 MG: 500 CHEW TAB at 05:42

## 2024-02-04 RX ADMIN — SENNOSIDES AND DOCUSATE SODIUM 1 TABLET: 8.6; 5 TABLET ORAL at 20:56

## 2024-02-04 RX ADMIN — HYDRALAZINE HYDROCHLORIDE 25 MG: 25 TABLET, FILM COATED ORAL at 09:59

## 2024-02-04 RX ADMIN — GADOBUTROL 10 ML: 604.72 INJECTION INTRAVENOUS at 21:34

## 2024-02-04 RX ADMIN — AMLODIPINE BESYLATE 10 MG: 10 TABLET ORAL at 09:58

## 2024-02-04 RX ADMIN — HYDRALAZINE HYDROCHLORIDE 25 MG: 25 TABLET, FILM COATED ORAL at 20:56

## 2024-02-04 RX ADMIN — ACETAMINOPHEN 1000 MG: 500 TABLET ORAL at 05:38

## 2024-02-04 RX ADMIN — LORAZEPAM 2 MG: 2 INJECTION INTRAMUSCULAR; INTRAVENOUS at 21:00

## 2024-02-04 RX ADMIN — LOSARTAN POTASSIUM 100 MG: 50 TABLET, FILM COATED ORAL at 09:58

## 2024-02-04 RX ADMIN — Medication: at 14:52

## 2024-02-04 ASSESSMENT — ACTIVITIES OF DAILY LIVING (ADL)
ADLS_ACUITY_SCORE: 30
ADLS_ACUITY_SCORE: 28
ADLS_ACUITY_SCORE: 28
ADLS_ACUITY_SCORE: 30
ADLS_ACUITY_SCORE: 30
ADLS_ACUITY_SCORE: 34
ADLS_ACUITY_SCORE: 30
ADLS_ACUITY_SCORE: 28
ADLS_ACUITY_SCORE: 30
ADLS_ACUITY_SCORE: 28

## 2024-02-04 NOTE — CARE PLAN
Shift Events:     PRN medication given for pain (See MAR).  Pt. BG 70, Pt was given Apple juice and Lunchbox to increase BG. Repeat BG 79 and 88.   0535- This writer was called into patient room. Pt. Was experiencing chills, afebrile, pt was pale and stated that their head and throat hurt. Pts fingertips were white on bilateral hands. Pt. Was given Tylenol, and Tums for pain and heart burn (See MAR). Provider notified. BG check was 65, D50% given (See MAR), BG rechecks 98 and 87 and 130.     Assessment:     Neuro: A/Ox4, follows commands    Respiratory: 1L NC    Cardiovascular: Tele SR-SB    GI/: voiding via toilet. Diet: Cardiac. No BM this shift.    Lines/Drains: x2 R) FA PIVs    Pain: PRN tylenol given for pain (See MAR).      Problem: Gas Exchange Impaired  Goal: Optimal Gas Exchange  Outcome: Progressing     Problem: Fatigue  Goal: Improved Activity Tolerance  Outcome: Progressing  Intervention: Promote Improved Energy  Recent Flowsheet Documentation  Taken 2/4/2024 0400 by Malathi Wellington, RN  Activity Management:   ambulated in room   up in chair  Taken 2/4/2024 0230 by Malathi Wellington, RN  Activity Management:   activity adjusted per tolerance   up in chair  Taken 2/4/2024 0000 by Malathi Wellington, RN  Activity Management:   ambulated in room   up in chair  Taken 2/3/2024 2000 by Malathi Wellington, RN  Activity Management:   ambulated in room   up in chair   Goal Outcome Evaluation:

## 2024-02-04 NOTE — PROGRESS NOTES
02/04/24 0740   Appointment Info   Signing Clinician's Name / Credentials (PT) Mitra Grimes PT DPT [facility]   Quick Adds   Quick Adds Certification   Living Environment   People in Home alone   Current Living Arrangements apartment   Transportation Anticipated family or friend will provide   Living Environment Comments previously Ind in apartment; not currently driving after CVA; uses 4WW   Self-Care   Equipment Currently Used at Home walker, rolling;shower chair;grab bar, tub/shower   Fall history within last six months no   General Information   Onset of Illness/Injury or Date of Surgery 02/02/24   Referring Physician Renetta Kahn   Patient/Family Therapy Goals Statement (PT) return home   Pertinent History of Current Problem (include personal factors and/or comorbidities that impact the POC) admitted via EMS to ED on 2/2/24 due to acute dyspnea, CHF, fever and severe sepsis. Patient had a Rapid Response epidsode in ED due to being hypoglycemic. PMH: CKD, DM, HTN, obesity, h/o CVA wth R sided residual weakness, asthma, Anxiety/depression, hypokalemia, CAD.   Existing Precautions/Restrictions fall   Cognition   Affect/Mental Status (Cognition) WNL   Orientation Status (Cognition) oriented x 4   Follows Commands (Cognition) WNL   Range of Motion (ROM)   ROM Comment B LE Grossly WNL   Strength (Manual Muscle Testing)   Strength Comments B LE grossly > 3+ /5   Transfers   Comment, (Transfers) sit <> stand from shair with FWW and Min A to attain full standing posture   Gait/Stairs (Locomotion)   [place] Level (Gait) contact guard   Assistive Device (Gait) walker, front-wheeled   Distance in Feet (Gait) 10 ft   Pattern (Gait) step-to   Deviations/Abnormal Patterns (Gait) base of support, wide;radha decreased;gait speed decreased;stride length decreased;weight shifting decreased   Clinical Impression   Criteria for Skilled Therapeutic Intervention Yes, treatment indicated   PT Diagnosis (PT) impaired  functional mobility   Influenced by the following impairments weakness, pain, low endurance   Functional limitations due to impairments transfers, gait   Clinical Presentation (PT Evaluation Complexity) stable   Clinical Presentation Rationale pt presents as medically diagnosed   Clinical Decision Making (Complexity) low complexity   Planned Therapy Interventions (PT) balance training;gait training;strengthening;progressive activity/exercise   Risk & Benefits of therapy have been explained evaluation/treatment results reviewed;care plan/treatment goals reviewed;risks/benefits reviewed;current/potential barriers reviewed;participants voiced agreement with care plan;participants included;patient   PT Total Evaluation Time   PT Eval, Low Complexity Minutes (16436) 10   Therapy Certification   Start of care date 02/04/24   Certification date from 02/04/24   Certification date to 03/04/24   Medical Diagnosis acute dyspnea/CHF/fever   Physical Therapy Goals   PT Frequency Daily   PT Predicted Duration/Target Date for Goal Attainment 02/11/24   PT Goals Gait;Transfers   PT: Transfers Supervision/stand-by assist;Sit to/from stand;Assistive device   PT: Gait Supervision/stand-by assist;Rolling walker;100 feet   Interventions   Interventions Quick Adds Gait Training;Therapeutic Activity;Therapeutic Procedure   Therapeutic Activity   Therapeutic Activities: dynamic activities to improve functional performance Minutes (68747) 15   Symptoms Noted During/After Treatment Fatigue   Treatment Detail/Skilled Intervention pt sitting in chair when approached for PT. Agreeable to participate in eval/treatment today. Pt performed seated B LE ex's x 10 ea: AP, QS, GS, LAQ, seated march   Gait Training   Gait Training Minutes (12580) 5   Symptoms Noted During/After Treatment (Gait Training) fatigue   Treatment Detail/Skilled Intervention cues for FWW steering/assist with equipment management   Distance in Feet 10 ft   [place] Level  (Gait Training) contact guard   Physical Assistance Level (Gait Training) 1 person assist;verbal cues;nonverbal cues (demo/gestures)   Weight Bearing (Gait Training) full weight-bearing   Assistive Device (Gait Training) rolling walker   Gait Analysis Deviations decreased radha;increased time in double stance;decreased step length;decreased stride length;decreased weight-shifting ability   Impairments (Gait Analysis/Training) balance impaired;strength decreased   PT Discharge Planning   PT Plan LE strengthening/endurance; gait progression   PT Discharge Recommendation (DC Rec) (S)  Transitional Care Facility;home with home care physical therapy   PT Rationale for DC Rec increased assist with functional mobility and patient lives alone.  Increased fall risk   PT Brief overview of current status Amb in room with FWW & CGA + assist for equipment management. Decreased endurance/activity tolerance due to weakness.   PT Equipment Needed at Discharge   (has 4WW)   Total Session Time   Timed Code Treatment Minutes 20   Total Session Time (sum of timed and untimed services) 30   [facility]  OUTPATIENT PHYSICAL THERAPY EVALUATION  PLAN OF TREATMENT FOR OUTPATIENT REHABILITATION  (COMPLETE FOR INITIAL CLAIMS ONLY)  Patient's Last Name, First Name, M.I.  YOB: 1950  Josefina Dumont  SHEBA                        Provider's Name  [facility] Medical Record No.  [id number]                             Onset Date:  (P) 02/02/24   Start of Care Date:  (P) 02/04/24   Type:     _X_PT   ___OT   ___SLP Medical Diagnosis:  (P) acute dyspnea/CHF/fever              PT Diagnosis:  (P) impaired functional mobility Visits from SOC:  1     See note for plan of treatment, functional goals and certification details    I CERTIFY THE NEED FOR THESE SERVICES FURNISHED UNDER        THIS PLAN OF TREATMENT AND WHILE UNDER MY CARE     (Physician co-signature of this document  indicates review and certification of the therapy plan).

## 2024-02-04 NOTE — PROGRESS NOTES
Occupational Therapy     02/04/24 0830   Appointment Info   Signing Clinician's Name / Credentials (OT) Ashley Paul OT   Quick Adds   Quick Adds Certification   Living Environment   People in Home alone   Current Living Arrangements independent living facility   Home Accessibility no concerns   Transportation Anticipated family or friend will provide;other (see comments)  (Pt does drive but has not been driving for the past several months)   Living Environment Comments WIS with grab bar, shower chair; RTS with grab bars   Self-Care   Usual Activity Tolerance good   Current Activity Tolerance fair   Fall history within last six months no   Activity/Exercise/Self-Care Comment Ind BADLs at baseline   Instrumental Activities of Daily Living (IADL)   IADL Comments Ind IADLs; sister does provide some assist with meal prep   General Information   Onset of Illness/Injury or Date of Surgery 02/02/24   Referring Physician Lazara Olivares MD   Patient/Family Therapy Goal Statement (OT) get stronger; be up and moving   Additional Occupational Profile Info/Pertinent History of Current Problem admitted via EMS to ED on 2/2/24 due to acute dyspnea, CHF, fever and severe sepsis. Patient had a Rapid Response epidsode in ED due to being hypoglycemic. PMH: CKD, DM, HTN, obesity, h/o CVA wth R sided residual weakness, asthma, Anxiety/depression, hypokalemia, CAD.   Existing Precautions/Restrictions oxygen therapy device and L/min  (1 lpm)   Limitations/Impairments safety/cognitive   Cognitive Status Examination   Affect/Mental Status (Cognitive) WFL;other (see comments)  (grossly; monitor cog/further assessment may be needed)   Follows Commands WFL   Safety Deficit ability to follow commands;awareness of need for assistance   Sensory   Sensory Quick Adds sensation intact   Posture   Posture not impaired   Range of Motion Comprehensive   General Range of Motion no range of motion deficits identified   Strength Comprehensive (MMT)    Comment, General Manual Muscle Testing (MMT) Assessment generalized weakness of BUE   Transfers   Transfers sit-stand transfer   Sit-Stand Transfer   Sit-Stand [place] (Transfers) moderate assist (50% patient effort)   Assistive Device (Sit-Stand Transfers) walker, front-wheeled   Activities of Daily Living   BADL Assessment/Intervention grooming   Grooming Assessment/Training   Position (Grooming) supported sitting   [place] Level (Grooming) supervision   Clinical Impression   Criteria for Skilled Therapeutic Interventions Met (OT) Yes, treatment indicated   OT Diagnosis Decr ADLs   OT Problem List-Impairments impacting ADL activity tolerance impaired;mobility;strength   Assessment of Occupational Performance 3-5 Performance Deficits   Identified Performance Deficits dressing, transfers, bathing, meal prep, housekpeeing   Planned Therapy Interventions (OT) ADL retraining;bed mobility training;transfer training;home program guidelines;progressive activity/exercise   Clinical Decision Making Complexity (OT) detailed assessment/moderate complexity   Risk & Benefits of therapy have been explained evaluation/treatment results reviewed;care plan/treatment goals reviewed;patient   OT Total Evaluation Time   OT Eval, Moderate Complexity Minutes (26395) 10   Therapy Certification   Medical Diagnosis fever, unspecified cause   Start of Care Date 02/04/24   Certification date from 02/04/24   Certification date to 03/04/24   OT Goals   Therapy Frequency (OT) Daily   OT Predicted Duration/Target Date for Goal Attainment 02/10/24   OT Goals Lower Body Dressing;Toilet Transfer/Toileting;OT Goal 1   OT: Lower Body Dressing Modified independent   OT: Toilet Transfer/Toileting Modified independent   OT: Goal 1 Pt will tolerate 10-15 mins of BUE exercise to increase strength needed for performing ADLs/IADLs   Interventions   Interventions Quick Adds Self-Care/Home Management   Self-Care/Home Management   Self-Care/Home  Mgmt/ADL, Compensatory, Meal Prep Minutes (32533) 15   Symptoms Noted During/After Treatment (Meal Preparation/Planning Training) fatigue;dizziness   Treatment Detail/Skilled Intervention Pt seated in chair upon OT arrival- agreeable to minimal activity due to fatigue/not feeling well. Pt completed g/h tasks with SBA while seated; extended time for tasks. Pt completed sit<>stand transfer from chair with FWW and Min A; verbal cues for safe transfer technique. Pt stood for 1 minute and took steps in place, CGA with FWW. Educ on PLB; pt verbalized understanding. Pt seated in chair at end of session, all needs within reach.   OT Discharge Planning   OT Plan 2/10- act ryley, cog, any ADLs, BUE exercise   OT Discharge Recommendation (DC Rec) Transitional Care Facility   OT Rationale for DC Rec Pt is not at baseline for ADLs and has decr act ryley; lives alone. Pt would benefit from TCU stay to improve strength/act ryley needed to perform ADLs/IADLs. Pending progress, pt may be able to d/c home with Home OT.   OT Brief overview of current status Min A transfers; SBA seated ADLs; decr act ryley   Total Session Time   Timed Code Treatment Minutes 15   Total Session Time (sum of timed and untimed services) 25      [facility]  OUTPATIENT OCCUPATIONAL THERAPY  EVALUATION  PLAN OF TREATMENT FOR OUTPATIENT REHABILITATION  (COMPLETE FOR INITIAL CLAIMS ONLY)  Patient's Last Name, First Name, M.I.  YOB: 1950  Josefina Dumont                          Provider's Name  [facility] Medical Record No.  [id number]                             Onset Date:  02/02/24   Start of Care Date:  02/04/24   Type:     ___PT   _X_OT   ___SLP Medical Diagnosis:  fever, unspecified cause                    OT Diagnosis:  Decr ADLs Visits from SOC:  1     See note for plan of treatment, functional goals and certification details    I CERTIFY THE NEED FOR THESE SERVICES FURNISHED  UNDER        THIS PLAN OF TREATMENT AND WHILE UNDER MY CARE     (Physician co-signature of this document indicates review and certification of the therapy plan).                   Ashley Paul, OT 2/4/2024

## 2024-02-04 NOTE — UTILIZATION REVIEW
Admission Status; Secondary Review Determination       Under the authority of the Utilization Management Committee, the utilization review process indicated a secondary review on the above patient. The review outcome is based on review of the medical records, discussions with staff, and applying clinical experience noted on the date of the review.     (x) Inpatient Status Appropriate - This patient's medical care is consistent with medical management for inpatient care and reasonable inpatient medical practice.     RATIONALE FOR DETERMINATION     Ms. Dumont is a 74 yo female who presents to the ED with SOB after missing dose of lasix PTA.  Initially was started on IV lasix but since has developed more of a sepsis picture with high fevers, elevated pro-calcitionin, acute kidney injury and cotinued hypoxia.  Transferred to the ICU. GIven IVF bolus yesterday for elevated lactic acid with close monitoring of respiratory status. Continues on IV vanco and IV cefepime.  Lactic acid has normalized, IV diuresis on hold and creat increased today.  At this time, she is requiring ongoing further inpatient medical evaluation, treatment and close clinical monitoring.     Page sent to Dr. Reilly with the above determination.     At the time of admission with the information available to the attending physician more than 2 nights Hospital complex care was anticipated, based on patient risk of adverse outcome if treated as outpatient and complex care required. Inpatient admission is appropriate based on the Medicare guidelines.    The information on this document is developed by the utilization review team in order for the business office to ensure compliance. This only denotes the appropriateness of proper admission status and does not reflect the quality of care rendered.   The definitions of Inpatient Status and Observation Status used in making the determination above are those provided in the CMS Coverage Manual, Chapter 1  and Chapter 6, section 70.4.         Sincerely,     Jennifer Walker, DO  Utilization Review  Physician Advisor  [facility].

## 2024-02-04 NOTE — PROGRESS NOTES
[facility]    Progress Note - Hospitalist Service       Date of Admission:  2/2/2024    Assessment & Plan   Josefina Dumont is a 73 year old female admitted on 2/2/2024. She has a history of CHF with recent hospitalization 12/28/2023 - 12/31/2023, recently required pneumonia, history of ischemic CVA, history of meningioma, history of CAD, type 2 diabetes mellitus and is admitted for shortness of breath and found to be in severe sepsis.    Severe sepsis due to unknown source   Acute hypoxic respiratory failure  Acute fever of unknown origin  Concern for CHF exacerbation  Patient initially presented with worsening shortness of breath, orthopnea, dyspnea on exertion, in the setting of elevated BNP and missed dose of Lasix, CHF exacerbation was initially high on the differential. She was febrile w/Tmax of 101.3. CT chest with no acute findings, mild pulmonary interstitial edema, small pericardial effusion. Inflammatory markers were added this AM. CRP elevated to 28.10 and procalcitonin of 5.80. D-Dimer normal of 0.49.Patient had a witnessed episode of her attack 2/3, seems like diffuse vasospasm. Differential remains broad at this time.   -AM CBC  -AM BMP  -IV Lasix 20 mg daily  -Cardiac/low-sodium diet  -Intensivist consulted, appreciate recommendations    -ICU transfer  - continue Vancomycin and cefepime (2/3-present)  -Continue supplemental oxygen as needed     FELIPA  Baseline creatinine around 0.8-0.9, 1.78 today.  -Avoid nephrotoxic medications    Hypokalemia, resolved  -Standard replacement protocol      Mild normocytic anemia  Hemoglobin 11.2 on admission. 9.6 today, drop consistent with hemodilution. Will continue to monitor.   -AM CBC    Hypertension  -Continue PTA amlodipine  -Continue PTA hydralazine  -Continue PTA losartan  -Continue PTA metoprolol tartrate     Prolonged QT  QTc of 472.  -Avoid QT prolonging medications    DM2   Most recent A1c 6.6 1 month ago.  -Holdin PTA  glipizide  -Low sliding scale insulin      Chronic:  History of ischemic CVA: continue PTA Plavix  GERD: Continue PTA Tums, continue PTA Protonix  Constipation: Continue PTA senna docusate  Anxiety/depression: Given history of prolonged QTc noted to be elevated at 472, decreasing dose of Celexa to 20 mg while hospitalized  Asthma: Continue PTA albuterol as needed         Observation Goals: -diagnostic tests and consults completed and resulted, -vital signs normal or at patient baseline, Nurse to notify provider when observation goals have been met and patient is ready for discharge.  Diet: Combination Diet 2 gm NA Diet; Low Saturated Fat Diet    DVT Prophylaxis: Heparin  [place] Catheter: Not present  Fluids: po  Lines: None     Cardiac Monitoring: ACTIVE order. Indication: Acute decompensated heart failure (48 hours)  Code Status: Full Code      Clinically Significant Risk Factors Present on Admission        # Hypokalemia: Lowest K = 3.2 mmol/L in last 2 days, will replace as needed       # Hypoalbuminemia: Lowest albumin = 3 g/dL at 2/4/2024  5:02 AM, will monitor as appropriate   # Drug Induced Platelet Defect: home medication list includes an antiplatelet medication  # Acute Kidney Injury, unspecified: based on a >150% or 0.3 mg/dL increase in last creatinine compared to past 90 day average, will monitor renal function  # Hypertension: Noted on problem list   # Acute Respiratory Failure: based on blood gas results.  Continue supplemental oxygen as needed    # DMII: A1C = 6.6 % (Ref range: <5.7 %) within past 6 months          # Financial/Environmental Concerns:           Disposition Plan      Expected Discharge Date: 02/06/2024        Discharge Comments: IV ABX for Sepsis,  Labile Blood Glucose,        The patient's care was discussed with the Attending Physician, Dr. Olivarse .    Lazara Reilly MD PGY3  Hospitalist Service  [facility]  Securely message with [employer] (more info)  Text page  via [employer] Paging/Directory   ______________________________________________________________________    Interval History   Patient is tired after another witnessed episode of vasospasm at 3am, unable to fall asleep after. Feeling well currently, but very unnerved by these episodes. Had a few before the episode in the restaurant that brought her into the hospital. To her knowledge, no other family members have had similar episodes. Does report fingers and toes turning blue when cold in the past.     Physical Exam   Vital Signs: Temp: 98.6  F (37  C) Temp src: Oral BP: (!) 144/65 Pulse: 54   Resp: 24 SpO2: 94 % O2 Device: Nasal cannula Oxygen Delivery: 1 LPM  Weight: 221 lbs 4.8 oz    GENERAL: alert and no acute distress, AO x3  EYES: eyes grossly normal to inspection  HENT: hearing grossly intact, moist mucus membranes  RESP: Bilateral fine crackles on exam, no wheezing, no rhonchi  CV: slow rate, but regular rhythm, I/VI <> systolic murmur appreciated  ABDOMEN: soft, nontender, and bowel sounds normal  MS: no gross musculoskeletal defects noted, no edema  PSYCH: mentation appears normal, affect normal/bright      Data     I have personally reviewed the following data over the past 24 hrs:    4.2  \   9.6 (L)   / 100 (L)     134 (L) 103 35.0 (H) /  137 (H)   3.5 23 1.78 (H) \     ALT: 14 AST: 40 AP: 117 TBILI: 0.6   ALB: 3.0 (L) TOT PROTEIN: 6.7 LIPASE: N/A     Trop: 35 (H) BNP: 4,474 (H)     Procal: N/A CRP: N/A Lactic Acid: 2.2 (H)       INR:  N/A PTT:  N/A   D-dimer:  N/A Fibrinogen:  N/A       Imaging results reviewed over the past 24 hrs:   Recent Results (from the past 24 hour(s))   XR Chest Port 1 View    Narrative    EXAM: XR CHEST PORT 1 VIEW  LOCATION: [facility]  DATE: 2/3/2024    INDICATION: Acute hypoxic resp failure  COMPARISON: 02/02/2024      Impression    IMPRESSION: Cardiomegaly. New mild pulmonary edema. No pneumothorax. No large effusions or acute bony abnormalities.   CT  Chest Abdomen Pelvis w/o Contrast    Narrative    EXAM: CT CHEST ABDOMEN PELVIS W/O CONTRAST  LOCATION: [facility]  DATE: 2/3/2024    INDICATION: Severe sepsis.  COMPARISON: CT chest 02/02/2024.  TECHNIQUE: CT scan of the chest, abdomen, and pelvis was performed without IV contrast. Multiplanar reformats were obtained. Dose reduction techniques were used.   CONTRAST: None.    FINDINGS:   LUNGS AND PLEURA: Since 02/02/2024, minimally increased interstitial pulmonary edema. Trace bibasilar atelectasis. No significant pleural effusion. No new focal consolidation.    MEDIASTINUM/AXILLAE: Similar small pericardial effusion. No lymphadenopathy. Mitral annular calcification. Nonaneurysmal thoracic aorta.    CORONARY ARTERY CALCIFICATION: Severe.    HEPATOBILIARY: Nodular liver contour. No liver lesion by noncontrast CT. Cholelithiasis.    PANCREAS: Normal.    SPLEEN: Normal.    ADRENAL GLANDS: Normal.    KIDNEYS/BLADDER: Bilateral renal benign cysts. No collecting system dilatation.    BOWEL: Moderate stool in the rectum. No bowel obstruction or significant wall thickening.    LYMPH NODES: Similar mildly enlarged jaziel hepatis lymph nodes, for example a portacaval lymph node measuring 1.7 cm.    VASCULATURE: Nonaneurysmal abdominal aorta. Severe atherosclerosis.    PELVIC ORGANS: Uterus is absent. No adnexal mass.    MUSCULOSKELETAL: Mild lower abdominal skin thickening. No organized fluid collection. No aggressive osseous lesion.      Impression    IMPRESSION:  1.  Minimally increased interstitial pulmonary edema. No focal consolidation or pleural effusion.  2.  Similar small pericardial effusion.  3.  Mild anterior lower abdominal skin thickening. Correlate for cellulitis. No organized fluid collection.  4.  Similar morphologic changes of hepatic cirrhosis.  5.  Similar upper abdominal enlarged lymph nodes, which are possibly reactive. Attention on follow-up.  6.  Cholelithiasis.

## 2024-02-05 ENCOUNTER — APPOINTMENT (OUTPATIENT)
Dept: CARDIOLOGY | Facility: CLINIC | Age: 74
DRG: 871 | End: 2024-02-05
Attending: STUDENT IN AN ORGANIZED HEALTH CARE EDUCATION/TRAINING PROGRAM
Payer: COMMERCIAL

## 2024-02-05 ENCOUNTER — APPOINTMENT (OUTPATIENT)
Dept: PHYSICAL THERAPY | Facility: CLINIC | Age: 74
DRG: 871 | End: 2024-02-05
Payer: COMMERCIAL

## 2024-02-05 ENCOUNTER — APPOINTMENT (OUTPATIENT)
Dept: RADIOLOGY | Facility: CLINIC | Age: 74
DRG: 871 | End: 2024-02-05
Attending: STUDENT IN AN ORGANIZED HEALTH CARE EDUCATION/TRAINING PROGRAM
Payer: COMMERCIAL

## 2024-02-05 LAB
ANION GAP SERPL CALCULATED.3IONS-SCNC: 13 MMOL/L (ref 7–15)
APPEARANCE CSF: CLEAR
ATRIAL RATE - MUSE: 78 BPM
BASE EXCESS BLDA CALC-SCNC: -5.7 MMOL/L (ref -3–3)
BUN SERPL-MCNC: 42.9 MG/DL (ref 8–23)
C GATTII+NEOFOR DNA CSF QL NAA+NON-PROBE: NEGATIVE
CALCIUM SERPL-MCNC: 8.9 MG/DL (ref 8.8–10.2)
CHLORIDE SERPL-SCNC: 103 MMOL/L (ref 98–107)
CMV DNA CSF QL NAA+NON-PROBE: NEGATIVE
COHGB MFR BLD: 84.7 % (ref 95–96)
COLOR CSF: COLORLESS
CREAT SERPL-MCNC: 1.76 MG/DL (ref 0.51–0.95)
CRP SERPL-MCNC: 54.2 MG/L
DEPRECATED HCO3 PLAS-SCNC: 18 MMOL/L (ref 22–29)
DIASTOLIC BLOOD PRESSURE - MUSE: NORMAL MMHG
E COLI K1 AG CSF QL: NEGATIVE
EGFRCR SERPLBLD CKD-EPI 2021: 30 ML/MIN/1.73M2
ERYTHROCYTE [DISTWIDTH] IN BLOOD BY AUTOMATED COUNT: 16 % (ref 10–15)
EV RNA SPEC QL NAA+PROBE: NEGATIVE
GLUCOSE BLDC GLUCOMTR-MCNC: 215 MG/DL (ref 70–99)
GLUCOSE BLDC GLUCOMTR-MCNC: 217 MG/DL (ref 70–99)
GLUCOSE BLDC GLUCOMTR-MCNC: 260 MG/DL (ref 70–99)
GLUCOSE BLDC GLUCOMTR-MCNC: 89 MG/DL (ref 70–99)
GLUCOSE BLDC GLUCOMTR-MCNC: 91 MG/DL (ref 70–99)
GLUCOSE CSF-MCNC: 77 MG/DL (ref 40–70)
GLUCOSE SERPL-MCNC: 98 MG/DL (ref 70–99)
GP B STREP DNA CSF QL NAA+NON-PROBE: NEGATIVE
HAEM INFLU DNA CSF QL NAA+NON-PROBE: NEGATIVE
HCO3 BLD-SCNC: 20 MMOL/L (ref 21–28)
HCT VFR BLD AUTO: 29.4 % (ref 35–47)
HGB BLD-MCNC: 9.5 G/DL (ref 11.7–15.7)
HHV6 DNA CSF QL NAA+NON-PROBE: NEGATIVE
HSV1 DNA CSF QL NAA+NON-PROBE: NEGATIVE
HSV2 DNA CSF QL NAA+NON-PROBE: NEGATIVE
INTERPRETATION ECG - MUSE: NORMAL
L MONOCYTOG DNA CSF QL NAA+NON-PROBE: NEGATIVE
LVEF ECHO: NORMAL
MCH RBC QN AUTO: 28.8 PG (ref 26.5–33)
MCHC RBC AUTO-ENTMCNC: 32.3 G/DL (ref 31.5–36.5)
MCV RBC AUTO: 89 FL (ref 78–100)
N MEN DNA CSF QL NAA+NON-PROBE: NEGATIVE
O2/TOTAL GAS SETTING VFR VENT: 32 %
P AXIS - MUSE: 73 DEGREES
PARECHOVIRUS A RNA CSF QL NAA+NON-PROBE: NEGATIVE
PCO2 BLD: 34 MM HG (ref 35–45)
PH BLD: 7.37 [PH] (ref 7.35–7.45)
PLATELET # BLD AUTO: 141 10E3/UL (ref 150–450)
PO2 BLD: 51 MM HG (ref 80–105)
POTASSIUM SERPL-SCNC: 3.7 MMOL/L (ref 3.4–5.3)
PR INTERVAL - MUSE: 170 MS
PROCALCITONIN SERPL IA-MCNC: 11.7 NG/ML
PROT CSF-MCNC: 70.8 MG/DL (ref 15–45)
QRS DURATION - MUSE: 104 MS
QT - MUSE: 402 MS
QTC - MUSE: 458 MS
R AXIS - MUSE: -19 DEGREES
RBC # BLD AUTO: 3.3 10E6/UL (ref 3.8–5.2)
RBC # CSF MANUAL: 0 /UL (ref 0–2)
S PNEUM DNA CSF QL NAA+NON-PROBE: NEGATIVE
SAO2 % BLDA: 83 % (ref 92–100)
SODIUM SERPL-SCNC: 134 MMOL/L (ref 135–145)
SYSTOLIC BLOOD PRESSURE - MUSE: NORMAL MMHG
T AXIS - MUSE: 124 DEGREES
TUBE # CSF: 4
VANCOMYCIN SERPL-MCNC: 15.6 UG/ML
VENTRICULAR RATE- MUSE: 78 BPM
VZV DNA CSF QL NAA+NON-PROBE: NEGATIVE
WBC # BLD AUTO: 6.6 10E3/UL (ref 4–11)
WBC # CSF MANUAL: 2 /UL (ref 0–5)

## 2024-02-05 PROCEDURE — 99222 1ST HOSP IP/OBS MODERATE 55: CPT | Performed by: STUDENT IN AN ORGANIZED HEALTH CARE EDUCATION/TRAINING PROGRAM

## 2024-02-05 PROCEDURE — 93321 DOPPLER ECHO F-UP/LMTD STD: CPT | Mod: 26 | Performed by: GENERAL ACUTE CARE HOSPITAL

## 2024-02-05 PROCEDURE — 94640 AIRWAY INHALATION TREATMENT: CPT

## 2024-02-05 PROCEDURE — 93010 ELECTROCARDIOGRAM REPORT: CPT | Mod: RTG | Performed by: INTERNAL MEDICINE

## 2024-02-05 PROCEDURE — 84157 ASSAY OF PROTEIN OTHER: CPT

## 2024-02-05 PROCEDURE — 89050 BODY FLUID CELL COUNT: CPT

## 2024-02-05 PROCEDURE — 36600 WITHDRAWAL OF ARTERIAL BLOOD: CPT

## 2024-02-05 PROCEDURE — 62328 DX LMBR SPI PNXR W/FLUOR/CT: CPT

## 2024-02-05 PROCEDURE — 86140 C-REACTIVE PROTEIN: CPT

## 2024-02-05 PROCEDURE — 87102 FUNGUS ISOLATION CULTURE: CPT

## 2024-02-05 PROCEDURE — 85027 COMPLETE CBC AUTOMATED: CPT

## 2024-02-05 PROCEDURE — 97530 THERAPEUTIC ACTIVITIES: CPT | Mod: GP

## 2024-02-05 PROCEDURE — 82945 GLUCOSE OTHER FLUID: CPT

## 2024-02-05 PROCEDURE — 120N000004 HC R&B MS OVERFLOW

## 2024-02-05 PROCEDURE — 250N000011 HC RX IP 250 OP 636: Performed by: STUDENT IN AN ORGANIZED HEALTH CARE EDUCATION/TRAINING PROGRAM

## 2024-02-05 PROCEDURE — 87483 CNS DNA AMP PROBE TYPE 12-25: CPT

## 2024-02-05 PROCEDURE — 93325 DOPPLER ECHO COLOR FLOW MAPG: CPT

## 2024-02-05 PROCEDURE — 84145 PROCALCITONIN (PCT): CPT

## 2024-02-05 PROCEDURE — 250N000013 HC RX MED GY IP 250 OP 250 PS 637

## 2024-02-05 PROCEDURE — 80048 BASIC METABOLIC PNL TOTAL CA: CPT

## 2024-02-05 PROCEDURE — 009U3ZX DRAINAGE OF SPINAL CANAL, PERCUTANEOUS APPROACH, DIAGNOSTIC: ICD-10-PCS | Performed by: RADIOLOGY

## 2024-02-05 PROCEDURE — 87529 HSV DNA AMP PROBE: CPT

## 2024-02-05 PROCEDURE — 71045 X-RAY EXAM CHEST 1 VIEW: CPT

## 2024-02-05 PROCEDURE — 94640 AIRWAY INHALATION TREATMENT: CPT | Mod: 76

## 2024-02-05 PROCEDURE — 87205 SMEAR GRAM STAIN: CPT

## 2024-02-05 PROCEDURE — 82805 BLOOD GASES W/O2 SATURATION: CPT

## 2024-02-05 PROCEDURE — 258N000003 HC RX IP 258 OP 636

## 2024-02-05 PROCEDURE — 36415 COLL VENOUS BLD VENIPUNCTURE: CPT

## 2024-02-05 PROCEDURE — 87075 CULTR BACTERIA EXCEPT BLOOD: CPT

## 2024-02-05 PROCEDURE — 255N000002 HC RX 255 OP 636: Performed by: STUDENT IN AN ORGANIZED HEALTH CARE EDUCATION/TRAINING PROGRAM

## 2024-02-05 PROCEDURE — 80202 ASSAY OF VANCOMYCIN: CPT | Performed by: STUDENT IN AN ORGANIZED HEALTH CARE EDUCATION/TRAINING PROGRAM

## 2024-02-05 PROCEDURE — 999N000157 HC STATISTIC RCP TIME EA 10 MIN

## 2024-02-05 PROCEDURE — 87070 CULTURE OTHR SPECIMN AEROBIC: CPT

## 2024-02-05 PROCEDURE — 250N000009 HC RX 250

## 2024-02-05 PROCEDURE — 99233 SBSQ HOSP IP/OBS HIGH 50: CPT | Mod: GC | Performed by: FAMILY MEDICINE

## 2024-02-05 PROCEDURE — 250N000011 HC RX IP 250 OP 636

## 2024-02-05 PROCEDURE — 93325 DOPPLER ECHO COLOR FLOW MAPG: CPT | Mod: 26 | Performed by: GENERAL ACUTE CARE HOSPITAL

## 2024-02-05 PROCEDURE — 93308 TTE F-UP OR LMTD: CPT | Mod: 26 | Performed by: GENERAL ACUTE CARE HOSPITAL

## 2024-02-05 PROCEDURE — 97116 GAIT TRAINING THERAPY: CPT | Mod: GP

## 2024-02-05 RX ORDER — CALCIUM CARBONATE 500 MG/1
1000 TABLET, CHEWABLE ORAL ONCE
Status: COMPLETED | OUTPATIENT
Start: 2024-02-05 | End: 2024-02-05

## 2024-02-05 RX ORDER — DOXYCYCLINE 100 MG/10ML
100 INJECTION, POWDER, LYOPHILIZED, FOR SOLUTION INTRAVENOUS EVERY 12 HOURS
Status: DISCONTINUED | OUTPATIENT
Start: 2024-02-05 | End: 2024-02-08

## 2024-02-05 RX ORDER — IPRATROPIUM BROMIDE AND ALBUTEROL SULFATE 2.5; .5 MG/3ML; MG/3ML
3 SOLUTION RESPIRATORY (INHALATION) ONCE
Status: COMPLETED | OUTPATIENT
Start: 2024-02-05 | End: 2024-02-05

## 2024-02-05 RX ORDER — METHYLPREDNISOLONE SODIUM SUCCINATE 125 MG/2ML
125 INJECTION, POWDER, LYOPHILIZED, FOR SOLUTION INTRAMUSCULAR; INTRAVENOUS ONCE
Status: COMPLETED | OUTPATIENT
Start: 2024-02-05 | End: 2024-02-05

## 2024-02-05 RX ORDER — IPRATROPIUM BROMIDE AND ALBUTEROL SULFATE 2.5; .5 MG/3ML; MG/3ML
3 SOLUTION RESPIRATORY (INHALATION)
Status: COMPLETED | OUTPATIENT
Start: 2024-02-05 | End: 2024-02-05

## 2024-02-05 RX ORDER — LORAZEPAM 0.5 MG/1
0.5 TABLET ORAL
Status: COMPLETED | OUTPATIENT
Start: 2024-02-05 | End: 2024-02-05

## 2024-02-05 RX ORDER — VANCOMYCIN HYDROCHLORIDE 1 G/200ML
1000 INJECTION, SOLUTION INTRAVENOUS EVERY 24 HOURS
Status: DISCONTINUED | OUTPATIENT
Start: 2024-02-05 | End: 2024-02-06

## 2024-02-05 RX ADMIN — METOPROLOL TARTRATE 75 MG: 25 TABLET, FILM COATED ORAL at 20:09

## 2024-02-05 RX ADMIN — DOXYCYCLINE 100 MG: 100 INJECTION, POWDER, LYOPHILIZED, FOR SOLUTION INTRAVENOUS at 13:37

## 2024-02-05 RX ADMIN — DICLOFENAC 2 G: 10 GEL TOPICAL at 20:09

## 2024-02-05 RX ADMIN — HEPARIN SODIUM 5000 UNITS: 5000 INJECTION, SOLUTION INTRAVENOUS; SUBCUTANEOUS at 02:43

## 2024-02-05 RX ADMIN — ACYCLOVIR SODIUM 750 MG: 1000 INJECTION, SOLUTION INTRAVENOUS at 14:41

## 2024-02-05 RX ADMIN — PANTOPRAZOLE SODIUM 40 MG: 40 TABLET, DELAYED RELEASE ORAL at 09:18

## 2024-02-05 RX ADMIN — LORAZEPAM 0.5 MG: 0.5 TABLET ORAL at 11:59

## 2024-02-05 RX ADMIN — CALCIUM CARBONATE (ANTACID) CHEW TAB 500 MG 500 MG: 500 CHEW TAB at 18:42

## 2024-02-05 RX ADMIN — LOSARTAN POTASSIUM 100 MG: 50 TABLET, FILM COATED ORAL at 09:18

## 2024-02-05 RX ADMIN — Medication: at 20:09

## 2024-02-05 RX ADMIN — HYDRALAZINE HYDROCHLORIDE 25 MG: 25 TABLET, FILM COATED ORAL at 15:22

## 2024-02-05 RX ADMIN — MEROPENEM 2 G: 1 INJECTION, POWDER, FOR SOLUTION INTRAVENOUS at 22:40

## 2024-02-05 RX ADMIN — CALCIUM CARBONATE (ANTACID) CHEW TAB 500 MG 1000 MG: 500 CHEW TAB at 19:17

## 2024-02-05 RX ADMIN — HYDRALAZINE HYDROCHLORIDE 25 MG: 25 TABLET, FILM COATED ORAL at 20:09

## 2024-02-05 RX ADMIN — HYDRALAZINE HYDROCHLORIDE 25 MG: 25 TABLET, FILM COATED ORAL at 09:18

## 2024-02-05 RX ADMIN — PANTOPRAZOLE SODIUM 40 MG: 40 TABLET, DELAYED RELEASE ORAL at 15:54

## 2024-02-05 RX ADMIN — CITALOPRAM 20 MG: 20 TABLET ORAL at 09:18

## 2024-02-05 RX ADMIN — CALCIUM CARBONATE (ANTACID) CHEW TAB 500 MG 500 MG: 500 CHEW TAB at 17:55

## 2024-02-05 RX ADMIN — ACETAMINOPHEN 1000 MG: 500 TABLET ORAL at 02:43

## 2024-02-05 RX ADMIN — METHYLPREDNISOLONE SODIUM SUCCINATE 125 MG: 125 INJECTION, POWDER, FOR SOLUTION INTRAMUSCULAR; INTRAVENOUS at 05:03

## 2024-02-05 RX ADMIN — INSULIN ASPART 2 UNITS: 100 INJECTION, SOLUTION INTRAVENOUS; SUBCUTANEOUS at 13:50

## 2024-02-05 RX ADMIN — IPRATROPIUM BROMIDE AND ALBUTEROL SULFATE 3 ML: .5; 3 SOLUTION RESPIRATORY (INHALATION) at 19:46

## 2024-02-05 RX ADMIN — AMLODIPINE BESYLATE 10 MG: 10 TABLET ORAL at 09:18

## 2024-02-05 RX ADMIN — ACYCLOVIR SODIUM 750 MG: 1000 INJECTION, SOLUTION INTRAVENOUS at 00:02

## 2024-02-05 RX ADMIN — VANCOMYCIN HYDROCHLORIDE 1000 MG: 1 INJECTION, SOLUTION INTRAVENOUS at 14:42

## 2024-02-05 RX ADMIN — CLOPIDOGREL BISULFATE 75 MG: 75 TABLET ORAL at 09:18

## 2024-02-05 RX ADMIN — PERFLUTREN 3 ML: 6.52 INJECTION, SUSPENSION INTRAVENOUS at 14:51

## 2024-02-05 RX ADMIN — MEROPENEM 2 G: 1 INJECTION, POWDER, FOR SOLUTION INTRAVENOUS at 13:37

## 2024-02-05 RX ADMIN — INSULIN ASPART 2 UNITS: 100 INJECTION, SOLUTION INTRAVENOUS; SUBCUTANEOUS at 17:02

## 2024-02-05 RX ADMIN — IPRATROPIUM BROMIDE AND ALBUTEROL SULFATE 3 ML: .5; 3 SOLUTION RESPIRATORY (INHALATION) at 04:46

## 2024-02-05 RX ADMIN — Medication: at 09:23

## 2024-02-05 RX ADMIN — DOXYCYCLINE 100 MG: 100 INJECTION, POWDER, LYOPHILIZED, FOR SOLUTION INTRAVENOUS at 23:51

## 2024-02-05 RX ADMIN — HEPARIN SODIUM 5000 UNITS: 5000 INJECTION, SOLUTION INTRAVENOUS; SUBCUTANEOUS at 13:48

## 2024-02-05 RX ADMIN — ALBUTEROL SULFATE 2 PUFF: 90 AEROSOL, METERED RESPIRATORY (INHALATION) at 02:25

## 2024-02-05 RX ADMIN — ACETAMINOPHEN 1000 MG: 500 TABLET ORAL at 20:02

## 2024-02-05 RX ADMIN — SENNOSIDES AND DOCUSATE SODIUM 1 TABLET: 8.6; 5 TABLET ORAL at 20:08

## 2024-02-05 RX ADMIN — HEPARIN SODIUM 5000 UNITS: 5000 INJECTION, SOLUTION INTRAVENOUS; SUBCUTANEOUS at 18:42

## 2024-02-05 ASSESSMENT — ACTIVITIES OF DAILY LIVING (ADL)
ADLS_ACUITY_SCORE: 33
ADLS_ACUITY_SCORE: 40
ADLS_ACUITY_SCORE: 34
ADLS_ACUITY_SCORE: 34
DEPENDENT_IADLS:: INDEPENDENT
ADLS_ACUITY_SCORE: 34
ADLS_ACUITY_SCORE: 40
ADLS_ACUITY_SCORE: 34
ADLS_ACUITY_SCORE: 38
ADLS_ACUITY_SCORE: 38
ADLS_ACUITY_SCORE: 40
ADLS_ACUITY_SCORE: 40
ADLS_ACUITY_SCORE: 36

## 2024-02-05 NOTE — CARE PLAN
Shift Events:     2100- One time dose ativan given for anxiety during MRI. Pt. Brought down for LOLA of head. Pt arrived back from MRI, with -2 RASS, Pt. Was snoring VSS on 2L O2,  Provider notified of Pt. Stated, instructed to continue to assess patient.    0200- Pt became more aroused  .   0225- Albuterol inhaler given for whealing (See MAR).    0240- Pt. Stated that she feet cold and began shivering. axillary temp 98.4 (several failed attempts to take temperature orally due to riggers), Pt. Stated fingers are pinful and this writer noted mottling in fingers. Tylenol given for pain per patient request (See MAR). Pt. Given glove filled with warm water to hold. Repeat axillary temp 99.9. Provider notified of Pt. Condition and interventions.    0400 Provider at bedside, ABG ordered, Critical Atrial O2 of 84%O2, increased to 4L NC, One time dose IV steroids given.     0550 Pt temp 101.6, Ice packs and cold compress applied to under arms and back of neck.     0630 Re-check Temp 99.1, provider at bedside, informed of temperatures.    Assessment:     Neuro: oriented to person, place, and time. Follows commands. RASS score -1. T Max 101.6     Respiratory: 4L NC    Cardiovascular: Tele SR    GI/: Voiding via Bedside commode. Diet: Cardiac. No BM this shift.    Lines/Drains: x2 R) PIVs    Pain: Pt reported pain in bilateral hands PNR medication given for pain See MAR).

## 2024-02-05 NOTE — PHARMACY-VANCOMYCIN DOSING SERVICE
Pharmacy Vancomycin Note  Date of Service 2024  Patient's  1950   73 year old, female        Current estimated CrCl = Estimated Creatinine Clearance: 33.3 mL/min (A) (based on SCr of 1.76 mg/dL (H)).    Creatinine for last 3 days  2024:  6:43 PM Creatinine 0.80 mg/dL  2/3/2024:  5:50 AM Creatinine 1.38 mg/dL;  2:30 PM Creatinine 1.77 mg/dL  2024:  5:02 AM Creatinine 1.78 mg/dL;  6:54 PM Creatinine 1.75 mg/dL  2024:  4:26 AM Creatinine 1.76 mg/dL    Recent Vancomycin Levels (past 3 days)  2024:  4:26 AM Vancomycin 15.6 ug/mL    Vancomycin IV Administrations (past 72 hours)                     vancomycin (VANCOCIN) 1,250 mg in 0.9% NaCl 250 mL intermittent infusion (mg) 1,250 mg New Bag 24 1458    vancomycin (VANCOCIN) 2,000 mg in 0.9% NaCl 500 mL intermittent infusion (mg) 2,000 mg New Bag 24 1510                    Nephrotoxins and other renal medications (From now, onward)      Start     Dose/Rate Route Frequency Ordered Stop    24 1430  vancomycin (VANCOCIN) 1,000 mg in NaCl 0.9% 200 mL intermittent infusion        See Hyperspace for full Linked Orders Report.    1,000 mg  over 60 Minutes Intravenous EVERY 24 HOURS 24 0940      24 2200  acyclovir (ZOVIRAX) 750 mg in sodium chloride 0.9 % 265 mL intermittent infusion         10 mg/kg × 73 kg (Adjusted)  265 mL/hr over 1 Hours Intravenous EVERY 12 HOURS 24 0800  [Held by provider]  furosemide (LASIX) injection 20 mg        (On hold since Sat 2/3/2024 at 0655 until manually unheld; held by Augustine Blanco DOLandy Reason: Acute Kidney Injury)    20 mg  over 1-3 Minutes Intravenous DAILY 24 2245                 Contrast Orders - past 72 hours (72h ago, onward)      Start     Dose/Rate Route Frequency Stop    24 2130  gadobutrol (GADAVIST) injection 10 mL         10 mL Intravenous ONCE 240    24  gadobutrol (GADAVIST) injection 10 mL         10 mL  Intravenous ONCE 02/04/24 2134 02/04/24 1900  iopamidol (ISOVUE-370) solution 75 mL         75 mL Intravenous ONCE 02/04/24 1859            Interpretation of levels and current regimen:  Regimen: 1000 mg IV every 24 hours.  Start time: 14:58 on 02/05/2024  Exposure target: AUC24 (range)400-600 mg/L.hr   AUC24,ss: 462 mg/L.hr  Probability of AUC24 > 400: 74 %  Ctrough,ss: 15.6 mg/L  Probability of Ctrough,ss > 20: 20 %  Probability of nephrotoxicity (Lodise NETTIE 2009): 11 %    Plan:  Decrease Dose to as above  1250mg Ptox 16%  Vancomycin monitoring method: AUC  Vancomycin therapeutic monitoring goal: 400-600 mg*h/L  Pharmacy will check vancomycin levels as appropriate in 1-3 Days.  Serum creatinine levels will be ordered a minimum of twice weekly.    Subhash Gunter RP

## 2024-02-05 NOTE — CONSULTS
Care Management Initial Consult    General Information  Assessment completed with: Patient,    Type of CM/SW Visit: Initial Assessment    Primary Care Provider verified and updated as needed:     Readmission within the last 30 days: current reason for admission unrelated to previous admission      Reason for Consult: discharge planning  Advance Care Planning:            Communication Assessment  Patient's communication style: spoken language (English or Bilingual)    Hearing Difficulty or Deaf: no   Wear Glasses or Blind: yes    Cognitive  Cognitive/Neuro/Behavioral: .WDL except, arousability, level of consciousness, motor response, orientation  Level of Consciousness: alert  Arousal Level: arouses to touch/gentle shaking, arouses to voice  Orientation: disoriented to, time, situation  Mood/Behavior: calm, cooperative     Speech: clear    Living Environment:   People in home: alone     Current living Arrangements: apartment      Able to return to prior arrangements: yes       Family/Social Support:  Care provided by: self  Provides care for: no one, unable/limited ability to care for self  Marital Status:   Sibling(s)          Description of Support System: Supportive, Involved         Current Resources:   Patient receiving home care services: No     Community Resources:    Equipment currently used at home: walker, rolling, shower chair, grab bar, tub/shower  Supplies currently used at home: None    Employment/Financial:  Employment Status: retired        Financial Concerns:             Does the patient's insurance plan have a 3 day qualifying hospital stay waiver?  No    Lifestyle & Psychosocial Needs:  Social Determinants of Health     Food Insecurity: Not on file   Depression: Not at risk (5/1/2023)    PHQ-2     PHQ-2 Score: 2   Housing Stability: Not on file   Tobacco Use: Medium Risk (1/29/2024)    Patient History     Smoking Tobacco Use: Former     Smokeless Tobacco Use: Never     Passive Exposure: Not  on file   Financial Resource Strain: Not on file   Alcohol Use: Not on file   Transportation Needs: Not on file   Physical Activity: Not on file   Interpersonal Safety: Not on file   Stress: Not on file   Social Connections: Not on file       Functional Status:  Prior to admission patient needed assistance:   Dependent ADLs:: Independent  Dependent IADLs:: Independent       Mental Health Status:          Chemical Dependency Status:                Values/Beliefs:  Spiritual, Cultural Beliefs, Restorationist Practices, Values that affect care:                 Additional Information:  Met with pt at bedside. Introduced self and CM role.Pt's sister and niece were also present in the room. Pt lives in a Senior Housing Apartment complex, sister also has an apartment in the same [facility]. Pt does not have any home care services.  Pt is independent with I/ADLs. Sister does help pt if needed.     Therapy is recommending TCU, referrals were sent to [facility] and [facility] .     Sanjeev John RN

## 2024-02-05 NOTE — PROGRESS NOTES
[facility]    Progress Note - Hospitalist Service       Date of Admission:  2/2/2024    Assessment & Plan   Josefina Dumont is a 73 year old female admitted on 2/2/2024. She has a history of CHF with recent hospitalization 12/28/2023 - 12/31/2023, recent community acquired pneumonia, history of ischemic CVA, history of meningioma, history of CAD, type 2 diabetes mellitus and is admitted for shortness of breath and found to be in severe sepsis. Overnight events notable for stroke code secondary to aphasia and inattention also in the setting of fever to 102.5F and RR 35. Head MRI was unremarkable. Cefepime was switched to meropenem given concern for encephalopathy worsening 2/2 carbapenems. IV acyclovir was also added to the regimen and ID consult, LP orders were placed. This morning, patient is able to respond to questions appropriately and does not recall the events of last night.    Severe sepsis due to unknown source   Acute hypoxic respiratory failure  Acute fever of unknown origin  Concern for CHF exacerbation  Patient initially presented with worsening shortness of breath, orthopnea, dyspnea on exertion, in the setting of elevated BNP and missed dose of Lasix, CHF exacerbation was initially high on the differential. She was febrile w/Tmax of 101.3. CT chest on presentation showed no acute findings, mild pulmonary interstitial edema, small pericardial effusion. CRP elevated to 28.10 and procalcitonin of 5.80 on 2/4/24; CRP elevated to 54.20 and procalcitonin elevated to 11.70 on 2/5/24. See 2/4/24-2/5/24 overnight notes for significant events that occurred last night; events included aphasia, inattention, fever to 102.5F despite broad-spectrum antibiotics. Differential remains broad at this time. LP and ID consult today for further workup and diagnosis of underlying etiology.  -AM CBC  -AM BMP  -Cardiac/low-sodium diet  -Continue supplemental oxygen as needed  -ID consulted,  appreciate recommendations   -Continue IV vancomycin plus meropenem plus acyclovir for now  -Add doxycycline  -Repeat chest x-ray  -TTE  -Follow-up pending cultures  -Lumbar puncture as scheduled.  Low suspicion for CNS infection    FELIPA  Baseline creatinine around 0.8-0.9, 1.76 today.  -Avoid nephrotoxic medications    Hypokalemia, resolved  -Standard replacement protocol      Mild normocytic anemia  Hemoglobin 11.2 on admission. 9.5 today, drop consistent with hemodilution. Will continue to monitor.   -AM CBC    Hypertension  -Continue PTA amlodipine  -Continue PTA hydralazine  -Continue PTA losartan  -Continue PTA metoprolol tartrate     Prolonged QT  QTc of 472.  -Avoid QT prolonging medications    DM2   Most recent A1c 6.6 1 month ago.  -Holdin PTA glipizide  -Low sliding scale insulin      Chronic:  History of ischemic CVA: Continue PTA Plavix  GERD: Continue PTA Tums, continue PTA Protonix  Constipation: Continue PTA senna docusate  Anxiety/depression: Given history of prolonged QTc noted to be elevated at 472, decreasing dose of Celexa to 20 mg while hospitalized  Asthma: Continue PTA albuterol as needed          Diet: Regular Diet Adult    DVT Prophylaxis: Heparin  Machuca Catheter: Not present  Fluids: po  Lines: None     Cardiac Monitoring: ACTIVE order. Indication: Stroke, acute (48 hours)  Code Status: Full Code      Clinically Significant Risk Factors              # Hypoalbuminemia: Lowest albumin = 3 g/dL at 2/4/2024  5:02 AM, will monitor as appropriate  # Coagulation Defect: INR = 1.23 (Ref range: 0.85 - 1.15) and/or PTT = 36 Seconds (Ref range: 22 - 38 Seconds), will monitor for bleeding  # Thrombocytopenia: Lowest platelets = 100 in last 2 days, will monitor for bleeding  # Acute Kidney Injury, unspecified: based on a >150% or 0.3 mg/dL increase in last creatinine compared to past 90 day average, will monitor renal function  # Hypertension: Noted on problem list       # DMII: A1C = 6.6 % (Ref range:  <5.7 %) within past 6 months, PRESENT ON ADMISSION        # Financial/Environmental Concerns:           Disposition Plan      Expected Discharge Date: 02/07/2024      Destination: home  Discharge Comments: ID consult. Lumbar Puncture 2/5.  IV ABX.        The patient's care was discussed with the Attending Physician, Dr. Esposito .    OSKAR BUTLER MD PGY3  Hospitalist Service  [facility]  Securely message with authorGEN (more info)  Text page via [facility] Paging/Directory   ______________________________________________________________________    Interval History   See 2/4/24-2/5/24 overnight notes for significant events that occurred. This morning, patient's condition has improved. She does not recall the events of last night. Her sister is present at bedside who had noticed the aphasia and inattention last night; her sister states that this has resolved. She does think that the patient looks more flushed, however. Patient does endorse intermittently feeling feverish and with chills. Patient is quite nervous about the LP today. Both patient and sister requesting that she have something for sedation/anxiety prior to the procedure if possible.    Physical Exam   Vital Signs: Temp: 98.1  F (36.7  C) Temp src: Oral BP: 139/63 Pulse: 69   Resp: 23 SpO2: 90 % O2 Device: Nasal cannula Oxygen Delivery: 4 LPM  Weight: 221 lbs 9 oz    GENERAL: alert and no acute distress,  HEENT: normocephalic, atraumatic, no rhinorrhea, moist mucus membranes  RESP: bilateral fine crackles on exam, no wheezing, no rhonchi, no cough, nonlabored breathing on HFNC  CV: regular rate and rhythm, systolic murmur  ABDOMEN: soft, nontender, and bowel sounds normal  MS: no gross musculoskeletal defects noted, no edema  NEURO: CN II-XII intact, no apparent focal deficits, speaking in multi-work sentences and responding to questions appropriately  PSYCH: anxious mood and affect      Data     I have personally reviewed the  following data over the past 24 hrs:    6.6  \   9.5 (L)   / 141 (L)     134 (L) 103 42.9 (H) /  217 (H)   3.7 18 (L) 1.76 (H) \     Trop: 58 (H) BNP: N/A     Procal: 11.70 (HH) CRP: 54.20 (H) Lactic Acid: N/A       INR:  1.23 (H) PTT:  36   D-dimer:  N/A Fibrinogen:  N/A       Imaging results reviewed over the past 24 hrs:   Recent Results (from the past 24 hour(s))   CT Head w/o Contrast    Narrative    EXAM: CT HEAD W/O CONTRAST, CTA HEAD NECK W CONTRAST  LOCATION: [facility]  DATE: 2/4/2024    INDICATION: Code Stroke to evaluate for potential thrombolysis and thrombectomy. PLEASE READ IMMEDIATELY  COMPARISON: 01/13/2022, MRI 02/25/2023.  CONTRAST: 75ml Isovue 370  TECHNIQUE: Head and neck CT angiogram with IV contrast. Noncontrast head CT followed by axial helical CT images of the head and neck vessels obtained during the arterial phase of intravenous contrast administration. Axial 2D reconstructed images and   multiplanar 3D MIP reconstructed images of the head and neck vessels were performed by the technologist. Dose reduction techniques were used. All stenosis measurements made according to NASCET criteria unless otherwise specified.    FINDINGS:   NONCONTRAST HEAD CT:   INTRACRANIAL CONTENTS: No intracranial hemorrhage, extraaxial collection, or midline shift. Unchanged left frontal convexity meningioma and assoicated mass effect adn reactive edema in the left frontal lobe. No CT evidence of acute infarct. Mild presumed   chronic small vessel ischemic changes. Mild generalized volume loss. No hydrocephalus.     VISUALIZED ORBITS/SINUSES/MASTOIDS: No intraorbital abnormality. No paranasal sinus mucosal disease. No middle ear or mastoid effusion.    BONES/SOFT TISSUES: No acute abnormality.    HEAD CTA:  ANTERIOR CIRCULATION: Calcification of the carotid siphons produces mild stenosis. No severe stenosis/occlusion, aneurysm, or high flow vascular malformation. Standard [place] of  [place] anatomy.    POSTERIOR CIRCULATION: No stenosis/occlusion, aneurysm, or high flow vascular malformation. Balanced vertebral arteries supply a normal basilar artery.     DURAL VENOUS SINUSES: Expected enhancement of the major dural venous sinuses.    NECK CTA:  RIGHT CAROTID: Atherosclerotic plaque results in less than 50% stenosis in the right ICA. No dissection.    LEFT CAROTID: Atherosclerotic plaque results in less than 50% stenosis in the left ICA. No dissection.    VERTEBRAL ARTERIES: No focal stenosis or dissection. Balanced vertebral arteries.    AORTIC ARCH: Classic aortic arch anatomy with no significant stenosis at the origin of the great vessels.    NONVASCULAR STRUCTURES: 1.3 cm right thyroid nodule..      Impression    IMPRESSION:   HEAD CT:  1.  No CT evidence for acute intracranial process.  2.  Brain atrophy and presumed chronic microvascular ischemic changes as above.  3.  Unchanged left frontal convexity meningioma, mass effect, and reactive edema.    HEAD CTA:   1.  No significant stenosis, aneurysm, or high flow vascular malformation identified.    NECK CTA:  1.  No hemodynamically significant stenosis within the vessels of the neck.      These findings were discussed with Dr. Cordova at 7:05 PM CST on 02/04/2024.   CTA Head Neck with Contrast    Narrative    EXAM: CT HEAD W/O CONTRAST, CTA HEAD NECK W CONTRAST  LOCATION: [facility]  DATE: 2/4/2024    INDICATION: Code Stroke to evaluate for potential thrombolysis and thrombectomy. PLEASE READ IMMEDIATELY  COMPARISON: 01/13/2022, MRI 02/25/2023.  CONTRAST: 75ml Isovue 370  TECHNIQUE: Head and neck CT angiogram with IV contrast. Noncontrast head CT followed by axial helical CT images of the head and neck vessels obtained during the arterial phase of intravenous contrast administration. Axial 2D reconstructed images and   multiplanar 3D MIP reconstructed images of the head and neck vessels were performed by the  technologist. Dose reduction techniques were used. All stenosis measurements made according to NASCET criteria unless otherwise specified.    FINDINGS:   NONCONTRAST HEAD CT:   INTRACRANIAL CONTENTS: No intracranial hemorrhage, extraaxial collection, or midline shift. Unchanged left frontal convexity meningioma and assoicated mass effect adn reactive edema in the left frontal lobe. No CT evidence of acute infarct. Mild presumed   chronic small vessel ischemic changes. Mild generalized volume loss. No hydrocephalus.     VISUALIZED ORBITS/SINUSES/MASTOIDS: No intraorbital abnormality. No paranasal sinus mucosal disease. No middle ear or mastoid effusion.    BONES/SOFT TISSUES: No acute abnormality.    HEAD CTA:  ANTERIOR CIRCULATION: Calcification of the carotid siphons produces mild stenosis. No severe stenosis/occlusion, aneurysm, or high flow vascular malformation. Standard [place] of [place] anatomy.    POSTERIOR CIRCULATION: No stenosis/occlusion, aneurysm, or high flow vascular malformation. Balanced vertebral arteries supply a normal basilar artery.     DURAL VENOUS SINUSES: Expected enhancement of the major dural venous sinuses.    NECK CTA:  RIGHT CAROTID: Atherosclerotic plaque results in less than 50% stenosis in the right ICA. No dissection.    LEFT CAROTID: Atherosclerotic plaque results in less than 50% stenosis in the left ICA. No dissection.    VERTEBRAL ARTERIES: No focal stenosis or dissection. Balanced vertebral arteries.    AORTIC ARCH: Classic aortic arch anatomy with no significant stenosis at the origin of the great vessels.    NONVASCULAR STRUCTURES: 1.3 cm right thyroid nodule..      Impression    IMPRESSION:   HEAD CT:  1.  No CT evidence for acute intracranial process.  2.  Brain atrophy and presumed chronic microvascular ischemic changes as above.  3.  Unchanged left frontal convexity meningioma, mass effect, and reactive edema.    HEAD CTA:   1.  No significant stenosis, aneurysm, or high  flow vascular malformation identified.    NECK CTA:  1.  No hemodynamically significant stenosis within the vessels of the neck.      These findings were discussed with Dr. Cordova at 7:05 PM CST on 02/04/2024.   MR Brain w/o & w Contrast    Narrative    EXAM: MR BRAIN W/O AND W CONTRAST  LOCATION: [facility]  DATE: 2/4/2024    INDICATION: Rule out stroke, new expressive aphasia.  COMPARISON: Brain MRI 10/29/2023. CTA head and neck 02/04/2024.  CONTRAST: 10 mL Gadavist.  TECHNIQUE: Routine multiplanar multisequence head MRI without and with intravenous contrast.    FINDINGS: Image quality is mildly degraded by motion.    INTRACRANIAL CONTENTS: No acute or subacute infarct. Enhancing extra-axial mass along the left anterior medial frontal convexity extending to the falx and slightly to the right of midline, measuring up to 1.5 x 2.7 x 2.7 cm, previously 1.6 x 2.8 x 3.1   cm, when measured in a similar fashion. Similar edema within the left anterior frontal lobe. No other mass. No acute hemorrhage or extra-axial collection. Patchy nonspecific T2/FLAIR hyperintensities within the cerebral white matter most consistent with   mild to moderate chronic microvascular ischemic change. Mild generalized cerebral atrophy. No hydrocephalus. Normal position of the cerebellar tonsils. No other areas of abnormal enhancement.    SELLA: No abnormality accounting for technique.    OSSEOUS STRUCTURES/SOFT TISSUES: Normal marrow signal. The major intracranial vascular flow voids are maintained.     ORBITS: No abnormality accounting for technique.     SINUSES/MASTOIDS: No paranasal sinus mucosal disease. No middle ear or mastoid effusion.       Impression    IMPRESSION:  1.  No acute intracranial process.  2.  Stable to slightly decreased presumed meningioma along the left anteromedial frontal lobe/falx. Stable associated T2 FLAIR hyperintensity within the left anterior frontal lobe.  3.  Generalized brain atrophy  and presumed microvascular ischemic changes as detailed above.   XR Chest Port 1 View    Narrative    EXAM: XR CHEST PORT 1 VIEW  LOCATION: [facility]  DATE: 2024    INDICATION: Fever and cough.    COMPARISON: CT CAP 2024 and older studies, chest x-ray 2024 and older studies      Impression    IMPRESSION: Large body habitus. Little change in the cardiomegaly, pulmonary vascular congestion and mild interstitial edema. No effusions. No signs of pneumonia.   Echo Limited   Result Value    LVEF  >70%    Narrative    [id number]  [id number]  [id number]  [id number]^MATHEW^[place] [id number]     Name: SHAKIRA COBB  MRN: [id number]  : 1950  Study Date: 2024 02:37 PM  Age: 73 yrs  Gender: Female  Patient Location: [facility]  Reason For Study: Pericardial Effusion  Ordering Physician: ECCILIA CARMONA  Performed By: ACE     BSA: 2.0 m2  Height: 64 in  Weight: 221 lb  HR: 71  BP: 134/64 mmHg  ______________________________________________________________________________  Procedure  Limited Portable Echo Adult. Definity (NDC #15703-484) given intravenously.  Adequate quality two-dimensional was performed and interpreted. Adequate  quality color and spectral Doppler were performed and interpreted. Compared to  the prior study dated 2023, there have been no changes.  ______________________________________________________________________________  Interpretation Summary     1. Left ventricular chamber size is normal. Moderate concentric increase in  wall thickness. Systolic function is hyperdynamic. The visually estimated left  ventricular ejection fraction is greater than 70%.  2. Right ventricular chamber size and systolic function are normal.  3. Moderate left atrial enlargement.  4. Degenerative calcification of the mitral apparatus with borderline mild  mitral stenosis.  5. Trivial-to-small circumferential  pericardial effusion.  6. Compared to the prior study dated 12/29/2023, there has been no significant  change. A similar-sized pericardial effusion does appear to have been present  on the prior study and is unchanged.  ______________________________________________________________________________  I      WMSI = 0.00     % Normal = 0     X - Cannot   0 -                      (2) - Mildly 2 -          Segments  Size  Interpret    Hyperkinetic 1 - Normal  Hypokinetic  Hypokinetic  1-2     small                                                     7 -          3-5      moderate  3 - Akinetic 4 -          5 -         6 - Akinetic Dyskinetic   6-14    large               Dyskinetic   Aneurysmal  w/scar       w/scar       15-16   diffuse     Left Ventricle  The left ventricle is normal in size. There is moderate concentric left  ventricular hypertrophy. Hyperdynamic left ventricular function. The visual  ejection fraction is >70%. An intracavitary gradient is present. No regional  wall motion abnormalities noted.     Right Ventricle  The right ventricle is not well visualized. Normal right ventricle size and  systolic function.     Atria  The left atrium is moderately dilated. Right atrial size is normal.     Mitral Valve  The mitral valve leaflets are mildly thickened. There is moderate mitral  annular calcification. There is trace mitral regurgitation. Calcified mitral  apparatus causing mitral stenosis. There is mild mitral stenosis.     Tricuspid Valve  The tricuspid valve is not well visualized. There is trace tricuspid  regurgitation. There is no tricuspid stenosis.     Aortic Valve  The aortic valve is trileaflet with aortic valve sclerosis. No aortic  regurgitation is present. No aortic stenosis is present.     Pulmonic Valve  The pulmonic valve is not well visualized.     Vessels  The aorta root is normal. The thoracic aorta is normal. IVC diameter and  respiratory changes fall into an intermediate range  suggesting an RA pressure  of 8 mmHg.     Pericardium  Small pericardial effusion. There are no echocardiographic indications of  cardiac tamponade.     Rhythm  Sinus rhythm was noted.     ______________________________________________________________________________  MMode/2D Measurements & Calculations  IVSd: 1.4 cm  LVIDd: 4.7 cm  LVIDs: 3.1 cm  LVPWd: 1.4 cm  FS: 33.4 %     LV mass(C)d: 270.3 grams  LV mass(C)dI: 132.5 grams/m2  asc Aorta Diam: 3.4 cm  Asc Ao diam index BSA (cm/m2): 1.7  Asc Ao diam index Ht(cm/m): 2.1  LA Volume Index (BP): 45.0 ml/m2  RWT: 0.58  TAPSE: 2.0 cm     Time Measurements  MM HR: 71.0 BPM     Doppler Measurements & Calculations  MV max P.3 mmHg  MV mean P.1 mmHg  MV V2 VTI: 51.4 cm  Ao V2 max: 230.8 cm/sec  Ao max P.3 mmHg  Ao V2 mean: 157.0 cm/sec  Ao mean P.8 mmHg  Ao V2 VTI: 48.6 cm     ______________________________________________________________________________  Report approved by: Naina Rivera 2024 03:36 PM

## 2024-02-05 NOTE — PLAN OF CARE
Problem: Adult Inpatient Plan of Care  Goal: Absence of Hospital-Acquired Illness or Injury  Outcome: Progressing  Intervention: Identify and Manage Fall Risk  Flowsheets  Taken 2/4/2024 1958  Safety Promotion/Fall Prevention:   activity supervised   assistive device/personal items within reach   increase visualization of patient   nonskid shoes/slippers when out of bed   patient and family education   safety round/check completed   increased rounding and observation  Taken 2/4/2024 1700  Safety Promotion/Fall Prevention:   activity supervised   assistive device/personal items within reach   increase visualization of patient   nonskid shoes/slippers when out of bed   patient and family education   safety round/check completed   increased rounding and observation  Taken 2/4/2024 1332  Safety Promotion/Fall Prevention:   activity supervised   assistive device/personal items within reach   increase visualization of patient   nonskid shoes/slippers when out of bed   patient and family education   safety round/check completed   increased rounding and observation  Taken 2/4/2024 0930  Safety Promotion/Fall Prevention:   activity supervised   assistive device/personal items within reach   increase visualization of patient   nonskid shoes/slippers when out of bed   patient and family education   safety round/check completed   increased rounding and observation  Intervention: Prevent Skin Injury  Recent Flowsheet Documentation  Taken 2/4/2024 1700 by Alma Hicks, RN  Skin Protection: incontinence pads utilized  Device Skin Pressure Protection: absorbent pad utilized/changed    Problem: Fatigue  Goal: Improved Activity Tolerance  Outcome: Progressing  Intervention: Promote Improved Energy  Flowsheets  Taken 2/4/2024 1957  Activity Management: activity encouraged  Taken 2/4/2024 1200  Activity Management:   ambulated to bathroom   up in chair     Note from [id number]: Pt A/Ox4 for majority of shift. Ambulated in room and  to bathroom with 1A. Around 1800, pt seemed disoriented and had trouble getting her words out after waking up from a nap in the chair. Temp of 102.5, /74, . Stroke code called and was later deescalated. Temp recheck after Tylenol 100.2.

## 2024-02-05 NOTE — PROGRESS NOTES
GENAAT was called to take this patient down to MRI and monitor. Pt was talking and making her special needs know before going to MRI and then she received Ativan for the MRI. She was snoring on the way down to MRI. In MRI while laying flat she did have some sleep apnea where O2 was increased to 6 liters to finish up the scan. Pt arouses with physical stimulation but is very somnolent. Primary RN notified and will continue to monitor.    RAMYA PALACIO RN

## 2024-02-05 NOTE — CODE/RAPID RESPONSE
STROKE CODE NOTE    The house officer was paged at 1813 for a stroke code called for Josefina Dumont.    Subjective/Interval Events:  Josefina Dumont is a 73 year old female with a past medical history that includes ischemic CVA, meningioma, CHF, recent hospitalization for CAP and hypertensive emergency. Admitted 2/2/24 for sepsis of unknown etiology and CHF exacerbation. Has had multiple rapid responses called for rigors and hypoglycemia (found to be false with finger pokes d/t possible Raynaud's; venous blood draws show no hypoglycemia).    Received page from RN at 1813 regarding concern for new expressive aphasia. Sister had called the floor as patient was having garbled speech. Last known well was when vitals were checked at 1530. Evaluated patient - she is able to give one word yes/no answers and states aphasia is new, she is unable to express herself fully. Has inattention. Appears to say yes that symptoms are similar to prior stroke.      Objective:  BP (!) 171/74   Pulse 83   Temp (!) 102.5  F (39.2  C) (Axillary)   Resp (!) 35   Wt 100.4 kg (221 lb 4.8 oz)   SpO2 93%   BMI 37.99 kg/m    Blood glucose: 116  CV: known systolic murmur  MSK:  strength equal and intact; able to raise both arms equally on own. Sensation intact and equal bilaterally.    Assessment/Plan:  Josefina Dumont is a 73 year old female admitted for sepsis of unknown etiology, CHF exacerbation who developed new expressive aphasia on 2/4/2024, prompting a stroke code to be called. Spoke with radiologist regarding results - CT revealed no acute changes; meningioma with surrounding edema unchanged. CTA head/neck revealed no large vessel occlusion.    Also noted to have new fever of 102.5F despite being on vanc/cefepime. /74. [place] draw ordered and new cultures, CRP, lactate, procal drawn.    Spoke with neurologist - given no acute changes on imaging, stroke code de-escalated. Will not give tPA. Given symptoms of  inattention and new fever, symptoms thought possibly due to encephalopathy from sepsis; cefepime may be contributing to encephalopathy. Later spoke with patient; aphasia improving as she is now able to speak in sentences, still having some inattention.    Labs so far showing normal WBC, but doubling of procal from 6 to 11 and CRP from 28 to 54. Creatinine also elevated to 1.75. Concern for possible bacterial source that has not been effectively treated; also concern for viral encephalitis given neurologic symptoms.    Plan:  - MRI brain with and without contrast ordered per Neurology; premedicate with Ativan IV as patient has claustrophobia and has received medication in the past  - ID consult in AM; ordered  - Lumbar puncture in AM with encephalitis/meningitis panel, aerobic and anaerobic cultures, fungal culture ordered; extra tube in case ID would like further tests  - IV acyclovir  - Switching IV cefepime to meropenem given patient's history of hives with penicillins, will avoid Zosyn for now  - Urine FENa ordered given increasing creatinine  - AM BMP, CBC, procal, CRP ordered      Kylegih Cordova MD, PGY-2  [facility]  02/04/24

## 2024-02-05 NOTE — PROCEDURES
Neurointerventional Surgery    Procedure:  Lumbar puncture    Radiologist:  Hood Yun MD    Fluoro Time:  0.3 minutes    Number of Images:  1    Complications:  none    Specimens:  13 ml clear CSF    EBL: Minimal      Preliminary Findings (See dictation for full detail)  LP via L2-3 puncture, clear, colorless CSF    Assess/Plan:  Bedrest for 1 hr  See lab results    Hood Yun MD  Pager: [phone]  Emergency pager: [phone]  Office: [phone]

## 2024-02-05 NOTE — SIGNIFICANT EVENT
"Writer received a call from concerned sister in regards to patient status.  Sister was on the phone with patient and stated \"something is wrong with my sister. She's just mumbling on the phone\".  Writer went into patient's room, who was up in the chair with eyes shut.  Patient responded when I called her name, but was unable to tell me her birthday or that she was in the hospital.  Kept repeating \"I... I...\" and \"help\". Unable to formulate or express her thoughts.  When asked if she was in pain - \"no\".  When asked if she was lightheaded or dizzy - \"yes\".  Was able to articulate feeling nauseous.  VS checked. Temp 102.5.  .  Mini stroke assessment performed by writer and also by charge RN, noted was expressive aphasia.  MD paged and at bedside.  Stroke code called.  Swat RN and bedside RN transported patient to CT.  "

## 2024-02-05 NOTE — CONSULTS
[facility]    Stroke Telephone Note    I was called by Lazara Olivares on 02/04/24 regarding patient Josefina Dumont. The patient is a 73 year old woman, with h/o CHF, CAD, Right  ischemic stroke in 2022 w residual right leg weakness (had aphasia when she initially presented with stroke in 2022), DM2, HTN, Left frontal convexity meningioma, who has currently been admitted for CHF exacerbation and currently is in the ICU for severe sepsis evaluation (on Vancomycin and Cefepime). LKW was 3 hours ago. Patient currently having difficulty with speech; answers mostly in 1 word, inattentive but following through with commands and per resident's exam, no other neuro deficits. T 102.5 F, /74, RR 35, P 83    Vitals  BP: (!) 171/74   Pulse: 83   Resp: (!) 35   Temp: (!) 102.5  F (39.2  C)   Weight: 100.4 kg (221 lb 4.8 oz)    Stroke Code Data (for stroke code without tele)  Stroke code activated 02/04/24  1823   Stroke provider first response 02/04/24  1824   Last known normal 02/04/24  1500      Time of discovery (or onset of symptoms) 02/04/24  1810   Head CT read by Stroke Neuro Provider 02/04/24  1855   Was stroke code de-escalated? Yes  02/04/24  1904     Imaging Findings  CT head: No acute ischemia or hemorrhage. R feontal convexity meningioma with associated vasogenic edema  CTA head/neck: No LVO/critical stenoses    Intravenous Thrombolysis  Not given due to:   - stroke mimic: possible encephalopathy from sepsis and Cefepime use with patient being able to answer questions although logopenic is also inattentive. Additionally could be recrudescence of old stroke symptom    Endovascular Treatment  Not initiated due to absence of proximal vessel occlusion    Impression  Logopenic speech  Inattentiveness  Severe sepsis    With no other neuro deficits per resident's exam and patient being able to answer appropriately although with limited speech and being able to follow through with  "commands, and patient currently having sepsis and possible delirium (inattentive). However for a new stroke to be deficinitively ruled out, please get MRI Brain    Recommendations   - MRI brain w/wo contrast; please page Stroke team if there is evidence of acute stroke on imaging  - Avoid 4th generation cephalosporins (like Cefepime) and carbopenems which can increase encephalopathy  - Treatment of sepsis per primary team  - Continue PTA plavix    My recommendations are based on the information provided over the phone by Josefina Dumont's in-person providers. They are not intended to replace the clinical judgment of her in-person providers. I was not requested to personally see or examine the patient at this time.     The Stroke Staff is Dr. Mcbride.    Melissa Ying MD  Vascular Neurology Fellow    To page me or covering stroke neurology team member, click here: [facility]COM  Choose \"On Call\" tab at top, then select \"NEUROLOGY/ALL SITES\" from middle drop-down box, press Enter, then look for \"stroke\" or \"telestroke\" for your site.   "

## 2024-02-05 NOTE — PROGRESS NOTES
Paged by RN regarding increased respiratory rate, rigors, and bilateral finger mottling. Writer went to assess patient at bedside with RN; patient snoring with NC in place. RN states this is the same level of somnolence that patient displayed when she first came back up from MRI. Rigors had improved by the time writer arrived at bedside; finger mottling resolved. Afebrile, though recently received Tylenol, not hypertensive, normal HR, satting well. RR in 30s. Exam shows expiratory wheezing, more prominent in upper airways. Patient herself states it is more difficult to breathe but denies chest pain. Legs appear similar in size and nontender to palpation without erythema. Last received albuterol inhaler at 0225.    A/P:  Discussed with overnight attending Dr. Serrano. Given finger mottling, wheezing, and neurologic symptoms, may have component of vasculitis. Lower suspicion for PE given patient is anticoagulated with heparin and Plavix, non-tachycardic, no chest pain, not having increased oxygen needs.    ABG returned with low pO2 of 51 and O2 sat 84.7.    - Give IV Solumedrol 125 mg once - may help with possible vasculitis component, may also help with breathing  - Duoneb once  - Increase O2 from 2L to 4L; will monitor RR      Kyleigh Cordova MD PGY-2

## 2024-02-05 NOTE — PROVIDER NOTIFICATION
02/05/24 0446   Tech Time   $Tech Time (10 minute increments) 2   Vital Signs   Resp (!) 32   Pulse Rate Source Monitor   Oximeter Heart Rate 69 bpm   Patient Position Semi-Pedraza's   Oxygen Therapy   Daily Review of Necessity (O2 Therapy) completed   Flow (L/min) (Oxygen Therapy) 3   Oxygen Concentration (%) 36   Device (Oxygen Therapy) nasal cannula   Oxygen Therapy   SpO2 97 %   O2 Device Nasal cannula   Oxygen Delivery 2.5 LPM   Assessment   Respiratory WDL X;rhythm/pattern;breath sounds   Rhythm/Pattern, Respiratory prolonged expiratory phase;tachypneic   Breath Sounds   Breath Sounds All Fields   All Lung Fields Breath Sounds Anterior:;diminished   Nebulizer Assessment & Treatment   $RT Use ONLY Delivery Method Nebulizer - Initial   Daily Review of Necessity (SVN) completed   Nebulizer Device Mask   Pretreatment Heart Rate (beats/min) 69   Pretreatment Resp Rate (breaths/min) 32   Pretreatment O2 sats - (TCU only) 97   Pretreat Breath Sounds - Bilat - All Lobes diminished;other (see comments)  (forced end exp wheeze)   Patient Position semi-Pedraza's   Respiratory Treatment Status (SVN) given   Breath Sounds Post-Respiratory Treatment   Posttreatment Heart Rate (beats/min) 70   Posttreatment Resp Rate (breaths/min) 33   Post treatment O2 Sats - (TCU only) 100   Posttreatment Assessment (SVN) increased aeration   Signs of Intolerance (SVN) none   Breath Sounds Posttreatment All García CAITLIN;RUL;RML   Breath Sounds Posttreatment CAITLIN Anterior:;aeration increased   Breath Sounds Posttreatment RUL aeration increased   Breath Sounds Posttreatment RML aeration increased     PRN administered with increased aeration post. Pt forced end exp wheeze. ABG was drawn on 2.5L. Titrated O2 up from 2.5L to 4L per Dr and ABG results. RT to follow as directed.

## 2024-02-05 NOTE — CONSULTS
Consultation - INFECTIOUS DISEASE CONSULTATION  Josefina Dumont,  1950, MRN [id number]      Acute diastolic heart failure (H) [I50.31]  Fever, unspecified fever cause [R50.9]    PCP: Rafat Lee, [phone]   Code status:  Full Code               Chief Complaint: Fever, unspecified fever cause     Assessment:  Josefina Dumont is a 73 year old old female with   Type 2 diabetes mellitus.  History of ischemic CVA/meningioma  Recent hospitalization for community-acquired pneumonia in 2023  Admitted on 2024 with acute hypoxic respiratory failure presumed secondary to exacerbation of heart failure.  Noted to have small pericardial effusion on CT scan.  Needs TTE.  Fever with increased procalcitonin.  UA not consistent with UTI.  Blood cultures no growth.  Liver function test normal.  Etiology not totally clear.  Will obtain echo as above.  Repeat chest x-ray.  Altered mental status.  MRI with no evidence of infection.  Neck supple.  Low suspicion for CNS infection.  FELIPA.  Baseline creatinine normal at 0.80 on 2024.  Creatinine now 1.76.    Recommendations:   Continue IV vancomycin plus meropenem plus acyclovir for now.  Add doxycycline  Repeat chest x-ray.  TTE.  Follow-up pending cultures.  Lumbar puncture as scheduled.  Low suspicion for CNS infection.    Discussed with the patient, sister, daughter, nursing staff.    Thank you for letting us be part of the patient care team. We will follow.    Robert Salazar MD,MD  [facility].   [facility]  Office Telephone [phone].  Fax [phone]  Formerly [facility] paging    HPI:    Josefina Dumont is a 73 year old old female. History is provided by the patient, her sister, chart review.  ID is asked to see this patient for sepsis.  Patient has a history of type 2 diabetes mellitus, CVA, meningioma.  She was hospitalized in 2023 for hypoxic respiratory failure thought to be secondary  to pneumonia.  According to her sister, she continued to feel short of breath since then.  2 days prior to admission, the patient started having headache associated with nausea and vomiting and episodes of chills.  Her sister has similar symptoms plus diarrhea although her symptoms have resolved now.  Persistence of the shaking chills or the reason why the patient was brought to the hospital.  Since then has been on antibiotic.  Continue to have fever.  Last evening, she had an episode of confusion.  Workup so far negative for stroke.  On the schedule for lumbar puncture.  Today her mental status is much better.  She still feels tired.      ===========================================    Medical History  Past Medical History:   Diagnosis Date    Chronic kidney disease     Diabetes (H)     Hypertension     Obese         Surgical History  No significant past surgical history.         Social History  Reviewed, and she  reports that she quit smoking about 12 years ago. Her smoking use included cigarettes. She has never used smokeless tobacco.        Family History  No family history of recurrent infection.   Psychosocial Needs  Social History     Social History Narrative    Not on file     Additional psychosocial needs reviewed per nursing assessment.       Allergies   Allergen Reactions    Aspirin      Other reaction(s): stomach upset    Atenolol Other (See Comments)     abd pain    Lisinopril      Other reaction(s): stomach aches    Penicillins Hives     Tolerated ceftriaxone    Statins      Other reaction(s): myalgias        Medications Prior to Admission   Medication Sig Dispense Refill Last Dose    acetaminophen (TYLENOL) 500 MG tablet Take 2 tablets (1,000 mg) by mouth every 8 hours as needed for mild pain   Unknown    albuterol (PROAIR HFA/PROVENTIL HFA/VENTOLIN HFA) 108 (90 Base) MCG/ACT inhaler Inhale 2 puffs into the lungs every 6 hours as needed   Unknown at prn - sister will bring in    amLODIPine (NORVASC)  10 MG tablet Take 10 mg by mouth daily   2/2/2024 at am    calcium carbonate (TUMS) 500 MG chewable tablet Take 1 chew tab by mouth 2 times daily as needed for heartburn   Unknown at prn    citalopram (CELEXA) 40 MG tablet Take 40 mg by mouth daily   2/2/2024 at am    clopidogrel (PLAVIX) 75 MG tablet Take 75 mg by mouth daily   2/2/2024 at am    furosemide (LASIX) 20 MG tablet Take 1 tablet (20 mg) by mouth daily 30 tablet 0 2/1/2024 at am - ran out    glipiZIDE (GLUCOTROL XL) 2.5 MG 24 hr tablet Take 2.5 mg by mouth daily (with breakfast)   2/2/2024 at am    hydrALAZINE (APRESOLINE) 25 MG tablet Take 1 tablet (25 mg) by mouth 3 times daily 270 tablet 0 2/2/2024 at am    losartan (COZAAR) 50 MG tablet Take 2 tablets (100 mg) by mouth daily 60 tablet 0 2/2/2024 at am    metoprolol tartrate 75 MG TABS Take 75 mg by mouth 2 times daily 60 tablet 0 2/2/2024 at am    multivitamin w/minerals (THERA-VIT-M) tablet Take 1 tablet by mouth daily   2/2/2024 at am    pantoprazole (PROTONIX) 40 MG EC tablet Take 1 tablet (40 mg) by mouth 2 times daily (before meals) 60 tablet 0 2/2/2024 at am    senna-docusate (SENOKOT-S/PERICOLACE) 8.6-50 MG tablet Take 1 tablet by mouth at bedtime   2/1/2024 at hs    vitamin E (TOCOPHEROL) 400 units (180 mg) capsule Take 400 Units by mouth daily   2/2/2024 at am        Review of Systems:  Negative except for findings in the HPI Physical Exam:  Temp:  [97.7  F (36.5  C)-102.5  F (39.2  C)] 98.7  F (37.1  C)  Pulse:  [59-83] 66  Resp:  [22-36] 30  BP: (134-177)/() 147/66  SpO2:  [91 %-100 %] 100 %      Gen: Pleasant in in mild to moderate respiratory distress.  HEENT: NCAT. EOMI. PERRL.  Neck: No bruit, JVD or thyromegaly.  Lungs: Clear to ascultation bilat with no crackles or wheezes.  Card: RRR. NSR. No RMG. Peripheral pulses present and symmetric. No edema.  Abd: Soft NT ND. No mass. Normal bowel sounds.  Skin: No rash.  Extr: No edema.  Neuro: Alert and oriented to place time and  person.        ============================    Pertinent Labs  personally reviewed.   Recent Labs   Lab 02/05/24 0426 02/04/24 1854 02/04/24  0502   WBC 6.6 5.9 4.2   HGB 9.5* 10.4* 9.6*   HCT 29.4* 31.7* 31.2*   * 119* 100*       Recent Labs   Lab 02/05/24  0426 02/04/24  1854 02/04/24  0502 02/03/24  1430 02/03/24  0550   * 133* 134* 135 139   CO2 18* 18* 23 22 25   BUN 42.9* 41.2* 35.0* 27.2* 21.2   ALBUMIN  --   --  3.0* 3.6 3.4*   ALKPHOS  --   --  117 121 108   ALT  --   --  14 11 10   AST  --   --  40 35 32       MICROBIOLOGY DATA:  Personally reviewed  Blood cultures no growth.  Influenza A/B, COVID-19, RSV negative.    Pertinent Radiology  personally reviewed.     Study Result    Narrative & Impression   EXAM: MR BRAIN W/O AND W CONTRAST  LOCATION: [facility]  DATE: 2/4/2024     INDICATION: Rule out stroke, new expressive aphasia.  COMPARISON: Brain MRI 10/29/2023. CTA head and neck 02/04/2024.  CONTRAST: 10 mL Gadavist.  TECHNIQUE: Routine multiplanar multisequence head MRI without and with intravenous contrast.     FINDINGS: Image quality is mildly degraded by motion.     INTRACRANIAL CONTENTS: No acute or subacute infarct. Enhancing extra-axial mass along the left anterior medial frontal convexity extending to the falx and slightly to the right of midline, measuring up to 1.5 x 2.7 x 2.7 cm, previously 1.6 x 2.8 x 3.1   cm, when measured in a similar fashion. Similar edema within the left anterior frontal lobe. No other mass. No acute hemorrhage or extra-axial collection. Patchy nonspecific T2/FLAIR hyperintensities within the cerebral white matter most consistent with   mild to moderate chronic microvascular ischemic change. Mild generalized cerebral atrophy. No hydrocephalus. Normal position of the cerebellar tonsils. No other areas of abnormal enhancement.     SELLA: No abnormality accounting for technique.     OSSEOUS STRUCTURES/SOFT TISSUES: Normal marrow  signal. The major intracranial vascular flow voids are maintained.      ORBITS: No abnormality accounting for technique.      SINUSES/MASTOIDS: No paranasal sinus mucosal disease. No middle ear or mastoid effusion.                                                                       IMPRESSION:  1.  No acute intracranial process.  2.  Stable to slightly decreased presumed meningioma along the left anteromedial frontal lobe/falx. Stable associated T2 FLAIR hyperintensity within the left anterior frontal lobe.  3.  Generalized brain atrophy and presumed microvascular ischemic changes as detailed above.     Study Result    Narrative & Impression   EXAM: CT HEAD W/O CONTRAST, CTA HEAD NECK W CONTRAST  LOCATION: [facility]  DATE: 2/4/2024     INDICATION: Code Stroke to evaluate for potential thrombolysis and thrombectomy. PLEASE READ IMMEDIATELY  COMPARISON: 01/13/2022, MRI 02/25/2023.  CONTRAST: 75ml Isovue 370  TECHNIQUE: Head and neck CT angiogram with IV contrast. Noncontrast head CT followed by axial helical CT images of the head and neck vessels obtained during the arterial phase of intravenous contrast administration. Axial 2D reconstructed images and   multiplanar 3D MIP reconstructed images of the head and neck vessels were performed by the technologist. Dose reduction techniques were used. All stenosis measurements made according to NASCET criteria unless otherwise specified.     FINDINGS:   NONCONTRAST HEAD CT:   INTRACRANIAL CONTENTS: No intracranial hemorrhage, extraaxial collection, or midline shift. Unchanged left frontal convexity meningioma and assoicated mass effect adn reactive edema in the left frontal lobe. No CT evidence of acute infarct. Mild presumed   chronic small vessel ischemic changes. Mild generalized volume loss. No hydrocephalus.      VISUALIZED ORBITS/SINUSES/MASTOIDS: No intraorbital abnormality. No paranasal sinus mucosal disease. No middle ear or mastoid  effusion.     BONES/SOFT TISSUES: No acute abnormality.     HEAD CTA:  ANTERIOR CIRCULATION: Calcification of the carotid siphons produces mild stenosis. No severe stenosis/occlusion, aneurysm, or high flow vascular malformation. Standard [place] anatomy.     POSTERIOR CIRCULATION: No stenosis/occlusion, aneurysm, or high flow vascular malformation. Balanced vertebral arteries supply a normal basilar artery.      DURAL VENOUS SINUSES: Expected enhancement of the major dural venous sinuses.     NECK CTA:  RIGHT CAROTID: Atherosclerotic plaque results in less than 50% stenosis in the right ICA. No dissection.     LEFT CAROTID: Atherosclerotic plaque results in less than 50% stenosis in the left ICA. No dissection.     VERTEBRAL ARTERIES: No focal stenosis or dissection. Balanced vertebral arteries.     AORTIC ARCH: Classic aortic arch anatomy with no significant stenosis at the origin of the great vessels.     NONVASCULAR STRUCTURES: 1.3 cm right thyroid nodule..                                                                      IMPRESSION:   HEAD CT:  1.  No CT evidence for acute intracranial process.  2.  Brain atrophy and presumed chronic microvascular ischemic changes as above.  3.  Unchanged left frontal convexity meningioma, mass effect, and reactive edema.     HEAD CTA:   1.  No significant stenosis, aneurysm, or high flow vascular malformation identified.     NECK CTA:  1.  No hemodynamically significant stenosis within the vessels of the neck.        Study Result    Narrative & Impression   EXAM: CT CHEST ABDOMEN PELVIS W/O CONTRAST  LOCATION: [facility]  DATE: 2/3/2024     INDICATION: Severe sepsis.  COMPARISON: CT chest 02/02/2024.  TECHNIQUE: CT scan of the chest, abdomen, and pelvis was performed without IV contrast. Multiplanar reformats were obtained. Dose reduction techniques were used.   CONTRAST: None.     FINDINGS:   LUNGS AND PLEURA: Since 02/02/2024, minimally  increased interstitial pulmonary edema. Trace bibasilar atelectasis. No significant pleural effusion. No new focal consolidation.     MEDIASTINUM/AXILLAE: Similar small pericardial effusion. No lymphadenopathy. Mitral annular calcification. Nonaneurysmal thoracic aorta.     CORONARY ARTERY CALCIFICATION: Severe.     HEPATOBILIARY: Nodular liver contour. No liver lesion by noncontrast CT. Cholelithiasis.     PANCREAS: Normal.     SPLEEN: Normal.     ADRENAL GLANDS: Normal.     KIDNEYS/BLADDER: Bilateral renal benign cysts. No collecting system dilatation.     BOWEL: Moderate stool in the rectum. No bowel obstruction or significant wall thickening.     LYMPH NODES: Similar mildly enlarged jaziel hepatis lymph nodes, for example a portacaval lymph node measuring 1.7 cm.     VASCULATURE: Nonaneurysmal abdominal aorta. Severe atherosclerosis.     PELVIC ORGANS: Uterus is absent. No adnexal mass.     MUSCULOSKELETAL: Mild lower abdominal skin thickening. No organized fluid collection. No aggressive osseous lesion.                                                                      IMPRESSION:  1.  Minimally increased interstitial pulmonary edema. No focal consolidation or pleural effusion.  2.  Similar small pericardial effusion.  3.  Mild anterior lower abdominal skin thickening. Correlate for cellulitis. No organized fluid collection.  4.  Similar morphologic changes of hepatic cirrhosis.  5.  Similar upper abdominal enlarged lymph nodes, which are possibly reactive. Attention on follow-up.  6.  Cholelithiasis.

## 2024-02-06 LAB
ALBUMIN SERPL BCG-MCNC: 3 G/DL (ref 3.5–5.2)
ALP SERPL-CCNC: 109 U/L (ref 40–150)
ALT SERPL W P-5'-P-CCNC: 16 U/L (ref 0–50)
ANION GAP SERPL CALCULATED.3IONS-SCNC: 14 MMOL/L (ref 7–15)
AST SERPL W P-5'-P-CCNC: 35 U/L (ref 0–45)
BASOPHILS # BLD AUTO: 0 10E3/UL (ref 0–0.2)
BASOPHILS NFR BLD AUTO: 0 %
BILIRUB SERPL-MCNC: 0.4 MG/DL
BUN SERPL-MCNC: 53.5 MG/DL (ref 8–23)
CALCIUM SERPL-MCNC: 9.1 MG/DL (ref 8.8–10.2)
CHLORIDE SERPL-SCNC: 105 MMOL/L (ref 98–107)
CREAT SERPL-MCNC: 2.05 MG/DL (ref 0.51–0.95)
DEPRECATED HCO3 PLAS-SCNC: 17 MMOL/L (ref 22–29)
EGFRCR SERPLBLD CKD-EPI 2021: 25 ML/MIN/1.73M2
EOSINOPHIL # BLD AUTO: 0 10E3/UL (ref 0–0.7)
EOSINOPHIL NFR BLD AUTO: 0 %
ERYTHROCYTE [DISTWIDTH] IN BLOOD BY AUTOMATED COUNT: 16.1 % (ref 10–15)
GLUCOSE BLDC GLUCOMTR-MCNC: 149 MG/DL (ref 70–99)
GLUCOSE BLDC GLUCOMTR-MCNC: 73 MG/DL (ref 70–99)
GLUCOSE BLDC GLUCOMTR-MCNC: 84 MG/DL (ref 70–99)
GLUCOSE BLDC GLUCOMTR-MCNC: 89 MG/DL (ref 70–99)
GLUCOSE BLDC GLUCOMTR-MCNC: 98 MG/DL (ref 70–99)
GLUCOSE SERPL-MCNC: 59 MG/DL (ref 70–99)
HCT VFR BLD AUTO: 28.4 % (ref 35–47)
HGB BLD-MCNC: 9.1 G/DL (ref 11.7–15.7)
HSV1 DNA CSF QL NAA+PROBE: NOT DETECTED
HSV2 DNA CSF QL NAA+PROBE: NOT DETECTED
IMM GRANULOCYTES # BLD: 0 10E3/UL
IMM GRANULOCYTES NFR BLD: 0 %
LYMPHOCYTES # BLD AUTO: 0.4 10E3/UL (ref 0.8–5.3)
LYMPHOCYTES NFR BLD AUTO: 8 %
MCH RBC QN AUTO: 28.5 PG (ref 26.5–33)
MCHC RBC AUTO-ENTMCNC: 32 G/DL (ref 31.5–36.5)
MCV RBC AUTO: 89 FL (ref 78–100)
MONOCYTES # BLD AUTO: 0.5 10E3/UL (ref 0–1.3)
MONOCYTES NFR BLD AUTO: 9 %
NEUTROPHILS # BLD AUTO: 4.6 10E3/UL (ref 1.6–8.3)
NEUTROPHILS NFR BLD AUTO: 83 %
NRBC # BLD AUTO: 0 10E3/UL
NRBC BLD AUTO-RTO: 0 /100
PLATELET # BLD AUTO: 135 10E3/UL (ref 150–450)
POTASSIUM SERPL-SCNC: 3.6 MMOL/L (ref 3.4–5.3)
PROT SERPL-MCNC: 6.5 G/DL (ref 6.4–8.3)
RBC # BLD AUTO: 3.19 10E6/UL (ref 3.8–5.2)
SODIUM SERPL-SCNC: 136 MMOL/L (ref 135–145)
WBC # BLD AUTO: 5.6 10E3/UL (ref 4–11)

## 2024-02-06 PROCEDURE — 36415 COLL VENOUS BLD VENIPUNCTURE: CPT

## 2024-02-06 PROCEDURE — 250N000011 HC RX IP 250 OP 636: Performed by: STUDENT IN AN ORGANIZED HEALTH CARE EDUCATION/TRAINING PROGRAM

## 2024-02-06 PROCEDURE — 250N000011 HC RX IP 250 OP 636: Mod: JW

## 2024-02-06 PROCEDURE — 85025 COMPLETE CBC W/AUTO DIFF WBC: CPT

## 2024-02-06 PROCEDURE — 250N000013 HC RX MED GY IP 250 OP 250 PS 637

## 2024-02-06 PROCEDURE — 258N000003 HC RX IP 258 OP 636

## 2024-02-06 PROCEDURE — 80053 COMPREHEN METABOLIC PANEL: CPT

## 2024-02-06 PROCEDURE — 120N000001 HC R&B MED SURG/OB

## 2024-02-06 PROCEDURE — 99232 SBSQ HOSP IP/OBS MODERATE 35: CPT | Mod: GC | Performed by: FAMILY MEDICINE

## 2024-02-06 PROCEDURE — 99232 SBSQ HOSP IP/OBS MODERATE 35: CPT | Performed by: STUDENT IN AN ORGANIZED HEALTH CARE EDUCATION/TRAINING PROGRAM

## 2024-02-06 RX ORDER — SODIUM CHLORIDE 9 MG/ML
INJECTION, SOLUTION INTRAVENOUS CONTINUOUS
Status: DISCONTINUED | OUTPATIENT
Start: 2024-02-06 | End: 2024-02-07

## 2024-02-06 RX ADMIN — HYDRALAZINE HYDROCHLORIDE 25 MG: 25 TABLET, FILM COATED ORAL at 14:09

## 2024-02-06 RX ADMIN — HEPARIN SODIUM 5000 UNITS: 5000 INJECTION, SOLUTION INTRAVENOUS; SUBCUTANEOUS at 02:43

## 2024-02-06 RX ADMIN — SODIUM CHLORIDE: 9 INJECTION, SOLUTION INTRAVENOUS at 12:17

## 2024-02-06 RX ADMIN — PANTOPRAZOLE SODIUM 40 MG: 40 TABLET, DELAYED RELEASE ORAL at 06:27

## 2024-02-06 RX ADMIN — PANTOPRAZOLE SODIUM 40 MG: 40 TABLET, DELAYED RELEASE ORAL at 16:04

## 2024-02-06 RX ADMIN — HYDRALAZINE HYDROCHLORIDE 25 MG: 25 TABLET, FILM COATED ORAL at 22:18

## 2024-02-06 RX ADMIN — METOPROLOL TARTRATE 75 MG: 25 TABLET, FILM COATED ORAL at 08:15

## 2024-02-06 RX ADMIN — DOXYCYCLINE 100 MG: 100 INJECTION, POWDER, LYOPHILIZED, FOR SOLUTION INTRAVENOUS at 11:27

## 2024-02-06 RX ADMIN — Medication 1 MG: at 00:01

## 2024-02-06 RX ADMIN — ACYCLOVIR SODIUM 750 MG: 1000 INJECTION, SOLUTION INTRAVENOUS at 13:10

## 2024-02-06 RX ADMIN — MEROPENEM 2 G: 1 INJECTION, POWDER, FOR SOLUTION INTRAVENOUS at 10:39

## 2024-02-06 RX ADMIN — METOPROLOL TARTRATE 75 MG: 25 TABLET, FILM COATED ORAL at 22:18

## 2024-02-06 RX ADMIN — HYDRALAZINE HYDROCHLORIDE 25 MG: 25 TABLET, FILM COATED ORAL at 08:18

## 2024-02-06 RX ADMIN — ACYCLOVIR SODIUM 750 MG: 1000 INJECTION, SOLUTION INTRAVENOUS at 00:56

## 2024-02-06 RX ADMIN — CLOPIDOGREL BISULFATE 75 MG: 75 TABLET ORAL at 08:18

## 2024-02-06 RX ADMIN — SENNOSIDES AND DOCUSATE SODIUM 1 TABLET: 8.6; 5 TABLET ORAL at 22:18

## 2024-02-06 RX ADMIN — LOSARTAN POTASSIUM 100 MG: 50 TABLET, FILM COATED ORAL at 08:18

## 2024-02-06 RX ADMIN — MEROPENEM 2 G: 1 INJECTION, POWDER, FOR SOLUTION INTRAVENOUS at 23:38

## 2024-02-06 RX ADMIN — CITALOPRAM 20 MG: 20 TABLET ORAL at 08:18

## 2024-02-06 RX ADMIN — Medication: at 09:48

## 2024-02-06 RX ADMIN — DICLOFENAC 2 G: 10 GEL TOPICAL at 16:10

## 2024-02-06 RX ADMIN — HEPARIN SODIUM 5000 UNITS: 5000 INJECTION, SOLUTION INTRAVENOUS; SUBCUTANEOUS at 11:27

## 2024-02-06 RX ADMIN — AMLODIPINE BESYLATE 10 MG: 10 TABLET ORAL at 08:18

## 2024-02-06 ASSESSMENT — ACTIVITIES OF DAILY LIVING (ADL)
ADLS_ACUITY_SCORE: 34
ADLS_ACUITY_SCORE: 37
ADLS_ACUITY_SCORE: 33
ADLS_ACUITY_SCORE: 33
ADLS_ACUITY_SCORE: 37
ADLS_ACUITY_SCORE: 37
ADLS_ACUITY_SCORE: 34
ADLS_ACUITY_SCORE: 33
ADLS_ACUITY_SCORE: 34
ADLS_ACUITY_SCORE: 39
ADLS_ACUITY_SCORE: 34
ADLS_ACUITY_SCORE: 34

## 2024-02-06 NOTE — PROGRESS NOTES
[facility]    Progress Note - Hospitalist Service       Date of Admission:  2/2/2024    Assessment & Plan   Josefina Dumont is a 73 year old female admitted on 2/2/2024. She has a history of CHF with recent hospitalization 12/28/23-12/31/23, recent community acquired pneumonia, history of ischemic CVA, history of meningioma, history of CAD, type 2 diabetes mellitus and is admitted for shortness of breath and found to be in severe sepsis. 2/4/24-2/5/24 overnight events notable for stroke code secondary to aphasia and inattention also in the setting of fever to 102.5F and RR 35. Head MRI was unremarkable. Cefepime was switched to meropenem given concern for encephalopathy worsening 2/2 carbapenems. IV acyclovir was also added to the regimen and ID consult, LP orders were placed. On 2/5/24 ID added doxycycline to medication regimen. Today 2/6/24, patient reports continued improvement in her condition. IV acyclovir and Vancomycin were discontinued per ID. Patient has FELIPA with creatinine now elevated to 2.05; will see if this starts to resolve with slow maintenance IVF and discontinuation of Vancomycin, otherwise will consider Nephrology consult tomorrow.    Severe sepsis due to unknown source   Acute hypoxic respiratory failure, improving  Acute fever of unknown origin, improving  Concern for CHF exacerbation  Patient initially presented with worsening shortness of breath, orthopnea, dyspnea on exertion, in the setting of elevated BNP and missed dose of Lasix, CHF exacerbation was initially high on the differential. She was febrile w/Tmax of 101.3. CT chest on presentation showed no acute findings, mild pulmonary interstitial edema, small pericardial effusion. CRP elevated to 28.10 and procalcitonin of 5.80 on 2/4/24; CRP elevated to 54.20 and procalcitonin elevated to 11.70 on 2/5/24. See 2/4/24-2/5/24 overnight notes for significant events including aphasia, inattention, fever to 102.5F  despite broad-spectrum antibiotics prompting additional aggressive treatment measures and workup. Differential remains broad at this time. LP and TTE unremarkable. Patient condition improving, will narrow anti-infectives per ID.  -Cardiac/low-sodium diet  -Continue supplemental oxygen as needed  -ID consulted, appreciate recommendations  -Continue IV meropenem and doxycycline for now.  -Discontinue IV acyclovir and vancomycin.  -Monitor temperature and oxygen needs.    FELIPA  Baseline creatinine around 0.8-0.9, 2.05 today (continues to increase, up from 1.76).  -Avoid nephrotoxic medications  -Will see if creatinine improves with low maintenance fluids at 75 mL/hr in addition to discontinuation of Vancomycin, otherwise will consider Nephrology consult tomorrow  -AM BMP    Hypokalemia, resolved  -Standard replacement protocol      Mild normocytic anemia  Hemoglobin 11.2 on admission. Stable at 9.1 today. Drop consistent with hemodilution. Will continue to monitor.   -AM CBC    Hypertension  -Continue PTA amlodipine  -Continue PTA hydralazine  -Continue PTA losartan  -Continue PTA metoprolol tartrate     Prolonged QT  QTc of 472.  -Avoid QT prolonging medications    DM2   Most recent A1c 6.6 1 month ago.  -Holding PTA glipizide  -Low sliding scale insulin      Chronic:  History of ischemic CVA: Continue PTA Plavix  GERD: Continue PTA Tums, continue PTA Protonix  Constipation: Continue PTA senna docusate  Anxiety/depression: Given history of prolonged QTc noted to be elevated at 472, decreasing dose of Celexa to 20 mg while hospitalized  Asthma: Continue PTA albuterol as needed          Diet: Regular Diet Adult    DVT Prophylaxis: Heparin  Machuca Catheter: Not present  Fluids: po  Lines: None     Cardiac Monitoring: ACTIVE order. Indication: Stroke, acute (48 hours)  Code Status: Full Code      Clinically Significant Risk Factors              # Hypoalbuminemia: Lowest albumin = 3 g/dL at 2/6/2024  4:53 AM, will monitor as  appropriate  # Coagulation Defect: INR = 1.23 (Ref range: 0.85 - 1.15) and/or PTT = 36 Seconds (Ref range: 22 - 38 Seconds), will monitor for bleeding  # Thrombocytopenia: Lowest platelets = 119 in last 2 days, will monitor for bleeding  # Acute Kidney Injury, unspecified: based on a >150% or 0.3 mg/dL increase in last creatinine compared to past 90 day average, will monitor renal function  # Hypertension: Noted on problem list       # DMII: A1C = 6.6 % (Ref range: <5.7 %) within past 6 months, PRESENT ON ADMISSION        # Financial/Environmental Concerns:           Disposition Plan      Expected Discharge Date: 02/08/2024      Destination: home  Discharge Comments: ID consult. Lumbar Puncture 2/5.  IV ABX.        The patient's care was discussed with the Attending Physician, Dr. Esposito .    OSKAR BUTLER MD PGY3  Hospitalist Service  [facility]  Securely message with [employer] (more info)  Text page via [employer] Paging/Directory   ______________________________________________________________________    Interval History   Patient continues to report improvement in her condition. Her SOB and oxygen requirements have continued to improve. She denies any systemic symptoms of illness such as fevers or chills. She is concerned about her weakness, noting that it would be difficult to get up and out of bed on her own. Otherwise no new questions or concerns.    Physical Exam   Vital Signs: Temp: 97.8  F (36.6  C) Temp src: Oral BP: (!) 140/64 Pulse: 60   Resp: 19 SpO2: 96 % O2 Device: Nasal cannula Oxygen Delivery: 2 LPM  Weight: 227 lbs 6.4 oz    GENERAL: alert and no acute distress  HEENT: normocephalic, atraumatic, no rhinorrhea, moist mucus membranes  RESP: no wheezing, no rhonchi, no cough, nonlabored breathing on RA  CV: regular rate and rhythm, systolic murmur  ABDOMEN: soft, nontender, and bowel sounds normal  MS: no gross musculoskeletal defects noted, no edema  NEURO: CN II-XII intact,  no apparent focal deficits, speaking in multi-word sentences and responding to questions appropriately  PSYCH: appropriate mood and affect      Data     I have personally reviewed the following data over the past 24 hrs:    5.6  \   9.1 (L)   / 135 (L)     136 105 53.5 (H) /  84   3.6 17 (L) 2.05 (H) \     ALT: 16 AST: 35 AP: 109 TBILI: 0.4   ALB: 3.0 (L) TOT PROTEIN: 6.5 LIPASE: N/A       Imaging results reviewed over the past 24 hrs:   Recent Results (from the past 24 hour(s))   XR Chest Port 1 View    Narrative    EXAM: XR CHEST PORT 1 VIEW  LOCATION: [facility]  DATE: 2024    INDICATION: Fever and cough.    COMPARISON: CT CAP 2024 and older studies, chest x-ray 2024 and older studies      Impression    IMPRESSION: Large body habitus. Little change in the cardiomegaly, pulmonary vascular congestion and mild interstitial edema. No effusions. No signs of pneumonia.   Echo Limited   Result Value    LVEF  >70%    Narrative    [id number]  [id number]  [id number]  [id number]^MATHEW^[place] [address]     Name: SHAKIRA OCBB  MRN: [id number]  : 1950  Study Date: 2024 02:37 PM  Age: 73 yrs  Gender: Female  Patient Location: [facility]  Reason For Study: Pericardial Effusion  Ordering Physician: CECILIA CARMONA  Performed By: ACE     BSA: 2.0 m2  Height: 64 in  Weight: 221 lb  HR: 71  BP: 134/64 mmHg  ______________________________________________________________________________  Procedure  Limited Portable Echo Adult. Definity (NDC #55342-326) given intravenously.  Adequate quality two-dimensional was performed and interpreted. Adequate  quality color and spectral Doppler were performed and interpreted. Compared to  the prior study dated 2023, there have been no changes.  ______________________________________________________________________________  Interpretation Summary     1. Left ventricular chamber size  is normal. Moderate concentric increase in  wall thickness. Systolic function is hyperdynamic. The visually estimated left  ventricular ejection fraction is greater than 70%.  2. Right ventricular chamber size and systolic function are normal.  3. Moderate left atrial enlargement.  4. Degenerative calcification of the mitral apparatus with borderline mild  mitral stenosis.  5. Trivial-to-small circumferential pericardial effusion.  6. Compared to the prior study dated 12/29/2023, there has been no significant  change. A similar-sized pericardial effusion does appear to have been present  on the prior study and is unchanged.  ______________________________________________________________________________  I      WMSI = 0.00     % Normal = 0     X - Cannot   0 -                      (2) - Mildly 2 -          Segments  Size  Interpret    Hyperkinetic 1 - Normal  Hypokinetic  Hypokinetic  1-2     small                                                     7 -          3-5      moderate  3 - Akinetic 4 -          5 -         6 - Akinetic Dyskinetic   6-14    large               Dyskinetic   Aneurysmal  w/scar       w/scar       15-16   diffuse     Left Ventricle  The left ventricle is normal in size. There is moderate concentric left  ventricular hypertrophy. Hyperdynamic left ventricular function. The visual  ejection fraction is >70%. An intracavitary gradient is present. No regional  wall motion abnormalities noted.     Right Ventricle  The right ventricle is not well visualized. Normal right ventricle size and  systolic function.     Atria  The left atrium is moderately dilated. Right atrial size is normal.     Mitral Valve  The mitral valve leaflets are mildly thickened. There is moderate mitral  annular calcification. There is trace mitral regurgitation. Calcified mitral  apparatus causing mitral stenosis. There is mild mitral stenosis.     Tricuspid Valve  The tricuspid valve is not well visualized. There is trace  tricuspid  regurgitation. There is no tricuspid stenosis.     Aortic Valve  The aortic valve is trileaflet with aortic valve sclerosis. No aortic  regurgitation is present. No aortic stenosis is present.     Pulmonic Valve  The pulmonic valve is not well visualized.     Vessels  The aorta root is normal. The thoracic aorta is normal. IVC diameter and  respiratory changes fall into an intermediate range suggesting an RA pressure  of 8 mmHg.     Pericardium  Small pericardial effusion. There are no echocardiographic indications of  cardiac tamponade.     Rhythm  Sinus rhythm was noted.     ______________________________________________________________________________  MMode/2D Measurements & Calculations  IVSd: 1.4 cm  LVIDd: 4.7 cm  LVIDs: 3.1 cm  LVPWd: 1.4 cm  FS: 33.4 %     LV mass(C)d: 270.3 grams  LV mass(C)dI: 132.5 grams/m2  asc Aorta Diam: 3.4 cm  Asc Ao diam index BSA (cm/m2): 1.7  Asc Ao diam index Ht(cm/m): 2.1  LA Volume Index (BP): 45.0 ml/m2  RWT: 0.58  TAPSE: 2.0 cm     Time Measurements  MM HR: 71.0 BPM     Doppler Measurements & Calculations  MV max P.3 mmHg  MV mean P.1 mmHg  MV V2 VTI: 51.4 cm  Ao V2 max: 230.8 cm/sec  Ao max P.3 mmHg  Ao V2 mean: 157.0 cm/sec  Ao mean P.8 mmHg  Ao V2 VTI: 48.6 cm     ______________________________________________________________________________  Report approved by: Naina Rivera 2024 03:36 PM

## 2024-02-06 NOTE — PROGRESS NOTES
Noted lab glucose only 59 this am with labs. No call from lab as this was not critical value.  Rechecked at bedside with glucose monitor and found to be 84.  Continue to monitor.

## 2024-02-06 NOTE — PLAN OF CARE
"  Problem: Fever  Goal: Body Temperature in Desired Range  Outcome: Progressing     Problem: Sepsis/Septic Shock  Goal: Absence of Infection Signs and Symptoms  Outcome: Progressing  Intervention: Promote Recovery  Recent Flowsheet Documentation  Taken 2/6/2024 0030 by Danna Mccormick RN  Activity Management: activity adjusted per tolerance  Taken 2/5/2024 2341 by Danna Mccormick RN  Activity Management: activity adjusted per tolerance  Taken 2/5/2024 2003 by Danna Mccormick RN  Activity Management: activity adjusted per tolerance   Goal Outcome Evaluation:             Pt pain in chest relieved last PM after taking Tylenol, Tums, and Voltaren.  Pt did have reproducible pain in left chest with palpation.  Pt also felt like it was possibly caused by dinner.  Felt as if she needed to \"burp\".  Pt denies further c/o chest discomfort this shift.  Pt up to BSC to void with 1-2 assist and walker.  Pt feels like she is weaker getting out of bed this shift.  Pt with some exp wheezes in chest on evening shift.  Pt given duo neb per Resident orders at start of shift.  Bandaid intact to back from LP.  Pt denies pain.  Alert and oriented. VSS.           "

## 2024-02-06 NOTE — PROGRESS NOTES
RT Note:    Was called by RN to administer prn neb per resident. Pt was resting in bed comfortably watching tv. Pt denies SOB when seen with RR16 SpO2 99% on 2L. No changes pre/post neb. Pt stated she ate a taco and develop some chest discomfort. Will continue to monitor and assess pt.

## 2024-02-06 NOTE — PROGRESS NOTES
"[facility] Inpatient follow up       Patient:  Josefina Dumont  Date of birth 1950, Medical record number [id number]  Date of Visit:  02/06/2024  Attending Physician: Lazara Olivares MD         Assessment and Recommendations:   Assessment:  Josefina Dumont is a 73 year old female with   Type 2 diabetes mellitus.  History of ischemic CVA/meningioma  Recent hospitalization for community-acquired pneumonia in December 2023  Admitted on 2/2/2024 with acute hypoxic respiratory failure presumed secondary to exacerbation of heart failure.  Noted to have small pericardial effusion on CT scan.  Needs TTE.  Fever with increased procalcitonin.  UA not consistent with UTI.  Blood cultures no growth.  Liver function test normal.  Etiology not totally clear.  TTE with minimal pericardial effusion.  Repeat chest x-ray with no findings suggestive of pneumonia.  Status post lumbar puncture with no findings consistent with infection (only 2 WBC)  Altered mental status.  MRI with no evidence of infection.  Neck supple.  Low suspicion for CNS infection.  CSF not consistent with infection.  FELIPA.  Baseline creatinine normal at 0.80 on 2/2/2024.  Creatinine now 1.76.      Recommendations:  Continue IV meropenem and doxycycline for now.  Discontinue IV acyclovir and vancomycin.  Monitor temperature and oxygen needs.    Discussed with the patient, nursing staff.    ID will follow.    Robert Salazar MD.  [facility].   [facility]  Office Telephone [phone].  Fax [phone]  [facility] paging            Interval History:     HPI:  The interval history was reviewed.   Feels better today.  Temperature improving.  All cultures reviewed.  Echo also reviewed.    Pertinent cultures include:  No results found for: \"CULT\"    Recent Inflammatory Biomarkers:   Recent Labs   Lab Test 02/06/24  0453 02/05/24  0426 02/04/24  1918 02/04/24  1854 " 24  0502 24  1430 24  0550 23  0533 23  1052 22  0429 22  2333   PCAL  --  11.70* 10.90*  --   --   --  5.80*  --  0.07  --  0.05   WBC 5.6 6.6  --  5.9 4.2 5.4 5.0   < > 5.7   < >  --     < > = values in this interval not displayed.            Review of Systems:   CONSTITUTIONAL:    Temp Max: Temp (24hrs), Av.8  F (36.6  C), Min:97.6  F (36.4  C), Max:98.1  F (36.7  C)   .  Negative except for findings in the HPI.           Current Medications (antimicrobials listed in bold):      acyclovir  10 mg/kg (Adjusted) Intravenous Q12H    amLODIPine  10 mg Oral Daily    citalopram  20 mg Oral Daily    clopidogrel  75 mg Oral Daily    diclofenac  2 g Topical 4x Daily    doxycycline  100 mg Intravenous Q12H    [Held by provider] furosemide  20 mg Intravenous Daily    heparin ANTICOAGULANT  5,000 Units Subcutaneous Q8H    hydrALAZINE  25 mg Oral TID    insulin aspart  1-3 Units Subcutaneous TID AC    insulin aspart  1-3 Units Subcutaneous At Bedtime    losartan  100 mg Oral Daily    meropenem  2 g Intravenous Q12H    metoprolol tartrate  75 mg Oral BID    miconazole   Topical BID    pantoprazole  40 mg Oral BID AC    senna-docusate  1 tablet Oral At Bedtime    vancomycin  1,000 mg Intravenous Q24H              Allergies:     Allergies   Allergen Reactions    Aspirin      Other reaction(s): stomach upset    Atenolol Other (See Comments)     abd pain    Lisinopril      Other reaction(s): stomach aches    Penicillins Hives     Tolerated ceftriaxone    Statins      Other reaction(s): myalgias            Physical Exam:   Vitals were reviewed  Patient Vitals for the past 24 hrs:   BP Temp Temp src Pulse Resp SpO2 Weight   24 1137 119/60 98  F (36.7  C) Oral -- 18 93 % --   24 0830 -- -- -- 57 (!) 125 -- --   24 0818 (!) 140/64 97.8  F (36.6  C) Oral -- -- 96 % --   24 0815 (!) 140/64 -- -- 60 -- -- --   24 0332 126/61 97.7  F (36.5  C) Oral 61 19 99 % --    02/06/24 0238 -- -- -- -- -- -- 103.1 kg (227 lb 6.4 oz)   02/05/24 2341 128/62 97.6  F (36.4  C) Oral 66 20 100 % --   02/05/24 2003 (!) 167/72 97.7  F (36.5  C) Oral 89 25 100 % --   02/05/24 1947 -- -- -- 83 23 100 % --   02/05/24 1854 (!) 169/75 -- -- 91 20 94 % --   02/05/24 1530 139/63 98.1  F (36.7  C) Oral 69 23 90 % --       Physical Examination:  Gen: Pleasant in no acute distress.  HEENT: NCAT. EOMI. PERRL.  Neck: No bruit, JVD or thyromegaly.  Lungs: Clear to ascultation bilat with no crackles or wheezes.  Card: RRR. NSR. No RMG. Peripheral pulses present and symmetric. No edema.  Abd: Soft NT ND. No mass. Normal bowel sounds.  Skin: No rash.  Extr: No edema.  Neuro: Alert and oriented to place time and person.       Laboratory Data:   ID Labs:  Microbiology labs:  Reviewed.    No lab results found.  Recent Labs   Lab Test 02/06/24 0453 02/05/24 0426 02/04/24 1854 02/04/24  0502 02/03/24  1430 02/03/24  0550   WBC 5.6 6.6 5.9 4.2 5.4 5.0     Recent Labs   Lab Test 02/06/24 0453 02/05/24 0426 02/04/24  1854 02/04/24  0502   CR 2.05* 1.76* 1.75* 1.78*   GFRESTIMATED 25* 30* 30* 30*       Hematology Studies  Recent Labs   Lab Test 02/06/24 0453 02/05/24 0426 02/04/24 1854 02/04/24  0502 02/03/24  1430 02/03/24  0550   WBC 5.6 6.6 5.9 4.2 5.4 5.0   HGB 9.1* 9.5* 10.4* 9.6* 10.8* 10.0*   HCT 28.4* 29.4* 31.7* 31.2* 35.2 31.7*   * 141* 119* 100* 134* 119*       Metabolic  Recent Labs   Lab Test 02/06/24  0453 02/05/24  0426 02/04/24  1854    134* 133*   BUN 53.5* 42.9* 41.2*   CO2 17* 18* 18*   CR 2.05* 1.76* 1.75*   GFRESTIMATED 25* 30* 30*       Hepatic Studies  Recent Labs   Lab Test 02/06/24  0453 02/04/24  0502 02/03/24  1430   BILITOTAL 0.4 0.6 0.8   ALKPHOS 109 117 121   ALBUMIN 3.0* 3.0* 3.6   AST 35 40 35   ALT 16 14 11       ImmunologlobulinsNo lab results found.         Imaging Data:   Reviewed

## 2024-02-06 NOTE — PROGRESS NOTES
Patient complained of Chest discomfort after eating dinner. She had been NPO all day and ate a taco. When pushing on chest can reproduce pain. She denies SoB. RR in the teens. No diaphoresis. No nausea. States she can have this heartburn at home. Gave 2 500mg Tums with no relief. Patient has burped and stated some momentary relief. Called B[facility] and updated. Ordered more Tums and voltaren cream for area of discomfort. [facility] resident wanted updated after treatments.

## 2024-02-07 ENCOUNTER — APPOINTMENT (OUTPATIENT)
Dept: RADIOLOGY | Facility: CLINIC | Age: 74
DRG: 871 | End: 2024-02-07
Payer: COMMERCIAL

## 2024-02-07 LAB
ALBUMIN UR-MCNC: 70 MG/DL
AMMONIA PLAS-SCNC: 29 UMOL/L (ref 11–51)
ANION GAP SERPL CALCULATED.3IONS-SCNC: 12 MMOL/L (ref 7–15)
ANION GAP SERPL CALCULATED.3IONS-SCNC: 12 MMOL/L (ref 7–15)
APPEARANCE UR: ABNORMAL
BILIRUB UR QL STRIP: NEGATIVE
BUN SERPL-MCNC: 58.7 MG/DL (ref 8–23)
BUN SERPL-MCNC: 61.2 MG/DL (ref 8–23)
CALCIUM SERPL-MCNC: 9.2 MG/DL (ref 8.8–10.2)
CALCIUM SERPL-MCNC: 9.4 MG/DL (ref 8.8–10.2)
CHLORIDE SERPL-SCNC: 104 MMOL/L (ref 98–107)
CHLORIDE SERPL-SCNC: 105 MMOL/L (ref 98–107)
COLOR UR AUTO: YELLOW
CREAT SERPL-MCNC: 2.33 MG/DL (ref 0.51–0.95)
CREAT SERPL-MCNC: 2.45 MG/DL (ref 0.51–0.95)
DEPRECATED HCO3 PLAS-SCNC: 17 MMOL/L (ref 22–29)
DEPRECATED HCO3 PLAS-SCNC: 21 MMOL/L (ref 22–29)
EGFRCR SERPLBLD CKD-EPI 2021: 20 ML/MIN/1.73M2
EGFRCR SERPLBLD CKD-EPI 2021: 22 ML/MIN/1.73M2
ERYTHROCYTE [DISTWIDTH] IN BLOOD BY AUTOMATED COUNT: 16.4 % (ref 10–15)
GLUCOSE BLDC GLUCOMTR-MCNC: 135 MG/DL (ref 70–99)
GLUCOSE BLDC GLUCOMTR-MCNC: 142 MG/DL (ref 70–99)
GLUCOSE BLDC GLUCOMTR-MCNC: 73 MG/DL (ref 70–99)
GLUCOSE BLDC GLUCOMTR-MCNC: 85 MG/DL (ref 70–99)
GLUCOSE BLDC GLUCOMTR-MCNC: 93 MG/DL (ref 70–99)
GLUCOSE SERPL-MCNC: 110 MG/DL (ref 70–99)
GLUCOSE SERPL-MCNC: 89 MG/DL (ref 70–99)
GLUCOSE UR STRIP-MCNC: NEGATIVE MG/DL
HCT VFR BLD AUTO: 30.5 % (ref 35–47)
HGB BLD-MCNC: 9.9 G/DL (ref 11.7–15.7)
HGB UR QL STRIP: NEGATIVE
HYALINE CASTS: 7 /LPF
KETONES UR STRIP-MCNC: ABNORMAL MG/DL
LEUKOCYTE ESTERASE UR QL STRIP: ABNORMAL
MCH RBC QN AUTO: 28.8 PG (ref 26.5–33)
MCHC RBC AUTO-ENTMCNC: 32.5 G/DL (ref 31.5–36.5)
MCV RBC AUTO: 89 FL (ref 78–100)
MUCOUS THREADS #/AREA URNS LPF: PRESENT /LPF
NITRATE UR QL: NEGATIVE
PH UR STRIP: 5.5 [PH] (ref 5–7)
PLATELET # BLD AUTO: 176 10E3/UL (ref 150–450)
POTASSIUM SERPL-SCNC: 3.5 MMOL/L (ref 3.4–5.3)
POTASSIUM SERPL-SCNC: 3.6 MMOL/L (ref 3.4–5.3)
PROCALCITONIN SERPL IA-MCNC: 7.31 NG/ML
RBC # BLD AUTO: 3.44 10E6/UL (ref 3.8–5.2)
RBC URINE: 2 /HPF
SODIUM SERPL-SCNC: 133 MMOL/L (ref 135–145)
SODIUM SERPL-SCNC: 138 MMOL/L (ref 135–145)
SP GR UR STRIP: 1.03 (ref 1–1.03)
SQUAMOUS EPITHELIAL: <1 /HPF
UROBILINOGEN UR STRIP-MCNC: <2 MG/DL
WBC # BLD AUTO: 4.1 10E3/UL (ref 4–11)
WBC URINE: 3 /HPF

## 2024-02-07 PROCEDURE — 250N000009 HC RX 250

## 2024-02-07 PROCEDURE — 84145 PROCALCITONIN (PCT): CPT | Performed by: STUDENT IN AN ORGANIZED HEALTH CARE EDUCATION/TRAINING PROGRAM

## 2024-02-07 PROCEDURE — 85014 HEMATOCRIT: CPT

## 2024-02-07 PROCEDURE — 84300 ASSAY OF URINE SODIUM: CPT | Performed by: INTERNAL MEDICINE

## 2024-02-07 PROCEDURE — 250N000009 HC RX 250: Performed by: INTERNAL MEDICINE

## 2024-02-07 PROCEDURE — 250N000013 HC RX MED GY IP 250 OP 250 PS 637: Performed by: INTERNAL MEDICINE

## 2024-02-07 PROCEDURE — 80048 BASIC METABOLIC PNL TOTAL CA: CPT

## 2024-02-07 PROCEDURE — 36415 COLL VENOUS BLD VENIPUNCTURE: CPT

## 2024-02-07 PROCEDURE — 999N000157 HC STATISTIC RCP TIME EA 10 MIN

## 2024-02-07 PROCEDURE — 258N000003 HC RX IP 258 OP 636

## 2024-02-07 PROCEDURE — 250N000011 HC RX IP 250 OP 636

## 2024-02-07 PROCEDURE — 99232 SBSQ HOSP IP/OBS MODERATE 35: CPT | Performed by: STUDENT IN AN ORGANIZED HEALTH CARE EDUCATION/TRAINING PROGRAM

## 2024-02-07 PROCEDURE — 36415 COLL VENOUS BLD VENIPUNCTURE: CPT | Performed by: INTERNAL MEDICINE

## 2024-02-07 PROCEDURE — 120N000001 HC R&B MED SURG/OB

## 2024-02-07 PROCEDURE — 258N000003 HC RX IP 258 OP 636: Performed by: INTERNAL MEDICINE

## 2024-02-07 PROCEDURE — 99222 1ST HOSP IP/OBS MODERATE 55: CPT | Performed by: INTERNAL MEDICINE

## 2024-02-07 PROCEDURE — 250N000013 HC RX MED GY IP 250 OP 250 PS 637

## 2024-02-07 PROCEDURE — 250N000011 HC RX IP 250 OP 636: Performed by: STUDENT IN AN ORGANIZED HEALTH CARE EDUCATION/TRAINING PROGRAM

## 2024-02-07 PROCEDURE — 250N000011 HC RX IP 250 OP 636: Mod: JZ | Performed by: STUDENT IN AN ORGANIZED HEALTH CARE EDUCATION/TRAINING PROGRAM

## 2024-02-07 PROCEDURE — 81003 URINALYSIS AUTO W/O SCOPE: CPT | Performed by: INTERNAL MEDICINE

## 2024-02-07 PROCEDURE — 99232 SBSQ HOSP IP/OBS MODERATE 35: CPT | Mod: GC | Performed by: FAMILY MEDICINE

## 2024-02-07 PROCEDURE — 71045 X-RAY EXAM CHEST 1 VIEW: CPT

## 2024-02-07 PROCEDURE — 82140 ASSAY OF AMMONIA: CPT | Performed by: INTERNAL MEDICINE

## 2024-02-07 PROCEDURE — 94640 AIRWAY INHALATION TREATMENT: CPT | Mod: 76

## 2024-02-07 RX ORDER — MEROPENEM 1 G/1
1 INJECTION, POWDER, FOR SOLUTION INTRAVENOUS EVERY 12 HOURS
Status: DISCONTINUED | OUTPATIENT
Start: 2024-02-07 | End: 2024-02-08

## 2024-02-07 RX ORDER — FUROSEMIDE 10 MG/ML
20 INJECTION INTRAMUSCULAR; INTRAVENOUS ONCE
Status: DISCONTINUED | OUTPATIENT
Start: 2024-02-07 | End: 2024-02-07

## 2024-02-07 RX ORDER — FAMOTIDINE 10 MG
10 TABLET ORAL 2 TIMES DAILY
Status: DISCONTINUED | OUTPATIENT
Start: 2024-02-07 | End: 2024-02-08

## 2024-02-07 RX ORDER — HYDRALAZINE HYDROCHLORIDE 25 MG/1
50 TABLET, FILM COATED ORAL 3 TIMES DAILY
Status: DISCONTINUED | OUTPATIENT
Start: 2024-02-07 | End: 2024-02-08

## 2024-02-07 RX ORDER — FUROSEMIDE 10 MG/ML
20 INJECTION INTRAMUSCULAR; INTRAVENOUS ONCE
Status: COMPLETED | OUTPATIENT
Start: 2024-02-07 | End: 2024-02-07

## 2024-02-07 RX ORDER — IPRATROPIUM BROMIDE AND ALBUTEROL SULFATE 2.5; .5 MG/3ML; MG/3ML
3 SOLUTION RESPIRATORY (INHALATION)
Status: DISCONTINUED | OUTPATIENT
Start: 2024-02-07 | End: 2024-02-08

## 2024-02-07 RX ORDER — LOPERAMIDE HCL 2 MG
2 CAPSULE ORAL 4 TIMES DAILY PRN
Status: DISCONTINUED | OUTPATIENT
Start: 2024-02-07 | End: 2024-02-28 | Stop reason: HOSPADM

## 2024-02-07 RX ADMIN — FUROSEMIDE 20 MG: 10 INJECTION, SOLUTION INTRAMUSCULAR; INTRAVENOUS at 19:18

## 2024-02-07 RX ADMIN — HYDRALAZINE HYDROCHLORIDE 25 MG: 25 TABLET, FILM COATED ORAL at 08:58

## 2024-02-07 RX ADMIN — ALBUTEROL SULFATE 2 PUFF: 90 AEROSOL, METERED RESPIRATORY (INHALATION) at 04:17

## 2024-02-07 RX ADMIN — PANTOPRAZOLE SODIUM 40 MG: 40 TABLET, DELAYED RELEASE ORAL at 08:58

## 2024-02-07 RX ADMIN — SODIUM BICARBONATE: 84 INJECTION, SOLUTION INTRAVENOUS at 12:47

## 2024-02-07 RX ADMIN — FAMOTIDINE 10 MG: 10 TABLET ORAL at 20:16

## 2024-02-07 RX ADMIN — DOXYCYCLINE 100 MG: 100 INJECTION, POWDER, LYOPHILIZED, FOR SOLUTION INTRAVENOUS at 23:53

## 2024-02-07 RX ADMIN — METOPROLOL TARTRATE 75 MG: 25 TABLET, FILM COATED ORAL at 20:12

## 2024-02-07 RX ADMIN — HYDRALAZINE HYDROCHLORIDE 25 MG: 25 TABLET, FILM COATED ORAL at 13:14

## 2024-02-07 RX ADMIN — LOSARTAN POTASSIUM 100 MG: 50 TABLET, FILM COATED ORAL at 08:58

## 2024-02-07 RX ADMIN — HYDRALAZINE HYDROCHLORIDE 50 MG: 25 TABLET, FILM COATED ORAL at 20:12

## 2024-02-07 RX ADMIN — HEPARIN SODIUM 5000 UNITS: 5000 INJECTION, SOLUTION INTRAVENOUS; SUBCUTANEOUS at 18:06

## 2024-02-07 RX ADMIN — AMLODIPINE BESYLATE 10 MG: 10 TABLET ORAL at 08:58

## 2024-02-07 RX ADMIN — CITALOPRAM 20 MG: 20 TABLET ORAL at 08:58

## 2024-02-07 RX ADMIN — DICLOFENAC 2 G: 10 GEL TOPICAL at 20:16

## 2024-02-07 RX ADMIN — HEPARIN SODIUM 5000 UNITS: 5000 INJECTION, SOLUTION INTRAVENOUS; SUBCUTANEOUS at 02:26

## 2024-02-07 RX ADMIN — LOPERAMIDE HYDROCHLORIDE 2 MG: 2 CAPSULE ORAL at 12:51

## 2024-02-07 RX ADMIN — MEROPENEM 1 G: 1 INJECTION, POWDER, FOR SOLUTION INTRAVENOUS at 12:48

## 2024-02-07 RX ADMIN — DICLOFENAC 2 G: 10 GEL TOPICAL at 08:30

## 2024-02-07 RX ADMIN — Medication: at 20:13

## 2024-02-07 RX ADMIN — DOXYCYCLINE 100 MG: 100 INJECTION, POWDER, LYOPHILIZED, FOR SOLUTION INTRAVENOUS at 12:45

## 2024-02-07 RX ADMIN — IPRATROPIUM BROMIDE AND ALBUTEROL SULFATE 3 ML: .5; 3 SOLUTION RESPIRATORY (INHALATION) at 20:42

## 2024-02-07 RX ADMIN — Medication: at 04:14

## 2024-02-07 RX ADMIN — DICLOFENAC 2 G: 10 GEL TOPICAL at 16:43

## 2024-02-07 RX ADMIN — HEPARIN SODIUM 5000 UNITS: 5000 INJECTION, SOLUTION INTRAVENOUS; SUBCUTANEOUS at 12:47

## 2024-02-07 RX ADMIN — DOXYCYCLINE 100 MG: 100 INJECTION, POWDER, LYOPHILIZED, FOR SOLUTION INTRAVENOUS at 00:46

## 2024-02-07 RX ADMIN — DICLOFENAC 2 G: 10 GEL TOPICAL at 04:14

## 2024-02-07 RX ADMIN — SENNOSIDES AND DOCUSATE SODIUM 1 TABLET: 8.6; 5 TABLET ORAL at 20:12

## 2024-02-07 RX ADMIN — SODIUM CHLORIDE: 9 INJECTION, SOLUTION INTRAVENOUS at 06:17

## 2024-02-07 RX ADMIN — INSULIN ASPART 1 UNITS: 100 INJECTION, SOLUTION INTRAVENOUS; SUBCUTANEOUS at 16:42

## 2024-02-07 RX ADMIN — DICLOFENAC 2 G: 10 GEL TOPICAL at 12:51

## 2024-02-07 RX ADMIN — METOPROLOL TARTRATE 75 MG: 25 TABLET, FILM COATED ORAL at 08:58

## 2024-02-07 RX ADMIN — ALBUTEROL SULFATE 2 PUFF: 90 AEROSOL, METERED RESPIRATORY (INHALATION) at 18:00

## 2024-02-07 RX ADMIN — CLOPIDOGREL BISULFATE 75 MG: 75 TABLET ORAL at 08:59

## 2024-02-07 RX ADMIN — Medication: at 08:30

## 2024-02-07 RX ADMIN — FAMOTIDINE 10 MG: 10 TABLET ORAL at 12:48

## 2024-02-07 RX ADMIN — MEROPENEM 1 G: 1 INJECTION, POWDER, FOR SOLUTION INTRAVENOUS at 22:45

## 2024-02-07 ASSESSMENT — ACTIVITIES OF DAILY LIVING (ADL)
ADLS_ACUITY_SCORE: 50
ADLS_ACUITY_SCORE: 51
ADLS_ACUITY_SCORE: 48
ADLS_ACUITY_SCORE: 47
ADLS_ACUITY_SCORE: 45
ADLS_ACUITY_SCORE: 48
ADLS_ACUITY_SCORE: 48
ADLS_ACUITY_SCORE: 47
ADLS_ACUITY_SCORE: 39

## 2024-02-07 NOTE — PROGRESS NOTES
BRIEF PROGRESS NOTE    I was called to the bedside to assess patient and discuss plan with patient's family. We reviewed that yesterday, patient was alert and oriented, involved in more complex conversations, sitting upright, eating and drinking. As a result, she was transferred from the ICU to the 2nd floor. Unfortunately, yesterday evening, the overnight [facility] resident was paged regarding declining orientation. On assessment, she was moving extremities equally with normal movements but demonstrated waxing and waning orientation and inattention, consistent with delirium. Delirium precautions were applied, and the reminder of the night was uneventful. On interview this morning, patient appeared very tired and had difficulty responding to questions aside from her first name and yes/no. On re-evaluation once family arrived around 1100, this was improved with patient able to answer all orientation questions in detail and respond in multi-word responses. However, she was experiencing incontinence of bowel and bladder. We further discussed causes of hospital delirium, and risk factors patient has, most of which primarily contributing to fatigue and sleep deprivation. Incontinence has required frequent turns and cleaning, interrupting sleep, and she may have been disoriented moving rooms yesterday evening after dark. Reassurance was provided about the typical course and precautions put in place. We also discussed plans for the day including antibiotics per ID and getting Nephrology on board. Imodium PRN for diarrhea given. PTA Celexa on board for anxiety. I will re-evaluate the patient later this afternoon to assess for continued resolution of altered mental status.    OSKAR BUTLER MD

## 2024-02-07 NOTE — PROGRESS NOTES
"Paged by RN regarding declining orientation and inability to answer questions.  RN states that during the day, the report was that she was oriented and able to answer questions.  However, once she transferred to second floor from third floor and RN took over her care, she noticed that her mental status had changed.    Went to assess patient at bedside.  Patient is difficult to direct and does not always answer questions when they are asked directly at her; seems inattentive.  Is able to answer yes/no questions and states that she would like to move her legs and says \"help\", but when asked why she would like to get out of bed, she states \"I do not know.\"  Is able to freely move all 4 extremities without difficulty and movements are equal.   strength equal.  Physical exam shows regular rate and rhythm, no murmurs, and lungs auscultated from anterior show minimal wheezing.    Based on waxing and waning orientation and inattention, this seems more consistent with delirium.  Likely the change in hospital room and sunset/nighttime are contributing to confusion.  Patient's physical status is improved from when she was first evaluated by me on Sunday, especially given that she is moving all 4 extremities well.    Plan:  - O2 as needed - can try 1-2L to see if this improves mentation (ABG showing hypoxia on Sunday and increasing O2 from 2 to 4L seemed to improve symptoms at that time)  - If more wheezing, can give albuterol now and consider Duoneb PRN later  - Delirium precautions - dark room, behavioral interventions  - Optimize pain control - can use Voltaren gel, Tylenol PRN      Kyleigh Cordova MD PGY-2  "

## 2024-02-07 NOTE — PROVIDER NOTIFICATION
Pt arrived to  around 1750. Pt disoriented, inconsistent with commands, which is a change in mental status. VSS. BG 98. MD paged and saw at bedside. Orders to utilize delirium precautions and continue to monitor pt's oxygen and pain status.

## 2024-02-07 NOTE — PROGRESS NOTES
Care Management Follow Up    Length of Stay (days): 3    Expected Discharge Date: 02/08/2024     Concerns to be Addressed: discharge planning       Patient plan of care discussed at interdisciplinary rounds: Yes    Anticipated Discharge Disposition:  [facility] ([facility])     Anticipated Discharge Services:  Transportaion TBD    Anticipated Discharge DME: None    Education Provided on the Discharge Plan: Yes (AVS will be per bedside RN)    Patient/Family in Agreement with the Plan: yes        Additional Information:  CM reviewed chart. Patient has been accepted at [facility] ([facility]). CM to call TCU Thursday afternoon or Friday to update as a prior authorization is needed. TCU will submit for prior authorization when appropriate. Transportation TBD. CM will follow.     Radha Bergeron RN

## 2024-02-07 NOTE — PLAN OF CARE
Goal Outcome Evaluation:  Pt oriented to self only. Confused. Nasal canula 1L O2. Had 3 loose stools. Voiding. Incontinent x2. Q2 turn. Denied pain. Normal saline at 75 ml/hr. Tele: sinus dysrhythmia with BBB. K protocol.       Problem: Adult Inpatient Plan of Care  Goal: Optimal Comfort and Wellbeing  Outcome: Progressing     Problem: Gas Exchange Impaired  Goal: Optimal Gas Exchange  Outcome: Progressing

## 2024-02-07 NOTE — PROGRESS NOTES
Per VORB by Dr. Bernard and Dr. Prado at the bedside okay to discontinue cardiac monitor, keep continuous IVF NS at 75ml/hr for now, and nephrology consult ordered. Dr. Esposito stopped by as well no orders given.-Chandrika ALATORRE RN

## 2024-02-07 NOTE — CONSULTS
NEPHROLOGY CONSULTATION    CC: Shortness of breath.    REASON FOR CONSULTATION: We are asked to see pt by .    HISTORY OF PRESENT ILLNESS:73 year old female with past medical history significant for obesity, hypertension, type 2 diabetes, coronary disease, CVA, meningioma, congestive heart failure with preserved ejection fraction and stage II chronic kidney disease who presented to [facility] on 02/02/24 for evaluation of shortness of breath.  Patient was found to have severe sepsis.  At the time of physical exam the patient is a very poor informant due to altered mental status.  No family present at the bedside.  Therefore patient history was obtained mostly from her chart.  Patient was recently 12/28/23-12/31/23 hospitalized with pneumonia.  Sustained acute EKG injury, serum creatinine peaked at 1.47 mg/dL.  Subsequently patient renal function improved to baseline 0.7 mg/dL.  On admission patient was hemodynamically stable.  Chest x-ray showed cardiac enlargement with no pulmonary edema or, infiltrates or effusions.  Laboratory work Showed mild elevation of N-terminal proBNP at 1579, mildly elevated troponin of 19, mild normocytic anemia with hemoglobin of 11.2.  On admission patient serum creatinine was at baseline 0.8 mg/dL.  Since admission patient serum creatinine has been trending up.  Today serum creatinine up to 2.45 mg/dL. Urinary output has not been recorded due to urinary incontinence.  UA 02/02 showed mild proteinuria, albumin 70 g/dl, 6 hyaline cast. No hematuria or pyuria.   CT abd/pelvis showed bilateral renal benign cyst.  No evidence of hydronephrosis.  On 02/02 the patient received Ibuprofen.  On 02/04 started on PPI/Protonix 40 mg BI  On 02/04 received 75 ml of iodinated contrast for CTA  On 02/03-02/04 received OV Vancomycin and Cefepime  On 02/05-02/06 the patient received IV Acyclovir.  PTA Losartan 100 mg was continued since admission.  Since 02/06 she has been  receiving NS at 75 ml/h  No episodes of hypotension or tachycardia.  24-24 overnight events notable for stroke code secondary to aphasia and inattention also in the setting of fever to 102.5F and RR 35. Head MRI was unremarkable. Cefepime was switched to meropenem given concern for encephalopathy worsening 2/2 carbapenems.  CSF is not consistent with infection.      REVIEW OF SYSTEMS:  ROS was completely reviewed and otherwise negative and non-contributory    Past Medical History:   Diagnosis Date    Chronic kidney disease     Diabetes (H)     Hypertension     Obese        Social History     Socioeconomic History    Marital status:      Spouse name: Not on file    Number of children: Not on file    Years of education: Not on file    Highest education level: Not on file   Occupational History    Not on file   Tobacco Use    Smoking status: Former     Types: Cigarettes     Quit date:      Years since quittin.    Smokeless tobacco: Never   Substance and Sexual Activity    Alcohol use: Not on file    Drug use: Not on file    Sexual activity: Not on file   Other Topics Concern    Not on file   Social History Narrative    Not on file     Social Determinants of Health     Financial Resource Strain: Not on file   Food Insecurity: Not on file   Transportation Needs: Not on file   Physical Activity: Not on file   Stress: Not on file   Social Connections: Not on file   Interpersonal Safety: Not on file   Housing Stability: Not on file       No family history on file.    Allergies   Allergen Reactions    Aspirin      Other reaction(s): stomach upset    Atenolol Other (See Comments)     abd pain    Lisinopril      Other reaction(s): stomach aches    Penicillins Hives     Tolerated ceftriaxone    Statins      Other reaction(s): myalgias       MEDICATIONS:   amLODIPine  10 mg Oral Daily    citalopram  20 mg Oral Daily    clopidogrel  75 mg Oral Daily    diclofenac  2 g Topical 4x Daily    doxycycline  100  "mg Intravenous Q12H    [Held by provider] furosemide  20 mg Intravenous Daily    heparin ANTICOAGULANT  5,000 Units Subcutaneous Q8H    hydrALAZINE  25 mg Oral TID    insulin aspart  1-3 Units Subcutaneous TID AC    insulin aspart  1-3 Units Subcutaneous At Bedtime    [Held by provider] losartan  100 mg Oral Daily    meropenem  2 g Intravenous Q12H    metoprolol tartrate  75 mg Oral BID    miconazole   Topical BID    pantoprazole  40 mg Oral BID AC    senna-docusate  1 tablet Oral At Bedtime         PHYSICAL EXAM    BP (!) 151/67 (BP Location: Right arm, Patient Position: Semi-Pedraza's, Cuff Size: Adult Regular)   Pulse 74   Temp 97.8  F (36.6  C) (Oral)   Resp 20   Wt 104.4 kg (230 lb 2.6 oz)   SpO2 94%   BMI 39.51 kg/m        Intake/Output Summary (Last 24 hours) at 2/7/2024 0908  Last data filed at 2/7/2024 0200  Gross per 24 hour   Intake 926.25 ml   Output 100 ml   Net 826.25 ml       Gen: Alert, awake , confused , NAD, morbidly obese  HEENT NC/AT; perrla; OP clear without lesions; mmm  Neck supple without LAD, TM  CV; RRR without rub or murmur  Lung: breathing comfortable on 1L via NC, clear and equal; no extra sounds  Ab: soft and NT; not distended; normal bs  Ext: no edema and well perfused  Skin; no rash  Neuro; grossly intact    LABORATORIES    Recent Labs   Lab 02/07/24  0654 02/06/24  0453 02/05/24  0426   WBC 4.1 5.6 6.6   HGB 9.9* 9.1* 9.5*   HCT 30.5* 28.4* 29.4*    135* 141*     Recent Labs   Lab 02/07/24  0654 02/06/24  0453 02/05/24  0426 02/04/24  1854 02/04/24  0502 02/03/24  1430   * 136 134*   < > 134* 135   CO2 17* 17* 18*   < > 23 22   BUN 58.7* 53.5* 42.9*   < > 35.0* 27.2*   ALKPHOS  --  109  --   --  117 121   ALT  --  16  --   --  14 11   AST  --  35  --   --  40 35    < > = values in this interval not displayed.     Recent Labs   Lab 02/04/24  1854   INR 1.23*   PTT 36     Invalid input(s): \"FERRITIN\"  No results for input(s): \"IRON\" in the last 168 hours.    Invalid " "input(s): \"TIBC\"    I reviewed all labs    ASSESSMENT/PLAN:  73 year old female with past medical history significant for obesity, hypertension, type 2 diabetes, coronary disease, CVA, meningioma, congestive heart failure with preserved ejection fraction and stage III chronic kidney disease who presented to [facility] on 02/02/24 for evaluation of shortness of breath.  Nephrology consulted for FELIPA.      Acute kidney injury superimposed on stage II chronic kidney disease  Baseline serum creatinine 0.7 to 0.8 mg/dL and correlating EGFR in 70s and 60s.  History of moderate albuminuria, urine albumin to creatinine ratio was 417 in 10/23.  His CKD could be secondary to diabetic nephropathy given albuminuria.  As per patient chart review, she was referred to [facility] in the past but their clinic has not been able to get hold of the patient to schedule an appointment.  On admission patient serum creatinine was at baseline 0.8 mg/dL.  Since admission patient serum creatinine has been trending up.  Today serum creatinine up to 2.45 mg/dL.   Urinary output has not been recorded due to urinary incontinence.  UA 02/02 showed mild proteinuria, albumin 70 g/dl, 6 hyaline cast. No hematuria or pyuria.   CT abd/pelvis showed bilateral renal benign cyst.  No evidence of hydronephrosis.  On 02/02 the patient received Ibuprofen 600 mg and IV Lasix 02/02  On 02/04 started on PPI/Protonix 40 mg BI  On 02/04 received 75 ml of iodinated contrast for CTA  On 02/03-02/05 received IV Vancomycin and Cefepime.  Vancomycin trough level was 15.6 02/05.  On 02/05-02/06 the patient received IV Acyclovir.  Patient received IV Solu-Medrol 125 mg 02/05  PTA Losartan 100 mg was continued since admission.  Since 02/06 she has been receiving NS at 75 ml/h  No episodes of hypotension or tachycardia.  Etiology of patient's acute kidney injury is likely multifactorial.  Sepsis +/_contrast induced nephropathy+/- acute " interstitial nephritis from PPIs and Cefepime+/- crystal induced nephropathy from IV acyclovir+/- tubular injury from IV vancomycin.  No absolute eosinophilia on CBC with differential.  TSH was mildly elevated at 6.1 on December 20, 2023.  LFTs wnl  Recs:  No indication for renal replacement therapy.  We will reevaluate daily.  Repeat UA, check UPCR and FeNa  Check postvoid residuals to rule out urinary retention  Discontinue losartan and PPI  Continue IV hydration but change IV fluids to isotonic bicarbonate at 75 mL/h given acidemia  Monitor renal panel daily  Avoid nephrotoxins including NSAIDs and contrast  Renally dose medications    Electrolytes  Hyponatremia-mild.  Today serum sodium trended down from 1 36-1 33.  Trend.  Normal serum potassium    Metabolic acidosis-likely secondary to FELIPA.  Today serum bicarbonate is trended down to 17.  Discontinue normocephalic switch to isotonic bicarbonate at 75 mL/h.    Hypertension-most recent blood pressure is elevated 151/67.  Prior to admission patient was taking losartan 100 mg daily, amlodipine 10 mg daily, metoprolol 75 mg twice daily, furosemide 20 mg daily,  Hydralazine 25 mg daily  Discontinue losartan given progressive worsening of renal function.  Patient received 1 dose of IV Lasix on admission.    Currently  on amlodipine 10 mg daily, metoprolol 75 mg twice daily and hydralazine 25 mg 3 times daily  Recs:  Increase hydralazine dose to 50 mg 3 times daily  Monitor blood pressure closely, avoid hypo and hypertension    Volume status patient -hard to assess given patient's body habitus.  Appears euvolemic on exam.  Her oral intake has been poor.  Net 3.5 L positive since admission not accurate given patient's urinary incontinence.  Weight has been trending up, gained 9 pounds since admission but has been checked in bed and likely not accurate.  She is currently on 1 L supplemental oxygen via nasal cannula, saturating 94%.  2D ECHO 02/02/24 showed hyperdynamic  systolic function with left ventricular ejection fraction of 70%, normal right ventricular chamber size and systolic function.  No significant valvular abnormalities.  Patient with history of congestive heart failure.  Prior to admission Lasix 20 mg daily  I would hold off on diuresis given uptrending serum creatinine.  Daily weight    Sepsis-2/4/24-2/5/24 overnight events notable for stroke code secondary to aphasia and inattention also in the setting of fever to 102.5F and RR 35. WBC is wnl. Blood and urine cultures negative to date.  TTE with minimal pericardial effusion.  Repeat chest x-ray showed normal evidence of pneumonia.  Status post lumbar puncture.  CSF analysis was not consistent with infection.  I reviewed infectious disease note from today.  Currently on IV meropenem and doxycycline.    Altered mental status-could be secondary to hospital delirium.  No evidence of acute pathology on brain imaging  Unlikely secondary to worsening renal function.  Check ammonia level given nodular liver on CT    Normocytic anemia.  Patient presented with hemoglobin of 11.2.  Likely multifactorial today hemoglobin is trending down to 9.9 g/dL.  No signs of active bleeding.    Type 2 diabetes mellitus-control on single agent . PTA on glipizide 2.5 mg daily.  Currently on insulin most recent hemoglobin A1c was 6.6%.  Management as per primary team.    History of ischemic CVA-on Plavix    Nodular liver on CT abdomen pelvis-etiology unclear.  LFTs are within normal limits.  Coagulopathy INR 1.2.  Platelet count is within normal limits.  I will defer further evaluation to primary team.    GERD prophylaxis patient was started on Protonix 40 mg twice daily during this admission.  Could be contributing to patient's acute kidney injury from interstitial nephritis.  Discontinue Protonix and switch to famotidine 10 mg twice daily    Discussed with family medicine resident Dr. Leslie.    Thank you for your consultation.  We will  follow.      Alma August MD  [facility]  [address], [address]  [place] [address]  Phone# [phone]  Fax# [phone]

## 2024-02-07 NOTE — PROGRESS NOTES
Writer gave end of day shift progress report to the receiving RN of the evening shift.-Chandrika ALATORER RN

## 2024-02-07 NOTE — PROGRESS NOTES
RT called to bedside, initial waveforms was not accurate, Pt placed on 10 lpm Oxymask and quickly weaned down to 3 lpm after accurate waveforms, SpO2 high 90s. Pt was on 2 lpm NC, sating mid 90s. Pt was tachypneic, BS diminished;wheezing. No BiPAP at this time per MD. Barbi nguyen initiate tonight.    Jeramie Salcedo, RT

## 2024-02-07 NOTE — PROGRESS NOTES
[facility]    Progress Note - Hospitalist Service       Date of Admission:  2/2/2024    Assessment & Plan   Josefina Dumont is a 73 year old female admitted on 2/2/2024. She has a history of CHF with recent hospitalization 12/28/23-12/31/23, recent community acquired pneumonia, history of ischemic CVA, history of meningioma, history of CAD, type 2 diabetes mellitus and is admitted for shortness of breath and found to be in severe sepsis. 2/4/24-2/5/24 overnight events notable for stroke code secondary to aphasia and inattention also in the setting of fever to 102.5F and RR 35. Head MRI was unremarkable. Cefepime was switched to meropenem given concern for encephalopathy worsening 2/2 carbapenems. IV acyclovir was also added to the regimen and ID consult, LP orders were placed. On 2/5/24 ID added doxycycline to medication regimen. On 2/6/24, patient reported continued improvement in her condition. IV acyclovir and Vancomycin were discontinued per ID. Patient has FELIPA with creatinine now elevated to 2.45 despite initiation of maintenance fluids and discontinuation of Vancomycin; Nephrology has been consulted. Per ID, no antibiotic changes today. Overnight events 2/6/24-2/7/24 were notable for likely hospital delirium; condition seems to be improving with patient now able to answer orientation questions in their entirety.     Severe sepsis due to unknown source   Acute hypoxic respiratory failure, improving  Acute fever of unknown origin, improving  Concern for CHF exacerbation  Delirium   Patient initially presented with worsening shortness of breath, orthopnea, dyspnea on exertion, in the setting of elevated BNP and missed dose of Lasix, CHF exacerbation was initially high on the differential. She was febrile w/Tmax of 101.3. CT chest on presentation showed no acute findings, mild pulmonary interstitial edema, small pericardial effusion. CRP elevated to 28.10 and procalcitonin of 5.80  on 2/4/24; CRP elevated to 54.20 and procalcitonin elevated to 11.70 on 2/5/24. See 2/4/24-2/5/24 overnight notes for significant events including aphasia, inattention, fever to 102.5F despite broad-spectrum antibiotics prompting additional aggressive treatment measures and workup. Differential remains broad at this time. LP and TTE unremarkable. Patient condition had been improving 2/6/24. She had episode of increased confusion and disorientation overnight which continues to resolve today, likely was 2/2 delirium in the setting of hospitalization and sleep deprivation but will continue to monitor.  -Cardiac/low-sodium diet  -Continue supplemental oxygen as needed  -ID consulted, appreciate recommendations  -Continue IV meropenem and doxycycline for now.  -Discontinue IV acyclovir and vancomycin.  -Monitor temperature and oxygen needs.  -Nephrology consulted, appreciate recommendations   -Note pending at the time of this dictation  -Delirium precautions    FELIPA  Baseline creatinine around 0.8-0.9, 2.45 today (continues to increase, up from 2.05). Vancomycin discontinued 2/6/24, also had started NS 75 mL/hr for gentle rehydration on 2/6/24.  -Avoid nephrotoxic medications  -Nephrology consulted, appreciate recommendations  -AM BMP    Hypokalemia, resolved  -Standard replacement protocol      Mild normocytic anemia  Hemoglobin 11.2 on admission. Stable at 9.9 today. Drop consistent with hemodilution. Will continue to monitor.   -AM CBC    Hypertension  -Continue PTA amlodipine  -Continue PTA hydralazine  -Holding PTA losartan in light of FELIPA; will continue to reassess as needing given blood pressure has been high-normal  -Continue PTA metoprolol tartrate     Prolonged QT  QTc of 472.  -Avoid QT prolonging medications  -Note that Celexa comes with risk for QT prolongation but benefit of anxiety treatment outweighing risk, will continue to monitor    DM2   Most recent A1c 6.6 1 month ago.  -Holding PTA glipizide  -Low  sliding scale insulin      Chronic:  History of ischemic CVA: Continue PTA Plavix  GERD: Continue PTA Tums, continue PTA Protonix  Constipation: Continue PTA senna docusate  Anxiety/depression: Continue PTA Celexa   Asthma: Continue PTA albuterol as needed          Diet: Regular Diet Adult    DVT Prophylaxis: Heparin  Machuca Catheter: Not present  Fluids: po  Lines: None     Cardiac Monitoring: None  Code Status: Full Code      Clinically Significant Risk Factors              # Hypoalbuminemia: Lowest albumin = 3 g/dL at 2/6/2024  4:53 AM, will monitor as appropriate    # Acute Kidney Injury, unspecified: based on a >150% or 0.3 mg/dL increase in last creatinine compared to past 90 day average, will monitor renal function  # Hypertension: Noted on problem list       # DMII: A1C = 6.6 % (Ref range: <5.7 %) within past 6 months, PRESENT ON ADMISSION        # Financial/Environmental Concerns:           Disposition Plan      Expected Discharge Date: 02/08/2024      Destination: home  Discharge Comments: ID consult. Lumbar Puncture 2/5.  IV ABX.        The patient's care was discussed with the Attending Physician, Dr. Esposito .    OSKAR BUTLER MD PGY3  Hospitalist Service  [facility]  Securely message with [employer] (more info)  Text page via [facility] Paging/Directory   ______________________________________________________________________    Interval History   Signs and symptoms overnight consistent with likely delirium in the setting of prolonged hospitalization, changing rooms at night, darkness, sleep deprivation.  See Dr. Cordova's note for further details.  Patient endorses feeling confused otherwise no other questions or concerns.  On second visit this morning, patient's sister was at bedside.  She had a number of questions which were answered.  Discussed that ultimately on discharge plan will likely be for TCU.  Sister states that she has provided the care coordination team with their #1 choice  for a facility when the time comes.    Physical Exam   Vital Signs: Temp: 97.8  F (36.6  C) Temp src: Oral BP: (!) 151/67 Pulse: 74   Resp: 20 SpO2: 94 % O2 Device: Nasal cannula Oxygen Delivery: 1 LPM  Weight: 230 lbs 2.56 oz    GENERAL: Alert and no acute distress.  HEENT: Normocephalic, atraumatic, no rhinorrhea, moist mucus membranes.  RESP: No wheezing, no rhonchi, no cough, nonlabored breathing on RA.  CV: Regular rate and rhythm, systolic murmur.  ABDOMEN: Soft, nontender, and bowel sounds normal.  MS: No gross musculoskeletal defects noted, no edema.  NEURO: At around 0830, patient was fatigue and only responding with her first name and yes/no answers. At around 1100, patient was able to answer all orientation questions and respond with multi-word sentences.  PSYCH: Appropriate mood and affect.      Data     I have personally reviewed the following data over the past 24 hrs:    4.1  \   9.9 (L)   / 176     133 (L) 104 58.7 (H) /  135 (H)   3.5 17 (L) 2.45 (H) \     Procal: 7.31 (HH) CRP: N/A Lactic Acid: N/A         Imaging results reviewed over the past 24 hrs:   No results found for this or any previous visit (from the past 24 hour(s)).

## 2024-02-07 NOTE — PLAN OF CARE
Problem: Adult Inpatient Plan of Care  Goal: Absence of Hospital-Acquired Illness or Injury  Intervention: Prevent Skin Injury  Recent Flowsheet Documentation  Taken 2/7/2024 1417 by Chandrika Cooney RN  Body Position:   turned   right  Taken 2/7/2024 1410 by Chandrika Cooney RN  Body Position: log-rolled  Taken 2/7/2024 0808 by Chandrika Cooney RN  Body Position: supine, head elevated  Taken 2/7/2024 0748 by Chandrika Cooney RN  Body Position: log-rolled     Problem: Gas Exchange Impaired  Goal: Optimal Gas Exchange  Intervention: Optimize Oxygenation and Ventilation  Recent Flowsheet Documentation  Taken 2/7/2024 1417 by Chandrika Cooney RN  Head of Bed (HOB) Positioning: HOB at 30 degrees  Taken 2/7/2024 1410 by Chandrika Cooney RN  Head of Bed (HOB) Positioning: HOB at 20-30 degrees  Taken 2/7/2024 0808 by Chandrika Cooney RN  Head of Bed (HOB) Positioning: HOB at 30-45 degrees  Taken 2/7/2024 0748 by Chandrika Cooney RN  Head of Bed (HOB) Positioning: HOB at 20-30 degrees     Problem: Fatigue  Goal: Improved Activity Tolerance  Intervention: Promote Improved Energy  Recent Flowsheet Documentation  Taken 2/7/2024 1410 by Chandrika Cooney RN  Activity Management: activity adjusted per tolerance  Taken 2/7/2024 0929 by Chandrika Cooney RN  Activity Management: up in chair  Taken 2/7/2024 0808 by Chandrika Cooney RN  Activity Management: activity adjusted per tolerance  Taken 2/7/2024 0748 by Chandrika Cooney RN  Activity Management:   activity adjusted per tolerance   activity encouraged  Taken 2/7/2024 0740 by Chandrika Cooney RN  Sleep/Rest Enhancement:   comfort measures   consistent schedule promoted   natural light exposure provided   therapeutic touch utilized  Environmental Support:   calm environment promoted   rest periods encouraged   personal routine supported     Problem: Risk for Delirium  Goal: Improved Behavioral Control  Intervention: Prevent and Manage Agitation  Recent Flowsheet  Documentation  Taken 2/7/2024 0740 by Chandrika Cooney RN  Environment Familiarity/Consistency:   daily routine followed   familiar objects from home provided     Problem: Risk for Delirium  Goal: Improved Attention and Thought Clarity  Intervention: Maximize Cognitive Function  Recent Flowsheet Documentation  Taken 2/7/2024 0740 by Chandrika Cooney RN  Sensory Stimulation Regulation: television on  Reorientation Measures:   glasses use encouraged   calendar in view   clock in view   reorientation provided  Goal Outcome Evaluation: Alert with intermittent confusion, HTN managed with medication, afebrile on rooAm air, q2 turned and repositioned, heavy assistive 3 with a becca lift, needing assistance with feeding otherwise a feeder, very incontinent of urine and stool. On ABX IV. Patient was seen by BFM, and Nephrology Team orders in placed. Patient sister sopped by earlier updated. Will continue to monitor.

## 2024-02-07 NOTE — PROGRESS NOTES
"[facility] Inpatient follow up       Patient:  Josefina Dumont  Date of birth 1950, Medical record number [id number]  Date of Visit:  02/07/2024  Attending Physician: Lazara Olivares MD         Assessment and Recommendations:   Assessment:  Josefina Dumont is a 73 year old female with   Type 2 diabetes mellitus.  History of ischemic CVA/meningioma  Recent hospitalization for community-acquired pneumonia in December 2023  Admitted on 2/2/2024 with acute hypoxic respiratory failure presumed secondary to exacerbation of heart failure.  Noted to have small pericardial effusion on CT scan.  Needs TTE.  Fever with increased procalcitonin.  UA not consistent with UTI.  Blood cultures no growth.  Liver function test normal.  Etiology not totally clear.  TTE with minimal pericardial effusion.  Repeat chest x-ray with no findings suggestive of pneumonia.  Status post lumbar puncture with no findings consistent with infection (only 2 WBC)  Altered mental status.  MRI with no evidence of infection.  Neck supple.  Low suspicion for CNS infection.  CSF not consistent with infection.  FELIPA.  Baseline creatinine normal at 0.80 on 2/2/2024.  Creatinine worsening.  Intermittent confusion.  Could be secondary to high BUN with worsening kidney function.  Nephrology to see.      Recommendations:  Continue IV meropenem and doxycycline for now.  I concur with renal consult.  Monitor temperature and oxygen needs.    Discussed with the patient, nursing staff.    ID will follow.    Robert Salazar MD.  [facility].   [facility]  Office Telephone [phone].  Fax [phone]  Formerly [facility] paging            Interval History:     HPI:  The interval history was reviewed.   Waxing and waning mental status.  No new complaint.    Pertinent cultures include:  No results found for: \"CULT\"    Recent Inflammatory Biomarkers:   Recent Labs   Lab Test " 24  0654 24  0453 24  0426 24  1918 24  1854 24  0502 24  1430 24  0550 23  0533 23  1052 22  0429 22  2333   PCAL  --   --  11.70* 10.90*  --   --   --  5.80*  --  0.07  --  0.05   WBC 4.1 5.6 6.6  --  5.9 4.2 5.4 5.0   < > 5.7   < >  --     < > = values in this interval not displayed.            Review of Systems:   CONSTITUTIONAL:    Temp Max: Temp (24hrs), Av.8  F (36.6  C), Min:97.6  F (36.4  C), Max:98.1  F (36.7  C)   .  Negative except for findings in the HPI.           Current Medications (antimicrobials listed in bold):      amLODIPine  10 mg Oral Daily    citalopram  20 mg Oral Daily    clopidogrel  75 mg Oral Daily    diclofenac  2 g Topical 4x Daily    doxycycline  100 mg Intravenous Q12H    [Held by provider] furosemide  20 mg Intravenous Daily    heparin ANTICOAGULANT  5,000 Units Subcutaneous Q8H    hydrALAZINE  25 mg Oral TID    insulin aspart  1-3 Units Subcutaneous TID AC    insulin aspart  1-3 Units Subcutaneous At Bedtime    [Held by provider] losartan  100 mg Oral Daily    meropenem  2 g Intravenous Q12H    metoprolol tartrate  75 mg Oral BID    miconazole   Topical BID    pantoprazole  40 mg Oral BID AC    senna-docusate  1 tablet Oral At Bedtime              Allergies:     Allergies   Allergen Reactions    Aspirin      Other reaction(s): stomach upset    Atenolol Other (See Comments)     abd pain    Lisinopril      Other reaction(s): stomach aches    Penicillins Hives     Tolerated ceftriaxone    Statins      Other reaction(s): myalgias            Physical Exam:   Vitals were reviewed  Patient Vitals for the past 24 hrs:   BP Temp Temp src Pulse Resp SpO2 Weight   24 0748 (!) 151/67 97.8  F (36.6  C) Oral -- 20 -- --   24 0740 -- -- -- -- -- 94 % --   24 0357 (!) 146/69 98.8  F (37.1  C) Axillary -- 24 93 % 104.4 kg (230 lb 2.6 oz)   24 2321 125/88 98.8  F (37.1  C) Axillary -- 24 96 % --    02/06/24 2216 (!) 166/72 -- -- 74 -- -- --   02/06/24 1930 (!) 143/63 98.5  F (36.9  C) Oral -- 22 95 % --   02/06/24 1804 129/63 98.5  F (36.9  C) Oral 65 20 91 % --   02/06/24 1547 -- 98.1  F (36.7  C) Oral -- 20 -- --   02/06/24 1409 130/58 -- -- -- -- -- --   02/06/24 1137 119/60 98  F (36.7  C) Oral -- 18 93 % --       Physical Examination:  Gen: Pleasant in no acute distress.  HEENT: NCAT. EOMI. PERRL.  Neck: No bruit, JVD or thyromegaly.  Lungs: Clear to ascultation bilat with no crackles or wheezes.  Card: RRR. NSR. No RMG. Peripheral pulses present and symmetric. No edema.  Abd: Soft NT ND. No mass. Normal bowel sounds.  Skin: No rash.  Extr: No edema.  Neuro: Alert and oriented to place time and person.       Laboratory Data:   ID Labs:  Microbiology labs:  Reviewed.    No lab results found.  Recent Labs   Lab Test 02/07/24  0654 02/06/24  0453 02/05/24  0426 02/04/24  1854 02/04/24  0502 02/03/24  1430   WBC 4.1 5.6 6.6 5.9 4.2 5.4     Recent Labs   Lab Test 02/07/24  0654 02/06/24  0453 02/05/24  0426 02/04/24  1854   CR 2.45* 2.05* 1.76* 1.75*   GFRESTIMATED 20* 25* 30* 30*       Hematology Studies  Recent Labs   Lab Test 02/07/24  0654 02/06/24  0453 02/05/24  0426 02/04/24  1854 02/04/24  0502 02/03/24  1430   WBC 4.1 5.6 6.6 5.9 4.2 5.4   HGB 9.9* 9.1* 9.5* 10.4* 9.6* 10.8*   HCT 30.5* 28.4* 29.4* 31.7* 31.2* 35.2    135* 141* 119* 100* 134*       Metabolic  Recent Labs   Lab Test 02/07/24  0654 02/06/24  0453 02/05/24  0426   * 136 134*   BUN 58.7* 53.5* 42.9*   CO2 17* 17* 18*   CR 2.45* 2.05* 1.76*   GFRESTIMATED 20* 25* 30*       Hepatic Studies  Recent Labs   Lab Test 02/06/24  0453 02/04/24  0502 02/03/24  1430   BILITOTAL 0.4 0.6 0.8   ALKPHOS 109 117 121   ALBUMIN 3.0* 3.0* 3.6   AST 35 40 35   ALT 16 14 11       ImmunologlobulinsNo lab results found.         Imaging Data:   Reviewed

## 2024-02-08 ENCOUNTER — APPOINTMENT (OUTPATIENT)
Dept: CT IMAGING | Facility: CLINIC | Age: 74
DRG: 871 | End: 2024-02-08
Payer: COMMERCIAL

## 2024-02-08 ENCOUNTER — APPOINTMENT (OUTPATIENT)
Dept: PHYSICAL THERAPY | Facility: CLINIC | Age: 74
DRG: 871 | End: 2024-02-08
Payer: COMMERCIAL

## 2024-02-08 LAB
ANION GAP SERPL CALCULATED.3IONS-SCNC: 10 MMOL/L (ref 7–15)
BACTERIA BLD CULT: NO GROWTH
BUN SERPL-MCNC: 63.3 MG/DL (ref 8–23)
CALCIUM SERPL-MCNC: 9.2 MG/DL (ref 8.8–10.2)
CHLORIDE SERPL-SCNC: 107 MMOL/L (ref 98–107)
CREAT SERPL-MCNC: 2.32 MG/DL (ref 0.51–0.95)
CREAT UR-MCNC: 173 MG/DL
DEPRECATED HCO3 PLAS-SCNC: 22 MMOL/L (ref 22–29)
EGFRCR SERPLBLD CKD-EPI 2021: 22 ML/MIN/1.73M2
ERYTHROCYTE [DISTWIDTH] IN BLOOD BY AUTOMATED COUNT: 16.1 % (ref 10–15)
FRACT EXCRET NA UR+SERPL-RTO: NORMAL %
GLUCOSE BLDC GLUCOMTR-MCNC: 105 MG/DL (ref 70–99)
GLUCOSE BLDC GLUCOMTR-MCNC: 108 MG/DL (ref 70–99)
GLUCOSE BLDC GLUCOMTR-MCNC: 161 MG/DL (ref 70–99)
GLUCOSE BLDC GLUCOMTR-MCNC: 74 MG/DL (ref 70–99)
GLUCOSE BLDC GLUCOMTR-MCNC: 98 MG/DL (ref 70–99)
GLUCOSE SERPL-MCNC: 101 MG/DL (ref 70–99)
HCT VFR BLD AUTO: 30.4 % (ref 35–47)
HGB BLD-MCNC: 9.8 G/DL (ref 11.7–15.7)
MCH RBC QN AUTO: 28.5 PG (ref 26.5–33)
MCHC RBC AUTO-ENTMCNC: 32.2 G/DL (ref 31.5–36.5)
MCV RBC AUTO: 88 FL (ref 78–100)
PLATELET # BLD AUTO: 160 10E3/UL (ref 150–450)
POTASSIUM SERPL-SCNC: 3.5 MMOL/L (ref 3.4–5.3)
RBC # BLD AUTO: 3.44 10E6/UL (ref 3.8–5.2)
SODIUM SERPL-SCNC: 139 MMOL/L (ref 135–145)
SODIUM UR-SCNC: <20 MMOL/L
WBC # BLD AUTO: 4.9 10E3/UL (ref 4–11)

## 2024-02-08 PROCEDURE — 71260 CT THORAX DX C+: CPT

## 2024-02-08 PROCEDURE — 120N000001 HC R&B MED SURG/OB

## 2024-02-08 PROCEDURE — 250N000011 HC RX IP 250 OP 636

## 2024-02-08 PROCEDURE — 250N000013 HC RX MED GY IP 250 OP 250 PS 637: Performed by: INTERNAL MEDICINE

## 2024-02-08 PROCEDURE — 85027 COMPLETE CBC AUTOMATED: CPT

## 2024-02-08 PROCEDURE — 250N000013 HC RX MED GY IP 250 OP 250 PS 637: Performed by: STUDENT IN AN ORGANIZED HEALTH CARE EDUCATION/TRAINING PROGRAM

## 2024-02-08 PROCEDURE — 82610 CYSTATIN C: CPT | Performed by: INTERNAL MEDICINE

## 2024-02-08 PROCEDURE — 70450 CT HEAD/BRAIN W/O DYE: CPT

## 2024-02-08 PROCEDURE — 99232 SBSQ HOSP IP/OBS MODERATE 35: CPT | Mod: GC | Performed by: FAMILY MEDICINE

## 2024-02-08 PROCEDURE — 36415 COLL VENOUS BLD VENIPUNCTURE: CPT

## 2024-02-08 PROCEDURE — 250N000011 HC RX IP 250 OP 636: Performed by: FAMILY MEDICINE

## 2024-02-08 PROCEDURE — 99232 SBSQ HOSP IP/OBS MODERATE 35: CPT | Performed by: STUDENT IN AN ORGANIZED HEALTH CARE EDUCATION/TRAINING PROGRAM

## 2024-02-08 PROCEDURE — 97530 THERAPEUTIC ACTIVITIES: CPT | Mod: GP

## 2024-02-08 PROCEDURE — 250N000013 HC RX MED GY IP 250 OP 250 PS 637

## 2024-02-08 PROCEDURE — 80048 BASIC METABOLIC PNL TOTAL CA: CPT

## 2024-02-08 PROCEDURE — 99233 SBSQ HOSP IP/OBS HIGH 50: CPT | Performed by: INTERNAL MEDICINE

## 2024-02-08 PROCEDURE — 250N000011 HC RX IP 250 OP 636: Performed by: STUDENT IN AN ORGANIZED HEALTH CARE EDUCATION/TRAINING PROGRAM

## 2024-02-08 RX ORDER — HYDRALAZINE HYDROCHLORIDE 25 MG/1
100 TABLET, FILM COATED ORAL 3 TIMES DAILY
Status: DISCONTINUED | OUTPATIENT
Start: 2024-02-08 | End: 2024-02-28 | Stop reason: HOSPADM

## 2024-02-08 RX ORDER — LEVOFLOXACIN 750 MG/1
750 TABLET, FILM COATED ORAL EVERY OTHER DAY
Status: DISCONTINUED | OUTPATIENT
Start: 2024-02-08 | End: 2024-02-10

## 2024-02-08 RX ORDER — ZINC OXIDE 20 %
OINTMENT (GRAM) TOPICAL
Status: DISCONTINUED | OUTPATIENT
Start: 2024-02-08 | End: 2024-02-28 | Stop reason: HOSPADM

## 2024-02-08 RX ORDER — IPRATROPIUM BROMIDE AND ALBUTEROL SULFATE 2.5; .5 MG/3ML; MG/3ML
3 SOLUTION RESPIRATORY (INHALATION) EVERY 6 HOURS PRN
Status: DISCONTINUED | OUTPATIENT
Start: 2024-02-08 | End: 2024-02-28 | Stop reason: HOSPADM

## 2024-02-08 RX ORDER — IOPAMIDOL 755 MG/ML
75 INJECTION, SOLUTION INTRAVASCULAR ONCE
Status: COMPLETED | OUTPATIENT
Start: 2024-02-08 | End: 2024-02-08

## 2024-02-08 RX ORDER — FAMOTIDINE 10 MG
10 TABLET ORAL DAILY
Status: DISCONTINUED | OUTPATIENT
Start: 2024-02-09 | End: 2024-02-12

## 2024-02-08 RX ADMIN — Medication: at 08:17

## 2024-02-08 RX ADMIN — ONDANSETRON 4 MG: 2 INJECTION INTRAMUSCULAR; INTRAVENOUS at 11:59

## 2024-02-08 RX ADMIN — HYDRALAZINE HYDROCHLORIDE 100 MG: 25 TABLET, FILM COATED ORAL at 20:14

## 2024-02-08 RX ADMIN — Medication: at 22:14

## 2024-02-08 RX ADMIN — LEVOFLOXACIN 750 MG: 750 TABLET, FILM COATED ORAL at 11:02

## 2024-02-08 RX ADMIN — FAMOTIDINE 10 MG: 10 TABLET ORAL at 08:17

## 2024-02-08 RX ADMIN — AMLODIPINE BESYLATE 10 MG: 10 TABLET ORAL at 08:17

## 2024-02-08 RX ADMIN — METOPROLOL TARTRATE 75 MG: 25 TABLET, FILM COATED ORAL at 20:12

## 2024-02-08 RX ADMIN — METOPROLOL TARTRATE 75 MG: 25 TABLET, FILM COATED ORAL at 08:16

## 2024-02-08 RX ADMIN — HYDRALAZINE HYDROCHLORIDE 50 MG: 25 TABLET, FILM COATED ORAL at 13:24

## 2024-02-08 RX ADMIN — CLOPIDOGREL BISULFATE 75 MG: 75 TABLET ORAL at 08:16

## 2024-02-08 RX ADMIN — DICLOFENAC 2 G: 10 GEL TOPICAL at 12:06

## 2024-02-08 RX ADMIN — HEPARIN SODIUM 5000 UNITS: 5000 INJECTION, SOLUTION INTRAVENOUS; SUBCUTANEOUS at 10:39

## 2024-02-08 RX ADMIN — HYDRALAZINE HYDROCHLORIDE 50 MG: 25 TABLET, FILM COATED ORAL at 08:17

## 2024-02-08 RX ADMIN — HEPARIN SODIUM 5000 UNITS: 5000 INJECTION, SOLUTION INTRAVENOUS; SUBCUTANEOUS at 19:57

## 2024-02-08 RX ADMIN — CITALOPRAM 20 MG: 20 TABLET ORAL at 08:17

## 2024-02-08 RX ADMIN — DICLOFENAC 2 G: 10 GEL TOPICAL at 22:13

## 2024-02-08 RX ADMIN — DICLOFENAC 2 G: 10 GEL TOPICAL at 17:38

## 2024-02-08 RX ADMIN — IOPAMIDOL 75 ML: 755 INJECTION, SOLUTION INTRAVENOUS at 13:48

## 2024-02-08 RX ADMIN — HEPARIN SODIUM 5000 UNITS: 5000 INJECTION, SOLUTION INTRAVENOUS; SUBCUTANEOUS at 02:26

## 2024-02-08 RX ADMIN — DICLOFENAC 2 G: 10 GEL TOPICAL at 08:18

## 2024-02-08 ASSESSMENT — ACTIVITIES OF DAILY LIVING (ADL)
ADLS_ACUITY_SCORE: 50

## 2024-02-08 NOTE — PLAN OF CARE
Problem: Fatigue  Goal: Improved Activity Tolerance  Outcome: Not Progressing     Problem: Adult Inpatient Plan of Care  Goal: Optimal Comfort and Wellbeing  Outcome: Progressing     Problem: Gas Exchange Impaired  Goal: Optimal Gas Exchange  Outcome: Progressing       Problem: Risk for Delirium  Goal: Improved Attention and Thought Clarity  2/8/2024 1449 by Jaswinder Novak, RN  Outcome: Not Progressing  2/8/2024 1446 by Jaswinder Novak, RN  Outcome: Progressing   Goal Outcome Evaluation:       Pt alert to self and place (hospital) she is confused and forgetful. Denies pain. VSS and O2 sats maintained above 90% on RA. C/O nausea and prn Zofran given with relief. L PIV patent and [employer] B&B. Continues on PO ABX. K+ 3.5 with recheck tomorrow morning per protocol. BG checked and managed. Repositioned Q2h, Reg diet, call light within reach and alarms on for safety.

## 2024-02-08 NOTE — PROGRESS NOTES
Care Management Follow Up    Length of Stay (days): 4    Expected Discharge Date: 02/09/2024     Concerns to be Addressed: discharge planning     Patient plan of care discussed at interdisciplinary rounds: Yes    Anticipated Discharge Disposition:  transitional care      Anticipated Discharge Services:  therapy   Anticipated Discharge DME: None    Patient/family educated on Medicare website which has current facility and service quality ratings:    Education Provided on the Discharge Plan: Yes (AVS will be per bedside RN)  Patient/Family in Agreement with the Plan: yes    Referrals Placed by CM/SW:  post acute care facilities   Private pay costs discussed: Not applicable    Additional Information:  SW reviewed chart.  Patient accepted at [facility] (confirmed with Kyleigh in admissions), could accept as soon as tomorrow if ready.  Will need [facility] authorization.     GENA called and spoke with Pt's sister, Orin, to discuss discharge plan.  Orin inquires if [facility] would be able to accept.  SW offered to call and ask if they have reviewed.  Did discuss that if [facility] is unable to accept, will need to proceed with discharge to [facility]. Orin states understanding. (PAS previously completed).  Left voice mail for [facility] requesting call back.    IVON Valentino

## 2024-02-08 NOTE — PROGRESS NOTES
[facility] ID Inpatient follow up       Patient:  Josefina Dumont  Date of birth 1950, Medical record number [id number]  Date of Visit:  02/08/2024  Attending Physician: Lazara Olivares MD         Assessment and Recommendations:   Assessment:  Josefina Dumont is a 73 year old female with   Type 2 diabetes mellitus.  History of ischemic CVA/meningioma  Recent hospitalization for community-acquired pneumonia in December 2023  Admitted on 2/2/2024 with acute hypoxic respiratory failure presumed secondary to exacerbation of heart failure.  Noted to have small pericardial effusion on CT scan.  Needs TTE.  Fever with increased procalcitonin.  UA not consistent with UTI.  Blood cultures no growth.  Liver function test normal.  Etiology not totally clear.  TTE with minimal pericardial effusion.  Repeat chest x-ray with no findings suggestive of pneumonia.  Status post lumbar puncture with no findings consistent with infection (only 2 WBC)  Altered mental status.  MRI with no evidence of infection.  Neck supple.  Low suspicion for CNS infection.  CSF not consistent with infection.  FELIPA.  Baseline creatinine normal at 0.80 on 2/2/2024.  Creatinine worsening.  Intermittent confusion.  Report of cirrhosis of the liver on CT scan.  Could be secondary to hepatic encephalopathy.  High BUN may be contributing.  Nephrology following.      Recommendations:  Discontinue IV meropenem and doxycycline.  Start p.o. Levaquin renally dosed.  Consider lactulose for possible hepatic encephalopathy.  Will consider repeat CT scan of the abdomen and pelvis.  Monitor mental status, and glucose.    Discussed with the patient, nursing staff, nephrologist, and Pharm.D.    ID will follow.    Robert Salazar MD.  [facility].   [facility]  Office Telephone [phone].  Fax [phone]  [facility] paging            Interval History:     HPI:  The interval  "history was reviewed.   Awake today.  Denies any abdominal pain.  CT scan of the abdomen and pelvis reviewed.  Cirrhosis of the liver on report.  All cultures no growth.    Pertinent cultures include:  No results found for: \"CULT\"    Recent Inflammatory Biomarkers:   Recent Labs   Lab Test 24  0659 24  0654 24  0453 24  0426 24  1918 24  1854 24  0502 24  1430 24  0550 23  0533 23  1052 22  0429 22  2333   PCAL  --  7.31*  --  11.70* 10.90*  --   --   --  5.80*  --  0.07  --  0.05   WBC 4.9 4.1 5.6 6.6  --  5.9 4.2   < > 5.0   < > 5.7   < >  --     < > = values in this interval not displayed.            Review of Systems:   CONSTITUTIONAL:    Temp Max: Temp (24hrs), Av.8  F (36.6  C), Min:97.6  F (36.4  C), Max:98.1  F (36.7  C)   .  Negative except for findings in the HPI.           Current Medications (antimicrobials listed in bold):      amLODIPine  10 mg Oral Daily    citalopram  20 mg Oral Daily    clopidogrel  75 mg Oral Daily    diclofenac  2 g Topical 4x Daily    doxycycline  100 mg Intravenous Q12H    famotidine  10 mg Oral BID    [Held by provider] furosemide  20 mg Intravenous Daily    heparin ANTICOAGULANT  5,000 Units Subcutaneous Q8H    hydrALAZINE  50 mg Oral TID    insulin aspart  1-3 Units Subcutaneous TID AC    insulin aspart  1-3 Units Subcutaneous At Bedtime    [Held by provider] losartan  100 mg Oral Daily    meropenem  1 g Intravenous Q12H    metoprolol tartrate  75 mg Oral BID    miconazole   Topical BID    senna-docusate  1 tablet Oral At Bedtime              Allergies:     Allergies   Allergen Reactions    Aspirin      Other reaction(s): stomach upset    Atenolol Other (See Comments)     abd pain    Lisinopril      Other reaction(s): stomach aches    Penicillins Hives     Tolerated ceftriaxone    Statins      Other reaction(s): myalgias            Physical Exam:   Vitals were reviewed  Patient Vitals for the " past 24 hrs:   BP Temp Temp src Pulse Resp SpO2 Weight   02/08/24 0823 (!) 167/70 97.7  F (36.5  C) Oral 73 20 94 % --   02/08/24 0817 (!) 167/70 -- -- -- -- -- --   02/08/24 0816 (!) 167/70 -- -- 73 -- -- --   02/08/24 0405 -- -- -- -- -- -- 105.9 kg (233 lb 7.5 oz)   02/08/24 0018 (!) 145/65 97  F (36.1  C) Oral 68 16 99 % --   02/07/24 2115 127/55 97.4  F (36.3  C) Oral -- -- 100 % --   02/07/24 2049 -- -- -- -- 20 98 % --   02/07/24 2012 (!) 156/67 -- -- -- 28 99 % --   02/07/24 1913 (!) 175/72 -- -- 79 28 98 % --   02/07/24 1835 (!) 192/77 -- -- -- -- 98 % --   02/07/24 1801 (!) 179/73 -- -- 80 28 98 % --   02/07/24 1745 -- -- -- -- 28 98 % --   02/07/24 1743 (!) 198/84 -- -- 81 28 98 % --   02/07/24 1724 -- 97.4  F (36.3  C) Oral -- -- -- --   02/07/24 1710 -- -- -- -- -- 95 % --   02/07/24 1535 (!) 145/66 98  F (36.7  C) Axillary 63 18 95 % --       Physical Examination:  Gen: Pleasant in no acute distress.  HEENT: NCAT. EOMI. PERRL.  Neck: No bruit, JVD or thyromegaly.  Lungs: Clear to ascultation bilat with no crackles or wheezes.  Card: RRR. NSR. No RMG. Peripheral pulses present and symmetric. No edema.  Abd: Soft NT ND. No mass. Normal bowel sounds.  Skin: No rash.  Extr: No edema.  Neuro: Alert and oriented to place time and person.       Laboratory Data:   ID Labs:  Microbiology labs:  Reviewed.    No lab results found.  Recent Labs   Lab Test 02/08/24  0659 02/07/24  0654 02/06/24  0453 02/05/24  0426 02/04/24  1854 02/04/24  0502   WBC 4.9 4.1 5.6 6.6 5.9 4.2     Recent Labs   Lab Test 02/08/24  0659 02/07/24  1918 02/07/24  0654 02/06/24  0453   CR 2.32* 2.33* 2.45* 2.05*   GFRESTIMATED 22* 22* 20* 25*       Hematology Studies  Recent Labs   Lab Test 02/08/24  0659 02/07/24  0654 02/06/24  0453 02/05/24  0426 02/04/24  1854 02/04/24  0502   WBC 4.9 4.1 5.6 6.6 5.9 4.2   HGB 9.8* 9.9* 9.1* 9.5* 10.4* 9.6*   HCT 30.4* 30.5* 28.4* 29.4* 31.7* 31.2*    176 135* 141* 119* 100*        Metabolic  Recent Labs   Lab Test 02/08/24  0659 02/07/24  1918 02/07/24  0654    138 133*   BUN 63.3* 61.2* 58.7*   CO2 22 21* 17*   CR 2.32* 2.33* 2.45*   GFRESTIMATED 22* 22* 20*       Hepatic Studies  Recent Labs   Lab Test 02/06/24  0453 02/04/24  0502 02/03/24  1430   BILITOTAL 0.4 0.6 0.8   ALKPHOS 109 117 121   ALBUMIN 3.0* 3.0* 3.6   AST 35 40 35   ALT 16 14 11       ImmunologlobulinsNo lab results found.         Imaging Data:   Reviewed

## 2024-02-08 NOTE — PLAN OF CARE
Problem: Gas Exchange Impaired  Goal: Optimal Gas Exchange  Outcome: Progressing  Problem: Fever  Goal: Body Temperature in Desired Range  Outcome: Progressing   Goal Outcome Evaluation:  Pt alert to self, follow some commands, no s/s of pain, pt was on 1L oxygen nasal cannula at the start of shift but changed to oxymask at 3L oxygen due to increase O2 needs, MD notified, new orders, IVF discontinued, lab drawn and IV lasix given, pt incontinence, utilizing purewick, blood sugar checked, encouraged food and fluid intake, continue to monitor.

## 2024-02-08 NOTE — PROVIDER NOTIFICATION
02/08/24 0526   RCAT Assessment   Reason for Assessment CHF   Pulmonary Status 1   Surgical Status 0   Chest X-ray 3   Respiratory Pattern 0   Mental Status 1   Breath Sounds 2   Cough Effectiveness 0   Level of Activity 2   O2 Required for SpO2>=92% 1   Acuity Level (points) 10   Acuity Level  4   Re-eval Interval Guideline Every 3 days   Re-evaluation Date 02/11/24     Pt scored an Acuity level 4 on RCAT assessment. Per RCAT guidelines, pt changed to Q6hprn nebs for wheezing. RCAT reassessment due on 02/11/2024.

## 2024-02-08 NOTE — PROGRESS NOTES
SPIRITUAL HEALTH SERVICES NOTE  [facility]; 2E    Reason for Visit: Attempted to see Josefina due to LOS    FULL SPIRITUAL CARE NOTE:   Josefina was resting, moaning at times and was not able to engage in any meaningful conversation. Will attempt another visit tomorrow.     Time Spent with Patient/Family: 5 minutes    Brii Hook M.Div.    Office: [phone] (for non-urgent requests)  Please Vocera or page through [facility] for time-sensitive requests

## 2024-02-08 NOTE — PLAN OF CARE
Pt alert to self only, follows some commands. VSS on 3L oxymask. Pt resting between cares. IV abx administered as ordered. Purewick in place. Incontinent of bowel and bladder. Heavy assist of 3.   Problem: Adult Inpatient Plan of Care  Goal: Optimal Comfort and Wellbeing  Outcome: Progressing     Problem: Gas Exchange Impaired  Goal: Optimal Gas Exchange  Outcome: Progressing     Problem: Risk for Delirium  Goal: Improved Sleep  Outcome: Progressing   Goal Outcome Evaluation:

## 2024-02-08 NOTE — PROGRESS NOTES
RENAL PROGRESS NOTE      ASSESSMENT & PLAN:   73 year old female with past medical history significant for obesity, hypertension, type 2 diabetes, coronary disease, CVA, meningioma, congestive heart failure with preserved ejection fraction and stage III chronic kidney disease who presented to [facility] on 02/02/24 for evaluation of shortness of breath.  Nephrology consulted for FELIPA.        Acute kidney injury superimposed on stage II chronic kidney disease  Baseline serum creatinine 0.7 to 0.8 mg/dL and correlating EGFR in 60s and 70s.  History of moderate albuminuria, urine albumin to creatinine ratio was 417 in 10/23.  His CKD could be secondary to diabetic nephropathy given albuminuria.  As per patient chart review, she was referred to [facility] in the past but their clinic has not been able to get hold of the patient to schedule an appointment.  On admission patient serum creatinine was at baseline 0.8 mg/dL.  Since admission patient serum creatinine has been trending up.    On 02/07 serum creatinine peaked 2.45 mg/dL.   Urinary output has not been recorded due to urinary incontinence.  UA 02/02 showed mild proteinuria, albumin 70 g/dl, 6 hyaline cast. No hematuria or pyuria.   CT abd/pelvis showed bilateral renal benign cyst.  No evidence of hydronephrosis.  On 02/02 the patient received Ibuprofen 600 mg and IV Lasix 02/02  On 02/04 started on PPI/Protonix 40 mg BI  On 02/04 received 75 ml of iodinated contrast for CTA  On 02/03-02/05 received IV Vancomycin and Cefepime.  Vancomycin trough level was 15.6 02/05.  On 02/05-02/06 the patient received IV Acyclovir.  Patient received IV Solu-Medrol 125 mg 02/05  PTA Losartan 100 mg was continued since admission.  Since 02/06 she has been receiving NS at 75 ml/h  No episodes of hypotension or tachycardia.  Etiology of patient's acute kidney injury is likely multifactorial.  Sepsis +/_contrast induced nephropathy+/- acute  interstitial nephritis from PPIs and Cefepime+/- crystal induced nephropathy from IV acyclovir+/- tubular injury from IV vancomycin.  No absolute eosinophilia on CBC with differential.  TSH was mildly elevated at 6.1 on December 28, 2023.  LFTs wnl  Low urine sodium suggestive prerenal etiology  UA 02/07/24 showed mild albuminuria 17 g/dL, no hematuria, 7 hyaline casts. UPCR is pending.  Today serum creatinine is trending down to 2.32 mg/dl. Moderate azotemia of 63 mg/dl  Postvoid residuals have been low in 70s and 100s  Losartan and PPI were discontinued 02/07  Recs:  No indication for renal replacement therapy.  We will reevaluate daily.  I will order Cystatin C with GFR for accurate evaluation of patient's renal function  Monitor renal panel daily  Avoid nephrotoxins including NSAIDs and contrast  Renally dose medications     Electrolytes  Hyponatremia-mild.  Resolved this a.m.  Trend.  Normal serum potassium of 3.5.  Trend.     Metabolic acidosis-likely secondary to FELIPA.  Today serum bicarbonate is trended up to 22. S/p isotonic bicarbonate. Trend.     Hypertension-most recent blood pressure is elevated 167/74  Prior to admission patient was taking losartan 100 mg daily, amlodipine 10 mg daily, metoprolol 75 mg twice daily, furosemide 20 mg daily,  Hydralazine 25 mg daily  Discontinue losartan given progressive worsening of renal function.  Patient received 1 dose of IV Lasix on admission.    Currently  on amlodipine 10 mg daily, metoprolol 75 mg twice daily and hydralazine 50 mg 3 times daily  Recs:  Increase hydralazine dose to 100 mg 3 times daily  Monitor blood pressure closely, avoid hypo and hypertension     Volume status patient -hard to assess given patient's body habitus.  Appears euvolemic on exam.  Her oral intake has been poor.  Net 3.5 L positive since admission not accurate given patient's urinary incontinence.  Weight has been trending up, gained 12 pounds since admission but has been checked in  bed and likely not accurate.  She is currently on 1 L supplemental oxygen via nasal cannula, saturating 94%.  2D ECHO 02/02/24 showed hyperdynamic systolic function with left ventricular ejection fraction of 70%, normal right ventricular chamber size and systolic function.  No significant valvular abnormalities.  Patient with history of congestive heart failure.  Prior to admission Lasix 20 mg daily. Received IV Lasix on 02/02 and 02/07.  Wean off supplemental oxygen to room air, saturating 96%.  Lungs clear to auscultation.  Daily weight     Sepsis-2/4/24-2/5/24 overnight events notable for stroke code secondary to aphasia and inattention also in the setting of fever to 102.5F and RR 35. WBC is wnl. Blood and urine cultures negative to date.  TTE with minimal pericardial effusion.  Repeat chest x-ray showed normal evidence of pneumonia.  Status post lumbar puncture.  CSF analysis was not consistent with infection.  Currently on IV meropenem and doxycycline. ? Viral infection.  Infectious diseases following.     Altered mental status-could be secondary to hospital delirium vs metabolic encephalopathy ( liver cirrhosis).  No evidence of acute pathology on brain imaging  Unlikely secondary to worsening renal function.  Ammonia level was wnl at 29.  Nodular liver on CT     Normocytic anemia.  Patient presented with hemoglobin of 11.2.  Likely multifactorial today hemoglobin is trending down to 9.8g/dL.  No signs of active bleeding.     Type 2 diabetes mellitus-control on single agent . PTA on glipizide 2.5 mg daily.  Currently on insulin most recent hemoglobin A1c was 6.6%.  Management as per primary team.     History of ischemic CVA-on Plavix     Nodular liver on CT abdomen pelvis-etiology unclear. ? PERRY given patient's obesity and DM.  LFTs are within normal limits.  Coagulopathy INR 1.2.  Platelet count is within normal limits.    Consider GI evaluation.  I will defer further evaluation to primary team.     GERD  prophylaxis patient was started on Protonix 40 mg twice daily during this admission.  Could be contributing to patient's acute kidney injury from interstitial nephritis.  On 02/07 Protonix discontineud and switch to famotidine 10 mg twice daily     Discussed with infectious disease        We will follow.          SUBJECTIVE:   Patient was seen at the bedside and events were reviewed with nursing.  Have episode of increased respiratory distress overnight.  IV fluids were discontinued she received IV Lasix.  Patient was weaned off supplemental oxygen to room air this morning.  As per nursing report, patient reminds confused.  Patient requires a lot of assistance with feeding.  Urinary output has not been measured due to urinary incontinence.  At the time of physical exam the patient is a very poor informant due to altered mental status.  The patient is disoriented in time place person and situation.  Patient denies: fever, chills, dizziness, adenopathy, sore throat, rhinorrhea, cough, shortness of breath , chest pain, palpitations, orthopnea, nausea, vomiting, abdominal pain, changes in bowel habits, dysuria, urinary frequency, urgency, hematuria, rash    OBJECTIVE:  Physical Exam   Temp: 97.7  F (36.5  C) Temp src: Oral BP: (!) 167/70 Pulse: 73   Resp: 20 SpO2: 94 % O2 Device: None (Room air) Oxygen Delivery: 3 LPM  Vitals:    02/06/24 0238 02/07/24 0357 02/08/24 0405   Weight: 103.1 kg (227 lb 6.4 oz) 104.4 kg (230 lb 2.6 oz) 105.9 kg (233 lb 7.5 oz)     Vital Signs with Ranges  Temp:  [97  F (36.1  C)-98  F (36.7  C)] 97.7  F (36.5  C)  Pulse:  [63-81] 73  Resp:  [16-28] 20  BP: (127-198)/(55-84) 167/70  SpO2:  [94 %-100 %] 94 %  I/O last 3 completed shifts:  In: 153 [P.O.:150; I.V.:3]  Out: 0     @TMAXR(24)@    Patient Vitals for the past 72 hrs:   Weight   02/08/24 0405 105.9 kg (233 lb 7.5 oz)   02/07/24 0357 104.4 kg (230 lb 2.6 oz)   02/06/24 0238 103.1 kg (227 lb 6.4 oz)   [unfilled]    PHYSICAL  EXAM:  Gen: Alert, awake , confused , NAD, morbidly obese  HEENT NC/AT; perrla; OP clear without lesions; mmm  Neck supple without LAD, TM  CV; RRR without rub or murmur  Lung: breathing comfortable on RA, clear and equal; no extra sounds  Ab: soft and NT; not distended; normal bs  Ext: no edema and well perfused  Skin; no rash  Neuro; grossly intact    LABORATORY STUDIES:     Recent Labs   Lab 02/08/24  0659 02/07/24  0654 02/06/24  0453 02/05/24  0426 02/04/24  1854 02/04/24  0502   WBC 4.9 4.1 5.6 6.6 5.9 4.2   RBC 3.44* 3.44* 3.19* 3.30* 3.56* 3.38*   HGB 9.8* 9.9* 9.1* 9.5* 10.4* 9.6*   HCT 30.4* 30.5* 28.4* 29.4* 31.7* 31.2*    176 135* 141* 119* 100*       Basic Metabolic Panel:  Recent Labs   Lab 02/08/24  0830 02/08/24  0659 02/08/24  0602 02/07/24 2127 02/07/24  1918 02/07/24  1638 02/07/24  0745 02/07/24  0654 02/06/24  0646 02/06/24  0453 02/05/24  0817 02/05/24  0426 02/04/24  2206 02/04/24  1854   NA  --  139  --   --  138  --   --  133*  --  136  --  134*  --  133*   POTASSIUM  --  3.5  --   --  3.6  --   --  3.5  --  3.6  --  3.7  --  3.6   CHLORIDE  --  107  --   --  105  --   --  104  --  105  --  103  --  102   CO2  --  22  --   --  21*  --   --  17*  --  17*  --  18*  --  18*   BUN  --  63.3*  --   --  61.2*  --   --  58.7*  --  53.5*  --  42.9*  --  41.2*   CR  --  2.32*  --   --  2.33*  --   --  2.45*  --  2.05*  --  1.76*  --  1.75*   GLC 74 101* 98 93 110* 142*   < > 89   < > 59*   < > 98   < > 119*   ALBARO  --  9.2  --   --  9.4  --   --  9.2  --  9.1  --  8.9  --  9.1    < > = values in this interval not displayed.       INR  Recent Labs   Lab 02/04/24  1854   INR 1.23*        Recent Labs   Lab Test 02/08/24  0659 02/07/24  0654 02/05/24  0426 02/04/24  1854 02/05/22  0642 02/04/22  1810   INR  --   --   --  1.23*  --  1.15   WBC 4.9 4.1   < > 5.9   < > 5.1   HGB 9.8* 9.9*   < > 10.4*   < > 12.1    176   < > 119*   < > 156    < > = values in this interval not displayed.        Personally reviewed current labs    ~ 50 Minutes today spent in exam, POC, education regarding renal disease and management, counseling and/or discussion with patient's care team.      Alma August MD  [facility]  [address] [place] [address], [address]  [place] [address]  Phone# [phone]  Fax# [phone]

## 2024-02-08 NOTE — PROGRESS NOTES
Paged by RN regarding increased oxygen needs and wheezing starting about 2 hours ago. Went to assess patient at bedside. RN states that patient's O2 sats were fluctuating/decreasing to 60s-80s, now satting at high 90s on 3L Oxymask. Physical exam shows wheezing and coarse breath sounds, some bibasilar crackles which is new as well. No LE edema bilaterally. Heart regular rhythm and rate, no murmurs. Patient able to state she is having more difficulty breathing.    Stopped continuous fluids for now. Getting CXR, give albuterol inhaler and now scheduling DuoNebs.    CXR no significant difference from prior CXR 2 days ago. However, given clinical picture of likely fluid overload, will continue to hold continuous IVF overnight and diurese. Patient's BP elevated but seems asymptomatic; likely due to fluid overload.    Plan:  - Stop continuous IVF  - Scheduling Duonebs QID  - Get BMP  - IV Lasix 20 mg once after BMP drawn  - Continue to monitor BP, O2 needs overnight - no need for escalating to BiPAP for now as patient satting well on 3L Oxymask  - AM BMP as scheduled to monitor Phuc Cordova MD PGY-2

## 2024-02-08 NOTE — PROGRESS NOTES
[facility]    Progress Note - Hospitalist Service       Date of Admission:  2/2/2024    Assessment & Plan   Josefina Dumont is a 73 year old female admitted on 2/2/2024. She has a history of CHF with recent hospitalization 12/28/23-12/31/23, recent community acquired pneumonia, history of ischemic CVA, history of meningioma, history of CAD, type 2 diabetes mellitus and is admitted for shortness of breath and found to be in severe sepsis. 2/4/24-2/5/24 overnight events notable for stroke code secondary to aphasia and inattention also in the setting of fever to 102.5F and RR 35. Head MRI was unremarkable. Cefepime was switched to meropenem given concern for encephalopathy worsening 2/2 carbapenems. IV acyclovir was also added to the regimen and ID consult, LP orders were placed. On 2/5/24 ID added doxycycline to medication regimen. On 2/6/24, patient reported continued improvement in her condition. IV acyclovir and IV Vancomycin were discontinued per ID. However, overnight events 2/6/24-2/7/24 were notable for likely hospital delirium and her mental status has continued to wax and wane. Unclear what primary contributor to delirium is at this time. Per ID, will switch from IV meropenem and IV doxycycline to PO Levaquin for antibiotic coverage and wondering about concern for hepatic encephalopathy. Plan to re-pan scan today with CT head, CT chest/abdomen/pelvis given unknown cause of delirium. Will potentially involve GI and/or Neurology pending results. It remains possible that hospital delirium is playing a role, but need to rule-out any new onset factors/changes from previous imaging studies.      Severe sepsis due to unknown source   Acute hypoxic respiratory failure, improving  Acute fever of unknown origin, improving  Concern for CHF exacerbation  Delirium   Patient initially presented with worsening shortness of breath, orthopnea, dyspnea on exertion, in the setting of elevated BNP  and missed dose of Lasix, CHF exacerbation was initially high on the differential. She was febrile w/Tmax of 101.3. CT chest on presentation showed no acute findings, mild pulmonary interstitial edema, small pericardial effusion. CRP elevated to 28.10 and procalcitonin of 5.80 on 2/4/24; CRP elevated to 54.20 and procalcitonin elevated to 11.70 on 2/5/24. See 2/4/24-2/5/24 overnight notes for significant events including aphasia, inattention, fever to 102.5F despite broad-spectrum antibiotics prompting additional aggressive treatment measures and workup. Differential remains broad at this time. LP and TTE unremarkable. Patient's condition had been improving 2/6/24 but then had onset of delirium overnight and continues to wax and wane. Possible hospital delirium in the setting of sleep deprivation, disorientation contributing. However, prudent to rule-out other potential causes given the distinct change from patient's baseline and prolonged delirium. Consideration for hepatic cause, change in neuro status. Will therefore re-pan scan with CT head, CT chest/abdomen/pelvis.  -Cardiac/low-sodium diet  -Continue supplemental oxygen as needed  -ID consulted, appreciate recommendations  -Discontinue IV meropenem and doxycycline.  -Start p.o. Levaquin renally dosed.  -Consider lactulose for possible hepatic encephalopathy.  -Will consider repeat CT scan of the abdomen and pelvis.  -Monitor mental status, and glucose  -Nephrology consulted, appreciate recommendations   -Note pending at the time of this dictation  -Delirium precautions  -Re-scan with CT head, CT chest/abdomen/pelvis.    FELIPA  Baseline creatinine around 0.8-0.9, 2.32 today (down from 2.45 yesterday). Vancomycin discontinued 2/6/24, also had started gentle rehydration on 2/6/24 (held overnight 2/7/24 due to evidence of fluid overload)  -Avoid nephrotoxic medications  -Nephrology consulted, appreciate recommendations  -AM BMP    Hypokalemia, resolved  -Standard  replacement protocol      Mild normocytic anemia  Hemoglobin 11.2 on admission. Stable at 9.8 today. Drop consistent with hemodilution. Will continue to monitor.   -AM CBC    Hypertension  -Continue PTA amlodipine  -Continue PTA hydralazine  -Holding PTA losartan in light of FELIPA; will continue to reassess as needing given blood pressure has been high-normal  -Continue PTA metoprolol tartrate     Prolonged QT  QTc of 472.  -Avoid QT prolonging medications  -Note that Celexa comes with risk for QT prolongation but benefit of anxiety treatment outweighing risk, will continue to monitor    DM2   Most recent A1c 6.6 1 month ago.  -Holding PTA glipizide  -Low sliding scale insulin      Chronic:  History of ischemic CVA: Continue PTA Plavix  GERD: Continue PTA Tums, continue PTA Protonix  Constipation: Continue PTA senna docusate  Anxiety/depression: Continue PTA Celexa   Asthma: Continue PTA albuterol as needed          Diet: Regular Diet Adult    DVT Prophylaxis: Heparin  Machuca Catheter: Not present  Fluids: po  Lines: None     Cardiac Monitoring: None  Code Status: Full Code      Clinically Significant Risk Factors              # Hypoalbuminemia: Lowest albumin = 3 g/dL at 2/6/2024  4:53 AM, will monitor as appropriate    # Acute Kidney Injury, unspecified: based on a >150% or 0.3 mg/dL increase in last creatinine compared to past 90 day average, will monitor renal function  # Hypertension: Noted on problem list       # DMII: A1C = 6.6 % (Ref range: <5.7 %) within past 6 months, PRESENT ON ADMISSION        # Financial/Environmental Concerns:           Disposition Plan      Expected Discharge Date: 02/09/2024      Destination: home  Discharge Comments: ID consult. Lumbar Puncture 2/5.  IV ABX.  Need prior authorization and [facility] accepted        The patient's care was discussed with the Attending Physician, Dr. Esposito .    OSKAR BUTLER MD PGY3  Hospitalist Service  [facility]  Hospital  Securely message with Sheryl (more info)  Text page via [facility] Paging/Directory   ______________________________________________________________________    Interval History   Overnight events notable for increased wheezing and coarse breath sounds, concern for fluid overload; IVF held, see  He's note for details. Remains anxious and confused today. Worried she is getting sicker, no specific symptoms she is concerned about. Concerned about which TCU she will ultimately discharge to. Unsure if her sister will be coming by today.    Physical Exam   Vital Signs: Temp: 97.7  F (36.5  C) Temp src: Oral BP: (!) 167/70 Pulse: 73   Resp: 20 SpO2: 94 % O2 Device: None (Room air) Oxygen Delivery: 3 LPM  Weight: 233 lbs 7.47 oz    GENERAL: Alert and no acute distress.  HEENT: Normocephalic, atraumatic, no rhinorrhea, moist mucus membranes.  RESP: Mild expiratory wheezing/coarse breath sounds, lungs otherwise with good air flow.  CV: Regular rate and rhythm, systolic murmur.  ABDOMEN: Soft, nontender, and bowel sounds normal. Possibly mild increase in distention/bloating.  MS: No gross musculoskeletal defects noted, no edema.  NEURO: Alert, opening eyes and making eye contact, responding to questions with fluctuating clarity. Interactive with some understanding of current situation.   PSYCH: Anxious/confused mood and affect.      Data     I have personally reviewed the following data over the past 24 hrs:    4.9  \   9.8 (L)   / 160     139 107 63.3 (H) /  74   3.5 22 2.32 (H) \       Imaging results reviewed over the past 24 hrs:   Recent Results (from the past 24 hour(s))   XR Chest Port 1 View    Narrative    EXAM: XR CHEST PORT 1 VIEW  LOCATION: [facility]  DATE: 2/7/2024    INDICATION: evaluate for fluid overload  COMPARISON: 02/05/2024      Impression    IMPRESSION: Cardiomegaly. Mild interstitial pulmonary edema. No pleural effusions.

## 2024-02-09 ENCOUNTER — APPOINTMENT (OUTPATIENT)
Dept: PHYSICAL THERAPY | Facility: CLINIC | Age: 74
DRG: 871 | End: 2024-02-09
Payer: COMMERCIAL

## 2024-02-09 ENCOUNTER — APPOINTMENT (OUTPATIENT)
Dept: NEUROLOGY | Facility: CLINIC | Age: 74
DRG: 871 | End: 2024-02-09
Attending: STUDENT IN AN ORGANIZED HEALTH CARE EDUCATION/TRAINING PROGRAM
Payer: COMMERCIAL

## 2024-02-09 LAB
ALBUMIN MFR UR ELPH: 109 MG/DL
ALBUMIN SERPL BCG-MCNC: 3 G/DL (ref 3.5–5.2)
ALP SERPL-CCNC: 114 U/L (ref 40–150)
ALT SERPL W P-5'-P-CCNC: 13 U/L (ref 0–50)
ANION GAP SERPL CALCULATED.3IONS-SCNC: 13 MMOL/L (ref 7–15)
AST SERPL W P-5'-P-CCNC: 19 U/L (ref 0–45)
BACTERIA BLD CULT: NO GROWTH
BACTERIA BLD CULT: NO GROWTH
BILIRUB SERPL-MCNC: 0.6 MG/DL
BUN SERPL-MCNC: 67.3 MG/DL (ref 8–23)
C DIFF TOX B STL QL: NEGATIVE
CALCIUM SERPL-MCNC: 9.4 MG/DL (ref 8.8–10.2)
CHLORIDE SERPL-SCNC: 109 MMOL/L (ref 98–107)
CREAT SERPL-MCNC: 2.36 MG/DL (ref 0.51–0.95)
CREAT UR-MCNC: 219 MG/DL
CYSTATIN C (ROCHE): 4.4 MG/L (ref 0.6–1)
DEPRECATED HCO3 PLAS-SCNC: 16 MMOL/L (ref 22–29)
EGFRCR SERPLBLD CKD-EPI 2021: 21 ML/MIN/1.73M2
ERYTHROCYTE [DISTWIDTH] IN BLOOD BY AUTOMATED COUNT: 16.4 % (ref 10–15)
GFR SERPL CREATININE-BSD FRML MDRD: 10 ML/MIN/1.73M2
GLUCOSE BLDC GLUCOMTR-MCNC: 106 MG/DL (ref 70–99)
GLUCOSE BLDC GLUCOMTR-MCNC: 122 MG/DL (ref 70–99)
GLUCOSE BLDC GLUCOMTR-MCNC: 135 MG/DL (ref 70–99)
GLUCOSE BLDC GLUCOMTR-MCNC: 162 MG/DL (ref 70–99)
GLUCOSE BLDC GLUCOMTR-MCNC: 200 MG/DL (ref 70–99)
GLUCOSE SERPL-MCNC: 128 MG/DL (ref 70–99)
HCT VFR BLD AUTO: 30.8 % (ref 35–47)
HGB BLD-MCNC: 10.1 G/DL (ref 11.7–15.7)
MCH RBC QN AUTO: 28.7 PG (ref 26.5–33)
MCHC RBC AUTO-ENTMCNC: 32.8 G/DL (ref 31.5–36.5)
MCV RBC AUTO: 88 FL (ref 78–100)
PLATELET # BLD AUTO: 202 10E3/UL (ref 150–450)
POTASSIUM SERPL-SCNC: 3.8 MMOL/L (ref 3.4–5.3)
PROT SERPL-MCNC: 6.6 G/DL (ref 6.4–8.3)
PROT/CREAT 24H UR: 0.5 MG/MG CR (ref 0–0.2)
RBC # BLD AUTO: 3.52 10E6/UL (ref 3.8–5.2)
SODIUM SERPL-SCNC: 138 MMOL/L (ref 135–145)
WBC # BLD AUTO: 6.9 10E3/UL (ref 4–11)

## 2024-02-09 PROCEDURE — 120N000001 HC R&B MED SURG/OB

## 2024-02-09 PROCEDURE — 250N000013 HC RX MED GY IP 250 OP 250 PS 637: Performed by: INTERNAL MEDICINE

## 2024-02-09 PROCEDURE — 99233 SBSQ HOSP IP/OBS HIGH 50: CPT | Performed by: INTERNAL MEDICINE

## 2024-02-09 PROCEDURE — 80053 COMPREHEN METABOLIC PANEL: CPT

## 2024-02-09 PROCEDURE — 250N000013 HC RX MED GY IP 250 OP 250 PS 637

## 2024-02-09 PROCEDURE — 85027 COMPLETE CBC AUTOMATED: CPT

## 2024-02-09 PROCEDURE — 250N000011 HC RX IP 250 OP 636: Performed by: STUDENT IN AN ORGANIZED HEALTH CARE EDUCATION/TRAINING PROGRAM

## 2024-02-09 PROCEDURE — 97530 THERAPEUTIC ACTIVITIES: CPT | Mod: GP

## 2024-02-09 PROCEDURE — 250N000013 HC RX MED GY IP 250 OP 250 PS 637: Performed by: STUDENT IN AN ORGANIZED HEALTH CARE EDUCATION/TRAINING PROGRAM

## 2024-02-09 PROCEDURE — 95819 EEG AWAKE AND ASLEEP: CPT

## 2024-02-09 PROCEDURE — 99232 SBSQ HOSP IP/OBS MODERATE 35: CPT | Performed by: STUDENT IN AN ORGANIZED HEALTH CARE EDUCATION/TRAINING PROGRAM

## 2024-02-09 PROCEDURE — 99232 SBSQ HOSP IP/OBS MODERATE 35: CPT | Mod: GC | Performed by: FAMILY MEDICINE

## 2024-02-09 PROCEDURE — 87493 C DIFF AMPLIFIED PROBE: CPT | Performed by: PHYSICIAN ASSISTANT

## 2024-02-09 PROCEDURE — 36415 COLL VENOUS BLD VENIPUNCTURE: CPT

## 2024-02-09 PROCEDURE — 84156 ASSAY OF PROTEIN URINE: CPT | Performed by: INTERNAL MEDICINE

## 2024-02-09 RX ORDER — SODIUM BICARBONATE 650 MG/1
1300 TABLET ORAL 2 TIMES DAILY
Status: DISCONTINUED | OUTPATIENT
Start: 2024-02-09 | End: 2024-02-24

## 2024-02-09 RX ORDER — LACTULOSE 10 G/15ML
10 SOLUTION ORAL 3 TIMES DAILY
Status: DISCONTINUED | OUTPATIENT
Start: 2024-02-09 | End: 2024-02-09

## 2024-02-09 RX ORDER — LACTULOSE 10 G/15ML
10 SOLUTION ORAL 2 TIMES DAILY
Status: DISCONTINUED | OUTPATIENT
Start: 2024-02-09 | End: 2024-02-13

## 2024-02-09 RX ADMIN — LACTULOSE 10 G: 10 SOLUTION ORAL at 11:21

## 2024-02-09 RX ADMIN — Medication: at 20:22

## 2024-02-09 RX ADMIN — METOPROLOL TARTRATE 75 MG: 25 TABLET, FILM COATED ORAL at 20:21

## 2024-02-09 RX ADMIN — HEPARIN SODIUM 5000 UNITS: 5000 INJECTION, SOLUTION INTRAVENOUS; SUBCUTANEOUS at 11:21

## 2024-02-09 RX ADMIN — DICLOFENAC 2 G: 10 GEL TOPICAL at 08:48

## 2024-02-09 RX ADMIN — Medication: at 08:48

## 2024-02-09 RX ADMIN — HEPARIN SODIUM 5000 UNITS: 5000 INJECTION, SOLUTION INTRAVENOUS; SUBCUTANEOUS at 03:35

## 2024-02-09 RX ADMIN — HYDRALAZINE HYDROCHLORIDE 100 MG: 25 TABLET, FILM COATED ORAL at 20:20

## 2024-02-09 RX ADMIN — CLOPIDOGREL BISULFATE 75 MG: 75 TABLET ORAL at 08:11

## 2024-02-09 RX ADMIN — DICLOFENAC 2 G: 10 GEL TOPICAL at 20:21

## 2024-02-09 RX ADMIN — DICLOFENAC 2 G: 10 GEL TOPICAL at 16:47

## 2024-02-09 RX ADMIN — METOPROLOL TARTRATE 75 MG: 25 TABLET, FILM COATED ORAL at 08:11

## 2024-02-09 RX ADMIN — FAMOTIDINE 10 MG: 10 TABLET ORAL at 08:48

## 2024-02-09 RX ADMIN — HYDRALAZINE HYDROCHLORIDE 100 MG: 25 TABLET, FILM COATED ORAL at 08:11

## 2024-02-09 RX ADMIN — INSULIN ASPART 1 UNITS: 100 INJECTION, SOLUTION INTRAVENOUS; SUBCUTANEOUS at 16:45

## 2024-02-09 RX ADMIN — CITALOPRAM 20 MG: 20 TABLET ORAL at 08:11

## 2024-02-09 RX ADMIN — INSULIN ASPART 1 UNITS: 100 INJECTION, SOLUTION INTRAVENOUS; SUBCUTANEOUS at 12:19

## 2024-02-09 RX ADMIN — AMLODIPINE BESYLATE 10 MG: 10 TABLET ORAL at 08:11

## 2024-02-09 RX ADMIN — CALCIUM CARBONATE (ANTACID) CHEW TAB 500 MG 500 MG: 500 CHEW TAB at 11:21

## 2024-02-09 RX ADMIN — HEPARIN SODIUM 5000 UNITS: 5000 INJECTION, SOLUTION INTRAVENOUS; SUBCUTANEOUS at 18:40

## 2024-02-09 RX ADMIN — HYDRALAZINE HYDROCHLORIDE 100 MG: 25 TABLET, FILM COATED ORAL at 13:40

## 2024-02-09 RX ADMIN — SODIUM BICARBONATE 650 MG TABLET 1300 MG: at 20:20

## 2024-02-09 ASSESSMENT — ACTIVITIES OF DAILY LIVING (ADL)
ADLS_ACUITY_SCORE: 46
ADLS_ACUITY_SCORE: 56
ADLS_ACUITY_SCORE: 56
ADLS_ACUITY_SCORE: 46
ADLS_ACUITY_SCORE: 52
ADLS_ACUITY_SCORE: 50
ADLS_ACUITY_SCORE: 56
ADLS_ACUITY_SCORE: 46
ADLS_ACUITY_SCORE: 52
ADLS_ACUITY_SCORE: 52
ADLS_ACUITY_SCORE: 56
ADLS_ACUITY_SCORE: 50

## 2024-02-09 NOTE — PROGRESS NOTES
[facility] ID Inpatient follow up       Patient:  Josefina Dumont  Date of birth 1950, Medical record number [id number]  Date of Visit:  02/09/2024  Attending Physician: Lazara Olivares MD         Assessment and Recommendations:   Assessment:  Josefina Dumont is a 73 year old female with   Type 2 diabetes mellitus.  History of ischemic CVA/meningioma  Recent hospitalization for community-acquired pneumonia in December 2023  Admitted on 2/2/2024 with acute hypoxic respiratory failure presumed secondary to exacerbation of heart failure.  Noted to have small pericardial effusion on CT scan.  Needs TTE.  Fever with increased procalcitonin.  UA not consistent with UTI.  Blood cultures no growth.  Liver function test normal.  Etiology not totally clear.  TTE with minimal pericardial effusion.  Repeat chest x-ray with no findings suggestive of pneumonia.  Status post lumbar puncture with no findings consistent with infection (only 2 WBC).  Was initially on broad-spectrum antibiotics.  Narrowed down to Levaquin on 2/8/2024.  Has been afebrile for about 5 days now.  Altered mental status.  MRI with no evidence of infection.  Neck supple.  Low suspicion for CNS infection.  CSF not consistent with infection.  Hepatic encephalopathy is on the differential diagnosis given liver cirrhosis.  FELIPA.  Baseline creatinine normal at 0.80 on 2/2/2024.  Creatinine worsening.  Renal following.  Intermittent confusion.  Report of cirrhosis of the liver on CT scan.  Could be secondary to hepatic encephalopathy.  High BUN may be contributing.  Nephrology following.    Abdominopelvic adenopathy.  Unclear etiology.  Appears stable from December 29, 2023    Recommendations:  Continue Levaquin renally dosed for 7 days from 2/8/2024.  Start lactulose for possible hepatic encephalopathy.  Consider lymph node biopsy if does not continue to improve clinically.  Monitor mental status, and glucose.    Discussed with  "the patient, nursing staff, nephrologist, and Pharm.D.    ID will follow.    Robert Salazar MD.  [facility].   [facility]  Office Telephone [phone].  Fax [phone]  [facility] paging            Interval History:     HPI:  The interval history was reviewed.   Family at bedside today.  They report 40% improvement from yesterday.  CT reviewed.    Pertinent cultures include:  No results found for: \"CULT\"    Recent Inflammatory Biomarkers:   Recent Labs   Lab Test 24  0556 24  0659 24  0654 24  0453 24  0426 24  1918 24  1854 24  1430 24  0550 23  0533 23  1052 22  0429 22  2333   PCAL  --   --  7.31*  --  11.70* 10.90*  --   --  5.80*  --  0.07  --  0.05   WBC 6.9 4.9 4.1 5.6 6.6  --  5.9   < > 5.0   < > 5.7   < >  --     < > = values in this interval not displayed.            Review of Systems:   CONSTITUTIONAL:    Temp Max: Temp (24hrs), Av.8  F (36.6  C), Min:97.6  F (36.4  C), Max:98.1  F (36.7  C)   .  Negative except for findings in the HPI.           Current Medications (antimicrobials listed in bold):      amLODIPine  10 mg Oral Daily    citalopram  20 mg Oral Daily    clopidogrel  75 mg Oral Daily    diclofenac  2 g Topical 4x Daily    famotidine  10 mg Oral Daily    [Held by provider] furosemide  20 mg Intravenous Daily    heparin ANTICOAGULANT  5,000 Units Subcutaneous Q8H    hydrALAZINE  100 mg Oral TID    insulin aspart  1-3 Units Subcutaneous TID AC    insulin aspart  1-3 Units Subcutaneous At Bedtime    levofloxacin  750 mg Oral Every Other Day    [Held by provider] losartan  100 mg Oral Daily    metoprolol tartrate  75 mg Oral BID    miconazole   Topical BID    senna-docusate  1 tablet Oral At Bedtime              Allergies:     Allergies   Allergen Reactions    Aspirin      Other reaction(s): stomach upset    Atenolol Other (See Comments)     abd pain    " Lisinopril      Other reaction(s): stomach aches    Penicillins Hives     Tolerated ceftriaxone    Statins      Other reaction(s): myalgias            Physical Exam:   Vitals were reviewed  Patient Vitals for the past 24 hrs:   BP Temp Temp src Pulse Resp SpO2   02/09/24 0825 (!) 165/74 -- -- -- -- --   02/09/24 0804 (!) 189/79 98.2  F (36.8  C) Oral 77 20 95 %   02/09/24 0430 (!) 145/65 97.7  F (36.5  C) Oral 79 18 98 %   02/09/24 0011 (!) 151/67 97.4  F (36.3  C) Oral 75 18 95 %   02/08/24 2007 (!) 149/66 98.4  F (36.9  C) Oral 81 12 94 %   02/08/24 1635 (!) 152/67 97.9  F (36.6  C) -- 84 20 96 %   02/08/24 1323 (!) 167/74 -- -- 79 -- --       Physical Examination:  Gen: Pleasant in no acute distress.  HEENT: NCAT. EOMI. PERRL.  Neck: No bruit, JVD or thyromegaly.  Lungs: Clear to ascultation bilat with no crackles or wheezes.  Card: RRR. NSR. No RMG. Peripheral pulses present and symmetric. No edema.  Abd: Soft NT ND. No mass. Normal bowel sounds.  Skin: No rash.  Extr: No edema.  Neuro: Alert and oriented to place time and person.       Laboratory Data:   ID Labs:  Microbiology labs:  Reviewed.    No lab results found.  Recent Labs   Lab Test 02/09/24  0556 02/08/24  0659 02/07/24  0654 02/06/24  0453 02/05/24  0426 02/04/24  1854   WBC 6.9 4.9 4.1 5.6 6.6 5.9     Recent Labs   Lab Test 02/09/24  0556 02/08/24  0659 02/07/24  1918 02/07/24  0654   CR 2.36* 2.32* 2.33* 2.45*   GFRESTIMATED 21* 22* 22* 20*       Hematology Studies  Recent Labs   Lab Test 02/09/24  0556 02/08/24  0659 02/07/24  0654 02/06/24  0453 02/05/24  0426 02/04/24  1854   WBC 6.9 4.9 4.1 5.6 6.6 5.9   HGB 10.1* 9.8* 9.9* 9.1* 9.5* 10.4*   HCT 30.8* 30.4* 30.5* 28.4* 29.4* 31.7*    160 176 135* 141* 119*       Metabolic  Recent Labs   Lab Test 02/09/24  0556 02/08/24  0659 02/07/24  1918    139 138   BUN 67.3* 63.3* 61.2*   CO2 16* 22 21*   CR 2.36* 2.32* 2.33*   GFRESTIMATED 21* 22* 22*       Hepatic Studies  Recent Labs   Lab  Test 02/09/24  0556 02/06/24  0453 02/04/24  0502   BILITOTAL 0.6 0.4 0.6   ALKPHOS 114 109 117   ALBUMIN 3.0* 3.0* 3.0*   AST 19 35 40   ALT 13 16 14       ImmunologlobulinsNo lab results found.         Imaging Data:   Reviewed

## 2024-02-09 NOTE — PROGRESS NOTES
Care Management Follow Up    Length of Stay (days): 5    Expected Discharge Date: 02/10/2024     Concerns to be Addressed: discharge planning     Patient plan of care discussed at interdisciplinary rounds: Yes    Anticipated Discharge Disposition:  Transitional care      Anticipated Discharge Services:  (Marika WILLIAM)  Anticipated Discharge DME: None    Patient/family educated on Medicare website which has current facility and service quality ratings:    Education Provided on the Discharge Plan: Yes (AVS will be per bedside RN)  Patient/Family in Agreement with the Plan: yes    Referrals Placed by CM/SW:  post acute care facilities   Private pay costs discussed: Not applicable    Additional Information:  Pt clinically accepted at [facility] for discharge Monday 2/12 pending [facility] insurance authorization. PAS completed and on chart.     CM to call [facility] Monday morning and admissions will initiate auth.     IVON Valentino

## 2024-02-09 NOTE — PLAN OF CARE
Pt disoriented times 4, pt only follows some commands.  On room air. IV saline locked. K protocol recheck in AM. Regular diet, however pt only eat bites of food. Q2 turns done on shift. Pt incontinent of bladder and bowel. New pure wick in placed to get urine sample, passed on to next shift. Call light within reach and bed alarm on.     Problem: Risk for Delirium  Goal: Optimal Coping  Outcome: Progressing  Goal: Improved Behavioral Control  Outcome: Progressing  Intervention: Minimize Safety Risk  Recent Flowsheet Documentation  Taken 2/8/2024 1726 by Lyudmila Sullivan RN  Enhanced Safety Measures: room near unit station  Goal: Improved Attention and Thought Clarity  Outcome: Progressing  Goal: Improved Sleep  Outcome: Progressing   Goal Outcome Evaluation:

## 2024-02-09 NOTE — PLAN OF CARE
Pt alert to self. BP elevated but all other VSS on RA. No complaint of pain. Q2 turns utilized to promote skin integrity. L PIV SL. Urine sample collected and sent to lab. Regular diet. Assist of 2. Purewick in place and patent. Call light within reach and bed alarm on for safety. Discharge pending.   Problem: Adult Inpatient Plan of Care  Goal: Optimal Comfort and Wellbeing  Outcome: Progressing     Problem: Gas Exchange Impaired  Goal: Optimal Gas Exchange  Outcome: Progressing   Goal Outcome Evaluation:

## 2024-02-09 NOTE — PROGRESS NOTES
RENAL PROGRESS NOTE      ASSESSMENT & PLAN:   73 year old female with past medical history significant for obesity, hypertension, type 2 diabetes, coronary disease, CVA, meningioma, congestive heart failure with preserved ejection fraction and stage III chronic kidney disease who presented to [facility] on 02/02/24 for evaluation of shortness of breath.  Nephrology consulted for FELIPA.        Acute kidney injury superimposed on stage II chronic kidney disease  Baseline serum creatinine 0.7 to 0.8 mg/dL and correlating EGFR in 60s and 70s.  History of moderate albuminuria, urine albumin to creatinine ratio was 417 in 10/23.  His CKD could be secondary to diabetic nephropathy given albuminuria.  As per patient chart review, she was referred to [facility] in the past but their clinic has not been able to get hold of the patient to schedule an appointment.  On admission patient serum creatinine was at baseline 0.8 mg/dL. Since admission patient's serum creatinine has been trending up and peaked at 2.45 mg/dl on 02/07.  Urinary output has not been accurately recorded due to urinary incontinence.  UA 02/02 showed mild proteinuria, albumin 70 g/dl, 6 hyaline cast. No hematuria or pyuria.   CT abd/pelvis showed bilateral renal benign cyst.  No evidence of hydronephrosis.  On 02/02 the patient received Ibuprofen 600 mg and IV Lasix 02/02  On 02/04 started on PPI/Protonix 40 mg BI  On 02/04 received 75 ml of iodinated contrast for CTA  On 02/03-02/05 received IV Vancomycin and Cefepime.  Vancomycin trough level was 15.6 02/05.  On 02/05-02/06 the patient received IV Acyclovir.  Patient received IV Solu-Medrol 125 mg 02/05  PTA Losartan 100 mg was continued since admission.  Since 02/06 she has been receiving NS at 75 ml/h  No episodes of hypotension or tachycardia.  Etiology of patient's acute kidney injury is likely multifactorial.  Sepsis +/_contrast induced nephropathy+/- acute interstitial  nephritis from PPIs and Cefepime+/- crystal induced nephropathy from IV acyclovir+/- tubular injury from IV vancomycin.  No absolute eosinophilia on CBC with differential.  TSH was mildly elevated at 6.1 on December 28, 2023.  LFTs wnl  Low urine sodium suggestive prerenal etiology  UA 02/07/24 showed mild albuminuria 17 g/dL, no hematuria, 7 hyaline casts. UPCR is pending.  Last 2 days serum creatinine stalled at 2.3 mg/dl and correlating EGFR at 21 and 22 mL/min.  However patient renal function has been overestimated, on 02/08 Cystatin C was 4.4 mg/dL and correlating EGFR of 10 mL/min.  Moderate azotemia of 67 mg/dl  Postvoid residuals have been low in 70s and 100s  Losartan and PPI were discontinued 02/07  Recs:  No indication for urgent renal replacement therapy.  We will reevaluate daily.  Monitor renal panel daily  Avoid nephrotoxins including NSAIDs and contrast  Renally dose medications     Electrolytes  Hyponatremia-mild.  Resolved .Trend.  Normal serum potassium of 3.8  Trend.     Metabolic acidosis-likely secondary to FELIPA.  Today serum bicarbonate is trended down to 16.  Received isotonic bicarbonate this admission.  Started on oral bicarbonate 1300 mg twice daily.  Trend.     Hypertension-most recent blood pressure is elevated 165/74  Prior to admission patient was taking losartan 100 mg daily, amlodipine 10 mg daily, metoprolol 75 mg twice daily, furosemide 20 mg daily,  Hydralazine 25 mg daily  Discontinue losartan given progressive worsening of renal function.  Patient received 1 dose of IV Lasix on admission.    Currently  on amlodipine 10 mg daily, metoprolol 75 mg twice daily and hydralazine 100 mg 3 times daily  Recs:  Recommend no changes.  Monitor blood pressure closely, avoid hypo and hypertension     Volume status patient -hard to assess given patient's body habitus.  Appears euvolemic on exam.  Her oral intake has been poor.  Net 3.7 L positive since admission not accurate given patient's  urinary incontinence.  Weight has been trending up, gained 12 pounds since admission but has been checked in bed and likely not accurate.  No weight checked this morning.  She is currently on RA saturating 94%. Lungs clear to auscultation.  2D ECHO 02/02/24 showed hyperdynamic systolic function with left ventricular ejection fraction of 70%, normal right ventricular chamber size and systolic function.  No significant valvular abnormalities.  Patient with history of congestive heart failure.  Prior to admission Lasix 20 mg daily. Received IV Lasix on 02/02 and 02/07.  Daily weight     Sepsis-2/4/24-2/5/24 overnight events notable for stroke code secondary to aphasia and inattention also in the setting of fever to 102.5F and RR 35. WBC is wnl. Blood and urine cultures negative to date.  TTE with minimal pericardial effusion.  Repeat chest x-ray showed normal evidence of pneumonia.  Status post lumbar puncture.  CSF analysis was not consistent with infection.  Currently on IV meropenem and doxycycline. ? Viral infection.  Infectious diseases following.  CT abdomen pelvis showed enlarged abdominal pelvic lymph nodes of uncertain etiology     Altered mental status-could be secondary to hospital delirium vs metabolic encephalopathy ( liver cirrhosis, uremia).  No evidence of acute pathology on brain imaging.  Patient mental status much improved this morning.  Ammonia level was wnl at 29.  Nodular liver on CT  Primary team is planning to consult neurology.     Normocytic anemia.  Patient presented with hemoglobin of 11.2.  Likely multifactorial. Today hemoglobin is trending up to 10.1 g/dL.  No signs of active bleeding. Trend.      Type 2 diabetes mellitus-control on single agent . PTA on glipizide 2.5 mg daily.  Currently on insulin most recent hemoglobin A1c was 6.6%.  Management as per primary team.     History of ischemic CVA-on Plavix     Nodular liver on CT abdomen pelvis-etiology unclear. ? PERRY given patient's  obesity and DM.  LFTs are within normal limits.  Coagulopathy INR 1.2.  Platelet count is within normal limits.    Gastroenterology service consulted.     GERD prophylaxis patient was started on Protonix 40 mg twice daily during this admission.  Could be contributing to patient's acute kidney injury from interstitial nephritis.  On 02/07 Protonix discontineud and switch to famotidine 10 mg twice daily     Discussed with infectious disease , family residents, gastroenterologist .      We will follow.          SUBJECTIVE:   Patient was seen at the bedside and events were reviewed with nursing.  Patient accompanied by sister and niece at the bedside.  Patient's mental status much improved this morning.  Urinary output has not been measured due to urinary incontinence.  Patient has been having frequent bowel movements with watery stool.  Patient denies: fever, chills, dizziness, adenopathy, sore throat, rhinorrhea, cough, shortness of breath , chest pain, palpitations, orthopnea, nausea, vomiting, abdominal pain,dysuria, urinary frequency, urgency, hematuria, rash    OBJECTIVE:  Physical Exam   Temp: 98.2  F (36.8  C) Temp src: Oral BP: (!) 165/74 Pulse: 77   Resp: 20 SpO2: 95 % O2 Device: None (Room air)    Vitals:    02/06/24 0238 02/07/24 0357 02/08/24 0405   Weight: 103.1 kg (227 lb 6.4 oz) 104.4 kg (230 lb 2.6 oz) 105.9 kg (233 lb 7.5 oz)     Vital Signs with Ranges  Temp:  [97.4  F (36.3  C)-98.4  F (36.9  C)] 98.2  F (36.8  C)  Pulse:  [75-84] 77  Resp:  [12-20] 20  BP: (145-189)/(65-79) 165/74  SpO2:  [94 %-98 %] 95 %  I/O last 3 completed shifts:  In: 270 [P.O.:270]  Out: 150 [Urine:150]    @TMAXR(24)@    Patient Vitals for the past 72 hrs:   Weight   02/08/24 0405 105.9 kg (233 lb 7.5 oz)   02/07/24 0357 104.4 kg (230 lb 2.6 oz)   [unfilled]    PHYSICAL EXAM:  Gen: Alert, awake , NAD, morbidly obese  HEENT NC/AT; perrla; OP clear without lesions; mmm  Neck supple without LAD, TM  CV; RRR without  rub or murmur  Lung: breathing comfortable on RA, clear and equal; no extra sounds  Ab: obese, soft and NT; not distended; normal bs  Ext: no edema and well perfused  Skin; no rash  Neuro; moves all extremities spontaneously. She is much less confused today.  She is oriented in person, place, situation.  Patient knew it was in February.  No tremor or asterixis appreciated on exam.    LABORATORY STUDIES:     Recent Labs   Lab 02/09/24  0556 02/08/24  0659 02/07/24  0654 02/06/24  0453 02/05/24  0426 02/04/24  1854   WBC 6.9 4.9 4.1 5.6 6.6 5.9   RBC 3.52* 3.44* 3.44* 3.19* 3.30* 3.56*   HGB 10.1* 9.8* 9.9* 9.1* 9.5* 10.4*   HCT 30.8* 30.4* 30.5* 28.4* 29.4* 31.7*    160 176 135* 141* 119*       Basic Metabolic Panel:  Recent Labs   Lab 02/09/24  0809 02/09/24  0556 02/09/24  0207 02/08/24  2105 02/08/24  1647 02/08/24  1205 02/08/24  0830 02/08/24  0659 02/07/24  2127 02/07/24  1918 02/07/24  0745 02/07/24  0654 02/06/24  0646 02/06/24  0453 02/05/24  0817 02/05/24  0426   NA  --  138  --   --   --   --   --  139  --  138  --  133*  --  136  --  134*   POTASSIUM  --  3.8  --   --   --   --   --  3.5  --  3.6  --  3.5  --  3.6  --  3.7   CHLORIDE  --  109*  --   --   --   --   --  107  --  105  --  104  --  105  --  103   CO2  --  16*  --   --   --   --   --  22  --  21*  --  17*  --  17*  --  18*   BUN  --  67.3*  --   --   --   --   --  63.3*  --  61.2*  --  58.7*  --  53.5*  --  42.9*   CR  --  2.36*  --   --   --   --   --  2.32*  --  2.33*  --  2.45*  --  2.05*  --  1.76*   * 128* 106* 161* 105* 108*   < > 101*   < > 110*   < > 89   < > 59*   < > 98   ALBARO  --  9.4  --   --   --   --   --  9.2  --  9.4  --  9.2  --  9.1  --  8.9    < > = values in this interval not displayed.       INR  Recent Labs   Lab 02/04/24  1854   INR 1.23*        Recent Labs   Lab Test 02/09/24  0556 02/08/24  0659 02/05/24  0426 02/04/24  1854 02/05/22  0642 02/04/22  1810   INR  --   --   --  1.23*  --  1.15   WBC 6.9 4.9    < > 5.9   < > 5.1   HGB 10.1* 9.8*   < > 10.4*   < > 12.1    160   < > 119*   < > 156    < > = values in this interval not displayed.       Personally reviewed current labs    ~ 50 Minutes today spent in exam, POC, education regarding renal disease and management, counseling and/or discussion with patient's care team.      Alma August MD  [facility]  [address] [place] [address], [address]  [place] [address]  Phone# [phone]  Fax# [phone]

## 2024-02-09 NOTE — PLAN OF CARE
"Problem: Adult Inpatient Plan of Care  Goal: Plan of Care Review  Outcome: Progressing     Problem: Fatigue  Goal: Improved Activity Tolerance  Outcome: Progressing  Intervention: Promote Improved Energy   Goal Outcome Evaluation:    Patient A&O to self and place only.  VSS except HTN, afebrile, denied pain.  Patient has been on RA all day sating in mid 90%'s.  Still confused, but family reports patient is \"much better today\" but not at her baseline yet.  Patient with diarrhea, lactulose BID started today to hopefully help patient's mentation.  Rule out C-diff, stool sample still needed.  Continent of bowel and bladder this shift, however not always able to make it on bedpan or to commode.  Assist of 2 pivot to BSC or chair with GB and walker, staff need to give patient some guidance on direction/how to move.  Repositioned Q2H, barrier cream and powder applied to bottom/perineum.  Feeder with meals, very poor appetite.      Attempted to give patient a pill whole with water.  Patient did not swallow pill, instead, played with it in her mouth before spitting it out.  Attempted to give another pill whole with applesauce.  Patient did not swallow the pill this time either, rather chewed it then swallowed with some water.  The rest of the meds were given crushed with applesauce and patient swallowed these without difficulty, she just did not like the taste at all so took her some time to finish.  Updated MD regarding this at bedside and sticky note left to team.  Will pass along to oncoming RN.      K protocol, recheck tomorrow AM.  Anticipated discharge to TCU once medically ready.  "

## 2024-02-09 NOTE — PROGRESS NOTES
Bedside EEG was performed that included photic stimulation; hyperventilation was deferred. The patient was awake and asleep throughout the recording. Skin intact.    [id number] used.

## 2024-02-09 NOTE — PROGRESS NOTES
Discussed this case by phone, patient with history of a frontal meningioma with no history of seizures who has become increasingly encephalopathic during hospitalization.  She has had a consistently elevated procalcitonin without a clear source and is now off antibiotics under the care of the infectious disease team.  She has had an LP that was essentially normal without any indication of infection.  She has developed a worsening uremia that does seem to match the time course of the worsening encephalopathy and is being managed by the nephrology team in addition to the internal medicine team.    I have recommended and ordered an EEG to assess for frequent seizures/nonconvulsive status epilepticus.  Her risk factors include the meningioma as well as neurotoxicity secondary to cefepime or to meropenem.  If the EEG does show nonconvulsive status then that would be an indication to transfer to a hospital that is able to provide ongoing EEG monitoring.  If not, the plan will be to continue treating toxic/metabolic abnormalities.    Dr Leslie plans to stay in touch with me over the next several days as we are both on service and we can change the plan as new information evolves.    Gary Ruffin MD  Staff Neurologist

## 2024-02-09 NOTE — CONSULTS
GI CONSULT NOTE      Name: Josefina Dumont  Medical Record #: [id number]  YOB: 1950  Date of Admission: 2/2/2024  Date/Time: 2/9/2024/11:58 AM     CHIEF COMPLAINT: altered mental status     HISTORY OF PRESENT ILLNESS: We were asked to see Josefina Dumont by Dr Esposito for evaluation of cirrhosis on imaging and altered mental status. Josefina Dumont is a 73 year old year old female with history of obesity, diabetes, hypertension, valvular heart disease, hyperlipidemia, chronic kidney disease, frontal meningioma depression, cva, and hysterectomy presented on February 3 with suspicion for severe sepsis with elevated procalcitonin, fever, acute on chronic kidney injury, and altered mental status.  Infection has not been confirmed with negative blood cultures, UA not consistent with UTI, LP unremarkable, and CXR negative for pneumonia.  As a part of her evaluation, she underwent CT chest abdomen pelvis on February 8 that showed findings of cirrhosis, cholelithiasis, but no duct dilation.  Liver function tests are unremarkable.  CBC shows white count 6.9 with chronic anemia and hemoglobin of 10.1 with MCV of 88.  Platelets are normal at 202,000.  Ammonia level on February 7 was 29.  The patient was started on lactulose and [facility] was consulted for further evaluation of possible cirrhosis and hepatic encephalopathy. The patient was seen by neurology earlier today and plans are in place for EEG.   Chart review indicates the patient's level of consciousness has waxed and waned. Nursing reports the patient's family believes the patient is improving.    The patient was seen at [facility] by Dr. George Rank January 25, 2024 for further evaluation of possible cirrhosis.  During her admission prior to that visit, she had a CT PE run showed findings suggestive of cirrhosis.  At her visit, she reported having a history of alcohol misuse with drinking heavily between 2000 in May 2022 (4-8 drinks most days).  The patient's  findings of cirrhosis were presumed to be related to metabolic syndrome in combination with prior history of alcohol misuse.  Further evaluation included unremarkable hepatitis serologies and negative alpha-1 antitrypsin.  TTG IgA level was not elevated but IgA was found to be elevated to 1197 and therefore the patient was referred to immunology.  These were unremarkable with the exception of alk phos being elevated to 145.  A FibroScan with CAP was ordered but this was converted to an MR elastography which is pending.  The patient was advised to have follow-up with Dr. Oleary in 3 months.    The patient is not able to provide a detailed history.  She reports being uncomfortable but does not report having abdominal pain.  She is not eating much due to poor appetite.  She has had 3 or 4 loose bowel movements today (before starting Lactulose).  Is passing brown to yellow stool without bright red blood. No nausea or vomiting.     Colonoscopy 11/30/21 showed diverticulosis and a tubular adenoma removed.      PAST MEDICAL HISTORY:  Past Medical History:   Diagnosis Date    Chronic kidney disease     Diabetes (H)     Hypertension     Obese         FAMILY HISTORY:  No family history on file.    SOCIAL HISTORY:        MEDICATIONS PRIOR TO ADMISSION:   Medications Prior to Admission   Medication Sig Dispense Refill Last Dose    acetaminophen (TYLENOL) 500 MG tablet Take 2 tablets (1,000 mg) by mouth every 8 hours as needed for mild pain   Unknown    albuterol (PROAIR HFA/PROVENTIL HFA/VENTOLIN HFA) 108 (90 Base) MCG/ACT inhaler Inhale 2 puffs into the lungs every 6 hours as needed   Unknown at prn - sister will bring in    amLODIPine (NORVASC) 10 MG tablet Take 10 mg by mouth daily   2/2/2024 at am    calcium carbonate (TUMS) 500 MG chewable tablet Take 1 chew tab by mouth 2 times daily as needed for heartburn   Unknown at prn    citalopram (CELEXA) 40 MG tablet Take 40 mg by mouth daily   2/2/2024 at am    clopidogrel  "(PLAVIX) 75 MG tablet Take 75 mg by mouth daily   2/2/2024 at am    furosemide (LASIX) 20 MG tablet Take 1 tablet (20 mg) by mouth daily 30 tablet 0 2/1/2024 at am - ran out    glipiZIDE (GLUCOTROL XL) 2.5 MG 24 hr tablet Take 2.5 mg by mouth daily (with breakfast)   2/2/2024 at am    hydrALAZINE (APRESOLINE) 25 MG tablet Take 1 tablet (25 mg) by mouth 3 times daily 270 tablet 0 2/2/2024 at am    losartan (COZAAR) 50 MG tablet Take 2 tablets (100 mg) by mouth daily 60 tablet 0 2/2/2024 at am    metoprolol tartrate 75 MG TABS Take 75 mg by mouth 2 times daily 60 tablet 0 2/2/2024 at am    multivitamin w/minerals (THERA-VIT-M) tablet Take 1 tablet by mouth daily   2/2/2024 at am    pantoprazole (PROTONIX) 40 MG EC tablet Take 1 tablet (40 mg) by mouth 2 times daily (before meals) 60 tablet 0 2/2/2024 at am    senna-docusate (SENOKOT-S/PERICOLACE) 8.6-50 MG tablet Take 1 tablet by mouth at bedtime   2/1/2024 at hs    vitamin E (TOCOPHEROL) 400 units (180 mg) capsule Take 400 Units by mouth daily   2/2/2024 at am          ALLERGIES: Aspirin, Atenolol, Lisinopril, Penicillins, and Statins    PHYSICAL EXAM:    BP (!) 165/74 (BP Location: Right arm)   Pulse 77   Temp 98.2  F (36.8  C) (Oral)   Resp 20   Wt 105.9 kg (233 lb 7.5 oz)   SpO2 95%   BMI 40.07 kg/m      GENERAL: Pleasant, awake, not able to state the year or tell me what time of day it is (clock is on the wall in the room), able to state she is in the hospital (not able to state \"[facility]\"). No asterixis.   EYES: No scleral icterus  LUNGS: Clear to auscultation bilaterally  HEART: S1S2  ABDOMEN: Non-distended. Positive bowel sounds. Soft, non-tender, no guarding/rebound/mass  MUSKULOSKELETAL:  Warm and well perfused, moves all extremities well  SKIN: No jaundice    LAB DATA:  CMP Results:   Recent Labs   Lab Test 02/09/24  0809 02/09/24  0556 02/09/24  0207 02/08/24  0830 02/08/24  0659 02/07/24  2127 02/07/24  1918 02/06/24  0646 02/06/24  0453 " "02/04/24  0559 02/04/24  0502   NA  --  138  --   --  139  --  138   < > 136   < > 134*   POTASSIUM  --  3.8  --   --  3.5  --  3.6   < > 3.6   < > 3.5   CHLORIDE  --  109*  --   --  107  --  105   < > 105   < > 103   CO2  --  16*  --   --  22  --  21*   < > 17*   < > 23   ANIONGAP  --  13  --   --  10  --  12   < > 14   < > 8   * 128* 106*   < > 101*   < > 110*   < > 59*   < > 88   BUN  --  67.3*  --   --  63.3*  --  61.2*   < > 53.5*   < > 35.0*   CR  --  2.36*  --   --  2.32*  --  2.33*   < > 2.05*   < > 1.78*   BILITOTAL  --  0.6  --   --   --   --   --   --  0.4  --  0.6   ALKPHOS  --  114  --   --   --   --   --   --  109  --  117   ALT  --  13  --   --   --   --   --   --  16  --  14   AST  --  19  --   --   --   --   --   --  35  --  40    < > = values in this interval not displayed.      CBC  Recent Labs   Lab 02/09/24  0556 02/08/24  0659 02/07/24  0654 02/06/24  0453   WBC 6.9 4.9 4.1 5.6   RBC 3.52* 3.44* 3.44* 3.19*   HGB 10.1* 9.8* 9.9* 9.1*   HCT 30.8* 30.4* 30.5* 28.4*   MCV 88 88 89 89   MCH 28.7 28.5 28.8 28.5   MCHC 32.8 32.2 32.5 32.0   RDW 16.4* 16.1* 16.4* 16.1*    160 176 135*     INR  Recent Labs   Lab 02/04/24  1854   INR 1.23*      No results found for: \"LIPASE\"    IMAGING:  EXAM: CT CHEST/ABDOMEN/PELVIS W CONTRAST  LOCATION: [facility]  DATE: 2/8/2024     INDICATION: delirium of undetermined etiology, no infection source with fevers  COMPARISON: 02/03/2024, 12/28/2023  TECHNIQUE: CT scan of the chest, abdomen, and pelvis was performed following injection of IV contrast. Multiplanar reformats were obtained. Dose reduction techniques were used.   CONTRAST: Isovue 370 75ml     FINDINGS:   LUNGS AND PLEURA: Expiratory appearance of the trachea. Mild pulmonary edema. Trace bilateral pleural effusions. Dependent atelectasis. Scattered pulmonary nodules. For example, right middle lobe some solid nodule, 8 mm (series 5 image 132).     No pneumothorax.   "   MEDIASTINUM/AXILLAE: No pathologically enlarged thoracic lymph nodes. Multinodular thyroid. Normal caliber thoracic aorta. Cardiomegaly. Small pericardial effusion. Exuberant mitral annulus calcifications.     CORONARY ARTERY CALCIFICATION: Moderate.     HEPATOBILIARY: Cirrhosis. No focal liver lesion. No biliary dilation. Cholelithiasis.     PANCREAS: Normal.     SPLEEN: Normal.     ADRENAL GLANDS: Normal.     KIDNEYS/BLADDER: Benign renal cysts and/or probable cysts for which no further follow-up imaging is recommended. Nonobstructing left nephrolithiasis. Normal urinary bladder.      BOWEL: No obstruction. Normal appendix. No bowel wall thickening or pneumatosis. Small volume pelvic free fluid.      LYMPH NODES: Enlarged gastrohepatic ligament and wadna hepatis lymph nodes. For example:  - Gastrohepatic ligament lymph node, 12 mm (series 3 image 49).  - Wanda hepatis lymph node, 13 mm (series 3 image 67).     VASCULATURE: Nonaneurysmal aorta with moderate aortoiliac calcifications.      PELVIC ORGANS: No pelvic masses.     MUSCULOSKELETAL: Multilevel degenerative disc disease of the thoracolumbar spine. Bones are diffusely demineralized. Mild anasarca. Subcutaneous emphysema in the anterior abdominal wall, presumably related to injection.                                                                      IMPRESSION:  1.  Mild pulmonary edema, trace bilateral pleural effusions, and mild anasarca.  2.  Enlarged abdominopelvic lymph nodes of uncertain etiology, unchanged dating back to at least 12/28/2023 correlate with clinical history and consider follow-up as indicated.  3.  Cirrhosis.  4.  Chronic and ancillary findings as described.    ASSESSMENT:  Encephalopathy-- This is a 73 year old year old female with history of obesity, diabetes, hypertension, valvular heart disease, hyperlipidemia, chronic kidney disease, frontal meningioma depression, cva, and hysterectomy presented on February 3 with suspicion for  severe sepsis with elevated procalcitonin, fever, acute on chronic kidney injury, and altered mental status. A clear source of infection has not been identified.   [facility] was consulted for further evaluation of cirrhosis on imaging and altered mental status, question of hepatic encephalopathy. I suspect the patient's encephalopathy is multifactorial and related to possible delirium with acute illness/infection/virus in combination with acute kidney injury/electrolyte disturbances and less likely solely related to underlying liver disease. Ammonia was not elevated 2/7/24 at 29, but agree with empiric Lactulose (goal 3 stools per day). Agree with plans for EEG per neurology.    Cirrhosis on imaging-- The patient is established at [facility] and was seen by Dr JAYESH Oleary (hepatology) 1/25/24. Evaluation is underway and plans are in place for outpatient MR elastography and clinic follow up in 3months. Suspect liver disease is related to combination of metabolic syndrome and prior history of alcohol misuse (last drank 2022).       PLAN:  Agree with empiric Lactulose but will reduce dose and adjust as needed for goal of 3 stools per day.  Rule out c difficile.  Appreciate input from neurology-- EEG as planned.    No additional tests planned from gi perspective at this time. The patient has plans in place for further outpatient follow up in hepatology clinic (MR elastography and f/up with Dr JAYESH Oleary ordered for 3 months at visit 1/25/24).       Total time spent on this encounter=50 minutes  Discussed with Dr. Oleary who will also visit with the patient.                                                Rosalia Valenzuela PA-C  Thank you for the opportunity to participate in the care of this patient.   Please feel free to call me with any questions or concerns.  Phone number [phone].            GI Staff Addendum  DOS 2/9/2024     Pt seen and discussed with LISBET. Agree with evaluation, assessment and plan as outlined.    73 year old yo  "female presents with suddenly feeling ill at a [place]--admitted with fever, shortness of breath and AMS. She is being treated for CHF exacerbation but in the process has been experiencing significant altered mental status. She is being evaluated for several different potential causes, but GI is being consulted for possible contributions from hepatic encephalopathy.   I saw the patient in clinic 1/25/2024. At that point in time, the diagnosis of cirrhosis was not confirmed as it was entirely based on imaging. Recent imaging during this hospitalization showed normal spleen size and no ascites. Most recent platelet count was 202 in hospital.   There were no antecedent symptoms of hepatic encephalopathy. She has not been on lactulose or rifaximin.   She did get a single dose of lactulose today at 1130 AM and \"never wants to have that again.\" She is having abdominal bloating and some diarrhea.  Her mental status is oscillating somewhat--today she recognized me but could not recall my name. Once reminded, she was able to recall details from our visit.        BP (!) 148/65 (BP Location: Right arm)   Pulse 68   Temp 98.9  F (37.2  C) (Oral)   Resp 20   Wt 105.9 kg (233 lb 7.5 oz)   SpO2 93%   BMI 40.07 kg/m    General: A&O, NAD, non-toxic appearing  Eyes: No icterus or conjunctivitis  Gastrointestinal: Soft, NTTP, NABS, no r/g, no masses, no HSM  Neuro: no asterixis    Assessment/Plan:  73 year old who is being evaluated for cirrhosis (not confirmed) who is seen for confusion. It's not clear to me that this is hepatic encephalopathy given no previous HE, no confirmed dx cirrhosis (normal liver function studies, normal spleen, no ascites, normal platelet count). Could be viral encephalitis, progressive renal dysfunction (GFR by cystatin 10), acute delirium, seizures, increase CNS pressure from meningioma amongst others.    Ok with brief trial of lactulose for 2-3 days but if no improvement would stop.   Agree with " broad investigation for other causes-->would not attribute this to HE.  Will see her in outpatient follow-up    Approximately 30 minutes of total time was spent providing patient care including patient evaluation, reviewing documentation/test results, and .                                                  Ariel Oleary MD  Thank you for the opportunity to participate in the care of this patient.   Please feel free to call me with any questions or concerns.  Phone number [phone].

## 2024-02-09 NOTE — PROGRESS NOTES
[facility]    Progress Note - Hospitalist Service       Date of Admission:  2/2/2024    Assessment & Plan   Josefina Dumont is a 73 year old female admitted on 2/2/2024. She has a history of CHF with recent hospitalization 12/28/23-12/31/23, recent community acquired pneumonia, history of ischemic CVA, history of meningioma, history of CAD, type 2 diabetes mellitus and is admitted for shortness of breath and found to be in severe sepsis. 2/4/24-2/5/24 overnight events notable for stroke code secondary to aphasia and inattention also in the setting of fever to 102.5F and RR 35. Head MRI was unremarkable. Cefepime was switched to meropenem given concern for encephalopathy worsening 2/2 carbapenems. IV acyclovir was also added to the regimen and ID consult, LP orders were placed. On 2/5/24 ID added doxycycline to medication regimen. On 2/6/24, patient reported continued improvement in her condition. IV acyclovir and IV Vancomycin were discontinued per ID. However, overnight events 2/6/24-2/7/24 were notable for likely hospital delirium and her mental status has continued to wax and wane. Unclear what primary contributor to delirium is at this time. Per ID, will switch from IV meropenem and IV doxycycline to PO Levaquin for antibiotic coverage and wondering about concern for hepatic encephalopathy. CT head, chest, abdomen, pelvis was repeated 2/8/24 in light of continued AMS of unclear etiology; results showed cirrhosis but were otherwise unremarkable. Metabolic encephalopathy remains on the differential given cirrhosis though LFTs have been wnl and patient is stooling daily. Will discuss with GI and Neuro teams for any further diagnostic workup. At this time, hospital delirium remains on the differential.      Severe sepsis due to unknown source   Acute hypoxic respiratory failure, improving  Acute fever of unknown origin, improving  Concern for CHF exacerbation  Delirium   Patient  initially presented with worsening shortness of breath, orthopnea, dyspnea on exertion, in the setting of elevated BNP and missed dose of Lasix, CHF exacerbation was initially high on the differential. She was febrile w/Tmax of 101.3. CT chest on presentation showed no acute findings, mild pulmonary interstitial edema, small pericardial effusion. CRP elevated to 28.10 and procalcitonin of 5.80 on 2/4/24; CRP elevated to 54.20 and procalcitonin elevated to 11.70 on 2/5/24. See 2/4/24-2/5/24 overnight notes for significant events including aphasia, inattention, fever to 102.5F despite broad-spectrum antibiotics prompting additional aggressive treatment measures and workup. Differential remains broad at this time. LP and TTE unremarkable. Patient's condition had been improving 2/6/24 but then had onset of delirium overnight and continues to wax and wane. Possible hospital delirium in the setting of sleep deprivation, disorientation contributing. However, prudent to rule-out other potential causes given the distinct change from patient's baseline and prolonged delirium. CT head, chest, abdomen, pelvis was repeated 2/8/24 in light of continued AMS of unclear etiology; results showed cirrhosis but were otherwise unremarkable. Metabolic encephalopathy remains on the differential given cirrhosis though LFTs have been wnl and patient is stooling daily. Will discuss with GI and Neuro teams for any further diagnostic workup  -Cardiac/low-sodium diet  -Continue supplemental oxygen as needed  -ID consulted, appreciate recommendations  -Continue Levaquin renally dosed for 7 days from 2/8/2024.  -Start lactulose for possible hepatic encephalopathy.  -Consider lymph node biopsy if does not continue to improve clinically.  -Monitor mental status, and glucose.  -Nephrology consulted, appreciate recommendations   -Note pending at the time of this dictation  -Delirium precautions  -GI and Neurology consults placed, appreciate  recs   -Discussed with Neurology, planning to access for non-convulsive status ellipticus with EEG    FELIPA  Baseline creatinine around 0.8-0.9, 2.36 today. Vancomycin discontinued 2/6/24, also had started gentle rehydration on 2/6/24 (held overnight 2/7/24 due to evidence of fluid overload). Creatinine has been stable for the past couple of days.  -Avoid nephrotoxic medications  -Nephrology consulted, appreciate recommendations  -AM BMP    Hypokalemia, resolved  -Standard replacement protocol      Mild normocytic anemia  Hemoglobin 11.2 on admission. Stable at 10.1 today. Drop consistent with hemodilution. Will continue to monitor.   -AM CBC    Hypertension  -Continue PTA amlodipine  -Continue PTA hydralazine  -Holding PTA losartan in light of FELIPA; will continue to reassess as needing given blood pressure has been high-normal  -Continue PTA metoprolol tartrate     Prolonged QT  QTc of 472.  -Avoid QT prolonging medications  -Note that Celexa comes with risk for QT prolongation but benefit of anxiety treatment outweighing risk, will continue to monitor    DM2   Most recent A1c 6.6 1 month ago.  -Holding PTA glipizide  -Low sliding scale insulin      Chronic:  History of ischemic CVA: Continue PTA Plavix  GERD: Continue PTA Tums, continue PTA Protonix  Constipation: Continue PTA senna docusate  Anxiety/depression: Continue PTA Celexa   Asthma: Continue PTA albuterol as needed          Diet: Regular Diet Adult    DVT Prophylaxis: Heparin  Machuca Catheter: Not present  Fluids: po  Lines: None     Cardiac Monitoring: None  Code Status: Full Code      Clinically Significant Risk Factors           # Hypercalcemia: corrected calcium is >10.1, will monitor as appropriate    # Hypoalbuminemia: Lowest albumin = 3 g/dL at 2/9/2024  5:56 AM, will monitor as appropriate    # Acute Kidney Injury, unspecified: based on a >150% or 0.3 mg/dL increase in last creatinine compared to past 90 day average, will monitor renal function  #  Hypertension: Noted on problem list       # DMII: A1C = 6.6 % (Ref range: <5.7 %) within past 6 months         # Financial/Environmental Concerns:           Disposition Plan     Expected Discharge Date: 02/09/2024      Destination: home  Discharge Comments: ID consult. Lumbar Puncture 2/5.  IV ABX.  Need prior authorization and [facility] accepted        The patient's care was discussed with the Attending Physician, Dr. Esposito .    OSKAR BUTLER MD PGY1  Hospitalist Service  [facility]  Securely message with [employer] (more info)  Text page via [facility] Paging/Directory   ______________________________________________________________________    Interval History   No acute events overnight. Difficulty taking medications this morning. Per RN, started chewing them, then RN mixed with applesauce and continued to have difficulty with that. Patient complaining of poor taste in mouth after taking medications. Affirms difficulty breathing but denies any specific symptoms. Denies abdominal pain. Is stooling appropriately. Remains incontinent of bowel and bladder.    Physical Exam   Vital Signs: Temp: 98.2  F (36.8  C) Temp src: Oral BP: (!) 165/74 Pulse: 77   Resp: 20 SpO2: 95 % O2 Device: None (Room air)    Weight: 233 lbs 7.47 oz    GENERAL: Alert and no acute distress. Anxious-appearing.  HEENT: Normocephalic, atraumatic, no rhinorrhea, moist mucus membranes.  RESP: Mild expiratory wheezing/coarse breath sounds, lungs otherwise with good air flow. Nonlabored breathing on room air.  CV: Regular rate and rhythm, systolic murmur.  ABDOMEN: Soft, nontender, and bowel sounds normal. Possibly mild distention/bloating.  MS: No gross musculoskeletal defects noted, no edema.  NEURO: Alert, opening eyes and making eye contact, responding to questions with fluctuating clarity. Needs prompting, forgetful. Oriented to self and place, has difficulty stating her birth date. Able to identify flowers in  the room and who gave them to her (sister).  PSYCH: Anxious/confused mood and affect.      Data     I have personally reviewed the following data over the past 24 hrs:    6.9  \   10.1 (L)   / 202     138 109 (H) 67.3 (H) /  122 (H)   3.8 16 (L) 2.36 (H) \     ALT: 13 AST: 19 AP: 114 TBILI: 0.6   ALB: 3.0 (L) TOT PROTEIN: 6.6 LIPASE: N/A       Imaging results reviewed over the past 24 hrs:   Recent Results (from the past 24 hour(s))   CT Head w/o Contrast    Narrative    EXAM: CT HEAD W/O CONTRAST  LOCATION: [facility]  DATE: 2/8/2024    INDICATION: delirium of undetermined etiology, no infection source with fevers  COMPARISON: MRI brain 02/04/2024    CT head 02/04/2024  TECHNIQUE: Routine CT Head without IV contrast. Multiplanar reformats. Dose reduction techniques were used.    FINDINGS:  INTRACRANIAL CONTENTS: Unchanged left frontal convexity presumed meningioma with underlying parenchymal edema. No intracranial hemorrhage, extraaxial collection, or mass effect.  No CT evidence of acute infarct. Mild presumed chronic small vessel   ischemic changes. Unchanged mild generalized volume loss. No hydrocephalus.     VISUALIZED ORBITS/SINUSES/MASTOIDS: No intraorbital abnormality. No paranasal sinus mucosal disease. No middle ear or mastoid effusion.    BONES/SOFT TISSUES: No acute abnormality.      Impression    IMPRESSION:  1.  No acute intracranial process.   CT Chest/Abdomen/Pelvis w Contrast    Narrative    EXAM: CT CHEST/ABDOMEN/PELVIS W CONTRAST  LOCATION: [facility]  DATE: 2/8/2024    INDICATION: delirium of undetermined etiology, no infection source with fevers  COMPARISON: 02/03/2024, 12/28/2023  TECHNIQUE: CT scan of the chest, abdomen, and pelvis was performed following injection of IV contrast. Multiplanar reformats were obtained. Dose reduction techniques were used.   CONTRAST: Isovue 370 75ml    FINDINGS:   LUNGS AND PLEURA: Expiratory appearance of the  trachea. Mild pulmonary edema. Trace bilateral pleural effusions. Dependent atelectasis. Scattered pulmonary nodules. For example, right middle lobe some solid nodule, 8 mm (series 5 image 132).    No pneumothorax.    MEDIASTINUM/AXILLAE: No pathologically enlarged thoracic lymph nodes. Multinodular thyroid. Normal caliber thoracic aorta. Cardiomegaly. Small pericardial effusion. Exuberant mitral annulus calcifications.    CORONARY ARTERY CALCIFICATION: Moderate.    HEPATOBILIARY: Cirrhosis. No focal liver lesion. No biliary dilation. Cholelithiasis.     PANCREAS: Normal.     SPLEEN: Normal.     ADRENAL GLANDS: Normal.     KIDNEYS/BLADDER: Benign renal cysts and/or probable cysts for which no further follow-up imaging is recommended. Nonobstructing left nephrolithiasis. Normal urinary bladder.      BOWEL: No obstruction. Normal appendix. No bowel wall thickening or pneumatosis. Small volume pelvic free fluid.     LYMPH NODES: Enlarged gastrohepatic ligament and wanda hepatis lymph nodes. For example:  - Gastrohepatic ligament lymph node, 12 mm (series 3 image 49).  - Wanda hepatis lymph node, 13 mm (series 3 image 67).    VASCULATURE: Nonaneurysmal aorta with moderate aortoiliac calcifications.     PELVIC ORGANS: No pelvic masses.     MUSCULOSKELETAL: Multilevel degenerative disc disease of the thoracolumbar spine. Bones are diffusely demineralized. Mild anasarca. Subcutaneous emphysema in the anterior abdominal wall, presumably related to injection.       Impression    IMPRESSION:  1.  Mild pulmonary edema, trace bilateral pleural effusions, and mild anasarca.  2.  Enlarged abdominopelvic lymph nodes of uncertain etiology, unchanged dating back to at least 12/28/2023 correlate with clinical history and consider follow-up as indicated.  3.  Cirrhosis.  4.  Chronic and ancillary findings as described.

## 2024-02-10 LAB
ALBUMIN SERPL BCG-MCNC: 2.9 G/DL (ref 3.5–5.2)
ALP SERPL-CCNC: 113 U/L (ref 40–150)
ALT SERPL W P-5'-P-CCNC: 13 U/L (ref 0–50)
ANION GAP SERPL CALCULATED.3IONS-SCNC: 13 MMOL/L (ref 7–15)
AST SERPL W P-5'-P-CCNC: 21 U/L (ref 0–45)
BACTERIA CSF CULT: NO GROWTH
BILIRUB DIRECT SERPL-MCNC: 0.29 MG/DL (ref 0–0.3)
BILIRUB SERPL-MCNC: 0.6 MG/DL
BUN SERPL-MCNC: 76.2 MG/DL (ref 8–23)
CALCIUM SERPL-MCNC: 9.5 MG/DL (ref 8.8–10.2)
CHLORIDE SERPL-SCNC: 108 MMOL/L (ref 98–107)
CREAT SERPL-MCNC: 3.24 MG/DL (ref 0.51–0.95)
DEPRECATED HCO3 PLAS-SCNC: 17 MMOL/L (ref 22–29)
EGFRCR SERPLBLD CKD-EPI 2021: 15 ML/MIN/1.73M2
ERYTHROCYTE [DISTWIDTH] IN BLOOD BY AUTOMATED COUNT: 16.4 % (ref 10–15)
GLUCOSE BLDC GLUCOMTR-MCNC: 117 MG/DL (ref 70–99)
GLUCOSE BLDC GLUCOMTR-MCNC: 127 MG/DL (ref 70–99)
GLUCOSE BLDC GLUCOMTR-MCNC: 149 MG/DL (ref 70–99)
GLUCOSE BLDC GLUCOMTR-MCNC: 158 MG/DL (ref 70–99)
GLUCOSE BLDC GLUCOMTR-MCNC: 165 MG/DL (ref 70–99)
GLUCOSE SERPL-MCNC: 135 MG/DL (ref 70–99)
GRAM STAIN RESULT: NORMAL
GRAM STAIN RESULT: NORMAL
HCT VFR BLD AUTO: 30.2 % (ref 35–47)
HGB BLD-MCNC: 9.8 G/DL (ref 11.7–15.7)
INR PPP: 1.25 (ref 0.85–1.15)
MCH RBC QN AUTO: 28.4 PG (ref 26.5–33)
MCHC RBC AUTO-ENTMCNC: 32.5 G/DL (ref 31.5–36.5)
MCV RBC AUTO: 88 FL (ref 78–100)
PLATELET # BLD AUTO: 209 10E3/UL (ref 150–450)
POTASSIUM SERPL-SCNC: 3.9 MMOL/L (ref 3.4–5.3)
PROT SERPL-MCNC: 6.5 G/DL (ref 6.4–8.3)
RBC # BLD AUTO: 3.45 10E6/UL (ref 3.8–5.2)
SODIUM SERPL-SCNC: 138 MMOL/L (ref 135–145)
WBC # BLD AUTO: 6.6 10E3/UL (ref 4–11)

## 2024-02-10 PROCEDURE — 85027 COMPLETE CBC AUTOMATED: CPT

## 2024-02-10 PROCEDURE — 250N000011 HC RX IP 250 OP 636: Performed by: STUDENT IN AN ORGANIZED HEALTH CARE EDUCATION/TRAINING PROGRAM

## 2024-02-10 PROCEDURE — 36415 COLL VENOUS BLD VENIPUNCTURE: CPT

## 2024-02-10 PROCEDURE — 99232 SBSQ HOSP IP/OBS MODERATE 35: CPT | Performed by: PHYSICIAN ASSISTANT

## 2024-02-10 PROCEDURE — P9047 ALBUMIN (HUMAN), 25%, 50ML: HCPCS | Performed by: PHYSICIAN ASSISTANT

## 2024-02-10 PROCEDURE — 250N000011 HC RX IP 250 OP 636: Performed by: PHYSICIAN ASSISTANT

## 2024-02-10 PROCEDURE — 250N000013 HC RX MED GY IP 250 OP 250 PS 637: Performed by: STUDENT IN AN ORGANIZED HEALTH CARE EDUCATION/TRAINING PROGRAM

## 2024-02-10 PROCEDURE — 250N000013 HC RX MED GY IP 250 OP 250 PS 637: Performed by: INTERNAL MEDICINE

## 2024-02-10 PROCEDURE — 95816 EEG AWAKE AND DROWSY: CPT | Mod: 26 | Performed by: PSYCHIATRY & NEUROLOGY

## 2024-02-10 PROCEDURE — 99232 SBSQ HOSP IP/OBS MODERATE 35: CPT | Mod: GC | Performed by: FAMILY MEDICINE

## 2024-02-10 PROCEDURE — 85610 PROTHROMBIN TIME: CPT | Performed by: PHYSICIAN ASSISTANT

## 2024-02-10 PROCEDURE — 250N000013 HC RX MED GY IP 250 OP 250 PS 637: Performed by: PHYSICIAN ASSISTANT

## 2024-02-10 PROCEDURE — 250N000013 HC RX MED GY IP 250 OP 250 PS 637

## 2024-02-10 PROCEDURE — 80053 COMPREHEN METABOLIC PANEL: CPT | Performed by: PHYSICIAN ASSISTANT

## 2024-02-10 PROCEDURE — 120N000001 HC R&B MED SURG/OB

## 2024-02-10 RX ORDER — BUMETANIDE 0.25 MG/ML
1 INJECTION INTRAMUSCULAR; INTRAVENOUS ONCE
Qty: 4 ML | Refills: 0 | Status: COMPLETED | OUTPATIENT
Start: 2024-02-10 | End: 2024-02-10

## 2024-02-10 RX ORDER — LEVOFLOXACIN 500 MG/1
500 TABLET, FILM COATED ORAL EVERY OTHER DAY
Status: COMPLETED | OUTPATIENT
Start: 2024-02-12 | End: 2024-02-14

## 2024-02-10 RX ORDER — ALBUMIN (HUMAN) 12.5 G/50ML
25 SOLUTION INTRAVENOUS ONCE
Status: COMPLETED | OUTPATIENT
Start: 2024-02-10 | End: 2024-02-10

## 2024-02-10 RX ADMIN — HEPARIN SODIUM 5000 UNITS: 5000 INJECTION, SOLUTION INTRAVENOUS; SUBCUTANEOUS at 11:02

## 2024-02-10 RX ADMIN — SODIUM BICARBONATE 650 MG TABLET 1300 MG: at 20:24

## 2024-02-10 RX ADMIN — Medication: at 09:11

## 2024-02-10 RX ADMIN — HEPARIN SODIUM 5000 UNITS: 5000 INJECTION, SOLUTION INTRAVENOUS; SUBCUTANEOUS at 02:21

## 2024-02-10 RX ADMIN — CITALOPRAM 20 MG: 20 TABLET ORAL at 09:09

## 2024-02-10 RX ADMIN — SODIUM BICARBONATE 650 MG TABLET 1300 MG: at 09:09

## 2024-02-10 RX ADMIN — HEPARIN SODIUM 5000 UNITS: 5000 INJECTION, SOLUTION INTRAVENOUS; SUBCUTANEOUS at 20:14

## 2024-02-10 RX ADMIN — METOPROLOL TARTRATE 75 MG: 25 TABLET, FILM COATED ORAL at 20:15

## 2024-02-10 RX ADMIN — Medication: at 20:23

## 2024-02-10 RX ADMIN — INSULIN ASPART 1 UNITS: 100 INJECTION, SOLUTION INTRAVENOUS; SUBCUTANEOUS at 18:07

## 2024-02-10 RX ADMIN — ALBUMIN HUMAN 25 G: 0.25 SOLUTION INTRAVENOUS at 18:39

## 2024-02-10 RX ADMIN — METOPROLOL TARTRATE 75 MG: 25 TABLET, FILM COATED ORAL at 09:09

## 2024-02-10 RX ADMIN — HYDRALAZINE HYDROCHLORIDE 100 MG: 25 TABLET, FILM COATED ORAL at 20:15

## 2024-02-10 RX ADMIN — CALCIUM CARBONATE (ANTACID) CHEW TAB 500 MG 500 MG: 500 CHEW TAB at 11:01

## 2024-02-10 RX ADMIN — FAMOTIDINE 10 MG: 10 TABLET ORAL at 09:09

## 2024-02-10 RX ADMIN — DICLOFENAC 2 G: 10 GEL TOPICAL at 09:12

## 2024-02-10 RX ADMIN — INSULIN ASPART 1 UNITS: 100 INJECTION, SOLUTION INTRAVENOUS; SUBCUTANEOUS at 12:47

## 2024-02-10 RX ADMIN — BUMETANIDE 1 MG: 0.25 INJECTION INTRAMUSCULAR; INTRAVENOUS at 19:10

## 2024-02-10 RX ADMIN — AMLODIPINE BESYLATE 10 MG: 10 TABLET ORAL at 09:09

## 2024-02-10 RX ADMIN — CLOPIDOGREL BISULFATE 75 MG: 75 TABLET ORAL at 09:09

## 2024-02-10 RX ADMIN — Medication 1 MG: at 02:21

## 2024-02-10 RX ADMIN — ZINC OXIDE: 200 OINTMENT TOPICAL at 09:12

## 2024-02-10 RX ADMIN — HYDRALAZINE HYDROCHLORIDE 100 MG: 25 TABLET, FILM COATED ORAL at 15:37

## 2024-02-10 RX ADMIN — HYDRALAZINE HYDROCHLORIDE 100 MG: 25 TABLET, FILM COATED ORAL at 09:08

## 2024-02-10 RX ADMIN — CALCIUM CARBONATE (ANTACID) CHEW TAB 500 MG 500 MG: 500 CHEW TAB at 15:37

## 2024-02-10 RX ADMIN — LEVOFLOXACIN 750 MG: 750 TABLET, FILM COATED ORAL at 09:09

## 2024-02-10 RX ADMIN — ACETAMINOPHEN 1000 MG: 500 TABLET ORAL at 02:21

## 2024-02-10 ASSESSMENT — ACTIVITIES OF DAILY LIVING (ADL)
ADLS_ACUITY_SCORE: 56
ADLS_ACUITY_SCORE: 50
ADLS_ACUITY_SCORE: 52
ADLS_ACUITY_SCORE: 56
ADLS_ACUITY_SCORE: 54
ADLS_ACUITY_SCORE: 56
ADLS_ACUITY_SCORE: 50
ADLS_ACUITY_SCORE: 56
ADLS_ACUITY_SCORE: 52
ADLS_ACUITY_SCORE: 54
ADLS_ACUITY_SCORE: 56
ADLS_ACUITY_SCORE: 50

## 2024-02-10 NOTE — PROGRESS NOTES
[facility]    Progress Note - Hospitalist Service       Date of Admission:  2/2/2024    Assessment & Plan   Josefina Dumont is a 73 year old female admitted on 2/2/2024. She has a history of CHF with recent hospitalization 12/28/23-12/31/23, recent community acquired pneumonia, history of ischemic CVA, history of meningioma, history of CAD, type 2 diabetes mellitus and is admitted for shortness of breath and found to be in severe sepsis. 2/4/24-2/5/24 overnight events notable for stroke code secondary to aphasia and inattention also in the setting of fever to 102.5F and RR 35. Head MRI was unremarkable. Cefepime was switched to meropenem given concern for encephalopathy worsening 2/2 carbapenems. IV acyclovir was also added to the regimen and ID consult, LP orders were placed. On 2/5/24 ID added doxycycline to medication regimen. On 2/6/24, patient reported continued improvement in her condition. However, overnight events 2/6/24-2/7/24 were notable for likely hospital delirium and her mental status has continued to wax and wane. It remains unclear what primary contributor to delirium is at this time. CT head, chest, abdomen, pelvis was repeated 2/8/24 in light of continued AMS of unclear etiology; results showed cirrhosis but were otherwise unremarkable. Metabolic encephalopathy remains on the differential given cirrhosis though LFTs have been wnl and patient is stooling daily; trialing empiric lactulose but no further recommendations from GI. EEG per Neurology was performed yesterday, read results are pending. On interview today 2/10/24, patient is alert and oriented to self, place, and time and is responding to questions with less prompting needed. Family also reporting improvement towards her baseline.      Severe sepsis due to unknown source   Acute hypoxic respiratory failure, resolved  Acute fever of unknown origin, resolved  Concern for CHF exacerbation  Delirium   Patient  initially presented with worsening shortness of breath, orthopnea, dyspnea on exertion, in the setting of elevated BNP and missed dose of Lasix, CHF exacerbation was initially high on the differential. She was febrile w/Tmax of 101.3. CT chest on presentation showed no acute findings, mild pulmonary interstitial edema, small pericardial effusion. CRP elevated to 28.10 and procalcitonin of 5.80 on 2/4/24; CRP elevated to 54.20 and procalcitonin elevated to 11.70 on 2/5/24. See 2/4/24-2/5/24 overnight notes for significant events including aphasia, inattention, fever to 102.5F despite broad-spectrum antibiotics prompting additional aggressive treatment measures and workup. Differential remains broad at this time. LP and TTE unremarkable. Patient's condition had been improving 2/6/24 but then had onset of delirium overnight and continues to wax and wane. Possible hospital delirium in the setting of sleep deprivation, disorientation contributing. However, prudent to rule-out other potential causes given the distinct change from patient's baseline and prolonged delirium. CT head, chest, abdomen, pelvis was repeated 2/8/24 in light of continued AMS of unclear etiology; results showed cirrhosis but were otherwise unremarkable. Metabolic encephalopathy remains on the differential given cirrhosis though LFTs have been wnl and patient is stooling daily. EEG per Neurology was performed yesterday, read results are pending.  -Delirium precautions  -Cardiac/low-sodium diet  -Continue supplemental oxygen as needed  -ID consulted, appreciate recommendations   -Nephrology consulted, appreciate recommendations  -GI consulted 2/9/24, no further recommendations beyond trial of empiric lactulose   -Discussed with Neurology, EEG performed on 2/9/24 for workup of of potential non-convulsive status ellipticus   -Read result of EEG is currently pending    FELIPA  Baseline creatinine around 0.8-0.9,had stabilized ~2.3 but is up to 3.24 today.  Several medications may have been contributing to FELIPA.   -Avoid further nephrotoxic medications  -Nephrology consulted, appreciate recommendations  -AM BMP    Hypokalemia, resolved  -Standard replacement protocol      Mild normocytic anemia  Hemoglobin 11.2 on admission. Stable at 9.8 today. Drop consistent with hemodilution. Will continue to monitor.   -AM CBC    Hypertension  -Continue PTA amlodipine  -Continue PTA hydralazine  -Holding PTA losartan in light of FELIPA; will continue to reassess as needing given blood pressure has been high-normal  -Continue PTA metoprolol tartrate     Prolonged QT  QTc of 472.  -Avoid QT prolonging medications  -Note that Celexa comes with risk for QT prolongation but benefit of anxiety treatment outweighing risk, will continue to monitor    DM2   Most recent A1c 6.6 1 month ago.  -Holding PTA glipizide  -Low sliding scale insulin      Chronic:  History of ischemic CVA: Continue PTA Plavix  GERD: Continue PTA Tums, continue PTA Protonix  Constipation: Continue PTA senna docusate  Anxiety/depression: Continue PTA Celexa   Asthma: Continue PTA albuterol as needed          Diet: Regular Diet Adult    DVT Prophylaxis: Heparin  Machuca Catheter: Not present  Fluids: po  Lines: None     Cardiac Monitoring: None  Code Status: Full Code      Clinically Significant Risk Factors           # Hypercalcemia: corrected calcium is >10.1, will monitor as appropriate    # Hypoalbuminemia: Lowest albumin = 2.9 g/dL at 2/10/2024  6:23 AM, will monitor as appropriate  # Coagulation Defect: INR = 1.25 (Ref range: 0.85 - 1.15) and/or PTT = 36 Seconds (Ref range: 22 - 38 Seconds), will monitor for bleeding   # Acute Kidney Injury, unspecified: based on a >150% or 0.3 mg/dL increase in last creatinine compared to past 90 day average, will monitor renal function  # Hypertension: Noted on problem list       # DMII: A1C = 6.6 % (Ref range: <5.7 %) within past 6 months         # Financial/Environmental Concerns:            Disposition Plan      Expected Discharge Date: 02/12/2024      Destination: home  Discharge Comments: ID consult. Lumbar Puncture 2/5.  IV ABX.  Need prior authorization and [facility] accepted        The patient's care was discussed with the Attending Physician, Dr. Esposito .    OSKAR BUTLER MD PGY1  Hospitalist Service  [facility]  Securely message with [employer] (more info)  Text page via [facility] Paging/Directory   ______________________________________________________________________    Interval History   No acute events overnight. Feeling well this morning but frustrated that she cannot remember the show that she usually watches every Saturday morning at 0900. Also feeling thirsty and wanting to sit up. No other new questions or concerns. Sister was at bedside and felt patient showed good improvement towards her baseline even compared to yesterday.    Physical Exam   Vital Signs: Temp: 97.3  F (36.3  C) Temp src: Oral BP: 136/63 Pulse: 62   Resp: 18 SpO2: 99 % O2 Device: None (Room air)    Weight: 236 lbs 15.91 oz    GENERAL: Alert and no acute distress. Anxious-appearing.  HEENT: Normocephalic, atraumatic, no rhinorrhea, moist mucus membranes.  RESP: Anterior lung fields with good airflow, clear to auscultation, no coarse lung sounds. Nonlabored breathing on room air.  CV: Regular rate and rhythm, systolic murmur.  MS: No gross musculoskeletal defects noted, no edema.  NEURO: Alert, opening eyes and making eye contact, responding to questions with less need for prompting. Some difficulty with recall. Oriented to self, place, birthday, and month of the year. Able to participate in conversation when discussing plan with her sister.  PSYCH: Anxious/confused mood and affect.      Data     I have personally reviewed the following data over the past 24 hrs:    6.6  \   9.8 (L)   / 209     138 108 (H) 76.2 (H) /  165 (H)   3.9 17 (L) 3.24 (H) \     ALT: 13 AST: 21 AP: 113  TBILI: 0.6   ALB: 2.9 (L) TOT PROTEIN: 6.5 LIPASE: N/A     INR:  1.25 (H) PTT:  N/A   D-dimer:  N/A Fibrinogen:  N/A       Imaging results reviewed over the past 24 hrs:   No results found for this or any previous visit (from the past 24 hour(s)).

## 2024-02-10 NOTE — PROGRESS NOTES
RENAL PROGRESS NOTE    ASSESSMENT & PLAN:     - ARB held   - Avoid nephrotoxins including NSAIDs and contrast  - Albumin and bumex challenge to promote urination  - Daily Wts   - I/O   - Follow expectantly     I reviewed case with Dr Escobedo      Acute kidney injury superimposed on stage II chronic kidney disease    Baseline serum creatinine 0.7 to 0.8 mg/dL and correlating EGFR in 60s and 70s.    History of moderate albuminuria, urine albumin to creatinine ratio was 417 in 10/23.    His CKD could be secondary to diabetic nephropathy given albuminuria.    As per patient chart review, she was referred to [facility] in the past but their clinic has not been able to get hold of the patient to schedule an appointment.    On admission patient serum creatinine was at baseline 0.8 mg/dL. Since admission patient's serum creatinine has been trending up and peaked at 2.45 mg/dl on 02/07.  Urinary output has not been accurately recorded due to urinary incontinence.  UA 02/02 showed mild proteinuria, albumin 70 g/dl, 6 hyaline cast. No hematuria or pyuria.   CT abd/pelvis showed bilateral renal benign cyst.  No evidence of hydronephrosis.  On 02/02 the patient received Ibuprofen 600 mg and IV Lasix 02/02  On 02/04 started on PPI/Protonix 40 mg BI  On 02/04 received 75 ml of iodinated contrast for CTA  On 02/03-02/05 received IV Vancomycin and Cefepime.  Vancomycin trough level was 15.6 02/05.  On 02/05-02/06 the patient received IV Acyclovir.  Patient received IV Solu-Medrol 125 mg 02/05  PTA Losartan 100 mg was continued since admission    Since 02/06 she has been receiving NS at 75 ml/h  No episodes of hypotension or tachycardia.    Etiology of patient's acute kidney injury is likely multifactorial.    Sepsis +/-contrast induced nephropathy+/- acute interstitial nephritis from PPIs and Cefepime+/- crystal induced nephropathy from IV acyclovir+/- tubular injury from IV vancomycin.  No absolute  "eosinophilia on CBC with differential.  TSH was mildly elevated at 6.1 on December 28, 2023.  LFTs wnl  Low urine sodium suggestive prerenal etiology  UA 02/07/24 showed mild albuminuria 17 g/dL, no hematuria, 7 hyaline casts.   UPCR at 0.5g protein    Rec  BP maintained, Cr rise since yesterday 2.3 -> 3.2  Wts up, pt reports not urinating. No otput recorded today, minimal yesterday  Pt with symptoms suggestive uremic will see response to diuretic  Pt would be a poor candidate for dialysis if worsened kidney function at present state.   Will trial albumin/bumex to promote moblization of fluids  Will follow expectantly      Electrolytes  Hyponatremia-mild.  Resolved .Trend.  Normal serum potassium of 3.8  Trend.     Metabolic acidosis-likely secondary to FELIPA.  Today serum bicarbonate is trended down to 16.  Received isotonic bicarbonate this admission.  Started on oral bicarbonate 1300 mg twice daily.  Trend.     Hypertension-  BP 140s  Now On - amlodipine 10 mg daily, metoprolol 75 mg twice daily and hydralazine 100 mg 3 times daily  PTA- losartan 100 mg daily, amlodipine 10 mg daily, metoprolol 75 mg twice daily, furosemide 20 mg daily,  Hydralazine 25 mg daily  Monitor blood pressure closely, avoid hypo and hypertension     Volume status patient -  Pitting edema in legs  Abdominal distention, per p \"I feel swollen in my belly\"  Low albumin  Minimal known urine ouput  Trial albumin / bumex  I/O and Wts      Sepsis-  2/4/24-2/5/24 overnight events notable for stroke code secondary to aphasia and inattention also in the setting of fever to 102.5F and RR 35. WBC is wnl. Blood and urine cultures negative to date.  TTE with minimal pericardial effusion.  Repeat chest x-ray showed normal evidence of pneumonia.  Status post lumbar puncture.  CSF analysis was not consistent with infection.  Currently on IV meropenem and doxycycline. ? Viral infection.  Infectious diseases following.  CT abdomen pelvis showed enlarged " abdominal pelvic lymph nodes of uncertain etiology     Altered mental status-  could be secondary to hospital delirium vs metabolic encephalopathy ( liver cirrhosis, uremia).  No evidence of acute pathology on brain imaging.  Patient mental status much improved this morning.  Ammonia level was wnl at 29.  Nodular liver on CT  Consulted neurology.     Normocytic anemia.  Patient presented with hemoglobin of 11.2.  Likely multifactorial. Today hemoglobin is trending up to 10.1 g/dL.  No signs of active bleeding. Trend.      Type 2 diabetes mellitus-control on single agent . PTA on glipizide 2.5 mg daily.  Currently on insulin most recent hemoglobin A1c was 6.6%.  Management as per primary team.     History of ischemic CVA-on Plavix     Nodular liver on CT abdomen pelvis-etiology unclear. ? PERRY given patient's obesity and DM.  LFTs are within normal limits.  Coagulopathy INR 1.2.  Platelet count is within normal limits.    Gastroenterology service consulted.     GERD prophylaxis patient was started on Protonix 40 mg twice daily during this admission.  Could be contributing to patient's acute kidney injury from interstitial nephritis.  On 02/07 Protonix discontineud and switch to famotidine 10 mg twice daily    SUBJECTIVE:    Pt seen today  This is my first time meeting   Pt seems broadly confused, slow responses   Reports feeling very exhausted  Thirsty  Queasy  Need to burp, would like a tums   Thirsty, dry mouth  Feeling very swollen in belly. Says not typical to get swelling in legs  Shares she has been 'pooping a lot' not sure if she has been urinating or not though  Poor appetite. Ate a bit of breakfast, not hungry now (3PM)  Nephew and his daughters visited earlier today which brought her zachariah. Sister came by yesterday.     OBJECTIVE:  Physical Exam   Temp: 97.3  F (36.3  C) Temp src: Oral BP: 136/63 Pulse: 62   Resp: 18 SpO2: 99 % O2 Device: None (Room air)    Vitals:    02/07/24 0357 02/08/24 0405 02/10/24 0432    Weight: 104.4 kg (230 lb 2.6 oz) 105.9 kg (233 lb 7.5 oz) 107.5 kg (236 lb 15.9 oz)     Vital Signs with Ranges  Temp:  [97.1  F (36.2  C)-98.8  F (37.1  C)] 97.3  F (36.3  C)  Pulse:  [62-84] 62  Resp:  [18-20] 18  BP: (136-148)/(62-67) 136/63  SpO2:  [92 %-100 %] 99 %  I/O last 3 completed shifts:  In: 120 [P.O.:120]  Out: -         Patient Vitals for the past 72 hrs:   Weight   02/10/24 0432 107.5 kg (236 lb 15.9 oz)   02/08/24 0405 105.9 kg (233 lb 7.5 oz)   [unfilled]    PHYSICAL EXAM:  General: IN bed awake. Lost in thoughts, not able to remember what she had for breakfast   HEENT:  Pupils equal, round, no scleral icterus  RESPIRATORY:  Lungs  normal effort , room air   CARDIOVASCULAR:  RRR, no murmur heard  VOLUME: Disteded abdomen, 2+ pitting lower legs  ABDOMEN:  distended  GENITOURINARY:  No mendez  INTEGUMENTARY: Warm, dry, no rash  NEUROLOGIC: pt apears very confused   Psych: calm, cooperative    LABORATORY STUDIES:     Recent Labs   Lab 02/10/24  0623 02/09/24  0556 02/08/24  0659 02/07/24  0654 02/06/24  0453 02/05/24  0426   WBC 6.6 6.9 4.9 4.1 5.6 6.6   RBC 3.45* 3.52* 3.44* 3.44* 3.19* 3.30*   HGB 9.8* 10.1* 9.8* 9.9* 9.1* 9.5*   HCT 30.2* 30.8* 30.4* 30.5* 28.4* 29.4*    202 160 176 135* 141*       Basic Metabolic Panel:  Recent Labs   Lab 02/10/24  1147 02/10/24  0822 02/10/24  0623 02/10/24  0209 02/09/24  2028 02/09/24  1645 02/09/24  0809 02/09/24  0556 02/08/24  0830 02/08/24  0659 02/07/24  2127 02/07/24  1918 02/07/24  0745 02/07/24  0654 02/06/24  0646 02/06/24  0453   NA  --   --  138  --   --   --   --  138  --  139  --  138  --  133*  --  136   POTASSIUM  --   --  3.9  --   --   --   --  3.8  --  3.5  --  3.6  --  3.5  --  3.6   CHLORIDE  --   --  108*  --   --   --   --  109*  --  107  --  105  --  104  --  105   CO2  --   --  17*  --   --   --   --  16*  --  22  --  21*  --  17*  --  17*   BUN  --   --  76.2*  --   --   --   --  67.3*  --  63.3*  --  61.2*  --  58.7*  --  53.5*    CR  --   --  3.24*  --   --   --   --  2.36*  --  2.32*  --  2.33*  --  2.45*  --  2.05*   * 117* 135* 127* 135* 162*   < > 128*   < > 101*   < > 110*   < > 89   < > 59*   ALBARO  --   --  9.5  --   --   --   --  9.4  --  9.2  --  9.4  --  9.2  --  9.1    < > = values in this interval not displayed.       INR  Recent Labs   Lab 02/10/24  0623 02/04/24  1854   INR 1.25* 1.23*        Recent Labs   Lab Test 02/10/24  0623 02/09/24  0556 02/05/24  0426 02/04/24  1854   INR 1.25*  --   --  1.23*   WBC 6.6 6.9   < > 5.9   HGB 9.8* 10.1*   < > 10.4*    202   < > 119*    < > = values in this interval not displayed.       Personally reviewed current labs       Gabriela Zamora PA-C  [facility]  [phone]

## 2024-02-10 NOTE — PROGRESS NOTES
GASTROENTEROLOGY PROGRESS NOTE     SUBJECTIVE   Patient continues to be oriented to self only.  She had 6-7 stools yesterday per RN, and lactulose was held.  No stools yet today.  The patient denies abdominal pain.  She has reported a headache this morning.     OBJECTIVE     Vitals Blood pressure 136/63, pulse 62, temperature 97.3  F (36.3  C), temperature source Oral, resp. rate 18, weight 107.5 kg (236 lb 15.9 oz), SpO2 99%.      Physical exam:    Oriented to self only, no acute distress  No asterixis  Abdomen obese, soft and nontender throughout    LABORATORY    ELECTROLYTE PANEL   Recent Labs   Lab 02/10/24  0822 02/10/24  0623 02/10/24  0209 24  0809 24  0556 24  0830 24  0659   NA  --  138  --   --  138  --  139   POTASSIUM  --  3.9  --   --  3.8  --  3.5   CHLORIDE  --  108*  --   --  109*  --  107   CO2  --  17*  --   --  16*  --  22   * 135* 127*   < > 128*   < > 101*   CR  --  3.24*  --   --  2.36*  --  2.32*   BUN  --  76.2*  --   --  67.3*  --  63.3*    < > = values in this interval not displayed.      HEMATOLOGY PANEL   Recent Labs   Lab 02/10/24  0624  0556 24  0659 24  0426 24  1854   HGB 9.8* 10.1* 9.8*   < > 10.4*   MCV 88 88 88   < > 89   WBC 6.6 6.9 4.9   < > 5.9    202 160   < > 119*   INR 1.25*  --   --   --  1.23*    < > = values in this interval not displayed.      LIVER AND PANCREAS PANEL   Recent Labs   Lab 02/10/24  0624  0556 24  0453   AST 21 19 35   ALT 13 13 16   ALKPHOS 113 114 109   BILITOTAL 0.6 0.6 0.4     IMAGING STUDIES        I have reviewed the current diagnostic and laboratory tests.              IMPRESSION   73 year old with history of obesity, hypertension, type 2 diabetes, coronary artery disease, CVA, meningioma, chronic kidney disease, congestive heart failure who presents with the followin.  Confusion: Clinical picture is not consistent with hepatic encephalopathy.  We do not even  have confirmation that she has cirrhosis, and she does not have asterixis.  Ammonia was normal.  She has not had improvement in mental status with multiple stools yesterday.  Neurologic evaluation in progress.  Lumbar puncture did not suggest infection.  2.  Possible cirrhosis: This is based upon CT imaging from February 3 and February 8.  She was seen at [facility] on January 25 as an outpatient.  Serologic evaluation was unremarkable aside from minimal alkaline phosphatase elevation.  It was suspected that if there is underlying cirrhosis, it would be due to PERRY with possible contribution from alcohol (heavy drinking from [id number]).  Outpatient MR elastography is pending.  She does not have signs of portal hypertension on imaging, and platelets are normal.  3.  Acute kidney injury on chronic kidney disease  4.  Acute hypoxic respiratory failure: Presumed due to heart failure exacerbation.  5.  Normocytic anemia: Stable, no sign of GI bleeding.     RECOMMENDATIONS   1.  Continue lactulose therapy with a goal of 3 stools per day.  May hold if greater than 3 stools daily.  2.  Outpatient follow-up for her liver evaluation, including MR elastography.  3.  Will chart check tomorrow.  Please call in the meanwhile with questions.  GI team will follow-up on Monday.     Total time spent: 30  minutes.        Jo Ann Palm MD  Thank you for the opportunity to participate in the care of this patient.   Please feel free to call me with any questions or concerns.  Phone number [phone].

## 2024-02-10 NOTE — PLAN OF CARE
Problem: Gas Exchange Impaired  Goal: Optimal Gas Exchange  Outcome: Progressing     Problem: Risk for Delirium  Goal: Improved Sleep  Outcome: Progressing     Problem: Fever  Goal: Body Temperature in Desired Range  Outcome: Progressing     Problem: Comorbidity Management  Goal: Blood Glucose Levels Within Targeted Range  Outcome: Progressing     Patient is alert and orientated to self only. She was given prn acetaminophen for PAINAD of 5 which was effective with reassessment at 0. Also given prn melatonin to promote sleep. Patient would prefer medication in pudding. Blood glucose tonight was 127. On the K+ protocol with recheck scheduled for this AM. Turned throughout the night. IV saline locked. Bed alarm in place for patient's safety.

## 2024-02-10 NOTE — PROVIDER NOTIFICATION
Provider notified that patient is c/o headache, patient is unable to rate pain but states it hurts. BP (!) 146/67 (BP Location: Right arm, Patient Position: Semi-Pedraza's, Cuff Size: Adult Regular)   Pulse 66   Temp 97.4  F (36.3  C) (Oral)   Resp 20   Wt 107.5 kg (236 lb 15.9 oz)   SpO2 97%   BMI 40.68 kg/m      Provider wants to try essential oils prior to further medication as patient had acetaminophen at 0221 and to call if its not effective for further interventions.

## 2024-02-10 NOTE — PLAN OF CARE
Problem: Adult Inpatient Plan of Care  Goal: Optimal Comfort and Wellbeing  Outcome: Progressing     Problem: Fever  Goal: Body Temperature in Desired Range  Outcome: Progressing     Problem: Gas Exchange Impaired  Goal: Optimal Gas Exchange  Outcome: Progressing  Intervention: Optimize Oxygenation and Ventilation     Goal Outcome Evaluation:  Pt VSS, remained afebrile, c/o back pain, managed with reposition and topical ointment, LS diminished, remained on room air, O2 sat WNL, q2h turn, pt had multiple BM this shift, negative c-diff, perineum cleansed and barrier cream applied, lactulose and senna held, blood sugar checked, insulin given per sliding scale, discharge pending, continue to monitor.

## 2024-02-11 ENCOUNTER — APPOINTMENT (OUTPATIENT)
Dept: PHYSICAL THERAPY | Facility: CLINIC | Age: 74
DRG: 871 | End: 2024-02-11
Payer: COMMERCIAL

## 2024-02-11 ENCOUNTER — APPOINTMENT (OUTPATIENT)
Dept: ULTRASOUND IMAGING | Facility: CLINIC | Age: 74
DRG: 871 | End: 2024-02-11
Payer: COMMERCIAL

## 2024-02-11 LAB
ANION GAP SERPL CALCULATED.3IONS-SCNC: 15 MMOL/L (ref 7–15)
BUN SERPL-MCNC: 86.4 MG/DL (ref 8–23)
CALCIUM SERPL-MCNC: 9.1 MG/DL (ref 8.8–10.2)
CHLORIDE SERPL-SCNC: 105 MMOL/L (ref 98–107)
CREAT SERPL-MCNC: 3.99 MG/DL (ref 0.51–0.95)
DEPRECATED HCO3 PLAS-SCNC: 16 MMOL/L (ref 22–29)
EGFRCR SERPLBLD CKD-EPI 2021: 11 ML/MIN/1.73M2
ERYTHROCYTE [DISTWIDTH] IN BLOOD BY AUTOMATED COUNT: 16.5 % (ref 10–15)
GLUCOSE BLDC GLUCOMTR-MCNC: 131 MG/DL (ref 70–99)
GLUCOSE BLDC GLUCOMTR-MCNC: 139 MG/DL (ref 70–99)
GLUCOSE BLDC GLUCOMTR-MCNC: 155 MG/DL (ref 70–99)
GLUCOSE BLDC GLUCOMTR-MCNC: 166 MG/DL (ref 70–99)
GLUCOSE BLDC GLUCOMTR-MCNC: 177 MG/DL (ref 70–99)
GLUCOSE SERPL-MCNC: 129 MG/DL (ref 70–99)
HCT VFR BLD AUTO: 30.5 % (ref 35–47)
HGB BLD-MCNC: 10 G/DL (ref 11.7–15.7)
MCH RBC QN AUTO: 28.7 PG (ref 26.5–33)
MCHC RBC AUTO-ENTMCNC: 32.8 G/DL (ref 31.5–36.5)
MCV RBC AUTO: 87 FL (ref 78–100)
PLATELET # BLD AUTO: 203 10E3/UL (ref 150–450)
POTASSIUM SERPL-SCNC: 3.8 MMOL/L (ref 3.4–5.3)
RBC # BLD AUTO: 3.49 10E6/UL (ref 3.8–5.2)
SODIUM SERPL-SCNC: 136 MMOL/L (ref 135–145)
WBC # BLD AUTO: 6.7 10E3/UL (ref 4–11)

## 2024-02-11 PROCEDURE — 99232 SBSQ HOSP IP/OBS MODERATE 35: CPT | Mod: GC | Performed by: FAMILY MEDICINE

## 2024-02-11 PROCEDURE — 80048 BASIC METABOLIC PNL TOTAL CA: CPT

## 2024-02-11 PROCEDURE — 250N000012 HC RX MED GY IP 250 OP 636 PS 637

## 2024-02-11 PROCEDURE — 250N000011 HC RX IP 250 OP 636

## 2024-02-11 PROCEDURE — 250N000013 HC RX MED GY IP 250 OP 250 PS 637: Performed by: PHYSICIAN ASSISTANT

## 2024-02-11 PROCEDURE — 97530 THERAPEUTIC ACTIVITIES: CPT | Mod: GP

## 2024-02-11 PROCEDURE — 36415 COLL VENOUS BLD VENIPUNCTURE: CPT

## 2024-02-11 PROCEDURE — 97110 THERAPEUTIC EXERCISES: CPT | Mod: GP

## 2024-02-11 PROCEDURE — 250N000011 HC RX IP 250 OP 636: Performed by: STUDENT IN AN ORGANIZED HEALTH CARE EDUCATION/TRAINING PROGRAM

## 2024-02-11 PROCEDURE — 250N000013 HC RX MED GY IP 250 OP 250 PS 637

## 2024-02-11 PROCEDURE — 85027 COMPLETE CBC AUTOMATED: CPT

## 2024-02-11 PROCEDURE — 93970 EXTREMITY STUDY: CPT

## 2024-02-11 PROCEDURE — 120N000001 HC R&B MED SURG/OB

## 2024-02-11 PROCEDURE — 250N000013 HC RX MED GY IP 250 OP 250 PS 637: Performed by: INTERNAL MEDICINE

## 2024-02-11 RX ORDER — ALBUMIN (HUMAN) 12.5 G/50ML
25 SOLUTION INTRAVENOUS ONCE
Status: DISCONTINUED | OUTPATIENT
Start: 2024-02-11 | End: 2024-02-11

## 2024-02-11 RX ORDER — BUMETANIDE 1 MG/1
1 TABLET ORAL DAILY
Status: DISCONTINUED | OUTPATIENT
Start: 2024-02-11 | End: 2024-02-13

## 2024-02-11 RX ORDER — BUMETANIDE 0.25 MG/ML
2 INJECTION INTRAMUSCULAR; INTRAVENOUS ONCE
Status: DISCONTINUED | OUTPATIENT
Start: 2024-02-11 | End: 2024-02-11

## 2024-02-11 RX ADMIN — ONDANSETRON 4 MG: 2 INJECTION INTRAMUSCULAR; INTRAVENOUS at 09:09

## 2024-02-11 RX ADMIN — HYDRALAZINE HYDROCHLORIDE 100 MG: 25 TABLET, FILM COATED ORAL at 14:25

## 2024-02-11 RX ADMIN — CLOPIDOGREL BISULFATE 75 MG: 75 TABLET ORAL at 09:03

## 2024-02-11 RX ADMIN — LACTULOSE 10 G: 10 SOLUTION ORAL at 09:04

## 2024-02-11 RX ADMIN — INSULIN ASPART 1 UNITS: 100 INJECTION, SOLUTION INTRAVENOUS; SUBCUTANEOUS at 11:08

## 2024-02-11 RX ADMIN — Medication: at 20:21

## 2024-02-11 RX ADMIN — CITALOPRAM 20 MG: 20 TABLET ORAL at 09:02

## 2024-02-11 RX ADMIN — SODIUM BICARBONATE 650 MG TABLET 1300 MG: at 09:04

## 2024-02-11 RX ADMIN — METOPROLOL TARTRATE 75 MG: 25 TABLET, FILM COATED ORAL at 09:02

## 2024-02-11 RX ADMIN — Medication 1 MG: at 21:54

## 2024-02-11 RX ADMIN — ONDANSETRON 4 MG: 2 INJECTION INTRAMUSCULAR; INTRAVENOUS at 14:44

## 2024-02-11 RX ADMIN — Medication: at 09:06

## 2024-02-11 RX ADMIN — AMLODIPINE BESYLATE 10 MG: 10 TABLET ORAL at 09:03

## 2024-02-11 RX ADMIN — INSULIN ASPART 1 UNITS: 100 INJECTION, SOLUTION INTRAVENOUS; SUBCUTANEOUS at 17:39

## 2024-02-11 RX ADMIN — HEPARIN SODIUM 5000 UNITS: 5000 INJECTION, SOLUTION INTRAVENOUS; SUBCUTANEOUS at 02:03

## 2024-02-11 RX ADMIN — HYDRALAZINE HYDROCHLORIDE 100 MG: 25 TABLET, FILM COATED ORAL at 09:03

## 2024-02-11 RX ADMIN — SODIUM BICARBONATE 650 MG TABLET 1300 MG: at 20:27

## 2024-02-11 RX ADMIN — ACETAMINOPHEN 1000 MG: 500 TABLET ORAL at 21:54

## 2024-02-11 RX ADMIN — HYDRALAZINE HYDROCHLORIDE 100 MG: 25 TABLET, FILM COATED ORAL at 20:27

## 2024-02-11 RX ADMIN — BUMETANIDE 1 MG: 1 TABLET ORAL at 18:37

## 2024-02-11 RX ADMIN — CALCIUM CARBONATE (ANTACID) CHEW TAB 500 MG 500 MG: 500 CHEW TAB at 14:44

## 2024-02-11 RX ADMIN — METOPROLOL TARTRATE 75 MG: 25 TABLET, FILM COATED ORAL at 20:27

## 2024-02-11 RX ADMIN — HEPARIN SODIUM 5000 UNITS: 5000 INJECTION, SOLUTION INTRAVENOUS; SUBCUTANEOUS at 18:37

## 2024-02-11 RX ADMIN — FAMOTIDINE 10 MG: 10 TABLET ORAL at 09:02

## 2024-02-11 RX ADMIN — DICLOFENAC 2 G: 10 GEL TOPICAL at 17:43

## 2024-02-11 RX ADMIN — HEPARIN SODIUM 5000 UNITS: 5000 INJECTION, SOLUTION INTRAVENOUS; SUBCUTANEOUS at 10:47

## 2024-02-11 RX ADMIN — CALCIUM CARBONATE (ANTACID) CHEW TAB 500 MG 500 MG: 500 CHEW TAB at 02:03

## 2024-02-11 ASSESSMENT — ACTIVITIES OF DAILY LIVING (ADL)
ADLS_ACUITY_SCORE: 54
ADLS_ACUITY_SCORE: 48
ADLS_ACUITY_SCORE: 50
ADLS_ACUITY_SCORE: 48
ADLS_ACUITY_SCORE: 54
ADLS_ACUITY_SCORE: 54
ADLS_ACUITY_SCORE: 48
ADLS_ACUITY_SCORE: 50
ADLS_ACUITY_SCORE: 50
ADLS_ACUITY_SCORE: 48

## 2024-02-11 NOTE — PLAN OF CARE
"Problem: Adult Inpatient Plan of Care  Goal: Plan of Care Review  Outcome: Progressing     Problem: Fatigue  Goal: Improved Activity Tolerance  Outcome: Progressing  Intervention: Promote Improved Energy     Problem: Risk for Delirium  Goal: Improved Attention and Thought Clarity  Outcome: Progressing  Intervention: Maximize Cognitive Function   Goal Outcome Evaluation:    Pt A&Ox3, disoriented to situation.  VSS on RA, afebrile.  Denied pain, SOB.  Reported nausea consistently.  PRN IV zofran given twice with some relief.  Patient and sister reported that patient had two great niece's visit her recently who reportedly had stomach bugs but were \"clear\" when they came to visit patient.  Patient and sister believe they could have gotten her sick.  No emesis, MD updated.  Morning lactulose given, 3 BM's on shift, will pass along to oncoming.  Per sister, patient appears to have taken a step back today whereas she felt she had been improving each day.  Patient still has word finding difficulty, able to make some needs known but she may not always be able to articulate what she wants to say.  Assist of 1-2 pivot to commode.  Up in chair today.  Ate 25% of breakfast meal and had snacks for lunch.  Continues with poor appetite.  K protocol, recheck tomorrow morning.  Accepted to [facility] tomorrow pending insurance authorization and medical clearance.  "

## 2024-02-11 NOTE — PLAN OF CARE
Slept most of night. Checked and repositioned Q2 hours. Tums given for heartburn. Some word finding difficulties. Plan for discharge to [facility] on 2/12.    Problem: Gas Exchange Impaired  Goal: Optimal Gas Exchange  Outcome: Progressing     Problem: Fatigue  Goal: Improved Activity Tolerance  Outcome: Progressing   Goal Outcome Evaluation:

## 2024-02-11 NOTE — PLAN OF CARE
Problem: Adult Inpatient Plan of Care  Goal: Optimal Comfort and Wellbeing  Outcome: Progressing   Goal Outcome Evaluation:

## 2024-02-11 NOTE — PROGRESS NOTES
RENAL PROGRESS NOTE    ASSESSMENT & PLAN:       Acute kidney injury superimposed on stage II chronic kidney disease    Baseline serum creatinine 0.7 to 0.8 mg/dL and correlating EGFR in 60s and 70s.    History of moderate albuminuria, urine albumin to creatinine ratio was 417 in 10/23.    CKD could be secondary to diabetic nephropathy given albuminuria.    As per patient chart review, she was referred to [facility] in the past but their clinic has not been able to get hold of the patient to schedule an appointment.    On admission patient serum creatinine was at baseline 0.8 mg/dL. Since admission patient's serum creatinine has been trending up  Urinary output has not been accurately recorded due to urinary incontinence.  UA 02/02 showed mild proteinuria, albumin 70 g/dl, 6 hyaline cast. No hematuria or pyuria.   CT abd/pelvis showed bilateral renal benign cyst.  No evidence of hydronephrosis.    On 02/02 the patient received Ibuprofen 600 mg and IV Lasix 02/02  On 02/04 started on PPI/Protonix 40 mg BI  On 02/04 received 75 ml of iodinated contrast for CTA  On 02/03-02/05 received IV Vancomycin and Cefepime.  Vancomycin trough level was 15.6 02/05.  On 02/05-02/06 the patient received IV Acyclovir.  Patient received IV Solu-Medrol 125 mg 02/05  PTA Losartan 100 mg was continued since admission    Since 02/06 she has been receiving NS at 75 ml/h  No episodes of hypotension or tachycardia.    Etiology of patient's acute kidney injury is likely multifactorial.    Sepsis +/-contrast induced nephropathy+/- acute interstitial nephritis from PPIs and Cefepime+/- crystal induced nephropathy from IV acyclovir+/- tubular injury from IV vancomycin.  No absolute eosinophilia on CBC with differential.  TSH was mildly elevated at 6.1 on December 28, 2023.  LFTs wnl  Low urine sodium suggestive prerenal etiology  UA 02/07/24 showed mild albuminuria 17 g/dL, no hematuria, 7 hyaline casts.   UPCR at 0.5g  protein    Unfortunate worsening of Cr.   Pt with nausea, poor appetite, swelling. Suspect likely uremic   Still urinating.   Reviewed with Sister (Orin) on phone, as I have not had change to discuss with family (Pt with notable memory problems since I have met her) on status:  Concern for worsening renal status, may need to consider if appropriate to consider dialysis if no improvements seen.     PLAN  - watch expectantly   - Hypervolemia, trial bumex to help with high volume, will follow Cr   - I/O  - Daily weights  - Avoid nephrotoxins     Electrolytes  Hyponatremia-mild.  Resolved .Trend.  Normal serum potassium of 3.8  Trend.     Metabolic acidosis-likely secondary to FELIPA.  Today serum bicarbonate is trended down to 16.  Received isotonic bicarbonate this admission.  Started on oral bicarbonate 1300 mg twice daily.  Trend.     Hypertension-  Variable - 130s, high this AM  Now On - amlodipine 10 mg daily, metoprolol 75 mg twice daily and hydralazine 100 mg 3 times daily  PTA- losartan 100 mg daily, amlodipine 10 mg daily, metoprolol 75 mg twice daily, furosemide 20 mg daily,  Hydralazine 25 mg daily  Monitor blood pressure closely, avoid hypo and hypertension     Volume status patient -  Pitting edema in legs  Trial albumin / bumex yesterday  Will trial low dose bumex for continued diuresis  I/O and Wts      Sepsis-  2/4/24-2/5/24 overnight events notable for stroke code secondary to aphasia and inattention also in the setting of fever to 102.5F and RR 35. WBC is wnl. Blood and urine cultures negative to date.  TTE with minimal pericardial effusion.  Repeat chest x-ray showed normal evidence of pneumonia.  Status post lumbar puncture.  CSF analysis was not consistent with infection.  Currently on IV meropenem and doxycycline. ? Viral infection.  Infectious diseases following.  CT abdomen pelvis showed enlarged abdominal pelvic lymph nodes of uncertain etiology     Altered mental status-  could be secondary to  hospital delirium vs metabolic encephalopathy ( liver cirrhosis, uremia).  No evidence of acute pathology on brain imaging.  Patient mental status much improved this morning.  Ammonia level was wnl at 29.  Nodular liver on CT  Consulted neurology.     Normocytic anemia.  Patient presented with hemoglobin of 11.2.  Likely multifactorial. Today hemoglobin is trending up to 10.1 g/dL.  No signs of active bleeding. Trend.      Type 2 diabetes mellitus-control on single agent . PTA on glipizide 2.5 mg daily.  Currently on insulin most recent hemoglobin A1c was 6.6%.  Management as per primary team.     History of ischemic CVA-on Plavix     Nodular liver on CT abdomen pelvis-etiology unclear. ? PERRY given patient's obesity and DM.  LFTs are within normal limits.  Coagulopathy INR 1.2.  Platelet count is within normal limits.    Gastroenterology service consulted.     GERD prophylaxis patient was started on Protonix 40 mg twice daily during this admission.  Could be contributing to patient's acute kidney injury from interstitial nephritis.  On 02/07 Protonix discontineud and switch to famotidine 10 mg twice daily    SUBJECTIVE:    Pt seen in room  Sitting in chair taking meds from nurse  Reports nausea  Urination is OK  Low appetite  Pt shares her sister's car having problems.   Shares prior to admit lived at home on own, independently.     I reviewed case with hospitalist. Discussed worsening kidney function. Plans to go to TCU, not anticipated tomorrow.     I called sister Orin and updated her on status.   Let her know of worsening kidney function.   Discussed how symptoms low appetite, nausea can be signs of kidney failure  Not planning to initiate dialysis presently, but may be needed if no recovery to kidneys. The sister verbalized understanding. Says she plans ot be at hospital tomorrow in evening.     OBJECTIVE:  Physical Exam   Temp: 97.6  F (36.4  C) Temp src: Oral BP: 118/56 Pulse: 57   Resp: 18 SpO2: 92 % O2  Device: None (Room air)    Vitals:    02/08/24 0405 02/10/24 0432 02/11/24 0424   Weight: 105.9 kg (233 lb 7.5 oz) 107.5 kg (236 lb 15.9 oz) 106.9 kg (235 lb 10.8 oz)     Vital Signs with Ranges  Temp:  [97.3  F (36.3  C)-98.3  F (36.8  C)] 97.6  F (36.4  C)  Pulse:  [57-73] 57  Resp:  [18] 18  BP: (118-177)/(56-73) 118/56  SpO2:  [92 %-93 %] 92 %  I/O last 3 completed shifts:  In: 380 [P.O.:380]  Out: 400 [Urine:400]        Patient Vitals for the past 72 hrs:   Weight   02/11/24 0424 106.9 kg (235 lb 10.8 oz)   02/10/24 0432 107.5 kg (236 lb 15.9 oz)   [unfilled]    PHYSICAL EXAM:  General: NAD. Reports nausea. Taking meds sitting in chair.   HEENT:  Pupils equal, round, no scleral icterus  RESPIRATORY:  Lungs  normal effort , room air   CARDIOVASCULAR:  RRR, no murmur heard  VOLUME: Disteded abdomen, 2+ pitting lower legs  ABDOMEN:  distended  GENITOURINARY:  No mendez  INTEGUMENTARY: Warm, dry, no rash  NEUROLOGIC: pt apears very confused   Psych: calm, cooperative    LABORATORY STUDIES:     Recent Labs   Lab 02/11/24  0754 02/10/24  0623 02/09/24  0556 02/08/24  0659 02/07/24  0654 02/06/24  0453   WBC 6.7 6.6 6.9 4.9 4.1 5.6   RBC 3.49* 3.45* 3.52* 3.44* 3.44* 3.19*   HGB 10.0* 9.8* 10.1* 9.8* 9.9* 9.1*   HCT 30.5* 30.2* 30.8* 30.4* 30.5* 28.4*    209 202 160 176 135*       Basic Metabolic Panel:  Recent Labs   Lab 02/11/24  1101 02/11/24  0754 02/11/24  0201 02/10/24  2055 02/10/24  1755 02/10/24  0822 02/10/24  0623 02/09/24  0809 02/09/24  0556 02/08/24  0830 02/08/24  0659 02/07/24  2127 02/07/24  1918 02/07/24  0745 02/07/24  0654   NA  --  136  --   --   --   --  138  --  138  --  139  --  138  --  133*   POTASSIUM  --  3.8  --   --   --   --  3.9  --  3.8  --  3.5  --  3.6  --  3.5   CHLORIDE  --  105  --   --   --   --  108*  --  109*  --  107  --  105  --  104   CO2  --  16*  --   --   --   --  17*  --  16*  --  22  --  21*  --  17*   BUN  --  86.4*  --   --   --   --  76.2*  --  67.3*  --  63.3*   --  61.2*  --  58.7*   CR  --  3.99*  --   --   --   --  3.24*  --  2.36*  --  2.32*  --  2.33*  --  2.45*   * 131*  129* 139* 149* 158*   < > 135*   < > 128*   < > 101*   < > 110*   < > 89   ALBARO  --  9.1  --   --   --   --  9.5  --  9.4  --  9.2  --  9.4  --  9.2    < > = values in this interval not displayed.       INR  Recent Labs   Lab 02/10/24  0623 02/04/24  1854   INR 1.25* 1.23*        Recent Labs   Lab Test 02/11/24  0754 02/10/24  0623 02/05/24  0426 02/04/24  1854   INR  --  1.25*  --  1.23*   WBC 6.7 6.6   < > 5.9   HGB 10.0* 9.8*   < > 10.4*    209   < > 119*    < > = values in this interval not displayed.       Personally reviewed current labs       Gabriela Zamora PA-C  [facility]  [phone]

## 2024-02-11 NOTE — PLAN OF CARE
Goal Outcome Evaluation:    Care provided from [id number]. A & O x 3 (not fully to situation). Calm and cooperative. Slow speech/response, but can make needs known. VSS, afebrile. RA. Denies pain. Denies SOB. Ongoing nausea. Adelina applied; patient states relief of symptoms when asked twice. Incontinent of bladder/bowel on and off. Bladder scan 361; patient voided x 1 after. No BM's on shift. K+ protocol is a recheck in the morning. Blood sugar 177; insulin provided, as scheduled. IV SL; flushing well.     Regular diet. Ate soup, crackers, and a roll for dinner. Appetite well. Assist of 1-2 with a pivot. Alarms on for safety. Call light education provided. Frequent checks completed on the patient. Discharge to TCU pending ongoing clinical improvement.    See previous clinical noted regarding lower extremities.     Problem: Risk for Delirium  Goal: Improved Attention and Thought Clarity  Intervention: Maximize Cognitive Function  Recent Flowsheet Documentation  Taken 2/11/2024 1605 by Jazmin Frazier, RN  Sensory Stimulation Regulation:   care clustered   lighting decreased   television on  Reorientation Measures:   calendar in view   clock in view   reorientation provided     Problem: Risk for Delirium  Goal: Optimal Coping  Intervention: Optimize Psychosocial Adjustment to Delirium  Recent Flowsheet Documentation  Taken 2/11/2024 1605 by Jazmin Frazier, RN  Supportive Measures: active listening utilized     Problem: Fever  Goal: Body Temperature in Desired Range  Outcome: Progressing     Problem: Comorbidity Management  Goal: Blood Glucose Levels Within Targeted Range  Intervention: Monitor and Manage Glycemia  Recent Flowsheet Documentation  Taken 2/11/2024 1605 by Jazmin Frazier, RN  Medication Review/Management: medications reviewed

## 2024-02-11 NOTE — PROGRESS NOTES
GASTROENTEROLOGY PROGRESS NOTE     SUBJECTIVE   Patient denies pain.  No acute issues overnight.  She is more alert today.  3 stools in past 24 hours.     OBJECTIVE     Vitals Blood pressure (!) 177/73, pulse 64, temperature 98  F (36.7  C), temperature source Oral, resp. rate 18, weight 106.9 kg (235 lb 10.8 oz), SpO2 93%.      Physical exam:    Awake, some psychomotor slowing but she is oriented x 3.  Abdomen obese, nontender throughout  No asterixis    LABORATORY    ELECTROLYTE PANEL   Recent Labs   Lab 24  0754 24  0201 02/10/24  0822 02/10/24  0623 24  0809 24  0556     --   --  138  --  138   POTASSIUM 3.8  --   --  3.9  --  3.8   CHLORIDE 105  --   --  108*  --  109*   CO2 16*  --   --  17*  --  16*   *  129* 139*   < > 135*   < > 128*   CR 3.99*  --   --  3.24*  --  2.36*   BUN 86.4*  --   --  76.2*  --  67.3*    < > = values in this interval not displayed.      HEMATOLOGY PANEL   Recent Labs   Lab 24  0754 02/10/24  0623 24  0556 24  0426 24  1854   HGB 10.0* 9.8* 10.1*   < > 10.4*   MCV 87 88 88   < > 89   WBC 6.7 6.6 6.9   < > 5.9    209 202   < > 119*   INR  --  1.25*  --   --  1.23*    < > = values in this interval not displayed.      LIVER AND PANCREAS PANEL   Recent Labs   Lab 02/10/24  0623 24  0556 24  0453   AST 21 19 35   ALT 13 13 16   ALKPHOS 113 114 109   BILITOTAL 0.6 0.6 0.4     IMAGING STUDIES        I have reviewed the current diagnostic and laboratory tests.              IMPRESSION   73 year old with history of obesity, hypertension, type 2 diabetes, coronary artery disease, CVA, meningioma, chronic kidney disease, congestive heart failure who presented with acute sepsis picture and the followin.  Confusion: Improved, likely due to acute illness.  Unlikely hepatic encephalopathy.  We do not even have confirmation that she has cirrhosis, she has not had asterixis and her ammonia is normal.  Head  imaging unremarkable.  Lumbar puncture did not suggest infection.  EEG results pending.  2.  Possible cirrhosis: Please note that this is not a confirmed diagnosis.  This is based upon CT imaging from February 3 and February 8.  She was seen at [facility] on January 25 as an outpatient.  Serologic evaluation was unremarkable aside from minimal alkaline phosphatase elevation.  It was suspected that if there is underlying cirrhosis, it would be due to PERRY with possible contribution from alcohol (heavy drinking from [id number]).  Outpatient MR elastography is pending.  She does not have signs of portal hypertension on imaging, and platelets are normal.  3.  Acute kidney injury on chronic kidney disease  4.  Acute hypoxic respiratory failure: Proved.  Presumed due to heart failure exacerbation.  5.  Normocytic anemia: Stable, no sign of GI bleeding.     RECOMMENDATIONS   1.  We will arrange follow-up at [facility] as outpatient, clued and consideration of MR elastography  2.  Continue lactulose for 3-day trial, despite low suspicion for hepatic encephalopathy as noted above.  3.  We will follow-up tomorrow.     Total time spent: 25 minutes.        Jo Ann Palm MD  Thank you for the opportunity to participate in the care of this patient.   Please feel free to call me with any questions or concerns.  Phone number [phone].

## 2024-02-11 NOTE — PLAN OF CARE
Problem: Adult Inpatient Plan of Care  Goal: Optimal Comfort and Wellbeing  Outcome: Progressing     Problem: Fatigue  Goal: Improved Activity Tolerance  Outcome: Progressing      Goal Outcome Evaluation:    Pt VSS, remained afebrile on room air   Some back pain, managed with repositioning, tylneol and voltaren  3 bowel movements this shift, morning lactulose held   K protocol, recheck in AM       Cognition appears better today per patient, writer observed disorientation to only situation. Able to state name, birthdate, location, year. All questions and statements by patient logical and rational.     Patient also states more mobile today, assist of 1    accepted to [facility] for discharge Monday 2/12 pending medically ready and insurance authorization

## 2024-02-11 NOTE — PROVIDER NOTIFICATION
Patient is endorsing slight lower bilateral tingling and extremity discomfort. Denies numbness. She can feel me touching her legs. Ongoing edema +2. Bilateral pulses +1. Chart review shows denial in morning assessment of these symptoms. Chart review shows no ultrasound to R/O DVT's.    THOMPSON YATES paged and informed of the above. Ultrasound lower extremities ordered.    1822: Call placed tp ultrasound to check on LE ultrasound order. Order was not placed as stat and ultrasound has been busy with STAT orders.     THOMPSON YATES paged. Call returned and MD will place order as STAT.

## 2024-02-11 NOTE — PROGRESS NOTES
[facility]    Progress Note - Hospitalist Service       Date of Admission:  2/2/2024    Assessment & Plan   Josefina Dumont is a 73 year old female admitted on 2/2/2024. She has a history of CHF with recent hospitalization 12/28/23-12/31/23, recent community acquired pneumonia, history of ischemic CVA, history of meningioma, history of CAD, type 2 diabetes mellitus and is admitted for shortness of breath and found to be in severe sepsis. 2/4/24-2/5/24 overnight events notable for stroke code secondary to aphasia and inattention also in the setting of fever to 102.5F and RR 35. Head MRI was unremarkable. Cefepime was switched to meropenem given concern for encephalopathy worsening 2/2 carbapenems. IV acyclovir was also added to the regimen and ID consult, LP orders were placed. On 2/5/24 ID added doxycycline to medication regimen. On 2/6/24, patient reported continued improvement in her condition. However, overnight events 2/6/24-2/7/24 were notable for likely hospital delirium and her mental status has continued to wax and wane. It remains unclear what primary contributor to delirium is at this time. CT head, chest, abdomen, pelvis was repeated 2/8/24 in light of continued AMS of unclear etiology; results showed cirrhosis but were otherwise unremarkable. Metabolic encephalopathy remains on the differential given cirrhosis though LFTs have been wnl and patient is stooling daily; trialing empiric lactulose but no further recommendations from GI. EEG per Neurology was performed yesterday, read was unremarkable for seizure activity.      Severe sepsis due to unknown source   Acute hypoxic respiratory failure, resolved  Acute fever of unknown origin, resolved  Concern for CHF exacerbation  Delirium   Patient initially presented with worsening shortness of breath, orthopnea, dyspnea on exertion, in the setting of elevated BNP and missed dose of Lasix, CHF exacerbation was initially high on  the differential. She was febrile w/Tmax of 101.3. CT chest on presentation showed no acute findings, mild pulmonary interstitial edema, small pericardial effusion. CRP elevated to 28.10 and procalcitonin of 5.80 on 2/4/24; CRP elevated to 54.20 and procalcitonin elevated to 11.70 on 2/5/24. See 2/4/24-2/5/24 overnight notes for significant events including aphasia, inattention, fever to 102.5F despite broad-spectrum antibiotics prompting additional aggressive treatment measures and workup. Differential remains broad at this time. LP and TTE unremarkable. Patient's condition had been improving 2/6/24 but then had onset of delirium overnight and continues to wax and wane. Possible hospital delirium in the setting of sleep deprivation, disorientation contributing. However, prudent to rule-out other potential causes given the distinct change from patient's baseline and prolonged delirium. CT head, chest, abdomen, pelvis was repeated 2/8/24 in light of continued AMS of unclear etiology; results showed cirrhosis but were otherwise unremarkable. Metabolic encephalopathy remains on the differential given cirrhosis though LFTs have been wnl and patient is stooling daily. EEG per Neurology was performed yesterday, read as unremarkable for seizure activity with slow background in theta and delta range that suggests a nonspecific generalized cerebral dysfunction. Such slowing can be seen in metabolic or toxic abnormalities, clinical correlation is recommended. Neurology has signed off.    -Delirium precautions  -Cardiac/low-sodium diet  -Continue supplemental oxygen as needed  -ID consulted, appreciate recommendations   -Nephrology consulted, appreciate recommendations  -GI consulted 2/9/24, no further recommendations beyond trial of empiric lactulose   -Discussed with Neurology, EEG read as above, signed off 2/10    FELIPA  Baseline creatinine around 0.8-0.9,had stabilized ~2.3 but is up to 3.99 today. Several medications may  have been contributing to FELIPA.   -Avoid further nephrotoxic medications  -Nephrology consulted, appreciate recommendations   - albumin bumex challenge performed 2/10  -AM BMP    Hypokalemia, resolved  -Standard replacement protocol      Mild normocytic anemia  Hemoglobin 11.2 on admission. Stable at 10.0 today. Drop consistent with hemodilution. Will continue to monitor.   -AM CBC    Hypertension  -Continue PTA amlodipine  -Continue PTA hydralazine  -Holding PTA losartan in light of FELIPA; will continue to reassess as needing given blood pressure has been high-normal  -Continue PTA metoprolol tartrate     Prolonged QT  QTc of 472.  -Avoid QT prolonging medications  -Note that Celexa comes with risk for QT prolongation but benefit of anxiety treatment outweighing risk, will continue to monitor    DM2   Most recent A1c 6.6 1 month ago.  -Holding PTA glipizide  -Low sliding scale insulin      Chronic:  History of ischemic CVA: Continue PTA Plavix  GERD: Continue PTA Tums, continue PTA Protonix  Constipation: Continue PTA senna docusate  Anxiety/depression: Continue PTA Celexa   Asthma: Continue PTA albuterol as needed          Diet: Regular Diet Adult    DVT Prophylaxis: Heparin  Machuca Catheter: Not present  Fluids: po  Lines: None     Cardiac Monitoring: None  Code Status: Full Code      Clinically Significant Risk Factors           # Hypercalcemia: corrected calcium is >10.1, will monitor as appropriate    # Hypoalbuminemia: Lowest albumin = 2.9 g/dL at 2/10/2024  6:23 AM, will monitor as appropriate  # Coagulation Defect: INR = 1.25 (Ref range: 0.85 - 1.15) and/or PTT = 36 Seconds (Ref range: 22 - 38 Seconds), will monitor for bleeding   # Acute Kidney Injury, unspecified: based on a >150% or 0.3 mg/dL increase in last creatinine compared to past 90 day average, will monitor renal function  # Hypertension: Noted on problem list       # DMII: A1C = 6.6 % (Ref range: <5.7 %) within past 6 months         #  Financial/Environmental Concerns:           Disposition Plan      Expected Discharge Date: 02/13/2024      Destination: home  Discharge Comments: ID consult. Lumbar Puncture 2/5.  IV ABX.  Need prior authorization and [facility] accepted        The patient's care was discussed with the Attending Physician, Dr. Esposito .    Joana Liu, DO   Hospitalist Service  [facility]  Securely message with [employer] (more info)  Text page via [facility] Paging/Directory   ______________________________________________________________________    Interval History   No acute events overnight. Feeling intermittently confused this morning and frustrated about her memory. Reviewed EEG results and plan for today. Patient denies abdominal pain, urinary symptoms, chest pain, or SOB today.     Physical Exam   Vital Signs: Temp: 97.6  F (36.4  C) Temp src: Oral BP: 116/54 Pulse: 57   Resp: 18 SpO2: 92 % O2 Device: None (Room air)    Weight: 235 lbs 10.75 oz    GENERAL: Alert and no acute distress. Anxious-appearing.  HEENT: Normocephalic, atraumatic, no rhinorrhea, moist mucus membranes.  RESP: Anterior lung fields with good airflow, clear to auscultation, no coarse lung sounds. Nonlabored breathing on room air.  CV: Regular rate and rhythm, systolic murmur.  MS: No gross musculoskeletal defects noted, no edema.  NEURO: Alert, opening eyes and making eye contact, responding to questions with more need for prompting today. Some difficulty with recall. Oriented to self, place, birthday, and month of the year.   PSYCH: Anxious/confused mood and affect.      Data     I have personally reviewed the following data over the past 24 hrs:    6.7  \   10.0 (L)   / 203     136 105 86.4 (H) /  155 (H)   3.8 16 (L) 3.99 (H) \       Imaging results reviewed over the past 24 hrs:   No results found for this or any previous visit (from the past 24 hour(s)).

## 2024-02-12 ENCOUNTER — APPOINTMENT (OUTPATIENT)
Dept: PHYSICAL THERAPY | Facility: CLINIC | Age: 74
DRG: 871 | End: 2024-02-12
Payer: COMMERCIAL

## 2024-02-12 LAB
ALBUMIN UR-MCNC: 70 MG/DL
ANION GAP SERPL CALCULATED.3IONS-SCNC: 14 MMOL/L (ref 7–15)
APPEARANCE UR: ABNORMAL
BILIRUB UR QL STRIP: NEGATIVE
BUN SERPL-MCNC: 89.6 MG/DL (ref 8–23)
CALCIUM SERPL-MCNC: 9.5 MG/DL (ref 8.8–10.2)
CHLORIDE SERPL-SCNC: 103 MMOL/L (ref 98–107)
COLOR UR AUTO: YELLOW
CREAT SERPL-MCNC: 4.72 MG/DL (ref 0.51–0.95)
DEPRECATED HCO3 PLAS-SCNC: 17 MMOL/L (ref 22–29)
EGFRCR SERPLBLD CKD-EPI 2021: 9 ML/MIN/1.73M2
ERYTHROCYTE [DISTWIDTH] IN BLOOD BY AUTOMATED COUNT: 16.6 % (ref 10–15)
GLUCOSE BLDC GLUCOMTR-MCNC: 102 MG/DL (ref 70–99)
GLUCOSE BLDC GLUCOMTR-MCNC: 110 MG/DL (ref 70–99)
GLUCOSE BLDC GLUCOMTR-MCNC: 123 MG/DL (ref 70–99)
GLUCOSE BLDC GLUCOMTR-MCNC: 139 MG/DL (ref 70–99)
GLUCOSE BLDC GLUCOMTR-MCNC: 184 MG/DL (ref 70–99)
GLUCOSE SERPL-MCNC: 114 MG/DL (ref 70–99)
GLUCOSE UR STRIP-MCNC: NEGATIVE MG/DL
HCT VFR BLD AUTO: 28.4 % (ref 35–47)
HGB BLD-MCNC: 9.4 G/DL (ref 11.7–15.7)
HGB UR QL STRIP: NEGATIVE
HYALINE CASTS: 13 /LPF
KETONES UR STRIP-MCNC: NEGATIVE MG/DL
LEUKOCYTE ESTERASE UR QL STRIP: NEGATIVE
MCH RBC QN AUTO: 29 PG (ref 26.5–33)
MCHC RBC AUTO-ENTMCNC: 33.1 G/DL (ref 31.5–36.5)
MCV RBC AUTO: 88 FL (ref 78–100)
MUCOUS THREADS #/AREA URNS LPF: PRESENT /LPF
NITRATE UR QL: NEGATIVE
PH UR STRIP: 5 [PH] (ref 5–7)
PLATELET # BLD AUTO: 218 10E3/UL (ref 150–450)
POTASSIUM SERPL-SCNC: 3.9 MMOL/L (ref 3.4–5.3)
RBC # BLD AUTO: 3.24 10E6/UL (ref 3.8–5.2)
RBC URINE: 2 /HPF
SODIUM SERPL-SCNC: 134 MMOL/L (ref 135–145)
SODIUM UR-SCNC: <20 MMOL/L
SP GR UR STRIP: 1.03 (ref 1–1.03)
SQUAMOUS EPITHELIAL: <1 /HPF
UROBILINOGEN UR STRIP-MCNC: <2 MG/DL
WBC # BLD AUTO: 9.1 10E3/UL (ref 4–11)
WBC URINE: 5 /HPF

## 2024-02-12 PROCEDURE — 82436 ASSAY OF URINE CHLORIDE: CPT | Performed by: INTERNAL MEDICINE

## 2024-02-12 PROCEDURE — 99232 SBSQ HOSP IP/OBS MODERATE 35: CPT

## 2024-02-12 PROCEDURE — 250N000013 HC RX MED GY IP 250 OP 250 PS 637

## 2024-02-12 PROCEDURE — 250N000011 HC RX IP 250 OP 636

## 2024-02-12 PROCEDURE — 82374 ASSAY BLOOD CARBON DIOXIDE: CPT

## 2024-02-12 PROCEDURE — 82565 ASSAY OF CREATININE: CPT

## 2024-02-12 PROCEDURE — 97116 GAIT TRAINING THERAPY: CPT | Mod: GP

## 2024-02-12 PROCEDURE — 250N000013 HC RX MED GY IP 250 OP 250 PS 637: Performed by: PHYSICIAN ASSISTANT

## 2024-02-12 PROCEDURE — 120N000001 HC R&B MED SURG/OB

## 2024-02-12 PROCEDURE — 99232 SBSQ HOSP IP/OBS MODERATE 35: CPT | Mod: GC

## 2024-02-12 PROCEDURE — 250N000011 HC RX IP 250 OP 636: Performed by: STUDENT IN AN ORGANIZED HEALTH CARE EDUCATION/TRAINING PROGRAM

## 2024-02-12 PROCEDURE — 250N000013 HC RX MED GY IP 250 OP 250 PS 637: Performed by: INTERNAL MEDICINE

## 2024-02-12 PROCEDURE — 99232 SBSQ HOSP IP/OBS MODERATE 35: CPT | Performed by: INTERNAL MEDICINE

## 2024-02-12 PROCEDURE — 81001 URINALYSIS AUTO W/SCOPE: CPT | Performed by: INTERNAL MEDICINE

## 2024-02-12 PROCEDURE — 85027 COMPLETE CBC AUTOMATED: CPT

## 2024-02-12 PROCEDURE — 250N000013 HC RX MED GY IP 250 OP 250 PS 637: Performed by: FAMILY MEDICINE

## 2024-02-12 PROCEDURE — 84133 ASSAY OF URINE POTASSIUM: CPT | Performed by: INTERNAL MEDICINE

## 2024-02-12 PROCEDURE — 36415 COLL VENOUS BLD VENIPUNCTURE: CPT

## 2024-02-12 PROCEDURE — 84300 ASSAY OF URINE SODIUM: CPT | Performed by: INTERNAL MEDICINE

## 2024-02-12 RX ORDER — FAMOTIDINE 10 MG
10 TABLET ORAL EVERY OTHER DAY
Status: DISCONTINUED | OUTPATIENT
Start: 2024-02-13 | End: 2024-02-27

## 2024-02-12 RX ADMIN — ONDANSETRON 4 MG: 2 INJECTION INTRAMUSCULAR; INTRAVENOUS at 10:38

## 2024-02-12 RX ADMIN — HYDRALAZINE HYDROCHLORIDE 100 MG: 25 TABLET, FILM COATED ORAL at 08:16

## 2024-02-12 RX ADMIN — HYDRALAZINE HYDROCHLORIDE 100 MG: 25 TABLET, FILM COATED ORAL at 20:19

## 2024-02-12 RX ADMIN — HEPARIN SODIUM 5000 UNITS: 5000 INJECTION, SOLUTION INTRAVENOUS; SUBCUTANEOUS at 20:15

## 2024-02-12 RX ADMIN — FAMOTIDINE 10 MG: 10 TABLET ORAL at 08:07

## 2024-02-12 RX ADMIN — AMLODIPINE BESYLATE 10 MG: 10 TABLET ORAL at 08:16

## 2024-02-12 RX ADMIN — LACTULOSE 10 G: 10 SOLUTION ORAL at 08:07

## 2024-02-12 RX ADMIN — SODIUM BICARBONATE 650 MG TABLET 1300 MG: at 20:19

## 2024-02-12 RX ADMIN — ACETAMINOPHEN 1000 MG: 500 TABLET ORAL at 21:54

## 2024-02-12 RX ADMIN — CITALOPRAM 20 MG: 20 TABLET ORAL at 08:07

## 2024-02-12 RX ADMIN — METOPROLOL TARTRATE 75 MG: 25 TABLET, FILM COATED ORAL at 20:16

## 2024-02-12 RX ADMIN — SODIUM BICARBONATE 650 MG TABLET 1300 MG: at 08:14

## 2024-02-12 RX ADMIN — CALCIUM CARBONATE (ANTACID) CHEW TAB 500 MG 500 MG: 500 CHEW TAB at 14:16

## 2024-02-12 RX ADMIN — Medication: at 20:26

## 2024-02-12 RX ADMIN — BUMETANIDE 1 MG: 1 TABLET ORAL at 08:07

## 2024-02-12 RX ADMIN — Medication: at 08:23

## 2024-02-12 RX ADMIN — HEPARIN SODIUM 5000 UNITS: 5000 INJECTION, SOLUTION INTRAVENOUS; SUBCUTANEOUS at 02:54

## 2024-02-12 RX ADMIN — HYDRALAZINE HYDROCHLORIDE 100 MG: 25 TABLET, FILM COATED ORAL at 15:44

## 2024-02-12 RX ADMIN — METOPROLOL TARTRATE 75 MG: 25 TABLET, FILM COATED ORAL at 08:16

## 2024-02-12 RX ADMIN — HEPARIN SODIUM 5000 UNITS: 5000 INJECTION, SOLUTION INTRAVENOUS; SUBCUTANEOUS at 10:38

## 2024-02-12 RX ADMIN — CLOPIDOGREL BISULFATE 75 MG: 75 TABLET ORAL at 08:07

## 2024-02-12 RX ADMIN — LEVOFLOXACIN 500 MG: 500 TABLET, FILM COATED ORAL at 08:07

## 2024-02-12 ASSESSMENT — ACTIVITIES OF DAILY LIVING (ADL)
ADLS_ACUITY_SCORE: 47
ADLS_ACUITY_SCORE: 43
ADLS_ACUITY_SCORE: 44
ADLS_ACUITY_SCORE: 44
ADLS_ACUITY_SCORE: 43
ADLS_ACUITY_SCORE: 47
ADLS_ACUITY_SCORE: 44
ADLS_ACUITY_SCORE: 47
ADLS_ACUITY_SCORE: 43
ADLS_ACUITY_SCORE: 44

## 2024-02-12 NOTE — PLAN OF CARE
Remains on room air. Has difficulty making needs known, does well with yes/no or better/worse type questions. Comfortable in bed, purewick in place. PRN tylenol and melatonin given.     Problem: Gas Exchange Impaired  Goal: Optimal Gas Exchange  Outcome: Progressing     Problem: Fever  Goal: Body Temperature in Desired Range  Outcome: Progressing   Goal Outcome Evaluation:

## 2024-02-12 NOTE — PLAN OF CARE
"Problem: Adult Inpatient Plan of Care  Goal: Plan of Care Review  Outcome: Progressing     Problem: Fatigue  Goal: Improved Activity Tolerance  Outcome: Progressing  Intervention: Promote Improved Energy     Problem: Comorbidity Management  Goal: Blood Glucose Levels Within Targeted Range  Outcome: Progressing  Intervention: Monitor and Manage Glycemia   Goal Outcome Evaluation:    Pt A&Ox3, disoriented to situation, but almost A&Ox4.  Patient was able to tell writer full name and , today's date, and that the super bowl was last night, however when asked why she was in the hospital, she says what she always says when asked \"because I was really sick\" but cannot tell writer how she was sick.  C/O nausea, IV zofran given with some effectiveness.  VSS, afebrile, on RA.  Morning lactulose given, patient with 3 stools so far today, passed along to oncoming RN.  Assist of 2 pivot to commode.  Continues with diarrhea.  Voided this morning, PVR was 18 ml.  Pills whole with water one at a time.  Poor appetite, eating 25% of meals.  K protocol, recheck tomorrow AM.  NPO at midnight for possible dialysis tomorrow.  "

## 2024-02-12 NOTE — PROGRESS NOTES
[facility] ID Inpatient follow up       Patient:  Josefina Dumont  Date of birth 1950, Medical record number [id number]  Date of Visit:  02/12/2024  Attending Physician: Lazara Olivares MD         Assessment and Recommendations:   Assessment:  Josefina Dumont is a 73 year old female with   Type 2 diabetes mellitus.  History of ischemic CVA/meningioma  Recent hospitalization for community-acquired pneumonia in December 2023  Admitted on 2/2/2024 with acute hypoxic respiratory failure presumed secondary to exacerbation of heart failure.  Noted to have small pericardial effusion on CT scan.  Needs TTE.  Fever with increased procalcitonin.  UA not consistent with UTI.  Blood cultures no growth.  Liver function test normal.  Etiology not totally clear.  TTE with minimal pericardial effusion.  Repeat chest x-ray with no findings suggestive of pneumonia.  Status post lumbar puncture with no findings consistent with infection (only 2 WBC).  Was initially on broad-spectrum antibiotics.  Narrowed down to Levaquin on 2/8/2024.  Has been afebrile for about 5 days now.  Altered mental status.  MRI with no evidence of infection.  Neck supple.  Low suspicion for CNS infection.  CSF not consistent with infection. Low concern for hepatic encephalopathy per GI.   FELIPA.  Baseline creatinine normal at 0.80 on 2/2/2024.  Creatinine worsening.  Renal following.  Intermittent confusion.  Report of cirrhosis of the liver on CT scan.  Could be secondary to hepatic encephalopathy.  High BUN may be contributing.  Nephrology following.    Abdominopelvic adenopathy.  Unclear etiology.  Appears stable from December 29, 2023    Recommendations:  Continue Levaquin renally dosed for 7 days from 2/8/2024. Order updated.   GI following, note low concern for hepatic encephalopathy   Consider lymph node biopsy if does not continue to improve clinically.  Monitor mental status, and glucose.    Alma Moore MD.  [facility]  "[facility].   [facility]  Office Telephone [phone].  Fax [phone]  [facility] paging            Interval History:     HPI:    First visit by me. Breathing ok.     Pertinent cultures include:  No results found for: \"CULT\"    Recent Inflammatory Biomarkers:   Recent Labs   Lab Test 24  0600 24  0754 02/10/24  0623 24  0556 24  0659 24  0654 24  0453 24  0426 24  1918 24  1430 24  0550 23  0533 23  1052 22  0429 22  2333   PCAL  --   --   --   --   --  7.31*  --  11.70* 10.90*  --  5.80*  --  0.07  --  0.05   WBC 9.1 6.7 6.6 6.9 4.9 4.1   < > 6.6  --    < > 5.0   < > 5.7   < >  --     < > = values in this interval not displayed.            Review of Systems:   CONSTITUTIONAL:    Temp Max: Temp (24hrs), Av.8  F (36.6  C), Min:97.6  F (36.4  C), Max:98.1  F (36.7  C)   .  Negative except for findings in the HPI.           Current Medications (antimicrobials listed in bold):      amLODIPine  10 mg Oral Daily    [Held by provider] bumetanide  1 mg Oral Daily    citalopram  20 mg Oral Daily    clopidogrel  75 mg Oral Daily    diclofenac  2 g Topical 4x Daily    famotidine  10 mg Oral Daily    [Held by provider] furosemide  20 mg Intravenous Daily    heparin ANTICOAGULANT  5,000 Units Subcutaneous Q8H    hydrALAZINE  100 mg Oral TID    insulin aspart  1-3 Units Subcutaneous TID AC    insulin aspart  1-3 Units Subcutaneous At Bedtime    lactulose  10 g Oral BID    levofloxacin  500 mg Oral Every Other Day    [Held by provider] losartan  100 mg Oral Daily    metoprolol tartrate  75 mg Oral BID    miconazole   Topical BID    senna-docusate  1 tablet Oral At Bedtime    sodium bicarbonate  1,300 mg Oral BID              Allergies:     Allergies   Allergen Reactions    Aspirin      Other reaction(s): stomach upset    Atenolol Other (See Comments)     abd pain    Lisinopril      Other reaction(s): " stomach aches    Penicillins Hives     Tolerated ceftriaxone    Statins      Other reaction(s): myalgias            Physical Exam:   Vitals were reviewed  Patient Vitals for the past 24 hrs:   BP Temp Temp src Pulse Resp SpO2 Weight   02/12/24 0816 127/60 -- -- 57 -- -- --   02/12/24 0739 127/62 97.4  F (36.3  C) -- 55 18 95 % --   02/12/24 0400 107/54 97.6  F (36.4  C) Oral 57 16 94 % --   02/12/24 0245 -- -- -- -- -- -- 107.1 kg (236 lb 1.8 oz)   02/12/24 0007 111/54 97.5  F (36.4  C) Oral 55 18 95 % --   02/11/24 2014 (!) 146/67 97.8  F (36.6  C) Oral 65 19 94 % --   02/11/24 1500 119/57 97.4  F (36.3  C) Oral 59 18 94 % --   02/11/24 1425 116/54 -- -- -- -- -- --   02/11/24 1218 118/56 97.6  F (36.4  C) Oral 57 18 92 % --       Physical Examination:  Gen: Pleasant in no acute distress. In chair.   HEENT: NCAT. EOMI. PERRL.  Neck: No bruit, JVD or thyromegaly.  Lungs: Clear to ascultation bilat with no crackles or wheezes.  Card: RRR. NSR. No RMG. Peripheral pulses present and symmetric. No edema.  Abd: Soft NT ND. No mass. Normal bowel sounds.  Skin: No rash.  Extr: No edema.  Neuro: Alert and oriented to place time and person.       Laboratory Data:   ID Labs:  Microbiology labs:  Reviewed.    No lab results found.  Recent Labs   Lab Test 02/12/24  0600 02/11/24  0754 02/10/24  0623 02/09/24  0556 02/08/24  0659 02/07/24  0654   WBC 9.1 6.7 6.6 6.9 4.9 4.1     Recent Labs   Lab Test 02/12/24  0600 02/11/24  0754 02/10/24  0623 02/09/24  0556   CR 4.72* 3.99* 3.24* 2.36*   GFRESTIMATED 9* 11* 15* 21*       Hematology Studies  Recent Labs   Lab Test 02/12/24  0600 02/11/24  0754 02/10/24  0623 02/09/24  0556 02/08/24  0659 02/07/24  0654   WBC 9.1 6.7 6.6 6.9 4.9 4.1   HGB 9.4* 10.0* 9.8* 10.1* 9.8* 9.9*   HCT 28.4* 30.5* 30.2* 30.8* 30.4* 30.5*    203 209 202 160 176       Metabolic  Recent Labs   Lab Test 02/12/24  0600 02/11/24  0754 02/10/24  0623   * 136 138   BUN 89.6* 86.4* 76.2*   CO2 17* 16*  17*   CR 4.72* 3.99* 3.24*   GFRESTIMATED 9* 11* 15*       Hepatic Studies  Recent Labs   Lab Test 02/10/24  0623 02/09/24  0556 02/06/24  0453   BILITOTAL 0.6 0.6 0.4   ALKPHOS 113 114 109   ALBUMIN 2.9* 3.0* 3.0*   AST 21 19 35   ALT 13 13 16       ImmunologlobulinsNo lab results found.         Imaging Data:   Reviewed      good balance

## 2024-02-12 NOTE — PROGRESS NOTES
RENAL PROGRESS NOTE    CC:  FELIPA    ASSESSMENT & PLAN:     PLAN:  -Continue expectant management (avoid nephrotoxins, renal dose medications, avoid hypo/hypertension, daily wts, daily I/O)  -Continue to HOLD ARB  -HOLD Bumex today, encouraged PO intake  -No urgent need for dialysis today (lytes, acid/base stable, fluid status okay on RA/Wt stable, Cr worse,Azotemia worse), IF FELIPA is progressive pt would proceed with RRT if indicated. I will make pt NPO after midnight just in case FELIPA is worse in AM  -Start Clear Ensure oral nutrition supplement  -Compression stockings ordered  -BMP daily    Non-oliguric FELIPA on CKD3: Base Cr ~ 0.7 to 0.8 mg/dL/ EGFR in 60s and 70s.  H/O moderate albuminuria, urine albumin to creatinine ratio was 417 mg  in 10/23.  CKD  thought 2/2 diabetic nephropathy given albuminuria.  As per patient chart review, she was referred to [facility] nephrology in the past but their clinic has not been able to get hold of the patient to schedule an appointment.  Baseline renal function on admission, with serial uptrend, FELIPA thought multifactorial 2/2 sepsis, NSAIDS while on ACE, +/- LAVNIIA, +/- ? 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 Urinary output has not been accurately recorded due to urinary incontinence. UA 02/02 showed mild proteinuria, albumin 70 g/dl, 6 hyaline cast. No hematuria or pyuria. CT abd/pelvis showed bilateral renal benign cyst.  No evidence of hydronephrosis.     On 02/02 the patient received Ibuprofen 600 mg and IV Lasix 02/02  On 02/04 started on PPI/Protonix 40 mg BI  On 02/04 received 75 ml of iodinated contrast for CTA  On 02/03-02/05 received IV Vancomycin and Cefepime.  Vancomycin trough level was 15.6 02/05.  On 02/05-02/06 the patient received IV Acyclovir.  Patient received IV Solu-Medrol 125 mg 02/05  No absolute eosinophilia on CBC with differential.  TSH was mildly elevated at 6.1 on December 28, 2023.  Low urine  sodium suggestive prerenal etiology  UA 02/07/24 showed mild albuminuria 17 g/dL, no hematuria, 7 hyaline casts.   UPCR at 0.5g protein    Mild Hyponatremia:Na+ 134.Trend.    Metabolic acidosis: 2/2 FELIPA.  co2 17, on PO bicarb, trend     Azotemia: Will HOLD bumex today. 2/2 FELIPA/CKD, +/1 ?Dry/dehydration with ongoing diuresis. Pt denies s/s of bleeding, hg with slight down trend, follow. Not on protein supplements.     HTN: on amlodipine 10 mg daily, metoprolol 75 mg twice daily and hydralazine 100 mg 3 times daily  PTA- losartan 100 mg daily, amlodipine 10 mg daily, metoprolol 75 mg twice daily, furosemide 20 mg daily,  Hydralazine 25 mg daily    Volume: +LLE. S/P albumin +diuresis trail. On Bumex 1 mg daily, follow     Sepsis-2/4/24-2/5/24 overnight events notable for stroke code secondary to aphasia and inattention also in the setting of fever to 102.5F and RR 35. WBC is wnl. Blood and urine cultures negative to date.  TTE with minimal pericardial effusion.  Repeat chest x-ray showed normal evidence of pneumonia.  Status post lumbar puncture.  CSF analysis was not consistent with infection.  Currently on IV meropenem and doxycycline. Infectious diseases following.  CT abdomen pelvis showed enlarged abdominal pelvic lymph nodes of uncertain etiology.     AMS: 2/2 ?hospital delirium vs metabolic encephalopathy ( liver cirrhosis, uremia).  No evidence of acute pathology on brain imaging.  Patient mental status much improved. Ammonia level was wnl at 29. Nodular liver on CT. Consulted neurology.     Normocytic anemia.  Patient presented with hemoglobin of 11.2> 9.4 today.  Likely multifactorial.  No signs of active bleeding. Trend.      DM2: control on single agent . PTA on glipizide 2.5 mg daily.  on insulin. Last  A1c was 6.6%.  Management as per primary team.     History of ischemic CVA-on Plavix     Nodular liver on CT abdomen pelvis: etiology unclear. ? PERRY given patient's obesity and DM.  LFTs are within normal  limits.  Coagulopathy INR 1.2.  Platelet count is within normal limits.    Gastroenterology service consulted.     GERD prophylaxis patient was started on Protonix 40 mg twice daily during this admission.  Could be contributing to patient's FELIPA/?interstitial nephritis.  On 02/07 PPI switched to famotidine 10 mg twice daily        SUBJECTIVE:    PT new tome today  Pt sitting up in bed, appears comfortable   Denies overt uremic symptoms other than decreased appetite  RN, pts sister, and pts niece at bedside  Pt denies pain, V, c, sob, fever, rash or CP  PT reports ongoing loose stool, poor poral intake  +LLE chronically, on RA  Pt reports Urine output is dark yellow, dry mouth  Poor appetite recently. Denies vomiting  Denies HA, feeling dizzy  We discussed in depth labs/ worsening renal function, and plan  We discussed current level of renal function, Renal replacement therapy options if CKD progression, and dialysis access risk/benefit. No urgent need for dialysis, will make NPO overnight all follow labs close in AM  No urgent need for dialysis, encouraged PO intake, HELD diuresis  No asterixis   Discussed labs/plan, and answered all questions with pt, pts sister and RN at bedside.        OBJECTIVE:  Physical Exam   Temp: 97.4  F (36.3  C) Temp src: Oral BP: 127/60 Pulse: 57   Resp: 18 SpO2: 95 % O2 Device: None (Room air)    Vitals:    02/10/24 0432 02/11/24 0424 02/12/24 0245   Weight: 107.5 kg (236 lb 15.9 oz) 106.9 kg (235 lb 10.8 oz) 107.1 kg (236 lb 1.8 oz)     Vital Signs with Ranges  Temp:  [97.4  F (36.3  C)-97.8  F (36.6  C)] 97.4  F (36.3  C)  Pulse:  [55-65] 57  Resp:  [16-19] 18  BP: (107-146)/(54-67) 127/60  SpO2:  [92 %-95 %] 95 %  I/O last 3 completed shifts:  In: 1100 [P.O.:1100]  Out: 350 [Urine:350]        Patient Vitals for the past 72 hrs:   Weight   02/12/24 0245 107.1 kg (236 lb 1.8 oz)   02/11/24 0424 106.9 kg (235 lb 10.8 oz)   02/10/24 0432 107.5 kg (236 lb 15.9 oz)     Intake/Output Summary  (Last 24 hours) at 2/12/2024 0948  Last data filed at 2/12/2024 0831  Gross per 24 hour   Intake 1580 ml   Output 350 ml   Net 1230 ml       PHYSICAL EXAM:  GEN: NAD  CV: RRR  Lung: clear and equal, on RA with good o2 sats  Ab: soft and NT  Ext: +LLE and well perfused  Skin: no rash      LABORATORY STUDIES:     Recent Labs   Lab 02/12/24  0600 02/11/24  0754 02/10/24  0623 02/09/24  0556 02/08/24  0659 02/07/24  0654   WBC 9.1 6.7 6.6 6.9 4.9 4.1   RBC 3.24* 3.49* 3.45* 3.52* 3.44* 3.44*   HGB 9.4* 10.0* 9.8* 10.1* 9.8* 9.9*   HCT 28.4* 30.5* 30.2* 30.8* 30.4* 30.5*    203 209 202 160 176       Basic Metabolic Panel:  Recent Labs   Lab 02/12/24  0814 02/12/24  0600 02/12/24  0232 02/11/24  2056 02/11/24  1727 02/11/24  1101 02/11/24  0754 02/10/24  0822 02/10/24  0623 02/09/24  0809 02/09/24  0556 02/08/24  0830 02/08/24  0659 02/07/24  2127 02/07/24  1918   NA  --  134*  --   --   --   --  136  --  138  --  138  --  139  --  138   POTASSIUM  --  3.9  --   --   --   --  3.8  --  3.9  --  3.8  --  3.5  --  3.6   CHLORIDE  --  103  --   --   --   --  105  --  108*  --  109*  --  107  --  105   CO2  --  17*  --   --   --   --  16*  --  17*  --  16*  --  22  --  21*   BUN  --  89.6*  --   --   --   --  86.4*  --  76.2*  --  67.3*  --  63.3*  --  61.2*   CR  --  4.72*  --   --   --   --  3.99*  --  3.24*  --  2.36*  --  2.32*  --  2.33*   * 114* 123* 166* 177* 155* 131*  129*   < > 135*   < > 128*   < > 101*   < > 110*   ALBARO  --  9.5  --   --   --   --  9.1  --  9.5  --  9.4  --  9.2  --  9.4    < > = values in this interval not displayed.       INR  Recent Labs   Lab 02/10/24  0623   INR 1.25*        Recent Labs   Lab Test 02/12/24  0600 02/11/24  0754 02/10/24  0623 02/05/24  0426 02/04/24  1854   INR  --   --  1.25*  --  1.23*   WBC 9.1 6.7 6.6   < > 5.9   HGB 9.4* 10.0* 9.8*   < > 10.4*    203 209   < > 119*    < > = values in this interval not displayed.       Personally reviewed current  labs    Sheri Fam [facility]  [facility]  [phone]

## 2024-02-12 NOTE — PROGRESS NOTES
GI PROGRESS NOTE  2/12/2024  Josefina Dumont  1950  /-90    Subjective:   Main complaint for me is back and neck discomfort.  She had some nausea last night, improved with Tums.  Per nursing staff, her mentation has significantly improved today.  3 loose stools yesterday.     Objective:     Blood pressure 127/60, pulse 57, temperature 97.4  F (36.3  C), resp. rate 18, weight 107.1 kg (236 lb 1.8 oz), SpO2 95%.    Body mass index is 40.53 kg/m .  Gen: NAD  Cardio: RRR  GI: Obese.  Non-distended, BS positive, soft, non-tender  Neuro: Appears mildly confused, but alert and orientated to person, time and place, no asterixis  Skin: No jaundice     Laboratory:  BMP  Recent Labs   Lab 02/12/24  0814 02/12/24  0600 02/12/24  0232 02/11/24  1101 02/11/24  0754 02/10/24  0822 02/10/24  0623   NA  --  134*  --   --  136  --  138   POTASSIUM  --  3.9  --   --  3.8  --  3.9   CHLORIDE  --  103  --   --  105  --  108*   ALBARO  --  9.5  --   --  9.1  --  9.5   CO2  --  17*  --   --  16*  --  17*   BUN  --  89.6*  --   --  86.4*  --  76.2*   CR  --  4.72*  --   --  3.99*  --  3.24*   * 114* 123*   < > 131*  129*   < > 135*    < > = values in this interval not displayed.     CBC  Recent Labs   Lab 02/12/24  0600 02/11/24  0754 02/10/24  0623   WBC 9.1 6.7 6.6   RBC 3.24* 3.49* 3.45*   HGB 9.4* 10.0* 9.8*   HCT 28.4* 30.5* 30.2*   MCV 88 87 88   MCH 29.0 28.7 28.4   MCHC 33.1 32.8 32.5   RDW 16.6* 16.5* 16.4*    203 209     INR  Recent Labs   Lab 02/10/24  0623   INR 1.25*      LFTs  Recent Labs   Lab Test 02/10/24  0623 02/09/24  0556 02/07/24  2142 02/06/24  0453 02/03/24  0550 02/02/24  2035 07/28/22  1203 02/04/22  1910   ALBUMIN 2.9* 3.0*  --  3.0*   < >  --    < >  --    BILITOTAL 0.6 0.6  --  0.4   < >  --    < >  --    ALT 13 13  --  16   < >  --    < >  --    AST 21 19  --  35   < >  --    < >  --    PROTEIN  --   --  70*  --   --  70*  --  30*    < > = values in this interval not displayed.      Imaging:  EXAM: CT CHEST/ABDOMEN/PELVIS W CONTRAST  LOCATION: [facility]  DATE: 2/8/2024     INDICATION: delirium of undetermined etiology, no infection source with fevers  COMPARISON: 02/03/2024, 12/28/2023  TECHNIQUE: CT scan of the chest, abdomen, and pelvis was performed following injection of IV contrast. Multiplanar reformats were obtained. Dose reduction techniques were used.   CONTRAST: Isovue 370 75ml     FINDINGS:   LUNGS AND PLEURA: Expiratory appearance of the trachea. Mild pulmonary edema. Trace bilateral pleural effusions. Dependent atelectasis. Scattered pulmonary nodules. For example, right middle lobe some solid nodule, 8 mm (series 5 image 132).     No pneumothorax.     MEDIASTINUM/AXILLAE: No pathologically enlarged thoracic lymph nodes. Multinodular thyroid. Normal caliber thoracic aorta. Cardiomegaly. Small pericardial effusion. Exuberant mitral annulus calcifications.     CORONARY ARTERY CALCIFICATION: Moderate.     HEPATOBILIARY: Cirrhosis. No focal liver lesion. No biliary dilation. Cholelithiasis.     PANCREAS: Normal.     SPLEEN: Normal.     ADRENAL GLANDS: Normal.     KIDNEYS/BLADDER: Benign renal cysts and/or probable cysts for which no further follow-up imaging is recommended. Nonobstructing left nephrolithiasis. Normal urinary bladder.      BOWEL: No obstruction. Normal appendix. No bowel wall thickening or pneumatosis. Small volume pelvic free fluid.      LYMPH NODES: Enlarged gastrohepatic ligament and wanda hepatis lymph nodes. For example:  - Gastrohepatic ligament lymph node, 12 mm (series 3 image 49).  - Wanda hepatis lymph node, 13 mm (series 3 image 67).     VASCULATURE: Nonaneurysmal aorta with moderate aortoiliac calcifications.      PELVIC ORGANS: No pelvic masses.     MUSCULOSKELETAL: Multilevel degenerative disc disease of the thoracolumbar spine. Bones are diffusely demineralized. Mild anasarca. Subcutaneous emphysema in the anterior abdominal  wall, presumably related to injection.                                                                      IMPRESSION:  1.  Mild pulmonary edema, trace bilateral pleural effusions, and mild anasarca.  2.  Enlarged abdominopelvic lymph nodes of uncertain etiology, unchanged dating back to at least 12/28/2023 correlate with clinical history and consider follow-up as indicated.  3.  Cirrhosis.  4.  Chronic and ancillary findings as described.     Assessment:   Confusion  73-year-old female with obesity, hypertension, type II DM, CAD, CVA, CKD, CHF who is admitted with acute sepsis.  She was confused, and we were asked to consult due to imaging findings of cirrhosis.  She has been evaluated as an outpatient for possible underlying cirrhosis, labs have been unremarkable aside from minimally elevated alkaline phosphatase.  MR elastography is pending, if this were to show cirrhosis, felt likely from PERRY or history of heavy drinking.    Her ammonia level was normal here.  No asterixis.  Her mentation has improved.  She was trialed on lactulose for 3 days, reasonable to continue while inpatient but would not prescribe upon discharge.      Plan:   1.  Reasonable to continue lactulose while inpatient  2.  MR elastography planned as outpatient  3.  Follow-up [facility] clinic as ordered  4.  No further GI workup planned at this time, we will sign off.    22 minutes of total time was spent providing patient care, including patient evaluation, reviewing documentation/test results, , and documentation.                                                                        Mandi Marcelino PA-C  Thank you for the opportunity to participate in the care of this patient.   Please feel free to call me with any questions or concerns.  Phone number [phone].

## 2024-02-12 NOTE — PROGRESS NOTES
Care Management Follow Up    Length of Stay (days): 8    Expected Discharge Date: 02/13/2024     Concerns to be Addressed: discharge planning     Patient plan of care discussed at interdisciplinary rounds: Yes    Anticipated Discharge Disposition:  ([facility] ([facility]     Anticipated Discharge Services:  ([facility] TBD)  Anticipated Discharge DME: None    Patient/family educated on Medicare website which has current facility and service quality ratings:    Education Provided on the Discharge Plan: Yes (AVS will be per bedside RN)  Patient/Family in Agreement with the Plan: yes    Referrals Placed by CM/SW:    Private pay costs discussed: Not applicable    Additional Information:  Chart reviewed. Discussed with resident. Not medically ready for discharge today     Updated [facility]. CM to keep them updated. TCU will submit for auth when ready     Malathi Harrington RN

## 2024-02-12 NOTE — PROVIDER NOTIFICATION
Provider notified that patient was bladder scanned for 397ml, she was assisted to the bedside commode and had only voided 50ml of dark mikki urine. Bladder scan post void was 303ml.     Update: provider responded, no new orders at this time other than that if straight cath was below 300ml to let her know. patient was straight cathed for 300ml of dark mikki urine.

## 2024-02-12 NOTE — PLAN OF CARE
Problem: Urinary Retention  Goal: Effective Urinary Elimination  Outcome: Not Progressing     Problem: Pain Acute  Goal: Optimal Pain Control and Function  Outcome: Progressing     Problem: Comorbidity Management  Goal: Blood Glucose Levels Within Targeted Range  Outcome: Progressing  Patient is alert and orientated to self, time, and place. Was disorientated to situation. She has denied pain tonight. Patient had some urinary retention tonight. Bladder scanned for 397ml. She was assisted to the Bed side commode and only voided 50ml of dark mikki urine. Bladder scanned again and was 303ml. Provider aware (SEE PN). She was straight cathed for 300ml of dark mikki urine. Repositioned throughout the night. Blood glucose 123. On K+ protocol with recheck scheduled or this AM. IV lupe locked. Bed alarm in place for patient's safety.

## 2024-02-12 NOTE — PROGRESS NOTES
[facility]    Progress Note - Hospitalist Service       Date of Admission:  2/2/2024    Assessment & Plan   Josefina Dumont is a 73 year old female admitted on 2/2/2024. She has a history of CHF with recent hospitalization 12/28/23-12/31/23, recent community acquired pneumonia, history of ischemic CVA, history of meningioma, history of CAD, type 2 diabetes mellitus and is admitted for shortness of breath and found to be in severe sepsis. 2/4/24-2/5/24 overnight events notable for stroke code secondary to aphasia and inattention also in the setting of fever to 102.5F and RR 35. Head MRI was unremarkable. Cefepime was switched to meropenem given concern for encephalopathy worsening 2/2 carbapenems. IV acyclovir was also added to the regimen and ID consult, LP orders were placed. On 2/5/24 ID added doxycycline to medication regimen. On 2/6/24, patient reported continued improvement in her condition. However, overnight events 2/6/24-2/7/24 were notable for likely hospital delirium and her mental status has continued to wax and wane. It remains unclear what primary contributor to delirium is at this time. CT head, chest, abdomen, pelvis was repeated 2/8/24 in light of continued AMS of unclear etiology; results showed cirrhosis but were otherwise unremarkable. Metabolic encephalopathy remains on the differential given cirrhosis though LFTs have been wnl and patient is stooling daily; trialing empiric lactulose but no further recommendations from GI. EEG per Neurology was performed 2/9/24, read was unremarkable for seizure activity.      Severe sepsis due to unknown source   Acute hypoxic respiratory failure, resolved  Acute fever of unknown origin, resolved  Concern for CHF exacerbation  Delirium   Patient initially presented with worsening shortness of breath, orthopnea, dyspnea on exertion, in the setting of elevated BNP and missed dose of Lasix, CHF exacerbation was initially high on the  differential. She was febrile w/Tmax of 101.3. CT chest on presentation showed no acute findings, mild pulmonary interstitial edema, small pericardial effusion. CRP elevated to 28.10 and procalcitonin of 5.80 on 2/4/24; CRP elevated to 54.20 and procalcitonin elevated to 11.70 on 2/5/24. See 2/4/24-2/5/24 overnight notes for significant events including aphasia, inattention, fever to 102.5F despite broad-spectrum antibiotics prompting additional aggressive treatment measures and workup. Differential remains broad at this time. LP and TTE unremarkable. Patient's condition had been improving 2/6/24 but then had onset of delirium overnight and continues to wax and wane. Possible hospital delirium in the setting of sleep deprivation, disorientation contributing. However, prudent to rule-out other potential causes given the distinct change from patient's baseline and prolonged delirium. CT head, chest, abdomen, pelvis was repeated 2/8/24 in light of continued AMS of unclear etiology; results showed cirrhosis but were otherwise unremarkable. Metabolic encephalopathy remains on the differential given cirrhosis though LFTs have been wnl and patient is stooling daily. EEG per Neurology was performed 2/9/24 read as unremarkable for seizure activity with slow background in theta and delta range that suggests a nonspecific generalized cerebral dysfunction. Such slowing can be seen in metabolic or toxic abnormalities, clinical correlation is recommended. Neurology has signed off.    -Delirium precautions  -Cardiac/low-sodium diet  -Continue supplemental oxygen as needed  -ID consulted, appreciate recommendations   -Nephrology consulted, appreciate recommendations  -GI consulted 2/9/24, no further recommendations beyond trial of empiric lactulose   -Discussed with Neurology, EEG read as above, signed off 2/10    FELIPA  Baseline creatinine around 0.8-0.9, had stabilized ~2.3 initially but over the past few days has been trending  upward now at 4.72. Several medications may have been contributing to FELIPA.   -Avoid further nephrotoxic medications  -Nephrology consulted, appreciate recommendations  -AM BMP    Hypokalemia, resolved  -Standard replacement protocol      Mild normocytic anemia  Hemoglobin 11.2 on admission. Stable at 9.1 today. Drop consistent with hemodilution. Will continue to monitor.   -AM CBC    Hypertension  -Continue PTA amlodipine  -Continue PTA hydralazine  -Holding PTA losartan in light of FELIPA; will continue to reassess as needing given blood pressure has been high-normal  -Continue PTA metoprolol tartrate     Prolonged QT  QTc of 472.  -Avoid QT prolonging medications  -Note that Celexa comes with risk for QT prolongation but benefit of anxiety treatment outweighing risk, will continue to monitor    DM2   Most recent A1c 6.6 1 month ago.  -Holding PTA glipizide  -Low sliding scale insulin      Chronic:  History of ischemic CVA: Continue PTA Plavix  GERD: Continue PTA Tums, continue PTA Protonix  Constipation: Continue PTA senna docusate  Anxiety/depression: Continue PTA Celexa   Asthma: Continue PTA albuterol as needed          Diet: Regular Diet Adult    DVT Prophylaxis: Heparin  Machuca Catheter: Not present  Fluids: po  Lines: None     Cardiac Monitoring: None  Code Status: Full Code      Clinically Significant Risk Factors              # Hypoalbuminemia: Lowest albumin = 2.9 g/dL at 2/10/2024  6:23 AM, will monitor as appropriate  # Coagulation Defect: INR = 1.25 (Ref range: 0.85 - 1.15) and/or PTT = 36 Seconds (Ref range: 22 - 38 Seconds), will monitor for bleeding   # Acute Kidney Injury, unspecified: based on a >150% or 0.3 mg/dL increase in last creatinine compared to past 90 day average, will monitor renal function  # Hypertension: Noted on problem list       # DMII: A1C = 6.6 % (Ref range: <5.7 %) within past 6 months         # Financial/Environmental Concerns:           Disposition Plan      Expected Discharge  Date: 02/13/2024      Destination: home  Discharge Comments: ID consult. Lumbar Puncture 2/5.  IV ABX.  Need prior authorization and [facility] accepted        The patient's care was discussed with the Attending Physician, Dr. Rea .    OSKAR BUTLER MD   Hospitalist Service  [facility]  Securely message with [employer] (more info)  Text page via [facility] Paging/Directory   ______________________________________________________________________    Interval History   No acute events overnight. Reports feeling much better today as her nausea has resolved; yesterday she had been feeling quite nauseous. She was recounting many events over the past couple of days including visits from her 4 year old nieces, her sister's car trouble, needing to be straight catheterized overnight. She is enjoying breakfast this morning and endorses more of an appetite. No other new concerns or questions.    Physical Exam   Vital Signs: Temp: 97.4  F (36.3  C) Temp src: Oral BP: 127/60 Pulse: 57   Resp: 18 SpO2: 95 % O2 Device: None (Room air)    Weight: 236 lbs 1.8 oz    GENERAL: Alert and no acute distress. Sitting up comfortably in bed, eating breakfast.  HEENT: Normocephalic, atraumatic, no rhinorrhea, moist mucus membranes.  RESP: Anterior lung fields with good airflow, clear to auscultation, no coarse lung sounds. Nonlabored breathing on room air.  CV: Regular rate and rhythm, systolic murmur.  MS: No gross musculoskeletal defects noted, mild edema.  NEURO: Alert, opening eyes and making eye contact, responding to questions and conversing with good recall of recent events.  PSYCH: Appropriate mood and affect.      Data     I have personally reviewed the following data over the past 24 hrs:    9.1  \   9.4 (L)   / 218     134 (L) 103 89.6 (H) /  102 (H)   3.9 17 (L) 4.72 (H) \       Imaging results reviewed over the past 24 hrs:   Recent Results (from the past 24 hour(s))   US Lower Extremity Venous  Duplex Bilateral    Narrative    EXAM: US LOWER EXTREMITY VENOUS DUPLEX BILATERAL  LOCATION: [facility]  DATE: 2/11/2024    INDICATION: bilateral new leg pain, reduced pulses and CONSUELO  COMPARISON: None.  TECHNIQUE: Venous Duplex ultrasound of bilateral lower extremities with and without compression, augmentation and duplex. Color flow and spectral Doppler with waveform analysis performed.    FINDINGS: Exam includes the common femoral, femoral, popliteal veins as well as segmentally visualized deep calf veins and greater saphenous vein.     RIGHT: No deep vein thrombosis. No superficial thrombophlebitis. No popliteal cyst.    LEFT: No deep vein thrombosis. No superficial thrombophlebitis. No popliteal cyst.      Impression    IMPRESSION:  1.  No deep venous thrombosis in the bilateral lower extremities.

## 2024-02-13 ENCOUNTER — APPOINTMENT (OUTPATIENT)
Dept: OCCUPATIONAL THERAPY | Facility: CLINIC | Age: 74
DRG: 871 | End: 2024-02-13
Payer: COMMERCIAL

## 2024-02-13 ENCOUNTER — APPOINTMENT (OUTPATIENT)
Dept: PHYSICAL THERAPY | Facility: CLINIC | Age: 74
DRG: 871 | End: 2024-02-13
Payer: COMMERCIAL

## 2024-02-13 LAB
ALBUMIN SERPL BCG-MCNC: 2.9 G/DL (ref 3.5–5.2)
ANION GAP SERPL CALCULATED.3IONS-SCNC: 15 MMOL/L (ref 7–15)
ANION GAP SERPL CALCULATED.3IONS-SCNC: 17 MMOL/L (ref 7–15)
BUN SERPL-MCNC: 94.5 MG/DL (ref 8–23)
BUN SERPL-MCNC: 95.2 MG/DL (ref 8–23)
CALCIUM SERPL-MCNC: 9.5 MG/DL (ref 8.8–10.2)
CALCIUM SERPL-MCNC: 9.5 MG/DL (ref 8.8–10.2)
CHLORIDE SERPL-SCNC: 100 MMOL/L (ref 98–107)
CHLORIDE SERPL-SCNC: 103 MMOL/L (ref 98–107)
CHLORIDE UR-SCNC: <20 MMOL/L
CLUE CELLS: ABNORMAL
CREAT SERPL-MCNC: 5.45 MG/DL (ref 0.51–0.95)
CREAT SERPL-MCNC: 5.62 MG/DL (ref 0.51–0.95)
DEPRECATED HCO3 PLAS-SCNC: 17 MMOL/L (ref 22–29)
DEPRECATED HCO3 PLAS-SCNC: 17 MMOL/L (ref 22–29)
EGFRCR SERPLBLD CKD-EPI 2021: 8 ML/MIN/1.73M2
EGFRCR SERPLBLD CKD-EPI 2021: 8 ML/MIN/1.73M2
ERYTHROCYTE [DISTWIDTH] IN BLOOD BY AUTOMATED COUNT: 16.6 % (ref 10–15)
GLUCOSE BLDC GLUCOMTR-MCNC: 102 MG/DL (ref 70–99)
GLUCOSE BLDC GLUCOMTR-MCNC: 124 MG/DL (ref 70–99)
GLUCOSE BLDC GLUCOMTR-MCNC: 143 MG/DL (ref 70–99)
GLUCOSE BLDC GLUCOMTR-MCNC: 148 MG/DL (ref 70–99)
GLUCOSE BLDC GLUCOMTR-MCNC: 180 MG/DL (ref 70–99)
GLUCOSE SERPL-MCNC: 131 MG/DL (ref 70–99)
GLUCOSE SERPL-MCNC: 184 MG/DL (ref 70–99)
HCT VFR BLD AUTO: 30.9 % (ref 35–47)
HGB BLD-MCNC: 10.2 G/DL (ref 11.7–15.7)
MCH RBC QN AUTO: 28.6 PG (ref 26.5–33)
MCHC RBC AUTO-ENTMCNC: 33 G/DL (ref 31.5–36.5)
MCV RBC AUTO: 87 FL (ref 78–100)
PHOSPHATE SERPL-MCNC: 5.9 MG/DL (ref 2.5–4.5)
PLATELET # BLD AUTO: 223 10E3/UL (ref 150–450)
POTASSIUM SERPL-SCNC: 3.9 MMOL/L (ref 3.4–5.3)
POTASSIUM SERPL-SCNC: 4.1 MMOL/L (ref 3.4–5.3)
POTASSIUM UR-SCNC: 41 MMOL/L
RBC # BLD AUTO: 3.57 10E6/UL (ref 3.8–5.2)
SODIUM SERPL-SCNC: 134 MMOL/L (ref 135–145)
SODIUM SERPL-SCNC: 135 MMOL/L (ref 135–145)
TRICHOMONAS, WET PREP: ABNORMAL
WBC # BLD AUTO: 7.4 10E3/UL (ref 4–11)
WBC'S/HIGH POWER FIELD, WET PREP: ABNORMAL
YEAST, WET PREP: PRESENT

## 2024-02-13 PROCEDURE — 250N000013 HC RX MED GY IP 250 OP 250 PS 637

## 2024-02-13 PROCEDURE — 85027 COMPLETE CBC AUTOMATED: CPT

## 2024-02-13 PROCEDURE — 36415 COLL VENOUS BLD VENIPUNCTURE: CPT

## 2024-02-13 PROCEDURE — 80069 RENAL FUNCTION PANEL: CPT

## 2024-02-13 PROCEDURE — 97116 GAIT TRAINING THERAPY: CPT | Mod: GP

## 2024-02-13 PROCEDURE — 87210 SMEAR WET MOUNT SALINE/INK: CPT

## 2024-02-13 PROCEDURE — 82040 ASSAY OF SERUM ALBUMIN: CPT

## 2024-02-13 PROCEDURE — 97530 THERAPEUTIC ACTIVITIES: CPT | Mod: GP

## 2024-02-13 PROCEDURE — 99232 SBSQ HOSP IP/OBS MODERATE 35: CPT | Performed by: INTERNAL MEDICINE

## 2024-02-13 PROCEDURE — 250N000013 HC RX MED GY IP 250 OP 250 PS 637: Performed by: INTERNAL MEDICINE

## 2024-02-13 PROCEDURE — 99233 SBSQ HOSP IP/OBS HIGH 50: CPT

## 2024-02-13 PROCEDURE — 120N000001 HC R&B MED SURG/OB

## 2024-02-13 PROCEDURE — 99232 SBSQ HOSP IP/OBS MODERATE 35: CPT | Mod: GC

## 2024-02-13 PROCEDURE — 87491 CHLMYD TRACH DNA AMP PROBE: CPT

## 2024-02-13 PROCEDURE — 97129 THER IVNTJ 1ST 15 MIN: CPT | Mod: GO

## 2024-02-13 PROCEDURE — 250N000011 HC RX IP 250 OP 636

## 2024-02-13 PROCEDURE — 82374 ASSAY BLOOD CARBON DIOXIDE: CPT

## 2024-02-13 PROCEDURE — 250N000011 HC RX IP 250 OP 636: Performed by: STUDENT IN AN ORGANIZED HEALTH CARE EDUCATION/TRAINING PROGRAM

## 2024-02-13 PROCEDURE — 97535 SELF CARE MNGMENT TRAINING: CPT | Mod: GO

## 2024-02-13 RX ORDER — BUMETANIDE 0.25 MG/ML
1 INJECTION INTRAMUSCULAR; INTRAVENOUS EVERY 12 HOURS
Status: DISCONTINUED | OUTPATIENT
Start: 2024-02-13 | End: 2024-02-17

## 2024-02-13 RX ORDER — BUMETANIDE 0.25 MG/ML
1 INJECTION INTRAMUSCULAR; INTRAVENOUS ONCE
Status: COMPLETED | OUTPATIENT
Start: 2024-02-13 | End: 2024-02-13

## 2024-02-13 RX ORDER — VIT B COMP NO.3/FOLIC/C/BIOTIN 1 MG-60 MG
1 TABLET ORAL DAILY
Status: DISCONTINUED | OUTPATIENT
Start: 2024-02-13 | End: 2024-02-28 | Stop reason: HOSPADM

## 2024-02-13 RX ORDER — BUMETANIDE 1 MG/1
1 TABLET ORAL
Status: DISCONTINUED | OUTPATIENT
Start: 2024-02-13 | End: 2024-02-13

## 2024-02-13 RX ORDER — FLUCONAZOLE 150 MG/1
150 TABLET ORAL ONCE
Status: COMPLETED | OUTPATIENT
Start: 2024-02-13 | End: 2024-02-13

## 2024-02-13 RX ADMIN — Medication: at 11:35

## 2024-02-13 RX ADMIN — INSULIN ASPART 1 UNITS: 100 INJECTION, SOLUTION INTRAVENOUS; SUBCUTANEOUS at 18:03

## 2024-02-13 RX ADMIN — SODIUM BICARBONATE 650 MG TABLET 1300 MG: at 20:02

## 2024-02-13 RX ADMIN — HEPARIN SODIUM 5000 UNITS: 5000 INJECTION, SOLUTION INTRAVENOUS; SUBCUTANEOUS at 12:08

## 2024-02-13 RX ADMIN — METOPROLOL TARTRATE 75 MG: 25 TABLET, FILM COATED ORAL at 09:13

## 2024-02-13 RX ADMIN — FLUCONAZOLE 150 MG: 150 TABLET ORAL at 18:04

## 2024-02-13 RX ADMIN — METOPROLOL TARTRATE 75 MG: 25 TABLET, FILM COATED ORAL at 19:56

## 2024-02-13 RX ADMIN — Medication: at 20:03

## 2024-02-13 RX ADMIN — BUMETANIDE 1 MG: 0.25 INJECTION INTRAMUSCULAR; INTRAVENOUS at 18:50

## 2024-02-13 RX ADMIN — HYDRALAZINE HYDROCHLORIDE 100 MG: 25 TABLET, FILM COATED ORAL at 09:12

## 2024-02-13 RX ADMIN — CITALOPRAM 20 MG: 20 TABLET ORAL at 09:09

## 2024-02-13 RX ADMIN — AMLODIPINE BESYLATE 10 MG: 10 TABLET ORAL at 09:02

## 2024-02-13 RX ADMIN — CLOPIDOGREL BISULFATE 75 MG: 75 TABLET ORAL at 09:02

## 2024-02-13 RX ADMIN — HYDRALAZINE HYDROCHLORIDE 100 MG: 25 TABLET, FILM COATED ORAL at 19:56

## 2024-02-13 RX ADMIN — HEPARIN SODIUM 5000 UNITS: 5000 INJECTION, SOLUTION INTRAVENOUS; SUBCUTANEOUS at 18:50

## 2024-02-13 RX ADMIN — HEPARIN SODIUM 5000 UNITS: 5000 INJECTION, SOLUTION INTRAVENOUS; SUBCUTANEOUS at 05:20

## 2024-02-13 RX ADMIN — BUMETANIDE 1 MG: 0.25 INJECTION INTRAMUSCULAR; INTRAVENOUS at 14:57

## 2024-02-13 ASSESSMENT — ACTIVITIES OF DAILY LIVING (ADL)
ADLS_ACUITY_SCORE: 43
ADLS_ACUITY_SCORE: 43
ADLS_ACUITY_SCORE: 45
ADLS_ACUITY_SCORE: 42
ADLS_ACUITY_SCORE: 43
ADLS_ACUITY_SCORE: 44
ADLS_ACUITY_SCORE: 45
ADLS_ACUITY_SCORE: 44
ADLS_ACUITY_SCORE: 43
ADLS_ACUITY_SCORE: 43
ADLS_ACUITY_SCORE: 42
ADLS_ACUITY_SCORE: 42

## 2024-02-13 NOTE — PROGRESS NOTES
SPIRITUAL HEALTH SERVICES Progress Note    Saw pt Josefina Dumont and offered [employer] sacrament of anointing for the healing of the sick.     Fr. Teo Mijares

## 2024-02-13 NOTE — PROGRESS NOTES
"CLINICAL NUTRITION SERVICES - ASSESSMENT NOTE     Nutrition Prescription    RECOMMENDATIONS FOR MDs/PROVIDERS TO ORDER:  Rec a renal (dialysis) consistent carbohydrate diet when diet advances     Malnutrition Status:    Does not meet criteria, but at risk    Recommendations already ordered by Registered Dietitian (RD):  Nepro with carb steady daily when diet advances     Future/Additional Recommendations:  Will monitor progress towards goals     REASON FOR ASSESSMENT  Josefina Dumont is a/an 73 year old female assessed by the dietitian for LOS    Pertinent Medical Admission/History: Severe sepsis, respiratory failure, delirium, CHF, FELIPA, anemia, HTN, DM2    NUTRITION HISTORY  Allergies: NKFA  Pt reportedly has not had much appetite lately. She has not bee on dialysis prior. Plan to possibly start it today.     CURRENT NUTRITION ORDERS  Diet: NPO today, was on a regular diet prior with variable intake of 25-75% at meals       PHYSICAL FINDINGS  See malnutrition section below.  Per flowsheet:  Edema- +1-+2 BLE  GI- diarrhea, fecal incontinence   Skin- coccyx erythema wound  Pain- denies  Oral- no oral issues     LABS  Labs reviewed, Na-134, BUN-89.6, Cr-4.72, meter glucose-184, 148    MEDICATIONS  Medications reviewed, lasix, novolog, senokot    ANTHROPOMETRICS  Height: 5'4\"  Most Recent Weight: 107.3 kg (236 lb 8.9 oz)    IBW: 55 kg  BMI: Obesity Grade III BMI >40  Weight History:   Wt Readings from Last 10 Encounters:   02/13/24 107.3 kg (236 lb 8.9 oz)   01/29/24 99.2 kg (218 lb 11.2 oz)   12/31/23 99.4 kg (219 lb 1.6 oz)   10/19/22 95.3 kg (210 lb)   08/30/22 95.3 kg (210 lb)   08/11/22 96.2 kg (212 lb)   04/20/22 101.6 kg (224 lb)   03/29/22 101.6 kg (224 lb)   02/04/22 103.6 kg (228 lb 6.3 oz)   01/13/22 106.4 kg (234 lb 8 oz)         ASSESSED NUTRITION NEEDS  Estimated Energy Needs: 1468-0904 kcals/day (22-25kcal/kg IBW of 55kg)  Justification: Maintenance and Obese  Estimated Protein Needs:  grams " protein/day (1.2 - 1.5 grams of pro/kg of adjusted body wt, 68kg)  Justification: Obesity guidelines, Wound healing  Estimated Fluid Needs: 1624-9870 mL/day (25 - 30 mL/kg of IBW of 55kg)   Justification: Maintenance        MALNUTRITION:  % Weight Loss:  None noted  % Intake:  </= 75% for >/= 1 month (severe malnutrition)  Subcutaneous Fat Loss:  None observed  Muscle Loss:  None observed  Fluid Retention:  Mild +1-+2 BLE     Malnutrition Diagnosis: Pt does not meet criteria, but at risk    NUTRITION DIAGNOSIS  Inadequate protein-energy intake related to reduced po  as evidenced by variable intake     INTERVENTIONS  Implementation  When diet resumes start Nepro with carbsteady daily  Renal MVI   Education Needs- Briefly discusses a dialysis diet. If pt discharges on a dialysis diet she will need more education    Goals  Patient to consume % of nutritionally adequate meals three times per day, or the equivalent with supplements/snacks.  Wound healing     Monitoring/Evaluation  Will monitor po, wt, education needs

## 2024-02-13 NOTE — PROGRESS NOTES
Care Management Follow Up    Length of Stay (days): 9    Expected Discharge Date: 02/14/2024     Concerns to be Addressed: discharge planning     Patient plan of care discussed at interdisciplinary rounds: Yes    Anticipated Discharge Disposition: [facility]     Anticipated Discharge Services:  (Transportaion TBD)  Anticipated Discharge DME: None    Patient/family educated on Medicare website which has current facility and service quality ratings:    Education Provided on the Discharge Plan: Yes (AVS will be per bedside RN)  Patient/Family in Agreement with the Plan: yes    Referrals Placed by CM/SW:    Private pay costs discussed: Not applicable    Additional Information:  Sister request referral to [facility].  Referral made and pt accepted there.  [facility] submitted for insurance auth.  Pt and sister updated on accepting facility.    IVON Mckeon

## 2024-02-13 NOTE — PLAN OF CARE
Pt A&Ox3 VSS expect for bradycardia paged MD reagarding heart rate, they will put in parameter orders.  Paged nephrology and asked if okay to given bumex with low heart rate, nephrology  team said okay to give. Wet prep done on shift. Pt incontinent of bowel, loose BM's. No voiding on shift bladder scan showed less than 30 when scanned.     Problem: Fever  Goal: Body Temperature in Desired Range  Outcome: Progressing   Goal Outcome Evaluation:

## 2024-02-13 NOTE — PROVIDER NOTIFICATION
Paged BFM about decreased urine output over the last 24 hours as well as new vagina discharge.     Provider  Will consult with Nephology in the morning as well as discuss new symptoms.       Carolyn Esposito RN on 2/13/2024 at 5:38 AM

## 2024-02-13 NOTE — PROGRESS NOTES
RENAL PROGRESS NOTE    CC:  FELIPA    ASSESSMENT & PLAN:     PLAN:  -Continue expectant management (avoid nephrotoxins, renal dose medications, avoid hypo/hypertension, daily wts, daily I/O)  -Continue to HOLD ARB  -Resume Bumex today BID (1 mg IV @ 1200, x 1 mg @1800, then 1 mg BID thereafter)   -I will lift NPO status today so she can eat during the day, then resume NPO status after midnight, will assess need to initiate dialysis in AM  -NPO after midnight again, will assess dialysis need in AM  -Continue Clear Ensure oral nutrition supplement  -Compression stockings ordered  -No urgent need for dialysis today (lytes, acid/base stable, fluid status okay on RA/Wt stable, Cr worse,Azotemia worse, less UO per RN), IF FELIPA is progressive pt would proceed with RRT if indicated. Will assess need for HD in AM.   -BMP daily    Non-oliguric FELIPA on CKD3: Base Cr ~ 0.7 to 0.8 mg/dL/ EGFR in 60s and 70s.  H/O moderate albuminuria, urine albumin to creatinine ratio was 417 mg  in 10/23.  CKD  thought 2/2 diabetic nephropathy given albuminuria.  As per patient chart review, she was referred to [facility] nephrology in the past but their clinic has not been able to get hold of the patient to schedule an appointment.  Baseline renal function on admission, with serial uptrend, FELIPA thought multifactorial 2/2 sepsis, NSAIDS while on ACE, +/- LAVINIA, +/- ? 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 Urinary output has not been accurately recorded due to urinary incontinence. UA 02/02 showed mild proteinuria, albumin 70 g/dl, 6 hyaline cast. No hematuria or pyuria. CT abd/pelvis showed bilateral renal benign cyst.  No evidence of hydronephrosis. See Plan above.      On 02/02 the patient received Ibuprofen 600 mg and IV Lasix 02/02  On 02/04 started on PPI/Protonix 40 mg BI  On 02/04 received 75 ml of iodinated contrast for CTA  On 02/03-02/05 received IV Vancomycin and Cefepime.   Vancomycin trough level was 15.6 02/05.  On 02/05-02/06 the patient received IV Acyclovir.  Patient received IV Solu-Medrol 125 mg 02/05  No absolute eosinophilia on CBC with differential.  TSH was mildly elevated at 6.1 on December 28, 2023.  Low urine sodium suggestive prerenal etiology  UA 02/07/24 showed mild albuminuria 17 g/dL, no hematuria, 7 hyaline casts.   UPCR at 0.5g protein    Mild Hyponatremia:Na+ 134.Trend.    Metabolic acidosis: 2/2 FELIPA.  co2 17, on PO bicarb, trend     Azotemia: Will HOLD bumex today. 2/2 FELIPA/CKD, +/1 ?Dry/dehydration with ongoing diuresis. Pt denies s/s of bleeding, hg with slight down trend, follow. Not on protein supplements.     HTN: on amlodipine 10 mg daily, metoprolol 75 mg twice daily and hydralazine 100 mg 3 times daily  PTA- losartan 100 mg daily, amlodipine 10 mg daily, metoprolol 75 mg twice daily, furosemide 20 mg daily,  Hydralazine 25 mg daily    Volume: +LLE. S/P albumin +diuresis trail. On Bumex 1 mg daily, follow     Sepsis-2/4/24-2/5/24 overnight events notable for stroke code secondary to aphasia and inattention also in the setting of fever to 102.5F and RR 35. WBC is wnl. Blood and urine cultures negative to date.  TTE with minimal pericardial effusion.  Repeat chest x-ray showed normal evidence of pneumonia.  Status post lumbar puncture.  CSF analysis was not consistent with infection.  Currently on IV meropenem and doxycycline. Infectious diseases following.  CT abdomen pelvis showed enlarged abdominal pelvic lymph nodes of uncertain etiology.     AMS: 2/2 ?hospital delirium vs metabolic encephalopathy ( liver cirrhosis, uremia).  No evidence of acute pathology on brain imaging.  Patient mental status much improved. Ammonia level was wnl at 29. Nodular liver on CT. Consulted neurology.     Normocytic anemia.  Patient presented with hemoglobin of 11.2> 9.4 today.  Likely multifactorial.  No signs of active bleeding. Trend.      DM2: control on single agent .  PTA on glipizide 2.5 mg daily.  on insulin. Last  A1c was 6.6%.  Management as per primary team.     History of ischemic CVA-on Plavix     Nodular liver on CT abdomen pelvis: etiology unclear. ? PERRY given patient's obesity and DM.  LFTs are within normal limits.  Coagulopathy INR 1.2.  Platelet count is within normal limits.    Gastroenterology service consulted.     GERD prophylaxis patient was started on Protonix 40 mg twice daily during this admission.  Could be contributing to patient's FELIPA/?interstitial nephritis.  On 02/07 PPI switched to famotidine 10 mg twice daily        SUBJECTIVE:      Pt sitting up in chair, appears comfortable   Sister at bedside, reports mentation stable, poor appetite.   Denies overt uremic symptoms other than decreased appetite, some intermittent confusion  I discussed with RN, pt not making UO since last shift, bladder scan was 25 ml, UO difficult to record with mixed urine/stool voids/loose stools ongoing  Pt reports less urine output today  Pt denies pain, V, c, sob, fever, rash or CP  PT reports ongoing loose stool, poor poral intake  +LLE chronically, on RA  Pt reports Urine output is dark, difficult to collect with mixed loose stools  Poor appetite recently. Denies vomiting  Denies HA, feeling dizzy  Cr worse today 4.7>5.4, Azotemia worse, +mental confusion/Decreased appetite, + LLE  On RA, wt stable/up since admit  Metabolic acidosis on Bicarb. stable  We discussed in depth labs/ worsening renal function, and plan  We discussed current level of renal function, Renal replacement therapy options if CKD progression, and dialysis access risk/benefit. No urgent need for dialysis today. will make NPO overnight again and follow labs close in AM.   Will lift NPO status and allow her to eat today, also will resume diuresis, and follow UO  No asterixis   RN paged me this afternoon do discuss if okay to give Duiretic when bradycardic. Pulses have been high 50s thoughout the wk, RN  report low 50s, will defer bradycardia workup /Tx to primary team. Okay to continue with diuresis, with hold parameter if SBP <90. Will recheck renal panel, to make sure no electrolyte abnormalities driving HR.   Discussed labs/plan, and answered all questions with pt,  sister and RN.        OBJECTIVE:  Physical Exam   Temp: 97.6  F (36.4  C) Temp src: Oral BP: 122/61 Pulse: 57   Resp: 18 SpO2: 94 % O2 Device: None (Room air)    Vitals:    02/11/24 0424 02/12/24 0245 02/13/24 0445   Weight: 106.9 kg (235 lb 10.8 oz) 107.1 kg (236 lb 1.8 oz) 107.3 kg (236 lb 8.9 oz)     Vital Signs with Ranges  Temp:  [97.6  F (36.4  C)-97.9  F (36.6  C)] 97.6  F (36.4  C)  Pulse:  [54-67] 57  Resp:  [18-20] 18  BP: (120-143)/(57-66) 122/61  SpO2:  [94 %-95 %] 94 %  I/O last 3 completed shifts:  In: 750 [P.O.:750]  Out: 130 [Urine:130]        Patient Vitals for the past 72 hrs:   Weight   02/13/24 0445 107.3 kg (236 lb 8.9 oz)   02/12/24 0245 107.1 kg (236 lb 1.8 oz)   02/11/24 0424 106.9 kg (235 lb 10.8 oz)     Intake/Output Summary (Last 24 hours) at 2/12/2024 0948  Last data filed at 2/12/2024 0831  Gross per 24 hour   Intake 1580 ml   Output 350 ml   Net 1230 ml       PHYSICAL EXAM:  GEN: NAD, aox3  CV: RRR  Lung: clear and equal, on RA with good o2 sats  Ab: soft and NT, obese  Ext: +1-2LE BL and well perfused  Skin: no rash  : little urine on bladder scans, UO collection poor with mixed loose stool + urine with voids.       LABORATORY STUDIES:     Recent Labs   Lab 02/13/24  0743 02/12/24  0600 02/11/24  0754 02/10/24  0623 02/09/24  0556 02/08/24  0659   WBC 7.4 9.1 6.7 6.6 6.9 4.9   RBC 3.57* 3.24* 3.49* 3.45* 3.52* 3.44*   HGB 10.2* 9.4* 10.0* 9.8* 10.1* 9.8*   HCT 30.9* 28.4* 30.5* 30.2* 30.8* 30.4*    218 203 209 202 160       Basic Metabolic Panel:  Recent Labs   Lab 02/13/24  0743 02/13/24  0231 02/12/24  2115 02/12/24  1744 02/12/24  1229 02/12/24  0814 02/12/24  0600 02/11/24  1101 02/11/24  0754  02/10/24  0822 02/10/24  0623 02/09/24  0809 02/09/24  0556 02/08/24  0830 02/08/24  0659     --   --   --   --   --  134*  --  136  --  138  --  138  --  139   POTASSIUM 3.9  --   --   --   --   --  3.9  --  3.8  --  3.9  --  3.8  --  3.5   CHLORIDE 103  --   --   --   --   --  103  --  105  --  108*  --  109*  --  107   CO2 17*  --   --   --   --   --  17*  --  16*  --  17*  --  16*  --  22   BUN 94.5*  --   --   --   --   --  89.6*  --  86.4*  --  76.2*  --  67.3*  --  63.3*   CR 5.45*  --   --   --   --   --  4.72*  --  3.99*  --  3.24*  --  2.36*  --  2.32*   * 148* 184* 139* 110* 102* 114*   < > 131*  129*   < > 135*   < > 128*   < > 101*   ALBARO 9.5  --   --   --   --   --  9.5  --  9.1  --  9.5  --  9.4  --  9.2    < > = values in this interval not displayed.       INR  Recent Labs   Lab 02/10/24  0623   INR 1.25*        Recent Labs   Lab Test 02/13/24  0743 02/12/24  0600 02/11/24  0754 02/10/24  0623 02/05/24  0426 02/04/24  1854   INR  --   --   --  1.25*  --  1.23*   WBC 7.4 9.1   < > 6.6   < > 5.9   HGB 10.2* 9.4*   < > 9.8*   < > 10.4*    218   < > 209   < > 119*    < > = values in this interval not displayed.       Personally reviewed current labs    Sheri Fam [facility]  [facility]  [phone]

## 2024-02-13 NOTE — PROGRESS NOTES
[facility] ID Inpatient follow up       Patient:  Josefina Dumont  Date of birth 1950, Medical record number [id number]  Date of Visit:  02/13/2024  Attending Physician: Lazara Olivares MD         Assessment and Recommendations:   Assessment:  Josefina Dumont is a 73 year old female with   1. Type 2 diabetes mellitus.  2. History of ischemic CVA/meningioma  3. Recent hospitalization for community-acquired pneumonia in December 2023  4. Admitted on 2/2/2024 with acute hypoxic respiratory failure presumed secondary to exacerbation of heart failure.  Noted to have small pericardial effusion on CT scan.  Needs TTE.  5. Fever with increased procalcitonin.  UA not consistent with UTI.  Blood cultures no growth.  Liver function test normal.  Etiology not totally clear.  TTE with minimal pericardial effusion.  Repeat chest x-ray with no findings suggestive of pneumonia.  Status post lumbar puncture with no findings consistent with infection (only 2 WBC).  Was initially on broad-spectrum antibiotics.  Narrowed down to Levaquin on 2/8/2024.  Has been afebrile for about 5 days now.  6. Altered mental status.  MRI with no evidence of infection.  Neck supple.  Low suspicion for CNS infection.  CSF not consistent with infection. Low concern for hepatic encephalopathy per GI.   7. FELIPA.  Baseline creatinine normal at 0.80 on 2/2/2024.  Creatinine worsening.  Renal following.  8. Intermittent confusion.  Report of cirrhosis of the liver on CT scan.  Could be secondary to hepatic encephalopathy.  High BUN may be contributing.  Nephrology following.    9. Abdominopelvic adenopathy.  Unclear etiology.  Appears stable from December 29, 2023    Recommendations:  1. Continue Levaquin renally dosed for 7 days from 2/8/2024. Order updated.   2. GI following, note low concern for hepatic encephalopathy   3. Consider lymph node biopsy if does not continue to improve clinically.  4. Monitor mental status,  "and glucose.  5. ID will sign off. Please call with additional questions or change in clinical status.     Alma oMore MD.  [facility].   [facility]  Office Telephone [phone].  Fax [phone]  [facility] paging            Interval History:     HPI:    Some green discharge. Tolerating antibiotics. Breathing ok.     Pertinent cultures include:  No results found for: \"CULT\"    Recent Inflammatory Biomarkers:   Recent Labs   Lab Test 24  0743 24  0600 24  0754 02/10/24  0623 24  0556 24  0659 24  0654 24  0453 24  0426 24  1918 24  1430 24  0550 23  0533 23  1052 22  0429 22  2333   PCAL  --   --   --   --   --   --  7.31*  --  11.70* 10.90*  --  5.80*  --  0.07  --  0.05   WBC 7.4 9.1 6.7 6.6 6.9 4.9 4.1   < > 6.6  --    < > 5.0   < > 5.7   < >  --     < > = values in this interval not displayed.            Review of Systems:   CONSTITUTIONAL:    Temp Max: Temp (24hrs), Av.8  F (36.6  C), Min:97.6  F (36.4  C), Max:98.1  F (36.7  C)   .  Negative except for findings in the HPI.           Current Medications (antimicrobials listed in bold):       amLODIPine  10 mg Oral Daily     [Held by provider] bumetanide  1 mg Oral Daily     citalopram  20 mg Oral Daily     clopidogrel  75 mg Oral Daily     diclofenac  2 g Topical 4x Daily     famotidine  10 mg Oral Every Other Day     [Held by provider] furosemide  20 mg Intravenous Daily     heparin ANTICOAGULANT  5,000 Units Subcutaneous Q8H     hydrALAZINE  100 mg Oral TID     insulin aspart  1-3 Units Subcutaneous TID AC     insulin aspart  1-3 Units Subcutaneous At Bedtime     lactulose  10 g Oral BID     levofloxacin  500 mg Oral Every Other Day     [Held by provider] losartan  100 mg Oral Daily     metoprolol tartrate  75 mg Oral BID     miconazole   Topical BID     senna-docusate  1 tablet Oral At Bedtime     sodium bicarbonate  " 1,300 mg Oral BID              Allergies:     Allergies   Allergen Reactions     Aspirin      Other reaction(s): stomach upset     Atenolol Other (See Comments)     abd pain     Lisinopril      Other reaction(s): stomach aches     Penicillins Hives     Tolerated ceftriaxone     Statins      Other reaction(s): myalgias            Physical Exam:   Vitals were reviewed  Patient Vitals for the past 24 hrs:   BP Temp Temp src Pulse Resp SpO2 Weight   02/13/24 0858 138/65 97.5  F (36.4  C) Oral 57 18 97 % --   02/13/24 0445 122/61 97.6  F (36.4  C) Oral 57 18 -- 107.3 kg (236 lb 8.9 oz)   02/13/24 0042 126/58 97.9  F (36.6  C) Oral 58 20 94 % --   02/12/24 2019 134/61 -- -- -- -- -- --   02/12/24 2016 134/61 -- -- 67 -- -- --   02/12/24 1544 130/60 97.6  F (36.4  C) Oral 56 20 94 % --   02/12/24 1330 120/57 97.7  F (36.5  C) Oral 54 18 95 % --   02/12/24 1240 (!) 143/66 97.8  F (36.6  C) Oral 56 20 94 % --       Physical Examination:  Gen: Pleasant in no acute distress. In bed  HEENT: NCAT. EOMI. PERRL.  Neck: No bruit, JVD or thyromegaly.  Lungs: room air, no respiratory distress  Skin: No rash.  Extr: No edema.  Neuro: Alert and oriented to place time and person.       Laboratory Data:   ID Labs:  Microbiology labs:  Reviewed.    No lab results found.  Recent Labs   Lab Test 02/13/24  0743 02/12/24  0600 02/11/24  0754 02/10/24  0623 02/09/24  0556 02/08/24  0659   WBC 7.4 9.1 6.7 6.6 6.9 4.9     Recent Labs   Lab Test 02/13/24  0743 02/12/24  0600 02/11/24  0754 02/10/24  0623   CR 5.45* 4.72* 3.99* 3.24*   GFRESTIMATED 8* 9* 11* 15*       Hematology Studies  Recent Labs   Lab Test 02/13/24  0743 02/12/24  0600 02/11/24  0754 02/10/24  0623 02/09/24  0556 02/08/24  0659   WBC 7.4 9.1 6.7 6.6 6.9 4.9   HGB 10.2* 9.4* 10.0* 9.8* 10.1* 9.8*   HCT 30.9* 28.4* 30.5* 30.2* 30.8* 30.4*    218 203 209 202 160       Metabolic  Recent Labs   Lab Test 02/13/24  0743 02/12/24  0600 02/11/24  0754    134* 136   BUN  94.5* 89.6* 86.4*   CO2 17* 17* 16*   CR 5.45* 4.72* 3.99*   GFRESTIMATED 8* 9* 11*       Hepatic Studies  Recent Labs   Lab Test 02/10/24  0623 02/09/24  0556 02/06/24  0453   BILITOTAL 0.6 0.6 0.4   ALKPHOS 113 114 109   ALBUMIN 2.9* 3.0* 3.0*   AST 21 19 35   ALT 13 13 16       ImmunologlobulinsNo lab results found.         Imaging Data:   Reviewed

## 2024-02-13 NOTE — CONSULTS
Visit for emotional and spiritual support, 9 days hospitalization. Pt hopes to discharge tomorrow.  Josefina verbalized feeling grateful for discharge and also very weak, tired. She is interested in being anointed.    Fr. Bruce is here tonight for another pt. I txd Fr requesting he also anoint this pt.    I offered a blessing.    TODD David.

## 2024-02-13 NOTE — PROGRESS NOTES
[facility]    Progress Note - Hospitalist Service       Date of Admission:  2/2/2024    Assessment & Plan   Josefina Dumont is a 73 year old female admitted on 2/2/2024. She has a history of CHF with recent hospitalization 12/28/23-12/31/23, recent community acquired pneumonia, history of ischemic CVA, history of meningioma, history of CAD, type 2 diabetes mellitus and is admitted for shortness of breath and found to be in severe sepsis. 2/4/24-2/5/24 overnight events notable for stroke code secondary to aphasia and inattention also in the setting of fever to 102.5F and RR 35. Head MRI was unremarkable. Cefepime was switched to meropenem given concern for encephalopathy 2/2 worsening carbapenems. IV acyclovir was also added to the regimen and ID consult, LP orders were placed. On 2/5/24 ID added doxycycline to medication regimen. On 2/6/24, patient reported continued improvement in her condition. However, overnight events 2/6/24-2/7/24 were notable for likely hospital delirium and her mental status has continued to wax and wane. It remains unclear what primary contributor to delirium is at this time. CT head, chest, abdomen, pelvis was repeated 2/8/24 in light of continued AMS of unclear etiology; results showed cirrhosis but were otherwise unremarkable. Metabolic encephalopathy remains on the differential given cirrhosis though LFTs have been wnl and patient is stooling daily; trialing empiric lactulose but no further recommendations from GI. EEG per Neurology was performed 2/9/24, read was unremarkable for seizure activity. Will finish course of antibiotics today, and ID will sign off. At this time, patient remains hospitalized for worsening kidney function/uremia, thought to be multifactorial in nature though differential remains open and broad. Planning for TCU on discharge once medically ready.    Wet prep notable for presence of yeast. Patient reports often getting yeast  infections with antibiotics. Will treat with 1-time dose of Diflucan 150 mg. No renal adjustment needed for vaginal candidiasis single dose therapy per Up-to-date.    Severe sepsis due to unknown source   Acute hypoxic respiratory failure, resolved  Acute fever of unknown origin, resolved  Concern for CHF exacerbation  Delirium, waxing and waning   Patient initially presented with worsening shortness of breath, orthopnea, dyspnea on exertion, in the setting of elevated BNP and missed dose of Lasix, CHF exacerbation was initially high on the differential. She was febrile w/Tmax of 101.3. CT chest on presentation showed no acute findings, mild pulmonary interstitial edema, small pericardial effusion. CRP elevated to 28.10 and procalcitonin of 5.80 on 2/4/24; CRP elevated to 54.20 and procalcitonin elevated to 11.70 on 2/5/24. See 2/4/24-2/5/24 overnight notes for significant events including aphasia, inattention, fever to 102.5F despite broad-spectrum antibiotics prompting additional aggressive treatment measures and workup. Differential remains broad at this time. LP and TTE unremarkable. Patient's condition had been improving 2/6/24 but then had onset of delirium overnight and continues to wax and wane. Possible hospital delirium in the setting of sleep deprivation, disorientation contributing. However, prudent to rule-out other potential causes given the distinct change from patient's baseline and prolonged delirium. CT head, chest, abdomen, pelvis was repeated 2/8/24 in light of continued AMS of unclear etiology; results showed cirrhosis but were otherwise unremarkable. Metabolic encephalopathy remains on the differential given cirrhosis though LFTs have been wnl and patient is stooling daily. EEG per Neurology was performed 2/9/24 read as unremarkable for seizure activity with slow background in theta and delta range that suggests a nonspecific generalized cerebral dysfunction. Such slowing can be seen in  metabolic or toxic abnormalities, clinical correlation is recommended. Neurology has signed off. ID now signing off as patient will finish course of antibiotics today.  -Delirium precautions  -Cardiac/low-sodium diet  -Continue supplemental oxygen as needed  -ID consulted, signed off 2/13/24  -Continue Levaquin renally dosed for 7 days from 2/8/2024. Order updated.   -GI following, note low concern for hepatic encephalopathy   Consider lymph node biopsy if does not continue to improve clinically.  -Monitor mental status, and glucose.  -Discussed with Neurology, EEG read as above, signed off 2/10/24  -GI consulted 2/9/24, no further recommendations beyond trial of empiric lactulose   -Nephrology consulted and continues to follow, appreciate recommendations    FELIPA  Baseline creatinine around 0.8-0.9, had stabilized ~2.3 initially but over the past few days has been trending upward now at 5.45. Several medications may have been contributing to FELIPA though etiology remains unclear; creatinine worsened several days s/p contrast administration and continues to worsen despite discontinuation of nephrotoxic IV antibiotics several days ago. Nephrology has been following, appreciate their cares and recommendations.  -Nephrology recommendations for today are pending at the time of this dictation  -Avoid further nephrotoxic medications  -AM BMP    Hypokalemia, resolved  -Standard replacement protocol      Mild normocytic anemia  Hemoglobin 11.2 on admission. Stable at 10.2 today. Drop consistent with hemodilution. Will continue to monitor.   -AM CBC    Hypertension  -Continue PTA amlodipine  -Continue PTA hydralazine  -Holding PTA losartan in light of FELIPA; will continue to reassess as needing given blood pressure has been high-normal  -Continue PTA metoprolol tartrate     Prolonged QT  QTc of 472.  -Avoid QT prolonging medications  -Note that Celexa comes with risk for QT prolongation but benefit of anxiety treatment outweighing  "risk, will continue to monitor    DM2   Most recent A1c 6.6 1 month ago.  -Holding PTA glipizide  -Low sliding scale insulin     Vaginal discharge  Wet prep notable for presence of yeast. Patient reports often getting yeast infections with antibiotics. Will treat with 1-time dose of Diflucan 150 mg.     Chronic:  History of ischemic CVA: Continue PTA Plavix  GERD: Continue PTA Tums, continue PTA Protonix  Constipation: Continue PTA senna docusate  Anxiety/depression: Continue PTA Celexa   Asthma: Continue PTA albuterol as needed          Diet: Snacks/Supplements Adult: Ensure Clear; Between Meals  NPO for Medical/Clinical Reasons Except for: Meds    DVT Prophylaxis: Heparin  Machuca Catheter: Not present  Fluids: po  Lines: None     Cardiac Monitoring: None  Code Status: Full Code      Clinically Significant Risk Factors              # Hypoalbuminemia: Lowest albumin = 2.9 g/dL at 2/10/2024  6:23 AM, will monitor as appropriate    # Acute Kidney Injury, unspecified: based on a >150% or 0.3 mg/dL increase in last creatinine compared to past 90 day average, will monitor renal function  # Hypertension: Noted on problem list       # DMII: A1C = 6.6 % (Ref range: <5.7 %) within past 6 months         # Financial/Environmental Concerns:           Disposition Plan      Expected Discharge Date: 02/14/2024      Destination: home  Discharge Comments: Need prior authorization and [facility] accepted        The patient's care was discussed with the Attending Physician, Dr. Rea .    OSKAR BUTLER MD   Hospitalist Service  [facility]  Securely message with [employer] (more info)  Text page via [employer] Paging/Directory   ______________________________________________________________________    Interval History   No acute events overnight. Reports feeling better again today. No further GI upset. Thinks she previously had a \"touch of the flu.\" Dislikes the frequent stooling. Is excited to finish " antibiotics today though notes she sometimes ends up getting an associated yeast infection. Some green discharge per pad noted overnight, patient denies noticing any discharge and denies any vaginal itching or irritation. She is not producing much urine and is frustrated by this.    Physical Exam   Vital Signs: Temp: 97.5  F (36.4  C) Temp src: Oral BP: 138/65 Pulse: 57   Resp: 18 SpO2: 97 % O2 Device: None (Room air)    Weight: 236 lbs 8.86 oz    GENERAL: Alert and no acute distress. Sitting up comfortably in bed, eating breakfast.  HEENT: Normocephalic, atraumatic, no rhinorrhea, moist mucus membranes.  RESP: Anterior lung fields with good airflow, clear to auscultation, no coarse lung sounds. Nonlabored breathing on room air.  CV: Regular rate and rhythm, systolic murmur.  MS: No gross musculoskeletal defects noted, mild edema.  NEURO: Alert, opening eyes and making eye contact, some word finding difficulty but overall responding to questions and conversing with good recall of recent events.  PSYCH: Appropriate mood and affect.      Data     I have personally reviewed the following data over the past 24 hrs:    7.4  \   10.2 (L)   / 223     135 103 94.5 (H) /  124 (H)   3.9 17 (L) 5.45 (H) \       Imaging results reviewed over the past 24 hrs:   No results found for this or any previous visit (from the past 24 hour(s)).

## 2024-02-13 NOTE — PLAN OF CARE
Problem: Adult Inpatient Plan of Care  Goal: Optimal Comfort and Wellbeing  Outcome: Progressing     Problem: Fever  Goal: Body Temperature in Desired Range  Outcome: Progressing   Goal Outcome Evaluation:  Pt alert x3, disoriented to situation, answer questions appropriately, denies p/n/v, up to chair A2 with gait belt and walker, incontinence of bowel and bladder perineum cleansed and barrier cream applied, blood sugar WNL, report given to RN assuming care, continue to monitor.

## 2024-02-13 NOTE — PLAN OF CARE
Problem: Adult Inpatient Plan of Care  Goal: Optimal Comfort and Wellbeing  Outcome: Progressing     Problem: Fatigue  Goal: Improved Activity Tolerance  Outcome: Progressing  Intervention: Promote Improved Energy  Recent Flowsheet Documentation  Taken 2/13/2024 0230 by Carolyn Esposito RN  Activity Management:   up in chair   standing at bedside   up to bedside commode  Environmental Support: calm environment promoted     Problem: Sepsis/Septic Shock  Goal: Absence of Infection Signs and Symptoms  Outcome: Progressing  Intervention: Initiate Sepsis Management  Recent Flowsheet Documentation  Taken 2/13/2024 0230 by Carolyn Esposito RN  Infection Prevention:   rest/sleep promoted   single patient room provided   hand hygiene promoted  Intervention: Promote Recovery  Recent Flowsheet Documentation  Taken 2/13/2024 0230 by Carolyn Esposito RN  Activity Management:   up in chair   standing at bedside   up to bedside commode     Problem: Pain Acute  Goal: Optimal Pain Control and Function  Outcome: Progressing  Intervention: Prevent or Manage Pain  Recent Flowsheet Documentation  Taken 2/13/2024 0230 by Carolyn Esposito RN  Sensory Stimulation Regulation:   care clustered   lighting decreased   television on  Medication Review/Management: medications reviewed  Intervention: Optimize Psychosocial Wellbeing  Recent Flowsheet Documentation  Taken 2/13/2024 0230 by Carolyn Esposito RN  Supportive Measures: active listening utilized     Goal Outcome Evaluation:    Pt is A&O but disoriented to time and situation, calls appropriately for needs and is an assist 2 and hover lift for ambulation. Vitals signs are stable, afebrile, oxygen is on room air. Pt denies pain. Pt is tolerating NPO diet. Pt is not voiding spontaneously- last bladder scan had 34 ml ([facility] notified), last bowel movement 2/13/2024. Glucose checks ACHS, insulin coverage per sliding scale. Alarms in place.      Plan:  Possible dialysis tomorrow due to patients  kidney function, possible pelvic exam due to new discharge symptoms.     Carolyn Esposito RN  February 13, 2024, 7:00 AM

## 2024-02-13 NOTE — PROGRESS NOTES
Pt is alert and oriented. Disoriented to initial reason why she's in the hospital. However, she mentioned that she is sick because something is wrong with her kidneys. Pt has not voided since AM shift. Straight cath to collect a urine sample, drained 130 ml and some unmeasured urine in the  pad, orange color. Sample sent to the lab. Removed purewick, genital area is red and sore. Complained of headache and Tylenol given. Pt is quite anxious for a possible dialysis tomorrow. Reassurance given. BP stable.

## 2024-02-14 ENCOUNTER — APPOINTMENT (OUTPATIENT)
Dept: INTERVENTIONAL RADIOLOGY/VASCULAR | Facility: CLINIC | Age: 74
DRG: 871 | End: 2024-02-14
Attending: NURSE PRACTITIONER
Payer: COMMERCIAL

## 2024-02-14 PROBLEM — N17.9 ACUTE KIDNEY FAILURE, UNSPECIFIED (H): Status: ACTIVE | Noted: 2024-02-14

## 2024-02-14 LAB
ANION GAP SERPL CALCULATED.3IONS-SCNC: 16 MMOL/L (ref 7–15)
BUN SERPL-MCNC: 107 MG/DL (ref 8–23)
C TRACH DNA SPEC QL PROBE+SIG AMP: NEGATIVE
CALCIUM SERPL-MCNC: 9.5 MG/DL (ref 8.8–10.2)
CHLORIDE SERPL-SCNC: 102 MMOL/L (ref 98–107)
CREAT SERPL-MCNC: 5.78 MG/DL (ref 0.51–0.95)
DEPRECATED HCO3 PLAS-SCNC: 17 MMOL/L (ref 22–29)
EGFRCR SERPLBLD CKD-EPI 2021: 7 ML/MIN/1.73M2
GLUCOSE BLDC GLUCOMTR-MCNC: 115 MG/DL (ref 70–99)
GLUCOSE BLDC GLUCOMTR-MCNC: 119 MG/DL (ref 70–99)
GLUCOSE BLDC GLUCOMTR-MCNC: 133 MG/DL (ref 70–99)
GLUCOSE BLDC GLUCOMTR-MCNC: 171 MG/DL (ref 70–99)
GLUCOSE BLDC GLUCOMTR-MCNC: 90 MG/DL (ref 70–99)
GLUCOSE SERPL-MCNC: 132 MG/DL (ref 70–99)
HOLD SPECIMEN: NORMAL
N GONORRHOEA DNA SPEC QL NAA+PROBE: NEGATIVE
POTASSIUM SERPL-SCNC: 4.2 MMOL/L (ref 3.4–5.3)
SODIUM SERPL-SCNC: 135 MMOL/L (ref 135–145)

## 2024-02-14 PROCEDURE — 250N000011 HC RX IP 250 OP 636: Performed by: NURSE PRACTITIONER

## 2024-02-14 PROCEDURE — 250N000013 HC RX MED GY IP 250 OP 250 PS 637

## 2024-02-14 PROCEDURE — 99152 MOD SED SAME PHYS/QHP 5/>YRS: CPT

## 2024-02-14 PROCEDURE — 250N000011 HC RX IP 250 OP 636: Performed by: RADIOLOGY

## 2024-02-14 PROCEDURE — 36415 COLL VENOUS BLD VENIPUNCTURE: CPT

## 2024-02-14 PROCEDURE — 250N000011 HC RX IP 250 OP 636

## 2024-02-14 PROCEDURE — C1750 CATH, HEMODIALYSIS,LONG-TERM: HCPCS

## 2024-02-14 PROCEDURE — 258N000003 HC RX IP 258 OP 636

## 2024-02-14 PROCEDURE — 250N000011 HC RX IP 250 OP 636: Performed by: STUDENT IN AN ORGANIZED HEALTH CARE EDUCATION/TRAINING PROGRAM

## 2024-02-14 PROCEDURE — 250N000009 HC RX 250: Performed by: RADIOLOGY

## 2024-02-14 PROCEDURE — 250N000013 HC RX MED GY IP 250 OP 250 PS 637: Performed by: INTERNAL MEDICINE

## 2024-02-14 PROCEDURE — 99233 SBSQ HOSP IP/OBS HIGH 50: CPT

## 2024-02-14 PROCEDURE — 99232 SBSQ HOSP IP/OBS MODERATE 35: CPT | Mod: GC

## 2024-02-14 PROCEDURE — 80048 BASIC METABOLIC PNL TOTAL CA: CPT

## 2024-02-14 PROCEDURE — 120N000001 HC R&B MED SURG/OB

## 2024-02-14 PROCEDURE — 02H633Z INSERTION OF INFUSION DEVICE INTO RIGHT ATRIUM, PERCUTANEOUS APPROACH: ICD-10-PCS | Performed by: RADIOLOGY

## 2024-02-14 PROCEDURE — 0JH63XZ INSERTION OF TUNNELED VASCULAR ACCESS DEVICE INTO CHEST SUBCUTANEOUS TISSUE AND FASCIA, PERCUTANEOUS APPROACH: ICD-10-PCS | Performed by: RADIOLOGY

## 2024-02-14 PROCEDURE — 76937 US GUIDE VASCULAR ACCESS: CPT

## 2024-02-14 PROCEDURE — 90935 HEMODIALYSIS ONE EVALUATION: CPT

## 2024-02-14 PROCEDURE — 272N000500 HC NEEDLE CR2

## 2024-02-14 RX ORDER — CEFAZOLIN SODIUM/WATER 2 G/20 ML
2 SYRINGE (ML) INTRAVENOUS
Status: COMPLETED | OUTPATIENT
Start: 2024-02-14 | End: 2024-02-14

## 2024-02-14 RX ORDER — NALOXONE HYDROCHLORIDE 0.4 MG/ML
0.4 INJECTION, SOLUTION INTRAMUSCULAR; INTRAVENOUS; SUBCUTANEOUS
Status: DISCONTINUED | OUTPATIENT
Start: 2024-02-14 | End: 2024-02-14 | Stop reason: HOSPADM

## 2024-02-14 RX ORDER — HEPARIN SODIUM 200 [USP'U]/100ML
1 INJECTION, SOLUTION INTRAVENOUS CONTINUOUS PRN
Status: DISCONTINUED | OUTPATIENT
Start: 2024-02-14 | End: 2024-02-14 | Stop reason: HOSPADM

## 2024-02-14 RX ORDER — FLUMAZENIL 0.1 MG/ML
0.2 INJECTION, SOLUTION INTRAVENOUS
Status: DISCONTINUED | OUTPATIENT
Start: 2024-02-14 | End: 2024-02-14 | Stop reason: HOSPADM

## 2024-02-14 RX ORDER — NALOXONE HYDROCHLORIDE 0.4 MG/ML
0.2 INJECTION, SOLUTION INTRAMUSCULAR; INTRAVENOUS; SUBCUTANEOUS
Status: DISCONTINUED | OUTPATIENT
Start: 2024-02-14 | End: 2024-02-14 | Stop reason: HOSPADM

## 2024-02-14 RX ORDER — ONDANSETRON 2 MG/ML
4 INJECTION INTRAMUSCULAR; INTRAVENOUS
Status: DISCONTINUED | OUTPATIENT
Start: 2024-02-14 | End: 2024-02-14 | Stop reason: HOSPADM

## 2024-02-14 RX ORDER — HEPARIN SODIUM 1000 [USP'U]/ML
4500 INJECTION, SOLUTION INTRAVENOUS; SUBCUTANEOUS ONCE
Status: COMPLETED | OUTPATIENT
Start: 2024-02-14 | End: 2024-02-14

## 2024-02-14 RX ORDER — FENTANYL CITRATE 50 UG/ML
25-50 INJECTION, SOLUTION INTRAMUSCULAR; INTRAVENOUS EVERY 5 MIN PRN
Status: DISCONTINUED | OUTPATIENT
Start: 2024-02-14 | End: 2024-02-14 | Stop reason: HOSPADM

## 2024-02-14 RX ADMIN — CITALOPRAM 20 MG: 20 TABLET ORAL at 10:51

## 2024-02-14 RX ADMIN — DICLOFENAC 2 G: 10 GEL TOPICAL at 21:43

## 2024-02-14 RX ADMIN — Medication: at 21:42

## 2024-02-14 RX ADMIN — SODIUM CHLORIDE 300 ML: 9 INJECTION, SOLUTION INTRAVENOUS at 19:25

## 2024-02-14 RX ADMIN — LIDOCAINE HYDROCHLORIDE 10 ML: 10 INJECTION, SOLUTION INFILTRATION; PERINEURAL at 15:02

## 2024-02-14 RX ADMIN — DICLOFENAC 2 G: 10 GEL TOPICAL at 18:33

## 2024-02-14 RX ADMIN — DICLOFENAC 2 G: 10 GEL TOPICAL at 12:21

## 2024-02-14 RX ADMIN — HEPARIN SODIUM 5000 UNITS: 5000 INJECTION, SOLUTION INTRAVENOUS; SUBCUTANEOUS at 22:11

## 2024-02-14 RX ADMIN — FENTANYL CITRATE 50 MCG: 50 INJECTION INTRAMUSCULAR; INTRAVENOUS at 15:04

## 2024-02-14 RX ADMIN — MIDAZOLAM 1.5 MG: 1 INJECTION INTRAMUSCULAR; INTRAVENOUS at 14:49

## 2024-02-14 RX ADMIN — SODIUM CHLORIDE 250 ML: 9 INJECTION, SOLUTION INTRAVENOUS at 19:25

## 2024-02-14 RX ADMIN — METOPROLOL TARTRATE 75 MG: 25 TABLET, FILM COATED ORAL at 21:41

## 2024-02-14 RX ADMIN — MIDAZOLAM 0.5 MG: 1 INJECTION INTRAMUSCULAR; INTRAVENOUS at 15:00

## 2024-02-14 RX ADMIN — CLOPIDOGREL BISULFATE 75 MG: 75 TABLET ORAL at 08:58

## 2024-02-14 RX ADMIN — FENTANYL CITRATE 50 MCG: 50 INJECTION INTRAMUSCULAR; INTRAVENOUS at 15:01

## 2024-02-14 RX ADMIN — HEPARIN SODIUM 5000 UNITS: 5000 INJECTION, SOLUTION INTRAVENOUS; SUBCUTANEOUS at 12:13

## 2024-02-14 RX ADMIN — AMLODIPINE BESYLATE 10 MG: 10 TABLET ORAL at 08:58

## 2024-02-14 RX ADMIN — BUMETANIDE 1 MG: 0.25 INJECTION INTRAMUSCULAR; INTRAVENOUS at 05:55

## 2024-02-14 RX ADMIN — SODIUM BICARBONATE 650 MG TABLET 1300 MG: at 21:41

## 2024-02-14 RX ADMIN — Medication: at 11:15

## 2024-02-14 RX ADMIN — Medication 2 G: at 14:31

## 2024-02-14 RX ADMIN — HEPARIN SODIUM 4500 UNITS: 1000 INJECTION INTRAVENOUS; SUBCUTANEOUS at 15:09

## 2024-02-14 RX ADMIN — SENNOSIDES AND DOCUSATE SODIUM 1 TABLET: 8.6; 5 TABLET ORAL at 21:42

## 2024-02-14 RX ADMIN — SODIUM BICARBONATE 650 MG TABLET 1300 MG: at 10:51

## 2024-02-14 RX ADMIN — HYDRALAZINE HYDROCHLORIDE 100 MG: 25 TABLET, FILM COATED ORAL at 08:58

## 2024-02-14 RX ADMIN — ACETAMINOPHEN 1000 MG: 500 TABLET ORAL at 21:42

## 2024-02-14 RX ADMIN — HEPARIN SODIUM 5000 UNITS: 5000 INJECTION, SOLUTION INTRAVENOUS; SUBCUTANEOUS at 02:55

## 2024-02-14 RX ADMIN — HEPARIN SODIUM 2200 UNITS: 1000 INJECTION INTRAVENOUS; SUBCUTANEOUS at 19:42

## 2024-02-14 RX ADMIN — DICLOFENAC 2 G: 10 GEL TOPICAL at 09:03

## 2024-02-14 RX ADMIN — HEPARIN SODIUM 2300 UNITS: 1000 INJECTION INTRAVENOUS; SUBCUTANEOUS at 19:43

## 2024-02-14 RX ADMIN — BUMETANIDE 1 MG: 0.25 INJECTION INTRAMUSCULAR; INTRAVENOUS at 18:31

## 2024-02-14 RX ADMIN — LEVOFLOXACIN 500 MG: 500 TABLET, FILM COATED ORAL at 08:58

## 2024-02-14 ASSESSMENT — ACTIVITIES OF DAILY LIVING (ADL)
ADLS_ACUITY_SCORE: 43
ADLS_ACUITY_SCORE: 43
ADLS_ACUITY_SCORE: 49
ADLS_ACUITY_SCORE: 43
ADLS_ACUITY_SCORE: 49
ADLS_ACUITY_SCORE: 45
ADLS_ACUITY_SCORE: 43
ADLS_ACUITY_SCORE: 49
ADLS_ACUITY_SCORE: 47

## 2024-02-14 NOTE — IR NOTE
Patient Name: Josefina Dumont  Medical Record Number: [id number]  Today's Date: 2/14/2024    Procedure: tunnelled CVC placement  Proceduralist: Dr. Scales      Sedation medications administered: 2 mg midazolam and 100 mcg fentanyl   Sedation time: 10 minutes    Report given to: FRANCISCO Marquez  : n/a    Other Notes: Pt arrived to IR room 1 from Pre/post bay 2. Consent reviewed. Pt denies any questions or concerns regarding procedure. Pt positioned supine and monitored per protocol. Pt tolerated procedure without any noted complications. VSS on monitor. Pt transferred back to  [facility] .      Right internal jugular tunnelled CVC placed and sutured in for securement.  Biopatch and tegaderm applied to site.  Right internal jugular puncture site and exit site of CVC are dry and intact at time of transfer to .  Site assessed jointly with FRANCISCO Marquez and oncoming nurse.      Sent patient back to floor with 2 blue emergency clamps.

## 2024-02-14 NOTE — PROGRESS NOTES
Care Management Follow Up    Length of Stay (days): 10    Expected Discharge Date: 02/15/2024     Concerns to be Addressed: discharge planning       Patient plan of care discussed at interdisciplinary rounds: Yes    Anticipated Discharge Disposition:  [facility] (TCU)    Anticipated Discharge Services:  TBD    Anticipated Discharge DME: None    Education Provided on the Discharge Plan: Yes    Patient/Family in Agreement with the Plan: yes    Referrals Placed by CM/SW:  TCU      Additional Information:  CM reviewed chart. CM updated [facility] TCU. [facility] does have prior authorization. Depending when discharge happens TCU may need to submit for another prior authorization.  10:15 AM   May need to arrange outpatient dialysis. No CM consult yet and not in provider notes. Transportation TBD.   CM will need to arrange outpatient dialysis (IF needed) and arrange the first 2-3 rides from TCU to dialysis. [facility] requests [employer]. CM to call [employer] [employer] to arrange (phone # [phone]).    Radha Bergeron RN

## 2024-02-14 NOTE — PLAN OF CARE
Pt A&Ox4, forgetful.  VSS on RA. No complaint of pain, SOB, or n/v. L PIV SL. Bladder scan at 0430 for 164. Pt has been NPO since midnight for possible CVC placement for dialysis. Assist of 2 with GBW, did not get up overnight. Alarms on for safety, call light in reach. Discharge pending.   Problem: Adult Inpatient Plan of Care  Goal: Optimal Comfort and Wellbeing  Outcome: Progressing     Problem: Risk for Delirium  Goal: Improved Sleep  Outcome: Progressing  Intervention: Promote Sleep  Recent Flowsheet Documentation  Taken 2/14/2024 0300 by Madeline Cooney, RN  Sleep/Rest Enhancement:   awakenings minimized   comfort measures   regular sleep/rest pattern promoted   room darkened     Problem: Fever  Goal: Body Temperature in Desired Range  Outcome: Progressing   Goal Outcome Evaluation:

## 2024-02-14 NOTE — PLAN OF CARE
Vitals stable, pt remains bradycardiac at times. Intermittent forgetfulness but pt able to answer orientation questions, although she told writer she was going to TCU tomorrow morning. Wet prep positive this shift for yeast, one time fluconazole given.     Nursing assistant reported this evening that she changed pad under patient and it was somewhat wet, possibly urine. Patient had been bladder scanned 3 hrs prior for 0 mL.     IV bumex ordered to give this evening. Writer expressed concerns regarding further hurting patients kidneys. Patient does not seem to have to be drinking much, has not received IV fluids in several days. Lung sounds clear for writer, on room air. Javan lower extremities +3 edema, patients most recent albumin was 2.9. Inquired w/ [facility] regarding albumin and IV hydration, they told writer that nephrology does not want this currently.     NPO at midnight for possible CVC placement and dialysis.

## 2024-02-14 NOTE — PROGRESS NOTES
[facility]    Progress Note - Hospitalist Service       Date of Admission:  2/2/2024    Assessment & Plan   Josefina Dumont is a 73 year old female admitted on 2/2/2024. She has a history of CHF with recent hospitalization 12/28/23-12/31/23, recent community acquired pneumonia, history of ischemic CVA, history of meningioma, history of CAD, type 2 diabetes mellitus and is admitted for shortness of breath and found to be in severe sepsis. 2/4/24-2/5/24 overnight events notable for stroke code secondary to aphasia and inattention also in the setting of fever to 102.5F and RR 35. Head MRI was unremarkable. Cefepime was switched to meropenem given concern for encephalopathy 2/2 worsening carbapenems. IV acyclovir was also added to the regimen and ID consult, LP orders were placed. On 2/5/24 ID added doxycycline to medication regimen. On 2/6/24, patient reported continued improvement in her condition. However, overnight events 2/6/24-2/7/24 were notable for likely hospital delirium and her mental status has continued to wax and wane. It remains unclear what primary contributor to delirium is at this time. CT head, chest, abdomen, pelvis was repeated 2/8/24 in light of continued AMS of unclear etiology; results showed cirrhosis but were otherwise unremarkable. Metabolic encephalopathy remains on the differential given cirrhosis though LFTs have been wnl and patient is stooling daily; trialing empiric lactulose but no further recommendations from GI. EEG per Neurology was performed 2/9/24, read was unremarkable for seizure activity. Will finish course of antibiotics today, and ID will sign off. At this time, patient remains hospitalized for worsening kidney function/uremia, thought to be multifactorial in nature though differential remains open and broad. Planning to initiate HD today per Nephrology. Planning for TCU on discharge once medically ready.    Severe sepsis due to unknown source    Acute hypoxic respiratory failure, resolved  Acute fever of unknown origin, resolved  Concern for CHF exacerbation  Delirium, improving   Patient initially presented with worsening shortness of breath, orthopnea, dyspnea on exertion, in the setting of elevated BNP and missed dose of Lasix, CHF exacerbation was initially high on the differential. She was febrile w/Tmax of 101.3. CT chest on presentation showed no acute findings, mild pulmonary interstitial edema, small pericardial effusion. CRP elevated to 28.10 and procalcitonin of 5.80 on 2/4/24; CRP elevated to 54.20 and procalcitonin elevated to 11.70 on 2/5/24. See 2/4/24-2/5/24 overnight notes for significant events including aphasia, inattention, fever to 102.5F despite broad-spectrum antibiotics prompting additional aggressive treatment measures and workup. Differential remains broad at this time. LP and TTE unremarkable. Patient's condition had been improving 2/6/24 but then had onset of delirium overnight and continues to wax and wane. Possible hospital delirium in the setting of sleep deprivation, disorientation contributing. However, prudent to rule-out other potential causes given the distinct change from patient's baseline and prolonged delirium. CT head, chest, abdomen, pelvis was repeated 2/8/24 in light of continued AMS of unclear etiology; results showed cirrhosis but were otherwise unremarkable. Metabolic encephalopathy was on the differential given cirrhosis though LFTs have been wnl and patient is stooling daily. EEG per Neurology was performed 2/9/24 read as unremarkable for seizure activity with slow background in theta and delta range that suggests a nonspecific generalized cerebral dysfunction. Such slowing can be seen in metabolic or toxic abnormalities, clinical correlation is recommended. Neurology has signed off. ID also signed off as patient will finished course of antibiotics.  -Delirium precautions  -Cardiac/low-sodium  diet  -Continue supplemental oxygen as needed  -ID consulted, signed off 2/13/24  -Continue Levaquin renally dosed for 7 days from 2/8/2024. Order updated. (Course has been completed)  -GI following, note low concern for hepatic encephalopathy   Consider lymph node biopsy if does not continue to improve clinically.  -Monitor mental status, and glucose.  -Discussed with Neurology, EEG read as above, signed off 2/10/24  -GI consulted 2/9/24, no further recommendations beyond trial of empiric lactulose (trial completed)  -Nephrology consulted and continues to follow, appreciate recommendations    FELIPA  Baseline creatinine around 0.8-0.9, had stabilized ~2.3 initially but over the past few days has been trending upward now at 5.78. Several medications may have been contributing to FELIPA though etiology remains unclear; creatinine worsened several days s/p contrast administration and continues to worsen despite discontinuation of nephrotoxic IV antibiotics several days ago. Nephrology has been following, appreciate their cares and recommendations.  -Nephrology recommendations:  -Continue expectant management (avoid nephrotoxins, renal dose medications, avoid hypo/hypertension, daily wts, daily I/O), continue to follow daily for recovery  -Continue IV diuresis, follow UO  -Pt consented to initiate dialysis today, IR consulted for Tunneled CVC placement and will plan for dialysis after CVC today. Discussed with pts RN and HD RN today.   -Continue to HOLD ARB  -Continue Clear Ensure oral nutrition supplement  -BMP daily    Hypokalemia, resolved  -Standard replacement protocol      Mild normocytic anemia  Hemoglobin 11.2 on admission. Has since been stable at ~10.. Drop consistent with hemodilution. Will continue to monitor.   -AM CBC    Hypertension  -Continue PTA amlodipine  -Continue PTA hydralazine  -Holding PTA losartan in light of FELIPA; will continue to reassess as needing given blood pressure has been  high-normal  -Continue PTA metoprolol tartrate     Prolonged QT  QTc of 472.  -Avoid QT prolonging medications  -Note that Celexa comes with risk for QT prolongation but benefit of anxiety treatment outweighing risk, will continue to monitor    DM2   Most recent A1c 6.6 1 month ago.  -Holding PTA glipizide  -Low sliding scale insulin      Chronic:  History of ischemic CVA: Continue PTA Plavix  GERD: Continue PTA Tums, continue PTA Protonix  Constipation: Continue PTA senna docusate  Anxiety/depression: Continue PTA Celexa   Asthma: Continue PTA albuterol as needed          Diet: Regular Diet Adult  Snacks/Supplements Adult: Nepro Oral Supplement; Between Meals    DVT Prophylaxis: Heparin  Machuca Catheter: Not present  Fluids: po  Lines: None     Cardiac Monitoring: None  Code Status: Full Code      Clinically Significant Risk Factors           # Hypercalcemia: corrected calcium is >10.1, will monitor as appropriate    # Hypoalbuminemia: Lowest albumin = 2.9 g/dL at 2/13/2024  3:31 PM, will monitor as appropriate    # Acute Kidney Injury, unspecified: based on a >150% or 0.3 mg/dL increase in last creatinine compared to past 90 day average, will monitor renal function  # Hypertension: Noted on problem list       # DMII: A1C = 6.6 % (Ref range: <5.7 %) within past 6 months         # Financial/Environmental Concerns:           Disposition Plan      Expected Discharge Date: 02/15/2024      Destination: home  Discharge Comments: Need prior authorization and [facility] accepted        The patient's care was discussed with the Attending Physician, Dr. Rea .    OSKAR BUTLER MD   Hospitalist Service  [facility]  Securely message with [employer] (more info)  Text page via [employer] Paging/Directory   ______________________________________________________________________    Interval History   No acute events overnight. Continues to feel better overall though notes intermittent GI  discomfort. Suspects GI discomfort may be related to having to take medications on an empty stomach; she usually takes medications with meals. Anxious for HD but understanding of and in agreement with the plan.    Physical Exam   Vital Signs: Temp: 98.2  F (36.8  C) Temp src: Oral BP: (!) 141/64 Pulse: 60   Resp: 18 SpO2: 94 % O2 Device: None (Room air)    Weight: 236 lbs 8.86 oz    GENERAL: Alert and no acute distress. Sitting up comfortably in recliner.  HEENT: Normocephalic, atraumatic, no rhinorrhea, moist mucus membranes.  RESP: Anterior lung fields with good airflow, clear to auscultation, no coarse lung sounds. Nonlabored breathing on room air.  CV: Regular rate and rhythm, systolic murmur.  MS: No gross musculoskeletal defects noted, mild edema.  NEURO: Alert, responding to questions appropriately, good recall of recent events.  PSYCH: Appropriate mood and affect.      Data     I have personally reviewed the following data over the past 24 hrs:    N/A  \   N/A   / N/A     135 102 107.0 (H) /  90   4.2 17 (L) 5.78 (H) \     ALT: N/A AST: N/A AP: N/A TBILI: N/A   ALB: 2.9 (L) TOT PROTEIN: N/A LIPASE: N/A       Imaging results reviewed over the past 24 hrs:   No results found for this or any previous visit (from the past 24 hour(s)).

## 2024-02-14 NOTE — CONSULTS
Interventional Radiology - Pre-Procedure Note:  Inpatient - [facility]  02/14/2024     Procedure Requested: Placement of tunneled CVC HD catheter  Requested by: Sheri Fam NP    History and Physical Reviewed: H&P documented within 30 days (by Augustine Blanco DO   on 2/2/2024).  I have personally reviewed the patient's medical history and have updated the medical record as necessary.    Past Medical History:   Diagnosis Date    Chronic kidney disease     Diabetes (H)     Hypertension     Obese       No past surgical history on file.     Brief HPI: Josefina Dumont is a 73 year old female h/o CHF, CAD, Right  ischemic stroke in 2022 w residual right leg weakness (had aphasia when she initially presented with stroke in 2022), DM2, HTN, Left frontal convexity meningioma, who was admitted 2/2/24 for CHF exacerbation, severe sepsis. Nephrology following for FELIPA and recommending HD initiation. IR consulted for placement of tunneled CVC HD catheter.      IMAGING:  EXAM: CT CHEST/ABDOMEN/PELVIS W CONTRAST  LOCATION: [facility]  DATE: 2/8/2024     INDICATION: delirium of undetermined etiology, no infection source with fevers  COMPARISON: 02/03/2024, 12/28/2023  TECHNIQUE: CT scan of the chest, abdomen, and pelvis was performed following injection of IV contrast. Multiplanar reformats were obtained. Dose reduction techniques were used.   CONTRAST: Isovue 370 75ml     FINDINGS:   LUNGS AND PLEURA: Expiratory appearance of the trachea. Mild pulmonary edema. Trace bilateral pleural effusions. Dependent atelectasis. Scattered pulmonary nodules. For example, right middle lobe some solid nodule, 8 mm (series 5 image 132).     No pneumothorax.     MEDIASTINUM/AXILLAE: No pathologically enlarged thoracic lymph nodes. Multinodular thyroid. Normal caliber thoracic aorta. Cardiomegaly. Small pericardial effusion. Exuberant mitral annulus calcifications.     CORONARY ARTERY  CALCIFICATION: Moderate.     HEPATOBILIARY: Cirrhosis. No focal liver lesion. No biliary dilation. Cholelithiasis.     PANCREAS: Normal.     SPLEEN: Normal.     ADRENAL GLANDS: Normal.     KIDNEYS/BLADDER: Benign renal cysts and/or probable cysts for which no further follow-up imaging is recommended. Nonobstructing left nephrolithiasis. Normal urinary bladder.      BOWEL: No obstruction. Normal appendix. No bowel wall thickening or pneumatosis. Small volume pelvic free fluid.      LYMPH NODES: Enlarged gastrohepatic ligament and wanda hepatis lymph nodes. For example:  - Gastrohepatic ligament lymph node, 12 mm (series 3 image 49).  - Wanda hepatis lymph node, 13 mm (series 3 image 67).     VASCULATURE: Nonaneurysmal aorta with moderate aortoiliac calcifications.      PELVIC ORGANS: No pelvic masses.     MUSCULOSKELETAL: Multilevel degenerative disc disease of the thoracolumbar spine. Bones are diffusely demineralized. Mild anasarca. Subcutaneous emphysema in the anterior abdominal wall, presumably related to injection.                                                                      IMPRESSION:  1.  Mild pulmonary edema, trace bilateral pleural effusions, and mild anasarca.  2.  Enlarged abdominopelvic lymph nodes of uncertain etiology, unchanged dating back to at least 12/28/2023 correlate with clinical history and consider follow-up as indicated.  3.  Cirrhosis.  4.  Chronic and ancillary findings as described.    NPO: since MN  ANTICOAGULANTS: plavix 75, heparin 5000 units subcutaneous-no hold required  ANTIBIOTICS: Ancef 2gm IV pre procedure    ALLERGIES  Allergies   Allergen Reactions    Aspirin      Other reaction(s): stomach upset    Atenolol Other (See Comments)     abd pain    Lisinopril      Other reaction(s): stomach aches    Penicillins Hives     Tolerated ceftriaxone    Statins      Other reaction(s): myalgias         LABS:  INR   Date Value Ref Range Status   02/10/2024 1.25 (H) 0.85 - 1.15 Final       Hemoglobin   Date Value Ref Range Status   02/13/2024 10.2 (L) 11.7 - 15.7 g/dL Final     Platelet Count   Date Value Ref Range Status   02/13/2024 223 150 - 450 10e3/uL Final     Creatinine   Date Value Ref Range Status   02/14/2024 5.78 (H) 0.51 - 0.95 mg/dL Final     Potassium   Date Value Ref Range Status   02/14/2024 4.2 3.4 - 5.3 mmol/L Final   02/10/2022 3.2 (L) 3.5 - 5.0 mmol/L Final         ASSESSMENT/PLAN:   Josefina Dumont is a 73 year old female h/o CHF, CAD, Right  ischemic stroke in 2022 w residual right leg weakness (had aphasia when she initially presented with stroke in 2022), DM2, HTN, Left frontal convexity meningioma, who has admitted 2/2/24 for CHF exacerbation ,severe sepsis. Nephrology following for FELIPA and recommending HD initiation.     NPO  Plan for placement of tunneled CVC HD catheter placement with sedation    Total time spent on the date of the encounter: 45 minutes.    EMR reviewed remotely. No formal physical exam completed.    THAD DICKERSON CNP  Interventional Radiology

## 2024-02-14 NOTE — PROCEDURES
Interventional Radiology Post-Procedure Note   ?   Brief Procedure Note:   Patient name: Josefina Dumont  Pt MRN:[id number]   Date of procedure: 2/14/2024     Procedure(s): Tunneled hemodialysis catheter placement  Sedation method: Moderate sedation was employed. The patient was monitored by an interventional radiology nurse at all times throughout the procedure under my direct guidance.  Pre Procedure Diagnosis: ESRD in need of hemodialysis FELIPA  Post Procedure Diagnosis: Same  Indications: Need for moderate-term central venous access for hemodialysis.   ?   Attending: Nikko Scales M.D.  Specimen(s) removed: None   Additional studies ordered: None  Drains: None   Lines: 23 cm (Tip-to-cuff) 15 Fr Duraflow 2 Hemodialysis catheter  Estimated Blood Loss: Minimal  Complications: None  Vascular closure method: N/A    Findings/Notes/Comments: Uncomplicated placement of right internal jugular hemodialysis catheter. Catheter tip lies in the high/mid right atrium. Catheter is ready for immediate use.   ?   Please see dictation in PACS or under the Imaging tab in [employer] for detailed procedure note.     Nikko Scales M.D.   Vascular and Interventional Radiology   Pager: [phone]   After Hours / Scheduling: [phone]     2/14/2024  3:26 PM

## 2024-02-14 NOTE — PLAN OF CARE
Goal Outcome Evaluation:       Able to answer orientation questions this morning but requires a fair amount of prompting for ADL's. Upi n chair for a few hours. 2 person assist with gaitbelt and walker. Slightly bradycardic with HR's in the 55-58 range this morning and was also feeling a little dizzy. Talked with MD and received order to hold AM dose of metoprolol. Sister present and supportive at bedside most of the morning. Left for IR around 1420 this afternoon and just returned at 1530 after successful dialysis line placement to right chest. Report given to receiving RN who will now assume care.

## 2024-02-14 NOTE — CONSULTS
SPIRITUAL HEALTH SERVICES CONSULT NOTE  [facility]; [facility]    Saw pt Josefina Dumont per Spiritual Care Consult. Notified by staff that Josefina would appreciate ashes on Ash Wednesday.     Patient/Family Understanding of Illness and Goals of Care - Met with Josefina in her room just prior to dialysis. She shares that this will be her first hemodialysis and that she is anxious that it might hurt or that something might go wrong. Brought her some lavender oil aromatherapy while she was waiting.     Distress and Loss - Experiencing some fear due to uncertainty    Strengths, Coping, and Resources - N/A    Meaning, Beliefs, and Spirituality - Josefina is Sikhism and welcomed both ashes and a prayer as she starts dialysis.       Plan of Care: Spiritual care staff will remain available for further follow-up as requested by patient, family or staff.      Time Spent with Patient/Family: 10 minutes    Brii Hook M.Div.      Office: [phone] (for non-urgent requests)  Please Vocera or page through [facility] for time-sensitive requests

## 2024-02-14 NOTE — DISCHARGE INSTRUCTIONS
Tunneled Central Catheter Discharge Instructions:  You had a tunneled central access device placed. Part of the device is outside of your body and part of the device runs under the skin into a vein. Your nurses and doctors will use the tunneled catheter for your future treatments.    Care Instructions:   - If you received sedation for your procedure, do not drive or operate heavy machinery for the rest of the day.  - Keep dialysis catheter site dry. Do not get wet.  - Never immerse your catheter in water; no swimming, hot tubs, or tub baths.  - If you experience significant bleeding at site, apply pressure with hands above the clavicle bone, sit upright.     If the catheter (tubing) breaks or leaks:  - Place the blue emergency clamp between the break and your insertion site on your skin  - If you don't have a clamp, fold or pinch off the tubing above the hole or break in the catheter (closest to your skin)    Seek medical assistance for any of the following:   - Uncontrolled bleeding.  - You have a fever (greater than 101 F (38.3C)).  - Purulent (yellow/green/foul smelling) drainage from catheter insertion site.  - Increasing redness at catheter site.  - Increasing pain at catheter site.  - Increasing swelling at catheter site.  - If you have chest pain, shortness of breath, or feel faint:  -Make sure end caps are securely tightened  -Look for a hole or leaking in the catheter  -Put the blue emergency clamp on the catheter (tubing) above the hole or break in the catheter as close to the skin as you can  -Remain calm and call 911. Explain your symptoms and say that you have a central line tunnel catheter in place  -Lie on your left side

## 2024-02-14 NOTE — PLAN OF CARE
Problem: Sepsis/Septic Shock  Goal: Absence of Infection Signs and Symptoms  Outcome: Progressing  Intervention: Initiate Sepsis Management  Recent Flowsheet Documentation  Taken 2/13/2024 1955 by Sobeida Mclean RN  Infection Prevention:   single patient room provided   rest/sleep promoted   hand hygiene promoted   personal protective equipment utilized   equipment surfaces disinfected  Intervention: Promote Recovery  Recent Flowsheet Documentation  Taken 2/13/2024 1955 by Sobeida Mclean RN  Sleep/Rest Enhancement:   awakenings minimized   comfort measures   regular sleep/rest pattern promoted   room darkened  Activity Management: activity adjusted per tolerance     Problem: Sepsis/Septic Shock  Goal: Absence of Infection Signs and Symptoms  Intervention: Initiate Sepsis Management  Recent Flowsheet Documentation  Taken 2/13/2024 1955 by Sobeida Mclean RN  Infection Prevention:   single patient room provided   rest/sleep promoted   hand hygiene promoted   personal protective equipment utilized   equipment surfaces disinfected     Problem: Sepsis/Septic Shock  Goal: Absence of Infection Signs and Symptoms  Intervention: Promote Recovery  Recent Flowsheet Documentation  Taken 2/13/2024 1955 by Sobeida Mclean RN  Sleep/Rest Enhancement:   awakenings minimized   comfort measures   regular sleep/rest pattern promoted   room darkened  Activity Management: activity adjusted per tolerance   Goal Outcome Evaluation:  Patient A&O x4, forgetful. Able to make needs known. VSS on RA. Denied pain throughout shift. Left PIV SL. Infrequent, dry cough. Lung sounds diminished. +2 BLE edema. Abdominal fold rash, Miconazole powder applied. Redness blanchable on sacrum/coccyx and buttocks/IT, Q2 turns. Incontinent of bladder. No BM during shift. K protocol, recheck in the morning. ACHS sugar checks, insulin coverage per sliding scale. Regular diet, but NPO at midnight. Assist of 2 with walker and gait belt, not out of bed during shift.  Discharge pending.

## 2024-02-14 NOTE — PROGRESS NOTES
RENAL PROGRESS NOTE    CC:  FELIPA    ASSESSMENT & PLAN:     PLAN:  -Continue expectant management (avoid nephrotoxins, renal dose medications, avoid hypo/hypertension, daily wts, daily I/O), continue to follow daily for recovery  -continue IV diuresis, follow UO  -Pt consented to initiate dialysis today, IR consulted for Tunneled CVC placement and will plan for dialysis after CVC today. Discussed with pts RN and HD RN today.   -Continue to HOLD ARB  -Continue Clear Ensure oral nutrition supplement  -BMP daily    Non-oliguric FELIPA on CKD3: Base Cr ~ 0.7 to 0.8 mg/dL/ EGFR in 60s and 70s.  H/O moderate albuminuria, urine albumin to creatinine ratio was 417 mg  in 10/23.  CKD  thought 2/2 diabetic nephropathy given albuminuria.  As per patient chart review, she was referred to [facility] nephrology in the past but their clinic has not been able to get hold of the patient to schedule an appointment.  Baseline renal function on admission, with serial uptrend, FELIPA thought multifactorial 2/2 sepsis, NSAIDS while on ACE, +/- LAVINIA, +/- ? 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 Urinary output has not been accurately recorded due to urinary incontinence. UA 02/02 showed mild proteinuria, albumin 70 g/dl, 6 hyaline cast. No hematuria or pyuria. CT abd/pelvis showed bilateral renal benign cyst.  No evidence of hydronephrosis. See Plan above.      On 02/02 the patient received Ibuprofen 600 mg and IV Lasix 02/02  On 02/04 started on PPI/Protonix 40 mg BI  On 02/04 received 75 ml of iodinated contrast for CTA  On 02/03-02/05 received IV Vancomycin and Cefepime.  Vancomycin trough level was 15.6 02/05.  On 02/05-02/06 the patient received IV Acyclovir.  Patient received IV Solu-Medrol 125 mg 02/05  No absolute eosinophilia on CBC with differential.  TSH was mildly elevated at 6.1 on December 28, 2023.  Low urine sodium suggestive prerenal etiology  UA 02/07/24 showed mild  albuminuria 17 g/dL, no hematuria, 7 hyaline casts.   UPCR at 0.5g protein  12/14/24 consented for CVC tunneled and HD initiation.     Mild Hyponatremia:Na+ 134.Trend.    Metabolic acidosis: 2/2 FELIPA.  co2 17, on PO bicarb, trend     Azotemia: Will HOLD bumex today. 2/2 FELIPA/CKD, +/1 ?Dry/dehydration with ongoing diuresis. Pt denies s/s of bleeding, hg with slight down trend, follow. Not on protein supplements.     HTN: on amlodipine 10 mg daily, metoprolol 75 mg twice daily and hydralazine 100 mg 3 times daily  PTA- losartan 100 mg daily, amlodipine 10 mg daily, metoprolol 75 mg twice daily, furosemide 20 mg daily,  Hydralazine 25 mg daily    Volume: +LLE. S/P albumin +diuresis trail. On Bumex 1 mg daily, follow     Sepsis-2/4/24-2/5/24 overnight events notable for stroke code secondary to aphasia and inattention also in the setting of fever to 102.5F and RR 35. WBC is wnl. Blood and urine cultures negative to date.  TTE with minimal pericardial effusion.  Repeat chest x-ray showed normal evidence of pneumonia.  Status post lumbar puncture.  CSF analysis was not consistent with infection.  Currently on IV meropenem and doxycycline. Infectious diseases following.  CT abdomen pelvis showed enlarged abdominal pelvic lymph nodes of uncertain etiology.     AMS: 2/2 ?hospital delirium vs metabolic encephalopathy ( liver cirrhosis, uremia).  No evidence of acute pathology on brain imaging.  Patient mental status much improved. Ammonia level was wnl at 29. Nodular liver on CT. Consulted neurology.     Normocytic anemia.  Patient presented with hemoglobin of 11.2> 9.4 today.  Likely multifactorial.  No signs of active bleeding. Trend.      DM2: control on single agent . PTA on glipizide 2.5 mg daily.  on insulin. Last  A1c was 6.6%.  Management as per primary team.     History of ischemic CVA-on Plavix     Nodular liver on CT abdomen pelvis: etiology unclear. ? PERRY given patient's obesity and DM.  LFTs are within normal  limits.  Coagulopathy INR 1.2.  Platelet count is within normal limits.    Gastroenterology service consulted.     GERD prophylaxis patient was started on Protonix 40 mg twice daily during this admission.  Could be contributing to patient's FELIPA/?interstitial nephritis.  On 02/07 PPI switched to famotidine 10 mg twice daily        SUBJECTIVE:      Pt sitting up in chair, appears comfortable   Scared about hemodialysis, asking if she will need long term, We discussed FELIPA, plan/labs and what to expect, she seemed more comfortable and would like to proceed with HD by the end of conversation today.   Sister and floor RN at bedside, reports mentation stable, poor appetite., little UO   decreased appetite, some intermittent confusion  Pt reports less urine output despite resuming diuresis  Pt denies pain, V, c, sob, fever, rash or CP  PT reports ongoing loose stool, poor oral intake  +LLE chronically, on RA  Pt reports Urine output is dark, difficult to collect with mixed loose stools   Denies vomiting  Denies HA, feeling dizzy  Cr worse today 4.7>5.4>5.7 7%eGFR, Azotemia worse, +mental confusion/Decreased appetite, + LLE  On RA, wt stable/up since admit  Metabolic acidosis on Bicarb. stable  We discussed in depth labs/ worsening renal function, and plan  We discussed current level of renal function, Renal replacement therapy options if CKD progression, and dialysis access risk/benefit. With serially worsening azotemia, AMS, and little UO, will initiate Hemodialysis, and follow for recovery, IR consulted, HD invitation today, continue diuresis, and follow for recovery.   Currently NPO.   RN paged me this afternoon do discuss if okay to give Duiretic when bradycardic. Pulses have been high 50s thoughout the wk, RN report low 50s, will defer bradycardia workup /Tx to primary team. Okay to continue with diuresis, with hold parameter if SBP <90. Will recheck renal panel, to make sure no electrolyte abnormalities driving HR.    Discussed labs/plan, and answered all questions with pt,  sister and RN.        OBJECTIVE:  Physical Exam   Temp: 98.3  F (36.8  C) Temp src: Oral BP: 138/64 Pulse: 57   Resp: 18 SpO2: 95 % O2 Device: None (Room air)    Vitals:    02/11/24 0424 02/12/24 0245 02/13/24 0445   Weight: 106.9 kg (235 lb 10.8 oz) 107.1 kg (236 lb 1.8 oz) 107.3 kg (236 lb 8.9 oz)     Vital Signs with Ranges  Temp:  [97.4  F (36.3  C)-98.3  F (36.8  C)] 98.3  F (36.8  C)  Pulse:  [50-64] 57  Resp:  [18-20] 18  BP: (123-140)/(58-65) 138/64  SpO2:  [89 %-97 %] 95 %  I/O last 3 completed shifts:  In: 1520 [P.O.:1520]  Out: -         Patient Vitals for the past 72 hrs:   Weight   02/13/24 0445 107.3 kg (236 lb 8.9 oz)   02/12/24 0245 107.1 kg (236 lb 1.8 oz)     Intake/Output Summary (Last 24 hours) at 2/12/2024 0948  Last data filed at 2/12/2024 0831  Gross per 24 hour   Intake 1580 ml   Output 350 ml   Net 1230 ml       PHYSICAL EXAM:  GEN: NAD, aox3  CV: RRR  Lung: clear and equal, on RA with good o2 sats  Ab: soft and NT, obese  Ext: +1-2LE BL and well perfused  Skin: no rash  : little urine on bladder scans, UO collection poor with mixed loose stool + urine with voids.       LABORATORY STUDIES:     Recent Labs   Lab 02/13/24  0743 02/12/24  0600 02/11/24  0754 02/10/24  0623 02/09/24  0556 02/08/24  0659   WBC 7.4 9.1 6.7 6.6 6.9 4.9   RBC 3.57* 3.24* 3.49* 3.45* 3.52* 3.44*   HGB 10.2* 9.4* 10.0* 9.8* 10.1* 9.8*   HCT 30.9* 28.4* 30.5* 30.2* 30.8* 30.4*    218 203 209 202 160       Basic Metabolic Panel:  Recent Labs   Lab 02/14/24  0751 02/14/24  0717 02/14/24  0225 02/13/24  2113 02/13/24  1745 02/13/24  1531 02/13/24  0929 02/13/24  0743 02/12/24  0814 02/12/24  0600 02/11/24  1101 02/11/24  0754 02/10/24  0822 02/10/24  0623   NA  --  135  --   --   --  134*  --  135  --  134*  --  136  --  138   POTASSIUM  --  4.2  --   --   --  4.1  --  3.9  --  3.9  --  3.8  --  3.9   CHLORIDE  --  102  --   --   --  100  --  103  --   103  --  105  --  108*   CO2  --  17*  --   --   --  17*  --  17*  --  17*  --  16*  --  17*   BUN  --  107.0*  --   --   --  95.2*  --  94.5*  --  89.6*  --  86.4*  --  76.2*   CR  --  5.78*  --   --   --  5.62*  --  5.45*  --  4.72*  --  3.99*  --  3.24*   * 132* 133* 180* 143* 184*   < > 131*   < > 114*   < > 131*  129*   < > 135*   ALBARO  --  9.5  --   --   --  9.5  --  9.5  --  9.5  --  9.1  --  9.5    < > = values in this interval not displayed.       INR  Recent Labs   Lab 02/10/24  0623   INR 1.25*        Recent Labs   Lab Test 02/13/24  0743 02/12/24  0600 02/11/24  0754 02/10/24  0623 02/05/24  0426 02/04/24  1854   INR  --   --   --  1.25*  --  1.23*   WBC 7.4 9.1   < > 6.6   < > 5.9   HGB 10.2* 9.4*   < > 9.8*   < > 10.4*    218   < > 209   < > 119*    < > = values in this interval not displayed.       Personally reviewed current labs    Sheri MATHIS-BC  [facility]  [phone]

## 2024-02-15 ENCOUNTER — APPOINTMENT (OUTPATIENT)
Dept: PHYSICAL THERAPY | Facility: CLINIC | Age: 74
DRG: 871 | End: 2024-02-15
Payer: COMMERCIAL

## 2024-02-15 LAB
ANION GAP SERPL CALCULATED.3IONS-SCNC: 13 MMOL/L (ref 7–15)
BUN SERPL-MCNC: 79.2 MG/DL (ref 8–23)
CALCIUM SERPL-MCNC: 9 MG/DL (ref 8.8–10.2)
CHLORIDE SERPL-SCNC: 101 MMOL/L (ref 98–107)
CREAT SERPL-MCNC: 4.66 MG/DL (ref 0.51–0.95)
DEPRECATED HCO3 PLAS-SCNC: 20 MMOL/L (ref 22–29)
EGFRCR SERPLBLD CKD-EPI 2021: 9 ML/MIN/1.73M2
ERYTHROCYTE [DISTWIDTH] IN BLOOD BY AUTOMATED COUNT: 16.6 % (ref 10–15)
GLUCOSE BLDC GLUCOMTR-MCNC: 126 MG/DL (ref 70–99)
GLUCOSE BLDC GLUCOMTR-MCNC: 142 MG/DL (ref 70–99)
GLUCOSE BLDC GLUCOMTR-MCNC: 156 MG/DL (ref 70–99)
GLUCOSE BLDC GLUCOMTR-MCNC: 196 MG/DL (ref 70–99)
GLUCOSE BLDC GLUCOMTR-MCNC: 99 MG/DL (ref 70–99)
GLUCOSE SERPL-MCNC: 112 MG/DL (ref 70–99)
HCT VFR BLD AUTO: 27.2 % (ref 35–47)
HGB BLD-MCNC: 9.1 G/DL (ref 11.7–15.7)
IRON BINDING CAPACITY (ROCHE): 227 UG/DL (ref 240–430)
IRON SATN MFR SERPL: 26 % (ref 15–46)
IRON SERPL-MCNC: 58 UG/DL (ref 37–145)
MCH RBC QN AUTO: 28.5 PG (ref 26.5–33)
MCHC RBC AUTO-ENTMCNC: 33.5 G/DL (ref 31.5–36.5)
MCV RBC AUTO: 85 FL (ref 78–100)
PLATELET # BLD AUTO: 170 10E3/UL (ref 150–450)
POTASSIUM SERPL-SCNC: 4 MMOL/L (ref 3.4–5.3)
RBC # BLD AUTO: 3.19 10E6/UL (ref 3.8–5.2)
SODIUM SERPL-SCNC: 134 MMOL/L (ref 135–145)
WBC # BLD AUTO: 5.6 10E3/UL (ref 4–11)

## 2024-02-15 PROCEDURE — 99232 SBSQ HOSP IP/OBS MODERATE 35: CPT

## 2024-02-15 PROCEDURE — 99232 SBSQ HOSP IP/OBS MODERATE 35: CPT | Mod: GC

## 2024-02-15 PROCEDURE — 250N000011 HC RX IP 250 OP 636

## 2024-02-15 PROCEDURE — 250N000013 HC RX MED GY IP 250 OP 250 PS 637

## 2024-02-15 PROCEDURE — 250N000013 HC RX MED GY IP 250 OP 250 PS 637: Performed by: FAMILY MEDICINE

## 2024-02-15 PROCEDURE — 250N000013 HC RX MED GY IP 250 OP 250 PS 637: Performed by: INTERNAL MEDICINE

## 2024-02-15 PROCEDURE — 250N000011 HC RX IP 250 OP 636: Performed by: STUDENT IN AN ORGANIZED HEALTH CARE EDUCATION/TRAINING PROGRAM

## 2024-02-15 PROCEDURE — 86706 HEP B SURFACE ANTIBODY: CPT

## 2024-02-15 PROCEDURE — 97116 GAIT TRAINING THERAPY: CPT | Mod: GP

## 2024-02-15 PROCEDURE — 83550 IRON BINDING TEST: CPT

## 2024-02-15 PROCEDURE — 90935 HEMODIALYSIS ONE EVALUATION: CPT

## 2024-02-15 PROCEDURE — 87340 HEPATITIS B SURFACE AG IA: CPT

## 2024-02-15 PROCEDURE — 5A1D70Z PERFORMANCE OF URINARY FILTRATION, INTERMITTENT, LESS THAN 6 HOURS PER DAY: ICD-10-PCS

## 2024-02-15 PROCEDURE — 258N000003 HC RX IP 258 OP 636

## 2024-02-15 PROCEDURE — 80048 BASIC METABOLIC PNL TOTAL CA: CPT

## 2024-02-15 PROCEDURE — 120N000001 HC R&B MED SURG/OB

## 2024-02-15 PROCEDURE — 36415 COLL VENOUS BLD VENIPUNCTURE: CPT

## 2024-02-15 PROCEDURE — 85027 COMPLETE CBC AUTOMATED: CPT

## 2024-02-15 PROCEDURE — 97110 THERAPEUTIC EXERCISES: CPT | Mod: GP

## 2024-02-15 PROCEDURE — 82728 ASSAY OF FERRITIN: CPT

## 2024-02-15 RX ORDER — HYDROXYZINE HYDROCHLORIDE 25 MG/1
25 TABLET, FILM COATED ORAL EVERY 6 HOURS PRN
Status: DISCONTINUED | OUTPATIENT
Start: 2024-02-15 | End: 2024-02-28 | Stop reason: HOSPADM

## 2024-02-15 RX ORDER — HYDROXYZINE HYDROCHLORIDE 25 MG/1
50 TABLET, FILM COATED ORAL EVERY 6 HOURS PRN
Status: DISCONTINUED | OUTPATIENT
Start: 2024-02-15 | End: 2024-02-28 | Stop reason: HOSPADM

## 2024-02-15 RX ADMIN — HYDRALAZINE HYDROCHLORIDE 100 MG: 25 TABLET, FILM COATED ORAL at 13:50

## 2024-02-15 RX ADMIN — FAMOTIDINE 10 MG: 10 TABLET ORAL at 11:29

## 2024-02-15 RX ADMIN — DICLOFENAC 2 G: 10 GEL TOPICAL at 08:03

## 2024-02-15 RX ADMIN — HEPARIN SODIUM 5000 UNITS: 5000 INJECTION, SOLUTION INTRAVENOUS; SUBCUTANEOUS at 04:17

## 2024-02-15 RX ADMIN — HYDROXYZINE HYDROCHLORIDE 25 MG: 25 TABLET, FILM COATED ORAL at 21:46

## 2024-02-15 RX ADMIN — HEPARIN SODIUM 2200 UNITS: 1000 INJECTION INTRAVENOUS; SUBCUTANEOUS at 17:26

## 2024-02-15 RX ADMIN — HYDRALAZINE HYDROCHLORIDE 100 MG: 25 TABLET, FILM COATED ORAL at 20:42

## 2024-02-15 RX ADMIN — SODIUM BICARBONATE 650 MG TABLET 1300 MG: at 08:02

## 2024-02-15 RX ADMIN — SODIUM CHLORIDE 300 ML: 9 INJECTION, SOLUTION INTRAVENOUS at 17:25

## 2024-02-15 RX ADMIN — SODIUM CHLORIDE 250 ML: 9 INJECTION, SOLUTION INTRAVENOUS at 17:24

## 2024-02-15 RX ADMIN — HEPARIN SODIUM 2300 UNITS: 1000 INJECTION INTRAVENOUS; SUBCUTANEOUS at 17:26

## 2024-02-15 RX ADMIN — CALCIUM CARBONATE (ANTACID) CHEW TAB 500 MG 500 MG: 500 CHEW TAB at 11:51

## 2024-02-15 RX ADMIN — DICLOFENAC 2 G: 10 GEL TOPICAL at 12:45

## 2024-02-15 RX ADMIN — Medication 1 MG: at 21:46

## 2024-02-15 RX ADMIN — INSULIN ASPART 1 UNITS: 100 INJECTION, SOLUTION INTRAVENOUS; SUBCUTANEOUS at 12:43

## 2024-02-15 RX ADMIN — AMLODIPINE BESYLATE 10 MG: 10 TABLET ORAL at 07:58

## 2024-02-15 RX ADMIN — METOPROLOL TARTRATE 75 MG: 25 TABLET, FILM COATED ORAL at 08:00

## 2024-02-15 RX ADMIN — ACETAMINOPHEN 1000 MG: 500 TABLET ORAL at 21:46

## 2024-02-15 RX ADMIN — CLOPIDOGREL BISULFATE 75 MG: 75 TABLET ORAL at 07:59

## 2024-02-15 RX ADMIN — Medication: at 20:43

## 2024-02-15 RX ADMIN — Medication 1 TABLET: at 08:02

## 2024-02-15 RX ADMIN — BUMETANIDE 1 MG: 0.25 INJECTION INTRAMUSCULAR; INTRAVENOUS at 20:43

## 2024-02-15 RX ADMIN — HEPARIN SODIUM 5000 UNITS: 5000 INJECTION, SOLUTION INTRAVENOUS; SUBCUTANEOUS at 20:43

## 2024-02-15 RX ADMIN — METOPROLOL TARTRATE 75 MG: 25 TABLET, FILM COATED ORAL at 20:43

## 2024-02-15 RX ADMIN — SODIUM BICARBONATE 650 MG TABLET 1300 MG: at 21:46

## 2024-02-15 RX ADMIN — CITALOPRAM 20 MG: 20 TABLET ORAL at 07:59

## 2024-02-15 RX ADMIN — ACETAMINOPHEN 1000 MG: 500 TABLET ORAL at 11:45

## 2024-02-15 RX ADMIN — HEPARIN SODIUM 5000 UNITS: 5000 INJECTION, SOLUTION INTRAVENOUS; SUBCUTANEOUS at 10:36

## 2024-02-15 RX ADMIN — BUMETANIDE 1 MG: 0.25 INJECTION INTRAMUSCULAR; INTRAVENOUS at 06:34

## 2024-02-15 RX ADMIN — Medication: at 08:03

## 2024-02-15 RX ADMIN — HYDRALAZINE HYDROCHLORIDE 100 MG: 25 TABLET, FILM COATED ORAL at 07:59

## 2024-02-15 RX ADMIN — DICLOFENAC 2 G: 10 GEL TOPICAL at 16:02

## 2024-02-15 ASSESSMENT — ACTIVITIES OF DAILY LIVING (ADL)
ADLS_ACUITY_SCORE: 47
ADLS_ACUITY_SCORE: 43
ADLS_ACUITY_SCORE: 49
ADLS_ACUITY_SCORE: 49
ADLS_ACUITY_SCORE: 43
ADLS_ACUITY_SCORE: 49
ADLS_ACUITY_SCORE: 45
ADLS_ACUITY_SCORE: 49
ADLS_ACUITY_SCORE: 47
ADLS_ACUITY_SCORE: 47
ADLS_ACUITY_SCORE: 45
ADLS_ACUITY_SCORE: 43

## 2024-02-15 NOTE — PROGRESS NOTES
[facility]    Progress Note - Hospitalist Service       Date of Admission:  2/2/2024    Assessment & Plan   Josefina Dumont is a 73 year old female admitted on 2/2/2024. She has a history of CHF with recent hospitalization 12/28/23-12/31/23, recent community acquired pneumonia, history of ischemic CVA, history of meningioma, history of CAD, type 2 diabetes mellitus and is admitted for shortness of breath and found to be in severe sepsis. 2/4/24-2/5/24 overnight events notable for stroke code secondary to aphasia and inattention also in the setting of fever to 102.5F and RR 35. Head MRI was unremarkable. Cefepime was switched to meropenem given concern for encephalopathy 2/2 worsening carbapenems. IV acyclovir was also added to the regimen and ID consult, LP orders were placed. On 2/5/24 ID added doxycycline to medication regimen. On 2/6/24, patient reported continued improvement in her condition. However, overnight events 2/6/24-2/7/24 were notable for likely hospital delirium and her mental status has continued to wax and wane. It remains unclear what primary contributor to delirium is at this time. CT head, chest, abdomen, pelvis was repeated 2/8/24 in light of continued AMS of unclear etiology; results showed cirrhosis but were otherwise unremarkable. Metabolic encephalopathy remains on the differential given cirrhosis though LFTs have been wnl and patient is stooling daily; trialing empiric lactulose but no further recommendations from GI. EEG per Neurology was performed 2/9/24, read was unremarkable for seizure activity. Antibiotics were finished 2/13/24, ID has signed offer. At this time, patient remains hospitalized for worsening kidney function/uremia, thought to be multifactorial in nature though differential remains open and broad. Patient was initiated on HD yesterday. Planning for TCU on discharge once medically ready.    Severe sepsis due to unknown source   Acute hypoxic  respiratory failure, resolved  Acute fever of unknown origin, resolved  Concern for CHF exacerbation  Delirium, improving   Patient initially presented with worsening shortness of breath, orthopnea, dyspnea on exertion, in the setting of elevated BNP and missed dose of Lasix, CHF exacerbation was initially high on the differential. She was febrile w/Tmax of 101.3. CT chest on presentation showed no acute findings, mild pulmonary interstitial edema, small pericardial effusion. CRP elevated to 28.10 and procalcitonin of 5.80 on 2/4/24; CRP elevated to 54.20 and procalcitonin elevated to 11.70 on 2/5/24. See 2/4/24-2/5/24 overnight notes for significant events including aphasia, inattention, fever to 102.5F despite broad-spectrum antibiotics prompting additional aggressive treatment measures and workup. Differential remains broad at this time. LP and TTE unremarkable. Patient's condition had been improving 2/6/24 but then had onset of delirium overnight and continues to wax and wane. Possible hospital delirium in the setting of sleep deprivation, disorientation contributing. However, prudent to rule-out other potential causes given the distinct change from patient's baseline and prolonged delirium. CT head, chest, abdomen, pelvis was repeated 2/8/24 in light of continued AMS of unclear etiology; results showed cirrhosis but were otherwise unremarkable. Metabolic encephalopathy was on the differential given cirrhosis though LFTs have been wnl and patient is stooling daily. EEG per Neurology was performed 2/9/24 read as unremarkable for seizure activity with slow background in theta and delta range that suggests a nonspecific generalized cerebral dysfunction. Such slowing can be seen in metabolic or toxic abnormalities, clinical correlation is recommended. Neurology has signed off. ID also signed off as patient will finished course of antibiotics.  -Delirium precautions  -Cardiac/low-sodium diet  -Continue supplemental  oxygen as needed  -ID consulted, signed off 2/13/24  -Continue Levaquin renally dosed for 7 days from 2/8/2024. Order updated. (Course has been completed)  -GI following, note low concern for hepatic encephalopathy   Consider lymph node biopsy if does not continue to improve clinically.  -Monitor mental status, and glucose.  -Discussed with Neurology, EEG read as above, signed off 2/10/24  -GI consulted 2/9/24, no further recommendations beyond trial of empiric lactulose (trial completed)  -Nephrology consulted and continues to follow, appreciate recommendations   -Note is pending at the time of this dictation    FELIPA  S/p HD (2/14/24)  Baseline creatinine around 0.8-0.9, had stabilized ~2.3 initially but then trended upward with the highest being 5.78 on 2/14/24. Several medications may have been contributing to FELIPA though etiology remains unclear; creatinine worsened several days s/p contrast administration and continued to worsen despite discontinuation of nephrotoxic IV antibiotics. Nephrology has been following, appreciate their cares and recommendations.  -Nephrology recommendations are pending at the time of this dictation    Hypokalemia, resolved  -Standard replacement protocol      Mild normocytic anemia  Hemoglobin 11.2 on admission. Has since been stable at ~10. Drop consistent with hemodilution. Will continue to monitor.   -AM CBC    Hypertension  -Continue PTA amlodipine  -Continue PTA hydralazine  -Holding PTA losartan in light of FELIPA; will continue to reassess as needing given blood pressure has been high-normal  -Continue PTA metoprolol tartrate     Prolonged QT  QTc of 472.  -Avoid QT prolonging medications  -Note that Celexa comes with risk for QT prolongation but benefit of anxiety treatment outweighing risk, will continue to monitor    DM2   Most recent A1c 6.6 1 month ago.  -Holding PTA glipizide  -Low sliding scale insulin      Chronic:  History of ischemic CVA: Continue PTA Plavix  GERD:  Continue PTA Tums, continue PTA Protonix  Constipation: Continue PTA senna docusate  Anxiety/depression: Continue PTA Celexa   Asthma: Continue PTA albuterol as needed          Diet: Regular Diet Adult  Snacks/Supplements Adult: Nepro Oral Supplement; Between Meals    DVT Prophylaxis: Heparin  [place]uca Catheter: Not present  Fluids: po  Lines: PRESENT      CVC Double Lumen Right Internal jugular Tunneled-Site Assessment: Unable to visualize    Cardiac Monitoring: None  Code Status: Full Code      Clinically Significant Risk Factors           # Hypercalcemia: corrected calcium is >10.1, will monitor as appropriate    # Hypoalbuminemia: Lowest albumin = 2.9 g/dL at 2/13/2024  3:31 PM, will monitor as appropriate     # Hypertension: Noted on problem list       # DMII: A1C = 6.6 % (Ref range: <5.7 %) within past 6 months         # Financial/Environmental Concerns:           Disposition Plan      Expected Discharge Date: 02/16/2024      Destination: home  Discharge Comments: Need prior authorization and [facility] accepted        The patient's care was discussed with the Attending Physician, Dr. Rea .    OSKAR BUTLER MD   Hospitalist Service  [facility]  Securely message with [employer] (more info)  Text page via [facility] Paging/Directory   ______________________________________________________________________    Interval History   No acute events overnight. Denies GI discomfort and nausea today but does endorse increased congestion. Also affirms dry cough. No fevers or chills. Dialysis went OK yesterday. PO intake has gone well, wanting to sit up and eat her breakfast.    Physical Exam   Vital Signs: Temp: 97.4  F (36.3  C) Temp src: Oral BP: (!) 157/72 Pulse: 56   Resp: 18 SpO2: 93 % O2 Device: None (Room air) Oxygen Delivery: 2 LPM  Weight: 232 lbs 12.89 oz    GENERAL: Alert and no acute distress. Sitting up comfortably in bed.  HEENT: Normocephalic, atraumatic, no rhinorrhea,  moist mucus membranes.  RESP: Anterior lung fields with good airflow, clear to auscultation, no coarse lung sounds. Nonlabored breathing on room air. Dry cough.  CV: Regular rate and rhythm, systolic murmur.  MS: No gross musculoskeletal defects noted, mild edema.  NEURO: Alert, responding to questions appropriately, good recall of recent events.  PSYCH: Appropriate mood and affect.      Data     I have personally reviewed the following data over the past 24 hrs:    5.6  \   9.1 (L)   / 170     134 (L) 101 79.2 (H) /  99   4.0 20 (L) 4.66 (H) \       Imaging results reviewed over the past 24 hrs:   Recent Results (from the past 24 hour(s))   IR CVC Tunnel Placement > 5 Yrs of Age    Narrative    [facility]  LOCATION: [facility]  DATE: 2/14/2024    PROCEDURE: TUNNELED DIALYSIS CATHETER PLACEMENT  1.  Insertion of a tunneled central venous catheter.  2.  Ultrasound guidance for vascular access. A permanent image was stored.  3.  Fluoroscopic guidance for central venous access device placement.  4.  Moderate sedation.    INTERVENTIONAL RADIOLOGIST: Nikko Scales MD    INDICATION: Patient is a 73-year-old female the history of acute kidney injury in need of hemodialysis.  The patient presents to Interventional Radiology for placement of a tunneled hemodialysis catheter for moderate-term central venous access for   hemodialysis.    CONSENT: The risks, benefits and alternatives of tunneled hemodialysis catheter placement were discussed with the patient  in detail. All questions were answered. Informed consent was given to proceed with the procedure.    MODERATE SEDATION: Versed 2 mg IV; Fentanyl 100 mcg IV. During the time out, immediately prior to the administration of medications, the patient was reassessed for adequacy to receive conscious sedation.  Under physician supervision, Versed and fentanyl   were administered for moderate sedation. Pulse oximetry, heart rate and blood pressure  were continuously monitored by an independent trained observer. The physician spent 10 minutes of face-to-face sedation time with the patient.    CONTRAST: None.  ANTIBIOTICS: 2 g of IV Ancef.  ADDITIONAL MEDICATIONS: None.    FLUOROSCOPIC TIME: 1.2 minutes.  RADIATION DOSE: Air Kerma: 18 mGy.    COMPLICATIONS: No immediate complications.    STERILE BARRIER TECHNIQUE: Maximum sterile barrier technique was used. Cutaneous antisepsis was performed at the operative site with application of 2% chlorhexidine and large sterile drape. Prior to the procedure, the  and assistant performed   hand hygiene and wore hat, mask, sterile gown, and sterile gloves during the entire procedure.    PROCEDURE:    The Central Venous Catheter Insertion checklist was reviewed prior to placement and followed throughout the procedure. Using local anesthesia and real-time ultrasound guidance the right internal jugular vein was accessed. A permanent ultrasound image was   saved, documenting patency and compressibility. A subcutaneous tunnel was created requiring a second incision. Using this access, a 23 cm tip to cuff 15 [place] DuraFlow dialysis catheter was advanced until the tip was in the mid/low right atrium.  The   catheter was tested and found to flush and aspirate appropriately.    FINDINGS:  Ultrasound shows an anechoic and compressible jugular vein.      Fluoroscopic spot film at completion of study show that the tunneled dialysis catheter tip lies in the mid/low right atrium.      Impression    IMPRESSION:    1.  Successful tunneled dialysis catheter placement.  2.  The catheter is ready for use.

## 2024-02-15 NOTE — PROGRESS NOTES
Spoke with dialysis nurse and will start 2/15/run around 16:30 fr about 2 hours.  Assisted patient back to bed, telemetry on, vital signs done.  Call light within reach.  Patient watching television and would like supper ordered for last meal run.

## 2024-02-15 NOTE — PLAN OF CARE
Josefina is alert and oriented x4. Returned from CVC line placement around 1540. Drowsy and napping, but easily rouseable. No c/o pain. VSS. Ate 75% dinner, no voiding, smear of BM.     1st dialysis run lasted 2.5 hours, removed 500mL only. Josefina tolerated procedure well. Tele reading Sinus ayo with BBB during dialysis. Sleepy afterwards. Gave PRN tylenol for abdominal pain and general discomfort. Hydralazine held this evening as Bps remained WNL and ayo HR.     Anticipating TCU to [facility], pending insurance auth.

## 2024-02-15 NOTE — PLAN OF CARE
Problem: Adult Inpatient Plan of Care  Goal: Absence of Hospital-Acquired Illness or Injury  Intervention: Identify and Manage Fall Risk  Recent Flowsheet Documentation  Taken 2/15/2024 1606 by Wong Cates RN  Safety Promotion/Fall Prevention: safety round/check completed  Taken 2/15/2024 1505 by Wong Cates RN  Safety Promotion/Fall Prevention: safety round/check completed  Taken 2/15/2024 1300 by Wong Cates RN  Safety Promotion/Fall Prevention: safety round/check completed  Taken 2/15/2024 1140 by Wong Cates RN  Safety Promotion/Fall Prevention: safety round/check completed  Taken 2/15/2024 0935 by Wong Cates RN  Safety Promotion/Fall Prevention: safety round/check completed  Taken 2/15/2024 0800 by Wong Cates RN  Safety Promotion/Fall Prevention: safety round/check completed     Problem: Adult Inpatient Plan of Care  Goal: Absence of Hospital-Acquired Illness or Injury  Intervention: Prevent Skin Injury  Recent Flowsheet Documentation  Taken 2/15/2024 1606 by Wong Cates RN  Body Position: supine, legs elevated  Taken 2/15/2024 1505 by Wong Cates RN  Skin Protection: adhesive use limited  Device Skin Pressure Protection:   tubing/devices free from skin contact   absorbent pad utilized/changed  Taken 2/15/2024 0800 by Wong Cates RN  Skin Protection: adhesive use limited  Device Skin Pressure Protection:   tubing/devices free from skin contact   absorbent pad utilized/changed     Problem: Adult Inpatient Plan of Care  Goal: Optimal Comfort and Wellbeing  Intervention: Monitor Pain and Promote Comfort  Recent Flowsheet Documentation  Taken 2/15/2024 1230 by Wong Cates RN  Pain Management Interventions:   rest   repositioned     Problem: Risk for Delirium  Goal: Improved Sleep  Intervention: Promote Sleep  Recent Flowsheet Documentation  Taken 2/15/2024 1505 by Darryn  Wong HENDERSON RN  Sleep/Rest Enhancement:   awakenings minimized   comfort measures   regular sleep/rest pattern promoted   room darkened  Taken 2/15/2024 0800 by Wong Cates RN  Sleep/Rest Enhancement:   awakenings minimized   comfort measures   regular sleep/rest pattern promoted   room darkened   Goal Outcome Evaluation:      Patient alert and oriented x 4, speech clear.  Lungs diminished clear, no SOB on room air.  Skin warm dry, pale colored. 2-3+ LE edema, scattered bruises on abdomen, arms.  Abdominal folds cleansed and powder applied.  Buttock in between cheek reddened barrrier ointment applied.  Incontinent bowel and bladder.  Last BM 2/15/2.  Dialysis nurse informed writer patient would be having a 2 hour around 16:30 and approximately 3 hour run on 2/16/24.  K+ 4.0. PIV SL patent and flushed.  (R) CVC dressing intact, UTV site.  Dialysis M, W, F. Discharge pending medical clearance and TCU prior authrization

## 2024-02-15 NOTE — PROGRESS NOTES
HEMODIALYSIS TREATMENT NOTE    Date: 2/14/2024  Time: 21.45 PM    Data:  Pre Wt: 107.3 kg (236 lb 8.9 oz)   Desired Wt: 0.5  kg   Post Wt: 106.8 kg (235 lb 7.2 oz)  Weight change: 0.5 kg  Ultrafiltration - Post Run Net Total Removed (mL): 500 mL  Vascular Access Status: CVC  patent  Dialyzer Rinse: Streaked, Light  Total Blood Volume Processed: 30 L   Total Dialysis (Treatment) Time: 2.0   Dialysate Bath: K 3, Ca 2.25  Heparin: None    Lab:   No    Interventions:  Pt  has 2.0 hours HD via RCVC that was inserted at IR with okay to use from report.   with 400 DFR.  No dialysis related medication given during HD run.  Pt hemodynamically stable throughout the treatment, 0.5 kg UF pulled, crit-line steady with A profile.  Post HD, blood rinsed back, CVC heparin locked  & handoff report given    Assessment:  Pt alert & oriented x4, denies pain & no sign of SOB.  Monitoring every 15 minutes & PRN.  CVC dressing CDI.     Plan:    Per Renal Team

## 2024-02-15 NOTE — PROGRESS NOTES
RENAL PROGRESS NOTE    CC:  FELIPA    ASSESSMENT & PLAN:     PLAN:  -Continue expectant management (avoid nephrotoxins, renal dose medications, avoid hypo/hypertension, daily wts, daily I/O)  -continue IV diuresis  -HD run numder 2 today, then up to full Rx tomorrow with MWF HD schedule therafter, follow for s/s of recovery  -Continue to HOLD ARB  -Continue Clear Ensure oral nutrition supplement  -check iron panel, ferr.   -BMP daily    Anuric FELIPA on CKD3, now requiring Hemodialysis: Base Cr ~ 0.7 to 0.8 mg/dL/ EGFR in 60s and 70s.  H/O moderate albuminuria, urine albumin to creatinine ratio was 417 mg  in 10/23.  CKD  thought 2/2 diabetic nephropathy given albuminuria.  As per patient chart review, she was referred to [facility] nephrology in the past but their clinic has not been able to get hold of the patient to schedule an appointment.  Baseline renal function on admission, with serial uptrend, FELIPA thought multifactorial 2/2 sepsis, NSAIDS while on ACE, +/- LAVINIA, +/- ? 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 Urinary output has not been accurately recorded due to urinary incontinence. UA 02/02 showed mild proteinuria, albumin 70 g/dl, 6 hyaline cast. No hematuria or pyuria. CT abd/pelvis showed bilateral renal benign cyst.  No evidence of hydronephrosis. See Plan above.      On 02/02 the patient received Ibuprofen 600 mg and IV Lasix 02/02  On 02/04 started on PPI/Protonix 40 mg BI  On 02/04 received 75 ml of iodinated contrast for CTA  On 02/03-02/05 received IV Vancomycin and Cefepime.  Vancomycin trough level was 15.6 02/05.  On 02/05-02/06 the patient received IV Acyclovir.  Patient received IV Solu-Medrol 125 mg 02/05  No absolute eosinophilia on CBC with differential.  TSH was mildly elevated at 6.1 on December 28, 2023.  Low urine sodium suggestive prerenal etiology  UA 02/07/24 showed mild albuminuria 17 g/dL, no hematuria, 7 hyaline casts.   UPCR  at 0.5g protein  12/14/24 consented for CVC tunneled and HD initiation.     Mild Hyponatremia:Na+ 134.Trend.    Metabolic acidosis: 2/2 FELIPA.  co2 17>20, on PO bicarb, trend with HD    Azotemia: Will HOLD bumex today. 2/2 FELIPA/CKD, +/1 ?Dry/dehydration with ongoing diuresis. Pt denies s/s of bleeding, hg with slight down trend, follow. Not on protein supplements. Follow with initiating HD    HTN: on amlodipine 10 mg daily, metoprolol 75 mg twice daily and hydralazine 100 mg 3 times daily  PTA- losartan 100 mg daily, amlodipine 10 mg daily, metoprolol 75 mg twice daily, furosemide 20 mg daily,  Hydralazine 25 mg daily    Volume: +LLE. S/P albumin +diuresis trail. On Bumex 1 mg daily, follow     Sepsis-2/4/24-2/5/24 overnight events notable for stroke code secondary to aphasia and inattention also in the setting of fever to 102.5F and RR 35. WBC is wnl. Blood and urine cultures negative to date.  TTE with minimal pericardial effusion.  Repeat chest x-ray showed normal evidence of pneumonia.  Status post lumbar puncture.  CSF analysis was not consistent with infection.  Currently on IV meropenem and doxycycline. Infectious diseases following.  CT abdomen pelvis showed enlarged abdominal pelvic lymph nodes of uncertain etiology.     AMS: 2/2 ?hospital delirium vs metabolic encephalopathy ( liver cirrhosis, uremia).  No evidence of acute pathology on brain imaging.  Patient mental status much improved. Ammonia level was wnl at 29. Nodular liver on CT. Consulted neurology.     Normocytic anemia.  Patient presented with hemoglobin of 11.2> 9.4 today.  Likely multifactorial.  No signs of active bleeding. Trend.      DM2: control on single agent . PTA on glipizide 2.5 mg daily.  on insulin. Last  A1c was 6.6%.  Management as per primary team.     History of ischemic CVA-on Plavix     Nodular liver on CT abdomen pelvis: etiology unclear. ? PERRY given patient's obesity and DM.  LFTs are within normal limits.  Coagulopathy INR  1.2.  Platelet count is within normal limits.    Gastroenterology service consulted.     GERD prophylaxis patient was started on Protonix 40 mg twice daily during this admission.  Could be contributing to patient's FELIPA/?interstitial nephritis.  On 02/07 PPI switched to famotidine 10 mg twice daily    Anemia: hg 9.1, slight down trend since here, may be delusional with hypervolemia. With check iron panel, ferr.     SUBJECTIVE:      Pt sitting up in chair, appears comfortable   Sister at bedside  HD yesterday was stable, HD again today  Azotemia improving, wt down trending, pt feeling a bit better today  500 UF on HD yesterday with initiation of dialysis  Denies pain, n, v, c, d, sob, fever, rash or CP  Pt doesn't notice any changes in UO, will continue diuresis and follow.  She reports she may feel slightly better S/P dialysis  Ongoing sig LLE edema/generalized edema.   Discussed plan, labs and answered questions    OBJECTIVE:  Physical Exam   Temp: 97.4  F (36.3  C) Temp src: Oral BP: (!) 157/72 Pulse: 56   Resp: 18 SpO2: 93 % O2 Device: None (Room air) Oxygen Delivery: 2 LPM  Vitals:    02/12/24 0245 02/13/24 0445 02/15/24 0500   Weight: 107.1 kg (236 lb 1.8 oz) 107.3 kg (236 lb 8.9 oz) 105.6 kg (232 lb 12.9 oz)     Vital Signs with Ranges  Temp:  [97.4  F (36.3  C)-98.2  F (36.8  C)] 97.4  F (36.3  C)  Pulse:  [54-65] 56  Resp:  [13-20] 18  BP: (113-157)/(52-72) 157/72  SpO2:  [92 %-98 %] 93 %  I/O last 3 completed shifts:  In: 130 [P.O.:130]  Out: 500 [Other:500]        Patient Vitals for the past 72 hrs:   Weight   02/15/24 0500 105.6 kg (232 lb 12.9 oz)   02/13/24 0445 107.3 kg (236 lb 8.9 oz)     Intake/Output Summary (Last 24 hours) at 2/12/2024 0948  Last data filed at 2/12/2024 0831  Gross per 24 hour   Intake 1580 ml   Output 350 ml   Net 1230 ml       PHYSICAL EXAM:  GEN: NAD, aox3  CV: RRR  Lung: clear and equal, on RA with good o2 sats  Ab: soft and NT, obese  Ext: +1-2LE BL and well perfused  Skin: no  rash  : little urine on bladder scans, UO collection poor with mixed loose stool + urine with voids.       LABORATORY STUDIES:     Recent Labs   Lab 02/15/24  0629 02/13/24  0743 02/12/24  0600 02/11/24  0754 02/10/24  0623 02/09/24  0556   WBC 5.6 7.4 9.1 6.7 6.6 6.9   RBC 3.19* 3.57* 3.24* 3.49* 3.45* 3.52*   HGB 9.1* 10.2* 9.4* 10.0* 9.8* 10.1*   HCT 27.2* 30.9* 28.4* 30.5* 30.2* 30.8*    223 218 203 209 202       Basic Metabolic Panel:  Recent Labs   Lab 02/15/24  0755 02/15/24  0629 02/15/24  0226 02/14/24  2141 02/14/24  1818 02/14/24  1114 02/14/24  0751 02/14/24  0717 02/13/24  1745 02/13/24  1531 02/13/24  0929 02/13/24  0743 02/12/24  0814 02/12/24  0600 02/11/24  1101 02/11/24  0754   NA  --  134*  --   --   --   --   --  135  --  134*  --  135  --  134*  --  136   POTASSIUM  --  4.0  --   --   --   --   --  4.2  --  4.1  --  3.9  --  3.9  --  3.8   CHLORIDE  --  101  --   --   --   --   --  102  --  100  --  103  --  103  --  105   CO2  --  20*  --   --   --   --   --  17*  --  17*  --  17*  --  17*  --  16*   BUN  --  79.2*  --   --   --   --   --  107.0*  --  95.2*  --  94.5*  --  89.6*  --  86.4*   CR  --  4.66*  --   --   --   --   --  5.78*  --  5.62*  --  5.45*  --  4.72*  --  3.99*   GLC 99 112* 126* 171* 115* 90   < > 132*   < > 184*   < > 131*   < > 114*   < > 131*  129*   ALBARO  --  9.0  --   --   --   --   --  9.5  --  9.5  --  9.5  --  9.5  --  9.1    < > = values in this interval not displayed.       INR  Recent Labs   Lab 02/10/24  0623   INR 1.25*        Recent Labs   Lab Test 02/15/24  0629 02/13/24  0743 02/11/24  0754 02/10/24  0623 02/05/24  0426 02/04/24  1854   INR  --   --   --  1.25*  --  1.23*   WBC 5.6 7.4   < > 6.6   < > 5.9   HGB 9.1* 10.2*   < > 9.8*   < > 10.4*    223   < > 209   < > 119*    < > = values in this interval not displayed.       Personally reviewed current labs    Sheri MATHIS-BC  [facility]  [phone]

## 2024-02-15 NOTE — PLAN OF CARE
"  Problem: Adult Inpatient Plan of Care  Goal: Absence of Hospital-Acquired Illness or Injury  Intervention: Identify and Manage Fall Risk  Recent Flowsheet Documentation  Taken 2/15/2024 0935 by Wong Cates RN  Safety Promotion/Fall Prevention: safety round/check completed  Taken 2/15/2024 0800 by Wong Cates RN  Safety Promotion/Fall Prevention: safety round/check completed     Problem: Risk for Delirium  Goal: Improved Sleep  Intervention: Promote Sleep  Recent Flowsheet Documentation  Taken 2/15/2024 0800 by Wong Cates RN  Sleep/Rest Enhancement:   awakenings minimized   comfort measures   regular sleep/rest pattern promoted   room darkened     Problem: Pain Acute  Goal: Optimal Pain Control and Function  Intervention: Prevent or Manage Pain  Recent Flowsheet Documentation  Taken 2/15/2024 0935 by Wong Cates RN  Medication Review/Management: medications reviewed  Taken 2/15/2024 0800 by Wong Cates RN  Sensory Stimulation Regulation:   care clustered   quiet environment promoted  Sleep/Rest Enhancement:   awakenings minimized   comfort measures   regular sleep/rest pattern promoted   room darkened  Medication Review/Management: medications reviewed   Goal Outcome Evaluation:     Patient alert and oriented x 4, speech clear.  Lungs diminished clear, no SOB on room air.  Skin warm dry, pale colered. 2-3+ LE edema, scattered bruises on abdomen, arms.  Abdominal folds cleansed and powder applied.  Buttock in between cheek reddened barrrier ointment applied.  Incontinent bowel and bladder.  Had 2 soft loose brwn BM and neptali-care and incontinence pad changed.  Patient reports,\"no being comfortable in any positions whether in bed, or chair. Tylenol prn given with some relief and TUMS fr indigestion.  Up to recliner assist 2, gait belt walker for breakfast consumed 75%.  Consumed 50% lunch Blood glucose 99, 196.  K+ 4.0. PIV SL patent and flushed.  (R) " CVC dressing intact, UTV site.  CHG wipes done.  Dialysis M, W, F.

## 2024-02-15 NOTE — PLAN OF CARE
Problem: Fatigue  Goal: Improved Activity Tolerance  Outcome: Progressing  Intervention: Promote Improved Energy  Recent Flowsheet Documentation  Taken 2/15/2024 0028 by Kyleigh Hook, RN  Environmental Support:   calm environment promoted   distractions minimized  Taken 2/15/2024 0020 by Kyleigh Hook, RN  Activity Management:   standing at bedside   sitting, edge of bed   stepped/marched in place   Goal Outcome Evaluation:       Alert on shift, stood at side of bed and walked in place with assist of two, gait belt, and walker. Denies pain. Repositioning every two hours. One incontinent occurrence on shift.

## 2024-02-16 ENCOUNTER — APPOINTMENT (OUTPATIENT)
Dept: OCCUPATIONAL THERAPY | Facility: CLINIC | Age: 74
DRG: 871 | End: 2024-02-16
Payer: COMMERCIAL

## 2024-02-16 ENCOUNTER — APPOINTMENT (OUTPATIENT)
Dept: PHYSICAL THERAPY | Facility: CLINIC | Age: 74
DRG: 871 | End: 2024-02-16
Payer: COMMERCIAL

## 2024-02-16 LAB
ANION GAP SERPL CALCULATED.3IONS-SCNC: 12 MMOL/L (ref 7–15)
BUN SERPL-MCNC: 51.8 MG/DL (ref 8–23)
CALCIUM SERPL-MCNC: 8.6 MG/DL (ref 8.8–10.2)
CHLORIDE SERPL-SCNC: 100 MMOL/L (ref 98–107)
CREAT SERPL-MCNC: 3.7 MG/DL (ref 0.51–0.95)
DEPRECATED HCO3 PLAS-SCNC: 24 MMOL/L (ref 22–29)
EGFRCR SERPLBLD CKD-EPI 2021: 12 ML/MIN/1.73M2
ERYTHROCYTE [DISTWIDTH] IN BLOOD BY AUTOMATED COUNT: 16.6 % (ref 10–15)
FERRITIN SERPL-MCNC: 142 NG/ML (ref 11–328)
GLUCOSE BLDC GLUCOMTR-MCNC: 119 MG/DL (ref 70–99)
GLUCOSE BLDC GLUCOMTR-MCNC: 130 MG/DL (ref 70–99)
GLUCOSE BLDC GLUCOMTR-MCNC: 144 MG/DL (ref 70–99)
GLUCOSE BLDC GLUCOMTR-MCNC: 180 MG/DL (ref 70–99)
GLUCOSE BLDC GLUCOMTR-MCNC: 234 MG/DL (ref 70–99)
GLUCOSE SERPL-MCNC: 119 MG/DL (ref 70–99)
HBV SURFACE AB SERPL IA-ACNC: <3.5 M[IU]/ML
HBV SURFACE AB SERPL IA-ACNC: NONREACTIVE M[IU]/ML
HBV SURFACE AG SERPL QL IA: NONREACTIVE
HCT VFR BLD AUTO: 28.8 % (ref 35–47)
HGB BLD-MCNC: 9.5 G/DL (ref 11.7–15.7)
MCH RBC QN AUTO: 28.3 PG (ref 26.5–33)
MCHC RBC AUTO-ENTMCNC: 33 G/DL (ref 31.5–36.5)
MCV RBC AUTO: 86 FL (ref 78–100)
PLATELET # BLD AUTO: 172 10E3/UL (ref 150–450)
POTASSIUM SERPL-SCNC: 4.2 MMOL/L (ref 3.4–5.3)
RBC # BLD AUTO: 3.36 10E6/UL (ref 3.8–5.2)
SODIUM SERPL-SCNC: 136 MMOL/L (ref 135–145)
WBC # BLD AUTO: 6.6 10E3/UL (ref 4–11)

## 2024-02-16 PROCEDURE — 250N000013 HC RX MED GY IP 250 OP 250 PS 637: Performed by: INTERNAL MEDICINE

## 2024-02-16 PROCEDURE — 97535 SELF CARE MNGMENT TRAINING: CPT | Mod: GO

## 2024-02-16 PROCEDURE — 250N000011 HC RX IP 250 OP 636

## 2024-02-16 PROCEDURE — 99232 SBSQ HOSP IP/OBS MODERATE 35: CPT | Mod: GC

## 2024-02-16 PROCEDURE — 90935 HEMODIALYSIS ONE EVALUATION: CPT

## 2024-02-16 PROCEDURE — 97110 THERAPEUTIC EXERCISES: CPT | Mod: GO

## 2024-02-16 PROCEDURE — 250N000011 HC RX IP 250 OP 636: Performed by: STUDENT IN AN ORGANIZED HEALTH CARE EDUCATION/TRAINING PROGRAM

## 2024-02-16 PROCEDURE — 36415 COLL VENOUS BLD VENIPUNCTURE: CPT

## 2024-02-16 PROCEDURE — 120N000001 HC R&B MED SURG/OB

## 2024-02-16 PROCEDURE — 250N000013 HC RX MED GY IP 250 OP 250 PS 637: Performed by: FAMILY MEDICINE

## 2024-02-16 PROCEDURE — 99232 SBSQ HOSP IP/OBS MODERATE 35: CPT

## 2024-02-16 PROCEDURE — 85014 HEMATOCRIT: CPT

## 2024-02-16 PROCEDURE — 80048 BASIC METABOLIC PNL TOTAL CA: CPT

## 2024-02-16 PROCEDURE — 97116 GAIT TRAINING THERAPY: CPT | Mod: GP

## 2024-02-16 PROCEDURE — 250N000013 HC RX MED GY IP 250 OP 250 PS 637

## 2024-02-16 RX ADMIN — Medication 1 TABLET: at 08:46

## 2024-02-16 RX ADMIN — HYDRALAZINE HYDROCHLORIDE 100 MG: 25 TABLET, FILM COATED ORAL at 13:07

## 2024-02-16 RX ADMIN — DICLOFENAC 2 G: 10 GEL TOPICAL at 20:35

## 2024-02-16 RX ADMIN — HEPARIN SODIUM 5000 UNITS: 5000 INJECTION, SOLUTION INTRAVENOUS; SUBCUTANEOUS at 08:59

## 2024-02-16 RX ADMIN — DICLOFENAC 2 G: 10 GEL TOPICAL at 08:59

## 2024-02-16 RX ADMIN — AMLODIPINE BESYLATE 10 MG: 10 TABLET ORAL at 08:46

## 2024-02-16 RX ADMIN — CLOPIDOGREL BISULFATE 75 MG: 75 TABLET ORAL at 08:46

## 2024-02-16 RX ADMIN — DICLOFENAC 2 G: 10 GEL TOPICAL at 16:40

## 2024-02-16 RX ADMIN — DICLOFENAC 2 G: 10 GEL TOPICAL at 12:33

## 2024-02-16 RX ADMIN — INSULIN ASPART 1 UNITS: 100 INJECTION, SOLUTION INTRAVENOUS; SUBCUTANEOUS at 12:31

## 2024-02-16 RX ADMIN — METOPROLOL TARTRATE 75 MG: 25 TABLET, FILM COATED ORAL at 20:29

## 2024-02-16 RX ADMIN — ACETAMINOPHEN 1000 MG: 500 TABLET ORAL at 23:13

## 2024-02-16 RX ADMIN — Medication 1 MG: at 23:13

## 2024-02-16 RX ADMIN — Medication: at 20:35

## 2024-02-16 RX ADMIN — METOPROLOL TARTRATE 75 MG: 25 TABLET, FILM COATED ORAL at 08:46

## 2024-02-16 RX ADMIN — SODIUM BICARBONATE 650 MG TABLET 1300 MG: at 20:30

## 2024-02-16 RX ADMIN — HYDRALAZINE HYDROCHLORIDE 100 MG: 25 TABLET, FILM COATED ORAL at 20:30

## 2024-02-16 RX ADMIN — Medication: at 08:59

## 2024-02-16 RX ADMIN — HYDRALAZINE HYDROCHLORIDE 100 MG: 25 TABLET, FILM COATED ORAL at 08:46

## 2024-02-16 RX ADMIN — SODIUM BICARBONATE 650 MG TABLET 1300 MG: at 08:46

## 2024-02-16 RX ADMIN — BUMETANIDE 1 MG: 0.25 INJECTION INTRAMUSCULAR; INTRAVENOUS at 08:59

## 2024-02-16 RX ADMIN — CITALOPRAM 20 MG: 20 TABLET ORAL at 08:46

## 2024-02-16 RX ADMIN — HEPARIN SODIUM 5000 UNITS: 5000 INJECTION, SOLUTION INTRAVENOUS; SUBCUTANEOUS at 23:14

## 2024-02-16 RX ADMIN — ONDANSETRON 4 MG: 2 INJECTION INTRAMUSCULAR; INTRAVENOUS at 13:07

## 2024-02-16 RX ADMIN — CALCIUM CARBONATE (ANTACID) CHEW TAB 500 MG 500 MG: 500 CHEW TAB at 11:40

## 2024-02-16 RX ADMIN — BUMETANIDE 1 MG: 0.25 INJECTION INTRAMUSCULAR; INTRAVENOUS at 20:33

## 2024-02-16 RX ADMIN — HEPARIN SODIUM 5000 UNITS: 5000 INJECTION, SOLUTION INTRAVENOUS; SUBCUTANEOUS at 16:40

## 2024-02-16 ASSESSMENT — ACTIVITIES OF DAILY LIVING (ADL)
ADLS_ACUITY_SCORE: 44
ADLS_ACUITY_SCORE: 43
ADLS_ACUITY_SCORE: 44
ADLS_ACUITY_SCORE: 44
ADLS_ACUITY_SCORE: 40
ADLS_ACUITY_SCORE: 44
ADLS_ACUITY_SCORE: 43
ADLS_ACUITY_SCORE: 44
ADLS_ACUITY_SCORE: 42
ADLS_ACUITY_SCORE: 42
ADLS_ACUITY_SCORE: 44
ADLS_ACUITY_SCORE: 43

## 2024-02-16 NOTE — PLAN OF CARE
"Problem: Oral Intake Inadequate  Goal: Improved Oral Intake  Outcome: Progressing   Goal Outcome Evaluation:  Pt reports her appetite \"seems to be getting worse\" and has been experiencing some nausea. Pt is ordering 2 meals/day on average and consuming 50-75% of them per flow sheets and [employer]. Added ice to her Nepro order per pt preference for cold drinks, and encouraged her to take advantage of Regular diet, and order foods that bring her pleasure. Also encouraged pt to ask nursing for anti-nausea meds if she needs them.    "

## 2024-02-16 NOTE — PLAN OF CARE
"Goal Outcome Evaluation:    A & O x 4. Calm/cooperative in care. Overall fatigue; patient states she feels like she \"was hit by a bus.\" MD informed during MD rounding. No changes to plan of care.    Hypertension; resolved after BP medication administration. Denies pain.      On & off nausea; TUMS, IV Zofran & essential oil utilized with some relief.  Patient states her symptoms worsened after her Atarax last night. Will avoid use of the medication today.     Labs improved. Dialysis completed today; 2.3 liters removed. Patient states her fatigue lessoned after completion of dialysis.    K+ protocol; recheck in the morning. Blood sugars stable. IV is SL. CVC dressing CDI.    Regular diet. Assist of 1-2 with walker/GB. Alarms on for safety. Up to chair today. Call light education provided.    Discharge pending ongoing clinical improvement.     Problem: Sepsis/Septic Shock  Goal: Absence of Infection Signs and Symptoms  Intervention: Promote Recovery  Recent Flowsheet Documentation  Taken 2/16/2024 0917 by Jazmin Frazier, RN  Activity Management:   up to bedside commode   up in chair     Problem: Sepsis/Septic Shock  Goal: Optimal Nutrition Intake  Outcome: Progressing     Problem: Fatigue  Goal: Improved Activity Tolerance  Intervention: Promote Improved Energy  Recent Flowsheet Documentation  Taken 2/16/2024 0917 by Jazmin Frazier, RN  Activity Management:   up to bedside commode   up in chair     Problem: Comorbidity Management  Goal: Blood Glucose Levels Within Targeted Range  Intervention: Monitor and Manage Glycemia  Recent Flowsheet Documentation  Taken 2/16/2024 0917 by Jazmin Frazier, RN  Medication Review/Management: medications reviewed        "

## 2024-02-16 NOTE — PROGRESS NOTES
RENAL PROGRESS NOTE    CC:  FELIPA    ASSESSMENT & PLAN:     PLAN:  -Continue expectant management (avoid nephrotoxins, renal dose medications, avoid hypo/hypertension, daily wts, daily I/O), follow for FELIPA recovery  -Clinical coordinator, was consulted to arrange OP dialysis unit upon discharge.     -continue IV diuresis, follow UO (can hold if ongoing no UO)  -MWF HD while here, 3rd initiation run today  -Continue to HOLD ARB  -Continue Clear Ensure oral nutrition supplement  -BMP daily    Anuric FELIPA on CKD3, now requiring Hemodialysis: Base Cr ~ 0.7 to 0.8 mg/dL/ EGFR in 60s and 70s.  H/O moderate albuminuria, urine albumin to creatinine ratio was 417 mg  in 10/23.  CKD  thought 2/2 diabetic nephropathy given albuminuria.  As per patient chart review, she was referred to [facility] nephrology in the past but their clinic has not been able to get hold of the patient to schedule an appointment.  Baseline renal function on admission, with serial uptrend, FELIPA thought multifactorial 2/2 sepsis, NSAIDS while on ACE, +/- LAVINIA, +/- ? 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 Urinary output has not been accurately recorded due to urinary incontinence. UA 02/02 showed mild proteinuria, albumin 70 g/dl, 6 hyaline cast. No hematuria or pyuria. CT abd/pelvis showed bilateral renal benign cyst.  No evidence of hydronephrosis. See Plan above.      On 02/02 the patient received Ibuprofen 600 mg and IV Lasix 02/02  On 02/04 started on PPI/Protonix 40 mg BI  On 02/04 received 75 ml of iodinated contrast for CTA  On 02/03-02/05 received IV Vancomycin and Cefepime.  Vancomycin trough level was 15.6 02/05.  On 02/05-02/06 the patient received IV Acyclovir.  Patient received IV Solu-Medrol 125 mg 02/05  No absolute eosinophilia on CBC with differential.  TSH was mildly elevated at 6.1 on December 28, 2023.  Low urine sodium suggestive prerenal etiology  UA 02/07/24 showed mild  albuminuria 17 g/dL, no hematuria, 7 hyaline casts.   UPCR at 0.5g protein  12/14/24 consented for CVC tunneled and HD initiation.     Mild Hyponatremia:Na+ 134.Trend.    Metabolic acidosis: 2/2 FELIPA.  co2 17>20, on PO bicarb, trend with HD    Azotemia: Will HOLD bumex today. 2/2 FELIPA/CKD, +/1 ?Dry/dehydration with ongoing diuresis. Pt denies s/s of bleeding, hg with slight down trend, follow. Not on protein supplements. Follow with initiating HD    HTN: on amlodipine 10 mg daily, metoprolol 75 mg twice daily and hydralazine 100 mg 3 times daily  PTA- losartan 100 mg daily, amlodipine 10 mg daily, metoprolol 75 mg twice daily, furosemide 20 mg daily,  Hydralazine 25 mg daily    Volume: +LLE. S/P albumin +diuresis trail. On Bumex 1 mg daily, follow     Sepsis-2/4/24-2/5/24 overnight events notable for stroke code secondary to aphasia and inattention also in the setting of fever to 102.5F and RR 35. WBC is wnl. Blood and urine cultures negative to date.  TTE with minimal pericardial effusion.  Repeat chest x-ray showed normal evidence of pneumonia.  Status post lumbar puncture.  CSF analysis was not consistent with infection.  Currently on IV meropenem and doxycycline. Infectious diseases following.  CT abdomen pelvis showed enlarged abdominal pelvic lymph nodes of uncertain etiology.     AMS: 2/2 ?hospital delirium vs metabolic encephalopathy ( liver cirrhosis, uremia).  No evidence of acute pathology on brain imaging.  Patient mental status much improved. Ammonia level was wnl at 29. Nodular liver on CT. Consulted neurology.     Normocytic anemia.  Patient presented with hemoglobin of 11.2> 9.4 today.  Likely multifactorial.  No signs of active bleeding. Trend.      DM2: control on single agent . PTA on glipizide 2.5 mg daily.  on insulin. Last  A1c was 6.6%.  Management as per primary team.     History of ischemic CVA-on Plavix     Nodular liver on CT abdomen pelvis: etiology unclear. ? PERRY given patient's obesity  and DM.  LFTs are within normal limits.  Coagulopathy INR 1.2.  Platelet count is within normal limits.    Gastroenterology service consulted.     GERD prophylaxis patient was started on Protonix 40 mg twice daily during this admission.  Could be contributing to patient's FELIPA/?interstitial nephritis.  On 02/07 PPI switched to famotidine 10 mg twice daily    Anemia: hg 9.1, slight down trend since here, may be delusional with hypervolemia. Iron replete.     SUBJECTIVE:      Pt sitting up in chair, appears comfortable   Overall feeling better, progressive mental clarity since starting HD  2L UF with HD yesterday, wt down trending, no change in UO, HD again today  Denies pain, n, v, c, d, sob, fever, rash or CP  Pt doesn't notice any changes in UO, will continue diuresis and follow.  PT reports nausea today  Ongoing sig LLE edema/generalized edema.   Discussed plan, labs and answered questions    OBJECTIVE:  Physical Exam   Temp: 97.7  F (36.5  C) Temp src: Oral BP: (!) 171/76 Pulse: 58   Resp: 18 SpO2: 93 % O2 Device: None (Room air)    Vitals:    02/13/24 0445 02/15/24 0500 02/16/24 0414   Weight: 107.3 kg (236 lb 8.9 oz) 105.6 kg (232 lb 12.9 oz) 105 kg (231 lb 7.7 oz)     Vital Signs with Ranges  Temp:  [97.6  F (36.4  C)-98.7  F (37.1  C)] 97.7  F (36.5  C)  Pulse:  [54-69] 58  Resp:  [15-18] 18  BP: (126-183)/(54-76) 171/76  SpO2:  [93 %-95 %] 93 %  I/O last 3 completed shifts:  In: 840 [P.O.:840]  Out: 2000 [Other:2000]        Patient Vitals for the past 72 hrs:   Weight   02/16/24 0414 105 kg (231 lb 7.7 oz)   02/15/24 0500 105.6 kg (232 lb 12.9 oz)     Intake/Output Summary (Last 24 hours) at 2/12/2024 0948  Last data filed at 2/12/2024 0831  Gross per 24 hour   Intake 1580 ml   Output 350 ml   Net 1230 ml       PHYSICAL EXAM:  GEN: NAD, aox3  CV: RRR  Lung: clear and equal, on RA with good o2 sats  Ab: soft and NT, obese  Ext: +1-2LE BL and well perfused  Skin: no rash  : little urine on bladder scans, UO  collection poor with mixed loose stool + urine with voids.       LABORATORY STUDIES:     Recent Labs   Lab 02/16/24  0832 02/15/24  0629 02/13/24  0743 02/12/24  0600 02/11/24  0754 02/10/24  0623   WBC 6.6 5.6 7.4 9.1 6.7 6.6   RBC 3.36* 3.19* 3.57* 3.24* 3.49* 3.45*   HGB 9.5* 9.1* 10.2* 9.4* 10.0* 9.8*   HCT 28.8* 27.2* 30.9* 28.4* 30.5* 30.2*    170 223 218 203 209       Basic Metabolic Panel:  Recent Labs   Lab 02/16/24  0238 02/15/24  2025 02/15/24  1804 02/15/24  1222 02/15/24  0755 02/15/24  0629 02/14/24  0751 02/14/24  0717 02/13/24  1745 02/13/24  1531 02/13/24  0929 02/13/24  0743 02/12/24  0814 02/12/24  0600 02/11/24  1101 02/11/24  0754   NA  --   --   --   --   --  134*  --  135  --  134*  --  135  --  134*  --  136   POTASSIUM  --   --   --   --   --  4.0  --  4.2  --  4.1  --  3.9  --  3.9  --  3.8   CHLORIDE  --   --   --   --   --  101  --  102  --  100  --  103  --  103  --  105   CO2  --   --   --   --   --  20*  --  17*  --  17*  --  17*  --  17*  --  16*   BUN  --   --   --   --   --  79.2*  --  107.0*  --  95.2*  --  94.5*  --  89.6*  --  86.4*   CR  --   --   --   --   --  4.66*  --  5.78*  --  5.62*  --  5.45*  --  4.72*  --  3.99*   * 142* 156* 196* 99 112*   < > 132*   < > 184*   < > 131*   < > 114*   < > 131*  129*   ALBARO  --   --   --   --   --  9.0  --  9.5  --  9.5  --  9.5  --  9.5  --  9.1    < > = values in this interval not displayed.       INR  Recent Labs   Lab 02/10/24  0623   INR 1.25*        Recent Labs   Lab Test 02/16/24  0832 02/15/24  0629 02/11/24  0754 02/10/24  0623 02/05/24  0426 02/04/24  1854   INR  --   --   --  1.25*  --  1.23*   WBC 6.6 5.6   < > 6.6   < > 5.9   HGB 9.5* 9.1*   < > 9.8*   < > 10.4*    170   < > 209   < > 119*    < > = values in this interval not displayed.       Personally reviewed current labs    Sheri Fam [facility]-BC  [facility]  [phone]

## 2024-02-16 NOTE — PROGRESS NOTES
HEMODIALYSIS TREATMENT NOTE    Date: 2/15/2024  Time: 7.50 PM    Data:  Pre Wt: 105.6 kg (232 lb 12.9 oz)   Desired Wt: 1-2  kg   Post Wt: 103.6 kg ( Estimated)  Weight change: 2.0kg  Ultrafiltration - Post Run Net Total Removed (mL): 2000 mL  Vascular Access Status: CVC  patent  Dialyzer Rinse: Streaked, Light  Total Blood Volume Processed: 43.4 L   Total Dialysis (Treatment) Time: 2.5   Dialysate Bath: K 3, Ca 2.25  Heparin: None    Lab:   No    Interventions:  Second day of dialysis through RCVC  with  500 DFR.  No dialysis related medication given during HD run.  UF adjusted according to pt hemodynamic status & Crit-line.  2.0kg UF pulled successfully, Crit-line steady with A profile at the end of HD run.  Pt completed her 2.5 hours HD well, blood rinsed back, CVC heparin locked, clear guard applied & handoff report given    Assessment:  Pt alert & oriented x4, denies pain & no sign of SOB.  Monitoring every 15 minutes & PRN.  CVC dressing CDI     Plan:    Per Renal Team

## 2024-02-16 NOTE — PROGRESS NOTES
[facility]    Progress Note - Hospitalist Service       Date of Admission:  2/2/2024    Assessment & Plan   Josefina Dumont is a 73 year old female admitted on 2/2/2024. She has a history of CHF with recent hospitalization 12/28/23-12/31/23, recent community acquired pneumonia, history of ischemic CVA, history of meningioma, history of CAD, type 2 diabetes mellitus and was admitted for shortness of breath and found to be in severe sepsis. 2/4/24-2/5/24 overnight events notable for stroke code secondary to aphasia and inattention also in the setting of fever to 102.5F and RR 35. Head MRI was unremarkable. Cefepime was switched to meropenem given concern for encephalopathy 2/2 worsening carbapenems. IV acyclovir was also added to the regimen and ID consult, LP orders were placed. On 2/5/24 ID added doxycycline to medication regimen. On 2/6/24, patient reported continued improvement in her condition. However, overnight events 2/6/24-2/7/24 were notable for likely hospital delirium and her mental status has continued to wax and wane. CT head, chest, abdomen, pelvis was repeated 2/8/24 in light of continued AMS of unclear etiology; results showed cirrhosis but were otherwise unremarkable. Metabolic encephalopathy was on the differential given cirrhosis though LFTs have been wnl and patient is stooling daily; trial of empiric lactulose but no further recommendations from GI. EEG per Neurology was performed 2/9/24, read was unremarkable for seizure activity. Antibiotics were finished 2/13/24, ID has signed off. At this time, patient remains hospitalized for worsening kidney function/uremia, thought to be multifactorial in nature though differential remains open and broad. Patient was initiated on HD yesterday. Planning for TCU on discharge once medically ready. Mental status has greatly improved throughout the past week, she is fully oriented and conversing appropriately.    Severe sepsis  due to unknown source   Acute hypoxic respiratory failure, resolved  Acute fever of unknown origin, resolved  Concern for CHF exacerbation  Delirium, improving   Patient initially presented with worsening shortness of breath, orthopnea, dyspnea on exertion, in the setting of elevated BNP and missed dose of Lasix, CHF exacerbation was initially high on the differential. She was febrile w/Tmax of 101.3. CT chest on presentation showed no acute findings, mild pulmonary interstitial edema, small pericardial effusion. CRP elevated to 28.10 and procalcitonin of 5.80 on 2/4/24; CRP elevated to 54.20 and procalcitonin elevated to 11.70 on 2/5/24. See 2/4/24-2/5/24 overnight notes for significant events including aphasia, inattention, fever to 102.5F despite broad-spectrum antibiotics prompting additional aggressive treatment measures and workup. Differential remains broad at this time. LP and TTE unremarkable. Patient's condition had been improving 2/6/24 but then had onset of delirium overnight and continues to wax and wane. Possible hospital delirium in the setting of sleep deprivation, disorientation contributing. However, prudent to rule-out other potential causes given the distinct change from patient's baseline and prolonged delirium. CT head, chest, abdomen, pelvis was repeated 2/8/24 in light of continued AMS of unclear etiology; results showed cirrhosis but were otherwise unremarkable. Metabolic encephalopathy was on the differential given cirrhosis though LFTs have been wnl and patient is stooling daily. EEG per Neurology was performed 2/9/24 read as unremarkable for seizure activity with slow background in theta and delta range that suggests a nonspecific generalized cerebral dysfunction. Neurology has signed off. ID also signed off as patient will finished course of antibiotics. At this time, mental status has greatly improved.  -Delirium precautions  -Cardiac/low-sodium diet  -Supplemental oxygen as needed  -ID  consulted, signed off 2/13/24  -Continue Levaquin renally dosed for 7 days from 2/8/2024. Order updated. (Course has been completed)  -GI following, note low concern for hepatic encephalopathy   Consider lymph node biopsy if does not continue to improve clinically.  -Monitor mental status, and glucose.  -Discussed with Neurology, EEG read as above, signed off 2/10/24  -GI consulted 2/9/24, no further recommendations beyond trial of empiric lactulose (trial completed)  -Nephrology consulted and continues to follow, appreciate recommendations   -Continue expectant management (avoid nephrotoxins, renal dose medications, avoid hypo/hypertension, daily wts, daily I/O), follow for FELIPA recovery  -Clinical coordinator, was consulted to arrange OP dialysis unit upon discharge.     -Continue IV diuresis, follow UO (can hold if ongoing no UO)  -MWF HD while here, 3rd initiation run today  -Continue to HOLD ARB  -Continue Clear Ensure oral nutrition supplement  -BMP daily    FELIPA  S/p HD (2/14/24, 2/15/24, planned for 2/16/24)  Baseline creatinine around 0.8-0.9, had stabilized ~2.3 initially but then trended upward with the highest being 5.78 on 2/14/24. Several medications may have been contributing to FELIPA though etiology remains unclear; creatinine worsened several days s/p contrast administration and continued to worsen despite discontinuation of nephrotoxic IV antibiotics. Nephrology has been following, appreciate their cares and recommendations.  -Nephrology following, see recommendations as above    Hypokalemia, resolved  -Standard replacement protocol      Mild normocytic anemia  Hemoglobin 11.2 on admission. Has since been stable at ~10. Drop consistent with hemodilution.     Hypertension  -Continue PTA amlodipine  -Continue PTA hydralazine  -Holding PTA losartan in light of FELIPA; will continue to reassess as needing given blood pressure has been high-normal  -Continue PTA metoprolol tartrate     Prolonged QT  QTc of  472.  -Avoid QT prolonging medications  -Note that Celexa comes with risk for QT prolongation but benefit of anxiety treatment outweighing risk, will continue to monitor    DM2   Most recent A1c 6.6 1 month ago.  -Holding PTA glipizide; blood sugars have been well-controlled through hospitalization so will likely hold on discharge as well  -Low sliding scale insulin     Anxiety/depression  -Continue PTA Celexa   -Patient was given hydroxyzine 2/15/24 PM for anxiety/insomnia; she felt like it may have caused some of her grogginess this morning, so would consider other medication options first    Chronic:  History of ischemic CVA: Continue PTA Plavix  GERD: Continue PTA Tums, continue PTA Protonix  Constipation: Continue PTA senna docusate  Asthma: Continue PTA albuterol as needed        Diet: Regular Diet Adult  Snacks/Supplements Adult: Nepro Oral Supplement; Between Meals    DVT Prophylaxis: Heparin  Machuca Catheter: Not present  Fluids: po  Lines: PRESENT      CVC Double Lumen Right Internal jugular Tunneled-Site Assessment: WDL    Cardiac Monitoring: None  Code Status: Full Code      Clinically Significant Risk Factors              # Hypoalbuminemia: Lowest albumin = 2.9 g/dL at 2/13/2024  3:31 PM, will monitor as appropriate     # Hypertension: Noted on problem list       # DMII: A1C = 6.6 % (Ref range: <5.7 %) within past 6 months         # Financial/Environmental Concerns:           Disposition Plan      Expected Discharge Date: 02/17/2024      Destination: home  Discharge Comments: Need prior authorization and [facility] accepted        The patient's care was discussed with the Attending Physician, Dr. Rea .    OSKAR BUTLER MD   Hospitalist Service  [facility]  Securely message with [employer] (more info)  Text page via [employer] - how to [place] Paging/Directory   ______________________________________________________________________    Interval History   Anxiety overnight, given  "hydroxyzine. Feels like she was \"hit by a bus this morning.\" Initially thought this may be due to HD, but after being asked about how the hydroxyzine worked for her, she wonders if that may have been the culprit. She did urinate over night. No further diarrhea, last bowel movement was yesterday. Denies any nausea though has had so/so appetite. Denies any chest pain or SOB.     Physical Exam   Vital Signs: Temp: 97.7  F (36.5  C) Temp src: Oral BP: (!) 171/76 Pulse: 58   Resp: 18 SpO2: 93 % O2 Device: None (Room air)    Weight: 231 lbs 7.73 oz    GENERAL: Alert and no acute distress. Sitting up comfortably in bed.  HEENT: Normocephalic, atraumatic, no rhinorrhea, moist mucus membranes.  RESP: Anterior lung fields with good airflow, clear to auscultation, no coarse lung sounds. Nonlabored breathing on room air.   CV: Regular rate and rhythm, systolic murmur.  MS: No gross musculoskeletal defects noted, mild edema.  NEURO: Alert, responding to questions appropriately, good recall of recent events.  PSYCH: Appropriate mood and affect.      Data     I have personally reviewed the following data over the past 24 hrs:    6.6  \   9.5 (L)   / 172     136 100 51.8 (H) /  119 (H)   4.2 24 3.70 (H) \     Ferritin:  142 % Retic:  N/A LDH:  N/A       Imaging results reviewed over the past 24 hrs:   No results found for this or any previous visit (from the past 24 hour(s)).    "

## 2024-02-16 NOTE — PROGRESS NOTES
Date: 2/16/2024  Time: 5:26 PM     Data:  Pre Wt:   105.0 kg  Desired Wt:   To be established  Post Wt:  103.0 kg (estimated)  Weight change: - 2 kg  Ultrafiltration - Post Run Net Total Removed (mL):  2000 ml  Vascular Access Status: CVC patent  Dialyzer Rinse:  Light  Total Blood Volume Processed: 48.2 L   Total Dialysis (Treatment) Time:   2.5 Hrs  Dialysate Bath: K 3, Ca 2.25  Heparin: Heparin: None     Lab:   No  HbsAg - 2/15/2024 (Non Reactive)  HbsAb - 2/15/2024 <3.50 (Susceptible)     Interventions:  Dialysis done through Right subclavian dialysis CVC. ,  as ordered.  UF set to 2.3 Liters of fluid removal, accommodating priming and rinse back volumes  No medications administered related to dialysis  CVC dressing changed aseptically  CritLine showed a stable Profile B throughout the run, tolerating UF pull  Treatment has ended safely and  blood is rinsed back completely  Catheter lumens flushed and locked with Saline, catheter caps (ClearGuard ) changed post HD  Report given to Jazmin Frazier RN, left in her room in stable condition     Assessment:  A/O x 4, calm & cooperative, denies pain  CVC intact, previous dressing clean and dry  Heart rhythm consistent throughout the run, sinus bradycardia                Plan:    Per Renal team        REBECCA HardenN, RN  Acute Dialysis RN  [facility]

## 2024-02-17 LAB
ANION GAP SERPL CALCULATED.3IONS-SCNC: 11 MMOL/L (ref 7–15)
BUN SERPL-MCNC: 35.9 MG/DL (ref 8–23)
CALCIUM SERPL-MCNC: 9.1 MG/DL (ref 8.8–10.2)
CHLORIDE SERPL-SCNC: 101 MMOL/L (ref 98–107)
CREAT SERPL-MCNC: 3.31 MG/DL (ref 0.51–0.95)
DEPRECATED HCO3 PLAS-SCNC: 24 MMOL/L (ref 22–29)
EGFRCR SERPLBLD CKD-EPI 2021: 14 ML/MIN/1.73M2
GLUCOSE BLDC GLUCOMTR-MCNC: 134 MG/DL (ref 70–99)
GLUCOSE BLDC GLUCOMTR-MCNC: 148 MG/DL (ref 70–99)
GLUCOSE BLDC GLUCOMTR-MCNC: 161 MG/DL (ref 70–99)
GLUCOSE BLDC GLUCOMTR-MCNC: 175 MG/DL (ref 70–99)
GLUCOSE BLDC GLUCOMTR-MCNC: 96 MG/DL (ref 70–99)
GLUCOSE SERPL-MCNC: 124 MG/DL (ref 70–99)
HBV CORE AB SERPL QL IA: NONREACTIVE
HOLD SPECIMEN: NORMAL
POTASSIUM SERPL-SCNC: 4 MMOL/L (ref 3.4–5.3)
SODIUM SERPL-SCNC: 136 MMOL/L (ref 135–145)

## 2024-02-17 PROCEDURE — 250N000013 HC RX MED GY IP 250 OP 250 PS 637: Performed by: INTERNAL MEDICINE

## 2024-02-17 PROCEDURE — 250N000013 HC RX MED GY IP 250 OP 250 PS 637

## 2024-02-17 PROCEDURE — 80048 BASIC METABOLIC PNL TOTAL CA: CPT

## 2024-02-17 PROCEDURE — 36415 COLL VENOUS BLD VENIPUNCTURE: CPT

## 2024-02-17 PROCEDURE — 99232 SBSQ HOSP IP/OBS MODERATE 35: CPT

## 2024-02-17 PROCEDURE — 120N000001 HC R&B MED SURG/OB

## 2024-02-17 PROCEDURE — 250N000013 HC RX MED GY IP 250 OP 250 PS 637: Performed by: FAMILY MEDICINE

## 2024-02-17 PROCEDURE — 86481 TB AG RESPONSE T-CELL SUSP: CPT

## 2024-02-17 PROCEDURE — 99231 SBSQ HOSP IP/OBS SF/LOW 25: CPT | Mod: GC

## 2024-02-17 PROCEDURE — 86704 HEP B CORE ANTIBODY TOTAL: CPT

## 2024-02-17 PROCEDURE — 250N000011 HC RX IP 250 OP 636: Performed by: STUDENT IN AN ORGANIZED HEALTH CARE EDUCATION/TRAINING PROGRAM

## 2024-02-17 RX ORDER — BUMETANIDE 2 MG/1
2 TABLET ORAL DAILY
Status: DISCONTINUED | OUTPATIENT
Start: 2024-02-17 | End: 2024-02-28 | Stop reason: HOSPADM

## 2024-02-17 RX ADMIN — DICLOFENAC 2 G: 10 GEL TOPICAL at 17:07

## 2024-02-17 RX ADMIN — METOPROLOL TARTRATE 75 MG: 25 TABLET, FILM COATED ORAL at 19:53

## 2024-02-17 RX ADMIN — HEPARIN SODIUM 5000 UNITS: 5000 INJECTION, SOLUTION INTRAVENOUS; SUBCUTANEOUS at 17:07

## 2024-02-17 RX ADMIN — DICLOFENAC 2 G: 10 GEL TOPICAL at 13:47

## 2024-02-17 RX ADMIN — Medication 1 TABLET: at 08:43

## 2024-02-17 RX ADMIN — HYDRALAZINE HYDROCHLORIDE 100 MG: 25 TABLET, FILM COATED ORAL at 08:42

## 2024-02-17 RX ADMIN — CITALOPRAM 20 MG: 20 TABLET ORAL at 08:43

## 2024-02-17 RX ADMIN — CLOPIDOGREL BISULFATE 75 MG: 75 TABLET ORAL at 08:43

## 2024-02-17 RX ADMIN — AMLODIPINE BESYLATE 10 MG: 10 TABLET ORAL at 08:43

## 2024-02-17 RX ADMIN — CALCIUM CARBONATE (ANTACID) CHEW TAB 500 MG 500 MG: 500 CHEW TAB at 11:05

## 2024-02-17 RX ADMIN — DICLOFENAC 2 G: 10 GEL TOPICAL at 19:53

## 2024-02-17 RX ADMIN — FAMOTIDINE 10 MG: 10 TABLET ORAL at 08:45

## 2024-02-17 RX ADMIN — Medication 1 MG: at 21:22

## 2024-02-17 RX ADMIN — INSULIN ASPART 1 UNITS: 100 INJECTION, SOLUTION INTRAVENOUS; SUBCUTANEOUS at 11:05

## 2024-02-17 RX ADMIN — CALCIUM CARBONATE (ANTACID) CHEW TAB 500 MG 500 MG: 500 CHEW TAB at 18:11

## 2024-02-17 RX ADMIN — SODIUM BICARBONATE 650 MG TABLET 1300 MG: at 19:53

## 2024-02-17 RX ADMIN — HEPARIN SODIUM 5000 UNITS: 5000 INJECTION, SOLUTION INTRAVENOUS; SUBCUTANEOUS at 08:42

## 2024-02-17 RX ADMIN — Medication: at 19:54

## 2024-02-17 RX ADMIN — HYDRALAZINE HYDROCHLORIDE 100 MG: 25 TABLET, FILM COATED ORAL at 19:52

## 2024-02-17 RX ADMIN — Medication: at 08:41

## 2024-02-17 RX ADMIN — SENNOSIDES AND DOCUSATE SODIUM 1 TABLET: 8.6; 5 TABLET ORAL at 19:53

## 2024-02-17 RX ADMIN — SODIUM BICARBONATE 650 MG TABLET 1300 MG: at 08:42

## 2024-02-17 RX ADMIN — BUMETANIDE 2 MG: 2 TABLET ORAL at 08:43

## 2024-02-17 RX ADMIN — DICLOFENAC 2 G: 10 GEL TOPICAL at 08:41

## 2024-02-17 ASSESSMENT — ACTIVITIES OF DAILY LIVING (ADL)
ADLS_ACUITY_SCORE: 40
ADLS_ACUITY_SCORE: 39

## 2024-02-17 NOTE — PROGRESS NOTES
RENAL PROGRESS NOTE    CC:  FELIPA    ASSESSMENT & PLAN:     PLAN:  -Continue expectant management (avoid nephrotoxins, renal dose medications, avoid hypo/hypertension, daily wts, daily I/O), follow for FELIPA recovery  -Clinical coordinator, was consulted to arrange OP dialysis unit upon discharge.     -continue PO diuresis, follow UO (can hold if ongoing no UO)  -MWF HD while here  -Continue to HOLD ARB  -Continue Clear Ensure oral nutrition supplement  -BMP daily    Anuric FELIPA on CKD3, now requiring Hemodialysis: Base Cr ~ 0.7 to 0.8 mg/dL/ EGFR in 60s and 70s.  H/O moderate albuminuria, urine albumin to creatinine ratio was 417 mg  in 10/23.  CKD  thought 2/2 diabetic nephropathy given albuminuria.  As per patient chart review, she was referred to [facility] nephrology in the past but their clinic has not been able to get hold of the patient to schedule an appointment.  Baseline renal function on admission, with serial uptrend, FELIPA thought multifactorial 2/2 sepsis, NSAIDS while on ACE, +/- LAVINIA, +/- ? 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 Urinary output has not been accurately recorded due to urinary incontinence. UA 02/02 showed mild proteinuria, albumin 70 g/dl, 6 hyaline cast. No hematuria or pyuria. CT abd/pelvis showed bilateral renal benign cyst.  No evidence of hydronephrosis. See Plan above.      On 02/02 the patient received Ibuprofen 600 mg and IV Lasix 02/02  On 02/04 started on PPI/Protonix 40 mg BI  On 02/04 received 75 ml of iodinated contrast for CTA  On 02/03-02/05 received IV Vancomycin and Cefepime.  Vancomycin trough level was 15.6 02/05.  On 02/05-02/06 the patient received IV Acyclovir.  Patient received IV Solu-Medrol 125 mg 02/05  No absolute eosinophilia on CBC with differential.  TSH was mildly elevated at 6.1 on December 28, 2023.  Low urine sodium suggestive prerenal etiology  UA 02/07/24 showed mild albuminuria 17 g/dL, no  hematuria, 7 hyaline casts.   UPCR at 0.5g protein  12/14/24 consented for CVC tunneled and HD initiation.     Mild Hyponatremia:Na+ 134.Trend.    Metabolic acidosis: 2/2 FELIPA.  co2 17>20, on PO bicarb, trend with HD    Azotemia: Will HOLD bumex today. 2/2 FELIPA/CKD, +/1 ?Dry/dehydration with ongoing diuresis. Pt denies s/s of bleeding, hg with slight down trend, follow. Not on protein supplements. Follow with initiating HD    HTN: on amlodipine 10 mg daily, metoprolol 75 mg twice daily and hydralazine 100 mg 3 times daily  PTA- losartan 100 mg daily, amlodipine 10 mg daily, metoprolol 75 mg twice daily, furosemide 20 mg daily,  Hydralazine 25 mg daily    Volume: +LLE. S/P albumin +diuresis trail. On Bumex 1 mg daily, follow     Sepsis-2/4/24-2/5/24 overnight events notable for stroke code secondary to aphasia and inattention also in the setting of fever to 102.5F and RR 35. WBC is wnl. Blood and urine cultures negative to date.  TTE with minimal pericardial effusion.  Repeat chest x-ray showed normal evidence of pneumonia.  Status post lumbar puncture.  CSF analysis was not consistent with infection.  Currently on IV meropenem and doxycycline. Infectious diseases following.  CT abdomen pelvis showed enlarged abdominal pelvic lymph nodes of uncertain etiology.     AMS: 2/2 ?hospital delirium vs metabolic encephalopathy ( liver cirrhosis, uremia).  No evidence of acute pathology on brain imaging.  Patient mental status much improved. Ammonia level was wnl at 29. Nodular liver on CT. Consulted neurology.     Normocytic anemia.  Patient presented with hemoglobin of 11.2> 9.4 today.  Likely multifactorial.  No signs of active bleeding. Trend.      DM2: control on single agent . PTA on glipizide 2.5 mg daily.  on insulin. Last  A1c was 6.6%.  Management as per primary team.     History of ischemic CVA-on Plavix     Nodular liver on CT abdomen pelvis: etiology unclear. ? PERRY given patient's obesity and DM.  LFTs are within  normal limits.  Coagulopathy INR 1.2.  Platelet count is within normal limits.    Gastroenterology service consulted.     GERD prophylaxis patient was started on Protonix 40 mg twice daily during this admission.  Could be contributing to patient's FELIPA/?interstitial nephritis.  On 02/07 PPI switched to famotidine 10 mg twice daily    Anemia: hg 9.1, slight down trend since here, may be delusional with hypervolemia. Iron replete.     SUBJECTIVE:      Pt sitting up in chair, appears comfortable   Sister and Primary MD at bedside  Overall feeling better, progressive mental clarity since starting HD  2L UF with HD yesterday, wt down trending, no change in UO, HD again today  LLE slightly better  PT reports increased UO  Denies pain, n, v, c, d, sob, fever, rash or CP  Wt down, VSS  Ongoing sig LLE edema/generalized edema.   Discussed plan, labs and answered questions    OBJECTIVE:  Physical Exam   Temp: 97.7  F (36.5  C) Temp src: Oral BP: (!) 155/69 Pulse: 54   Resp: 18 SpO2: 94 % O2 Device: None (Room air)    Vitals:    02/13/24 0445 02/15/24 0500 02/16/24 0414   Weight: 107.3 kg (236 lb 8.9 oz) 105.6 kg (232 lb 12.9 oz) 105 kg (231 lb 7.7 oz)     Vital Signs with Ranges  Temp:  [97.5  F (36.4  C)-98.9  F (37.2  C)] 97.7  F (36.5  C)  Pulse:  [51-67] 54  Resp:  [15-19] 18  BP: (119-171)/(51-76) 155/69  SpO2:  [90 %-98 %] 94 %  I/O last 3 completed shifts:  In: 480 [P.O.:480]  Out: 2100 [Urine:100; Other:2000]        Patient Vitals for the past 72 hrs:   Weight   02/16/24 0414 105 kg (231 lb 7.7 oz)   02/15/24 0500 105.6 kg (232 lb 12.9 oz)     Intake/Output Summary (Last 24 hours) at 2/12/2024 0948  Last data filed at 2/12/2024 0831  Gross per 24 hour   Intake 1580 ml   Output 350 ml   Net 1230 ml       PHYSICAL EXAM:  GEN: NAD, aox3  CV: RRR  Lung: clear and equal, on RA with good o2 sats  Ab: soft and NT, obese  Ext: +1-2LE BL and well perfused  Skin: no rash  : little urine on bladder scans, UO collection poor  with mixed loose stool + urine with voids.       LABORATORY STUDIES:     Recent Labs   Lab 02/16/24  0832 02/15/24  0629 02/13/24  0743 02/12/24  0600 02/11/24  0754   WBC 6.6 5.6 7.4 9.1 6.7   RBC 3.36* 3.19* 3.57* 3.24* 3.49*   HGB 9.5* 9.1* 10.2* 9.4* 10.0*   HCT 28.8* 27.2* 30.9* 28.4* 30.5*    170 223 218 203       Basic Metabolic Panel:  Recent Labs   Lab 02/17/24  0652 02/17/24  0204 02/16/24  2058 02/16/24  1755 02/16/24  1228 02/16/24  0846 02/16/24  0832 02/15/24  0755 02/15/24  0629 02/14/24  0751 02/14/24  0717 02/13/24  1745 02/13/24  1531 02/13/24  0929 02/13/24  0743     --   --   --   --   --  136  --  134*  --  135  --  134*  --  135   POTASSIUM 4.0  --   --   --   --   --  4.2  --  4.0  --  4.2  --  4.1  --  3.9   CHLORIDE 101  --   --   --   --   --  100  --  101  --  102  --  100  --  103   CO2 24  --   --   --   --   --  24  --  20*  --  17*  --  17*  --  17*   BUN 35.9*  --   --   --   --   --  51.8*  --  79.2*  --  107.0*  --  95.2*  --  94.5*   CR 3.31*  --   --   --   --   --  3.70*  --  4.66*  --  5.78*  --  5.62*  --  5.45*   * 148* 234* 130* 180* 119* 119*   < > 112*   < > 132*   < > 184*   < > 131*   ALBARO 9.1  --   --   --   --   --  8.6*  --  9.0  --  9.5  --  9.5  --  9.5    < > = values in this interval not displayed.       INR  No lab results found in last 7 days.       Recent Labs   Lab Test 02/16/24  0832 02/15/24  0629 02/11/24  0754 02/10/24  0623 02/05/24  0426 02/04/24  1854   INR  --   --   --  1.25*  --  1.23*   WBC 6.6 5.6   < > 6.6   < > 5.9   HGB 9.5* 9.1*   < > 9.8*   < > 10.4*    170   < > 209   < > 119*    < > = values in this interval not displayed.       Personally reviewed current labs    Sheri MATHIS-BC  [facility]  [phone]

## 2024-02-17 NOTE — PLAN OF CARE
Problem: Fatigue  Goal: Improved Activity Tolerance  Outcome: Progressing     Problem: Gas Exchange Impaired  Goal: Optimal Gas Exchange  Outcome: Progressing   Pt oriented x4, forgetful. Feeling very tired today, taking medications as prescribed, one at a time. X2 urine occurrences during shift. PRN tylenol and melatonin provided for pt comfort. CVC dressing CDI.

## 2024-02-17 NOTE — CONSULTS
"Care Management Follow Up    Length of Stay (days): 13    Expected Discharge Date: 02/19/2024     Concerns to be Addressed: discharge planning     Patient plan of care discussed at interdisciplinary rounds: Yes    Anticipated Discharge Disposition:  ([facility] (TCU))  Disposition Comments: [facility] (U)  Anticipated Discharge Services:  (TBD)  Anticipated Discharge DME: None    Patient/family educated on Medicare website which has current facility and service quality ratings:    Education Provided on the Discharge Plan: Yes  Patient/Family in Agreement with the Plan: yes    Referrals Placed by CM/SW:  [facility]  Private pay costs discussed: Not applicable    Additional Information:    CM consulted for OP [facility] Placement.    [facility] Placement Portal - Initiated, Request#[id number].  CM to submit either QFT gold labs or CXR with addendum addressing \"no signs of active TB\"    Dr. Yung of [facility] states he has requested Radiology addend pt's CXR (no active signs of TB).    [facility] - accepted this pt for placement when medically ready.  TCU may need to submit a new [facility] auth closer to discharge date.    Per previous CM note: \"CM will need to arrange outpatient dialysis (IF needed) and arrange the first 2-3 rides from TCU to dialysis. Roe Lakhani requests [facility] [employer]. CM to call [facility] [employer] to arrange (phone # [phone]). \"    CM will continue to follow.    Satinder Tyler RN    "

## 2024-02-17 NOTE — PROVIDER NOTIFICATION
Patient blood pressure 134/60 and pulse 53 currently. Due for hydralazine 100 mg. MD paged with updated vitals. Verbal OK provided to hold this dose of hydralazine due to normal vitals.

## 2024-02-17 NOTE — PROGRESS NOTES
[facility]    Progress Note - Hospitalist Service       Date of Admission:  2/2/2024    Assessment & Plan   Josefina Dumont is a 73 year old female admitted on 2/2/2024. She has a history of CHF with recent hospitalization 12/28/23-12/31/23, recent community acquired pneumonia, history of ischemic CVA, history of meningioma, history of CAD, type 2 diabetes mellitus and was admitted for shortness of breath and found to be in severe sepsis. 2/4/24-2/5/24 overnight events notable for stroke code secondary to aphasia and inattention also in the setting of fever to 102.5F and RR 35. Head MRI was unremarkable. Cefepime was switched to meropenem given concern for encephalopathy 2/2 worsening carbapenems. IV acyclovir was also added to the regimen and ID consult, LP orders were placed. On 2/5/24 ID added doxycycline to medication regimen. On 2/6/24, patient reported continued improvement in her condition. However, overnight events 2/6/24-2/7/24 were notable for likely hospital delirium and her mental status has continued to wax and wane. CT head, chest, abdomen, pelvis was repeated 2/8/24 in light of continued AMS of unclear etiology; results showed cirrhosis but were otherwise unremarkable. Metabolic encephalopathy was on the differential given cirrhosis though LFTs have been wnl and patient is stooling daily; trial of empiric lactulose but no further recommendations from GI. EEG per Neurology was performed 2/9/24, read was unremarkable for seizure activity. Antibiotics were finished 2/13/24, ID has signed off. At this time, patient remains hospitalized for worsening kidney function/uremia, thought to be multifactorial in nature though differential remains open and broad. Patient was initiated on HD yesterday. Planning for TCU on discharge once medically ready. Mental status has greatly improved throughout the past week, she is fully oriented and conversing appropriately.    Severe sepsis  due to unknown source; resolved   Acute hypoxic respiratory failure, resolved  Acute fever of unknown origin, resolved  Concern for CHF exacerbation, resolved  Delirium, resolved  Patient initially presented with worsening shortness of breath, orthopnea, dyspnea on exertion, in the setting of elevated BNP and missed dose of Lasix, CHF exacerbation was initially high on the differential. She was febrile w/Tmax of 101.3. CT chest on presentation showed no acute findings, mild pulmonary interstitial edema, small pericardial effusion. CRP elevated to 28.10 and procalcitonin of 5.80 on 2/4/24; CRP elevated to 54.20 and procalcitonin elevated to 11.70 on 2/5/24. See 2/4/24-2/5/24 overnight notes for significant events including aphasia, inattention, fever to 102.5F despite broad-spectrum antibiotics prompting additional aggressive treatment measures and workup. Differential remains broad at this time. LP and TTE unremarkable. Patient's condition had been improving 2/6/24 but then had onset of delirium overnight and continues to wax and wane. Possible hospital delirium in the setting of sleep deprivation, disorientation contributing. However, prudent to rule-out other potential causes given the distinct change from patient's baseline and prolonged delirium. CT head, chest, abdomen, pelvis was repeated 2/8/24 in light of continued AMS of unclear etiology; results showed cirrhosis but were otherwise unremarkable. Metabolic encephalopathy was on the differential given cirrhosis though LFTs have been wnl and patient is stooling daily. EEG per Neurology was performed 2/9/24 read as unremarkable for seizure activity with slow background in theta and delta range that suggests a nonspecific generalized cerebral dysfunction. Neurology has signed off. ID also signed off as patient will finished course of antibiotics. At this time, mental status has greatly improved.  -Delirium precautions  -Cardiac/low-sodium diet  -Supplemental  oxygen as needed  -ID consulted, signed off 2/13/24  -Continue Levaquin renally dosed for 7 days from 2/8/2024. Order updated. (Course has been completed)  -GI following, note low concern for hepatic encephalopathy   Consider lymph node biopsy if does not continue to improve clinically.  -Monitor mental status, and glucose.  -Discussed with Neurology, EEG read as above, signed off 2/10/24  -GI consulted 2/9/24, no further recommendations beyond trial of empiric lactulose (trial completed)  -Nephrology consulted and continues to follow, appreciate recommendations   -Continue expectant management (avoid nephrotoxins, renal dose medications, avoid hypo/hypertension, daily wts, daily I/O), follow for FELIPA recovery  -Clinical coordinator, was consulted to arrange OP dialysis unit upon discharge.     -Continue IV diuresis, follow UO (can hold if ongoing no UO)  -MWF HD while here, 3rd initiation run today  -Continue to HOLD ARB  -Continue Clear Ensure oral nutrition supplement  -BMP daily    FELIPA  S/p HD (2/14/24, 2/15/24, planned for 2/16/24)  Baseline creatinine around 0.8-0.9, had stabilized ~2.3 initially but then trended upward with the highest being 5.78 on 2/14/24. Several medications may have been contributing to FELIPA though etiology remains unclear; creatinine worsened several days s/p contrast administration and continued to worsen despite discontinuation of nephrotoxic IV antibiotics. Nephrology has been following, appreciate their cares and recommendations.  -Nephrology following, see recommendations as above    Hypokalemia, resolved  -Standard replacement protocol      Mild normocytic anemia  Hemoglobin 11.2 on admission. Has since been stable at ~10. Drop consistent with hemodilution.     Hypertension  -Continue PTA amlodipine  -Continue PTA hydralazine  -Holding PTA losartan in light of FELIPA; will continue to reassess as needing given blood pressure has been high-normal  -Continue PTA metoprolol tartrate      Prolonged QT  QTc of 472.  -Avoid QT prolonging medications  -Note that Celexa comes with risk for QT prolongation but benefit of anxiety treatment outweighing risk, will continue to monitor    DM2   Most recent A1c 6.6 1 month ago.  -Holding PTA glipizide; blood sugars have been well-controlled through hospitalization so will likely hold on discharge as well  -Low sliding scale insulin     Anxiety/depression  -Continue PTA Celexa   -Patient was given hydroxyzine 2/15/24 PM for anxiety/insomnia; she felt like it may have caused some of her grogginess this morning, so would consider other medication options first    Chronic:  History of ischemic CVA: Continue PTA Plavix  GERD: Continue PTA Tums, continue PTA Protonix  Constipation: Continue PTA senna docusate  Asthma: Continue PTA albuterol as needed        Diet: Regular Diet Adult  Snacks/Supplements Adult: Nepro Oral Supplement; Between Meals    DVT Prophylaxis: Heparin  Machuca Catheter: Not present  Fluids: po  Lines: PRESENT      CVC Double Lumen Right Internal jugular Tunneled-Site Assessment: WDL    Cardiac Monitoring: None  Code Status: Full Code           Disposition Plan      Expected Discharge Date: 02/19/2024    Discharge Delays: Dialysis - arrange outpatient  Destination: home  Discharge Comments: Need prior authorization, OP HD setup, and [facility] accepted        The patient's care was discussed with the Attending Physician, Dr. Rea .    Johan Yung MD   Hospitalist Service  [facility]  Securely message with [employer] (more info)  Text page via [facility] Paging/Directory   ______________________________________________________________________    Interval History   Continues to have trouble sleeping.  She states that this is due to not being able to get comfortable.  Reports that she peed a lot yesterday.  Sister present in the room this morning.  We discussed trying to avoid napping during the day today to see  if this improves her sleep tonight.    Physical Exam   Vital Signs: Temp: 98  F (36.7  C) Temp src: Oral BP: (!) 141/62 Pulse: 60   Resp: 20 SpO2: 92 % O2 Device: None (Room air)    Weight: 236 lbs 0 oz    GENERAL: Alert and no acute distress. Sitting up comfortably in bed.  HEENT: Normocephalic, atraumatic, no rhinorrhea, moist mucus membranes.  RESP: Anterior lung fields with good airflow, clear to auscultation, no coarse lung sounds. Nonlabored breathing on room air.   CV: Regular rate and rhythm, systolic murmur.  MS: No gross musculoskeletal defects noted, mild edema to the hips bilaterally.  NEURO: Alert, responding to questions appropriately, good recall of recent events.  PSYCH: Appropriate mood and affect.      Data     I have personally reviewed the following data over the past 24 hrs:    N/A  \   N/A   / N/A     136 101 35.9 (H) /  175 (H)   4.0 24 3.31 (H) \       Imaging results reviewed over the past 24 hrs:   No results found for this or any previous visit (from the past 24 hour(s)).

## 2024-02-17 NOTE — PLAN OF CARE
Goal Outcome Evaluation:    A & O x 4. Calm/cooperative. VSS. Denies pain. Less fatigued today. Minimal nausea; TUMS PRN x 2 with relief. Labs continue to improve. No dialysis today. K+ protocol; recheck in the morning. Blood sugars stable. IV is SL. CVC dressing CDI.     Regular diet. Assist of 1-2 with walker/GB. Noted increase in overall strength. Alarms on for safety. Up to chair today. Call light education provided.     Discharge pending ongoing clinical improvement and out-patient dialysis set-up.    Problem: Risk for Delirium  Goal: Improved Behavioral Control  Intervention: Minimize Safety Risk  Recent Flowsheet Documentation  Taken 2/17/2024 0912 by Jazmin Frazier, RN  Enhanced Safety Measures: room near unit station     Problem: Hemodialysis  Goal: Safe, Effective Therapy Delivery  Intervention: Optimize Device Care and Function  Recent Flowsheet Documentation  Taken 2/17/2024 0912 by Jazmin Frazier, RN  Medication Review/Management: medications reviewed     Problem: Comorbidity Management  Goal: Blood Glucose Levels Within Targeted Range  Intervention: Monitor and Manage Glycemia  Recent Flowsheet Documentation  Taken 2/17/2024 0912 by Jazmin Frazier, RN  Medication Review/Management: medications reviewed

## 2024-02-18 LAB
ANION GAP SERPL CALCULATED.3IONS-SCNC: 12 MMOL/L (ref 7–15)
BUN SERPL-MCNC: 44.8 MG/DL (ref 8–23)
CALCIUM SERPL-MCNC: 8.8 MG/DL (ref 8.8–10.2)
CHLORIDE SERPL-SCNC: 98 MMOL/L (ref 98–107)
CREAT SERPL-MCNC: 3.9 MG/DL (ref 0.51–0.95)
DEPRECATED HCO3 PLAS-SCNC: 23 MMOL/L (ref 22–29)
EGFRCR SERPLBLD CKD-EPI 2021: 12 ML/MIN/1.73M2
GLUCOSE BLDC GLUCOMTR-MCNC: 129 MG/DL (ref 70–99)
GLUCOSE BLDC GLUCOMTR-MCNC: 136 MG/DL (ref 70–99)
GLUCOSE BLDC GLUCOMTR-MCNC: 147 MG/DL (ref 70–99)
GLUCOSE BLDC GLUCOMTR-MCNC: 177 MG/DL (ref 70–99)
GLUCOSE SERPL-MCNC: 121 MG/DL (ref 70–99)
PLATELET # BLD AUTO: 130 10E3/UL (ref 150–450)
POTASSIUM SERPL-SCNC: 4.1 MMOL/L (ref 3.4–5.3)
POTASSIUM SERPL-SCNC: 4.4 MMOL/L (ref 3.4–5.3)
SODIUM SERPL-SCNC: 133 MMOL/L (ref 135–145)

## 2024-02-18 PROCEDURE — 250N000013 HC RX MED GY IP 250 OP 250 PS 637

## 2024-02-18 PROCEDURE — 80048 BASIC METABOLIC PNL TOTAL CA: CPT

## 2024-02-18 PROCEDURE — 36415 COLL VENOUS BLD VENIPUNCTURE: CPT | Performed by: FAMILY MEDICINE

## 2024-02-18 PROCEDURE — 250N000013 HC RX MED GY IP 250 OP 250 PS 637: Performed by: INTERNAL MEDICINE

## 2024-02-18 PROCEDURE — 120N000001 HC R&B MED SURG/OB

## 2024-02-18 PROCEDURE — 99231 SBSQ HOSP IP/OBS SF/LOW 25: CPT | Mod: GC

## 2024-02-18 PROCEDURE — 250N000013 HC RX MED GY IP 250 OP 250 PS 637: Performed by: FAMILY MEDICINE

## 2024-02-18 PROCEDURE — 85049 AUTOMATED PLATELET COUNT: CPT | Performed by: STUDENT IN AN ORGANIZED HEALTH CARE EDUCATION/TRAINING PROGRAM

## 2024-02-18 PROCEDURE — 250N000011 HC RX IP 250 OP 636: Performed by: STUDENT IN AN ORGANIZED HEALTH CARE EDUCATION/TRAINING PROGRAM

## 2024-02-18 PROCEDURE — 84132 ASSAY OF SERUM POTASSIUM: CPT | Performed by: FAMILY MEDICINE

## 2024-02-18 PROCEDURE — 99232 SBSQ HOSP IP/OBS MODERATE 35: CPT

## 2024-02-18 RX ORDER — HYDRALAZINE HYDROCHLORIDE 10 MG/1
10 TABLET, FILM COATED ORAL 3 TIMES DAILY PRN
Status: DISCONTINUED | OUTPATIENT
Start: 2024-02-18 | End: 2024-02-19

## 2024-02-18 RX ADMIN — ACETAMINOPHEN 1000 MG: 500 TABLET ORAL at 10:19

## 2024-02-18 RX ADMIN — Medication: at 09:23

## 2024-02-18 RX ADMIN — DICLOFENAC 2 G: 10 GEL TOPICAL at 16:02

## 2024-02-18 RX ADMIN — SODIUM BICARBONATE 650 MG TABLET 1300 MG: at 09:20

## 2024-02-18 RX ADMIN — HEPARIN SODIUM 5000 UNITS: 5000 INJECTION, SOLUTION INTRAVENOUS; SUBCUTANEOUS at 00:05

## 2024-02-18 RX ADMIN — Medication: at 20:46

## 2024-02-18 RX ADMIN — DICLOFENAC 2 G: 10 GEL TOPICAL at 13:05

## 2024-02-18 RX ADMIN — HYDRALAZINE HYDROCHLORIDE 100 MG: 25 TABLET, FILM COATED ORAL at 20:46

## 2024-02-18 RX ADMIN — METOPROLOL TARTRATE 75 MG: 25 TABLET, FILM COATED ORAL at 20:46

## 2024-02-18 RX ADMIN — DICLOFENAC 2 G: 10 GEL TOPICAL at 20:46

## 2024-02-18 RX ADMIN — Medication 1 MG: at 20:52

## 2024-02-18 RX ADMIN — CALCIUM CARBONATE (ANTACID) CHEW TAB 500 MG 500 MG: 500 CHEW TAB at 11:00

## 2024-02-18 RX ADMIN — DICLOFENAC 2 G: 10 GEL TOPICAL at 09:19

## 2024-02-18 RX ADMIN — HEPARIN SODIUM 5000 UNITS: 5000 INJECTION, SOLUTION INTRAVENOUS; SUBCUTANEOUS at 16:02

## 2024-02-18 RX ADMIN — INSULIN ASPART 1 UNITS: 100 INJECTION, SOLUTION INTRAVENOUS; SUBCUTANEOUS at 16:55

## 2024-02-18 RX ADMIN — CLOPIDOGREL BISULFATE 75 MG: 75 TABLET ORAL at 09:20

## 2024-02-18 RX ADMIN — HYDRALAZINE HYDROCHLORIDE 100 MG: 25 TABLET, FILM COATED ORAL at 09:20

## 2024-02-18 RX ADMIN — AMLODIPINE BESYLATE 10 MG: 10 TABLET ORAL at 09:20

## 2024-02-18 RX ADMIN — HEPARIN SODIUM 5000 UNITS: 5000 INJECTION, SOLUTION INTRAVENOUS; SUBCUTANEOUS at 09:20

## 2024-02-18 RX ADMIN — BUMETANIDE 2 MG: 2 TABLET ORAL at 09:20

## 2024-02-18 RX ADMIN — CITALOPRAM 20 MG: 20 TABLET ORAL at 09:20

## 2024-02-18 RX ADMIN — Medication 1 TABLET: at 09:20

## 2024-02-18 ASSESSMENT — ACTIVITIES OF DAILY LIVING (ADL)
ADLS_ACUITY_SCORE: 40
ADLS_ACUITY_SCORE: 37
ADLS_ACUITY_SCORE: 37
ADLS_ACUITY_SCORE: 40
ADLS_ACUITY_SCORE: 37
ADLS_ACUITY_SCORE: 37
ADLS_ACUITY_SCORE: 40
ADLS_ACUITY_SCORE: 39
ADLS_ACUITY_SCORE: 37
ADLS_ACUITY_SCORE: 40
ADLS_ACUITY_SCORE: 37
ADLS_ACUITY_SCORE: 40

## 2024-02-18 NOTE — PROVIDER NOTIFICATION
Patient blood pressure and pulse WNL. BFM provided paged regarding mid-day dose of Hydralazine.    Per MD, hold mid-day dose and she will place parameters on dosage.

## 2024-02-18 NOTE — PROVIDER NOTIFICATION
"Page BFM MD, \"I had placed compression stocking on the patient. She was able to keep them on for a few hours but requested that they be removed due to the discomfort they are causing. They were a size large. Do you think she could benefit from lymph wraps for her LE edema?\"    MD agreed to place order for lymph team to assess patients LE and place wraps, if appropriate.   "

## 2024-02-18 NOTE — PROGRESS NOTES
[facility]    Progress Note - Hospitalist Service       Date of Admission:  2/2/2024    Assessment & Plan   Josefina Dumont is a 73 year old female admitted on 2/2/2024. She has a history of CHF with recent hospitalization 12/28/23-12/31/23, recent community acquired pneumonia, history of ischemic CVA, history of meningioma, history of CAD, type 2 diabetes mellitus and was admitted for shortness of breath and found to be in severe sepsis. 2/4/24-2/5/24 overnight events notable for stroke code secondary to aphasia and inattention also in the setting of fever to 102.5F and RR 35. Head MRI was unremarkable. Cefepime was switched to meropenem given concern for encephalopathy 2/2 worsening carbapenems. IV acyclovir was also added to the regimen and ID consult, LP orders were placed. On 2/5/24 ID added doxycycline to medication regimen. On 2/6/24, patient reported continued improvement in her condition. However, overnight events 2/6/24-2/7/24 were notable for likely hospital delirium and her mental status has continued to wax and wane. CT head, chest, abdomen, pelvis was repeated 2/8/24 in light of continued AMS of unclear etiology; results showed cirrhosis but were otherwise unremarkable. Metabolic encephalopathy was on the differential given cirrhosis though LFTs have been wnl and patient is stooling daily; trial of empiric lactulose but no further recommendations from GI. EEG per Neurology was performed 2/9/24, read was unremarkable for seizure activity. Antibiotics were finished 2/13/24, ID has signed off. At this time, patient remains hospitalized for worsening kidney function/uremia, thought to be multifactorial in nature though differential remains open and broad. Patient was initiated on HD on 2/14/24. Planning for TCU on discharge once outpatient HD plan is setup. Mental status has greatly improved throughout the past week, she is fully oriented and conversing  appropriately.    Severe sepsis due to unknown source; resolved   Acute hypoxic respiratory failure, resolved  Acute fever of unknown origin, resolved  Concern for CHF exacerbation, resolved  Delirium, resolved  Patient initially presented with worsening shortness of breath, orthopnea, dyspnea on exertion, in the setting of elevated BNP and missed dose of Lasix, CHF exacerbation was initially high on the differential. She was febrile w/Tmax of 101.3. CT chest on presentation showed no acute findings, mild pulmonary interstitial edema, small pericardial effusion. CRP elevated to 28.10 and procalcitonin of 5.80 on 2/4/24; CRP elevated to 54.20 and procalcitonin elevated to 11.70 on 2/5/24. See 2/4/24-2/5/24 overnight notes for significant events including aphasia, inattention, fever to 102.5F despite broad-spectrum antibiotics prompting additional aggressive treatment measures and workup. Differential remains broad at this time. LP and TTE unremarkable. Patient's condition had been improving 2/6/24 but then had onset of delirium overnight and continues to wax and wane. Possible hospital delirium in the setting of sleep deprivation, disorientation contributing. However, prudent to rule-out other potential causes given the distinct change from patient's baseline and prolonged delirium. CT head, chest, abdomen, pelvis was repeated 2/8/24 in light of continued AMS of unclear etiology; results showed cirrhosis but were otherwise unremarkable. Metabolic encephalopathy was on the differential given cirrhosis though LFTs have been wnl and patient is stooling daily. EEG per Neurology was performed 2/9/24 read as unremarkable for seizure activity with slow background in theta and delta range that suggests a nonspecific generalized cerebral dysfunction. Neurology has signed off. ID also signed off as patient will finished course of antibiotics. At this time, mental status has greatly improved.  -Delirium  precautions  -Cardiac/low-sodium diet  -Supplemental oxygen as needed  -ID consulted, signed off 2/13/24  -Continue Levaquin renally dosed for 7 days from 2/8/2024. Order updated. (Course has been completed)  -GI following, note low concern for hepatic encephalopathy   Consider lymph node biopsy if does not continue to improve clinically.  -Monitor mental status, and glucose.  -Discussed with Neurology, EEG read as above, signed off 2/10/24  -GI consulted 2/9/24, no further recommendations beyond trial of empiric lactulose (trial completed)  -Nephrology consulted and continues to follow, appreciate recommendations   -Continue expectant management (avoid nephrotoxins, renal dose medications, avoid hypo/hypertension, daily wts, daily I/O), follow for FELIPA recovery  -Clinical coordinator, was consulted to arrange OP dialysis unit upon discharge.     -Continue IV diuresis, follow UO (can hold if ongoing no UO)  -MWF HD while here  -Continue to HOLD ARB  -Continue Clear Ensure oral nutrition supplement  -BMP daily    FELIPA  S/p HD (2/14/24, 2/15/24, 2/16/24)  Baseline creatinine around 0.8-0.9, had stabilized ~2.3 initially but then trended upward with the highest being 5.78 on 2/14/24. Several medications may have been contributing to FELIPA though etiology remains unclear; creatinine worsened several days s/p contrast administration and continued to worsen despite discontinuation of nephrotoxic IV antibiotics. Nephrology has been following, appreciate their cares and recommendations.  -Nephrology following, see recommendations as above    Hypokalemia, resolved  -Standard replacement protocol      Mild normocytic anemia  Hemoglobin 11.2 on admission. Has since been stable at ~10. Drop consistent with hemodilution.     Hypertension  -Continue PTA amlodipine  -Continue PTA hydralazine  -Holding PTA losartan in light of FELIPA; will continue to reassess as needing given blood pressure has been high-normal  -Continue PTA metoprolol  tartrate     Prolonged QT  QTc of 472.  -Avoid QT prolonging medications  -Note that Celexa comes with risk for QT prolongation but benefit of anxiety treatment outweighing risk, will continue to monitor    DM2   Most recent A1c 6.6 1 month ago.  -Holding PTA glipizide; blood sugars have been well-controlled through hospitalization so will likely hold on discharge as well  -Low sliding scale insulin     Anxiety/depression  -Continue PTA Celexa   -Patient was given hydroxyzine 2/15/24 PM for anxiety/insomnia; she felt like it may have caused some of her grogginess this morning, so would consider other medication options first  -Bedside RN noticed air mattress of bed wasn't being used, so she left a note on the whiteboard to remember to turn that on at night for comfort    Bilaterally lower extremity edema  BLE edema. Equal bilaterally, no skin changes, no specific tenderness to palpation but overall uncomfortable due to sensation of fullness per patient.  -Compression  -Voltaren PRN  -Anticipate edema will improve with continued dialysis    Chronic:  History of ischemic CVA: Continue PTA Plavix  GERD: Continue PTA Tums, continue PTA Protonix  Constipation: Continue PTA senna docusate  Asthma: Continue PTA albuterol as needed        Diet: Regular Diet Adult  Snacks/Supplements Adult: Nepro Oral Supplement; Between Meals    DVT Prophylaxis: Heparin  Machuca Catheter: Not present  Fluids: po  Lines: PRESENT      CVC Double Lumen Right Internal jugular Tunneled-Site Assessment: WDL    Cardiac Monitoring: None  Code Status: Full Code           Disposition Plan     Expected Discharge Date: 02/19/2024    Discharge Delays: Dialysis - arrange outpatient  Destination: home  Discharge Comments: Need prior authorization, OP HD setup, and [facility] accepted        The patient's care was discussed with the Attending Physician, Dr. Rea .    OSKAR BUTLER MD   Hospitalist Service  [facility]  Hospital  Securely message with [employer] (more info)  Text page via [facility] Paging/Directory   ______________________________________________________________________    Interval History   Overall doing well this morning, working on ordering breakfast. Appetite has been poor but no nausea or abdominal discomfort. Sleep continues to be poor. She could not get comfortable in bed last night, ultimately got up and moved to the recliner to sleep. Looking forward to discharge to TCU in the coming days. Reports fullness/swelling in lower legs bilaterally. Legs are painful but only in the context of fullness.    Physical Exam   Vital Signs: Temp: 97.8  F (36.6  C) Temp src: Oral BP: (!) 183/73 Pulse: 53   Resp: 18 SpO2: 93 % O2 Device: None (Room air)    Weight: 236 lbs 0 oz    GENERAL: Alert and no acute distress. Sitting up comfortably in bed.  HEENT: Normocephalic, atraumatic, no rhinorrhea, moist mucus membranes.  RESP: Anterior lung fields with good airflow, clear to auscultation, no coarse lung sounds. Nonlabored breathing on room air.   CV: Regular rate and rhythm, systolic murmur.  MS: No gross musculoskeletal defects noted. Moderate edema of the BLE to the knees.  NEURO: Alert, responding to questions appropriately, good recall of recent events.  PSYCH: Appropriate mood and affect.      Data     I have personally reviewed the following data over the past 24 hrs:    N/A  \   N/A   / 130 (L)     133 (L) 98 44.8 (H) /  129 (H)   4.4; 4.1 23 3.90 (H) \       Imaging results reviewed over the past 24 hrs:   No results found for this or any previous visit (from the past 24 hour(s)).

## 2024-02-18 NOTE — PLAN OF CARE
Goal Outcome Evaluation:    A & O x 4. VSS. Pain in LE's. Increased LE edema noted. Tylenol provided. Compression socks applied. Patient stated compression socks caused pain. MD informed and Lymph consult ordered of patient. TUMS provided for reflux symptoms after eating. K+ protocol; recheck in the morning. Blood sugars stable. IV is SL. CVC dressing CDI. Still voiding. Dialysis schedule is MWF.     Regular diet. Assist of 1 with walker/GB. Alarms on for safety. Call light education provided.     Discharge pending ongoing clinical improvement and out-patient dialysis set-up.    Problem: Hemodialysis  Goal: Safe, Effective Therapy Delivery  Outcome: Progressing  Intervention: Optimize Device Care and Function  Recent Flowsheet Documentation  Taken 2/18/2024 0955 by Jazmin Frazier, RN  Medication Review/Management: medications reviewed     Problem: Oral Intake Inadequate  Goal: Improved Oral Intake  Outcome: Progressing     Problem: Pain Acute  Goal: Optimal Pain Control and Function  Intervention: Prevent or Manage Pain  Recent Flowsheet Documentation  Taken 2/18/2024 0955 by Jazmni Frazier, RN  Sensory Stimulation Regulation:   care clustered   quiet environment promoted  Medication Review/Management: medications reviewed     Problem: Comorbidity Management  Goal: Blood Glucose Levels Within Targeted Range  Intervention: Monitor and Manage Glycemia  Recent Flowsheet Documentation  Taken 2/18/2024 0955 by Jazmin Frazier, RN  Medication Review/Management: medications reviewed     Problem: Fever  Goal: Body Temperature in Desired Range  Outcome: Adequate for Care Transition     Problem: Risk for Delirium  Goal: Improved Attention and Thought Clarity  Intervention: Maximize Cognitive Function  Recent Flowsheet Documentation  Taken 2/18/2024 0955 by Jazmin Frazier, RN  Sensory Stimulation Regulation:   care clustered   quiet environment promoted  Reorientation Measures:   calendar in view   clock in view

## 2024-02-19 ENCOUNTER — APPOINTMENT (OUTPATIENT)
Dept: PHYSICAL THERAPY | Facility: CLINIC | Age: 74
DRG: 871 | End: 2024-02-19
Payer: COMMERCIAL

## 2024-02-19 ENCOUNTER — APPOINTMENT (OUTPATIENT)
Dept: OCCUPATIONAL THERAPY | Facility: CLINIC | Age: 74
DRG: 871 | End: 2024-02-19
Payer: COMMERCIAL

## 2024-02-19 LAB
ANION GAP SERPL CALCULATED.3IONS-SCNC: 13 MMOL/L (ref 7–15)
BACTERIA CSF CULT: NORMAL
BUN SERPL-MCNC: 50.8 MG/DL (ref 8–23)
CALCIUM SERPL-MCNC: 9.3 MG/DL (ref 8.8–10.2)
CHLORIDE SERPL-SCNC: 97 MMOL/L (ref 98–107)
CREAT SERPL-MCNC: 4.15 MG/DL (ref 0.51–0.95)
DEPRECATED HCO3 PLAS-SCNC: 23 MMOL/L (ref 22–29)
EGFRCR SERPLBLD CKD-EPI 2021: 11 ML/MIN/1.73M2
GAMMA INTERFERON BACKGROUND BLD IA-ACNC: 0.08 IU/ML
GLUCOSE BLDC GLUCOMTR-MCNC: 133 MG/DL (ref 70–99)
GLUCOSE BLDC GLUCOMTR-MCNC: 136 MG/DL (ref 70–99)
GLUCOSE BLDC GLUCOMTR-MCNC: 146 MG/DL (ref 70–99)
GLUCOSE BLDC GLUCOMTR-MCNC: 163 MG/DL (ref 70–99)
GLUCOSE SERPL-MCNC: 139 MG/DL (ref 70–99)
HOLD SPECIMEN: NORMAL
M TB IFN-G BLD-IMP: NEGATIVE
M TB IFN-G CD4+ BCKGRND COR BLD-ACNC: 0.5 IU/ML
MITOGEN IGNF BCKGRD COR BLD-ACNC: 0 IU/ML
MITOGEN IGNF BCKGRD COR BLD-ACNC: 0.01 IU/ML
POTASSIUM SERPL-SCNC: 4.1 MMOL/L (ref 3.4–5.3)
QUANTIFERON MITOGEN: 0.58 IU/ML
QUANTIFERON NIL TUBE: 0.08 IU/ML
QUANTIFERON TB1 TUBE: 0.08 IU/ML
QUANTIFERON TB2 TUBE: 0.09
SODIUM SERPL-SCNC: 133 MMOL/L (ref 135–145)

## 2024-02-19 PROCEDURE — 80048 BASIC METABOLIC PNL TOTAL CA: CPT

## 2024-02-19 PROCEDURE — 120N000001 HC R&B MED SURG/OB

## 2024-02-19 PROCEDURE — 634N000001 HC RX 634: Mod: JZ | Performed by: PHYSICIAN ASSISTANT

## 2024-02-19 PROCEDURE — 97535 SELF CARE MNGMENT TRAINING: CPT | Mod: GP

## 2024-02-19 PROCEDURE — 99232 SBSQ HOSP IP/OBS MODERATE 35: CPT | Mod: GC

## 2024-02-19 PROCEDURE — 250N000011 HC RX IP 250 OP 636

## 2024-02-19 PROCEDURE — 250N000013 HC RX MED GY IP 250 OP 250 PS 637

## 2024-02-19 PROCEDURE — 90945 DIALYSIS ONE EVALUATION: CPT | Performed by: PHYSICIAN ASSISTANT

## 2024-02-19 PROCEDURE — 97110 THERAPEUTIC EXERCISES: CPT | Mod: GO

## 2024-02-19 PROCEDURE — 90935 HEMODIALYSIS ONE EVALUATION: CPT

## 2024-02-19 PROCEDURE — 250N000013 HC RX MED GY IP 250 OP 250 PS 637: Performed by: FAMILY MEDICINE

## 2024-02-19 PROCEDURE — 97535 SELF CARE MNGMENT TRAINING: CPT | Mod: GO

## 2024-02-19 PROCEDURE — 250N000011 HC RX IP 250 OP 636: Performed by: STUDENT IN AN ORGANIZED HEALTH CARE EDUCATION/TRAINING PROGRAM

## 2024-02-19 PROCEDURE — 36415 COLL VENOUS BLD VENIPUNCTURE: CPT

## 2024-02-19 PROCEDURE — 258N000003 HC RX IP 258 OP 636

## 2024-02-19 RX ORDER — HYDRALAZINE HYDROCHLORIDE 10 MG/1
10 TABLET, FILM COATED ORAL 3 TIMES DAILY PRN
Status: DISCONTINUED | OUTPATIENT
Start: 2024-02-19 | End: 2024-02-20

## 2024-02-19 RX ADMIN — HYDRALAZINE HYDROCHLORIDE 100 MG: 25 TABLET, FILM COATED ORAL at 13:11

## 2024-02-19 RX ADMIN — EPOETIN ALFA-EPBX 10000 UNITS: 10000 INJECTION, SOLUTION INTRAVENOUS; SUBCUTANEOUS at 13:13

## 2024-02-19 RX ADMIN — METOPROLOL TARTRATE 75 MG: 25 TABLET, FILM COATED ORAL at 20:52

## 2024-02-19 RX ADMIN — Medication: at 09:56

## 2024-02-19 RX ADMIN — CALCIUM CARBONATE (ANTACID) CHEW TAB 500 MG 500 MG: 500 CHEW TAB at 12:45

## 2024-02-19 RX ADMIN — HEPARIN SODIUM 5000 UNITS: 5000 INJECTION, SOLUTION INTRAVENOUS; SUBCUTANEOUS at 09:46

## 2024-02-19 RX ADMIN — HEPARIN SODIUM 5000 UNITS: 5000 INJECTION, SOLUTION INTRAVENOUS; SUBCUTANEOUS at 23:52

## 2024-02-19 RX ADMIN — INSULIN ASPART 1 UNITS: 100 INJECTION, SOLUTION INTRAVENOUS; SUBCUTANEOUS at 18:03

## 2024-02-19 RX ADMIN — HEPARIN SODIUM 1900 UNITS: 1000 INJECTION INTRAVENOUS; SUBCUTANEOUS at 10:18

## 2024-02-19 RX ADMIN — HYDRALAZINE HYDROCHLORIDE 100 MG: 25 TABLET, FILM COATED ORAL at 20:51

## 2024-02-19 RX ADMIN — CLOPIDOGREL BISULFATE 75 MG: 75 TABLET ORAL at 12:44

## 2024-02-19 RX ADMIN — FAMOTIDINE 10 MG: 10 TABLET ORAL at 12:45

## 2024-02-19 RX ADMIN — HEPARIN SODIUM 1900 UNITS: 1000 INJECTION INTRAVENOUS; SUBCUTANEOUS at 10:19

## 2024-02-19 RX ADMIN — SENNOSIDES AND DOCUSATE SODIUM 1 TABLET: 8.6; 5 TABLET ORAL at 20:51

## 2024-02-19 RX ADMIN — Medication: at 20:52

## 2024-02-19 RX ADMIN — DICLOFENAC 2 G: 10 GEL TOPICAL at 09:57

## 2024-02-19 RX ADMIN — ACETAMINOPHEN 1000 MG: 500 TABLET ORAL at 22:11

## 2024-02-19 RX ADMIN — INSULIN ASPART 1 UNITS: 100 INJECTION, SOLUTION INTRAVENOUS; SUBCUTANEOUS at 12:47

## 2024-02-19 RX ADMIN — Medication 1 MG: at 22:11

## 2024-02-19 RX ADMIN — SODIUM CHLORIDE 300 ML: 9 INJECTION, SOLUTION INTRAVENOUS at 10:17

## 2024-02-19 RX ADMIN — CITALOPRAM 20 MG: 20 TABLET ORAL at 16:31

## 2024-02-19 RX ADMIN — HYDRALAZINE HYDROCHLORIDE 10 MG: 10 TABLET ORAL at 16:31

## 2024-02-19 RX ADMIN — HEPARIN SODIUM 5000 UNITS: 5000 INJECTION, SOLUTION INTRAVENOUS; SUBCUTANEOUS at 00:07

## 2024-02-19 RX ADMIN — SODIUM CHLORIDE 250 ML: 9 INJECTION, SOLUTION INTRAVENOUS at 10:17

## 2024-02-19 RX ADMIN — HEPARIN SODIUM 5000 UNITS: 5000 INJECTION, SOLUTION INTRAVENOUS; SUBCUTANEOUS at 16:34

## 2024-02-19 ASSESSMENT — ACTIVITIES OF DAILY LIVING (ADL)
ADLS_ACUITY_SCORE: 42
ADLS_ACUITY_SCORE: 40
ADLS_ACUITY_SCORE: 46
ADLS_ACUITY_SCORE: 42
ADLS_ACUITY_SCORE: 42
ADLS_ACUITY_SCORE: 46
ADLS_ACUITY_SCORE: 42
ADLS_ACUITY_SCORE: 46
ADLS_ACUITY_SCORE: 42
ADLS_ACUITY_SCORE: 40

## 2024-02-19 NOTE — PROGRESS NOTES
Date: 2/19/2024  Time: 12:54 PM     Data:  Pre Wt:   107 kg  Desired Wt:   To be established  Post Wt:  104.5 kg (estimated)  Weight change: - 2.5 kg  Ultrafiltration - Post Run Net Total Removed (mL):  2500 ml  Vascular Access Status: CVC patent  Dialyzer Rinse:  Light  Total Blood Volume Processed: 69.7 L   Total Dialysis (Treatment) Time:   3 Hrs  Dialysate Bath: K 3, Ca 2.25  Heparin: Heparin: None     Lab:   No  HbsAg - 2/15/2024 (Non Reactive)  HbsAb - 2/15/2024 <3.50 (Susceptible)     Interventions:  Dialysis done through Right subclavian CVC  UF set to 2.8 Liters of fluid removal, accommodating priming and rinse back volumes  ,   No dialysis related medications administered   CVC dressing changed aseptically  See Flowsheet for Crit Profile throughout the run  Treatment has ended safely and blood is rinsed back completely  Catheter lumens flushed with saline and locked with Heparin, catheter caps (ClearGuard ) changed post HD  Report given to Tressa Duenas RN, left in her room in stable condition     Assessment:  A/O x 4, calm & cooperative, denies pain  CVC intact, previous dressing clean and dry                Plan:    Dialysis every MWF while inpatient  Per Renal team        REBECCA HardenN, RN  Acute Dialysis RN  [facility]

## 2024-02-19 NOTE — PLAN OF CARE
Problem: Fatigue  Goal: Improved Activity Tolerance  Outcome: Progressing     Problem: Gas Exchange Impaired  Goal: Optimal Gas Exchange  Outcome: Progressing   Pt oriented x4. Ambulating with assist of 1-2. 2 incontinent Bms overnight. Voiding spontaneously. BLE still quite edematous, pt unable to tolerate compression stockings for very long d/t pain. Taking medications as prescribed, VSS. CVC intact.

## 2024-02-19 NOTE — PROGRESS NOTES
[facility]    Progress Note - Hospitalist Service       Date of Admission:  2/2/2024    Assessment & Plan   Josefina Dumont is a 73 year old female admitted on 2/2/2024. She has a history of CHF with recent hospitalization 12/28/23-12/31/23, recent community acquired pneumonia, history of ischemic CVA, history of meningioma, history of CAD, type 2 diabetes mellitus and was admitted for shortness of breath and found to be in severe sepsis. 2/4/24-2/5/24 overnight events notable for stroke code secondary to aphasia and inattention also in the setting of fever to 102.5F and RR 35. Head MRI was unremarkable. Cefepime was switched to meropenem given concern for encephalopathy 2/2 worsening carbapenems. IV acyclovir was also added to the regimen and ID consult, LP orders were placed. On 2/5/24 ID added doxycycline to medication regimen. On 2/6/24, patient reported continued improvement in her condition. However, overnight events 2/6/24-2/7/24 were notable for likely hospital delirium and her mental status has continued to wax and wane. CT head, chest, abdomen, pelvis was repeated 2/8/24 in light of continued AMS of unclear etiology; results showed cirrhosis but were otherwise unremarkable. Metabolic encephalopathy was on the differential given cirrhosis though LFTs have been wnl and patient is stooling daily; trial of empiric lactulose but no further recommendations from GI. EEG per Neurology was performed 2/9/24, read was unremarkable for seizure activity. Antibiotics were finished 2/13/24, ID has signed off. At this time, patient remains hospitalized for worsening kidney function/uremia, thought to be multifactorial in nature though differential remains open and broad. Patient was initiated on HD on 2/14/24. Planning for TCU on discharge once outpatient HD plan is setup. Mental status has greatly improved throughout the past week, she is fully oriented and conversing  appropriately.    Discharge currently pending establishment of outpatient HD.    Severe sepsis due to unknown source; resolved   Acute hypoxic respiratory failure, resolved  Acute fever of unknown origin, resolved  Concern for CHF exacerbation, resolved  Delirium, resolved  Patient initially presented with worsening shortness of breath, orthopnea, dyspnea on exertion, in the setting of elevated BNP and missed dose of Lasix, CHF exacerbation was initially high on the differential. She was febrile w/Tmax of 101.3. CT chest on presentation showed no acute findings, mild pulmonary interstitial edema, small pericardial effusion. CRP elevated to 28.10 and procalcitonin of 5.80 on 2/4/24; CRP elevated to 54.20 and procalcitonin elevated to 11.70 on 2/5/24. See 2/4/24-2/5/24 overnight notes for significant events including aphasia, inattention, fever to 102.5F despite broad-spectrum antibiotics prompting additional aggressive treatment measures and workup. LP and TTE unremarkable. Patient's condition had been improving 2/6/24 but then had onset of delirium overnight which continued to wax and wane until improving last week. Possible hospital delirium in the setting of sleep deprivation, disorientation contributing. However, prudent to rule-out other potential causes given the distinct change from patient's baseline and prolonged delirium. CT head, chest, abdomen, pelvis was repeated 2/8/24 in light of continued AMS of unclear etiology; results showed cirrhosis but were otherwise unremarkable. Metabolic encephalopathy was on the differential given cirrhosis though LFTs have been wnl and patient is stooling daily. EEG per Neurology was performed 2/9/24 read as unremarkable for seizure activity with slow background in theta and delta range that suggests a nonspecific generalized cerebral dysfunction. Neurology has signed off. ID also signed off as patient will finished course of antibiotics. At this time, mental status has  greatly improved.  -Delirium precautions  -Cardiac/low-sodium diet  -Supplemental oxygen as needed  -ID consulted, signed off 2/13/24  -Continue Levaquin renally dosed for 7 days from 2/8/2024. Order updated. (Course has been completed)  -GI following, note low concern for hepatic encephalopathy   Consider lymph node biopsy if does not continue to improve clinically.  -Monitor mental status, and glucose.  -Discussed with Neurology, EEG read as above, signed off 2/10/24  -GI consulted 2/9/24, no further recommendations beyond trial of empiric lactulose (trial completed)  -Nephrology consulted and continues to follow, appreciate recommendations   -Creatinine uptrending between dialysis treatments.  Still needing high-volume UF with HD.  Urine output remains oliguric.  Will need to continue dialysis.  -Care management working on outpatient [facility] admissions  -Hold all NSAIDs including Voltaren  -Continue to follow intake and output.  Fluid restriction.  -Will follow for signs of recovery.  -Thanks for the consult we will follow    FELIPA  S/p HD (2/14/24, 2/15/24, 2/16/24, 2/19/24)  Baseline creatinine around 0.8-0.9, had stabilized ~2.3 initially but then trended upward with the highest being 5.78 on 2/14/24. Several medications may have been contributing to FELIPA though etiology remains unclear; creatinine worsened several days s/p contrast administration and continued to worsen despite discontinuation of nephrotoxic IV antibiotics. Nephrology has been following, appreciate their cares and recommendations.  -Nephrology following, see recommendations as above    Hypokalemia, resolved  -Standard replacement protocol      Mild normocytic anemia  Hemoglobin 11.2 on admission. Has since been stable at ~10. Drop consistent with hemodilution.     Hypertension  -Continue PTA amlodipine  -Continue PTA hydralazine  -Holding PTA losartan in light of FELIPA; will continue to reassess as needing given blood pressure has been  high-normal  -Continue PTA metoprolol tartrate     Prolonged QT  QTc of 472.  -Avoid QT prolonging medications  -Note that Celexa comes with risk for QT prolongation but benefit of anxiety treatment outweighing risk, will continue to monitor    DM2   Most recent A1c 6.6 1 month ago.  -Holding PTA glipizide; blood sugars have been well-controlled through hospitalization so will likely hold on discharge as well  -Low sliding scale insulin     Anxiety/depression  -Continue PTA Celexa   -Patient was given hydroxyzine 2/15/24 PM for anxiety/insomnia; she felt like it may have caused some of her grogginess this morning, so would consider other medication options first  -Bedside RN noticed air mattress of bed wasn't being used, so she left a note on the whiteboard to remember to turn that on at night for comfort    Bilaterally lower extremity edema  BLE edema. Equal bilaterally, no skin changes, no specific tenderness to palpation but overall uncomfortable due to sensation of fullness per patient.  -Compression  -Voltaren PRN  -Anticipate edema will improve with continued dialysis    Chronic:  History of ischemic CVA: Continue PTA Plavix  GERD: Continue PTA Tums, continue PTA Protonix  Constipation: Continue PTA senna docusate  Asthma: Continue PTA albuterol as needed        Diet: Regular Diet Adult  Snacks/Supplements Adult: Nepro Oral Supplement; Between Meals    DVT Prophylaxis: Heparin  Machuca Catheter: Not present  Fluids: po  Lines: PRESENT      CVC Double Lumen Right Internal jugular Tunneled-Site Assessment: WDL    Cardiac Monitoring: None  Code Status: Full Code           Disposition Plan     Expected Discharge Date: 02/20/2024    Discharge Delays: Dialysis - arrange outpatient  Destination: home  Discharge Comments: Need prior authorization, OP HD setup, and [facility] accepted        The patient's care was discussed with the Attending Physician, Dr. Ibarra .    OSKAR BUTLER MD   Hospitalist  [facility]  Securely message with Sheryl (more info)  Text page via [employer] Paging/Directory   ______________________________________________________________________    Interval History   Doing well this morning.  Ordered breakfast.  Hopeful for TCU discharge in the coming days.  Legs still feeling fairly swollen and full.  No chest pain, shortness of breath, lightheadedness, dizziness.  No new questions or concerns.    Physical Exam   Vital Signs: Temp: 97.7  F (36.5  C) Temp src: Oral BP: (!) 153/67 Pulse: 59   Resp: 17 SpO2: 94 % O2 Device: None (Room air)    Weight: 236 lbs 0 oz    GENERAL: Alert and no acute distress. Sitting up comfortably in bed.  HEENT: Normocephalic, atraumatic, no rhinorrhea, moist mucus membranes.  RESP: Anterior lung fields with good airflow, clear to auscultation, no coarse lung sounds. Nonlabored breathing on room air.   CV: Regular rate and rhythm, systolic murmur.  MS: No gross musculoskeletal defects noted. Moderate edema of the BLE to the knees.  NEURO: Alert, responding to questions appropriately, good recall of recent events.  PSYCH: Appropriate mood and affect.      Data     I have personally reviewed the following data over the past 24 hrs:    N/A  \   N/A   / N/A     133 (L) 97 (L) 50.8 (H) /  163 (H)   4.1 23 4.15 (H) \       Imaging results reviewed over the past 24 hrs:   No results found for this or any previous visit (from the past 24 hour(s)).

## 2024-02-19 NOTE — PROGRESS NOTES
RENAL PROGRESS NOTE    ASSESSMENT & PLAN:   Anuric FELIPA on CKD III: Hemodialysis dependent.  Hemodialysis was initiated via tunneled CVC 12/14/2024.  ARF hemodynamic ATN in the setting of septic physiology, NSAIDs, CT contrast, ACE inhibitor, elevated vancomycin level.  AIN secondary to PPI or cefepime in the differential.     Recs:  Creatinine uptrending between dialysis treatments.  Still needing high-volume UF with HD.  Urine output remains oliguric.  Will need to continue dialysis.  Care management working on outpatient [facility] admissions  Hold all NSAIDs including Voltaren  Continue to follow intake and output.  Fluid restriction.  Will follow for signs of recovery.  Thanks for the consult we will follow    Hyponatremia: Mild.  Sodium 133.  UF with HD.    Hypertension: Amlodipine 10 mg daily, metoprolol 75 mg twice daily, hydralazine 100 mg 3 times daily, Bumex 2 mg daily.  ARB on hold in setting of ARF.  UF with HD.    Normocytic anemia: Inflammatory as well as ARF.  Hemoglobin <10.0.  Starting Epogen 10,000 units weekly.    DM2: Management per primary service    Severe sepsis: Resolved    History of CVA:       SUBJECTIVE:    Patient was seen bedside currently on dialysis  In profile A with goal set 2.8 L  Patient's sister present bedside during encounter  Seems to be tolerating dialysis and has no new complaints for me  She denies any cramping during her treatments  She denies any worsening shortness of breath  Does mention 1 moderate void this morning but does not appear to be documented in the intake and output  We discussed her current level of renal function, and no signs of recovery from her ARF at this point.  Discussed the need to continue HD  All questions answered  Discussed with HD RN bedside      OBJECTIVE:  Physical Exam   Temp: 97.7  F (36.5  C) Temp src: Oral BP: (!) 149/67 Pulse: 56   Resp: 19 SpO2: 93 % O2 Device: None (Room air)    Vitals:    02/15/24 0500 02/16/24 0414 02/17/24 0857    Weight: 105.6 kg (232 lb 12.9 oz) 105 kg (231 lb 7.7 oz) 107 kg (236 lb)     Vital Signs with Ranges  Temp:  [97.7  F (36.5  C)-97.8  F (36.6  C)] 97.7  F (36.5  C)  Pulse:  [52-64] 56  Resp:  [15-19] 19  BP: (128-183)/(50-79) 149/67  SpO2:  [91 %-95 %] 93 %  I/O last 3 completed shifts:  In: 240 [P.O.:240]  Out: 400 [Urine:400]    Patient Vitals for the past 72 hrs:   Weight   02/17/24 0857 107 kg (236 lb)     Intake/Output Summary (Last 24 hours) at 2/19/2024 1023  Last data filed at 2/19/2024 0300  Gross per 24 hour   Intake 240 ml   Output 250 ml   Net -10 ml       PHYSICAL EXAM:  General: Alert, NAD  HENT: Supple, Non-tender, no obvious JVD  Eyes: No scleral icterus  Cardiovascular: RRR, no rub, gallop, or murmur.   Remedies: 2-3+ pitting edema BL LE  Respiratory: CTAB, non-labored  Gastrointestinal: Soft, non-tender, non-distended  Musculoskeletal: Grossly normal   Integumentary: Warm, dry, no rash  Neurologic: Non focal   Psychiatric: Cooperative  Access: R tunneled CVC.  Clear dressing covering.  No surrounding erythema or drainage.    LABORATORY STUDIES:     Recent Labs   Lab 02/18/24  0706 02/16/24  0832 02/15/24  0629 02/13/24  0743   WBC  --  6.6 5.6 7.4   RBC  --  3.36* 3.19* 3.57*   HGB  --  9.5* 9.1* 10.2*   HCT  --  28.8* 27.2* 30.9*   * 172 170 223       Basic Metabolic Panel:  Recent Labs   Lab 02/19/24  0834 02/19/24  0556 02/19/24  0228 02/18/24  2047 02/18/24  1633 02/18/24  0910 02/18/24  0706 02/17/24  0844 02/17/24  0652 02/16/24  0846 02/16/24  0832 02/15/24  0755 02/15/24  0629 02/14/24  0751 02/14/24  0717   NA  --  133*  --   --   --   --  133*  --  136  --  136  --  134*  --  135   POTASSIUM  --  4.1  --   --   --   --  4.1  4.4  --  4.0  --  4.2  --  4.0  --  4.2   CHLORIDE  --  97*  --   --   --   --  98  --  101  --  100  --  101  --  102   CO2  --  23  --   --   --   --  23  --  24  --  24  --  20*  --  17*   BUN  --  50.8*  --   --   --   --  44.8*  --  35.9*  --  51.8*   --  79.2*  --  107.0*   CR  --  4.15*  --   --   --   --  3.90*  --  3.31*  --  3.70*  --  4.66*  --  5.78*   * 139* 133* 177* 147* 129* 121*   < > 124*   < > 119*   < > 112*   < > 132*   ALBARO  --  9.3  --   --   --   --  8.8  --  9.1  --  8.6*  --  9.0  --  9.5    < > = values in this interval not displayed.       INRNo lab results found in last 7 days.     Recent Labs   Lab Test 02/18/24  0706 02/16/24  0832 02/15/24  0629 02/11/24  0754 02/10/24  0623 02/05/24  0426 02/04/24  1854   INR  --   --   --   --  1.25*  --  1.23*   WBC  --  6.6 5.6   < > 6.6   < > 5.9   HGB  --  9.5* 9.1*   < > 9.8*   < > 10.4*   * 172 170   < > 209   < > 119*    < > = values in this interval not displayed.       Personally reviewed current labs    This note was dictated using voice recognition     Twan Gutierrez PA-C  [facility]  [phone]

## 2024-02-19 NOTE — CONSULTS
SPIRITUAL HEALTH SERVICES ([facility])  SPIRITUAL ASSESSMENT Progress Note  [facility]. [address]    REFERRAL SOURCE: Consult (Routine)      Josefina was open to a SH[facility] visit but she kept moaning during the visit and squirming in her chair asa she tried to get comfortable.  We had a short conversation and she is open to followup visits.      PLAN: [facility] will follow as able during this hospitalization.      Nargis Quintana, Ph.D., [facility]      SHS available 24/7 for emergency requests/referrals, either by having the on-call  paged or by entering an ASAP/STAT consult in Epic (this will also page the on-call ).

## 2024-02-20 ENCOUNTER — APPOINTMENT (OUTPATIENT)
Dept: OCCUPATIONAL THERAPY | Facility: CLINIC | Age: 74
DRG: 871 | End: 2024-02-20
Payer: COMMERCIAL

## 2024-02-20 ENCOUNTER — APPOINTMENT (OUTPATIENT)
Dept: PHYSICAL THERAPY | Facility: CLINIC | Age: 74
DRG: 871 | End: 2024-02-20
Payer: COMMERCIAL

## 2024-02-20 LAB
ANION GAP SERPL CALCULATED.3IONS-SCNC: 13 MMOL/L (ref 7–15)
BUN SERPL-MCNC: 28.1 MG/DL (ref 8–23)
CALCIUM SERPL-MCNC: 9.2 MG/DL (ref 8.8–10.2)
CHLORIDE SERPL-SCNC: 99 MMOL/L (ref 98–107)
CREAT SERPL-MCNC: 3.16 MG/DL (ref 0.51–0.95)
DEPRECATED HCO3 PLAS-SCNC: 23 MMOL/L (ref 22–29)
EGFRCR SERPLBLD CKD-EPI 2021: 15 ML/MIN/1.73M2
ERYTHROCYTE [DISTWIDTH] IN BLOOD BY AUTOMATED COUNT: 17.9 % (ref 10–15)
GLUCOSE BLDC GLUCOMTR-MCNC: 109 MG/DL (ref 70–99)
GLUCOSE BLDC GLUCOMTR-MCNC: 116 MG/DL (ref 70–99)
GLUCOSE BLDC GLUCOMTR-MCNC: 148 MG/DL (ref 70–99)
GLUCOSE BLDC GLUCOMTR-MCNC: 150 MG/DL (ref 70–99)
GLUCOSE BLDC GLUCOMTR-MCNC: 162 MG/DL (ref 70–99)
GLUCOSE BLDC GLUCOMTR-MCNC: 229 MG/DL (ref 70–99)
GLUCOSE SERPL-MCNC: 129 MG/DL (ref 70–99)
HCT VFR BLD AUTO: 29.1 % (ref 35–47)
HGB BLD-MCNC: 9.4 G/DL (ref 11.7–15.7)
MCH RBC QN AUTO: 28.9 PG (ref 26.5–33)
MCHC RBC AUTO-ENTMCNC: 32.3 G/DL (ref 31.5–36.5)
MCV RBC AUTO: 90 FL (ref 78–100)
PLATELET # BLD AUTO: 124 10E3/UL (ref 150–450)
POTASSIUM SERPL-SCNC: 4.1 MMOL/L (ref 3.4–5.3)
RBC # BLD AUTO: 3.25 10E6/UL (ref 3.8–5.2)
SODIUM SERPL-SCNC: 135 MMOL/L (ref 135–145)
WBC # BLD AUTO: 6 10E3/UL (ref 4–11)

## 2024-02-20 PROCEDURE — 250N000013 HC RX MED GY IP 250 OP 250 PS 637

## 2024-02-20 PROCEDURE — 36415 COLL VENOUS BLD VENIPUNCTURE: CPT

## 2024-02-20 PROCEDURE — 97129 THER IVNTJ 1ST 15 MIN: CPT | Mod: GO

## 2024-02-20 PROCEDURE — 250N000013 HC RX MED GY IP 250 OP 250 PS 637: Performed by: PHYSICIAN ASSISTANT

## 2024-02-20 PROCEDURE — 80048 BASIC METABOLIC PNL TOTAL CA: CPT

## 2024-02-20 PROCEDURE — 97116 GAIT TRAINING THERAPY: CPT | Mod: GP

## 2024-02-20 PROCEDURE — 97110 THERAPEUTIC EXERCISES: CPT | Mod: GP

## 2024-02-20 PROCEDURE — 85027 COMPLETE CBC AUTOMATED: CPT

## 2024-02-20 PROCEDURE — 97535 SELF CARE MNGMENT TRAINING: CPT | Mod: GO

## 2024-02-20 PROCEDURE — 97130 THER IVNTJ EA ADDL 15 MIN: CPT | Mod: GO

## 2024-02-20 PROCEDURE — 250N000013 HC RX MED GY IP 250 OP 250 PS 637: Performed by: FAMILY MEDICINE

## 2024-02-20 PROCEDURE — 120N000001 HC R&B MED SURG/OB

## 2024-02-20 PROCEDURE — 99232 SBSQ HOSP IP/OBS MODERATE 35: CPT | Performed by: PHYSICIAN ASSISTANT

## 2024-02-20 PROCEDURE — 97530 THERAPEUTIC ACTIVITIES: CPT | Mod: GP

## 2024-02-20 PROCEDURE — 250N000011 HC RX IP 250 OP 636: Performed by: STUDENT IN AN ORGANIZED HEALTH CARE EDUCATION/TRAINING PROGRAM

## 2024-02-20 PROCEDURE — 99232 SBSQ HOSP IP/OBS MODERATE 35: CPT | Mod: GC

## 2024-02-20 RX ORDER — SCOLOPAMINE TRANSDERMAL SYSTEM 1 MG/1
1 PATCH, EXTENDED RELEASE TRANSDERMAL
Status: DISCONTINUED | OUTPATIENT
Start: 2024-02-20 | End: 2024-02-28 | Stop reason: HOSPADM

## 2024-02-20 RX ORDER — CARVEDILOL 12.5 MG/1
12.5 TABLET ORAL 2 TIMES DAILY WITH MEALS
Status: DISCONTINUED | OUTPATIENT
Start: 2024-02-20 | End: 2024-02-21

## 2024-02-20 RX ORDER — FAMOTIDINE 10 MG
10 TABLET ORAL ONCE
Status: COMPLETED | OUTPATIENT
Start: 2024-02-20 | End: 2024-02-20

## 2024-02-20 RX ADMIN — FAMOTIDINE 10 MG: 10 TABLET ORAL at 14:40

## 2024-02-20 RX ADMIN — ACETAMINOPHEN 1000 MG: 500 TABLET ORAL at 13:54

## 2024-02-20 RX ADMIN — INSULIN ASPART 1 UNITS: 100 INJECTION, SOLUTION INTRAVENOUS; SUBCUTANEOUS at 12:30

## 2024-02-20 RX ADMIN — HYDRALAZINE HYDROCHLORIDE 100 MG: 25 TABLET, FILM COATED ORAL at 20:49

## 2024-02-20 RX ADMIN — CITALOPRAM 20 MG: 20 TABLET ORAL at 09:03

## 2024-02-20 RX ADMIN — HEPARIN SODIUM 5000 UNITS: 5000 INJECTION, SOLUTION INTRAVENOUS; SUBCUTANEOUS at 09:03

## 2024-02-20 RX ADMIN — CLOPIDOGREL BISULFATE 75 MG: 75 TABLET ORAL at 09:02

## 2024-02-20 RX ADMIN — CARVEDILOL 12.5 MG: 12.5 TABLET, FILM COATED ORAL at 20:49

## 2024-02-20 RX ADMIN — AMLODIPINE BESYLATE 10 MG: 10 TABLET ORAL at 09:02

## 2024-02-20 RX ADMIN — Medication 1 TABLET: at 09:02

## 2024-02-20 RX ADMIN — Medication: at 20:49

## 2024-02-20 RX ADMIN — HEPARIN SODIUM 5000 UNITS: 5000 INJECTION, SOLUTION INTRAVENOUS; SUBCUTANEOUS at 17:44

## 2024-02-20 RX ADMIN — HYDRALAZINE HYDROCHLORIDE 100 MG: 25 TABLET, FILM COATED ORAL at 09:01

## 2024-02-20 RX ADMIN — Medication: at 09:04

## 2024-02-20 RX ADMIN — METOPROLOL TARTRATE 75 MG: 25 TABLET, FILM COATED ORAL at 09:02

## 2024-02-20 RX ADMIN — CALCIUM CARBONATE (ANTACID) CHEW TAB 500 MG 500 MG: 500 CHEW TAB at 10:31

## 2024-02-20 RX ADMIN — CALCIUM CARBONATE (ANTACID) CHEW TAB 500 MG 500 MG: 500 CHEW TAB at 20:54

## 2024-02-20 RX ADMIN — HYDRALAZINE HYDROCHLORIDE 100 MG: 25 TABLET, FILM COATED ORAL at 13:54

## 2024-02-20 RX ADMIN — BUMETANIDE 2 MG: 2 TABLET ORAL at 09:02

## 2024-02-20 ASSESSMENT — ACTIVITIES OF DAILY LIVING (ADL)
ADLS_ACUITY_SCORE: 44
ADLS_ACUITY_SCORE: 44
ADLS_ACUITY_SCORE: 42
ADLS_ACUITY_SCORE: 42
ADLS_ACUITY_SCORE: 44
ADLS_ACUITY_SCORE: 45
ADLS_ACUITY_SCORE: 42
ADLS_ACUITY_SCORE: 44
ADLS_ACUITY_SCORE: 46

## 2024-02-20 NOTE — PLAN OF CARE
Goal Outcome Evaluation:  Problem: Adult Inpatient Plan of Care    Patient alert and oriented. Higher BP's today, PRN hydralazine given x1. Recheck was 163/70 - provider was paged to see if they wanted hydralazine given again. Was told no, BP was ok. Dialysis run today, 2.5L pulled. Denies pain, feet tender to the touch. CVC WDL. Alarms on, call light within reach.         Results   LIPID PNL   30 Mar 2017 10:10 AM  -   FASTING STATUS: UNKNOWN  -   CHOLESTEROL: 180  Reference Range: <200  -   HDL CHOLESTEROL: 55  Reference Range: >59  Flag: L  -   TRIGLYCERIDES: 62  Reference Range: <150  -   LDL CHOLESTEROL (CALCULATED): 113  Reference Range: <130  -   NON-HDL CHOLESTEROL: 125 mg/dl  -   CHOLESTEROL/HDL RATIO: 3.3   Reference Range: <4.5.  HEMOGLOBIN A1C GLYCOSYLATED   30 Mar 2017 10:10 AM  -   HEMOGLOBIN A1C GLYH: 5.4 %  Reference Range: 4.5-5.6.  Discussed   Laboratory test results are good will fax your psychiatrist.

## 2024-02-20 NOTE — PLAN OF CARE
Problem: Oral Intake Inadequate  Goal: Improved Oral Intake  Outcome: Progressing   Goal Outcome Evaluation:         Pt on regular diet with nepro bid. Intake improving and appears to be meeting estimated needs. Pt not drinking Nepro so I will discontinue it.

## 2024-02-20 NOTE — PLAN OF CARE
Goal Outcome Evaluation:         Patient denies pain. BP continues to be high this evening. See VS section. Scheduled metoprolol and hydralazine given. Asymptomatic. Lymphedema wraps on lower extremities, patient complaining they are too heavy- encouraging to keep them on. Blood sugar 150, no coverage needed. Tylenol and melatonin given at bedtime per patient request. Refusing to move back to bed, wanting to stay in the chair tonight as the bed is uncomfortable.

## 2024-02-20 NOTE — PROGRESS NOTES
RENAL PROGRESS NOTE    ASSESSMENT & PLAN:   Anuric FELIPA on CKD III: Hemodialysis dependent.  Hemodialysis was initiated via tunneled CVC 12/14/2024.  ARF hemodynamic ATN in the setting of septic physiology, NSAIDs, CT contrast, ACE inhibitor, elevated vancomycin level.  AIN secondary to PPI or cefepime in the differential.     Recs:  Creatinine uptrending between dialysis treatments.  Still needing high-volume UF with HD.  Urine output remains oliguric.  Will need to continue dialysis.  Care management working on outpatient [facility] admissions  Hold all NSAIDs including Voltaren  Continue to follow intake and output.  Fluid restriction.  Slow challenge of TW.  Patient is hypertensive, swapping metoprolol for carvedilol today for better blood pressure lowering effects from her beta-blocker.  Will titrate Coreg pending response.  Hold parameters for HR placed.  Will follow for signs of recovery.  Thanks for the consult we will follow    Hyponatremia: Mild.  Sodium 133.  UF with HD.    Hypertension: Amlodipine 10 mg daily, metoprolol 75 mg twice daily, hydralazine 100 mg 3 times daily, Bumex 2 mg daily. Patient is hypertensive, swapping metoprolol for carvedilol today for better blood pressure lowering effects from her beta-blocker.  Will titrate Coreg pending response.  Hold parameters for HR placed. ARB on hold in setting of ARF.  UF with HD.    Normocytic anemia: Inflammatory as well as ARF.  Hemoglobin 9s.  Started Epogen 10,000 units weekly 2/19/2024.    DM2: Management per primary service    Severe sepsis: Resolved    History of CVA:       SUBJECTIVE:    Patient was seen bedside   Patient's sister present bedside during encounter  Dialysis went okay yesterday  She does feel pretty wiped out completing treatment yesterday  Does feel that she is recovering today and feels much better  We discussed her current level of renal function, and no signs of recovery from her ARF at this point.  Discussed the need to  continue HD  All questions answered    OBJECTIVE:  Physical Exam   Temp: 97.6  F (36.4  C) Temp src: Oral BP: (!) 203/79 Pulse: 60   Resp: 16 SpO2: 93 % O2 Device: None (Room air)    Vitals:    02/16/24 0414 02/17/24 0857 02/20/24 0548   Weight: 105 kg (231 lb 7.7 oz) 107 kg (236 lb) 106 kg (233 lb 11 oz)     Vital Signs with Ranges  Temp:  [97.5  F (36.4  C)-98.7  F (37.1  C)] 97.6  F (36.4  C)  Pulse:  [52-66] 60  Resp:  [15-18] 16  BP: (140-203)/(14-79) 203/79  SpO2:  [93 %-95 %] 93 %  I/O last 3 completed shifts:  In: 240 [P.O.:240]  Out: 2600 [Urine:100; Other:2500]    Patient Vitals for the past 72 hrs:   Weight   02/20/24 0548 106 kg (233 lb 11 oz)     Intake/Output Summary (Last 24 hours) at 2/19/2024 1023  Last data filed at 2/19/2024 0300  Gross per 24 hour   Intake 240 ml   Output 250 ml   Net -10 ml       PHYSICAL EXAM:  General: Alert, NAD  HENT: Supple, Non-tender, no obvious JVD  Eyes: No scleral icterus  Cardiovascular: RRR, no rub, gallop, or murmur.   Remedies: 2-3+ pitting edema BL LE  Respiratory: CTAB, non-labored  Gastrointestinal: Soft, non-tender, non-distended  Musculoskeletal: Grossly normal   Integumentary: Warm, dry, no rash  Neurologic: Non focal   Psychiatric: Cooperative  Access: R tunneled CVC.  Clear dressing covering.  No surrounding erythema or drainage.    LABORATORY STUDIES:     Recent Labs   Lab 02/20/24  0603 02/18/24  0706 02/16/24  0832 02/15/24  0629   WBC 6.0  --  6.6 5.6   RBC 3.25*  --  3.36* 3.19*   HGB 9.4*  --  9.5* 9.1*   HCT 29.1*  --  28.8* 27.2*   * 130* 172 170       Basic Metabolic Panel:  Recent Labs   Lab 02/20/24  0807 02/20/24  0603 02/20/24  0217 02/19/24  2152 02/19/24  1720 02/19/24  1225 02/19/24  0834 02/19/24  0556 02/18/24  0910 02/18/24  0706 02/17/24  0844 02/17/24  0652 02/16/24  0846 02/16/24  0832 02/15/24  0755 02/15/24  0629   NA  --  135  --   --   --   --   --  133*  --  133*  --  136  --  136  --  134*   POTASSIUM  --  4.1  --   --    --   --   --  4.1  --  4.1  4.4  --  4.0  --  4.2  --  4.0   CHLORIDE  --  99  --   --   --   --   --  97*  --  98  --  101  --  100  --  101   CO2  --  23  --   --   --   --   --  23  --  23  --  24  --  24  --  20*   BUN  --  28.1*  --   --   --   --   --  50.8*  --  44.8*  --  35.9*  --  51.8*  --  79.2*   CR  --  3.16*  --   --   --   --   --  4.15*  --  3.90*  --  3.31*  --  3.70*  --  4.66*   * 129* 162* 150* 146* 163*   < > 139*   < > 121*   < > 124*   < > 119*   < > 112*   ALBARO  --  9.2  --   --   --   --   --  9.3  --  8.8  --  9.1  --  8.6*  --  9.0    < > = values in this interval not displayed.       INRNo lab results found in last 7 days.     Recent Labs   Lab Test 02/20/24  0603 02/18/24  0706 02/16/24  0832 02/11/24  0754 02/10/24  0623 02/05/24  0426 02/04/24  1854   INR  --   --   --   --  1.25*  --  1.23*   WBC 6.0  --  6.6   < > 6.6   < > 5.9   HGB 9.4*  --  9.5*   < > 9.8*   < > 10.4*   * 130* 172   < > 209   < > 119*    < > = values in this interval not displayed.       Personally reviewed current labs    This note was dictated using voice recognition     Twan Gutierrez PA-C  [facility]  [phone]

## 2024-02-20 NOTE — PLAN OF CARE
Problem: Gas Exchange Impaired  Goal: Optimal Gas Exchange  Outcome: Progressing     Problem: Pain Acute  Goal: Optimal Pain Control and Function  Outcome: Progressing    Patient is alert and orientated times four. She denied pain tonight. Most of the night she was up in her chair as she declined laying in bed. Needed much encouragement this morning to lay in bed as buttocks and groin are reddened. Patient did agree to lay in bed this morning at 0550. Periarea cleansed and barrier cream applied. Currently laying on right side. IV saline locked. Dressing to CVC tunnel catheter CDI. Blood glucose tonight was 162. On K+ protocol with recheck scheduled for this AM. Bed alarm is in place for patient's safety.

## 2024-02-20 NOTE — PLAN OF CARE
Problem: Urinary Retention  Goal: Effective Urinary Elimination  Outcome: Progressing     Problem: Oral Intake Inadequate  Goal: Improved Oral Intake  Outcome: Progressing     Problem: Fatigue  Goal: Improved Activity Tolerance  Outcome: Progressing  Intervention: Promote Improved Energy  Recent Flowsheet Documentation  Taken 2/20/2024 0900 by Didi Santos RN  Activity Management:   activity adjusted per tolerance   activity encouraged   Goal Outcome Evaluation:    Pt is getting up and into the bathroom.  She still gets very tired.  Hair was washed, teeth were brushed and face was cleaned.  New gown and new linens.

## 2024-02-20 NOTE — PROGRESS NOTES
[facility]    Progress Note - Hospitalist Service       Date of Admission:  2/2/2024    Assessment & Plan   Josefina Dumont is a 73 year old female admitted on 2/2/2024. She has a history of CHF with recent hospitalization 12/28/23-12/31/23, recent community acquired pneumonia, history of ischemic CVA, history of meningioma, history of CAD, type 2 diabetes mellitus and was admitted for shortness of breath and found to be in severe sepsis. 2/4/24-2/5/24 overnight events notable for stroke code secondary to aphasia and inattention also in the setting of fever to 102.5F and RR 35. Head MRI was unremarkable. Cefepime was switched to meropenem given concern for encephalopathy 2/2 worsening carbapenems. IV acyclovir was also added to the regimen and ID consult, LP orders were placed. On 2/5/24 ID added doxycycline to medication regimen. On 2/6/24, patient reported continued improvement in her condition. However, overnight events 2/6/24-2/7/24 were notable for likely hospital delirium and her mental status has continued to wax and wane. CT head, chest, abdomen, pelvis was repeated 2/8/24 in light of continued AMS of unclear etiology; results showed cirrhosis but were otherwise unremarkable. Metabolic encephalopathy was on the differential given cirrhosis though LFTs have been wnl and patient is stooling daily; trial of empiric lactulose but no further recommendations from GI. EEG per Neurology was performed 2/9/24, read was unremarkable for seizure activity. Antibiotics were finished 2/13/24, ID has signed off. At this time, patient remains hospitalized for worsening kidney function/uremia, thought to be multifactorial in nature though differential remains open and broad. Patient was initiated on HD on 2/14/24. Planning for TCU on discharge once outpatient HD plan is setup. Mental status has greatly improved throughout the past week, she is fully oriented and conversing  appropriately.    Discharge currently pending establishment of outpatient HD.    Severe sepsis due to unknown source; resolved   Acute hypoxic respiratory failure, resolved  Acute fever of unknown origin, resolved  Concern for CHF exacerbation, resolved  Delirium, resolved  Patient initially presented with worsening shortness of breath, orthopnea, dyspnea on exertion, in the setting of elevated BNP and missed dose of Lasix, CHF exacerbation was initially high on the differential. She was febrile w/Tmax of 101.3. CT chest on presentation showed no acute findings, mild pulmonary interstitial edema, small pericardial effusion. CRP elevated to 28.10 and procalcitonin of 5.80 on 2/4/24; CRP elevated to 54.20 and procalcitonin elevated to 11.70 on 2/5/24. See 2/4/24-2/5/24 overnight notes for significant events including aphasia, inattention, fever to 102.5F despite broad-spectrum antibiotics prompting additional aggressive treatment measures and workup. LP and TTE unremarkable. Patient's condition had been improving 2/6/24 but then had onset of delirium overnight which continued to wax and wane until improving last week. Possible hospital delirium in the setting of sleep deprivation, disorientation contributing. However, prudent to rule-out other potential causes given the distinct change from patient's baseline and prolonged delirium. CT head, chest, abdomen, pelvis was repeated 2/8/24 in light of continued AMS of unclear etiology; results showed cirrhosis but were otherwise unremarkable. Metabolic encephalopathy was on the differential given cirrhosis though LFTs have been wnl and patient is stooling daily. EEG per Neurology was performed 2/9/24 read as unremarkable for seizure activity with slow background in theta and delta range that suggests a nonspecific generalized cerebral dysfunction. Neurology has signed off. ID also signed off as patient will finished course of antibiotics. At this time, mental status has  greatly improved.  -Delirium precautions  -Cardiac/low-sodium diet  -Supplemental oxygen as needed  -ID consulted, signed off 2/13/24  -Continue Levaquin renally dosed for 7 days from 2/8/2024. Order updated. (Course has been completed)  -GI following, note low concern for hepatic encephalopathy   Consider lymph node biopsy if does not continue to improve clinically.  -Monitor mental status, and glucose.  -Discussed with Neurology, EEG read as above, signed off 2/10/24  -GI consulted 2/9/24, no further recommendations beyond trial of empiric lactulose (trial completed)  -Nephrology consulted and continues to follow, appreciate recommendations   -Creatinine uptrending between dialysis treatments.  Still needing high-volume UF with HD.  Urine output remains oliguric.  Will need to continue dialysis.  -Care management working on outpatient [facility] admissions  -Hold all NSAIDs including Voltaren  -Continue to follow intake and output.  Fluid restriction.  Slow challenge of TW.  -Patient is hypertensive, swapping metoprolol for carvedilol today for better blood pressure lowering effects from her -beta-blocker.  Will titrate Coreg pending response.  Hold parameters for HR placed.  -Will follow for signs of recovery.  -Thanks for the consult we will follow    FELIPA  S/p HD (2/14/24, 2/15/24, 2/16/24, 2/19/24)  Baseline creatinine around 0.8-0.9, had stabilized ~2.3 initially but then trended upward with the highest being 5.78 on 2/14/24. Several medications may have been contributing to FELIPA though etiology remains unclear; creatinine worsened several days s/p contrast administration and continued to worsen despite discontinuation of nephrotoxic IV antibiotics. Nephrology has been following, appreciate their cares and recommendations.  -Nephrology following, see recommendations as above    Hypokalemia, resolved  -Standard replacement protocol      Mild normocytic anemia  Hemoglobin 11.2 on admission. Has since been stable at  ~10. Drop consistent with hemodilution.     Hypertension  -Per Nephrology (2/20/24):  -Amlodipine 10 mg daily, metoprolol 75 mg twice daily, hydralazine 100 mg 3 times daily, Bumex 2 mg daily. Patient is hypertensive, swapping metoprolol for carvedilol today for better blood pressure lowering effects from her beta-blocker.  Will titrate Coreg pending response.  Hold parameters for HR placed. ARB on hold in setting of ARF.  UF with HD.     Prolonged QT  QTc of 472.  -Avoid QT prolonging medications  -Note that Celexa comes with risk for QT prolongation but benefit of anxiety treatment outweighing risk, will continue to monitor    DM2   Most recent A1c 6.6 1 month ago.  -Holding PTA glipizide; blood sugars have been well-controlled through hospitalization so will likely hold on discharge as well  -Low sliding scale insulin     Anxiety/depression  -Continue PTA Celexa   -Patient was given hydroxyzine 2/15/24 PM for anxiety/insomnia; she felt like it may have caused some of her grogginess this morning, so would consider other medication options first  -Bedside RN noticed air mattress of bed wasn't being used, so she left a note on the whiteboard to remember to turn that on at night for comfort    Bilaterally lower extremity edema  BLE edema. Equal bilaterally, no skin changes, no specific tenderness to palpation but overall uncomfortable due to sensation of fullness per patient.  -Compression  -Voltaren PRN  -Anticipate edema will improve with continued dialysis    Chronic:  History of ischemic CVA: Continue PTA Plavix  GERD: Continue PTA Tums, continue PTA Protonix  Constipation: Continue PTA senna docusate  Asthma: Continue PTA albuterol as needed        Diet: Regular Diet Adult    DVT Prophylaxis: Heparin  Machuca Catheter: Not present  Fluids: po  Lines: PRESENT      CVC Double Lumen Right Internal jugular Tunneled-Site Assessment: WDL    Cardiac Monitoring: None  Code Status: Full Code           Disposition Plan       Expected Discharge Date: 02/21/2024    Discharge Delays: Dialysis - arrange outpatient  Destination: home  Discharge Comments: Need prior authorization, OP HD setup, and [facility] accepted        The patient's care was discussed with the Attending Physician, Dr. Olivares .    OSKAR BUTLER MD   Hospitalist Service  [facility]  Securely message with [employer] (more info)  Text page via [employer] Paging/Directory   ______________________________________________________________________    Interval History   Doing well this morning. Hopeful for TCU discharge in the coming days.  Feeling some dizziness/vertigo. Found a more comfortable position by resting on her side. Denies any chest pain, SOB, headache, lightheadedness, dizziness.    Physical Exam   Vital Signs: Temp: 97.6  F (36.4  C) Temp src: Oral BP: (!) 154/69 Pulse: 60   Resp: 16 SpO2: 93 % O2 Device: None (Room air)    Weight: 233 lbs 11 oz    GENERAL: Alert and no acute distress. Sitting up comfortably in bed.  HEENT: Normocephalic, atraumatic, no rhinorrhea, moist mucus membranes.  RESP: Anterior lung fields with good airflow, clear to auscultation, no coarse lung sounds. Nonlabored breathing on room air.   CV: Regular rate and rhythm, systolic murmur.  MS: No gross musculoskeletal defects noted. Moderate edema of the BLE to the knees.  NEURO: Alert, responding to questions appropriately, good recall of recent events.  PSYCH: Appropriate mood and affect.      Data     I have personally reviewed the following data over the past 24 hrs:    6.0  \   9.4 (L)   / 124 (L)     135 99 28.1 (H) /  148 (H)   4.1 23 3.16 (H) \       Imaging results reviewed over the past 24 hrs:   No results found for this or any previous visit (from the past 24 hour(s)).

## 2024-02-20 NOTE — PROGRESS NOTES
CLINICAL NUTRITION SERVICES - REASSESSMENT NOTE     Nutrition Prescription    RECOMMENDATIONS FOR MDs/PROVIDERS TO ORDER:  none    Malnutrition Status:    Does not meet criteria     Recommendations already ordered by Registered Dietitian (RD):  Discontinue nepro     Future/Additional Recommendations:  Will monitor po      EVALUATION OF THE PROGRESS TOWARD GOALS   Diet: Regular  Nutrition Support: Nepro bid   Intake: on 2/19 pt ordered 2,688kcal and 89g protein with 75% intake noted     NEW FINDINGS   Pt is not drinking the Nepro. She is eating better at meals.     ANTHROPOMETRICS  02/20/24 0548 106 kg (233 lb 11 oz) Bed scale   02/17/24 0857 107 kg (236 lb) --   02/16/24 0414 105 kg (231 lb 7.7 oz) Bed scale   02/15/24 0500 105.6 kg (232 lb 12.9 oz) Bed scale   02/13/24 0445 107.3 kg (236 lb 8.9 oz) Bed scale   02/12/24 0245 107.1 kg (236 lb 1.8 oz) Bed scale   02/11/24 0424 106.9 kg (235 lb 10.8 oz) Bed scale   02/10/24 0432 107.5 kg (236 lb 15.9 oz) Bed scale   02/08/24 0405 105.9 kg (233 lb 7.5 oz) Bed scale   02/07/24 0357 104.4 kg (230 lb 2.6 oz) Bed scale   02/06/24 0238 103.1 kg (227 lb 6.4 oz) Standing scale   02/05/24 0541 100.5 kg (221 lb 9 oz) Bed scale   02/04/24 0239 100.4 kg (221 lb 4.8 oz) Standing scale       PHYSICAL FINDINGS  Per flowsheet:   Edema- +1 IDA (stable)   GI- WDL  Skin- coccyx moisture damage (no updates)     LABS  Reviewed, BUN-28.1, Cr-3.16 (improving)     MEDICATIONS  Reviewed Lasix, novolog, renal MVI     MALNUTRITION:  Continues to not meet criteria     NUTRITION DIAGNOSIS  Inadequate protein-energy intake related to reduced po  as evidenced by variable intake  -met     Goals:  Patient to consume % of nutritionally adequate meals three times per day, or the equivalent with supplements/snacks. -met  Wound healing -no updates     INTERVENTIONS  Implementation  Discontinue Nepro       Monitoring/Evaluation  Progress toward goals will be monitored and evaluated per protocol. Will  monitor po, labs, wt, skin status

## 2024-02-21 ENCOUNTER — APPOINTMENT (OUTPATIENT)
Dept: PHYSICAL THERAPY | Facility: CLINIC | Age: 74
DRG: 871 | End: 2024-02-21
Payer: COMMERCIAL

## 2024-02-21 LAB
ALBUMIN SERPL BCG-MCNC: 3 G/DL (ref 3.5–5.2)
ANION GAP SERPL CALCULATED.3IONS-SCNC: 13 MMOL/L (ref 7–15)
BUN SERPL-MCNC: 35.9 MG/DL (ref 8–23)
CALCIUM SERPL-MCNC: 9.1 MG/DL (ref 8.8–10.2)
CHLORIDE SERPL-SCNC: 99 MMOL/L (ref 98–107)
CREAT SERPL-MCNC: 3.4 MG/DL (ref 0.51–0.95)
DEPRECATED HCO3 PLAS-SCNC: 23 MMOL/L (ref 22–29)
EGFRCR SERPLBLD CKD-EPI 2021: 14 ML/MIN/1.73M2
ERYTHROCYTE [DISTWIDTH] IN BLOOD BY AUTOMATED COUNT: 17.6 % (ref 10–15)
GLUCOSE BLDC GLUCOMTR-MCNC: 127 MG/DL (ref 70–99)
GLUCOSE BLDC GLUCOMTR-MCNC: 131 MG/DL (ref 70–99)
GLUCOSE BLDC GLUCOMTR-MCNC: 177 MG/DL (ref 70–99)
GLUCOSE BLDC GLUCOMTR-MCNC: 181 MG/DL (ref 70–99)
GLUCOSE BLDC GLUCOMTR-MCNC: 184 MG/DL (ref 70–99)
GLUCOSE SERPL-MCNC: 135 MG/DL (ref 70–99)
HCT VFR BLD AUTO: 28.6 % (ref 35–47)
HGB BLD-MCNC: 9.4 G/DL (ref 11.7–15.7)
MCH RBC QN AUTO: 28.9 PG (ref 26.5–33)
MCHC RBC AUTO-ENTMCNC: 32.9 G/DL (ref 31.5–36.5)
MCV RBC AUTO: 88 FL (ref 78–100)
PHOSPHATE SERPL-MCNC: 4 MG/DL (ref 2.5–4.5)
PLATELET # BLD AUTO: 100 10E3/UL (ref 150–450)
POTASSIUM SERPL-SCNC: 4.2 MMOL/L (ref 3.4–5.3)
RBC # BLD AUTO: 3.25 10E6/UL (ref 3.8–5.2)
SODIUM SERPL-SCNC: 135 MMOL/L (ref 135–145)
WBC # BLD AUTO: 5.1 10E3/UL (ref 4–11)

## 2024-02-21 PROCEDURE — 80069 RENAL FUNCTION PANEL: CPT | Performed by: STUDENT IN AN ORGANIZED HEALTH CARE EDUCATION/TRAINING PROGRAM

## 2024-02-21 PROCEDURE — 250N000013 HC RX MED GY IP 250 OP 250 PS 637

## 2024-02-21 PROCEDURE — 99232 SBSQ HOSP IP/OBS MODERATE 35: CPT | Performed by: PHYSICIAN ASSISTANT

## 2024-02-21 PROCEDURE — 250N000013 HC RX MED GY IP 250 OP 250 PS 637: Performed by: PHYSICIAN ASSISTANT

## 2024-02-21 PROCEDURE — 250N000013 HC RX MED GY IP 250 OP 250 PS 637: Performed by: FAMILY MEDICINE

## 2024-02-21 PROCEDURE — 97535 SELF CARE MNGMENT TRAINING: CPT | Mod: GP

## 2024-02-21 PROCEDURE — 85027 COMPLETE CBC AUTOMATED: CPT | Performed by: STUDENT IN AN ORGANIZED HEALTH CARE EDUCATION/TRAINING PROGRAM

## 2024-02-21 PROCEDURE — 99232 SBSQ HOSP IP/OBS MODERATE 35: CPT | Mod: GC

## 2024-02-21 PROCEDURE — 36415 COLL VENOUS BLD VENIPUNCTURE: CPT | Performed by: STUDENT IN AN ORGANIZED HEALTH CARE EDUCATION/TRAINING PROGRAM

## 2024-02-21 PROCEDURE — 250N000011 HC RX IP 250 OP 636: Performed by: STUDENT IN AN ORGANIZED HEALTH CARE EDUCATION/TRAINING PROGRAM

## 2024-02-21 PROCEDURE — 97116 GAIT TRAINING THERAPY: CPT | Mod: GP

## 2024-02-21 PROCEDURE — 97530 THERAPEUTIC ACTIVITIES: CPT | Mod: GP

## 2024-02-21 PROCEDURE — 120N000001 HC R&B MED SURG/OB

## 2024-02-21 PROCEDURE — 90935 HEMODIALYSIS ONE EVALUATION: CPT

## 2024-02-21 RX ORDER — CARVEDILOL 12.5 MG/1
12.5 TABLET ORAL ONCE
Status: COMPLETED | OUTPATIENT
Start: 2024-02-21 | End: 2024-02-21

## 2024-02-21 RX ORDER — CALCIUM CARBONATE 500 MG/1
500 TABLET, CHEWABLE ORAL 2 TIMES DAILY PRN
Status: DISCONTINUED | OUTPATIENT
Start: 2024-02-21 | End: 2024-02-28 | Stop reason: HOSPADM

## 2024-02-21 RX ORDER — ONDANSETRON 2 MG/ML
8 INJECTION INTRAMUSCULAR; INTRAVENOUS ONCE
Status: COMPLETED | OUTPATIENT
Start: 2024-02-21 | End: 2024-02-21

## 2024-02-21 RX ORDER — DOXEPIN HYDROCHLORIDE 10 MG/ML
3 SOLUTION ORAL
Status: DISCONTINUED | OUTPATIENT
Start: 2024-02-21 | End: 2024-02-28 | Stop reason: HOSPADM

## 2024-02-21 RX ORDER — SUCRALFATE 1 G/1
1 TABLET ORAL
Status: DISCONTINUED | OUTPATIENT
Start: 2024-02-21 | End: 2024-02-28 | Stop reason: HOSPADM

## 2024-02-21 RX ORDER — CALCIUM CARBONATE 500 MG/1
500 TABLET, CHEWABLE ORAL 3 TIMES DAILY PRN
Status: DISCONTINUED | OUTPATIENT
Start: 2024-02-21 | End: 2024-02-21

## 2024-02-21 RX ORDER — CARVEDILOL 25 MG/1
25 TABLET ORAL 2 TIMES DAILY WITH MEALS
Status: DISCONTINUED | OUTPATIENT
Start: 2024-02-21 | End: 2024-02-28 | Stop reason: HOSPADM

## 2024-02-21 RX ADMIN — Medication 1 TABLET: at 09:36

## 2024-02-21 RX ADMIN — AMLODIPINE BESYLATE 10 MG: 10 TABLET ORAL at 09:36

## 2024-02-21 RX ADMIN — FAMOTIDINE 10 MG: 10 TABLET ORAL at 09:37

## 2024-02-21 RX ADMIN — CARVEDILOL 12.5 MG: 12.5 TABLET, FILM COATED ORAL at 09:36

## 2024-02-21 RX ADMIN — CARVEDILOL 25 MG: 25 TABLET, FILM COATED ORAL at 17:29

## 2024-02-21 RX ADMIN — SUCRALFATE 1 G: 1 TABLET ORAL at 20:25

## 2024-02-21 RX ADMIN — Medication: at 09:31

## 2024-02-21 RX ADMIN — SUCRALFATE 1 G: 1 TABLET ORAL at 17:29

## 2024-02-21 RX ADMIN — INSULIN ASPART 1 UNITS: 100 INJECTION, SOLUTION INTRAVENOUS; SUBCUTANEOUS at 14:00

## 2024-02-21 RX ADMIN — HEPARIN SODIUM 5000 UNITS: 5000 INJECTION, SOLUTION INTRAVENOUS; SUBCUTANEOUS at 00:10

## 2024-02-21 RX ADMIN — ACETAMINOPHEN 1000 MG: 500 TABLET ORAL at 12:29

## 2024-02-21 RX ADMIN — CLOPIDOGREL BISULFATE 75 MG: 75 TABLET ORAL at 09:31

## 2024-02-21 RX ADMIN — SENNOSIDES AND DOCUSATE SODIUM 1 TABLET: 8.6; 5 TABLET ORAL at 20:26

## 2024-02-21 RX ADMIN — HYDRALAZINE HYDROCHLORIDE 100 MG: 25 TABLET, FILM COATED ORAL at 09:31

## 2024-02-21 RX ADMIN — CITALOPRAM 20 MG: 20 TABLET ORAL at 09:31

## 2024-02-21 RX ADMIN — HYDRALAZINE HYDROCHLORIDE 100 MG: 25 TABLET, FILM COATED ORAL at 20:25

## 2024-02-21 RX ADMIN — CALCIUM CARBONATE (ANTACID) CHEW TAB 500 MG 500 MG: 500 CHEW TAB at 10:44

## 2024-02-21 RX ADMIN — Medication: at 20:37

## 2024-02-21 RX ADMIN — CALCIUM CARBONATE (ANTACID) CHEW TAB 500 MG 500 MG: 500 CHEW TAB at 17:34

## 2024-02-21 RX ADMIN — BUMETANIDE 2 MG: 2 TABLET ORAL at 09:31

## 2024-02-21 ASSESSMENT — ACTIVITIES OF DAILY LIVING (ADL)
ADLS_ACUITY_SCORE: 45
ADLS_ACUITY_SCORE: 40
ADLS_ACUITY_SCORE: 45
ADLS_ACUITY_SCORE: 40
ADLS_ACUITY_SCORE: 40
ADLS_ACUITY_SCORE: 45
ADLS_ACUITY_SCORE: 40
ADLS_ACUITY_SCORE: 45
ADLS_ACUITY_SCORE: 45

## 2024-02-21 NOTE — PROGRESS NOTES
"   02/21/24 1030   Appointment Info   Signing Clinician's Name / Credentials (PT) Shabnam Chacko, PT, DPT   Quick Adds   Quick Adds Vestibular Eval   Cervicogenic Screen   Neck ROM grossly WFL   Oculomotor Exam   Ocular ROM Normal   Smooth Pursuit Normal   Smooth Pursuit Comment requires attention to task at times   Saccades Normal   Saccades Comments slow pace but accurate   Convergence Testing Abnormal   Convergence Testing Comments increased distance about 4\"   Infrared Goggle Exam or Frenzel Lense Exam   Exam completed with Frenzel Lenses   Spontaneous Nystagmus Negative   Gaze Evoked Nystagmus Negative   Head Shake Horizontal Nystagmus Negative   Clinical Impression   Clinical Impression Comments No dizziness reported with head movements or occular motions, no nystagmus noted, pt does move eyes frequently and required redirection to task at times, kia hallpike/epley not indicated at this time   Therapeutic Activity   Therapeutic Activities: dynamic activities to improve functional performance Minutes (67155) 10   Treatment Detail/Skilled Intervention anterior seated scoot to edge of chair, cueing for posture and PLB, steady with bilat UE and LE support as needed, x4 min, sit to/from stand with review for technique, Min A x1   Self-Care/Home Management   Self-Care/Home Mgmt/ADL, Compensatory, Meal Prep Minutes (06598) 16   Treatment Detail/Skilled Intervention Lotion applied to bilat lower legs and feet, size G tubigrip donned to bilat lower legs and feet, pt educated on proper alignment/positioning for skin integrity and circulation, educated on indications for removal   Gait Training   Gait Training Minutes (07666) 11   Symptoms Noted During/After Treatment (Gait Training) dizziness;fatigue   Treatment Detail/Skilled Intervention cueing for posture and PLB, cueing for technique, encouraged for continuing ambulation   Distance in Feet 17'   [place] Level (Gait Training) minimum assist (75% patient effort) "   Physical Assistance Level (Gait Training) 1 person + 1 person to manage equipment  (chair follow)   Weight Bearing (Gait Training) full weight-bearing   Assistive Device (Gait Training) rolling walker   Pattern Analysis (Gait Training) swing-to gait   Gait Analysis Deviations decreased radha;increased time in double stance;decreased step length;decreased toe-to-floor clearance   PT Discharge Planning   PT Plan Transfers, gait with FWW and chair follow, LE strengthening   PT Discharge Recommendation (DC Rec) Transitional Care Facility   PT Rationale for DC Rec Patient continues to require assist of 1 for mobility and is only able to ambulate up to 15'. Recommend continued PT at TCU to progress balance, mobility, and strength.   PT Brief overview of current status Sit<>stand min A, amb 15'x2 with FWW min A.   PT Equipment Needed at Discharge walker, rolling;wheelchair   Total Session Time   Timed Code Treatment Minutes 37   Total Session Time (sum of timed and untimed services) 37

## 2024-02-21 NOTE — PROGRESS NOTES
Date: 2/21/2024  Time: 1600     Data:  Pre Wt:   105.8 kg  Desired Wt:   To be established  Post Wt:  104.3 kg (estimated)  Weight change: - 1.5 kg  Ultrafiltration - Post Run Net Total Removed (mL):  1500 ml  Vascular Access Status: CVC patent  Dialyzer Rinse:  Light  Total Blood Volume Processed: 65.4 L   Total Dialysis (Treatment) Time:   3 Hrs  Dialysate Bath: K 3, Ca 2.25  Heparin: Heparin: None     Lab:   No    Interventions:  Dialysis done through right CVC  UF set to 1500 Liters of fluid removal, accommodating priming and rinse back volumes  ,   See Flowsheet for Crit Profile throughout the run  Treatment has ended safely and  blood is rinsed back completely  Catheter lumens flushed with saline and locked with saline, catheter caps (ClearGuard ) changed post HD  Report given to PCN in stable condition     Assessment:  A/O x 4, calm & cooperative, c/o pain rated 5/10, on relief after 1 hour after giving the tylenol  CVC intact, previous dressing clean and dry                Plan:    Per Renal team    Cedric Alvarez BSN, RN  Acute Dialysis RN  [facility]

## 2024-02-21 NOTE — PROGRESS NOTES
Care Management Follow Up    Length of Stay (days): 17    Expected Discharge Date: 02/23/2024     Concerns to be Addressed: discharge planning     Patient plan of care discussed at interdisciplinary rounds: Yes    Anticipated Discharge Disposition:  ([facility] (TCU))  Disposition Comments: [facility] (U)  Anticipated Discharge Services:  (TBD)  Anticipated Discharge DME: None    Patient/family educated on Medicare website which has current facility and service quality ratings:    Education Provided on the Discharge Plan: Yes  Patient/Family in Agreement with the Plan: yes    Referrals Placed by CM/SW:    Private pay costs discussed: Not applicable    Additional Information:  [facility] for M,[facility],F. She can start 2/23. Call placed to [facility] to see if they can still accept the pt. They are currently reviewing pt's chart again. Waiting for their response.     [facility] is accepting the pt. They are requesting that a ride be set up with [facility] for the first 3 appts.     Sanjeev John RN

## 2024-02-21 NOTE — PROGRESS NOTES
RENAL PROGRESS NOTE    ASSESSMENT & PLAN:   Anuric FELIPA on CKD III: Hemodialysis dependent.  Hemodialysis was initiated via tunneled CVC 12/14/2024.  ARF hemodynamic ATN in the setting of septic physiology, NSAIDs, CT contrast, ACE inhibitor, elevated vancomycin level.  AIN secondary to PPI or cefepime in the differential.     Recs:  Creatinine uptrending between dialysis treatments.  Still needing high-volume UF with HD.  Urine output remains oliguric.  Will need to continue dialysis.  Patient has been accepted at [place] [facility] on a MWF schedule.  She is able to start on 2/23/2024.  Waiting on TCU placement.  Dr. Aponte and myself will follow her at [facility]  Hold all NSAIDs including Voltaren  Continue to follow intake and output.  Fluid restriction.  Slow challenge of TW with obvious volume overload.  Patient is hypertensive, I change metoprolol for carvedilol yesterday for better blood pressure lowering effects from her beta-blocker.  Titrating dose to 25 mg twice daily today.  Hold parameters set for bradycardia.  Will follow for signs of recovery.  Thanks for the consult we will follow    Hyponatremia: Improving with volume optimization with HD/UF.  Sodium 135 today.    Hypertension: PTA amlodipine 10 mg daily, metoprolol 75 mg twice daily, hydralazine 100 mg 3 times daily, Bumex 2 mg daily. Patient is hypertensive, I change metoprolol for carvedilol yesterday for better blood pressure lowering effects from her beta-blocker.  Titrating dose to 25 mg twice daily today.  Hold parameters set for bradycardia. ARB on hold in setting of ARF.  UF with HD.    Normocytic anemia: Inflammatory as well as ARF.  Hemoglobin 9s.  Started Epogen 10,000 units weekly 2/19/2024.    DM2: Management per primary service    Severe sepsis: Resolved    History of CVA:     Nausea/upset stomach: Pepcid and Zofran today.      SUBJECTIVE:    Patient was seen bedside   Sister present as well as other family members  "[facility]    I called  David Hogan about her left Quad muscle biopsy for increasing weakness . She has been evaluated by Dr MIRIAM Cagle.  Symptoms present for about one year but worsening especially over the past 6 months.  She did have back surgery approximately a year ago and this did not help.  Her symptoms are somewhat fluctuating but relatively constant.  She cannot walk less than 25 feet before her muscle gets so weak and even can \"lockup\" so she needs assistance or a walker to continue.    I spoke with her and her  Jorge for 10 minutes today discussing the situation and the planned procedure.  COVID 19 testing was negative.  There is no further questions.       Yazan Chanel MD   " during encounter  Tells me she does not want undergo dialysis today because she is having some stomach upset and nausea  We discussed the importance of maintaining her dialysis schedule, she is agreeable to Pepcid and Zofran to treat her GI symptoms  Will plan for dialysis this afternoon  Will be less aggressive with UF today  All questions answered    OBJECTIVE:  Physical Exam   Temp: 97.7  F (36.5  C) Temp src: Oral BP: (!) 181/74 Pulse: 63   Resp: 16 SpO2: 95 % O2 Device: Nasal cannula Oxygen Delivery: 1/2 LPM  Vitals:    02/17/24 0857 02/20/24 0548 02/21/24 0653   Weight: 107 kg (236 lb) 106 kg (233 lb 11 oz) 105.8 kg (233 lb 3.2 oz)     Vital Signs with Ranges  Temp:  [97.7  F (36.5  C)-98  F (36.7  C)] 97.7  F (36.5  C)  Pulse:  [56-70] 63  Resp:  [16-18] 16  BP: (146-186)/() 181/74  SpO2:  [94 %-95 %] 95 %  I/O last 3 completed shifts:  In: 1310 [P.O.:1310]  Out: 200 [Urine:200]    Patient Vitals for the past 72 hrs:   Weight   02/21/24 0653 105.8 kg (233 lb 3.2 oz)   02/20/24 0548 106 kg (233 lb 11 oz)     Intake/Output Summary (Last 24 hours) at 2/19/2024 1023  Last data filed at 2/19/2024 0300  Gross per 24 hour   Intake 240 ml   Output 250 ml   Net -10 ml       PHYSICAL EXAM:  General: Alert, NAD  HENT: Supple, Non-tender, no obvious JVD  Eyes: No scleral icterus  Cardiovascular: RRR, no rub, gallop, or murmur.   Remedies: 2+ pitting edema BL LE, improving.  Respiratory: CTAB, non-labored  Gastrointestinal: Soft, non-tender, non-distended  Musculoskeletal: Grossly normal   Integumentary: Warm, dry, no rash  Neurologic: Non focal   Psychiatric: Cooperative  Access: R tunneled CVC.  Clear dressing covering.  No surrounding erythema or drainage.    LABORATORY STUDIES:     Recent Labs   Lab 02/21/24  0652 02/20/24  0603 02/18/24  0706 02/16/24  0832 02/15/24  0629   WBC 5.1 6.0  --  6.6 5.6   RBC 3.25* 3.25*  --  3.36* 3.19*   HGB 9.4* 9.4*  --  9.5* 9.1*   HCT 28.6* 29.1*  --  28.8* 27.2*   *  124* 130* 172 170       Basic Metabolic Panel:  Recent Labs   Lab 02/21/24  0826 02/21/24  0652 02/21/24  0216 02/20/24  2058 02/20/24  1715 02/20/24  1144 02/20/24  0807 02/20/24  0603 02/19/24  0834 02/19/24  0556 02/18/24  0910 02/18/24  0706 02/17/24  0844 02/17/24  0652 02/16/24  0846 02/16/24  0832   NA  --  135  --   --   --   --   --  135  --  133*  --  133*  --  136  --  136   POTASSIUM  --  4.2  --   --   --   --   --  4.1  --  4.1  --  4.1  4.4  --  4.0  --  4.2   CHLORIDE  --  99  --   --   --   --   --  99  --  97*  --  98  --  101  --  100   CO2  --  23  --   --   --   --   --  23  --  23  --  23  --  24  --  24   BUN  --  35.9*  --   --   --   --   --  28.1*  --  50.8*  --  44.8*  --  35.9*  --  51.8*   CR  --  3.40*  --   --   --   --   --  3.16*  --  4.15*  --  3.90*  --  3.31*  --  3.70*   * 135* 127* 229* 109* 148*   < > 129*   < > 139*   < > 121*   < > 124*   < > 119*   ALBARO  --  9.1  --   --   --   --   --  9.2  --  9.3  --  8.8  --  9.1  --  8.6*    < > = values in this interval not displayed.       INRNo lab results found in last 7 days.     Recent Labs   Lab Test 02/21/24  0652 02/20/24  0603 02/11/24  0754 02/10/24  0623 02/05/24  0426 02/04/24  1854   INR  --   --   --  1.25*  --  1.23*   WBC 5.1 6.0   < > 6.6   < > 5.9   HGB 9.4* 9.4*   < > 9.8*   < > 10.4*   * 124*   < > 209   < > 119*    < > = values in this interval not displayed.       Personally reviewed current labs    This note was dictated using voice recognition     Twan Gutierrez PA-C  [facility]  [phone]

## 2024-02-21 NOTE — PLAN OF CARE
"Josefina is oriented x4. Up in chair today. Had 4 Bms, all soft. C/O feeling \"sick\" today, mild pain in epigastric/upper abdomen area, gave Pepcid and Tums. No c/o nausea, SOB. Ambulates with 1x assist. Lymph wraps off, LE are 3+ and hard. PIV SL. No insulin correction needed for dinner, 1 unit for HS. VSS. Alarms on.       "

## 2024-02-21 NOTE — PLAN OF CARE
Problem: Adult Inpatient Plan of Care  Goal: Optimal Comfort and Wellbeing  Outcome: Progressing     Problem: Fatigue  Goal: Improved Activity Tolerance  Outcome: Progressing     Problem: Risk for Delirium  Goal: Optimal Coping  Outcome: Progressing  Intervention: Optimize Psychosocial Adjustment to Delirium  Recent Flowsheet Documentation  Taken 2/21/2024 0014 by Reyes, Dora, RN  Supportive Measures: active listening utilized   Goal Outcome Evaluation:    Pt denies pain, sob, n/v, chest pain or any new sx. Pt was a little anxious in the beginning of this shift but declined prn atarax. VSS on RA, except elevated bp. Pt resting comfortably in between cares.

## 2024-02-21 NOTE — PLAN OF CARE
Problem: Adult Inpatient Plan of Care  Goal: Optimal Comfort and Wellbeing  Outcome: Progressing  Goal Outcome Evaluation: Writer is with Dr. Mcpherson([facility]) at the bedside right now, noted patient is alert, and pleasantly anxious c/o of having stomach pain. Per Dr. Mcpherson, keep Patient on Pepcid to manage heartburn, held Lasix, and heparin for now, defer further update from nephrology. HTN managed with BP med. Dr. Bernard and Dr. Leslie at the bedside as well. Will continue to monitor.-Chandrika ALATORRE RN

## 2024-02-21 NOTE — PROGRESS NOTES
[facility]    Progress Note - Hospitalist Service       Date of Admission:  2/2/2024    Assessment & Plan   Josefina Dumont is a 73 year old female admitted on 2/2/2024. She has a history of CHF with recent hospitalization 12/28/23-12/31/23, recent community acquired pneumonia, history of ischemic CVA, history of meningioma, history of CAD, type 2 diabetes mellitus and was admitted for shortness of breath and found to be in severe sepsis. 2/4/24-2/5/24 overnight events notable for stroke code secondary to aphasia and inattention also in the setting of fever to 102.5F and RR 35. Head MRI was unremarkable. Cefepime was switched to meropenem given concern for encephalopathy 2/2 worsening carbapenems. IV acyclovir was also added to the regimen and ID consult, LP orders were placed. On 2/5/24 ID added doxycycline to medication regimen. On 2/6/24, patient reported continued improvement in her condition. However, overnight events 2/6/24-2/7/24 were notable for likely hospital delirium and her mental status has continued to wax and wane. CT head, chest, abdomen, pelvis was repeated 2/8/24 in light of continued AMS of unclear etiology; results showed cirrhosis but were otherwise unremarkable. Metabolic encephalopathy was on the differential given cirrhosis though LFTs have been wnl and patient is stooling daily; trial of empiric lactulose but no further recommendations from GI. EEG per Neurology was performed 2/9/24, read was unremarkable for seizure activity. Antibiotics were finished 2/13/24, ID has signed off. At this time, patient remains hospitalized for worsening kidney function/uremia, thought to be multifactorial in nature though differential remains open and broad. Patient was initiated on HD on 2/14/24. Planning for TCU on discharge once outpatient HD plan is setup. Mental status has greatly improved throughout the past week, she is fully oriented and conversing  appropriately.    Discharge pending TCU bed availability. HD at [facility] has been established. Tentatively planned for tomorrow 2/22/24.    Severe sepsis due to unknown source; resolved   Acute hypoxic respiratory failure, resolved  Acute fever of unknown origin, resolved  Concern for CHF exacerbation, resolved  Delirium, resolved  Patient initially presented with worsening shortness of breath, orthopnea, dyspnea on exertion, in the setting of elevated BNP and missed dose of Lasix, CHF exacerbation was initially high on the differential. She was febrile w/Tmax of 101.3. CT chest on presentation showed no acute findings, mild pulmonary interstitial edema, small pericardial effusion. CRP elevated to 28.10 and procalcitonin of 5.80 on 2/4/24; CRP elevated to 54.20 and procalcitonin elevated to 11.70 on 2/5/24. See 2/4/24-2/5/24 overnight notes for significant events including aphasia, inattention, fever to 102.5F despite broad-spectrum antibiotics prompting additional aggressive treatment measures and workup. LP and TTE unremarkable. Patient's condition had been improving 2/6/24 but then had onset of delirium overnight which continued to wax and wane until improving last week. Possible hospital delirium in the setting of sleep deprivation, disorientation contributing. However, prudent to rule-out other potential causes given the distinct change from patient's baseline and prolonged delirium. CT head, chest, abdomen, pelvis was repeated 2/8/24 in light of continued AMS of unclear etiology; results showed cirrhosis but were otherwise unremarkable. Metabolic encephalopathy was on the differential given cirrhosis though LFTs have been wnl and patient is stooling daily. EEG per Neurology was performed 2/9/24 read as unremarkable for seizure activity with slow background in theta and delta range that suggests a nonspecific generalized cerebral dysfunction. Neurology has signed off. ID also signed off as patient will finished  course of antibiotics. At this time, mental status has greatly improved.  -Delirium precautions  -Cardiac/low-sodium diet  -Supplemental oxygen as needed  -ID consulted, signed off 2/13/24  -Continue Levaquin renally dosed for 7 days from 2/8/2024. Order updated. (Course has been completed)  -GI following, note low concern for hepatic encephalopathy   Consider lymph node biopsy if does not continue to improve clinically.  -Monitor mental status, and glucose.  -Discussed with Neurology, EEG read as above, signed off 2/10/24  -GI consulted 2/9/24, no further recommendations beyond trial of empiric lactulose (trial completed)  -Nephrology consulted and continues to follow, appreciate recommendations   -Creatinine uptrending between dialysis treatments.  Still needing high-volume UF with HD.  Urine output remains oliguric.  Will need to continue dialysis.  -Patient has been accepted at [facility] on a MWF schedule.  She is able to start on 2/23/2024.  Waiting on TCU placement.  [place] [facility] [place]  Hold all NSAIDs including Voltaren  -Continue to follow intake and output.  Fluid restriction.  Slow challenge of TW with obvious volume overload.  -Patient is hypertensive, I change metoprolol for carvedilol yesterday for better blood pressure lowering effects from her beta-blocker.  Titrating dose to 25 mg twice daily today.  Hold parameters set for bradycardia.  -Will follow for signs of recovery.  -Thanks for the consult we will follow    FELIPA  S/p HD (2/14/24, 2/15/24, 2/16/24, 2/19/24)  Baseline creatinine around 0.8-0.9, had stabilized ~2.3 initially but then trended upward with the highest being 5.78 on 2/14/24. Several medications may have been contributing to FELIPA though etiology remains unclear; creatinine worsened several days s/p contrast administration and continued to worsen despite discontinuation of nephrotoxic IV antibiotics. Nephrology has been following,  appreciate their cares and recommendations.  -Nephrology following, see recommendations as above    Hypokalemia, resolved  -Standard replacement protocol      Mild normocytic anemia  Hemoglobin 11.2 on admission. Has since been stable at ~10. Drop consistent with hemodilution.     Hypertension  -Per Nephrology (2/20/24): PTA amlodipine 10 mg daily, metoprolol 75 mg twice daily, hydralazine 100 mg 3 times daily, Bumex 2 mg daily. Patient is hypertensive, I change metoprolol for carvedilol yesterday for better blood pressure lowering effects from her beta-blocker.  Titrating dose to 25 mg twice daily today.  Hold parameters set for bradycardia. ARB on hold in setting of ARF.  UF with HD.     Prolonged QT  QTc of 472.  -Avoid QT prolonging medications  -Note that Celexa comes with risk for QT prolongation but benefit of anxiety treatment outweighing risk, will continue to monitor    DM2   Most recent A1c 6.6 1 month ago.  -Holding PTA glipizide; blood sugars have been well-controlled through hospitalization so will likely hold on discharge as well  -Low sliding scale insulin     Anxiety/depression  -Continue PTA Celexa   -Patient was given hydroxyzine 2/15/24 PM for anxiety/insomnia; she felt like it may have caused some of her grogginess this morning, so would consider other medication options first  -Bedside RN noticed air mattress of bed wasn't being used, so she left a note on the whiteboard to remember to turn that on at night for comfort  -PRN doxepin 3 mg at bedtime for sleep    Bilaterally lower extremity edema  BLE edema. Equal bilaterally, no skin changes, no specific tenderness to palpation but overall uncomfortable due to sensation of fullness per patient. This is improving today.  -Compression  -No Voltaren per Nephrology recommendations  -Anticipate edema will improve with continued dialysis    Chronic:  History of ischemic CVA: Continue PTA Plavix  GERD: Continue PTA Tums, continue PTA Protonix, added on  Carafate   Constipation: Continue PTA senna docusate  Asthma: Continue PTA albuterol as needed        Diet: Regular Diet Adult    DVT Prophylaxis: Heparin; platelets decreasing so added on HIT screening lab  Machuca Catheter: Not present  Fluids: po  Lines: PRESENT      CVC Double Lumen Right Internal jugular Tunneled-Site Assessment: WDL    Cardiac Monitoring: None  Code Status: Full Code           Disposition Plan      Expected Discharge Date: 02/22/2024    Discharge Delays: Dialysis - arrange outpatient  Destination: home  Discharge Comments: Need prior authorization, OP HD setup, and [facility] accepted        The patient's care was discussed with the Attending Physician, Dr. Olivares .    OSKAR BUTLER MD   Hospitalist Service  [facility]  Securely message with [employer] (more info)  Text page via [facility] Paging/Directory   ______________________________________________________________________    Interval History   Patient had a tough night. Feels like she is at the end of her rope in terms of tolerating sleeping in the hospital beds. She notes that at home, she actually usually sleeps in a recliner. In addition, she had more anxiety and claustrophobia last night. She is also bothered but continued GERD despite Tums and Pepcid. She has a history of GERD which has usually been well-controlled with Tums. It is her birthday today so she is looking forward to her family coming in with cake to enjoy.    Physical Exam   Vital Signs: Temp: 97.7  F (36.5  C) Temp src: Oral BP: 136/63 Pulse: 53   Resp: 20 SpO2: 95 % O2 Device: Nasal cannula Oxygen Delivery: 1/2 LPM  Weight: 233 lbs 3.2 oz    GENERAL: Alert and no acute distress. Sitting up in recliner. Tired and restless appearing.  HEENT: Normocephalic, atraumatic, no rhinorrhea, moist mucus membranes.  RESP: Anterior lung fields with good airflow, clear to auscultation, no coarse lung sounds. Nonlabored breathing on room air.   CV:  Regular rate and rhythm, systolic murmur.  MS: No gross musculoskeletal defects noted. Moderate edema of the BLE to the knees, improved from previous and nontender to palpation.  NEURO: Alert, responding to questions appropriately, good recall of recent events.  PSYCH: Appropriate mood and affect. Stressed from difficult night and prolonged hospital stay awaiting TCU and HD arrangements.      Data     I have personally reviewed the following data over the past 24 hrs:    5.1  \   9.4 (L)   / 100 (L)     135 99 35.9 (H) /  184 (H)   4.2 23 3.40 (H) \     ALT: N/A AST: N/A AP: N/A TBILI: N/A   ALB: 3.0 (L) TOT PROTEIN: N/A LIPASE: N/A       Imaging results reviewed over the past 24 hrs:   No results found for this or any previous visit (from the past 24 hour(s)).

## 2024-02-22 ENCOUNTER — APPOINTMENT (OUTPATIENT)
Dept: PHYSICAL THERAPY | Facility: CLINIC | Age: 74
DRG: 871 | End: 2024-02-22
Payer: COMMERCIAL

## 2024-02-22 ENCOUNTER — APPOINTMENT (OUTPATIENT)
Dept: OCCUPATIONAL THERAPY | Facility: CLINIC | Age: 74
DRG: 871 | End: 2024-02-22
Payer: COMMERCIAL

## 2024-02-22 LAB
ANION GAP SERPL CALCULATED.3IONS-SCNC: 9 MMOL/L (ref 7–15)
BASOPHILS # BLD AUTO: 0 10E3/UL (ref 0–0.2)
BASOPHILS NFR BLD AUTO: 1 %
BUN SERPL-MCNC: 20.8 MG/DL (ref 8–23)
CALCIUM SERPL-MCNC: 9 MG/DL (ref 8.8–10.2)
CHLORIDE SERPL-SCNC: 101 MMOL/L (ref 98–107)
CREAT SERPL-MCNC: 2.48 MG/DL (ref 0.51–0.95)
DEPRECATED HCO3 PLAS-SCNC: 26 MMOL/L (ref 22–29)
EGFRCR SERPLBLD CKD-EPI 2021: 20 ML/MIN/1.73M2
EOSINOPHIL # BLD AUTO: 0.1 10E3/UL (ref 0–0.7)
EOSINOPHIL NFR BLD AUTO: 3 %
ERYTHROCYTE [DISTWIDTH] IN BLOOD BY AUTOMATED COUNT: 18.1 % (ref 10–15)
ERYTHROCYTE [DISTWIDTH] IN BLOOD BY AUTOMATED COUNT: 18.2 % (ref 10–15)
GLUCOSE BLDC GLUCOMTR-MCNC: 112 MG/DL (ref 70–99)
GLUCOSE BLDC GLUCOMTR-MCNC: 124 MG/DL (ref 70–99)
GLUCOSE BLDC GLUCOMTR-MCNC: 139 MG/DL (ref 70–99)
GLUCOSE BLDC GLUCOMTR-MCNC: 151 MG/DL (ref 70–99)
GLUCOSE BLDC GLUCOMTR-MCNC: 185 MG/DL (ref 70–99)
GLUCOSE BLDC GLUCOMTR-MCNC: 188 MG/DL (ref 70–99)
GLUCOSE SERPL-MCNC: 123 MG/DL (ref 70–99)
HCT VFR BLD AUTO: 28.6 % (ref 35–47)
HCT VFR BLD AUTO: 28.6 % (ref 35–47)
HGB BLD-MCNC: 9.2 G/DL (ref 11.7–15.7)
HGB BLD-MCNC: 9.3 G/DL (ref 11.7–15.7)
IMM GRANULOCYTES # BLD: 0 10E3/UL
IMM GRANULOCYTES NFR BLD: 1 %
LYMPHOCYTES # BLD AUTO: 0.5 10E3/UL (ref 0.8–5.3)
LYMPHOCYTES NFR BLD AUTO: 12 %
MCH RBC QN AUTO: 28.9 PG (ref 26.5–33)
MCH RBC QN AUTO: 29 PG (ref 26.5–33)
MCHC RBC AUTO-ENTMCNC: 32.2 G/DL (ref 31.5–36.5)
MCHC RBC AUTO-ENTMCNC: 32.5 G/DL (ref 31.5–36.5)
MCV RBC AUTO: 89 FL (ref 78–100)
MCV RBC AUTO: 90 FL (ref 78–100)
MONOCYTES # BLD AUTO: 0.7 10E3/UL (ref 0–1.3)
MONOCYTES NFR BLD AUTO: 17 %
NEUTROPHILS # BLD AUTO: 3 10E3/UL (ref 1.6–8.3)
NEUTROPHILS NFR BLD AUTO: 66 %
NRBC # BLD AUTO: 0 10E3/UL
NRBC BLD AUTO-RTO: 0 /100
PF4 HEPARIN CMPLX AB SER QL: NEGATIVE
PLATELET # BLD AUTO: 89 10E3/UL (ref 150–450)
PLATELET # BLD AUTO: 95 10E3/UL (ref 150–450)
POTASSIUM SERPL-SCNC: 4.3 MMOL/L (ref 3.4–5.3)
RBC # BLD AUTO: 3.17 10E6/UL (ref 3.8–5.2)
RBC # BLD AUTO: 3.22 10E6/UL (ref 3.8–5.2)
RETICS # AUTO: 0.09 10E6/UL (ref 0.01–0.11)
RETICS/RBC NFR AUTO: 2.8 % (ref 0.8–2.7)
SODIUM SERPL-SCNC: 136 MMOL/L (ref 135–145)
WBC # BLD AUTO: 4.1 10E3/UL (ref 4–11)
WBC # BLD AUTO: 4.4 10E3/UL (ref 4–11)

## 2024-02-22 PROCEDURE — 97110 THERAPEUTIC EXERCISES: CPT | Mod: GP

## 2024-02-22 PROCEDURE — 99232 SBSQ HOSP IP/OBS MODERATE 35: CPT | Performed by: PHYSICIAN ASSISTANT

## 2024-02-22 PROCEDURE — 250N000013 HC RX MED GY IP 250 OP 250 PS 637: Performed by: PHYSICIAN ASSISTANT

## 2024-02-22 PROCEDURE — 97116 GAIT TRAINING THERAPY: CPT | Mod: GP

## 2024-02-22 PROCEDURE — 250N000013 HC RX MED GY IP 250 OP 250 PS 637

## 2024-02-22 PROCEDURE — 85045 AUTOMATED RETICULOCYTE COUNT: CPT

## 2024-02-22 PROCEDURE — 85027 COMPLETE CBC AUTOMATED: CPT

## 2024-02-22 PROCEDURE — 99232 SBSQ HOSP IP/OBS MODERATE 35: CPT | Mod: GC

## 2024-02-22 PROCEDURE — 120N000001 HC R&B MED SURG/OB

## 2024-02-22 PROCEDURE — 36415 COLL VENOUS BLD VENIPUNCTURE: CPT | Performed by: STUDENT IN AN ORGANIZED HEALTH CARE EDUCATION/TRAINING PROGRAM

## 2024-02-22 PROCEDURE — 97535 SELF CARE MNGMENT TRAINING: CPT | Mod: GO

## 2024-02-22 PROCEDURE — 250N000013 HC RX MED GY IP 250 OP 250 PS 637: Performed by: INTERNAL MEDICINE

## 2024-02-22 PROCEDURE — 85025 COMPLETE CBC W/AUTO DIFF WBC: CPT | Performed by: STUDENT IN AN ORGANIZED HEALTH CARE EDUCATION/TRAINING PROGRAM

## 2024-02-22 PROCEDURE — 86022 PLATELET ANTIBODIES: CPT | Performed by: STUDENT IN AN ORGANIZED HEALTH CARE EDUCATION/TRAINING PROGRAM

## 2024-02-22 PROCEDURE — 80048 BASIC METABOLIC PNL TOTAL CA: CPT | Performed by: STUDENT IN AN ORGANIZED HEALTH CARE EDUCATION/TRAINING PROGRAM

## 2024-02-22 PROCEDURE — 36415 COLL VENOUS BLD VENIPUNCTURE: CPT

## 2024-02-22 PROCEDURE — 85060 BLOOD SMEAR INTERPRETATION: CPT | Performed by: PATHOLOGY

## 2024-02-22 PROCEDURE — 250N000013 HC RX MED GY IP 250 OP 250 PS 637: Performed by: FAMILY MEDICINE

## 2024-02-22 PROCEDURE — 97535 SELF CARE MNGMENT TRAINING: CPT | Mod: GP

## 2024-02-22 RX ORDER — CLONIDINE HYDROCHLORIDE 0.1 MG/1
0.1 TABLET ORAL
Status: DISCONTINUED | OUTPATIENT
Start: 2024-02-22 | End: 2024-02-28 | Stop reason: HOSPADM

## 2024-02-22 RX ADMIN — HYDRALAZINE HYDROCHLORIDE 100 MG: 25 TABLET, FILM COATED ORAL at 20:16

## 2024-02-22 RX ADMIN — CARVEDILOL 25 MG: 25 TABLET, FILM COATED ORAL at 08:39

## 2024-02-22 RX ADMIN — CLOPIDOGREL BISULFATE 75 MG: 75 TABLET ORAL at 08:39

## 2024-02-22 RX ADMIN — CALCIUM CARBONATE (ANTACID) CHEW TAB 500 MG 500 MG: 500 CHEW TAB at 16:28

## 2024-02-22 RX ADMIN — BUMETANIDE 2 MG: 2 TABLET ORAL at 08:39

## 2024-02-22 RX ADMIN — HYDRALAZINE HYDROCHLORIDE 100 MG: 25 TABLET, FILM COATED ORAL at 14:05

## 2024-02-22 RX ADMIN — SUCRALFATE 1 G: 1 TABLET ORAL at 16:28

## 2024-02-22 RX ADMIN — AMLODIPINE BESYLATE 10 MG: 10 TABLET ORAL at 08:39

## 2024-02-22 RX ADMIN — Medication 1 TABLET: at 08:39

## 2024-02-22 RX ADMIN — CITALOPRAM 20 MG: 20 TABLET ORAL at 08:39

## 2024-02-22 RX ADMIN — SUCRALFATE 1 G: 1 TABLET ORAL at 06:33

## 2024-02-22 RX ADMIN — INSULIN ASPART 1 UNITS: 100 INJECTION, SOLUTION INTRAVENOUS; SUBCUTANEOUS at 14:02

## 2024-02-22 RX ADMIN — Medication: at 08:40

## 2024-02-22 RX ADMIN — CLONIDINE HYDROCHLORIDE 0.1 MG: 0.1 TABLET ORAL at 01:26

## 2024-02-22 RX ADMIN — HYDRALAZINE HYDROCHLORIDE 100 MG: 25 TABLET, FILM COATED ORAL at 08:39

## 2024-02-22 RX ADMIN — SENNOSIDES AND DOCUSATE SODIUM 1 TABLET: 8.6; 5 TABLET ORAL at 20:21

## 2024-02-22 RX ADMIN — CALCIUM CARBONATE (ANTACID) CHEW TAB 500 MG 500 MG: 500 CHEW TAB at 10:40

## 2024-02-22 RX ADMIN — CARVEDILOL 25 MG: 25 TABLET, FILM COATED ORAL at 16:28

## 2024-02-22 RX ADMIN — CALCIUM CARBONATE (ANTACID) CHEW TAB 500 MG 500 MG: 500 CHEW TAB at 20:16

## 2024-02-22 RX ADMIN — SUCRALFATE 1 G: 1 TABLET ORAL at 20:16

## 2024-02-22 RX ADMIN — Medication: at 20:23

## 2024-02-22 ASSESSMENT — ACTIVITIES OF DAILY LIVING (ADL)
ADLS_ACUITY_SCORE: 32
ADLS_ACUITY_SCORE: 36
ADLS_ACUITY_SCORE: 40
ADLS_ACUITY_SCORE: 36
ADLS_ACUITY_SCORE: 40
ADLS_ACUITY_SCORE: 36
ADLS_ACUITY_SCORE: 36
ADLS_ACUITY_SCORE: 40
ADLS_ACUITY_SCORE: 36
ADLS_ACUITY_SCORE: 36
ADLS_ACUITY_SCORE: 40
ADLS_ACUITY_SCORE: 32
ADLS_ACUITY_SCORE: 32
ADLS_ACUITY_SCORE: 36
ADLS_ACUITY_SCORE: 36
ADLS_ACUITY_SCORE: 40
ADLS_ACUITY_SCORE: 36
ADLS_ACUITY_SCORE: 40
ADLS_ACUITY_SCORE: 36

## 2024-02-22 NOTE — PLAN OF CARE
Problem: Adult Inpatient Plan of Care  Goal: Absence of Hospital-Acquired Illness or Injury  Intervention: Identify and Manage Fall Risk  Recent Flowsheet Documentation  Taken 2/21/2024 1650 by Rosetta Packer, RN  Safety Promotion/Fall Prevention:   activity supervised   clutter free environment maintained   increased rounding and observation   increase visualization of patient   lighting adjusted   nonskid shoes/slippers when out of bed   patient and family education   room door open   room near nurse's station   safety round/check completed   room organization consistent     Pt A&Ox4, flat affect. Dialysis completed at 1700. Poor appetite this evening. Pt refused full skin assessment and requested to stay in chair following dialysis. VSS on RA. No urine output this shift. Denies pain this shift. IV SL. Discharge tomorrow to tcu with stretcher transport time TBD.

## 2024-02-22 NOTE — PROGRESS NOTES
Dr. Olivares at the bedside to check on patient, denied any concerns at this time. Patient's sister visiting updated.-Chandrika ALATORRE RN

## 2024-02-22 NOTE — PLAN OF CARE
Problem: Adult Inpatient Plan of Care  Goal: Absence of Hospital-Acquired Illness or Injury  Intervention: Prevent Skin Injury  Recent Flowsheet Documentation  Taken 2/22/2024 0050 by Vicenta Blair RN  Body Position:   position maintained   refuses positioning  Skin Protection: adhesive use limited  Device Skin Pressure Protection:   tubing/devices free from skin contact   absorbent pad utilized/changed   adhesive use limited  Taken 2/21/2024 2025 by Vicenta Blair RN  Body Position: weight shifting  Skin Protection: adhesive use limited  Device Skin Pressure Protection:   tubing/devices free from skin contact   absorbent pad utilized/changed   adhesive use limited   Goal Outcome Evaluation:       Pt is oriented x4, has been sleeping between care. Pt verbalized feeling tired after hemodialysis. Encouraged to go to bed but won't stand up from the recliner chair. Full skin assessment not done. Pt wears tubi- but wants to leave them on. BP was high, scheduled hydralazine was given but when rechecked in again at midnight, SBp was 180-196, tried on both arms. Denies pain, no headache and no SOB. Called on call Nephro and spoke with Melida Escobedo MD. Received Telephone order for Clonidine 0.1mg Q1H PRN for SBP > 190. Last SBP checked was 175 after administration of Clonidine. Rest of VSS on RA. Potential discharge to TCU today.

## 2024-02-22 NOTE — PROGRESS NOTES
RENAL PROGRESS NOTE    ASSESSMENT & PLAN:   Anuric FELIPA on CKD III: Hemodialysis dependent.  Hemodialysis was initiated via tunneled CVC 12/14/2024.  ARF hemodynamic ATN in the setting of septic physiology, NSAIDs, CT contrast, ACE inhibitor, elevated vancomycin level.  AIN secondary to PPI or cefepime in the differential.     Recs:  Creatinine uptrending between dialysis treatments.  Still needing high-volume UF with HD.  Urine output remains oliguric.  Will need to continue dialysis.  Patient has been accepted at [facility] on a MWF schedule.  She is able to start on 2/23/2024.  Waiting on TCU placement.  Dr. Aponte and myself will follow her at [facility]  Hold all NSAIDs including Voltaren  Continue to follow intake and output.  Fluid restriction.  Slow challenge of TW with obvious volume overload.  Will follow for signs of recovery.  Thanks for the consult we will follow    Hyponatremia: Improving with volume optimization with HD/UF.  Sodium 136 today.    Hypertension: PTA amlodipine 10 mg daily, metoprolol 75 mg twice daily, hydralazine 100 mg 3 times daily, Bumex 2 mg daily.  Swap metoprolol for carvedilol this week for better blood pressure lowering effects from her beta-blocker.  Blood pressure seems to be under better control.  ARB on hold in setting of ARF.  UF with HD.    Normocytic anemia: Inflammatory as well as ARF.  Hemoglobin 9s.  Started Epogen 10,000 units weekly 2/19/2024.    DM2: Management per primary service    Severe sepsis: Resolved    History of CVA:     Thrombocytopenia: HIT labs sent per primary team.  Put on hold.  Still receiving Plavix.  Management per primary service.      SUBJECTIVE:    Patient was seen bedside   Dialysis went okay yesterday  Felt pretty worn out posttreatment, but feels energy better today  Had breakfast  No nausea or vomiting  Some modest UOP  Will repeat BMP tomorrow   Plan for inpatient HD tomorrow      OBJECTIVE:  Physical Exam   Temp:  98.7  F (37.1  C) Temp src: Oral BP: 116/58 Pulse: 55   Resp: 17 SpO2: 93 % O2 Device: None (Room air) Oxygen Delivery: 1/2 LPM  Vitals:    02/20/24 0548 02/21/24 0653 02/22/24 1032   Weight: 106 kg (233 lb 11 oz) 105.8 kg (233 lb 3.2 oz) 103.9 kg (229 lb)     Vital Signs with Ranges  Temp:  [97.7  F (36.5  C)-98.7  F (37.1  C)] 98.7  F (37.1  C)  Pulse:  [53-65] 55  Resp:  [16-22] 17  BP: (116-196)/(58-84) 116/58  SpO2:  [93 %-96 %] 93 %  I/O last 3 completed shifts:  In: 360 [P.O.:360]  Out: 1500 [Other:1500]    Patient Vitals for the past 72 hrs:   Weight   02/22/24 1032 103.9 kg (229 lb)   02/21/24 0653 105.8 kg (233 lb 3.2 oz)   02/20/24 0548 106 kg (233 lb 11 oz)     Intake/Output Summary (Last 24 hours) at 2/19/2024 1023  Last data filed at 2/19/2024 0300  Gross per 24 hour   Intake 240 ml   Output 250 ml   Net -10 ml       PHYSICAL EXAM:  General: Alert, NAD  HENT: Supple, Non-tender, no obvious JVD  Eyes: No scleral icterus  Cardiovascular: RRR, no rub, gallop, or murmur.   Remedies: 2+ pitting edema BL LE, improving.  Respiratory: CTAB, non-labored  Gastrointestinal: Soft, non-tender, non-distended  Musculoskeletal: Grossly normal   Integumentary: Warm, dry, no rash  Neurologic: Non focal   Psychiatric: Cooperative  Access: R tunneled CVC.  Clear dressing covering.  No surrounding erythema or drainage.    LABORATORY STUDIES:     Recent Labs   Lab 02/22/24  0755 02/21/24  0652 02/20/24  0603 02/18/24  0706 02/16/24  0832   WBC 4.1 5.1 6.0  --  6.6   RBC 3.17* 3.25* 3.25*  --  3.36*   HGB 9.2* 9.4* 9.4*  --  9.5*   HCT 28.6* 28.6* 29.1*  --  28.8*   PLT 95* 100* 124* 130* 172       Basic Metabolic Panel:  Recent Labs   Lab 02/22/24  0808 02/22/24  0755 02/22/24  0235 02/21/24  2024 02/21/24  1659 02/21/24  1347 02/21/24  0826 02/21/24  0652 02/20/24  0807 02/20/24  0603 02/19/24  0834 02/19/24  0556 02/18/24  0910 02/18/24  0706 02/17/24  0844 02/17/24  0652   NA  --  136  --   --   --   --   --  135  --   135  --  133*  --  133*  --  136   POTASSIUM  --  4.3  --   --   --   --   --  4.2  --  4.1  --  4.1  --  4.1  4.4  --  4.0   CHLORIDE  --  101  --   --   --   --   --  99  --  99  --  97*  --  98  --  101   CO2  --  26  --   --   --   --   --  23  --  23  --  23  --  23  --  24   BUN  --  20.8  --   --   --   --   --  35.9*  --  28.1*  --  50.8*  --  44.8*  --  35.9*   CR  --  2.48*  --   --   --   --   --  3.40*  --  3.16*  --  4.15*  --  3.90*  --  3.31*   * 123* 124* 177* 181* 184*   < > 135*   < > 129*   < > 139*   < > 121*   < > 124*   ALBARO  --  9.0  --   --   --   --   --  9.1  --  9.2  --  9.3  --  8.8  --  9.1    < > = values in this interval not displayed.       INRNo lab results found in last 7 days.     Recent Labs   Lab Test 02/22/24  0755 02/21/24  0652 02/11/24  0754 02/10/24  0623 02/05/24  0426 02/04/24  1854   INR  --   --   --  1.25*  --  1.23*   WBC 4.1 5.1   < > 6.6   < > 5.9   HGB 9.2* 9.4*   < > 9.8*   < > 10.4*   PLT 95* 100*   < > 209   < > 119*    < > = values in this interval not displayed.       Personally reviewed current labs    This note was dictated using voice recognition     Twan Gutierrez PA-C  [facility]  [phone]

## 2024-02-22 NOTE — PROGRESS NOTES
"Care Management Follow Up    Length of Stay (days): 18    Expected Discharge Date: 02/23/2024     Concerns to be Addressed: discharge planning       Patient plan of care discussed at interdisciplinary rounds: Yes    Anticipated Discharge Disposition:  [facility]    Anticipated Discharge Services: Transportation Services    Anticipated Discharge DME: None    Education Provided on the Discharge Plan: Yes (AVS per bedside RN)    Patient/Family in Agreement with the Plan: yes      Additional Information:  CM reviewed chart. CM met with patient to discuss transportation to outpatient dialysis. Transportation has been arranged with [facility] (phone # [phone]). Transportation cost is $100/day. Patient needs to call [facility] with a credit cared #. Patient states she does not have money for that. CM asked if family can provide transportation. Patient states \"I'm screwed\". She can;t afford to pay for transportation and does not have family that can provide transportation. Patient is going to call her sister and see if they can find someone from [employer] or friend that can help with transportation. [facility] and she is agreeable. Anticipate discharge to [facility] tomorrow after dialysis. However patient needs transportation to and from dialysis. Transportation has been arranged with [facility] but they need a credit card on file. Patient looking for a friend or someone from [employer] that can provide transportation to and from dialysis. Patient currently has two transportation options- 1) pay [facility] or 2) family or friend. Transportation to TCU will be arranged by CM. CM to follow.     Radha Bergeron RN          "

## 2024-02-22 NOTE — PLAN OF CARE
Problem: Adult Inpatient Plan of Care  Goal: Optimal Comfort and Wellbeing  Outcome: Progressing  Goal Outcome Evaluation: A&Ox4, denied pain, afebrile, on room air, calm, pleasant, compliant, HTN managed with routine BP meds, up on the chair eating breakfast, took all AM routine meds whole with food, will re-check BP within an hour after breakfast, plans to discharge today to [facility] time TBD. Will continue to monitor.-Chandrika ALATORRE RN

## 2024-02-22 NOTE — PROGRESS NOTES
Lymphedema Discharge Summary    Reason for therapy discharge:    All goals and outcomes met, no further needs identified.    Progress towards therapy goal(s). See goals on Care Plan in Epic electronic health record for goal details.  Goals met    Therapy recommendation(s):    Continued tubigrips with RN management    Lymphedema Care Plan: Elastic Stockinette (cream colored, tubular and toeless)   Please see the instructions including pictures posted in patient room for specific directions.  Patient is now in a maintenance phase for the management of their edema and will no longer be actively followed by edema therapists.  Please follow instructions below to assist the patient with use of the compression garment to maintain control of edema:    1. Pt is to wear the labeled size Size G stockinette on bilateral LE's with overlap of extra fabric at feet. Ensure top of elastic stockinette is 1  below pt's knees and is smooth (do not fold double or bunch below knees)    2. Completely remove compression for 1 hour each day for skin care (wash and lotion) and skin assessment then re-don elastic stockinette.    3. Nursing to remove if:  - stockinette is causing pain/numbness   - stockinette becomes soiled  - stockinette is loose or falling off  - there are areas of bunching up and rolling (want to avoid risk of tourniquet effect)  - patient has a change in level of alertness or status impacting ability to communicate pain   - worsening respiratory status     Contact inpatient edema therapy (rehabilitation services) with questions or if there is a change in condition.

## 2024-02-22 NOTE — PROGRESS NOTES
[facility]    Progress Note - Hospitalist Service       Date of Admission:  2/2/2024    Assessment & Plan   Josefina Dumont is a 73 year old female admitted on 2/2/2024. She has a history of CHF with recent hospitalization 12/28/23-12/31/23, recent community acquired pneumonia, history of ischemic CVA, history of meningioma, history of CAD, type 2 diabetes mellitus and was admitted for shortness of breath and found to be in severe sepsis. 2/4/24-2/5/24 overnight events notable for stroke code secondary to aphasia and inattention also in the setting of fever to 102.5F and RR 35. Head MRI was unremarkable. Cefepime was switched to meropenem given concern for encephalopathy 2/2 worsening carbapenems. IV acyclovir was also added to the regimen and ID consult, LP orders were placed. On 2/5/24 ID added doxycycline to medication regimen. On 2/6/24, patient reported continued improvement in her condition. However, overnight events 2/6/24-2/7/24 were notable for likely hospital delirium and her mental status has continued to wax and wane. CT head, chest, abdomen, pelvis was repeated 2/8/24 in light of continued AMS of unclear etiology; results showed cirrhosis but were otherwise unremarkable. Metabolic encephalopathy was on the differential given cirrhosis though LFTs have been wnl and patient is stooling daily; trial of empiric lactulose but no further recommendations from GI. EEG per Neurology was performed 2/9/24, read was unremarkable for seizure activity. Antibiotics were finished 2/13/24, ID has signed off. At this time, patient remains hospitalized for worsening kidney function/uremia, thought to be multifactorial in nature though differential remains open and broad. Patient was initiated on HD on 2/14/24. Planning for TCU on discharge once outpatient HD plan is setup. Mental status has greatly improved throughout the past week, she is fully oriented and conversing  Per Cristine Gant NP:  Please provide a referral to ent.     Referral placed to ENT, Dr Pérez.  Called patient to let her know above and she stated she will call and make an appointment.   No further questions or concerns.    appropriately.    Discharge pending establishment of transportation for HD. Tentatively planned for tomorrow 2/23/24.    Severe sepsis due to unknown source; resolved   Acute hypoxic respiratory failure, resolved  Acute fever of unknown origin, resolved  Concern for CHF exacerbation, resolved  Delirium, resolved  Patient initially presented with worsening shortness of breath, orthopnea, dyspnea on exertion, in the setting of elevated BNP and missed dose of Lasix, CHF exacerbation was initially high on the differential. She was febrile w/Tmax of 101.3. CT chest on presentation showed no acute findings, mild pulmonary interstitial edema, small pericardial effusion. CRP elevated to 28.10 and procalcitonin of 5.80 on 2/4/24; CRP elevated to 54.20 and procalcitonin elevated to 11.70 on 2/5/24. See 2/4/24-2/5/24 overnight notes for significant events including aphasia, inattention, fever to 102.5F despite broad-spectrum antibiotics prompting additional aggressive treatment measures and workup. LP and TTE unremarkable. Patient's condition had been improving 2/6/24 but then had onset of delirium overnight which continued to wax and wane until improving last week. Possible hospital delirium in the setting of sleep deprivation, disorientation contributing. However, prudent to rule-out other potential causes given the distinct change from patient's baseline and prolonged delirium. CT head, chest, abdomen, pelvis was repeated 2/8/24 in light of continued AMS of unclear etiology; results showed cirrhosis but were otherwise unremarkable. Metabolic encephalopathy was on the differential given cirrhosis though LFTs have been wnl and patient is stooling daily. EEG per Neurology was performed 2/9/24 read as unremarkable for seizure activity with slow background in theta and delta range that suggests a nonspecific generalized cerebral dysfunction. Neurology has signed off. ID also signed off as patient will finished course of  antibiotics. At this time, mental status has greatly improved.  -Delirium precautions  -Cardiac/low-sodium diet  -Supplemental oxygen as needed  -ID consulted, signed off 2/13/24  -Continue Levaquin renally dosed for 7 days from 2/8/2024. Order updated. (Course has been completed)  -GI following, note low concern for hepatic encephalopathy   Consider lymph node biopsy if does not continue to improve clinically.  -Monitor mental status, and glucose.  -Discussed with Neurology, EEG read as above, signed off 2/10/24  -GI consulted 2/9/24, no further recommendations beyond trial of empiric lactulose (trial completed)  -Nephrology consulted and continues to follow, appreciate recommendations   -Creatinine uptrending between dialysis treatments.  Still needing high-volume UF with HD.  Urine output remains oliguric.  Will need to continue dialysis.  -Patient has been accepted at [facility] on a MWF schedule.  She is able to start on 2/23/2024.  Waiting on TCU placement.  Dr. Aponte and myself will follow her at [facility]  -Hold all NSAIDs including Voltaren  -Continue to follow intake and output.  Fluid restriction.  Slow challenge of TW with obvious volume overload.  -Will follow for signs of recovery.  -Thanks for the consult we will follow    FELIPA  S/p HD (2/14/24, 2/15/24, 2/16/24, 2/19/24, 2/21/24 next planned for tomorrow 2/23/24)  Baseline creatinine around 0.8-0.9, had stabilized ~2.3 initially but then trended upward with the highest being 5.78 on 2/14/24. Several medications may have been contributing to FELIPA though etiology remains unclear; creatinine worsened several days s/p contrast administration and continued to worsen despite discontinuation of nephrotoxic IV antibiotics. Nephrology has been following, appreciate their cares and recommendations.  -Nephrology following, see recommendations as above  -Planning for HD tomorrow and then discharge to TCU    Hypokalemia, resolved  -Standard  replacement protocol      Mild normocytic anemia  Hemoglobin 11.2 on admission. Has since been stable at ~10. Drop consistent with hemodilution.     Hypertension  -Per Nephrology (2/22/24): PTA amlodipine 10 mg daily, metoprolol 75 mg twice daily, hydralazine 100 mg 3 times daily, Bumex 2 mg daily.  Swap metoprolol for carvedilol this week for better blood pressure lowering effects from her beta-blocker.  Blood pressure seems to be under better control.  ARB on hold in setting of ARF.  UF with HD.     Prolonged QT  QTc of 472.  -Avoid QT prolonging medications  -Note that Celexa comes with risk for QT prolongation but benefit of anxiety treatment outweighing risk, will continue to monitor    DM2   Most recent A1c 6.6 1 month ago.  -Holding PTA glipizide; blood sugars have been well-controlled through hospitalization so will likely hold on discharge as well  -Low sliding scale insulin     Anxiety/depression  -Continue PTA Celexa   -Patient was given hydroxyzine 2/15/24 PM for anxiety/insomnia; she felt like it may have caused some of her grogginess this morning, so would consider other medication options first  -Bedside RN noticed air mattress of bed wasn't being used, so she left a note on the whiteboard to remember to turn that on at night for comfort  -PRN doxepin 3 mg at bedtime for sleep    Bilaterally lower extremity edema  BLE edema. Equal bilaterally, no skin changes, no specific tenderness to palpation but overall uncomfortable due to sensation of fullness per patient. This is improving today.  -Compression  -No Voltaren per Nephrology recommendations  -Anticipate edema will improve with continued dialysis    Chronic:  History of ischemic CVA: Continue PTA Plavix  GERD: Continue PTA Tums, continue PTA Protonix, added on Carafate   Constipation: Continue PTA senna docusate  Asthma: Continue PTA albuterol as needed        Diet: Regular Diet Adult    DVT Prophylaxis: Heparin; platelets decreasing so added on  HIT screening lab 2/22/24 which was negative  Machuca Catheter: Not present  Fluids: po  Lines: PRESENT      CVC Double Lumen Right Internal jugular Tunneled-Site Assessment: WDL    Cardiac Monitoring: None  Code Status: Full Code           Disposition Plan      Expected Discharge Date: 02/23/2024    Discharge Delays: Dialysis - arrange outpatient  Destination: home  Discharge Comments: OP HD setup, and [facility] accepted        The patient's care was discussed with the Attending Physician, Dr. Olivares .    OSKAR BUTLER MD   Hospitalist Service  [facility]  Securely message with [employer] (more info)  Text page via [facility] Paging/Directory   ______________________________________________________________________    Interval History   No acute events overnight. Feeling better than yesterday. Slept in recliner last night which was better than the bed. Eagerly awaiting discharge to TCU for better rest and recovery. Had a good birthday celebration yesterday.    Physical Exam   Vital Signs: Temp: 98.1  F (36.7  C) Temp src: Oral BP: 134/60 Pulse: 62   Resp: 17 SpO2: 93 % O2 Device: None (Room air) Oxygen Delivery: 1/2 LPM  Weight: 229 lbs 0 oz    GENERAL: Alert and no acute distress. Sitting up in recliner.   HEENT: Normocephalic, atraumatic, no rhinorrhea, moist mucus membranes.  RESP: Anterior lung fields with good airflow, clear to auscultation, no coarse lung sounds. Nonlabored breathing on room air.   CV: Regular rate and rhythm, systolic murmur.  MS: No gross musculoskeletal defects noted. Mild edema of the BLE, nontender to palpation.  NEURO: Alert, responding to questions appropriately, good recall of recent events.  PSYCH: Pleasant mood and affect.       Data     I have personally reviewed the following data over the past 24 hrs:    4.1  \   9.2 (L)   / 95 (L)     136 101 20.8 /  185 (H)   4.3 26 2.48 (H) \       Imaging results reviewed over the past 24 hrs:   No results found  for this or any previous visit (from the past 24 hour(s)).

## 2024-02-23 ENCOUNTER — APPOINTMENT (OUTPATIENT)
Dept: OCCUPATIONAL THERAPY | Facility: CLINIC | Age: 74
DRG: 871 | End: 2024-02-23
Payer: COMMERCIAL

## 2024-02-23 ENCOUNTER — PATIENT OUTREACH (OUTPATIENT)
Dept: CARE COORDINATION | Facility: CLINIC | Age: 74
End: 2024-02-23
Payer: COMMERCIAL

## 2024-02-23 LAB
ANION GAP SERPL CALCULATED.3IONS-SCNC: 10 MMOL/L (ref 7–15)
BUN SERPL-MCNC: 28.3 MG/DL (ref 8–23)
CALCIUM SERPL-MCNC: 9.5 MG/DL (ref 8.8–10.2)
CHLORIDE SERPL-SCNC: 99 MMOL/L (ref 98–107)
CREAT SERPL-MCNC: 2.76 MG/DL (ref 0.51–0.95)
DEPRECATED HCO3 PLAS-SCNC: 26 MMOL/L (ref 22–29)
EGFRCR SERPLBLD CKD-EPI 2021: 18 ML/MIN/1.73M2
ERYTHROCYTE [DISTWIDTH] IN BLOOD BY AUTOMATED COUNT: 18.1 % (ref 10–15)
GLUCOSE BLDC GLUCOMTR-MCNC: 114 MG/DL (ref 70–99)
GLUCOSE BLDC GLUCOMTR-MCNC: 136 MG/DL (ref 70–99)
GLUCOSE BLDC GLUCOMTR-MCNC: 179 MG/DL (ref 70–99)
GLUCOSE BLDC GLUCOMTR-MCNC: 185 MG/DL (ref 70–99)
GLUCOSE BLDC GLUCOMTR-MCNC: 225 MG/DL (ref 70–99)
GLUCOSE SERPL-MCNC: 130 MG/DL (ref 70–99)
HCT VFR BLD AUTO: 30.1 % (ref 35–47)
HGB BLD-MCNC: 9.7 G/DL (ref 11.7–15.7)
HOLD SPECIMEN: NORMAL
MCH RBC QN AUTO: 29 PG (ref 26.5–33)
MCHC RBC AUTO-ENTMCNC: 32.2 G/DL (ref 31.5–36.5)
MCV RBC AUTO: 90 FL (ref 78–100)
PATH REPORT.COMMENTS IMP SPEC: NORMAL
PATH REPORT.COMMENTS IMP SPEC: NORMAL
PATH REPORT.FINAL DX SPEC: NORMAL
PATH REPORT.RELEVANT HX SPEC: NORMAL
PLATELET # BLD AUTO: 115 10E3/UL (ref 150–450)
POTASSIUM SERPL-SCNC: 4.1 MMOL/L (ref 3.4–5.3)
RBC # BLD AUTO: 3.34 10E6/UL (ref 3.8–5.2)
SODIUM SERPL-SCNC: 135 MMOL/L (ref 135–145)
WBC # BLD AUTO: 4.9 10E3/UL (ref 4–11)

## 2024-02-23 PROCEDURE — 85014 HEMATOCRIT: CPT

## 2024-02-23 PROCEDURE — 250N000013 HC RX MED GY IP 250 OP 250 PS 637: Performed by: FAMILY MEDICINE

## 2024-02-23 PROCEDURE — 250N000013 HC RX MED GY IP 250 OP 250 PS 637

## 2024-02-23 PROCEDURE — 120N000001 HC R&B MED SURG/OB

## 2024-02-23 PROCEDURE — 99232 SBSQ HOSP IP/OBS MODERATE 35: CPT | Mod: GC

## 2024-02-23 PROCEDURE — 36415 COLL VENOUS BLD VENIPUNCTURE: CPT | Performed by: PHYSICIAN ASSISTANT

## 2024-02-23 PROCEDURE — 80048 BASIC METABOLIC PNL TOTAL CA: CPT | Performed by: PHYSICIAN ASSISTANT

## 2024-02-23 PROCEDURE — 97535 SELF CARE MNGMENT TRAINING: CPT | Mod: GO

## 2024-02-23 PROCEDURE — 90935 HEMODIALYSIS ONE EVALUATION: CPT

## 2024-02-23 PROCEDURE — 99232 SBSQ HOSP IP/OBS MODERATE 35: CPT | Performed by: PHYSICIAN ASSISTANT

## 2024-02-23 PROCEDURE — 250N000013 HC RX MED GY IP 250 OP 250 PS 637: Performed by: PHYSICIAN ASSISTANT

## 2024-02-23 RX ORDER — DOXAZOSIN 1 MG/1
1 TABLET ORAL DAILY
Status: DISCONTINUED | OUTPATIENT
Start: 2024-02-23 | End: 2024-02-24

## 2024-02-23 RX ADMIN — SUCRALFATE 1 G: 1 TABLET ORAL at 06:07

## 2024-02-23 RX ADMIN — FAMOTIDINE 10 MG: 10 TABLET ORAL at 08:46

## 2024-02-23 RX ADMIN — CALCIUM CARBONATE (ANTACID) CHEW TAB 500 MG 500 MG: 500 CHEW TAB at 20:04

## 2024-02-23 RX ADMIN — CITALOPRAM 20 MG: 20 TABLET ORAL at 08:40

## 2024-02-23 RX ADMIN — CALCIUM CARBONATE (ANTACID) CHEW TAB 500 MG 500 MG: 500 CHEW TAB at 10:33

## 2024-02-23 RX ADMIN — CALCIUM CARBONATE (ANTACID) CHEW TAB 500 MG 500 MG: 500 CHEW TAB at 09:38

## 2024-02-23 RX ADMIN — Medication: at 08:44

## 2024-02-23 RX ADMIN — AMLODIPINE BESYLATE 10 MG: 10 TABLET ORAL at 08:40

## 2024-02-23 RX ADMIN — SUCRALFATE 1 G: 1 TABLET ORAL at 11:52

## 2024-02-23 RX ADMIN — ACETAMINOPHEN 1000 MG: 500 TABLET ORAL at 14:53

## 2024-02-23 RX ADMIN — SUCRALFATE 1 G: 1 TABLET ORAL at 17:28

## 2024-02-23 RX ADMIN — SENNOSIDES AND DOCUSATE SODIUM 1 TABLET: 8.6; 5 TABLET ORAL at 22:21

## 2024-02-23 RX ADMIN — Medication 1 TABLET: at 08:44

## 2024-02-23 RX ADMIN — CLOPIDOGREL BISULFATE 75 MG: 75 TABLET ORAL at 08:40

## 2024-02-23 RX ADMIN — HYDRALAZINE HYDROCHLORIDE 100 MG: 25 TABLET, FILM COATED ORAL at 08:40

## 2024-02-23 RX ADMIN — BUMETANIDE 2 MG: 2 TABLET ORAL at 08:40

## 2024-02-23 RX ADMIN — HYDRALAZINE HYDROCHLORIDE 100 MG: 25 TABLET, FILM COATED ORAL at 22:21

## 2024-02-23 RX ADMIN — CARVEDILOL 25 MG: 25 TABLET, FILM COATED ORAL at 18:17

## 2024-02-23 RX ADMIN — SUCRALFATE 1 G: 1 TABLET ORAL at 22:22

## 2024-02-23 RX ADMIN — INSULIN ASPART 1 UNITS: 100 INJECTION, SOLUTION INTRAVENOUS; SUBCUTANEOUS at 18:44

## 2024-02-23 RX ADMIN — CARVEDILOL 25 MG: 25 TABLET, FILM COATED ORAL at 08:40

## 2024-02-23 ASSESSMENT — ACTIVITIES OF DAILY LIVING (ADL)
ADLS_ACUITY_SCORE: 29
ADLS_ACUITY_SCORE: 29
ADLS_ACUITY_SCORE: 30
ADLS_ACUITY_SCORE: 30
ADLS_ACUITY_SCORE: 32
ADLS_ACUITY_SCORE: 30
ADLS_ACUITY_SCORE: 32
ADLS_ACUITY_SCORE: 30
ADLS_ACUITY_SCORE: 32
ADLS_ACUITY_SCORE: 30
ADLS_ACUITY_SCORE: 32
ADLS_ACUITY_SCORE: 32
ADLS_ACUITY_SCORE: 29
ADLS_ACUITY_SCORE: 32
ADLS_ACUITY_SCORE: 30
ADLS_ACUITY_SCORE: 32

## 2024-02-23 NOTE — DISCHARGE SUMMARY
[facility]  Discharge Summary - Medicine & Pediatrics       Date of Admission:  2/2/2024  Date of Discharge:  2/28/2024  Discharging Provider: Dr. Galdino Serrano  Discharge Service: Hospitalist Service    Discharge Diagnoses   Patient Active Problem List   Diagnosis    Primary hypertension    Meningioma (H)    Injury of head, initial encounter    Syncope, unspecified syncope type    Community acquired pneumonia of left lung    Type 2 diabetes mellitus, without long-term current use of insulin (H)    Febrile illness, acute    Urinary tract infection without hematuria, site unspecified    Mitral valve stenosis    Morbid obesity (H)    Hypertensive emergency    Demand ischemia    FELIPA (acute kidney injury) (H24)    Acute diastolic heart failure (H)    Fever, unspecified fever cause    Acute kidney failure, unspecified (H)       Clinically Significant Risk Factors     # DMII: A1C = 6.6 % (Ref range: <5.7 %) within past 6 months       Follow-ups Needed After Discharge   Follow-up Appointments     Follow Up and recommended labs and tests      Follow up with nursing home physician or PCP.  The following labs/tests   are recommended: CBC, BMP for continued kidney, platelet, and WBC   monitoring.    Follow up with nephrology as indicated by outpatient hemodialysis team.            Unresulted Labs Ordered in the Past 30 Days of this Admission       Date and Time Order Name Status Description    2/4/2024  8:16 PM Fungus Culture, non-blood Preliminary         These results will be followed up by PCP.    Discharge Disposition   Discharged to short-term care facility  Condition at discharge: Stable    Hospital Course   Josefina Dumont was admitted on 2/2/2024 for SOB, concern for infection. She has a history of CHF with recent hospitalization 12/28/23-12/31/23, recent community acquired pneumonia, history of ischemic CVA, history of meningioma, history of CAD, type 2 diabetes mellitus and was admitted for  shortness of breath and found to be in severe sepsis. Patient initially presented with worsening shortness of breath, orthopnea, dyspnea on exertion. In the setting of elevated BNP and missed dose of Lasix, CHF exacerbation was initially high on the differential. She was febrile w/Tmax of 101.3. CT chest on presentation showed no acute findings, mild pulmonary interstitial edema, small pericardial effusion. CRP elevated to 28.10 and procalcitonin of 5.80 on 2/4/24; CRP elevated to 54.20 and procalcitonin elevated to 11.70 on 2/5/24. 2/4/24-2/5/24 overnight events notable for stroke code secondary to aphasia and inattention also in the setting of fever to 102.5F and RR 35 despite broad-spectrum antibiotics prompting additional aggressive treatment measures and workup. Head MRI was unremarkable. Cefepime was switched to meropenem given concern for encephalopathy 2/2 carbapenems. IV acyclovir was also added to the regimen and ID consult, LP orders were placed. LP and TTE unremarkable. On 2/5/24 ID added doxycycline to medication regimen. On 2/6/24, patient reported continued improvement in her condition. However, overnight events 2/6/24-2/7/24 were notable for likely hospital delirium and her mental status continued to wax and wane. CT head, chest, abdomen, pelvis was repeated 2/8/24 in light of continued AMS of unclear etiology; results showed cirrhosis but were otherwise unremarkable. Metabolic encephalopathy was on the differential given cirrhosis though LFTs have been wnl and patient stooling daily; trial of empiric lactulose was performed but no further recommendations from GI. EEG per Neurology was performed 2/9/24, read was unremarkable for seizure activity. Antibiotics were finished 2/13/24, ID then signed off. Patient remained hospitalized for worsening kidney function/uremia, thought to be multifactorial in nature. Patient was initiated on HD on 2/14/24 and Nephrology is planning for outpatient HD, duration  TBD but hopeful for short course. Kidney function and fluid status has shown good response to HD, and patient is producing urine. Plan is also for TCU on discharge in the setting of prolonged hospital stay resulting in deconditioning. On day of discharge, leading differential for kidney injury is ATN in the setting of PPI and cefepime. Kidney function has shown good response with creatinine of 1.88 and GFR of 28 prior to HD today.     The following problems were addressed during her hospitalization:    Severe sepsis due to unknown source; resolved   Acute hypoxic respiratory failure, resolved  Acute fever of unknown origin, resolved  Concern for CHF exacerbation, resolved  Delirium, resolved  -Cardiac/low-sodium diet  -ID consulted, signed off 2/13/24  -LP unremarkable  -LUC unremarkable  -IV cefepime 2/3/24-2/4/24  -IV Zosyn 2/3/24-2/5/24  -IV Vancomycin 2/3/24-2/5/24  -IV acyclovir 2/4/24-2/6/24  -IV doxycycline 2/5/24-2/8/24  -Discussed with Neurology, signed off 2/10/24   -EEG 2/9/24: This is an abnormal EEG due to diffusely slow background in theta and delta range that suggests a nonspecific generalized cerebral dysfunction. Such slowing can be seen in metabolic or toxic abnormalities, clinical correlation is recommended. No sharp discharges or spikes were recorded during this recording to suggest a seizure disorder.   -GI consulted 2/9/24, no further recommendations beyond trial of empiric lactulose    -Lactulose 2/9/24-2/13/24  -Nephrology consulted and continues to follow, appreciate recommendations              -Acute kidney injury, chronic kidney disease stage 3 - Currently hemodialysis dependent; dialyzing via tunneled CVC. FELIPA due to hemodynamic ATN in the setting of septic physiology, NSAIDs, CT contrast, ACEi and elevated vancomycin. Consideration for AIN secondary to PPI or cefepime on differential. May be showing some downtrend in creatinine. Okay to discharge on dialysis and will follow for signs of  recovery as outpatient.      FELIPA  S/p HD (2/14/24, 2/15/24, 2/16/24, 2/19/24, 2/21/24, 2/23/24, 2/26/24, 2/28/24)  Baseline creatinine around 0.8-0.9, had stabilized ~2.3 initially but then trended upward with the highest being 5.78 on 2/14/24. FELIPA due to hemodynamic ATN in the setting of septic physiology, NSAIDs, CT contrast, ACEi and elevated vancomycin. Consideration for AIN secondary to PPI or cefepime on differential. Kidney function has shown good response with creatinine of 1.88 and GFR of 28 prior to HD today.   -Nephrology following, see recommendations as above  -Planning for HD today and then discharge to TCU     Hypokalemia, resolved  -Standard replacement protocol      Mild normocytic anemia  Hemoglobin 11.2 on admission. Has since been stable at ~10. Drop consistent with hemodilution.      Hypertension  -Per Nephrology: Prior to admission on amlodipine, metoprolol, hydralazine, bumetanide. Metoprolol changed to carvedilol for better blood pressure lowering effect. Started on doxazosin over weekend. ARB on hold in setting of FELIPA. UF as needed with HD. Will go up on doxazosin and follow response      Prolonged QT  QTc of 472.  -Avoid QT prolonging medications  -Note that Celexa comes with risk for QT prolongation but benefit of anxiety treatment outweighing risk, will continue to monitor     DM2   Most recent A1c 6.6 1 month ago.  -Holding PTA glipizide; blood sugars have been well-controlled through hospitalization so will hold on discharge as well  -Low sliding scale insulin      Anxiety/depression  -Continue PTA Celexa   -Patient was given hydroxyzine 2/15/24 PM for anxiety/insomnia; she felt like it may have caused some of her grogginess this morning, so would consider other medication options first  -Patient has increased comfort sleeping in recliner instead of bed     Bilaterally lower extremity edema  BLE edema. Equal bilaterally, no skin changes, no specific tenderness to palpation but overall  uncomfortable due to sensation of fullness per patient. This is improving today.  -Compression  -No Voltaren per Nephrology recommendations  -Anticipate edema will improve with continued dialysis    Thrombocytopenia  Began having decreased platelet count on 2/18/24. Has been low but stable with platelet count 20343 today. HIT screening lab was checked on 2/22/24 and results were negative. Suspect thrombocytopenia is in the setting of heparin usage.  -Holding prophylactic heparin  -Continue to monitor    Leukopenia  WBC decreased today at 2.4 today. Patient VSS. Would recommend outpatient follow-up.     Chronic:  History of ischemic CVA: Continue PTA Plavix  GERD: Continue PTA Tums, continue PTA Protonix, added on Carafate   Constipation: Continue PTA senna docusate  Asthma: Continue PTA albuterol as needed    Consultations This Hospital Stay   PHYSICAL THERAPY ADULT IP CONSULT  OCCUPATIONAL THERAPY ADULT IP CONSULT  PHARMACY TO DOSE VANCO  INTENSIVIST IP CONSULT  INFECTIOUS DISEASES IP CONSULT  CARE MANAGEMENT / SOCIAL WORK IP CONSULT  NEPHROLOGY IP CONSULT  GASTROENTEROLOGY IP CONSULT  NEUROLOGY IP CONSULT  INTERVENTIONAL RADIOLOGY ADULT/PEDS IP CONSULT  SPIRITUAL HEALTH SERVICES IP CONSULT  CARE MANAGEMENT / SOCIAL WORK IP CONSULT  LYMPHEDEMA THERAPY IP CONSULT  SPIRITUAL HEALTH SERVICES IP CONSULT  OCCUPATIONAL THERAPY ADULT IP CONSULT  PHYSICAL THERAPY ADULT IP CONSULT  PHYSICAL THERAPY ADULT IP CONSULT  OCCUPATIONAL THERAPY ADULT IP CONSULT    Code Status   Full Code       The patient was discussed with Dr. Zach BUTLER MD  [address] [address]  Phone: [phone]  Fax: [phone]  ______________________________________________________________________    Physical Exam   Vital Signs: Temp: 98.2  F (36.8  C) Temp src: Oral BP: (!) 151/67 Pulse: 63   Resp: 22 SpO2: 97 % O2 Device: None (Room air)    Weight: 231 lbs 0  oz    GENERAL: Alert and no acute distress. Sitting up in recliner.   HEENT: Normocephalic, atraumatic, no rhinorrhea, moist mucus membranes.  RESP: Anterior lung fields with good airflow, clear to auscultation, no coarse lung sounds. Nonlabored breathing on room air.   CV: Regular rate and rhythm, systolic murmur.  MS: No gross musculoskeletal defects noted. Mild edema of the BLE, nontender to palpation.  NEURO: Alert, responding to questions appropriately, good recall of recent events.  PSYCH: Pleasant mood and affect.       Primary Care Physician   COURT SERVIN    Discharge Orders      Brief Discharge Instructions    Tunneled Central Catheter Discharge Instructions:  You had a tunneled central access device placed. Part of the device is outside of your body and part of the device runs under the skin into a vein. Your nurses and doctors will use the tunneled catheter for your future treatments.    Care Instructions:   - If you received sedation for your procedure, do not drive or operate heavy machinery for the rest of the day.  - Keep dialysis catheter site dry. Do not get wet.  - Never immerse your catheter in water; no swimming, hot tubs, or tub baths.  - If you experience significant bleeding at site, apply pressure with hands above the clavicle bone, sit upright.     If the catheter (tubing) breaks or leaks:  - Place the blue emergency clamp between the break and your insertion site on your skin  - If you don't have a clamp, fold or pinch off the tubing above the hole or break in the catheter (closest to your skin)    Seek medical assistance for any of the following:   - Uncontrolled bleeding.  - You have a fever (greater than 101 F (38.3C)).  - Purulent (yellow/green/foul smelling) drainage from catheter insertion site.  - Increasing redness at catheter site.  - Increasing pain at catheter site.  - Increasing swelling at catheter site.  - If you have chest pain, shortness of breath, or feel faint:  -Make  sure end caps are securely tightened  -Look for a hole or leaking in the catheter  -Put the blue emergency clamp on the catheter (tubing) above the hole or break in the catheter as close to the skin as you can  -Remain calm and call 911. Explain your symptoms and say that you have a central line tunnel catheter in place  -Lie on your left side     General info for SNF    Length of Stay Estimate: Short Term Care: Estimated # of Days <30  Condition at Discharge: Improving  Level of care: Skilled   Rehabilitation Potential: Good  Admission H&P remains valid and up-to-date: Yes  Recent Chemotherapy: N/A  Use Nursing Home Standing Orders: Yes     Mantoux instructions    Give two-step Mantoux (PPD) Per Facility Policy Yes     Follow Up and recommended labs and tests    Follow up with nursing home physician or PCP.  The following labs/tests are recommended: CBC, BMP for continued kidney, platelet, and WBC monitoring.    Follow up with nephrology as indicated by outpatient hemodialysis team.     Reason for your hospital stay    Sepsis, altered mental status, FELIPA/ATN requiring initiation of hemodialysis     Glucose monitor nursing POCT    Before meals and at bedtime     Intake and output    Every shift     Activity - Up with nursing assistance     Activity - Up with assistive device     Full Code     Physical Therapy Adult Consult    Evaluate and treat as clinically indicated.    Reason:  deconditioning     Occupational Therapy Adult Consult    Evaluate and treat as clinically indicated.    Reason:  deconditioning, lives independently at baseline     Fall precautions     Diet    Follow this diet upon discharge: Orders Placed This Encounter      Snacks/Supplements Adult: Magic Cup; Between Meals      Snacks/Supplements Adult: Gelatein Plus; Between Meals      Regular Diet Adult       Significant Results and Procedures   Results for orders placed or performed during the hospital encounter of 02/02/24   XR Chest Port 1 View     Narrative    EXAM: XR CHEST PORT 1 VIEW  LOCATION: [facility]  DATE: 2/2/2024    INDICATION: SOB  COMPARISON: 12/28/2023      Impression    IMPRESSION: Cardiac enlargement. No pulmonary edema. No change.   CT Chest w/o Contrast    Narrative    EXAM: CT CHEST W/O CONTRAST  LOCATION: [facility]  DATE: 2/2/2024    INDICATION: fever of unknown etiology, hx of recent pneumonia dec 2023  COMPARISON: CTA chest 12/20/2023.  TECHNIQUE: CT chest without IV contrast. Multiplanar reformats were obtained. Dose reduction techniques were used.  CONTRAST: None.    FINDINGS:   LUNGS AND PLEURA: The small focus of consolidation in the left lower lobe on 12/20/2023 has resolved. Mild interstitial thickening most prominent in the left upper lobe consistent with mild interstitial edema. Mild atelectasis in both lower lungs.    MEDIASTINUM/AXILLAE: Small pericardial effusion. Moderate aortic calcification.    CORONARY ARTERY CALCIFICATION: Moderate.    UPPER ABDOMEN: Hepatic cirrhosis. Small benign left renal cyst not warranting follow-up.    MUSCULOSKELETAL: Unremarkable.      Impression    IMPRESSION:   1.  Small focus of consolidation in the left lower lobe on 12/20/2023 has resolved.  2.  Mild pulmonary interstitial edema.  3.  Small pericardial effusion.  4.  Coronary artery calcification.  5.  Hepatic cirrhosis.     XR Chest Port 1 View    Narrative    EXAM: XR CHEST PORT 1 VIEW  LOCATION: [facility]  DATE: 2/3/2024    INDICATION: Acute hypoxic resp failure  COMPARISON: 02/02/2024      Impression    IMPRESSION: Cardiomegaly. New mild pulmonary edema. No pneumothorax. No large effusions or acute bony abnormalities.   CT Chest Abdomen Pelvis w/o Contrast    Narrative    EXAM: CT CHEST ABDOMEN PELVIS W/O CONTRAST  LOCATION: [facility]  DATE: 2/3/2024    INDICATION: Severe sepsis.  COMPARISON: CT chest 02/02/2024.  TECHNIQUE: CT scan  of the chest, abdomen, and pelvis was performed without IV contrast. Multiplanar reformats were obtained. Dose reduction techniques were used.   CONTRAST: None.    FINDINGS:   LUNGS AND PLEURA: Since 02/02/2024, minimally increased interstitial pulmonary edema. Trace bibasilar atelectasis. No significant pleural effusion. No new focal consolidation.    MEDIASTINUM/AXILLAE: Similar small pericardial effusion. No lymphadenopathy. Mitral annular calcification. Nonaneurysmal thoracic aorta.    CORONARY ARTERY CALCIFICATION: Severe.    HEPATOBILIARY: Nodular liver contour. No liver lesion by noncontrast CT. Cholelithiasis.    PANCREAS: Normal.    SPLEEN: Normal.    ADRENAL GLANDS: Normal.    KIDNEYS/BLADDER: Bilateral renal benign cysts. No collecting system dilatation.    BOWEL: Moderate stool in the rectum. No bowel obstruction or significant wall thickening.    LYMPH NODES: Similar mildly enlarged jaziel hepatis lymph nodes, for example a portacaval lymph node measuring 1.7 cm.    VASCULATURE: Nonaneurysmal abdominal aorta. Severe atherosclerosis.    PELVIC ORGANS: Uterus is absent. No adnexal mass.    MUSCULOSKELETAL: Mild lower abdominal skin thickening. No organized fluid collection. No aggressive osseous lesion.      Impression    IMPRESSION:  1.  Minimally increased interstitial pulmonary edema. No focal consolidation or pleural effusion.  2.  Similar small pericardial effusion.  3.  Mild anterior lower abdominal skin thickening. Correlate for cellulitis. No organized fluid collection.  4.  Similar morphologic changes of hepatic cirrhosis.  5.  Similar upper abdominal enlarged lymph nodes, which are possibly reactive. Attention on follow-up.  6.  Cholelithiasis.   CT Head w/o Contrast    Narrative    EXAM: CT HEAD W/O CONTRAST, CTA HEAD NECK W CONTRAST  LOCATION: [facility]  DATE: 2/4/2024    INDICATION: Code Stroke to evaluate for potential thrombolysis and thrombectomy. PLEASE READ  IMMEDIATELY  COMPARISON: 01/13/2022, MRI 02/25/2023.  CONTRAST: 75ml Isovue 370  TECHNIQUE: Head and neck CT angiogram with IV contrast. Noncontrast head CT followed by axial helical CT images of the head and neck vessels obtained during the arterial phase of intravenous contrast administration. Axial 2D reconstructed images and   multiplanar 3D MIP reconstructed images of the head and neck vessels were performed by the technologist. Dose reduction techniques were used. All stenosis measurements made according to NASCET criteria unless otherwise specified.    FINDINGS:   NONCONTRAST HEAD CT:   INTRACRANIAL CONTENTS: No intracranial hemorrhage, extraaxial collection, or midline shift. Unchanged left frontal convexity meningioma and assoicated mass effect adn reactive edema in the left frontal lobe. No CT evidence of acute infarct. Mild presumed   chronic small vessel ischemic changes. Mild generalized volume loss. No hydrocephalus.     VISUALIZED ORBITS/SINUSES/MASTOIDS: No intraorbital abnormality. No paranasal sinus mucosal disease. No middle ear or mastoid effusion.    BONES/SOFT TISSUES: No acute abnormality.    HEAD CTA:  ANTERIOR CIRCULATION: Calcification of the carotid siphons produces mild stenosis. No severe stenosis/occlusion, aneurysm, or high flow vascular malformation. Standard [place] anatomy.    POSTERIOR CIRCULATION: No stenosis/occlusion, aneurysm, or high flow vascular malformation. Balanced vertebral arteries supply a normal basilar artery.     DURAL VENOUS SINUSES: Expected enhancement of the major dural venous sinuses.    NECK CTA:  RIGHT CAROTID: Atherosclerotic plaque results in less than 50% stenosis in the right ICA. No dissection.    LEFT CAROTID: Atherosclerotic plaque results in less than 50% stenosis in the left ICA. No dissection.    VERTEBRAL ARTERIES: No focal stenosis or dissection. Balanced vertebral arteries.    AORTIC ARCH: Classic aortic arch anatomy with no significant  stenosis at the origin of the great vessels.    NONVASCULAR STRUCTURES: 1.3 cm right thyroid nodule..      Impression    IMPRESSION:   HEAD CT:  1.  No CT evidence for acute intracranial process.  2.  Brain atrophy and presumed chronic microvascular ischemic changes as above.  3.  Unchanged left frontal convexity meningioma, mass effect, and reactive edema.    HEAD CTA:   1.  No significant stenosis, aneurysm, or high flow vascular malformation identified.    NECK CTA:  1.  No hemodynamically significant stenosis within the vessels of the neck.      These findings were discussed with Dr. Cordova at 7:05 PM CST on 02/04/2024.   CTA Head Neck with Contrast    Narrative    EXAM: CT HEAD W/O CONTRAST, CTA HEAD NECK W CONTRAST  LOCATION: [facility]  DATE: 2/4/2024    INDICATION: Code Stroke to evaluate for potential thrombolysis and thrombectomy. PLEASE READ IMMEDIATELY  COMPARISON: 01/13/2022, MRI 02/25/2023.  CONTRAST: 75ml Isovue 370  TECHNIQUE: Head and neck CT angiogram with IV contrast. Noncontrast head CT followed by axial helical CT images of the head and neck vessels obtained during the arterial phase of intravenous contrast administration. Axial 2D reconstructed images and   multiplanar 3D MIP reconstructed images of the head and neck vessels were performed by the technologist. Dose reduction techniques were used. All stenosis measurements made according to NASCET criteria unless otherwise specified.    FINDINGS:   NONCONTRAST HEAD CT:   INTRACRANIAL CONTENTS: No intracranial hemorrhage, extraaxial collection, or midline shift. Unchanged left frontal convexity meningioma and assoicated mass effect adn reactive edema in the left frontal lobe. No CT evidence of acute infarct. Mild presumed   chronic small vessel ischemic changes. Mild generalized volume loss. No hydrocephalus.     VISUALIZED ORBITS/SINUSES/MASTOIDS: No intraorbital abnormality. No paranasal sinus mucosal disease. No middle  ear or mastoid effusion.    BONES/SOFT TISSUES: No acute abnormality.    HEAD CTA:  ANTERIOR CIRCULATION: Calcification of the carotid siphons produces mild stenosis. No severe stenosis/occlusion, aneurysm, or high flow vascular malformation. Standard [place] anatomy.    POSTERIOR CIRCULATION: No stenosis/occlusion, aneurysm, or high flow vascular malformation. Balanced vertebral arteries supply a normal basilar artery.     DURAL VENOUS SINUSES: Expected enhancement of the major dural venous sinuses.    NECK CTA:  RIGHT CAROTID: Atherosclerotic plaque results in less than 50% stenosis in the right ICA. No dissection.    LEFT CAROTID: Atherosclerotic plaque results in less than 50% stenosis in the left ICA. No dissection.    VERTEBRAL ARTERIES: No focal stenosis or dissection. Balanced vertebral arteries.    AORTIC ARCH: Classic aortic arch anatomy with no significant stenosis at the origin of the great vessels.    NONVASCULAR STRUCTURES: 1.3 cm right thyroid nodule..      Impression    IMPRESSION:   HEAD CT:  1.  No CT evidence for acute intracranial process.  2.  Brain atrophy and presumed chronic microvascular ischemic changes as above.  3.  Unchanged left frontal convexity meningioma, mass effect, and reactive edema.    HEAD CTA:   1.  No significant stenosis, aneurysm, or high flow vascular malformation identified.    NECK CTA:  1.  No hemodynamically significant stenosis within the vessels of the neck.      These findings were discussed with Dr. Cordova at 7:05 PM CST on 02/04/2024.   MR Brain w/o & w Contrast    Narrative    EXAM: MR BRAIN W/O AND W CONTRAST  LOCATION: [facility]  DATE: 2/4/2024    INDICATION: Rule out stroke, new expressive aphasia.  COMPARISON: Brain MRI 10/29/2023. CTA head and neck 02/04/2024.  CONTRAST: 10 mL Gadavist.  TECHNIQUE: Routine multiplanar multisequence head MRI without and with intravenous contrast.    FINDINGS: Image quality is mildly degraded by  motion.    INTRACRANIAL CONTENTS: No acute or subacute infarct. Enhancing extra-axial mass along the left anterior medial frontal convexity extending to the falx and slightly to the right of midline, measuring up to 1.5 x 2.7 x 2.7 cm, previously 1.6 x 2.8 x 3.1   cm, when measured in a similar fashion. Similar edema within the left anterior frontal lobe. No other mass. No acute hemorrhage or extra-axial collection. Patchy nonspecific T2/FLAIR hyperintensities within the cerebral white matter most consistent with   mild to moderate chronic microvascular ischemic change. Mild generalized cerebral atrophy. No hydrocephalus. Normal position of the cerebellar tonsils. No other areas of abnormal enhancement.    SELLA: No abnormality accounting for technique.    OSSEOUS STRUCTURES/SOFT TISSUES: Normal marrow signal. The major intracranial vascular flow voids are maintained.     ORBITS: No abnormality accounting for technique.     SINUSES/MASTOIDS: No paranasal sinus mucosal disease. No middle ear or mastoid effusion.       Impression    IMPRESSION:  1.  No acute intracranial process.  2.  Stable to slightly decreased presumed meningioma along the left anteromedial frontal lobe/falx. Stable associated T2 FLAIR hyperintensity within the left anterior frontal lobe.  3.  Generalized brain atrophy and presumed microvascular ischemic changes as detailed above.   XR Lumbar Puncture Spinal Tap Diag    Narrative    EXAM:  1. DIAGNOSTIC LUMBAR PUNCTURE  2. FLUOROSCOPIC GUIDANCE  LOCATION: [facility]  DATE: 2/5/2024    INDICATION: Neurologic changes. Fever, altered mental status, concern for meningitis.    PROCEDURE: Procedure and risks explained to patient and consent received. The patient was placed in prone position, and the lower back was prepped and draped in sterile fashion. 1% local lidocaine infused in local soft tissues.     Under direct fluoroscopic guidance, a 20 gauge spinal needle was placed  into the spinal canal at the L2-L3 level.    SPECIMEN: 13 mL of clear CSF sent to lab.    COMPLICATIONS: None.    FLUOROSCOPIC TIME: 0.3 minutes  NUMBER OF IMAGES: 1      Impression    IMPRESSION:  Fluoroscopically guided lumbar puncture.   XR Chest Port 1 View    Narrative    EXAM: XR CHEST PORT 1 VIEW  LOCATION: [facility]  DATE: 2/5/2024    INDICATION: Fever and cough.    COMPARISON: CT CAP 02/03/2024 and older studies, chest x-ray 02/03/2024 and older studies      Impression    IMPRESSION: Large body habitus. Little change in the cardiomegaly, pulmonary vascular congestion and mild interstitial edema. No effusions. No signs of pneumonia.   XR Chest Port 1 View    Narrative    EXAM: XR CHEST PORT 1 VIEW  LOCATION: [facility]  DATE: 2/7/2024    INDICATION: evaluate for fluid overload  COMPARISON: 02/05/2024      Impression    IMPRESSION: Cardiomegaly. Mild interstitial pulmonary edema. No pleural effusions.   CT Chest/Abdomen/Pelvis w Contrast    Narrative    EXAM: CT CHEST/ABDOMEN/PELVIS W CONTRAST  LOCATION: [facility]  DATE: 2/8/2024    INDICATION: delirium of undetermined etiology, no infection source with fevers  COMPARISON: 02/03/2024, 12/28/2023  TECHNIQUE: CT scan of the chest, abdomen, and pelvis was performed following injection of IV contrast. Multiplanar reformats were obtained. Dose reduction techniques were used.   CONTRAST: Isovue 370 75ml    FINDINGS:   LUNGS AND PLEURA: Expiratory appearance of the trachea. Mild pulmonary edema. Trace bilateral pleural effusions. Dependent atelectasis. Scattered pulmonary nodules. For example, right middle lobe some solid nodule, 8 mm (series 5 image 132).    No pneumothorax.    MEDIASTINUM/AXILLAE: No pathologically enlarged thoracic lymph nodes. Multinodular thyroid. Normal caliber thoracic aorta. Cardiomegaly. Small pericardial effusion. Exuberant mitral annulus  calcifications.    CORONARY ARTERY CALCIFICATION: Moderate.    HEPATOBILIARY: Cirrhosis. No focal liver lesion. No biliary dilation. Cholelithiasis.     PANCREAS: Normal.     SPLEEN: Normal.     ADRENAL GLANDS: Normal.     KIDNEYS/BLADDER: Benign renal cysts and/or probable cysts for which no further follow-up imaging is recommended. Nonobstructing left nephrolithiasis. Normal urinary bladder.      BOWEL: No obstruction. Normal appendix. No bowel wall thickening or pneumatosis. Small volume pelvic free fluid.     LYMPH NODES: Enlarged gastrohepatic ligament and wanda hepatis lymph nodes. For example:  - Gastrohepatic ligament lymph node, 12 mm (series 3 image 49).  - Wanda hepatis lymph node, 13 mm (series 3 image 67).    VASCULATURE: Nonaneurysmal aorta with moderate aortoiliac calcifications.     PELVIC ORGANS: No pelvic masses.     MUSCULOSKELETAL: Multilevel degenerative disc disease of the thoracolumbar spine. Bones are diffusely demineralized. Mild anasarca. Subcutaneous emphysema in the anterior abdominal wall, presumably related to injection.       Impression    IMPRESSION:  1.  Mild pulmonary edema, trace bilateral pleural effusions, and mild anasarca.  2.  Enlarged abdominopelvic lymph nodes of uncertain etiology, unchanged dating back to at least 12/28/2023 correlate with clinical history and consider follow-up as indicated.  3.  Cirrhosis.  4.  Chronic and ancillary findings as described.   CT Head w/o Contrast    Narrative    EXAM: CT HEAD W/O CONTRAST  LOCATION: [facility]  DATE: 2/8/2024    INDICATION: delirium of undetermined etiology, no infection source with fevers  COMPARISON: MRI brain 02/04/2024    CT head 02/04/2024  TECHNIQUE: Routine CT Head without IV contrast. Multiplanar reformats. Dose reduction techniques were used.    FINDINGS:  INTRACRANIAL CONTENTS: Unchanged left frontal convexity presumed meningioma with underlying parenchymal edema. No intracranial  hemorrhage, extraaxial collection, or mass effect.  No CT evidence of acute infarct. Mild presumed chronic small vessel   ischemic changes. Unchanged mild generalized volume loss. No hydrocephalus.     VISUALIZED ORBITS/SINUSES/MASTOIDS: No intraorbital abnormality. No paranasal sinus mucosal disease. No middle ear or mastoid effusion.    BONES/SOFT TISSUES: No acute abnormality.      Impression    IMPRESSION:  1.  No acute intracranial process.   US Lower Extremity Venous Duplex Bilateral    Narrative    EXAM: US LOWER EXTREMITY VENOUS DUPLEX BILATERAL  LOCATION: [facility]  DATE: 2/11/2024    INDICATION: bilateral new leg pain, reduced pulses and CONSUELO  COMPARISON: None.  TECHNIQUE: Venous Duplex ultrasound of bilateral lower extremities with and without compression, augmentation and duplex. Color flow and spectral Doppler with waveform analysis performed.    FINDINGS: Exam includes the common femoral, femoral, popliteal veins as well as segmentally visualized deep calf veins and greater saphenous vein.     RIGHT: No deep vein thrombosis. No superficial thrombophlebitis. No popliteal cyst.    LEFT: No deep vein thrombosis. No superficial thrombophlebitis. No popliteal cyst.      Impression    IMPRESSION:  1.  No deep venous thrombosis in the bilateral lower extremities.   IR CVC Tunnel Placement > 5 Yrs of Age    Narrative    [place] [facility]  LOCATION: [facility]  DATE: 2/14/2024    PROCEDURE: TUNNELED DIALYSIS CATHETER PLACEMENT  1.  Insertion of a tunneled central venous catheter.  2.  Ultrasound guidance for vascular access. A permanent image was stored.  3.  Fluoroscopic guidance for central venous access device placement.  4.  Moderate sedation.    INTERVENTIONAL RADIOLOGIST: Nikko Scales MD    INDICATION: Patient is a 73-year-old female the history of acute kidney injury in need of hemodialysis.  The patient presents to Interventional Radiology for  placement of a tunneled hemodialysis catheter for moderate-term central venous access for   hemodialysis.    CONSENT: The risks, benefits and alternatives of tunneled hemodialysis catheter placement were discussed with the patient  in detail. All questions were answered. Informed consent was given to proceed with the procedure.    MODERATE SEDATION: Versed 2 mg IV; Fentanyl 100 mcg IV. During the time out, immediately prior to the administration of medications, the patient was reassessed for adequacy to receive conscious sedation.  Under physician supervision, Versed and fentanyl   were administered for moderate sedation. Pulse oximetry, heart rate and blood pressure were continuously monitored by an independent trained observer. The physician spent 10 minutes of face-to-face sedation time with the patient.    CONTRAST: None.  ANTIBIOTICS: 2 g of IV Ancef.  ADDITIONAL MEDICATIONS: None.    FLUOROSCOPIC TIME: 1.2 minutes.  RADIATION DOSE: Air Kerma: 18 mGy.    COMPLICATIONS: No immediate complications.    STERILE BARRIER TECHNIQUE: Maximum sterile barrier technique was used. Cutaneous antisepsis was performed at the operative site with application of 2% chlorhexidine and large sterile drape. Prior to the procedure, the  and assistant performed   hand hygiene and wore hat, mask, sterile gown, and sterile gloves during the entire procedure.    PROCEDURE:    The Central Venous Catheter Insertion checklist was reviewed prior to placement and followed throughout the procedure. Using local anesthesia and real-time ultrasound guidance the right internal jugular vein was accessed. A permanent ultrasound image was   saved, documenting patency and compressibility. A subcutaneous tunnel was created requiring a second incision. Using this access, a 23 cm tip to cuff 15 Serbian DuraFlow dialysis catheter was advanced until the tip was in the mid/low right atrium.  The   catheter was tested and found to flush and aspirate  appropriately.    FINDINGS:  Ultrasound shows an anechoic and compressible jugular vein.      Fluoroscopic spot film at completion of study show that the tunneled dialysis catheter tip lies in the mid/low right atrium.      Impression    IMPRESSION:    1.  Successful tunneled dialysis catheter placement.  2.  The catheter is ready for use.     Echo Limited     Value    LVEF  >70%    [facility]    [id number]  [id number]  [id number]  [id number]^MATHEW^[place] [address]     Name: SHAKIRA COBB  MRN: [id number]  : 1950  Study Date: 2024 02:37 PM  Age: 73 yrs  Gender: Female  Patient Location: [facility]  Reason For Study: Pericardial Effusion  Ordering Physician: CECILIA CARMONA  Performed By: ACE     BSA: 2.0 m2  Height: 64 in  Weight: 221 lb  HR: 71  BP: 134/64 mmHg  ______________________________________________________________________________  Procedure  Limited Portable Echo Adult. Definity (NDC #28581-048) given intravenously.  Adequate quality two-dimensional was performed and interpreted. Adequate  quality color and spectral Doppler were performed and interpreted. Compared to  the prior study dated 2023, there have been no changes.  ______________________________________________________________________________  Interpretation Summary     1. Left ventricular chamber size is normal. Moderate concentric increase in  wall thickness. Systolic function is hyperdynamic. The visually estimated left  ventricular ejection fraction is greater than 70%.  2. Right ventricular chamber size and systolic function are normal.  3. Moderate left atrial enlargement.  4. Degenerative calcification of the mitral apparatus with borderline mild  mitral stenosis.  5. Trivial-to-small circumferential pericardial effusion.  6. Compared to the prior study dated 2023, there has been no significant  change. A similar-sized pericardial effusion does appear to have  been present  on the prior study and is unchanged.  ______________________________________________________________________________  I      WMSI = 0.00     % Normal = 0     X - Cannot   0 -                      (2) - Mildly 2 -          Segments  Size  Interpret    Hyperkinetic 1 - Normal  Hypokinetic  Hypokinetic  1-2     small                                                     7 -          3-5      moderate  3 - Akinetic 4 -          5 -         6 - Akinetic Dyskinetic   6-14    large               Dyskinetic   Aneurysmal  w/scar       w/scar       15-16   diffuse     Left Ventricle  The left ventricle is normal in size. There is moderate concentric left  ventricular hypertrophy. Hyperdynamic left ventricular function. The visual  ejection fraction is >70%. An intracavitary gradient is present. No regional  wall motion abnormalities noted.     Right Ventricle  The right ventricle is not well visualized. Normal right ventricle size and  systolic function.     Atria  The left atrium is moderately dilated. Right atrial size is normal.     Mitral Valve  The mitral valve leaflets are mildly thickened. There is moderate mitral  annular calcification. There is trace mitral regurgitation. Calcified mitral  apparatus causing mitral stenosis. There is mild mitral stenosis.     Tricuspid Valve  The tricuspid valve is not well visualized. There is trace tricuspid  regurgitation. There is no tricuspid stenosis.     Aortic Valve  The aortic valve is trileaflet with aortic valve sclerosis. No aortic  regurgitation is present. No aortic stenosis is present.     Pulmonic Valve  The pulmonic valve is not well visualized.     Vessels  The aorta root is normal. The thoracic aorta is normal. IVC diameter and  respiratory changes fall into an intermediate range suggesting an RA pressure  of 8 mmHg.     Pericardium  Small pericardial effusion. There are no echocardiographic indications of  cardiac tamponade.     Rhythm  Sinus rhythm was  noted.     ______________________________________________________________________________  MMode/2D Measurements & Calculations  IVSd: 1.4 cm  LVIDd: 4.7 cm  LVIDs: 3.1 cm  LVPWd: 1.4 cm  FS: 33.4 %     LV mass(C)d: 270.3 grams  LV mass(C)dI: 132.5 grams/m2  asc Aorta Diam: 3.4 cm  Asc Ao diam index BSA (cm/m2): 1.7  Asc Ao diam index Ht(cm/m): 2.1  LA Volume Index (BP): 45.0 ml/m2  RWT: 0.58  TAPSE: 2.0 cm     Time Measurements  MM HR: 71.0 BPM     Doppler Measurements & Calculations  MV max P.3 mmHg  MV mean P.1 mmHg  MV V2 VTI: 51.4 cm  Ao V2 max: 230.8 cm/sec  Ao max P.3 mmHg  Ao V2 mean: 157.0 cm/sec  Ao mean P.8 mmHg  Ao V2 VTI: 48.6 cm     ______________________________________________________________________________  Report approved by: Naina Rivera 2024 03:36 PM             Discharge Medications   Current Discharge Medication List        CONTINUE these medications which have NOT CHANGED    Details   acetaminophen (TYLENOL) 500 MG tablet Take 2 tablets (1,000 mg) by mouth every 8 hours as needed for mild pain    Associated Diagnoses: Chest wall pain      albuterol (PROAIR HFA/PROVENTIL HFA/VENTOLIN HFA) 108 (90 Base) MCG/ACT inhaler Inhale 2 puffs into the lungs every 6 hours as needed      amLODIPine (NORVASC) 10 MG tablet Take 10 mg by mouth daily      calcium carbonate (TUMS) 500 MG chewable tablet Take 1 chew tab by mouth 2 times daily as needed for heartburn      citalopram (CELEXA) 40 MG tablet Take 40 mg by mouth daily      clopidogrel (PLAVIX) 75 MG tablet Take 75 mg by mouth daily      furosemide (LASIX) 20 MG tablet Take 1 tablet (20 mg) by mouth daily  Qty: 30 tablet, Refills: 0    Associated Diagnoses: Hypertensive emergency      glipiZIDE (GLUCOTROL XL) 2.5 MG 24 hr tablet Take 2.5 mg by mouth daily (with breakfast)      hydrALAZINE (APRESOLINE) 25 MG tablet Take 1 tablet (25 mg) by mouth 3 times daily  Qty: 270 tablet, Refills: 0    Associated Diagnoses: Primary  hypertension      losartan (COZAAR) 50 MG tablet Take 2 tablets (100 mg) by mouth daily  Qty: 60 tablet, Refills: 0    Associated Diagnoses: Hypertensive emergency      metoprolol tartrate 75 MG TABS Take 75 mg by mouth 2 times daily  Qty: 60 tablet, Refills: 0    Associated Diagnoses: Hypertensive emergency      multivitamin w/minerals (THERA-VIT-M) tablet Take 1 tablet by mouth daily      pantoprazole (PROTONIX) 40 MG EC tablet Take 1 tablet (40 mg) by mouth 2 times daily (before meals)  Qty: 60 tablet, Refills: 0    Associated Diagnoses: Gastroesophageal reflux disease with esophagitis without hemorrhage      senna-docusate (SENOKOT-S/PERICOLACE) 8.6-50 MG tablet Take 1 tablet by mouth at bedtime      vitamin E (TOCOPHEROL) 400 units (180 mg) capsule Take 400 Units by mouth daily           Allergies   Allergies   Allergen Reactions    Aspirin      Other reaction(s): stomach upset    Atenolol Other (See Comments)     abd pain    Lisinopril      Other reaction(s): stomach aches    Nsaids Nephrotoxicity    Penicillins Hives     Tolerated ceftriaxone    Statins      Other reaction(s): myalgias

## 2024-02-23 NOTE — PROGRESS NOTES
RENAL PROGRESS NOTE    ASSESSMENT & PLAN:   Anuric FELIPA on CKD III: Hemodialysis dependent.  Hemodialysis was initiated via tunneled CVC 12/14/2024.  ARF hemodynamic ATN in the setting of septic physiology, NSAIDs, CT contrast, ACE inhibitor, elevated vancomycin level.  AIN secondary to PPI or cefepime in the differential.     Recs:  Creatinine may be improving a little bit, urine output picking up, will continue dialysis MWF but will be more gentle with UF.  We will limit UF to 1-1.5 L UF so we do not delay recovery from ARF with more aggressive UF   Patient has been accepted at [facility] on a MWF schedule.  She is able to start on 2/26/2024.  Dr. Aponte and myself will follow her at [facility]  Chair time 8 AM, she will arrive at 7:15 AM on Monday for admissions paperwork.  Orders called into [facility] FR[facility]  Hold all NSAIDs including Voltaren  Continue to follow intake and output.  Fluid restriction.  Slow challenge of TW with obvious volume overload.  Will follow for signs of recovery.  Thanks for the consult we will follow    Hyponatremia: Improving with volume optimization with HD/UF.  Sodium 135 today.    Hypertension: PTA amlodipine 10 mg daily, metoprolol 75 mg twice daily, hydralazine 100 mg 3 times daily, Bumex 2 mg daily.  Swap metoprolol for carvedilol this week for better blood pressure lowering effects from her beta-blocker.  Coreg currently 25 mg twice daily.  ARB on hold in setting of ARF.  UF with HD.  Blood pressure still not at goal we will add doxazosin 1 mg daily.    Normocytic anemia: Inflammatory as well as ARF.  Hemoglobin 9s.  Started Epogen 10,000 units weekly 2/19/2024.  Will start anemia management protocols with outpatient HD.    DM2: Management per primary service    Severe sepsis: Resolved    History of CVA:     Thrombocytopenia: HIT labs negative.  Platelets improving.  Management per primary team      SUBJECTIVE:    Patient was seen bedside    Patient's sister is present bedside  She is upset about the cost of medical transport to dialysis  Patient sister is working with care management  Outpatient dialysis is arranged as above  Planning for dialysis today then can discharge after treatment from my standpoint  Orders called and outpatient CDU  Patient denies any shortness of breath  Does mention her urine output seems to be increasing and she is going more frequently with larger volumes  Appetite is low  No nausea or vomiting      OBJECTIVE:  Physical Exam   Temp: 98.1  F (36.7  C) Temp src: Oral BP: (!) 219/88 Pulse: 63   Resp: 18 SpO2: 94 % O2 Device: None (Room air)    Vitals:    02/20/24 0548 02/21/24 0653 02/22/24 1032   Weight: 106 kg (233 lb 11 oz) 105.8 kg (233 lb 3.2 oz) 103.9 kg (229 lb)     Vital Signs with Ranges  Temp:  [98  F (36.7  C)-98.4  F (36.9  C)] 98.1  F (36.7  C)  Pulse:  [61-63] 63  Resp:  [16-20] 18  BP: (134-219)/(60-88) 219/88  SpO2:  [94 %-95 %] 94 %  I/O last 3 completed shifts:  In: 1503 [P.O.:1500; I.V.:3]  Out: -     Patient Vitals for the past 72 hrs:   Weight   02/22/24 1032 103.9 kg (229 lb)   02/21/24 0653 105.8 kg (233 lb 3.2 oz)     Intake/Output Summary (Last 24 hours) at 2/19/2024 1023  Last data filed at 2/19/2024 0300  Gross per 24 hour   Intake 240 ml   Output 250 ml   Net -10 ml       PHYSICAL EXAM:  General: Alert, NAD  HENT: Supple, Non-tender, no obvious JVD  Eyes: No scleral icterus  Cardiovascular: RRR, no rub, gallop, or murmur.   Remedies: 2+ pitting edema BL LE, improving.  Respiratory: CTAB, non-labored  Gastrointestinal: Soft, non-tender, non-distended  Musculoskeletal: Grossly normal   Integumentary: Warm, dry, no rash  Neurologic: Non focal   Psychiatric: Cooperative  Access: R tunneled CVC.  Clear dressing covering.  No surrounding erythema or drainage.    LABORATORY STUDIES:     Recent Labs   Lab 02/23/24  0744 02/22/24  1704 02/22/24  0755 02/21/24  0652 02/20/24  0603 02/18/24  0706   WBC 4.9  4.4 4.1 5.1 6.0  --    RBC 3.34* 3.22* 3.17* 3.25* 3.25*  --    HGB 9.7* 9.3* 9.2* 9.4* 9.4*  --    HCT 30.1* 28.6* 28.6* 28.6* 29.1*  --    * 89* 95* 100* 124* 130*       Basic Metabolic Panel:  Recent Labs   Lab 02/23/24  1148 02/23/24  0746 02/23/24  0744 02/23/24  0158 02/22/24  2119 02/22/24  1728 02/22/24  0808 02/22/24  0755 02/21/24  0826 02/21/24  0652 02/20/24  0807 02/20/24  0603 02/19/24  0834 02/19/24  0556 02/18/24  0910 02/18/24  0706   NA  --   --  135  --   --   --   --  136  --  135  --  135  --  133*  --  133*   POTASSIUM  --   --  4.1  --   --   --   --  4.3  --  4.2  --  4.1  --  4.1  --  4.1  4.4   CHLORIDE  --   --  99  --   --   --   --  101  --  99  --  99  --  97*  --  98   CO2  --   --  26  --   --   --   --  26  --  23  --  23  --  23  --  23   BUN  --   --  28.3*  --   --   --   --  20.8  --  35.9*  --  28.1*  --  50.8*  --  44.8*   CR  --   --  2.76*  --   --   --   --  2.48*  --  3.40*  --  3.16*  --  4.15*  --  3.90*   * 114* 130* 136* 151* 139*   < > 123*   < > 135*   < > 129*   < > 139*   < > 121*   ALBARO  --   --  9.5  --   --   --   --  9.0  --  9.1  --  9.2  --  9.3  --  8.8    < > = values in this interval not displayed.       INRNo lab results found in last 7 days.     Recent Labs   Lab Test 02/23/24  0744 02/22/24  1704 02/11/24  0754 02/10/24  0623 02/05/24  0426 02/04/24  1854   INR  --   --   --  1.25*  --  1.23*   WBC 4.9 4.4   < > 6.6   < > 5.9   HGB 9.7* 9.3*   < > 9.8*   < > 10.4*   * 89*   < > 209   < > 119*    < > = values in this interval not displayed.       Personally reviewed current labs    This note was dictated using voice recognition     Twan Gutierrez PA-C  [facility]  [phone]

## 2024-02-23 NOTE — PROGRESS NOTES
Clinic Care Coordination Contact  Care Team Conversations    Patient identified for care management outreach, however patient is not on a value based contract so cannot complete outreach. Will escalate to clinic staff if specific needs or resources are indicated.    Cristal Leonardo [facility]  Social Work Care Coordinator - [facility]  Care Coordination  [email or web address]  Cell Phone: [phone]  Gender pronouns: she/her  Employed by [employer]

## 2024-02-23 NOTE — PROGRESS NOTES
[facility] [place]    Progress Note - Hospitalist Service       Date of Admission:  2/2/2024    Assessment & Plan   Josefina Dumont is a 73 year old female admitted on 2/2/2024. She has a history of CHF with recent hospitalization 12/28/23-12/31/23, recent community acquired pneumonia, history of ischemic CVA, history of meningioma, history of CAD, type 2 diabetes mellitus and was admitted for shortness of breath and found to be in severe sepsis. Hospital course has been complicated by extensive infectious workup, courses of anti-infectives, delirium workup. At this time, patient is stable but remains hospitalized for worsening kidney function/uremia, thought to be multifactorial in nature. Patient was initiated on HD on 2/14/24. Planning for TCU on discharge once outpatient HD plan is setup. Mental status has greatly improved throughout the past week, she is fully oriented and conversing appropriately.    Discharge pending establishment of transportation for HD.    Severe sepsis due to unknown source; resolved   Acute hypoxic respiratory failure, resolved  Acute fever of unknown origin, resolved  Concern for CHF exacerbation, resolved  Delirium, resolved  Patient initially presented with worsening shortness of breath, orthopnea, dyspnea on exertion, in the setting of elevated BNP and missed dose of Lasix, CHF exacerbation was initially high on the differential. She was febrile w/Tmax of 101.3. CT chest on presentation showed no acute findings, mild pulmonary interstitial edema, small pericardial effusion. CRP elevated to 28.10 and procalcitonin of 5.80 on 2/4/24; CRP elevated to 54.20 and procalcitonin elevated to 11.70 on 2/5/24. See 2/4/24-2/5/24 overnight notes for significant events including aphasia, inattention, fever to 102.5F despite broad-spectrum antibiotics prompting additional aggressive treatment measures and workup. LP and TTE unremarkable. Patient's condition had been  improving 2/6/24 but then had onset of delirium overnight which continued to wax and wane until improving last week. Possible hospital delirium in the setting of sleep deprivation, disorientation contributing. However, prudent to rule-out other potential causes given the distinct change from patient's baseline and prolonged delirium. CT head, chest, abdomen, pelvis was repeated 2/8/24 in light of continued AMS of unclear etiology; results showed cirrhosis but were otherwise unremarkable. Metabolic encephalopathy was on the differential given cirrhosis though LFTs have been wnl and patient is stooling daily. EEG per Neurology was performed 2/9/24 read as unremarkable for seizure activity with slow background in theta and delta range that suggests a nonspecific generalized cerebral dysfunction. Neurology has signed off. ID also signed off as patient will finished course of antibiotics. At this time, mental status has greatly improved.  -Delirium precautions  -Cardiac/low-sodium diet  -Supplemental oxygen as needed    FELIPA  S/p HD (2/14/24, 2/15/24, 2/16/24, 2/19/24, 2/21/24, 2/23/24)  Baseline creatinine around 0.8-0.9, had stabilized ~2.3 initially but then trended upward with the highest being 5.78 on 2/14/24. Several medications may have been contributing to FELIPA though etiology remains unclear; creatinine worsened several days s/p contrast administration and continued to worsen despite discontinuation of nephrotoxic IV antibiotics. Nephrology has been following, appreciate their cares and recommendations.  -Nephrology following  -Planning for HD today    Hypokalemia, resolved  -Standard replacement protocol      Mild normocytic anemia  Hemoglobin 11.2 on admission. Has since been stable at ~10. Drop consistent with hemodilution.     Hypertension  -Per Nephrology (2/22/24): PTA amlodipine 10 mg daily, metoprolol 75 mg twice daily, hydralazine 100 mg 3 times daily, Bumex 2 mg daily.  Swap metoprolol for carvedilol  this week for better blood pressure lowering effects from her beta-blocker.  Blood pressure seems to be under better control.  ARB on hold in setting of ARF.  UF with HD.     Prolonged QT  QTc of 472.  -Avoid QT prolonging medications  -Note that Celexa comes with risk for QT prolongation but benefit of anxiety treatment outweighing risk, will continue to monitor    DM2   Most recent A1c 6.6 1 month ago.  -Holding PTA glipizide; blood sugars have been well-controlled through hospitalization so will likely hold on discharge as well  -Low sliding scale insulin     Anxiety/depression  -Continue PTA Celexa   -Patient was given hydroxyzine 2/15/24 PM for anxiety/insomnia; she felt like it may have caused some of her grogginess this morning, so would consider other medication options first  -Bedside RN noticed air mattress of bed wasn't being used, so she left a note on the whiteboard to remember to turn that on at night for comfort  -PRN doxepin 3 mg at bedtime for sleep    Bilaterally lower extremity edema  BLE edema. Equal bilaterally, no skin changes, no specific tenderness to palpation but overall uncomfortable due to sensation of fullness per patient. This is improving today.  -Compression  -No Voltaren per Nephrology recommendations  -Anticipate edema will improve with continued dialysis    Chronic:  History of ischemic CVA: Continue PTA Plavix  GERD: Continue PTA Tums, continue PTA Protonix, added on Carafate   Constipation: Continue PTA senna docusate  Asthma: Continue PTA albuterol as needed        Diet: Regular Diet Adult    DVT Prophylaxis: Heparin; HIT screening lab 2/22/24 negative  Machuca Catheter: Not present  Fluids: po  Lines: PRESENT      CVC Double Lumen Right Internal jugular Tunneled-Site Assessment: WDL    Cardiac Monitoring: None  Code Status: Full Code         Disposition Plan      Expected Discharge Date: 02/23/2024    Discharge Delays: Dialysis - arrange outpatient  Destination: home  Discharge  Comments: OP HD setup, and [facility] accepted        The patient's care was discussed with the Attending Physician, Dr. Linares .    OSKAR BUTLER MD   Hospitalist Service  [facility]  Securely message with Sheryl (more info)  Text page via [employer] Paging/Directory   ______________________________________________________________________    Interval History   No acute events overnight. Feeling well. Anxious about HD but notes it does help with her leg swelling. Has been making urine daily. Poor appetite but working on eating the majority of her meals. Anxious about discharge as she doesn't have a ride nor enough financial means to cover medical rides to/from HD.    Physical Exam   Vital Signs: Temp: 98.1  F (36.7  C) Temp src: Oral BP: (!) 219/88 Pulse: 63   Resp: 18 SpO2: 94 % O2 Device: None (Room air)    Weight: 229 lbs 0 oz    GENERAL: Alert and no acute distress. Sitting up in recliner.   HEENT: Normocephalic, atraumatic, no rhinorrhea, moist mucus membranes.  RESP: Anterior lung fields with good airflow, clear to auscultation, no coarse lung sounds. Nonlabored breathing on room air.   CV: Regular rate and rhythm, systolic murmur.  MS: No gross musculoskeletal defects noted. Mild edema of the BLE, nontender to palpation.  NEURO: Alert, responding to questions appropriately, good recall of recent events.  PSYCH: Pleasant mood and affect.       Data     I have personally reviewed the following data over the past 24 hrs:    4.9  \   9.7 (L)   / 115 (L)     135 99 28.3 (H) /  225 (H)   4.1 26 2.76 (H) \     Ferritin:  N/A % Retic:  2.8 (H) LDH:  N/A       Imaging results reviewed over the past 24 hrs:   No results found for this or any previous visit (from the past 24 hour(s)).

## 2024-02-23 NOTE — PROGRESS NOTES
Date: 2/23/2024  Time: 1815     Data:  Pre Wt:   103.9 kg from 2/22/24. Pt unable to stand at scale. Pt declined transfer from chair to bed for weight and treatment. Dialysis provided with pt in chair.   Desired Wt:   To be established  Post Wt:  102.9 kg (estimated)  Weight change: - 1.0 kg  Ultrafiltration - Post Run Net Total Removed (mL):  1000 ml  Vascular Access Status: CVC patent  Dialyzer Rinse:  Light  Total Blood Volume Processed: 68.5 L   Total Dialysis (Treatment) Time:   3 Hrs  Dialysate Bath: K 3, Ca 2.25  Heparin: Heparin: None     Lab:   No  HbsAg - Non-reactive  (2/17/24)  HbsAb - <3.50 Susceptible (2/15/24)     Interventions:  Dialysis done through RIJ CVC  UF set to 1.3 Liters of fluid removal, accommodating priming and rinse back volumes  , DFR 60   Scheduled medications per MAR held d/t decreasing BP, hydralazine and doxazosin.   CVC dressing present, clean, dry, and intact. Change due 2/26/24  CritLine showed a stable Profile B throughout the run, tolerating UF pull  Treatment has ended safely and  blood is rinsed back completely  Catheter lumens flushed with saline and locked with NS, catheter caps (ClearGuard ) changed post HD  Report given to Tabatha KRUEGER     Assessment:  A/O x 4, calm & cooperative, denies pain  CVC intact, previous dressing clean and dry with no s/s of infection. CVC functioned well at 400 BFR.   Hydralazine and doxazosin held while on tx d/t BP decreasing. Pt tolerated net UF of 1.0L well. Tx ended, blood returned, pt stable.                 Plan:    Per Renal team        Mich Valencia, RN  Acute Dialysis RN  [facility]

## 2024-02-23 NOTE — PROGRESS NOTES
Care Management Follow Up    Length of Stay (days): 19    Expected Discharge Date: 02/23/2024     Concerns to be Addressed: discharge planning       Patient plan of care discussed at interdisciplinary rounds: Yes    Anticipated Discharge Disposition:  TBD    Anticipated Discharge Services: Transportation Services    Anticipated Discharge DME: None    Education Provided on the Discharge Plan: Yes (AVS per bedside RN)    Patient/Family in Agreement with the Plan: yes    Referrals Placed by CM/SW:  TCU        Additional Information:  CM reviewed chart.     CM met with patient and sister bedside. Sister is very upset about the cost of rides to dialysis. CM had arranged ride from to dialysis and back to TCU. Patient does not have to accept these rides and can find family or friend to provide transportation to and from dialysis. Prior CM has completed and submitted [employer] application on 2/21/24    Additional TCU referrals sent.     Radha Bergeron RN      .

## 2024-02-23 NOTE — PLAN OF CARE
"  Problem: Adult Inpatient Plan of Care  Goal: Absence of Hospital-Acquired Illness or Injury  Intervention: Prevent Skin Injury  Recent Flowsheet Documentation  Taken 2/22/2024 2307 by Vicenta Blair, RN  Body Position: weight shifting  Taken 2/22/2024 2016 by Vicenta Blair, RN  Body Position: weight shifting     Problem: Adult Inpatient Plan of Care  Goal: Readiness for Transition of Care  Outcome: Progressing   Goal Outcome Evaluation:       Pt is alert and oriented x4. Refused to lay in bed to sleep. Stayed in recliner with legs elevated. Has a \"little bit\" of tenderness to bilateral lower extremities. Stockinette in place. Other than that, pt denies pain. Denies SOB. Slept well overnight. VSS.                 "

## 2024-02-24 ENCOUNTER — APPOINTMENT (OUTPATIENT)
Dept: PHYSICAL THERAPY | Facility: CLINIC | Age: 74
DRG: 871 | End: 2024-02-24
Payer: COMMERCIAL

## 2024-02-24 LAB
GLUCOSE BLDC GLUCOMTR-MCNC: 109 MG/DL (ref 70–99)
GLUCOSE BLDC GLUCOMTR-MCNC: 148 MG/DL (ref 70–99)
GLUCOSE BLDC GLUCOMTR-MCNC: 152 MG/DL (ref 70–99)
GLUCOSE BLDC GLUCOMTR-MCNC: 227 MG/DL (ref 70–99)
GLUCOSE BLDC GLUCOMTR-MCNC: 228 MG/DL (ref 70–99)
PLATELET # BLD AUTO: 94 10E3/UL (ref 150–450)
POTASSIUM SERPL-SCNC: 3.9 MMOL/L (ref 3.4–5.3)

## 2024-02-24 PROCEDURE — 99232 SBSQ HOSP IP/OBS MODERATE 35: CPT | Mod: GC

## 2024-02-24 PROCEDURE — 84132 ASSAY OF SERUM POTASSIUM: CPT | Performed by: STUDENT IN AN ORGANIZED HEALTH CARE EDUCATION/TRAINING PROGRAM

## 2024-02-24 PROCEDURE — 120N000001 HC R&B MED SURG/OB

## 2024-02-24 PROCEDURE — 250N000013 HC RX MED GY IP 250 OP 250 PS 637: Performed by: FAMILY MEDICINE

## 2024-02-24 PROCEDURE — 250N000013 HC RX MED GY IP 250 OP 250 PS 637

## 2024-02-24 PROCEDURE — 97116 GAIT TRAINING THERAPY: CPT | Mod: GP

## 2024-02-24 PROCEDURE — 97530 THERAPEUTIC ACTIVITIES: CPT | Mod: GP

## 2024-02-24 PROCEDURE — 36415 COLL VENOUS BLD VENIPUNCTURE: CPT | Performed by: STUDENT IN AN ORGANIZED HEALTH CARE EDUCATION/TRAINING PROGRAM

## 2024-02-24 PROCEDURE — 99232 SBSQ HOSP IP/OBS MODERATE 35: CPT | Performed by: PHYSICIAN ASSISTANT

## 2024-02-24 PROCEDURE — 250N000013 HC RX MED GY IP 250 OP 250 PS 637: Performed by: PHYSICIAN ASSISTANT

## 2024-02-24 PROCEDURE — 85049 AUTOMATED PLATELET COUNT: CPT | Performed by: STUDENT IN AN ORGANIZED HEALTH CARE EDUCATION/TRAINING PROGRAM

## 2024-02-24 RX ORDER — DOXAZOSIN 1 MG/1
1 TABLET ORAL ONCE
Qty: 1 TABLET | Refills: 0 | Status: COMPLETED | OUTPATIENT
Start: 2024-02-24 | End: 2024-02-24

## 2024-02-24 RX ORDER — DOXAZOSIN 1 MG/1
2 TABLET ORAL DAILY
Status: DISCONTINUED | OUTPATIENT
Start: 2024-02-25 | End: 2024-02-27

## 2024-02-24 RX ADMIN — DOXAZOSIN 1 MG: 1 TABLET ORAL at 08:15

## 2024-02-24 RX ADMIN — HYDRALAZINE HYDROCHLORIDE 100 MG: 25 TABLET, FILM COATED ORAL at 21:02

## 2024-02-24 RX ADMIN — SUCRALFATE 1 G: 1 TABLET ORAL at 06:52

## 2024-02-24 RX ADMIN — CARVEDILOL 25 MG: 25 TABLET, FILM COATED ORAL at 08:10

## 2024-02-24 RX ADMIN — DOXAZOSIN 1 MG: 1 TABLET ORAL at 10:38

## 2024-02-24 RX ADMIN — BUMETANIDE 2 MG: 2 TABLET ORAL at 08:10

## 2024-02-24 RX ADMIN — CARVEDILOL 25 MG: 25 TABLET, FILM COATED ORAL at 18:27

## 2024-02-24 RX ADMIN — AMLODIPINE BESYLATE 10 MG: 10 TABLET ORAL at 08:10

## 2024-02-24 RX ADMIN — Medication: at 08:15

## 2024-02-24 RX ADMIN — INSULIN ASPART 1 UNITS: 100 INJECTION, SOLUTION INTRAVENOUS; SUBCUTANEOUS at 12:00

## 2024-02-24 RX ADMIN — SUCRALFATE 1 G: 1 TABLET ORAL at 15:33

## 2024-02-24 RX ADMIN — CALCIUM CARBONATE (ANTACID) CHEW TAB 500 MG 500 MG: 500 CHEW TAB at 21:12

## 2024-02-24 RX ADMIN — HYDRALAZINE HYDROCHLORIDE 100 MG: 25 TABLET, FILM COATED ORAL at 08:10

## 2024-02-24 RX ADMIN — CALCIUM CARBONATE (ANTACID) CHEW TAB 500 MG 500 MG: 500 CHEW TAB at 15:49

## 2024-02-24 RX ADMIN — SUCRALFATE 1 G: 1 TABLET ORAL at 21:01

## 2024-02-24 RX ADMIN — CITALOPRAM 20 MG: 20 TABLET ORAL at 08:11

## 2024-02-24 RX ADMIN — HYDRALAZINE HYDROCHLORIDE 100 MG: 25 TABLET, FILM COATED ORAL at 15:33

## 2024-02-24 RX ADMIN — Medication 1 TABLET: at 08:15

## 2024-02-24 RX ADMIN — Medication: at 21:05

## 2024-02-24 RX ADMIN — CALCIUM CARBONATE (ANTACID) CHEW TAB 500 MG 500 MG: 500 CHEW TAB at 08:18

## 2024-02-24 RX ADMIN — CLOPIDOGREL BISULFATE 75 MG: 75 TABLET ORAL at 08:11

## 2024-02-24 RX ADMIN — SENNOSIDES AND DOCUSATE SODIUM 1 TABLET: 8.6; 5 TABLET ORAL at 21:02

## 2024-02-24 RX ADMIN — INSULIN ASPART 1 UNITS: 100 INJECTION, SOLUTION INTRAVENOUS; SUBCUTANEOUS at 17:37

## 2024-02-24 RX ADMIN — SUCRALFATE 1 G: 1 TABLET ORAL at 10:38

## 2024-02-24 RX ADMIN — ACETAMINOPHEN 1000 MG: 500 TABLET ORAL at 13:26

## 2024-02-24 ASSESSMENT — ACTIVITIES OF DAILY LIVING (ADL)
ADLS_ACUITY_SCORE: 32
ADLS_ACUITY_SCORE: 29
ADLS_ACUITY_SCORE: 32
ADLS_ACUITY_SCORE: 29
ADLS_ACUITY_SCORE: 29
ADLS_ACUITY_SCORE: 31
ADLS_ACUITY_SCORE: 32
ADLS_ACUITY_SCORE: 29
ADLS_ACUITY_SCORE: 32
ADLS_ACUITY_SCORE: 32
ADLS_ACUITY_SCORE: 31
ADLS_ACUITY_SCORE: 32
ADLS_ACUITY_SCORE: 29
ADLS_ACUITY_SCORE: 32
ADLS_ACUITY_SCORE: 29
ADLS_ACUITY_SCORE: 31
ADLS_ACUITY_SCORE: 32

## 2024-02-24 NOTE — PLAN OF CARE
Goal Outcome Evaluation:    Pt alert and oriented. VSS. RA. Pt requested to sleep in the chair with a pillow underneath her legs/feet. Pt denies any pain. Pt calls appropriately. Call light within reach. Bed in lowest position. Chair alarm on.      Problem: Adult Inpatient Plan of Care  Goal: Optimal Comfort and Wellbeing  Outcome: Progressing     Problem: Risk for Delirium  Goal: Optimal Coping  Outcome: Progressing     Problem: Risk for Delirium  Goal: Improved Sleep  Outcome: Progressing

## 2024-02-24 NOTE — PROGRESS NOTES
RENAL PROGRESS NOTE    ASSESSMENT & PLAN:   Anuric FELIPA on CKD III: Hemodialysis dependent.  Hemodialysis was initiated via tunneled CVC 12/14/2024.  ARF hemodynamic ATN in the setting of septic physiology, NSAIDs, CT contrast, ACE inhibitor, elevated vancomycin level.  AIN secondary to PPI or cefepime in the differential.     Recs:  Creatinine may be improving a little bit, urine output picking up, will continue dialysis MWF but will be more gentle with UF.  We will limit UF to 1-1.5 L UF so we do not delay recovery from ARF with more aggressive UF   Patient has been accepted at [facility] on a MWF schedule.  Dr. Aponte and myself will follow her at [facility]  Chair time 8 AM, she will arrive at 7:15 AM on first day of treatment outpatient for admissions paperwork.  Will continue normal MWF inpatient HD schedule until she is discharged  Orders called into [facility]  Hold all NSAIDs including Voltaren  Continue to follow intake and output.   Will follow for signs of recovery.  Thanks for the consult we will follow    Hyponatremia: Improving with volume optimization with HD/UF.  Sodium 135 most recently    Hypertension: PTA amlodipine 10 mg daily, metoprolol 75 mg twice daily, hydralazine 100 mg 3 times daily, Bumex 2 mg daily.  Swap metoprolol for carvedilol this week for better blood pressure lowering effects from her beta-blocker.  Coreg currently 25 mg twice daily.  ARB on hold in setting of ARF.  UF with HD.  Blood pressure still not at goal, added doxazosin 1 mg yesterday will increase to 2 mg today.    Normocytic anemia: Inflammatory as well as ARF.  Hemoglobin 9s.  Started Epogen 10,000 units weekly 2/19/2024.  Will start anemia management protocols with outpatient HD.    DM2: Management per primary service    Severe sepsis: Resolved    History of CVA:     Thrombocytopenia: HIT labs negative.  Platelets improving.  Management per primary team      SUBJECTIVE:     Patient was seen bedside   Dialysis went well yesterday  Tolerated 1 L UF  She feels her urine output seems to be picking up a bit  Large void this morning  Discussed plans repeat renal function labs tomorrow, continue MWF HD and following for signs of recovery of current ARF  All questions answered      OBJECTIVE:  Physical Exam   Temp: 98  F (36.7  C) Temp src: Oral BP: (!) 183/77 Pulse: 62   Resp: 18 SpO2: 96 % O2 Device: None (Room air)    Vitals:    02/21/24 0653 02/22/24 1032 02/24/24 0612   Weight: 105.8 kg (233 lb 3.2 oz) 103.9 kg (229 lb) 103.7 kg (228 lb 9.6 oz)     Vital Signs with Ranges  Temp:  [97.7  F (36.5  C)-98.3  F (36.8  C)] 98  F (36.7  C)  Pulse:  [57-62] 62  Resp:  [18-19] 18  BP: (130-183)/(53-80) 183/77  SpO2:  [93 %-96 %] 96 %  I/O last 3 completed shifts:  In: 360 [P.O.:360]  Out: 1000 [Other:1000]    Patient Vitals for the past 72 hrs:   Weight   02/24/24 0612 103.7 kg (228 lb 9.6 oz)   02/22/24 1032 103.9 kg (229 lb)     Intake/Output Summary (Last 24 hours) at 2/19/2024 1023  Last data filed at 2/19/2024 0300  Gross per 24 hour   Intake 240 ml   Output 250 ml   Net -10 ml       PHYSICAL EXAM:  General: Alert, NAD  HENT: Supple, Non-tender, no obvious JVD  Eyes: No scleral icterus  Cardiovascular: RRR, no rub, gallop, or murmur.   Extremities: 2+ pitting edema BL LE, improving.  Respiratory: CTAB, non-labored  Gastrointestinal: Soft, non-tender, non-distended  Musculoskeletal: Grossly normal   Integumentary: Warm, dry, no rash  Neurologic: Non focal   Psychiatric: Cooperative  Access: R tunneled CVC.  Clear dressing covering.  No surrounding erythema or drainage.    LABORATORY STUDIES:     Recent Labs   Lab 02/24/24  0555 02/23/24  0744 02/22/24  1704 02/22/24  0755 02/21/24  0652 02/20/24  0603   WBC  --  4.9 4.4 4.1 5.1 6.0   RBC  --  3.34* 3.22* 3.17* 3.25* 3.25*   HGB  --  9.7* 9.3* 9.2* 9.4* 9.4*   HCT  --  30.1* 28.6* 28.6* 28.6* 29.1*   PLT 94* 115* 89* 95* 100* 124*       Basic  Metabolic Panel:  Recent Labs   Lab 02/24/24  0656 02/24/24  0555 02/24/24  0219 02/23/24  2123 02/23/24  1743 02/23/24  1148 02/23/24  0746 02/23/24  0744 02/22/24  0808 02/22/24  0755 02/21/24  0826 02/21/24  0652 02/20/24  0807 02/20/24  0603 02/19/24  0834 02/19/24  0556 02/18/24  0910 02/18/24  0706   NA  --   --   --   --   --   --   --  135  --  136  --  135  --  135  --  133*  --  133*   POTASSIUM  --  3.9  --   --   --   --   --  4.1  --  4.3  --  4.2  --  4.1  --  4.1  --  4.1  4.4   CHLORIDE  --   --   --   --   --   --   --  99  --  101  --  99  --  99  --  97*  --  98   CO2  --   --   --   --   --   --   --  26  --  26  --  23  --  23  --  23  --  23   BUN  --   --   --   --   --   --   --  28.3*  --  20.8  --  35.9*  --  28.1*  --  50.8*  --  44.8*   CR  --   --   --   --   --   --   --  2.76*  --  2.48*  --  3.40*  --  3.16*  --  4.15*  --  3.90*   *  --  148* 185* 179* 225* 114* 130*   < > 123*   < > 135*   < > 129*   < > 139*   < > 121*   ALBARO  --   --   --   --   --   --   --  9.5  --  9.0  --  9.1  --  9.2  --  9.3  --  8.8    < > = values in this interval not displayed.       INRNo lab results found in last 7 days.     Recent Labs   Lab Test 02/24/24  0555 02/23/24  0744 02/22/24  1704 02/11/24  0754 02/10/24  0623 02/05/24  0426 02/04/24  1854   INR  --   --   --   --  1.25*  --  1.23*   WBC  --  4.9 4.4   < > 6.6   < > 5.9   HGB  --  9.7* 9.3*   < > 9.8*   < > 10.4*   PLT 94* 115* 89*   < > 209   < > 119*    < > = values in this interval not displayed.       Personally reviewed current labs    This note was dictated using voice recognition     Twan Gutierrez PA-C  [facility]  [phone]

## 2024-02-24 NOTE — PLAN OF CARE
"  Problem: Adult Inpatient Plan of Care  Goal: Plan of Care Review  Description: The Plan of Care Review/Shift note should be completed every shift.  The Outcome Evaluation is a brief statement about your assessment that the patient is improving, declining, or no change.  This information will be displayed automatically on your shift  note.  Outcome: Progressing     Problem: Adult Inpatient Plan of Care  Goal: Patient-Specific Goal (Individualized)  Description: You can add care plan individualizations to a care plan. Examples of Individualization might be:  \"Parent requests to be called daily at 9am for status\", \"I have a hard time hearing out of my right ear\", or \"Do not touch me to wake me up as it startles  me\".  Outcome: Progressing   Goal Outcome Evaluation:       VSS on RA. Pt received dialysis today with no issues. Reg diet tolerating well. A1GBW to bathroom. Discharge pending                 "

## 2024-02-24 NOTE — PROGRESS NOTES
[facility]    Progress Note - Hospitalist Service       Date of Admission:  2/2/2024    Assessment & Plan   Josefina Dumont is a 73 year old female admitted on 2/2/2024. She has a history of CHF with recent hospitalization 12/28/23-12/31/23, recent community acquired pneumonia, history of ischemic CVA, history of meningioma, history of CAD, type 2 diabetes mellitus and was admitted for shortness of breath and found to be in severe sepsis. Hospital course has been complicated by extensive infectious workup, courses of anti-infectives, delirium workup. At this time, patient is stable but remains hospitalized for worsening kidney function/uremia, thought to be multifactorial in nature. Patient was initiated on HD on 2/14/24. Planning for TCU on discharge once outpatient HD plan is setup. Mental status has greatly improved throughout the past week, she is fully oriented and conversing appropriately.    Discharge pending establishment of transportation for HD.    Severe sepsis due to unknown source; resolved   Acute hypoxic respiratory failure, resolved  Acute fever of unknown origin, resolved  Concern for CHF exacerbation, resolved  Delirium, resolved  Patient initially presented with worsening shortness of breath, orthopnea, dyspnea on exertion, in the setting of elevated BNP and missed dose of Lasix, CHF exacerbation was initially high on the differential. She was febrile w/Tmax of 101.3. CT chest on presentation showed no acute findings, mild pulmonary interstitial edema, small pericardial effusion. CRP elevated to 28.10 and procalcitonin of 5.80 on 2/4/24; CRP elevated to 54.20 and procalcitonin elevated to 11.70 on 2/5/24. See 2/4/24-2/5/24 overnight notes for significant events including aphasia, inattention, fever to 102.5F despite broad-spectrum antibiotics prompting additional aggressive treatment measures and workup. LP and TTE unremarkable. Patient's condition had been  improving 2/6/24 but then had onset of delirium overnight which continued to wax and wane until improving last week. Possible hospital delirium in the setting of sleep deprivation, disorientation contributing. However, prudent to rule-out other potential causes given the distinct change from patient's baseline and prolonged delirium. CT head, chest, abdomen, pelvis was repeated 2/8/24 in light of continued AMS of unclear etiology; results showed cirrhosis but were otherwise unremarkable. Metabolic encephalopathy was on the differential given cirrhosis though LFTs have been wnl and patient is stooling daily. EEG per Neurology was performed 2/9/24 read as unremarkable for seizure activity with slow background in theta and delta range that suggests a nonspecific generalized cerebral dysfunction. Neurology has signed off. ID also signed off as patient will finished course of antibiotics. At this time, mental status has greatly improved.  -Delirium precautions  -Cardiac/low-sodium diet  -Supplemental oxygen as needed    FELIPA  S/p HD (2/14/24, 2/15/24, 2/16/24, 2/19/24, 2/21/24, 2/23/24)  Baseline creatinine around 0.8-0.9, had stabilized ~2.3 initially but then trended upward with the highest being 5.78 on 2/14/24. Several medications may have been contributing to FELIPA though etiology remains unclear; creatinine worsened several days s/p contrast administration and continued to worsen despite discontinuation of nephrotoxic IV antibiotics. Nephrology has been following, appreciate their cares and recommendations.  -Nephrology following  -Planning for HD Monday    Hypokalemia, resolved  -Standard replacement protocol      Mild normocytic anemia  Hemoglobin 11.2 on admission. Has since been stable at ~10. Drop consistent with hemodilution.     Hypertension  -Per Nephrology (2/22/24): PTA amlodipine 10 mg daily, metoprolol 75 mg twice daily, hydralazine 100 mg 3 times daily, Bumex 2 mg daily.  Swap metoprolol for carvedilol  this week for better blood pressure lowering effects from her beta-blocker.  Blood pressure seems to be under better control.  ARB on hold in setting of ARF.  UF with HD.     Prolonged QT  QTc of 472.  -Avoid QT prolonging medications  -Note that Celexa comes with risk for QT prolongation but benefit of anxiety treatment outweighing risk, will continue to monitor    DM2   Most recent A1c 6.6 1 month ago.  -Holding PTA glipizide; blood sugars have been well-controlled through hospitalization so will likely hold on discharge as well  -Low sliding scale insulin     Anxiety/depression  -Continue PTA Celexa   -Patient was given hydroxyzine 2/15/24 PM for anxiety/insomnia; she felt like it may have caused some of her grogginess this morning, so would consider other medication options first  -Bedside RN noticed air mattress of bed wasn't being used, so she left a note on the whiteboard to remember to turn that on at night for comfort  -PRN doxepin 3 mg at bedtime for sleep    Bilaterally lower extremity edema  BLE edema. Equal bilaterally, no skin changes, no specific tenderness to palpation but overall uncomfortable due to sensation of fullness per patient. This is improving today.  -Compression  -No Voltaren per Nephrology recommendations  -Anticipate edema will improve with continued dialysis    Chronic:  History of ischemic CVA: Continue PTA Plavix  GERD: Continue PTA Tums, continue PTA Protonix, added on Carafate   Constipation: Continue PTA senna docusate  Asthma: Continue PTA albuterol as needed        Diet: Regular Diet Adult    DVT Prophylaxis: Heparin; HIT screening lab 2/22/24 negative  Machuca Catheter: Not present  Fluids: po  Lines: PRESENT      CVC Double Lumen Right Internal jugular Tunneled-Site Assessment: WDL    Cardiac Monitoring: None  Code Status: Full Code         Disposition Plan      Expected Discharge Date: 02/25/2024    Discharge Delays: Dialysis - arrange outpatient  Destination: home  Discharge  Comments: OP HD setup, and [facility] accepted        The patient's care was discussed with the Attending Physician, Dr. Linares .    Joana Liu,    Hospitalist Service  [facility]  Securely message with [employer] (more info)  Text page via [facility] Paging/Directory   ______________________________________________________________________    Interval History   No acute events overnight. Feeling well today, no new complaints or concerns. Patient is looking forward to having a break from dialysis today and taking a nap this afternoon. She remains in good spirits despite difficulty with disposition barriers. She states her legs feel lighter than yesterday and less swollen. Working on medication administration this morning which is complicated by her ongoing reflux.       Physical Exam   Vital Signs: Temp: 98  F (36.7  C) Temp src: Oral BP: (!) 183/77 Pulse: 62   Resp: 18 SpO2: 96 % O2 Device: None (Room air)    Weight: 228 lbs 9.6 oz    GENERAL: Alert and no acute distress. Sitting up in recliner.   HEENT: Normocephalic, atraumatic, no rhinorrhea, moist mucus membranes.  RESP: Anterior lung fields with good airflow, clear to auscultation, no coarse lung sounds. Nonlabored breathing on room air.   CV: Regular rate and rhythm, systolic murmur.  MS: No gross musculoskeletal defects noted. Mild edema of the BLE, nontender to palpation.  NEURO: Alert, responding to questions appropriately, good recall of recent events.  PSYCH: Pleasant mood and affect.       Data     I have personally reviewed the following data over the past 24 hrs:    N/A  \   N/A   / 94 (L)     N/A N/A N/A /  109 (H)   3.9 N/A N/A \       Imaging results reviewed over the past 24 hrs:   No results found for this or any previous visit (from the past 24 hour(s)).

## 2024-02-25 ENCOUNTER — APPOINTMENT (OUTPATIENT)
Dept: OCCUPATIONAL THERAPY | Facility: CLINIC | Age: 74
DRG: 871 | End: 2024-02-25
Payer: COMMERCIAL

## 2024-02-25 LAB
ALBUMIN SERPL BCG-MCNC: 3 G/DL (ref 3.5–5.2)
ANION GAP SERPL CALCULATED.3IONS-SCNC: 9 MMOL/L (ref 7–15)
BUN SERPL-MCNC: 22.3 MG/DL (ref 8–23)
CALCIUM SERPL-MCNC: 8.7 MG/DL (ref 8.8–10.2)
CHLORIDE SERPL-SCNC: 100 MMOL/L (ref 98–107)
CREAT SERPL-MCNC: 2.19 MG/DL (ref 0.51–0.95)
DEPRECATED HCO3 PLAS-SCNC: 24 MMOL/L (ref 22–29)
EGFRCR SERPLBLD CKD-EPI 2021: 23 ML/MIN/1.73M2
GLUCOSE BLDC GLUCOMTR-MCNC: 134 MG/DL (ref 70–99)
GLUCOSE BLDC GLUCOMTR-MCNC: 139 MG/DL (ref 70–99)
GLUCOSE BLDC GLUCOMTR-MCNC: 144 MG/DL (ref 70–99)
GLUCOSE BLDC GLUCOMTR-MCNC: 150 MG/DL (ref 70–99)
GLUCOSE BLDC GLUCOMTR-MCNC: 164 MG/DL (ref 70–99)
GLUCOSE SERPL-MCNC: 127 MG/DL (ref 70–99)
HOLD SPECIMEN: NORMAL
PHOSPHATE SERPL-MCNC: 3.2 MG/DL (ref 2.5–4.5)
POTASSIUM SERPL-SCNC: 3.9 MMOL/L (ref 3.4–5.3)
SODIUM SERPL-SCNC: 133 MMOL/L (ref 135–145)

## 2024-02-25 PROCEDURE — 250N000013 HC RX MED GY IP 250 OP 250 PS 637

## 2024-02-25 PROCEDURE — 250N000013 HC RX MED GY IP 250 OP 250 PS 637: Performed by: PHYSICIAN ASSISTANT

## 2024-02-25 PROCEDURE — 99232 SBSQ HOSP IP/OBS MODERATE 35: CPT | Mod: GC

## 2024-02-25 PROCEDURE — 80069 RENAL FUNCTION PANEL: CPT | Performed by: PHYSICIAN ASSISTANT

## 2024-02-25 PROCEDURE — 120N000001 HC R&B MED SURG/OB

## 2024-02-25 PROCEDURE — 97535 SELF CARE MNGMENT TRAINING: CPT | Mod: GO | Performed by: OCCUPATIONAL THERAPIST

## 2024-02-25 PROCEDURE — 250N000013 HC RX MED GY IP 250 OP 250 PS 637: Performed by: FAMILY MEDICINE

## 2024-02-25 PROCEDURE — 36415 COLL VENOUS BLD VENIPUNCTURE: CPT | Performed by: PHYSICIAN ASSISTANT

## 2024-02-25 PROCEDURE — 99232 SBSQ HOSP IP/OBS MODERATE 35: CPT | Performed by: PHYSICIAN ASSISTANT

## 2024-02-25 RX ADMIN — CALCIUM CARBONATE (ANTACID) CHEW TAB 500 MG 500 MG: 500 CHEW TAB at 14:58

## 2024-02-25 RX ADMIN — CITALOPRAM 20 MG: 20 TABLET ORAL at 08:10

## 2024-02-25 RX ADMIN — CLOPIDOGREL BISULFATE 75 MG: 75 TABLET ORAL at 08:10

## 2024-02-25 RX ADMIN — CARVEDILOL 25 MG: 25 TABLET, FILM COATED ORAL at 08:10

## 2024-02-25 RX ADMIN — BUMETANIDE 2 MG: 2 TABLET ORAL at 08:10

## 2024-02-25 RX ADMIN — SUCRALFATE 1 G: 1 TABLET ORAL at 17:03

## 2024-02-25 RX ADMIN — HYDRALAZINE HYDROCHLORIDE 100 MG: 25 TABLET, FILM COATED ORAL at 13:18

## 2024-02-25 RX ADMIN — HYDRALAZINE HYDROCHLORIDE 100 MG: 25 TABLET, FILM COATED ORAL at 20:30

## 2024-02-25 RX ADMIN — CALCIUM CARBONATE (ANTACID) CHEW TAB 500 MG 500 MG: 500 CHEW TAB at 10:26

## 2024-02-25 RX ADMIN — INSULIN ASPART 1 UNITS: 100 INJECTION, SOLUTION INTRAVENOUS; SUBCUTANEOUS at 13:19

## 2024-02-25 RX ADMIN — AMLODIPINE BESYLATE 10 MG: 10 TABLET ORAL at 08:11

## 2024-02-25 RX ADMIN — SUCRALFATE 1 G: 1 TABLET ORAL at 08:11

## 2024-02-25 RX ADMIN — CARVEDILOL 25 MG: 25 TABLET, FILM COATED ORAL at 17:03

## 2024-02-25 RX ADMIN — FAMOTIDINE 10 MG: 10 TABLET ORAL at 08:10

## 2024-02-25 RX ADMIN — Medication: at 08:11

## 2024-02-25 RX ADMIN — Medication 1 TABLET: at 08:10

## 2024-02-25 RX ADMIN — INSULIN ASPART 1 UNITS: 100 INJECTION, SOLUTION INTRAVENOUS; SUBCUTANEOUS at 17:03

## 2024-02-25 RX ADMIN — SUCRALFATE 1 G: 1 TABLET ORAL at 11:54

## 2024-02-25 RX ADMIN — HYDRALAZINE HYDROCHLORIDE 100 MG: 25 TABLET, FILM COATED ORAL at 08:10

## 2024-02-25 RX ADMIN — DOXAZOSIN 2 MG: 1 TABLET ORAL at 08:10

## 2024-02-25 ASSESSMENT — ACTIVITIES OF DAILY LIVING (ADL)
ADLS_ACUITY_SCORE: 35
ADLS_ACUITY_SCORE: 34
ADLS_ACUITY_SCORE: 35
ADLS_ACUITY_SCORE: 34
ADLS_ACUITY_SCORE: 35
ADLS_ACUITY_SCORE: 34
ADLS_ACUITY_SCORE: 34
ADLS_ACUITY_SCORE: 35
ADLS_ACUITY_SCORE: 31
ADLS_ACUITY_SCORE: 34
ADLS_ACUITY_SCORE: 35
ADLS_ACUITY_SCORE: 34
ADLS_ACUITY_SCORE: 35
ADLS_ACUITY_SCORE: 35
ADLS_ACUITY_SCORE: 34

## 2024-02-25 NOTE — PLAN OF CARE
Problem: Adult Inpatient Plan of Care  Goal: Optimal Comfort and Wellbeing  Outcome: Progressing     Problem: Pain Acute  Goal: Optimal Pain Control and Function  Outcome: Progressing   Goal Outcome Evaluation:      Pt alert and oriented. VSS. RA. Pt requested to sleep in the chair. Pt denies any pain. Pt had a bloody nose during the night which resolved on its own. Pt says this is normal for her. Pt calls appropriately. Call light within reach. Bed in lowest position. Chair alarm on

## 2024-02-25 NOTE — PROGRESS NOTES
RENAL PROGRESS NOTE    ASSESSMENT & PLAN:   FELIPA on CKD III: Hemodialysis dependent.  Hemodialysis was initiated via tunneled CVC 12/14/2024.  ARF hemodynamic ATN in the setting of septic physiology, NSAIDs, CT contrast, ACE inhibitor, elevated vancomycin level.  AIN secondary to PPI or cefepime in the differential.     Recs:  Creatinine downtrending, looks like she is recovering from her dialysis dependent ARF.  Urine output seems to be picking up, but UOP is not being tracked adequately.  Small increase in weights between dialysis treatment noted.  We will limit UF to 1-1.5 L UF so we do not delay recovery from ARF with more aggressive UF   Ordered 24-hour creatinine clearance study to be completed next week and then we can decide on weaning off dialysis at that time.  Okay to discharge after inpatient dialysis tomorrow, will start outpatient HD at [facility] on a MWF schedule.  She is able to start on 2/26/2024.  Dr. Aponte and myself will follow her at [facility]  Chair time 8 AM, she will arrive at 7:15 AM on Wednesday for admissions paperwork.  Orders called into [facility] FRANCISCO Diaz R  Hold all NSAIDs including Voltaren  Continue to follow intake and output.    Thanks for the consult we will follow    Hyponatremia: Improving with volume optimization resolving ARF.  Sodium 133    Hypertension: PTA amlodipine 10 mg daily, metoprolol 75 mg twice daily, hydralazine 100 mg 3 times daily, Bumex 2 mg daily.  We swapped metoprolol for carvedilol this week for better blood pressure lowering effects from her beta-blocker.  Coreg currently 25 mg twice daily.  ARB on hold in setting of ARF.  UF with HD.  Blood pressure remains elevated, started doxazosin 1 mg 2/24/2024, will titrate as needed.    Normocytic anemia: Inflammatory as well as ARF.  Hemoglobin 9s.  Started Epogen 10,000 units weekly 2/19/2024.  I have ordered anemia management protocols with outpatient HD.    DM2: Management  per primary service    Severe sepsis: Resolved    History of CVA:     Thrombocytopenia: HIT labs negative.  Platelets improving.  Management per primary team      SUBJECTIVE:    Patient was seen bedside   Says that this is the best she has felt in quite some time  Feels she is more clear in the head with her thinking  Less fatigued  Feels her urine output is increasing  No nausea or vomiting today  Discussed plans as above  She is very pleased to hear that we may be seeing some early signs of recovery from her ARF  All questions answered      OBJECTIVE:  Physical Exam   Temp: 98.1  F (36.7  C) Temp src: Oral BP: (!) 204/84 Pulse: 67   Resp: 18 SpO2: 96 % O2 Device: None (Room air)    Vitals:    02/22/24 1032 02/24/24 0612 02/25/24 0527   Weight: 103.9 kg (229 lb) 103.7 kg (228 lb 9.6 oz) 104.7 kg (230 lb 14.4 oz)     Vital Signs with Ranges  Temp:  [97.7  F (36.5  C)-98.1  F (36.7  C)] 98.1  F (36.7  C)  Pulse:  [57-67] 67  Resp:  [18] 18  BP: (152-204)/(67-84) 204/84  SpO2:  [93 %-96 %] 96 %  I/O last 3 completed shifts:  In: 210 [P.O.:210]  Out: -     Patient Vitals for the past 72 hrs:   Weight   02/25/24 0527 104.7 kg (230 lb 14.4 oz)   02/24/24 0612 103.7 kg (228 lb 9.6 oz)     Intake/Output Summary (Last 24 hours) at 2/19/2024 1023  Last data filed at 2/19/2024 0300  Gross per 24 hour   Intake 240 ml   Output 250 ml   Net -10 ml       PHYSICAL EXAM:  General: Alert, NAD  HENT: Supple, Non-tender, no obvious JVD  Eyes: No scleral icterus  Cardiovascular: RRR, no rub, gallop, or murmur.   Remedies: 1-2+ pitting edema BL LE, improving significantly  Respiratory: CTAB, non-labored  Gastrointestinal: Soft, non-tender, non-distended  Musculoskeletal: Grossly normal   Integumentary: Warm, dry, no rash  Neurologic: Non focal   Psychiatric: Cooperative  Access: R tunneled CVC.  Clear dressing covering.  No surrounding erythema or drainage.    LABORATORY STUDIES:     Recent Labs   Lab 02/24/24  0555 02/23/24  [id number]  02/22/24  1704 02/22/24  0755 02/21/24  0652 02/20/24  0603   WBC  --  4.9 4.4 4.1 5.1 6.0   RBC  --  3.34* 3.22* 3.17* 3.25* 3.25*   HGB  --  9.7* 9.3* 9.2* 9.4* 9.4*   HCT  --  30.1* 28.6* 28.6* 28.6* 29.1*   PLT 94* 115* 89* 95* 100* 124*       Basic Metabolic Panel:  Recent Labs   Lab 02/25/24  0731 02/25/24  0612 02/25/24  0215 02/24/24  2049 02/24/24  1713 02/24/24  1147 02/24/24  0656 02/24/24  0555 02/23/24  0746 02/23/24  0744 02/22/24  0808 02/22/24  0755 02/21/24  0826 02/21/24  0652 02/20/24  0807 02/20/24  0603 02/19/24  0834 02/19/24  0556   NA  --  133*  --   --   --   --   --   --   --  135  --  136  --  135  --  135  --  133*   POTASSIUM  --  3.9  --   --   --   --   --  3.9  --  4.1  --  4.3  --  4.2  --  4.1  --  4.1   CHLORIDE  --  100  --   --   --   --   --   --   --  99  --  101  --  99  --  99  --  97*   CO2  --  24  --   --   --   --   --   --   --  26  --  26  --  23  --  23  --  23   BUN  --  22.3  --   --   --   --   --   --   --  28.3*  --  20.8  --  35.9*  --  28.1*  --  50.8*   CR  --  2.19*  --   --   --   --   --   --   --  2.76*  --  2.48*  --  3.40*  --  3.16*  --  4.15*   * 127* 139* 227* 152* 228*   < >  --    < > 130*   < > 123*   < > 135*   < > 129*   < > 139*   ALBARO  --  8.7*  --   --   --   --   --   --   --  9.5  --  9.0  --  9.1  --  9.2  --  9.3    < > = values in this interval not displayed.       INRNo lab results found in last 7 days.     Recent Labs   Lab Test 02/24/24  0555 02/23/24  0744 02/22/24  1704 02/11/24  0754 02/10/24  0623 02/05/24  0426 02/04/24  1854   INR  --   --   --   --  1.25*  --  1.23*   WBC  --  4.9 4.4   < > 6.6   < > 5.9   HGB  --  9.7* 9.3*   < > 9.8*   < > 10.4*   PLT 94* 115* 89*   < > 209   < > 119*    < > = values in this interval not displayed.       Personally reviewed current labs    This note was dictated using voice recognition     Twan Gutierrez PA-C  [facility]  [phone]

## 2024-02-25 NOTE — PROGRESS NOTES
[facility]    Progress Note - Hospitalist Service       Date of Admission:  2/2/2024    Assessment & Plan   Josefina Dumont is a 73 year old female admitted on 2/2/2024. She has a history of CHF with recent hospitalization 12/28/23-12/31/23, recent community acquired pneumonia, history of ischemic CVA, history of meningioma, history of CAD, type 2 diabetes mellitus and was admitted for shortness of breath and found to be in severe sepsis. Hospital course has been complicated by extensive infectious workup, courses of anti-infectives, delirium workup. At this time, patient is stable but remains hospitalized for worsening kidney function/uremia, thought to be multifactorial in nature. Patient was initiated on HD on 2/14/24. Planning for TCU on discharge once outpatient HD plan is setup. Mental status has improved to baseline. She is medically ready for discharge.    Discharge pending establishment of transportation for HD.    Severe sepsis due to unknown source; resolved   Acute hypoxic respiratory failure, resolved  Acute fever of unknown origin, resolved  Concern for CHF exacerbation, resolved  Delirium, resolved  Patient initially presented with worsening shortness of breath, orthopnea, dyspnea on exertion, in the setting of elevated BNP and missed dose of Lasix, CHF exacerbation was initially high on the differential. She was febrile w/Tmax of 101.3. CT chest on presentation showed no acute findings, mild pulmonary interstitial edema, small pericardial effusion. CRP elevated to 28.10 and procalcitonin of 5.80 on 2/4/24; CRP elevated to 54.20 and procalcitonin elevated to 11.70 on 2/5/24. See 2/4/24-2/5/24 overnight notes for significant events including aphasia, inattention, fever to 102.5F despite broad-spectrum antibiotics prompting additional aggressive treatment measures and workup. LP and TTE unremarkable. Patient's condition had been improving 2/6/24 but then had onset of  delirium overnight which continued to wax and wane until improving last week. Possible hospital delirium in the setting of sleep deprivation, disorientation contributing. However, prudent to rule-out other potential causes given the distinct change from patient's baseline and prolonged delirium. CT head, chest, abdomen, pelvis was repeated 2/8/24 in light of continued AMS of unclear etiology; results showed cirrhosis but were otherwise unremarkable. Metabolic encephalopathy was on the differential given cirrhosis though LFTs have been wnl and patient is stooling daily. EEG per Neurology was performed 2/9/24 read as unremarkable for seizure activity with slow background in theta and delta range that suggests a nonspecific generalized cerebral dysfunction. Neurology has signed off. ID also signed off as patient will finished course of antibiotics. At this time, mental status has greatly improved.  -Delirium precautions  -Cardiac/low-sodium diet  -Supplemental oxygen as needed    FELIPA  S/p HD (2/14/24, 2/15/24, 2/16/24, 2/19/24, 2/21/24, 2/23/24)  Baseline creatinine around 0.8-0.9, had stabilized ~2.3 initially but then trended upward with the highest being 5.78 on 2/14/24. Several medications may have been contributing to FELIPA though etiology remains unclear; creatinine worsened several days s/p contrast administration and continued to worsen despite discontinuation of nephrotoxic IV antibiotics. Nephrology has been following, appreciate their cares and recommendations.  -Nephrology following  -Planning for HD Monday    Hypokalemia, resolved  -Standard replacement protocol      Mild normocytic anemia  Hemoglobin 11.2 on admission. Has since been stable at ~10. Drop consistent with hemodilution.     Hypertension  -Per Nephrology (2/24/24): PTA amlodipine 10 mg daily, metoprolol 75 mg twice daily, hydralazine 100 mg 3 times daily, Bumex 2 mg daily.  Swap metoprolol for carvedilol this week for better blood pressure  lowering effects from her beta-blocker.  Coreg currently 25 mg twice daily.  ARB on hold in setting of ARF.  UF with HD.  Blood pressure still not at goal, added doxazosin 1 mg yesterday will increase to 2 mg today.     Prolonged QT  QTc of 472.  -Avoid QT prolonging medications  -Note that Celexa comes with risk for QT prolongation but benefit of anxiety treatment outweighing risk, will continue to monitor    DM2   Most recent A1c 6.6 1 month ago.  -Holding PTA glipizide; blood sugars have been well-controlled through hospitalization so will likely hold on discharge as well  -Low sliding scale insulin     Anxiety/depression  -Continue PTA Celexa   -Patient was given hydroxyzine 2/15/24 PM for anxiety/insomnia; she felt like it may have caused some of her grogginess  -Bedside RN noticed air mattress of bed wasn't being used, so she left a note on the whiteboard to remember to turn that on at night for comfort  -PRN doxepin 3 mg at bedtime for sleep    Bilaterally lower extremity edema  BLE edema. Equal bilaterally, no skin changes, no specific tenderness to palpation but overall uncomfortable due to sensation of fullness per patient. This is improving today.  -Compression  -No Voltaren per Nephrology recommendations  -Anticipate edema will improve with continued dialysis    Chronic:  History of ischemic CVA: Continue PTA Plavix  GERD: Continue PTA Tums, continue PTA Protonix, added on Carafate   Constipation: Continue PTA senna docusate  Asthma: Continue PTA albuterol as needed        Diet: Regular Diet Adult    DVT Prophylaxis: Heparin; HIT screening lab 2/22/24 negative  Machuca Catheter: Not present  Fluids: po  Lines: PRESENT      CVC Double Lumen Right Internal jugular Tunneled-Site Assessment: WDL    Cardiac Monitoring: None  Code Status: Full Code         Disposition Plan      Expected Discharge Date: 02/26/2024    Discharge Delays: Dialysis - arrange outpatient  Destination: home  Discharge Comments: OP HD  setup, new TCU bed, referrals pending       Per weekend care coordination team, one possibility may be [facility]. It is a TCU with HD capabilities there. Will plan to discuss with incoming care team tomorrow.       The patient's care was discussed with the Attending Physician, Dr. Linares .    OSKAR BUTLER MD   Hospitalist Service  [facility]  Securely message with [employer] (more info)  Text page via [employer] Paging/Directory   ______________________________________________________________________    Interval History   No acute events overnight. Reflux has been better controlled, feels like spacing out medications rather than taking them all at once has been helpful. Appetite remains poor but is working on PO intake. Has been urinating a lot. Sleeping better in the recliner compared to the bed. Legs remain swollen but seem to be improving. No new questions or concerns, eagerly awaiting updates for discharge plan.      Physical Exam   Vital Signs: Temp: 98.1  F (36.7  C) Temp src: Oral BP: (!) 204/84 Pulse: 67   Resp: 18 SpO2: 96 % O2 Device: None (Room air)    Weight: 230 lbs 14.4 oz    GENERAL: Alert and no acute distress. Sitting up in recliner.   HEENT: Normocephalic, atraumatic, no rhinorrhea, moist mucus membranes.  RESP: Anterior lung fields with good airflow, clear to auscultation, no coarse lung sounds. Nonlabored breathing on room air.   CV: Regular rate and rhythm, systolic murmur.  MS: No gross musculoskeletal defects noted. Mild edema of the BLE, nontender to palpation.  NEURO: Alert, responding to questions appropriately, good recall of recent events.  PSYCH: Pleasant mood and affect.       Data     I have personally reviewed the following data over the past 24 hrs:    N/A  \   N/A   / N/A     133 (L) 100 22.3 /  134 (H)   3.9 24 2.19 (H) \     ALT: N/A AST: N/A AP: N/A TBILI: N/A   ALB: 3.0 (L) TOT PROTEIN: N/A LIPASE: N/A       Imaging results reviewed over the  past 24 hrs:   No results found for this or any previous visit (from the past 24 hour(s)).

## 2024-02-26 LAB
ANION GAP SERPL CALCULATED.3IONS-SCNC: 9 MMOL/L (ref 7–15)
BUN SERPL-MCNC: 26 MG/DL (ref 8–23)
CALCIUM SERPL-MCNC: 8.7 MG/DL (ref 8.8–10.2)
CHLORIDE SERPL-SCNC: 100 MMOL/L (ref 98–107)
CREAT SERPL-MCNC: 2.28 MG/DL (ref 0.51–0.95)
DEPRECATED HCO3 PLAS-SCNC: 25 MMOL/L (ref 22–29)
EGFRCR SERPLBLD CKD-EPI 2021: 22 ML/MIN/1.73M2
GLUCOSE BLDC GLUCOMTR-MCNC: 123 MG/DL (ref 70–99)
GLUCOSE BLDC GLUCOMTR-MCNC: 167 MG/DL (ref 70–99)
GLUCOSE BLDC GLUCOMTR-MCNC: 168 MG/DL (ref 70–99)
GLUCOSE BLDC GLUCOMTR-MCNC: 183 MG/DL (ref 70–99)
GLUCOSE BLDC GLUCOMTR-MCNC: 194 MG/DL (ref 70–99)
GLUCOSE BLDC GLUCOMTR-MCNC: 224 MG/DL (ref 70–99)
GLUCOSE SERPL-MCNC: 123 MG/DL (ref 70–99)
HOLD SPECIMEN: NORMAL
POTASSIUM SERPL-SCNC: 3.8 MMOL/L (ref 3.4–5.3)
SODIUM SERPL-SCNC: 134 MMOL/L (ref 135–145)

## 2024-02-26 PROCEDURE — 99232 SBSQ HOSP IP/OBS MODERATE 35: CPT | Mod: GC

## 2024-02-26 PROCEDURE — 250N000013 HC RX MED GY IP 250 OP 250 PS 637

## 2024-02-26 PROCEDURE — 80048 BASIC METABOLIC PNL TOTAL CA: CPT

## 2024-02-26 PROCEDURE — 99232 SBSQ HOSP IP/OBS MODERATE 35: CPT | Performed by: PHYSICIAN ASSISTANT

## 2024-02-26 PROCEDURE — 36415 COLL VENOUS BLD VENIPUNCTURE: CPT

## 2024-02-26 PROCEDURE — 634N000001 HC RX 634: Mod: JZ | Performed by: PHYSICIAN ASSISTANT

## 2024-02-26 PROCEDURE — 90935 HEMODIALYSIS ONE EVALUATION: CPT

## 2024-02-26 PROCEDURE — 120N000001 HC R&B MED SURG/OB

## 2024-02-26 PROCEDURE — 250N000013 HC RX MED GY IP 250 OP 250 PS 637: Performed by: FAMILY MEDICINE

## 2024-02-26 PROCEDURE — 250N000013 HC RX MED GY IP 250 OP 250 PS 637: Performed by: PHYSICIAN ASSISTANT

## 2024-02-26 RX ADMIN — Medication: at 08:32

## 2024-02-26 RX ADMIN — BUMETANIDE 2 MG: 2 TABLET ORAL at 08:32

## 2024-02-26 RX ADMIN — AMLODIPINE BESYLATE 10 MG: 10 TABLET ORAL at 08:31

## 2024-02-26 RX ADMIN — Medication: at 20:46

## 2024-02-26 RX ADMIN — SUCRALFATE 1 G: 1 TABLET ORAL at 07:04

## 2024-02-26 RX ADMIN — Medication 1 TABLET: at 08:32

## 2024-02-26 RX ADMIN — CALCIUM CARBONATE (ANTACID) CHEW TAB 500 MG 500 MG: 500 CHEW TAB at 10:32

## 2024-02-26 RX ADMIN — SUCRALFATE 1 G: 1 TABLET ORAL at 20:46

## 2024-02-26 RX ADMIN — SUCRALFATE 1 G: 1 TABLET ORAL at 13:57

## 2024-02-26 RX ADMIN — CITALOPRAM 20 MG: 20 TABLET ORAL at 08:31

## 2024-02-26 RX ADMIN — INSULIN ASPART 1 UNITS: 100 INJECTION, SOLUTION INTRAVENOUS; SUBCUTANEOUS at 17:42

## 2024-02-26 RX ADMIN — CARVEDILOL 25 MG: 25 TABLET, FILM COATED ORAL at 16:26

## 2024-02-26 RX ADMIN — INSULIN ASPART 1 UNITS: 100 INJECTION, SOLUTION INTRAVENOUS; SUBCUTANEOUS at 13:13

## 2024-02-26 RX ADMIN — HYDRALAZINE HYDROCHLORIDE 100 MG: 25 TABLET, FILM COATED ORAL at 20:46

## 2024-02-26 RX ADMIN — DOXAZOSIN 2 MG: 1 TABLET ORAL at 08:32

## 2024-02-26 RX ADMIN — EPOETIN ALFA-EPBX 10000 UNITS: 10000 INJECTION, SOLUTION INTRAVENOUS; SUBCUTANEOUS at 13:15

## 2024-02-26 RX ADMIN — SUCRALFATE 1 G: 1 TABLET ORAL at 16:26

## 2024-02-26 RX ADMIN — CARVEDILOL 25 MG: 25 TABLET, FILM COATED ORAL at 08:32

## 2024-02-26 RX ADMIN — HYDRALAZINE HYDROCHLORIDE 100 MG: 25 TABLET, FILM COATED ORAL at 08:31

## 2024-02-26 RX ADMIN — CLOPIDOGREL BISULFATE 75 MG: 75 TABLET ORAL at 08:31

## 2024-02-26 ASSESSMENT — ACTIVITIES OF DAILY LIVING (ADL)
ADLS_ACUITY_SCORE: 34
ADLS_ACUITY_SCORE: 34
ADLS_ACUITY_SCORE: 35
ADLS_ACUITY_SCORE: 34
ADLS_ACUITY_SCORE: 35
ADLS_ACUITY_SCORE: 35
ADLS_ACUITY_SCORE: 34
ADLS_ACUITY_SCORE: 35
ADLS_ACUITY_SCORE: 35
ADLS_ACUITY_SCORE: 34
ADLS_ACUITY_SCORE: 35
ADLS_ACUITY_SCORE: 34
ADLS_ACUITY_SCORE: 35
ADLS_ACUITY_SCORE: 35
ADLS_ACUITY_SCORE: 34

## 2024-02-26 NOTE — PLAN OF CARE
VSS expect for high BP, hydralazine given. Denies pain. C/o of stomach ache. Call light within reach and pt up in the chair most of shift. Dialysis cath within defined limits. Plan to discharge tomorrow after dialysis.     Problem: Adult Inpatient Plan of Care  Goal: Absence of Hospital-Acquired Illness or Injury  Outcome: Progressing  Intervention: Identify and Manage Fall Risk  Recent Flowsheet Documentation  Taken 2/25/2024 1924 by Lyudmila Sullivan, RN  Safety Promotion/Fall Prevention: activity supervised  Intervention: Prevent Skin Injury  Recent Flowsheet Documentation  Taken 2/25/2024 1924 by Lyudmila Sullivan, RN  Body Position: position changed independently   Goal Outcome Evaluation:

## 2024-02-26 NOTE — PROGRESS NOTES
Care Management Follow Up    Length of Stay (days): 22    Expected Discharge Date: 02/27/2024     Concerns to be Addressed: discharge planning     Patient plan of care discussed at interdisciplinary rounds: Yes    Anticipated Discharge Disposition:  (TBD)  Disposition Comments: TBD  Anticipated Discharge Services: Transportation Services  Anticipated Discharge DME: None    Education Provided on the Discharge Plan: Yes (AVS per bedside RN)  Patient/Family in Agreement with the Plan: yes    Referrals Placed by CM/SW:  yes  Private pay costs discussed: transportation costs    Additional Information:  10:31 AM  Pt got accepted to [facility] ([facility]). [facility] did inform pt of acceptance. Pt wanted her sister Orin to be involved. SW called Orin and updated her on [facility] acceptance. Orin confirmed going forward with facility. SW called facility to start the process of getting her approved to go to facility with in house dialysis. Pt would be on dialysis for three weeks. Admissions staff let GENA know that more then likely Dialysis would be performed Tuesdays, Thursdays, and Saturday's. Admissions staff states they can take her mid-week. CM to follow for any updates. [facility] transportation needed.     REBECCA EstradaW

## 2024-02-26 NOTE — PROGRESS NOTES
RENAL PROGRESS NOTE    ASSESSMENT & PLAN:   Acute kidney injury, chronic kidney disease stage 3 - Currently hemodialysis dependent; dialyzing via tunneled CVC. FELIPA due to hemodynamic ATN in the setting of septic physiology, NSAIDs, CT contrast, ACEi and elevated vancomycin. Consideration for AIN secondary to PPI or cefepime on differential. Now with some downtrend in creatinine, hopeful to see recovery from dialysis dependent FELIPA. Okay to discharge on dialysis and will follow for signs of recovery as outpatient. Will continue to follow daily while inpatient     Hyponatremia -  Improving with volume optimization resolving acute kidney injury.       Hypertension - Prior to admission on amlodipine, metoprolol, hydralazine, bumetanide. Metoprolol changed to carvedilol for better blood pressure lowering effect. ARB on hold in setting of FELIPA. UF as needed with HD. Started on doxazosin over weekend and following    Anemia - Inflammatory as well as from FELIPA hemoglobin in 9s. Started epogen 10,000 units weekly on 2/19/24; will receive per protocols at outpatient unit     Diabetes mellitus type 2 - Management per primary service     Severe sepsis - Resolved     History of CVA:      Thrombocytopenia - HIT labs negative.  Platelets improving.  Management per primary team    Disposition - No objection to discharge from renal perspective when TCU located. If discharge location changed, will need dialysis arrangements near there       SUBJECTIVE:  Patient new to me. Chart reviewed. Feeling okay. Frustrated as she had thought she would discharge today. Denies shortness of breath    OBJECTIVE:  Physical Exam   Temp: 98.5  F (36.9  C) Temp src: Oral BP: (!) 173/75 (nurse notified) Pulse: 63   Resp: 16 SpO2: 94 % O2 Device: None (Room air)    Vitals:    02/24/24 0612 02/25/24 0527 02/26/24 0605   Weight: 103.7 kg (228 lb 9.6 oz) 104.7 kg (230 lb 14.4 oz) 104.6 kg (230 lb 11.2 oz)     Vital Signs with Ranges  Temp:  [97.7  F (36.5   C)-98.5  F (36.9  C)] 98.5  F (36.9  C)  Pulse:  [58-70] 63  Resp:  [16-18] 16  BP: (150-173)/(67-75) 173/75  SpO2:  [94 %-96 %] 94 %  I/O last 3 completed shifts:  In: 240 [P.O.:240]  Out: -         Patient Vitals for the past 72 hrs:   Weight   02/26/24 0605 104.6 kg (230 lb 11.2 oz)   02/25/24 0527 104.7 kg (230 lb 14.4 oz)   02/24/24 0612 103.7 kg (228 lb 9.6 oz)     Intake/Output Summary (Last 24 hours) at 2/26/2024 1126  Last data filed at 2/26/2024 0917  Gross per 24 hour   Intake 840 ml   Output --   Net 840 ml       PHYSICAL EXAM:  General - Alert and pleasant  Cardiovascular - Normal S1S2, no rub  Respiratory - Clear to auscultation bilaterally, no crackles or wheezes  Abdomen - Soft, non-tender, bowel sounds present.    Extremities - 1-2+ pitting lower extremity edema bilaterally  Integumentary - Warm, dry, no rash  Neurologic - Grossly intact, no focal deficits      LABORATORY STUDIES:     Recent Labs   Lab 02/24/24  0555 02/23/24  0744 02/22/24  1704 02/22/24  0755 02/21/24  0652 02/20/24  0603   WBC  --  4.9 4.4 4.1 5.1 6.0   RBC  --  3.34* 3.22* 3.17* 3.25* 3.25*   HGB  --  9.7* 9.3* 9.2* 9.4* 9.4*   HCT  --  30.1* 28.6* 28.6* 28.6* 29.1*   PLT 94* 115* 89* 95* 100* 124*       Basic Metabolic Panel:  Recent Labs   Lab 02/26/24  0810 02/26/24  0638 02/26/24  0229 02/25/24  2238 02/25/24  1647 02/25/24  1221 02/25/24  0731 02/25/24  0612 02/24/24  0656 02/24/24  0555 02/23/24  0746 02/23/24  0744 02/22/24  0808 02/22/24  0755 02/21/24  0826 02/21/24  0652 02/20/24  0807 02/20/24  0603   NA  --  134*  --   --   --   --   --  133*  --   --   --  135  --  136  --  135  --  135   POTASSIUM  --  3.8  --   --   --   --   --  3.9  --  3.9  --  4.1  --  4.3  --  4.2  --  4.1   CHLORIDE  --  100  --   --   --   --   --  100  --   --   --  99  --  101  --  99  --  99   CO2  --  25  --   --   --   --   --  24  --   --   --  26  --  26  --  23  --  23   BUN  --  26.0*  --   --   --   --   --  22.3  --   --   --   28.3*  --  20.8  --  35.9*  --  28.1*   CR  --  2.28*  --   --   --   --   --  2.19*  --   --   --  2.76*  --  2.48*  --  3.40*  --  3.16*   * 123* 168* 144* 150* 164*   < > 127*   < >  --    < > 130*   < > 123*   < > 135*   < > 129*   ALBARO  --  8.7*  --   --   --   --   --  8.7*  --   --   --  9.5  --  9.0  --  9.1  --  9.2    < > = values in this interval not displayed.       Recent Labs   Lab Test 02/24/24  0555 02/23/24  0744 02/22/24  1704 02/11/24  0754 02/10/24  0623 02/05/24  0426 02/04/24  1854   INR  --   --   --   --  1.25*  --  1.23*   WBC  --  4.9 4.4   < > 6.6   < > 5.9   HGB  --  9.7* 9.3*   < > 9.8*   < > 10.4*   PLT 94* 115* 89*   < > 209   < > 119*    < > = values in this interval not displayed.         Personally reviewed current labs      Linda Lieberman PA-C  [facility]  [phone]

## 2024-02-26 NOTE — PLAN OF CARE
"  Problem: Adult Inpatient Plan of Care  Goal: Plan of Care Review  Description: The Plan of Care Review/Shift note should be completed every shift.  The Outcome Evaluation is a brief statement about your assessment that the patient is improving, declining, or no change.  This information will be displayed automatically on your shift  note.  Outcome: Progressing     Problem: Adult Inpatient Plan of Care  Goal: Patient-Specific Goal (Individualized)  Description: You can add care plan individualizations to a care plan. Examples of Individualization might be:  \"Parent requests to be called daily at 9am for status\", \"I have a hard time hearing out of my right ear\", or \"Do not touch me to wake me up as it startles  me\".  Outcome: Progressing   Goal Outcome Evaluation:       VSS on RA. Pt up in chair. BG checks coverage needed. Tums given. Geomat  utilized in chair. Discharge pending                 "

## 2024-02-26 NOTE — PLAN OF CARE
Problem: Pain Acute  Goal: Optimal Pain Control and Function  Outcome: Progressing     Patient is alert and orientated times four. She has been in reclining chair all shift. Does shift weight independently with reminders. She denies pain. CVC tunnel catheter place to right chest and dressing is CDI. On K+ protocol with recheck scheduled for this AM. Blood glucose tonight was 168. Alarm in place for patient's safety.

## 2024-02-26 NOTE — PROGRESS NOTES
"CLINICAL NUTRITION SERVICES - REASSESSMENT NOTE     Nutrition Prescription    RECOMMENDATIONS FOR MDs/PROVIDERS TO ORDER:  Recommend adjust insulin regimen with hyperglycemia.     Malnutrition Status:    Patient does not meet two of the criteria necessary for diagnosing malnutrition     Recommendations already ordered by Registered Dietitian (RD):    Future/Additional Recommendations:  Will monitor po intake, wt, wound healing, labs.      EVALUATION OF THE PROGRESS TOWARD GOALS   Diet: Regular  Intake: Okay, 0-100% of meals   Pt meeting % kcal needs and <75% protein needs.     Attempted to meet with pt x 2 today.     ANTHROPOMETRICS  Height: 5'4\"   Most Recent Weight: 104.6 kg (230 lb 11.2 oz)    Weight History:   02/26/24 0605 104.6 kg (230 lb 11.2 oz) Standing scale   02/25/24 0527 104.7 kg (230 lb 14.4 oz) Standing scale   02/24/24 0612 103.7 kg (228 lb 9.6 oz) Standing scale   02/22/24 1032 103.9 kg (229 lb) Standing scale     02/13/24 107.3 kg (236 lb 8.9 oz)   01/29/24 99.2 kg (218 lb 11.2 oz)   12/31/23 99.4 kg (219 lb 1.6 oz)     ASSESSED NUTRITION NEEDS  Estimated Energy Needs: 6990-8617 kcals/day (22-25kcal/kg IBW of 55kg)  Justification: Maintenance and Obese  Estimated Protein Needs:  grams protein/day (1.2 - 1.5 grams of pro/kg of adjusted body wt, 68kg)  Justification: Obesity guidelines, Wound healing  Estimated Fluid Needs: 9376-1732 mL/day (25 - 30 mL/kg of IBW of 55kg)   Justification: Maintenance    PHYSICAL FINDINGS/GI CONCERNS  See malnutrition section below.  Per Flowhseets:  GI: Obese abd   BM: x5 2/25  Skin: Incision/surgical site R-neck 2/14, Coccyx wound   Edema: Trace/Mild     LABS  Reviewed  Na 134(L)   BUN 26(H), Creat 2.28(H)  -168 last 24 hrs     MEDICATIONS  Reviewed  Scheduled bumex, plavix, pepcid, novolog, MVI renal, senna, carafate     MALNUTRITION:  Malnutrition Diagnosis: Patient does not meet two of the above criteria necessary for diagnosing " malnutrition    CURRENT NUTRITION DIAGNOSIS  Inadequate protein-energy intake related to reduced po  as evidenced by variable intake  Evaluation: Ongoing     INTERVENTIONS  Implementation  Adequate protein to meet nutrition needs for wound healing.   Recommend adjust insulin regimen with hyperglycemia.     Goals  Patient to consume % of nutritionally adequate meals three times per day, or the equivalent with supplements/snacks. - Not Met  Wound healing - Ongoing     Monitoring/Evaluation  Progress toward goals will be monitored and evaluated per protocol.

## 2024-02-26 NOTE — PROGRESS NOTES
Dialysis RN in patient's room at this time performing dialysis procedure.Pre-meal blood glucose is 224mg/dl, Dialysis RN agreed to give insulin pen novolog per sliding scale dosing, and Retacrit subcutaneous.-Chandrika ALATORRE RN

## 2024-02-26 NOTE — PLAN OF CARE
Problem: Adult Inpatient Plan of Care  Goal: Absence of Hospital-Acquired Illness or Injury  Intervention: Identify and Manage Fall Risk  Recent Flowsheet Documentation  Taken 2/26/2024 0817 by Chandrika Cooney RN  Safety Promotion/Fall Prevention:   safety round/check completed   room organization consistent   room near nurse's station   room door open   patient and family education   nonskid shoes/slippers when out of bed   mobility aid in reach   lighting adjusted   increase visualization of patient   increased rounding and observation   clutter free environment maintained   assistive device/personal items within reach   activity supervised     Problem: Adult Inpatient Plan of Care  Goal: Absence of Hospital-Acquired Illness or Injury  Intervention: Prevent Infection  Recent Flowsheet Documentation  Taken 2/26/2024 0817 by Chandrika Cooney RN  Infection Prevention:   hand hygiene promoted   personal protective equipment utilized   rest/sleep promoted   single patient room provided     Problem: Adult Inpatient Plan of Care  Goal: Optimal Comfort and Wellbeing  Intervention: Monitor Pain and Promote Comfort  Recent Flowsheet Documentation  Taken 2/26/2024 0817 by Chandrika Cooney RN  Pain Management Interventions:   therapeutic presence   therapeutic touch   repositioned     Problem: Fatigue  Goal: Improved Activity Tolerance  Intervention: Promote Improved Energy  Recent Flowsheet Documentation  Taken 2/26/2024 0817 by Chandrika Cooney RN  Activity Management: up in chair     Problem: Sepsis/Septic Shock  Goal: Blood Glucose Level Within Targeted Range  Outcome: Progressing    Goal Outcome Evaluation: A&Ox4, denied pain, calm, pleasant, compliant, HTN managed with BP meds, afebrile, on room air, blood glucose before breakfast was 123mg/dl no insulin given per INSS dosing protocol, up on the chair right now eating breakfast, alarm on, call light and phone within reach. Will continue to monitor.-Izabella ALATORRE  RN

## 2024-02-26 NOTE — PROGRESS NOTES
Date: 2/26/2024  Time: 1513     Data:  Pre Wt:   104.6 kg  Desired Wt:   To be established  Post Wt:  103.1 kg (estimated)  Weight change: - 1.5 kg  Ultrafiltration - Post Run Net Total Removed (mL):  1500 ml  Vascular Access Status: CVC patent  Dialyzer Rinse:  Light  Total Blood Volume Processed: 66.4  L   Total Dialysis (Treatment) Time:   3 Hrs  Dialysate Bath: K 2, Ca 2.5  Heparin: Heparin: None     Lab:   No  HbsAg - Non-reactive (2/15/24)  HbsAb - <3.50 Suscetible (2/15/24)     Interventions:  Dialysis done through RIJ Tunneled CVC  UF set to 1.8 Liters of fluid removal, accommodating priming and rinse back volumes. UF goal reduced to 1.5 d/t to hypotension at start of treatment and increased back to 1.8 with improved UF tolerance.  ,   Epo, Insulin, Sucralfate administered per MAR. Hydralazine held d/t decreasing SBP.   CVC dressing changed aseptically. No s/s of infection.   CritLine showed a stable Profile B throughout the run, tolerating UF pull  Glucose checks done. Pre-HD: 224 mg/dl;  Post-HD: 183 mg/dl  Treatment has ended safely and  blood is rinsed back completely  Catheter lumens flushed with saline and locked with NS, catheter caps (ClearGuard ) changed post HD  Report given to PCN, Chandrika Cooney      Assessment:  A/O x 4, calm & cooperative, denies pain  CVC intact, changed, no s/s of infection. Functioned well at 400 BFR.   UF goal set to net 1.5L, SBP drop first 30 mins of run. UF goal decreased to net 1.0L. SBP improved with Critline showing pt tolerating UF well. UF goal increased back to net 1.5L. Pt tolerated this well and achieved goal. Treatment ended at 3 hours, blood returned, pt stable. Hydralazine held during this time. PCN to reevaluate post treatment.                 Plan:    Per Renal team        Mich Valencia RN  Acute Dialysis RN  [facility]

## 2024-02-26 NOTE — PROGRESS NOTES
[facility]    Progress Note - Hospitalist Service       Date of Admission:  2/2/2024    Assessment & Plan   Josefina Dumont is a 73 year old female admitted on 2/2/2024. She has a history of CHF with recent hospitalization 12/28/23-12/31/23, recent community acquired pneumonia, history of ischemic CVA, history of meningioma, history of CAD, type 2 diabetes mellitus and was admitted for shortness of breath and found to be in severe sepsis. Hospital course has been complicated by extensive infectious workup, courses of anti-infectives, delirium workup. At this time, patient is stable but remains hospitalized for worsening kidney function/uremia, thought to be multifactorial in nature; AIN secondary to PPI or cefepime in the differential per Nephrology. Patient was initiated on HD on 2/14/24. Planning for TCU on discharge once outpatient HD plan is finalized. Mental status has improved to baseline. She is medically ready for discharge.    Discharge pending establishment of transportation for HD.    Severe sepsis due to unknown source; resolved   Acute hypoxic respiratory failure, resolved  Acute fever of unknown origin, resolved  Concern for CHF exacerbation, resolved  Delirium, resolved  Patient initially presented with worsening SOB, SHAH, orthopnea in the setting of elevated BNP and missed dose of Lasix; CHF exacerbation was initially high on the differential. She was febrile w/Tmax of 101.3. CT chest on presentation showed no acute findings, mild pulmonary interstitial edema, small pericardial effusion. CRP elevated to 28.10 and procalcitonin of 5.80 on 2/4/24; CRP elevated to 54.20 and procalcitonin elevated to 11.70 on 2/5/24. See 2/4/24-2/5/24 overnight notes for significant events including aphasia, inattention, fever to 102.5F despite broad-spectrum antibiotics prompting additional aggressive treatment measures and workup. LP and TTE unremarkable. Patient's condition had been  improving 2/6/24 but then had onset of delirium overnight which waxed and waned for about a week. Possible hospital delirium in the setting of sleep deprivation, disorientation contributing but additional workup performed. CT head, C/A/P was repeated 2/8/24; results showed cirrhosis but were otherwise unremarkable. Metabolic encephalopathy was on the differential given cirrhosis though LFTs have been wnl and patient is stooling daily. EEG per Neurology was performed 2/9/24 read as unremarkable for seizure activity. Neurology has signed off. ID also signed off after completion of antibiotics. At this time, mental status has greatly improved and is at baseline.  -Delirium precautions  -Cardiac/low-sodium diet  -Supplemental oxygen as needed    FELIPA  S/p HD (2/14/24, 2/15/24, 2/16/24, 2/19/24, 2/21/24, 2/23/24)  Baseline creatinine around 0.8-0.9, had stabilized ~2.3 initially but then trended upward with the highest being 5.78 on 2/14/24. Several medications may have been contributing to FELIPA; AIN secondary to PPI or cefepime in the differential per Nephrology. Nephrology has been following, appreciate their cares and recommendations.  -Nephrology following  -Planning for HD today    Hypokalemia, resolved  -Standard replacement protocol      Mild normocytic anemia  Hemoglobin 11.2 on admission. Has since been stable at ~10. Drop consistent with hemodilution.     Hypertension  -Per Nephrology (2/24/24): PTA amlodipine 10 mg daily, metoprolol 75 mg twice daily, hydralazine 100 mg 3 times daily, Bumex 2 mg daily.  Swap metoprolol for carvedilol this week for better blood pressure lowering effects from her beta-blocker.  Coreg currently 25 mg twice daily.  ARB on hold in setting of ARF.  UF with HD.  Blood pressure still not at goal, added doxazosin 1 mg yesterday will increase to 2 mg today.     Prolonged QT  QTc of 472.  -Avoid QT prolonging medications  -Note that Celexa comes with risk for QT prolongation but benefit of  anxiety treatment outweighing risk, will continue to monitor    DM2   Most recent A1c 6.6 1 month ago.  -Holding PTA glipizide; blood sugars have been well-controlled through hospitalization so will likely hold on discharge as well  -Low sliding scale insulin     Anxiety/depression  -Continue PTA Celexa   -Patient was given hydroxyzine 2/15/24 PM for anxiety/insomnia; she felt like it may have caused some of her grogginess  -Bedside RN noticed air mattress of bed wasn't being used, so she left a note on the whiteboard to remember to turn that on at night for comfort  -PRN doxepin 3 mg at bedtime for sleep    Bilaterally lower extremity edema  BLE edema. Equal bilaterally, no skin changes, no specific tenderness to palpation but overall uncomfortable due to sensation of fullness per patient. This is improving today.  -Compression  -No Voltaren per Nephrology recommendations  -Anticipate edema will improve with continued dialysis    Chronic:  History of ischemic CVA: Continue PTA Plavix  GERD: Continue PTA Tums, continue PTA Protonix, added on Carafate   Constipation: Continue PTA senna docusate  Asthma: Continue PTA albuterol as needed        Diet: Regular Diet Adult    DVT Prophylaxis: Heparin; HIT screening lab 2/22/24 negative  [place]achuca Catheter: Not present  Fluids: po  Lines: PRESENT      CVC Double Lumen Right Internal jugular Tunneled-Site Assessment: WDL    Cardiac Monitoring: None  Code Status: Full Code         Disposition Plan      Expected Discharge Date: 02/27/2024    Discharge Delays: Dialysis - arrange outpatient  Destination: home  Discharge Comments: OP HD setup, new TCU bed, referrals pending       Per weekend care coordination team, one possibility may be [facility]. It is a TCU with HD capabilities there.        The patient's care was discussed with the Attending Physician, Dr. Serrano .    OSKAR BUTLER MD   Hospitalist Service  [facility]  Securely message  with Sheryl (more info)  Text page via [facility] Paging/Directory   ______________________________________________________________________    Interval History   No acute events overnight. Overall feels well, anxious to leave the hospital. Hopeful for discharge today though no new plans have been made. Sister is coming in this morning to further discuss with care coordination. Reviewed Nephrology's discussion that patient may only need a couple of weeks of dialysis; unfortunately $100 per ride for even 2-3 weeks would be cost prohibitive to patient. Discharge plans therefore remain in flux.    Physical Exam   Vital Signs: Temp: 98.5  F (36.9  C) Temp src: Oral BP: (!) 173/75 (nurse notified) Pulse: 63   Resp: 16 SpO2: 94 % O2 Device: None (Room air)    Weight: 230 lbs 11.2 oz    GENERAL: Alert and no acute distress. Sitting up in recliner.   HEENT: Normocephalic, atraumatic, no rhinorrhea, moist mucus membranes.  RESP: Anterior lung fields with good airflow, clear to auscultation, no coarse lung sounds. Nonlabored breathing on room air.   CV: Regular rate and rhythm, systolic murmur.  MS: No gross musculoskeletal defects noted. Mild edema of the BLE, nontender to palpation.  NEURO: Alert, responding to questions appropriately, good recall of recent events.  PSYCH: Pleasant mood and affect.       Data     I have personally reviewed the following data over the past 24 hrs:    N/A  \   N/A   / N/A     134 (L) 100 26.0 (H) /  123 (H)   3.8 25 2.28 (H) \       Imaging results reviewed over the past 24 hrs:   No results found for this or any previous visit (from the past 24 hour(s)).

## 2024-02-26 NOTE — PROGRESS NOTES
Writer gave end of day shift report to the receiving RN of the evening shift.-Chandrika ALATORRE RN

## 2024-02-27 ENCOUNTER — APPOINTMENT (OUTPATIENT)
Dept: OCCUPATIONAL THERAPY | Facility: CLINIC | Age: 74
DRG: 871 | End: 2024-02-27
Payer: COMMERCIAL

## 2024-02-27 ENCOUNTER — APPOINTMENT (OUTPATIENT)
Dept: PHYSICAL THERAPY | Facility: CLINIC | Age: 74
DRG: 871 | End: 2024-02-27
Payer: COMMERCIAL

## 2024-02-27 LAB
ANION GAP SERPL CALCULATED.3IONS-SCNC: 9 MMOL/L (ref 7–15)
BUN SERPL-MCNC: 15.5 MG/DL (ref 8–23)
CALCIUM SERPL-MCNC: 8.7 MG/DL (ref 8.8–10.2)
CHLORIDE SERPL-SCNC: 102 MMOL/L (ref 98–107)
CREAT SERPL-MCNC: 1.59 MG/DL (ref 0.51–0.95)
DEPRECATED HCO3 PLAS-SCNC: 24 MMOL/L (ref 22–29)
EGFRCR SERPLBLD CKD-EPI 2021: 34 ML/MIN/1.73M2
GLUCOSE BLDC GLUCOMTR-MCNC: 120 MG/DL (ref 70–99)
GLUCOSE BLDC GLUCOMTR-MCNC: 129 MG/DL (ref 70–99)
GLUCOSE BLDC GLUCOMTR-MCNC: 139 MG/DL (ref 70–99)
GLUCOSE BLDC GLUCOMTR-MCNC: 140 MG/DL (ref 70–99)
GLUCOSE BLDC GLUCOMTR-MCNC: 184 MG/DL (ref 70–99)
GLUCOSE SERPL-MCNC: 124 MG/DL (ref 70–99)
PLATELET # BLD AUTO: 85 10E3/UL (ref 150–450)
POTASSIUM SERPL-SCNC: 3.6 MMOL/L (ref 3.4–5.3)
SODIUM SERPL-SCNC: 135 MMOL/L (ref 135–145)

## 2024-02-27 PROCEDURE — 250N000013 HC RX MED GY IP 250 OP 250 PS 637

## 2024-02-27 PROCEDURE — 250N000013 HC RX MED GY IP 250 OP 250 PS 637: Performed by: FAMILY MEDICINE

## 2024-02-27 PROCEDURE — 85049 AUTOMATED PLATELET COUNT: CPT | Performed by: STUDENT IN AN ORGANIZED HEALTH CARE EDUCATION/TRAINING PROGRAM

## 2024-02-27 PROCEDURE — 120N000001 HC R&B MED SURG/OB

## 2024-02-27 PROCEDURE — 250N000013 HC RX MED GY IP 250 OP 250 PS 637: Performed by: PHYSICIAN ASSISTANT

## 2024-02-27 PROCEDURE — 99232 SBSQ HOSP IP/OBS MODERATE 35: CPT | Mod: GC

## 2024-02-27 PROCEDURE — 97110 THERAPEUTIC EXERCISES: CPT | Mod: GP

## 2024-02-27 PROCEDURE — 97535 SELF CARE MNGMENT TRAINING: CPT | Mod: GO

## 2024-02-27 PROCEDURE — 36415 COLL VENOUS BLD VENIPUNCTURE: CPT | Performed by: STUDENT IN AN ORGANIZED HEALTH CARE EDUCATION/TRAINING PROGRAM

## 2024-02-27 PROCEDURE — 80048 BASIC METABOLIC PNL TOTAL CA: CPT

## 2024-02-27 PROCEDURE — 97116 GAIT TRAINING THERAPY: CPT | Mod: GP

## 2024-02-27 PROCEDURE — 99233 SBSQ HOSP IP/OBS HIGH 50: CPT | Performed by: PHYSICIAN ASSISTANT

## 2024-02-27 RX ORDER — FAMOTIDINE 10 MG
10 TABLET ORAL 2 TIMES DAILY
Status: DISCONTINUED | OUTPATIENT
Start: 2024-02-27 | End: 2024-02-28 | Stop reason: HOSPADM

## 2024-02-27 RX ORDER — DOXAZOSIN 4 MG/1
4 TABLET ORAL DAILY
Status: DISCONTINUED | OUTPATIENT
Start: 2024-02-28 | End: 2024-02-28 | Stop reason: HOSPADM

## 2024-02-27 RX ORDER — DOXAZOSIN 1 MG/1
2 TABLET ORAL ONCE
Qty: 2 TABLET | Refills: 0 | Status: COMPLETED | OUTPATIENT
Start: 2024-02-27 | End: 2024-02-27

## 2024-02-27 RX ADMIN — SUCRALFATE 1 G: 1 TABLET ORAL at 17:23

## 2024-02-27 RX ADMIN — CITALOPRAM 20 MG: 20 TABLET ORAL at 09:54

## 2024-02-27 RX ADMIN — DOXAZOSIN 2 MG: 1 TABLET ORAL at 09:54

## 2024-02-27 RX ADMIN — SUCRALFATE 1 G: 1 TABLET ORAL at 06:42

## 2024-02-27 RX ADMIN — CARVEDILOL 25 MG: 25 TABLET, FILM COATED ORAL at 17:23

## 2024-02-27 RX ADMIN — AMLODIPINE BESYLATE 10 MG: 10 TABLET ORAL at 09:54

## 2024-02-27 RX ADMIN — INSULIN ASPART 1 UNITS: 100 INJECTION, SOLUTION INTRAVENOUS; SUBCUTANEOUS at 12:25

## 2024-02-27 RX ADMIN — Medication: at 09:54

## 2024-02-27 RX ADMIN — FAMOTIDINE 10 MG: 10 TABLET ORAL at 20:51

## 2024-02-27 RX ADMIN — FAMOTIDINE 10 MG: 10 TABLET ORAL at 09:54

## 2024-02-27 RX ADMIN — BUMETANIDE 2 MG: 2 TABLET ORAL at 09:54

## 2024-02-27 RX ADMIN — SUCRALFATE 1 G: 1 TABLET ORAL at 12:20

## 2024-02-27 RX ADMIN — SUCRALFATE 1 G: 1 TABLET ORAL at 20:51

## 2024-02-27 RX ADMIN — HYDRALAZINE HYDROCHLORIDE 100 MG: 25 TABLET, FILM COATED ORAL at 20:51

## 2024-02-27 RX ADMIN — HYDRALAZINE HYDROCHLORIDE 100 MG: 25 TABLET, FILM COATED ORAL at 09:54

## 2024-02-27 RX ADMIN — HYDRALAZINE HYDROCHLORIDE 100 MG: 25 TABLET, FILM COATED ORAL at 17:23

## 2024-02-27 RX ADMIN — CARVEDILOL 25 MG: 25 TABLET, FILM COATED ORAL at 09:54

## 2024-02-27 RX ADMIN — CLOPIDOGREL BISULFATE 75 MG: 75 TABLET ORAL at 09:54

## 2024-02-27 RX ADMIN — Medication 1 TABLET: at 09:54

## 2024-02-27 RX ADMIN — DOXAZOSIN 2 MG: 1 TABLET ORAL at 12:30

## 2024-02-27 RX ADMIN — Medication: at 21:03

## 2024-02-27 RX ADMIN — CALCIUM CARBONATE (ANTACID) CHEW TAB 500 MG 500 MG: 500 CHEW TAB at 17:23

## 2024-02-27 ASSESSMENT — ACTIVITIES OF DAILY LIVING (ADL)
ADLS_ACUITY_SCORE: 34

## 2024-02-27 NOTE — PROGRESS NOTES
RENAL PROGRESS NOTE    ASSESSMENT & PLAN:   Acute kidney injury, chronic kidney disease stage 3 - Currently hemodialysis dependent; dialyzing via tunneled CVC. FELIPA due to hemodynamic ATN in the setting of septic physiology, NSAIDs, CT contrast, ACEi and elevated vancomycin. Consideration for AIN secondary to PPI or cefepime on differential. May be showing some downtrend in creatinine. Okay to discharge on dialysis and will follow for signs of recovery as outpatient.      Hyponatremia -  Improving with volume optimization resolving acute kidney injury.       Hypertension - Prior to admission on amlodipine, metoprolol, hydralazine, bumetanide. Metoprolol changed to carvedilol for better blood pressure lowering effect. Started on doxazosin over weekend. ARB on hold in setting of FELIPA. UF as needed with HD. Discussed with Dr. Leslie, will go up on doxazosin and follow response    Anemia - Inflammatory as well as from FELIPA hemoglobin in 9s. Started epogen 10,000 units weekly on 2/19/24; will receive per protocols at outpatient unit     Diabetes mellitus type 2 - Management per primary service     Severe sepsis - Resolved     History of CVA:      Thrombocytopenia - HIT labs negative.  Platelets improving.  Management per primary team    Disposition - No objection to discharge from renal perspective when TCU located. It sounds as though likely discharge location will be [facility] (has HD capabilities on site)       SUBJECTIVE:  Feeling well, no new complaints. Tolerated dialysis yesterday without issue. Excited that creatinine down today; discussed that it is expected to see creatinine lowering with dialysis thus most useful values are those drawn after weekend when she has been without dialysis x2 days    OBJECTIVE:  Physical Exam   Temp: 98.2  F (36.8  C) Temp src: Oral BP: (!) 195/87 (RN notified) Pulse: 61   Resp: 18 SpO2: 95 % O2 Device: None (Room air)    Vitals:    02/25/24 0527 02/26/24 0605  02/27/24 0429   Weight: 104.7 kg (230 lb 14.4 oz) 104.6 kg (230 lb 11.2 oz) 104 kg (229 lb 4.8 oz)     Vital Signs with Ranges  Temp:  [98.2  F (36.8  C)-98.8  F (37.1  C)] 98.2  F (36.8  C)  Pulse:  [55-75] 61  Resp:  [16-18] 18  BP: ()/(50-98) 195/87  SpO2:  [90 %-95 %] 95 %  I/O last 3 completed shifts:  In: 960 [P.O.:960]  Out: 1500 [Other:1500]        Patient Vitals for the past 72 hrs:   Weight   02/27/24 0429 104 kg (229 lb 4.8 oz)   02/26/24 0605 104.6 kg (230 lb 11.2 oz)   02/25/24 0527 104.7 kg (230 lb 14.4 oz)     Intake/Output Summary (Last 24 hours) at 2/26/2024 1126  Last data filed at 2/26/2024 0917  Gross per 24 hour   Intake 840 ml   Output --   Net 840 ml       PHYSICAL EXAM:  General - Alert and pleasant  Cardiovascular - Normal S1S2, no rub  Respiratory - Clear to auscultation bilaterally, no crackles or wheezes  Abdomen - Soft, non-tender, bowel sounds present.    Extremities - 1-2+ pitting lower extremity edema bilaterally  Integumentary - Warm, dry, no rash  Neurologic - Grossly intact, no focal deficits      LABORATORY STUDIES:     Recent Labs   Lab 02/27/24  0603 02/24/24  0555 02/23/24  0744 02/22/24  1704 02/22/24  0755 02/21/24  0652   WBC  --   --  4.9 4.4 4.1 5.1   RBC  --   --  3.34* 3.22* 3.17* 3.25*   HGB  --   --  9.7* 9.3* 9.2* 9.4*   HCT  --   --  30.1* 28.6* 28.6* 28.6*   PLT 85* 94* 115* 89* 95* 100*       Basic Metabolic Panel:  Recent Labs   Lab 02/27/24  0830 02/27/24  0603 02/27/24  0202 02/26/24  2104 02/26/24  1729 02/26/24  1511 02/26/24  0810 02/26/24  0638 02/25/24  0731 02/25/24  0612 02/24/24  0656 02/24/24  0555 02/23/24  0746 02/23/24  0744 02/22/24  0808 02/22/24  0755 02/21/24  0826 02/21/24  0652   NA  --  135  --   --   --   --   --  134*  --  133*  --   --   --  135  --  136  --  135   POTASSIUM  --  3.6  --   --   --   --   --  3.8  --  3.9  --  3.9  --  4.1  --  4.3  --  4.2   CHLORIDE  --  102  --   --   --   --   --  100  --  100  --   --   --  99  --   101  --  99   CO2  --  24  --   --   --   --   --  25  --  24  --   --   --  26  --  26  --  23   BUN  --  15.5  --   --   --   --   --  26.0*  --  22.3  --   --   --  28.3*  --  20.8  --  35.9*   CR  --  1.59*  --   --   --   --   --  2.28*  --  2.19*  --   --   --  2.76*  --  2.48*  --  3.40*   * 124* 120* 167* 194* 183*   < > 123*   < > 127*   < >  --    < > 130*   < > 123*   < > 135*   ALBARO  --  8.7*  --   --   --   --   --  8.7*  --  8.7*  --   --   --  9.5  --  9.0  --  9.1    < > = values in this interval not displayed.       Recent Labs   Lab Test 02/27/24  0603 02/24/24  0555 02/23/24  0744 02/22/24  1704 02/11/24  0754 02/10/24  0623 02/05/24  0426 02/04/24  1854   INR  --   --   --   --   --  1.25*  --  1.23*   WBC  --   --  4.9 4.4   < > 6.6   < > 5.9   HGB  --   --  9.7* 9.3*   < > 9.8*   < > 10.4*   PLT 85* 94* 115* 89*   < > 209   < > 119*    < > = values in this interval not displayed.         Personally reviewed current labs      Linda Lieberman PA-C  [facility]  [phone]

## 2024-02-27 NOTE — PROGRESS NOTES
Care Management Follow Up    Length of Stay (days): 23    Expected Discharge Date: 02/28/2024     Concerns to be Addressed: discharge planning     Patient plan of care discussed at interdisciplinary rounds: Yes    Anticipated Discharge Disposition:  (TBD)  Disposition Comments: TBD  Anticipated Discharge Services: Transportation Services  Anticipated Discharge DME: None    Patient/family educated on Medicare website which has current facility and service quality ratings:  yes  Education Provided on the Discharge Plan: Yes (AVS per bedside RN)  Patient/Family in Agreement with the Plan: yes    Referrals Placed by CM/SW: yes  Private pay costs discussed: private room/amenity fees and transportation costs    Additional Information:  4:03 PM  Pt will go to facility tomorrow afternoon to [facility]. CM to verify time tomorrow after dialysis. [facility]NA[facility] to set up transportation.     COLT Estrada

## 2024-02-27 NOTE — PROGRESS NOTES
[facility]    Progress Note - Hospitalist Service       Date of Admission:  2/2/2024    Assessment & Plan   Josefina Dumont is a 73 year old female admitted on 2/2/2024. She has a history of CHF with recent hospitalization 12/28/23-12/31/23, recent community acquired pneumonia, history of ischemic CVA, history of meningioma, history of CAD, type 2 diabetes mellitus and was admitted for shortness of breath and found to be in severe sepsis. Hospital course has been complicated by extensive infectious workup, courses of anti-infectives, delirium workup. At this time, patient is stable but remains hospitalized for worsening kidney function/uremia, thought to be multifactorial in nature; AIN secondary to PPI or cefepime in the differential per Nephrology. Patient was initiated on HD on 2/14/24. Planning for TCU on discharge once outpatient HD plan is finalized. Mental status has improved to baseline. She is medically ready for discharge.    Discharge pending establishment of transportation for HD.    Severe sepsis due to unknown source; resolved   Acute hypoxic respiratory failure, resolved  Acute fever of unknown origin, resolved  Concern for CHF exacerbation, resolved  Delirium, resolved  Patient initially presented with worsening SOB, SHAH, orthopnea in the setting of elevated BNP and missed dose of Lasix; CHF exacerbation was initially high on the differential. She was febrile w/Tmax of 101.3. CT chest on presentation showed no acute findings, mild pulmonary interstitial edema, small pericardial effusion. CRP elevated to 28.10 and procalcitonin of 5.80 on 2/4/24; CRP elevated to 54.20 and procalcitonin elevated to 11.70 on 2/5/24. See 2/4/24-2/5/24 overnight notes for significant events including aphasia, inattention, fever to 102.5F despite broad-spectrum antibiotics prompting additional aggressive treatment measures and workup. LP and TTE unremarkable. Patient's condition had been  improving 2/6/24 but then had onset of delirium overnight which waxed and waned for about a week. Possible hospital delirium in the setting of sleep deprivation, disorientation contributing but additional workup performed. CT head, C/A/P was repeated 2/8/24; results showed cirrhosis but were otherwise unremarkable. Metabolic encephalopathy was on the differential given cirrhosis though LFTs have been wnl and patient is stooling daily. EEG per Neurology was performed 2/9/24 read as unremarkable for seizure activity. Neurology has signed off. ID also signed off after completion of antibiotics. At this time, mental status has greatly improved and is at baseline.  -Delirium precautions  -Cardiac/low-sodium diet  -Supplemental oxygen as needed    FELIPA  S/p HD (2/14/24, 2/15/24, 2/16/24, 2/19/24, 2/21/24, 2/23/24, 2/26/24)  Baseline creatinine around 0.8-0.9, had stabilized ~2.3 initially but then trended upward with the highest being 5.78 on 2/14/24. Several medications may have been contributing to FELIPA; AIN secondary to PPI or cefepime in the differential per Nephrology. Nephrology has been following, appreciate their cares and recommendations. Kidney function has shown good response to HD with labs today remarkable for GFR of 34 and creatinine of 1.59.  -Nephrology following  -Planning for HD MWF while hospitalized, may need to switch to TTS on discharge    Hypokalemia, resolved  -Standard replacement protocol      Mild normocytic anemia  Hemoglobin 11.2 on admission. Has since been stable at ~10. Drop consistent with hemodilution.     Hypertension  -Per Nephrology (2/26/24): Prior to admission on amlodipine, metoprolol, hydralazine, bumetanide. Metoprolol changed to carvedilol for better blood pressure lowering effect. ARB on hold in setting of FELIPA. UF as needed with HD. Started on doxazosin over weekend and following    -Discussed with Nephrology today. Their evaluation and note is pending, but planning to increase  doxazosin. Ideally will continue to avoid restarting losartan as kidney function continues to recover.    Prolonged QT  QTc of 472.  -Avoid QT prolonging medications  -Note that Celexa comes with risk for QT prolongation but benefit of anxiety treatment outweighing risk, will continue to monitor    DM2   Most recent A1c 6.6 1 month ago.  -Holding PTA glipizide; blood sugars have been well-controlled through hospitalization so will likely hold on discharge as well  -Low sliding scale insulin     Anxiety/depression  -Continue PTA Celexa   -Patient was given hydroxyzine 2/15/24 PM for anxiety/insomnia; she felt like it may have caused some of her grogginess  -Bedside RN noticed air mattress of bed wasn't being used, so she left a note on the whiteboard to remember to turn that on at night for comfort  -PRN doxepin 3 mg at bedtime for sleep    Bilateral lower extremity edema  BLE edema. Equal bilaterally, no skin changes, no specific tenderness to palpation but overall uncomfortable due to sensation of fullness per patient. This is improving today.  -Compression  -No Voltaren per Nephrology recommendations  -Anticipate edema will improve with continued dialysis    Thrombocytopenia  Began having decreased platelet count on 2/18/24. Has been low but stable with platelet count [id number] today. HIT screening lab was checked on 2/22/24 and results were negative. Suspect thrombocytopenia is in the setting of heparin usage.  -Holding prophylactic heparin  -Continue to monitor    Chronic:  History of ischemic CVA: Continue PTA Plavix  GERD: Continue PTA Tums, continue PTA Protonix, added on Carafate   Constipation: Continue PTA senna docusate  Asthma: Continue PTA albuterol as needed        Diet: Regular Diet Adult    DVT Prophylaxis: Heparin; holding given thrombocytopenia  Machuca Catheter: Not present  Fluids: po  Lines: PRESENT      CVC Double Lumen Right Internal jugular Tunneled-Site Assessment: WDL    Cardiac Monitoring:  None  Code Status: Full Code         Disposition Plan      Expected Discharge Date: 02/28/2024    Discharge Delays: Dialysis - arrange outpatient  Destination: home  Discharge Comments: OP HD setup, accepted at [facility]       Per weekend care coordination team, one possibility may be [facility]. It is a TCU with HD capabilities there. Tentatively planning for mid-week discharge to this facility per this weeks' care coordination staff. Greatly appreciate their assistance with this,       The patient's care was discussed with the Attending Physician, Dr. Serrano .    OSKAR BUTLER MD   Hospitalist Service  [facility]  Securely message with [employer] (more info)  Text page via [facility] Paging/Directory   ______________________________________________________________________    Interval History   No acute events overnight. Overall feels well and hopeful mid-week discharge means that she will get to discharge tomorrow. Notes that her mood has been more depressed which she understandably attributes to prolonged hospitalization. She is working on more exercises including walking to the nurses station today. GERD continues to be challenging to control, particularly with GERD medications needing to be renally dosed in the setting of her decreased kidney function. No new questions or concerns. Very happy to hear about the continued improvement in her kidney function.    Physical Exam   Vital Signs: Temp: 98.2  F (36.8  C) Temp src: Oral BP: (!) 195/87 (RN notified) Pulse: 61   Resp: 18 SpO2: 95 % O2 Device: None (Room air)    Weight: 229 lbs 4.8 oz    GENERAL: Alert and no acute distress. Sitting up in recliner.   HEENT: Normocephalic, atraumatic, no rhinorrhea, moist mucus membranes.  RESP: Anterior lung fields with good airflow, clear to auscultation, no coarse lung sounds. Nonlabored breathing on room air.   CV: Regular rate and rhythm, systolic murmur.  MS: No gross  musculoskeletal defects noted. Mild edema of the BLE, nontender to palpation. Mild edema of upper extremities/hands.  NEURO: Alert, responding to questions appropriately, good recall of recent events.  PSYCH: Cheerful mood and affect.       Data     I have personally reviewed the following data over the past 24 hrs:    N/A  \   N/A   / 85 (L)     135 102 15.5 /  129 (H)   3.6 24 1.59 (H) \       Imaging results reviewed over the past 24 hrs:   No results found for this or any previous visit (from the past 24 hour(s)).

## 2024-02-27 NOTE — PLAN OF CARE
Problem: Sepsis/Septic Shock  Goal: Blood Glucose Level Within Targeted Range  Outcome: Progressing     Problem: Pain Acute  Goal: Optimal Pain Control and Function  Outcome: Progressing     Patient is alert and orientated times four. She denies pain. IV saline locked. CVC tunnel catheter in place to right chest and dressing is CDI. Patient has been up in her chair all shift. She states she does not like sleeping in the bed. Encouraged to keep legs elevates as she has +3 edema in BLE. Blood glucose tonight was 120. Chair arm is in place for patient's safety.

## 2024-02-27 NOTE — PLAN OF CARE
"  Problem: Adult Inpatient Plan of Care  Goal: Plan of Care Review  Description: The Plan of Care Review/Shift note should be completed every shift.  The Outcome Evaluation is a brief statement about your assessment that the patient is improving, declining, or no change.  This information will be displayed automatically on your shift  note.  Outcome: Progressing     Problem: Adult Inpatient Plan of Care  Goal: Patient-Specific Goal (Individualized)  Description: You can add care plan individualizations to a care plan. Examples of Individualization might be:  \"Parent requests to be called daily at 9am for status\", \"I have a hard time hearing out of my right ear\", or \"Do not touch me to wake me up as it startles  me\".  Outcome: Progressing   Goal Outcome Evaluation:       VSS on RA. Pt in chair, dialysis finished done, no issues. Discharge pending                   "

## 2024-02-27 NOTE — PROGRESS NOTES
CLINICAL NUTRITION SERVICES - BRIEF NOTE     Nutrition Prescription    RECOMMENDATIONS FOR MDs/PROVIDERS TO ORDER:    Malnutrition Status:    Patient does not meet two of the criteria necessary for diagnosing malnutrition    Recommendations already ordered by Registered Dietitian (RD):  ONS- Magic cup daily, Cherry Gelatein plus daily     Future/Additional Recommendations:  Will monitor po intake, supplement ryley., wt, wound healing, labs.       EVALUATION OF THE PROGRESS TOWARD GOALS   Diet: Regular  Intake: Pt with variable po intakes, consuming 0-100% of meals.        NEW FINDINGS   Met with pt at bedside this afternoon. Pt reports decreased appetite and oral intakes this admit, states she has not been eating as well. Pt states she is worried about stomach pain if facility food does not agree with her. Reviewed facility menu with pt and discussed bland foods, asking for sauces on the side/plain. Pt with x4 unopened Nepro nutrition supplements at bedside- reports dislike for Razo flavor. Pt interested in Gelatein plus and Magic cup.     INTERVENTIONS  Implementation  ONS- Magic cup daily, Cherry Gelatein plus daily   Education- reviewed facility menu, sources of protein, nutrition supplements.      Monitoring/Evaluation  Progress toward goals will be monitored and evaluated per protocol.

## 2024-02-28 VITALS
DIASTOLIC BLOOD PRESSURE: 67 MMHG | WEIGHT: 231 LBS | OXYGEN SATURATION: 96 % | TEMPERATURE: 97.4 F | BODY MASS INDEX: 39.65 KG/M2 | SYSTOLIC BLOOD PRESSURE: 146 MMHG | HEART RATE: 61 BPM | RESPIRATION RATE: 21 BRPM

## 2024-02-28 LAB
ANION GAP SERPL CALCULATED.3IONS-SCNC: 11 MMOL/L (ref 7–15)
BUN SERPL-MCNC: 21.7 MG/DL (ref 8–23)
CALCIUM SERPL-MCNC: 8.6 MG/DL (ref 8.8–10.2)
CHLORIDE SERPL-SCNC: 100 MMOL/L (ref 98–107)
CREAT SERPL-MCNC: 1.88 MG/DL (ref 0.51–0.95)
DEPRECATED HCO3 PLAS-SCNC: 23 MMOL/L (ref 22–29)
EGFRCR SERPLBLD CKD-EPI 2021: 28 ML/MIN/1.73M2
ERYTHROCYTE [DISTWIDTH] IN BLOOD BY AUTOMATED COUNT: 17.4 % (ref 10–15)
GLUCOSE BLDC GLUCOMTR-MCNC: 122 MG/DL (ref 70–99)
GLUCOSE BLDC GLUCOMTR-MCNC: 128 MG/DL (ref 70–99)
GLUCOSE BLDC GLUCOMTR-MCNC: 152 MG/DL (ref 70–99)
GLUCOSE SERPL-MCNC: 128 MG/DL (ref 70–99)
HCT VFR BLD AUTO: 29.8 % (ref 35–47)
HGB BLD-MCNC: 9.5 G/DL (ref 11.7–15.7)
MCH RBC QN AUTO: 28.5 PG (ref 26.5–33)
MCHC RBC AUTO-ENTMCNC: 31.9 G/DL (ref 31.5–36.5)
MCV RBC AUTO: 90 FL (ref 78–100)
PLATELET # BLD AUTO: 91 10E3/UL (ref 150–450)
POTASSIUM SERPL-SCNC: 3.7 MMOL/L (ref 3.4–5.3)
RBC # BLD AUTO: 3.33 10E6/UL (ref 3.8–5.2)
SODIUM SERPL-SCNC: 134 MMOL/L (ref 135–145)
WBC # BLD AUTO: 2.4 10E3/UL (ref 4–11)

## 2024-02-28 PROCEDURE — 85014 HEMATOCRIT: CPT

## 2024-02-28 PROCEDURE — 250N000013 HC RX MED GY IP 250 OP 250 PS 637

## 2024-02-28 PROCEDURE — 250N000013 HC RX MED GY IP 250 OP 250 PS 637: Performed by: FAMILY MEDICINE

## 2024-02-28 PROCEDURE — 250N000013 HC RX MED GY IP 250 OP 250 PS 637: Performed by: INTERNAL MEDICINE

## 2024-02-28 PROCEDURE — 90935 HEMODIALYSIS ONE EVALUATION: CPT

## 2024-02-28 PROCEDURE — 99238 HOSP IP/OBS DSCHRG MGMT 30/<: CPT | Mod: GC

## 2024-02-28 PROCEDURE — 90935 HEMODIALYSIS ONE EVALUATION: CPT | Performed by: PHYSICIAN ASSISTANT

## 2024-02-28 PROCEDURE — 80048 BASIC METABOLIC PNL TOTAL CA: CPT

## 2024-02-28 PROCEDURE — 250N000013 HC RX MED GY IP 250 OP 250 PS 637: Performed by: PHYSICIAN ASSISTANT

## 2024-02-28 PROCEDURE — 36415 COLL VENOUS BLD VENIPUNCTURE: CPT

## 2024-02-28 RX ORDER — CLONIDINE HYDROCHLORIDE 0.1 MG/1
0.1 TABLET ORAL
Qty: 30 TABLET | Refills: 0 | Status: SHIPPED | OUTPATIENT
Start: 2024-02-28 | End: 2024-04-15

## 2024-02-28 RX ORDER — BUMETANIDE 2 MG/1
2 TABLET ORAL DAILY
Qty: 30 TABLET | Refills: 0 | Status: SHIPPED | OUTPATIENT
Start: 2024-02-29 | End: 2024-04-04

## 2024-02-28 RX ORDER — HYDRALAZINE HYDROCHLORIDE 100 MG/1
100 TABLET, FILM COATED ORAL 3 TIMES DAILY
Qty: 90 TABLET | Refills: 0 | Status: SHIPPED | OUTPATIENT
Start: 2024-02-28 | End: 2024-04-15

## 2024-02-28 RX ORDER — DOXAZOSIN 4 MG/1
4 TABLET ORAL DAILY
Qty: 30 TABLET | Refills: 0 | Status: SHIPPED | OUTPATIENT
Start: 2024-02-29 | End: 2024-02-28

## 2024-02-28 RX ORDER — CARVEDILOL 25 MG/1
25 TABLET ORAL 2 TIMES DAILY WITH MEALS
Qty: 60 TABLET | Refills: 0 | Status: SHIPPED | OUTPATIENT
Start: 2024-02-28 | End: 2024-04-04

## 2024-02-28 RX ORDER — DOXAZOSIN 4 MG/1
4 TABLET ORAL DAILY
Qty: 30 TABLET | Refills: 0 | Status: SHIPPED | OUTPATIENT
Start: 2024-02-29 | End: 2024-04-15

## 2024-02-28 RX ORDER — CLONIDINE HYDROCHLORIDE 0.1 MG/1
0.1 TABLET ORAL
Qty: 30 TABLET | Refills: 0 | Status: SHIPPED | OUTPATIENT
Start: 2024-02-28 | End: 2024-02-28

## 2024-02-28 RX ORDER — BUMETANIDE 2 MG/1
2 TABLET ORAL DAILY
Qty: 30 TABLET | Refills: 0 | Status: SHIPPED | OUTPATIENT
Start: 2024-02-29 | End: 2024-02-28

## 2024-02-28 RX ORDER — HYDRALAZINE HYDROCHLORIDE 100 MG/1
100 TABLET, FILM COATED ORAL 3 TIMES DAILY
Qty: 90 TABLET | Refills: 0 | Status: SHIPPED | OUTPATIENT
Start: 2024-02-28 | End: 2024-02-28

## 2024-02-28 RX ORDER — CARVEDILOL 25 MG/1
25 TABLET ORAL 2 TIMES DAILY WITH MEALS
Qty: 60 TABLET | Refills: 0 | Status: SHIPPED | OUTPATIENT
Start: 2024-02-28 | End: 2024-02-28

## 2024-02-28 RX ADMIN — INSULIN ASPART 1 UNITS: 100 INJECTION, SOLUTION INTRAVENOUS; SUBCUTANEOUS at 12:10

## 2024-02-28 RX ADMIN — CARVEDILOL 25 MG: 25 TABLET, FILM COATED ORAL at 11:40

## 2024-02-28 RX ADMIN — CITALOPRAM 20 MG: 20 TABLET ORAL at 11:34

## 2024-02-28 RX ADMIN — FAMOTIDINE 10 MG: 10 TABLET ORAL at 12:13

## 2024-02-28 RX ADMIN — CLOPIDOGREL BISULFATE 75 MG: 75 TABLET ORAL at 12:13

## 2024-02-28 RX ADMIN — CLONIDINE HYDROCHLORIDE 0.1 MG: 0.1 TABLET ORAL at 02:39

## 2024-02-28 RX ADMIN — AMLODIPINE BESYLATE 10 MG: 10 TABLET ORAL at 11:40

## 2024-02-28 RX ADMIN — CALCIUM CARBONATE (ANTACID) CHEW TAB 500 MG 500 MG: 500 CHEW TAB at 10:32

## 2024-02-28 RX ADMIN — BUMETANIDE 2 MG: 2 TABLET ORAL at 11:40

## 2024-02-28 RX ADMIN — SUCRALFATE 1 G: 1 TABLET ORAL at 10:32

## 2024-02-28 RX ADMIN — SUCRALFATE 1 G: 1 TABLET ORAL at 06:19

## 2024-02-28 RX ADMIN — Medication 1 TABLET: at 12:13

## 2024-02-28 ASSESSMENT — ACTIVITIES OF DAILY LIVING (ADL)
ADLS_ACUITY_SCORE: 32
ADLS_ACUITY_SCORE: 34
ADLS_ACUITY_SCORE: 32
ADLS_ACUITY_SCORE: 34

## 2024-02-28 NOTE — PROGRESS NOTES
Occupational Therapy Discharge Summary    Reason for therapy discharge:    Discharged to transitional care facility.    Progress towards therapy goal(s). See goals on Care Plan in [facility] electronic health record for goal details.  Goals partially met.  Barriers to achieving goals:   discharge from facility.    Therapy recommendation(s):    Continued therapy is recommended.  Rationale/Recommendations:  To increase indep with ADLs and trsfs.  Monitor O2 sats with increased activity tolerance.

## 2024-02-28 NOTE — PROGRESS NOTES
RENAL PROGRESS NOTE    ASSESSMENT & PLAN:   Acute kidney injury, chronic kidney disease stage 3 - Currently hemodialysis dependent; dialyzing via tunneled CVC. FELIPA due to hemodynamic ATN in the setting of septic physiology, NSAIDs, CT contrast, ACEi and elevated vancomycin. Consideration for AIN secondary to PPI or cefepime on differential. Showing downtrend in creatinine and suspect she will be able to come off dialysis in next week. Okay to discharge on dialysis and will follow for signs of recovery as outpatient     Hyponatremia -  Improving with volume optimization resolving acute kidney injury.       Hypertension - Prior to admission on amlodipine, metoprolol, hydralazine, bumetanide. Metoprolol changed to carvedilol for better blood pressure lowering effect. Started on doxazosin over weekend. ARB on hold in setting of FELIPA. UF as needed with HD. Doxazosin increased this week. ?some contribution from anxiety + fluid excess    Anemia - Inflammatory as well as from FELIPA hemoglobin in 9s. Started epogen 10,000 units weekly on 2/19/24; will receive per protocols at outpatient unit     Diabetes mellitus type 2 - Management per primary service     Severe sepsis - Resolved     History of CVA:      Thrombocytopenia - HIT labs negative.  Platelets improving.  Management per primary team    Disposition - No objection to discharge from renal perspective when TCU located. It sounds as though likely discharge location will be [facility] (has HD capabilities on site)       SUBJECTIVE:  Seen on dialysis. Having anxiety. No other complaints. No shortness of breath. No nausea or vomiting. Appetite fine.     OBJECTIVE:  Physical Exam   Temp: 98.2  F (36.8  C) Temp src: Oral BP: 126/59 Pulse: 63   Resp: 22 SpO2: 95 % O2 Device: None (Room air)    Vitals:    02/26/24 0605 02/27/24 0429 02/28/24 0236   Weight: 104.6 kg (230 lb 11.2 oz) 104 kg (229 lb 4.8 oz) 104.8 kg (231 lb)     Vital Signs with Ranges  Temp:  [98.1   F (36.7  C)-98.4  F (36.9  C)] 98.2  F (36.8  C)  Pulse:  [61-65] 63  Resp:  [18-22] 22  BP: (126-192)/(59-79) 126/59  SpO2:  [93 %-95 %] 95 %  I/O last 3 completed shifts:  In: 480 [P.O.:480]  Out: -         Patient Vitals for the past 72 hrs:   Weight   02/28/24 0236 104.8 kg (231 lb)   02/27/24 0429 104 kg (229 lb 4.8 oz)   02/26/24 0605 104.6 kg (230 lb 11.2 oz)     Intake/Output Summary (Last 24 hours) at 2/26/2024 1126  Last data filed at 2/26/2024 0917  Gross per 24 hour   Intake 840 ml   Output --   Net 840 ml       PHYSICAL EXAM:  General - Alert and pleasant, appears comfortable  Cardiovascular - Normal S1S2, no rub  Respiratory - Clear to auscultation bilaterally, no crackles or wheezes  Abdomen - Soft, non-tender, bowel sounds present.    Extremities - 1-2+ pitting lower extremity edema bilaterally  Integumentary - Warm, dry, no rash  Neurologic - Grossly intact, no focal deficits      LABORATORY STUDIES:     Recent Labs   Lab 02/28/24  0610 02/27/24  0603 02/24/24  0555 02/23/24  0744 02/22/24  1704 02/22/24  0755   WBC 2.4*  --   --  4.9 4.4 4.1   RBC 3.33*  --   --  3.34* 3.22* 3.17*   HGB 9.5*  --   --  9.7* 9.3* 9.2*   HCT 29.8*  --   --  30.1* 28.6* 28.6*   PLT 91* 85* 94* 115* 89* 95*       Basic Metabolic Panel:  Recent Labs   Lab 02/28/24  0758 02/28/24  0610 02/28/24  0223 02/27/24  2153 02/27/24  1748 02/27/24  1207 02/27/24  0830 02/27/24  0603 02/26/24  0810 02/26/24  0638 02/25/24  0731 02/25/24  0612 02/24/24  0656 02/24/24  0555 02/23/24  0746 02/23/24  0744 02/22/24  0808 02/22/24  0755   NA  --  134*  --   --   --   --   --  135  --  134*  --  133*  --   --   --  135  --  136   POTASSIUM  --  3.7  --   --   --   --   --  3.6  --  3.8  --  3.9  --  3.9  --  4.1  --  4.3   CHLORIDE  --  100  --   --   --   --   --  102  --  100  --  100  --   --   --  99  --  101   CO2  --  23  --   --   --   --   --  24  --  25  --  24  --   --   --  26  --  26   BUN  --  21.7  --   --   --   --   --   15.5  --  26.0*  --  22.3  --   --   --  28.3*  --  20.8   CR  --  1.88*  --   --   --   --   --  1.59*  --  2.28*  --  2.19*  --   --   --  2.76*  --  2.48*   * 128* 128* 140* 139* 184*   < > 124*   < > 123*   < > 127*   < >  --    < > 130*   < > 123*   ALBARO  --  8.6*  --   --   --   --   --  8.7*  --  8.7*  --  8.7*  --   --   --  9.5  --  9.0    < > = values in this interval not displayed.       Recent Labs   Lab Test 02/28/24  0610 02/27/24  0603 02/24/24  0555 02/23/24  0744 02/11/24  0754 02/10/24  0623 02/05/24  0426 02/04/24  1854   INR  --   --   --   --   --  1.25*  --  1.23*   WBC 2.4*  --   --  4.9   < > 6.6   < > 5.9   HGB 9.5*  --   --  9.7*   < > 9.8*   < > 10.4*   PLT 91* 85*   < > 115*   < > 209   < > 119*    < > = values in this interval not displayed.         Personally reviewed current labs      Linda Lieberman PA-C  [facility]  [phone]

## 2024-02-28 NOTE — PLAN OF CARE
Goal Outcome Evaluation:    Patient cleared to discharge. Called MD to change pharmacy to [facility] in [place], per TCU. IV removed. Call light within reach.

## 2024-02-28 NOTE — PROGRESS NOTES
Date: 2/28/2024  Time: 1100     Data:  Pre Wt:   104.8 kg  Desired Wt:   To be established  Post Wt:  103.8 kg (estimated)  Weight change: - 1 kg  Ultrafiltration - Post Run Net Total Removed (mL):  1000 ml  Vascular Access Status: CVC patent  Dialyzer Rinse:  Light  Total Blood Volume Processed: 65.8 L   Total Dialysis (Treatment) Time:   3 Hrs  Dialysate Bath: K 3, Ca 2.25  Heparin: Heparin: None     Lab:   No  HbsAg - Non-Reactive (2/15/24)  HbsAb - Susceptible (2/15/24)    Interventions:  Dialysis done through right CVC  UF set to 1 Liters of fluid removal, accommodating priming and rinse back volumes  ,   See Flowsheet for Crit Profile throughout the run  Treatment has ended safely and  blood is rinsed back completely  Catheter lumens flushed with saline and locked with saline, catheter caps (ClearGuard ) changed post HD  Report given to PCN in stable condition     Assessment:  A/O x 4, calm & cooperative, denies pain  CVC intact, previous dressing clean and dry                Plan:    Per Renal team    Cedric Alvarez, REBECCAN, RN  Acute Dialysis RN  [facility]

## 2024-02-28 NOTE — PLAN OF CARE
Goal Outcome Evaluation: progressing with PT/OT    Problem: Pain Acute  Goal: Optimal Pain Control and Function  Intervention: Prevent or Manage Pain  Recent Flowsheet Documentation  Taken 2/27/2024 1700 by Lorena Steven RN  Medication Review/Management: medications reviewed  Taken 2/27/2024 0900 by Lorena Steven RN  Medication Review/Management: medications reviewed   Pt stated minimal pain today besides a stomach ache this evening which pt received tums for.   Problem: Risk for Delirium  Goal: Improved Behavioral Control  Intervention: Minimize Safety Risk  Recent Flowsheet Documentation  Taken 2/27/2024 1700 by Lorena Steven RN  Enhanced Safety Measures: room near unit station  Taken 2/27/2024 0900 by Lorena Steven RN  Enhanced Safety Measures: room near unit station   Pt remains alert and oriented. Was able to work with PT and OT more then in previous days which, pt was excited about.

## 2024-02-28 NOTE — PLAN OF CARE
Problem: Risk for Delirium  Goal: Improved Behavioral Control  Intervention: Minimize Safety Risk  Recent Flowsheet Documentation  Taken 2/28/2024 0030 by Dana Richards, RN  Enhanced Safety Measures: room near unit station  Taken 2/27/2024 2103 by Dana Richards, RN  Enhanced Safety Measures: room near unit station     Problem: Risk for Delirium  Goal: Improved Attention and Thought Clarity  Outcome: Progressing   Pt oriented x4. Endorsing mild pain in the BLEs. SBP elevated overnight, PRN clonidine provided. Ambulating with assist of one. Voiding spontaneously. Dialysis tomorrow and then discharge.

## 2024-02-28 NOTE — PROGRESS NOTES
Care Management Follow Up    Length of Stay (days): 24    Expected Discharge Date: 02/28/2024     Concerns to be Addressed: discharge planning     Patient plan of care discussed at interdisciplinary rounds: Yes    Anticipated Discharge Disposition: Transitional Care  Anticipated Discharge Services: None  Anticipated Discharge DME: None    Patient/family educated on Medicare website which has current facility and service quality ratings: no  Education Provided on the Discharge Plan: Yes  Patient/Family in Agreement with the Plan: yes    Referrals Placed by CM/SW:  yes  Private pay costs discussed: private room/amenity fees and transportation costs    Additional Information:  7:57 AM  CMSW set up [facility] w/c transport for today 2/28 between 2:27-3:32pm. Cm to update [facility] of time.  8:31 AM  CMSW called [facility] and updated them on transportation time. CMSW updated sister blayne as well. CM to follow any changes upon discharge. PAS completed.     REBECCA EstradaW

## 2024-03-04 LAB — BACTERIA CSF CULT: NO GROWTH

## 2024-04-04 ENCOUNTER — OFFICE VISIT (OUTPATIENT)
Dept: PHARMACY | Facility: PHYSICIAN GROUP | Age: 74
End: 2024-04-04
Payer: COMMERCIAL

## 2024-04-04 VITALS — HEART RATE: 66 BPM | OXYGEN SATURATION: 97 % | SYSTOLIC BLOOD PRESSURE: 140 MMHG | DIASTOLIC BLOOD PRESSURE: 72 MMHG

## 2024-04-04 DIAGNOSIS — E11.10 TYPE 2 DIABETES MELLITUS WITH KETOACIDOSIS WITHOUT COMA, WITHOUT LONG-TERM CURRENT USE OF INSULIN (H): Primary | ICD-10-CM

## 2024-04-04 DIAGNOSIS — K21.00 GASTROESOPHAGEAL REFLUX DISEASE WITH ESOPHAGITIS, UNSPECIFIED WHETHER HEMORRHAGE: ICD-10-CM

## 2024-04-04 DIAGNOSIS — I16.1 HYPERTENSIVE EMERGENCY: ICD-10-CM

## 2024-04-04 DIAGNOSIS — K59.00 CONSTIPATION, UNSPECIFIED CONSTIPATION TYPE: ICD-10-CM

## 2024-04-04 PROCEDURE — 99207 PR NO CHARGE LOS: CPT | Performed by: PHARMACIST

## 2024-04-04 RX ORDER — FUROSEMIDE 40 MG
40 TABLET ORAL DAILY
Status: ON HOLD | COMMUNITY
Start: 2024-04-04 | End: 2024-06-20

## 2024-04-04 RX ORDER — METOPROLOL TARTRATE 75 MG/1
75 TABLET, FILM COATED ORAL 2 TIMES DAILY
COMMUNITY
End: 2024-06-16

## 2024-04-04 RX ORDER — SPIRONOLACTONE 25 MG/1
25 TABLET ORAL EVERY MORNING
Status: ON HOLD | COMMUNITY
End: 2024-06-20

## 2024-04-04 NOTE — PROGRESS NOTES
Medication Therapy Management (MTM) Encounter    ASSESSMENT:                            Medication Adherence/Access: Recommended assistance to set up med boxes  .  Diabetes Stable.  We could consider stopping due to her low A1c, risk of hypoglycemia and not checking glucose at home. Will defer to follow up  .  GERD : Stable.  .  Hypertension:  Unable to assess/determine plan - do not know what meds she is taking.  Blood pressure is above goal <130/80 mmHg.  .  Constipation: Stable.  .  Supplements: Stable.    PLAN:                             Will need to clarify what she is taking at home --patient was unsure.  Will have her reschedule for another visit with me soon and have her bring in all of her medications     After appointment:  - Called Dr. Aponte's RN at [facility]:  Patient should be on amlodipine 10 mg, furosemide 40 mg, losartan 50 mg , metoprolol 75 mg twice daily, spironolactone 25 mg daily  Not on hydralazine, doxazosin, carvedilol or bumetanide per Dr. Aponte's medication list.    - Still need to confirm with patient what exactly she's been taking at home- so far I only know that she's been taking the spironolactone and furosemide.    Follow-up: Return in about 11 days (around 4/15/2024) for MTM Follow Up.    SUBJECTIVE/OBJECTIVE:                          Josefina Dumont is a 74 year old female coming in for an initial visit. She was referred to me from Dr. Lee. Patient was accompanied by her sister Orin today.   Reason for visit: initial medication review.    Allergies/ADRs: Reviewed in chart  Past Medical History: Reviewed in chart  Tobacco: She reports that she quit smoking about 12 years ago. Her smoking use included cigarettes. She has never used smokeless tobacco.  Alcohol: none    Medication Adherence/Access:   We tried to medication rec today, but there were several meds she didn't know if she was taking at home:  hydralazine, doxazosin, carvedilol, and clonidine.  She didn't bring in her meds,  and doesn't know what she's taking at home  They are working on getting her set up with a home care RN to do medication set up.    HPI:  2/28-3/11/24 [facility] for Hypertensive emergency, meningioma, injury to head, syncope, CAP of L lung, febrile, UTI, mitral valve stenosis, morbid obesity, demand ischemia, FELIPA, acute diastolic HF, acute kidney failure  From [facility] discharge from 2/28/24, but from there they had her on amlodipine, furosemide, hydralazine 25 mg twice daily, losartan 100 mg, metoprolol 75 mg twice daily,  From the TCU discharge: They intended to her discharge on bumetanide, carvedilol, clonidine, doxazosin, and increased hydralazine.  However per patient, they didn't understand the TCU discharge instructions so she resumed taking her PTA medications.  She has not been taking the bubble packs that they provided her from [employer], but does have them at home.    Diabetes   - Glipizide 2.5 mg, 1/2 tablet daily.  Patient has been taking the full tablet  Hasn't been checking her blood glucose at home, denies symptoms of hypoglycemia.  Blood sugar monitoring: not monitoring  Current diabetes symptoms: none  Eye exam in the last 12 months? Yes- Date of last eye exam: 4/18/2023,  Location: [facility]  Foot exam: up to date  Urine Albumin: elevated  Lab Results   Component Value Date    UMALCR 471.70 (H) 10/19/2023      Lab Results   Component Value Date    A1C 6.6 (H) 12/28/2023     GERD    - Pantoprazole 40 mg twice daily   - TUMs 500 mg twice daily as needed   Patient reports no current symptoms. She does forget the evening dose of her pantoprazole sometimes and then heartburn is worse those days.  Patient feels that current regimen is effective.  The patient does notice symptoms if they miss a dose.    Hypertension   - Hydralazine 100 mg three times daily - doesn't think she's been taking this  - Losartan 100 mg daily -not sure of what dose she's taking  - Doxazosin 4 mg daily - doesn't  think she's been taking this  - Clonidine 0.1 mg daily as needed if SBP >190 - doesn't think she's been taking this  - Carvedilol 25 mg twice daily (hold if SBP <105, pulse <60) - doesn't think she's been taking this, unsure if she's still on metoprolol  - Furosemide 40 mg daily  - Spironolactone 25 mg daily  - Amlodipine 10 mg daily  - Clopidogrel 75 mg daily  From Dr. Aponte's last visit note from 3/14 she increased her furosemide and added spironolactone.  Has dialysis Tue/Thur/Sat, but today was her last day.  Not checking BP at home, but it's been high at dialysis.  She plans to buy a BP monitor today  The patient has not been taking bumetanide 2 mg/day from the TCU discharge.  One the [facility] [place] [facility] medication list we had a few meds listed that the patient doesn't think she's taking- doxazosin, carvedilol, hydralazine, clonidine, and bumetanide.    Blood pressure readings at Dialysis:         Constipation:   - Senna S 8.6-50 mg daily at bedtime  Bowel movements have been good, not having issues    Supplements:  - Multivitamin daily  - Vitamin D 400 units daily  Denies issues    Today's Vitals: BP (!) 140/72   Pulse 66   SpO2 97%   ----------------  Post Discharge Medication Reconciliation Status: discharge medications reconciled and changed, per note/orders.    I spent 60 minutes with this patient today. All changes were made via collaborative practice agreement with COURT SERVIN MD. A copy of the visit note was provided to the patient's provider(s).    A summary of these recommendations was declined by the patient.    Ophelia Tam, Brennan  Medication Therapy Management Pharmacist       Medication Therapy Recommendations  No medication therapy recommendations to display

## 2024-04-12 ENCOUNTER — LAB REQUISITION (OUTPATIENT)
Dept: LAB | Facility: CLINIC | Age: 74
End: 2024-04-12
Payer: COMMERCIAL

## 2024-04-12 DIAGNOSIS — J02.9 ACUTE PHARYNGITIS, UNSPECIFIED: ICD-10-CM

## 2024-04-12 PROCEDURE — 87081 CULTURE SCREEN ONLY: CPT | Mod: ORL | Performed by: FAMILY MEDICINE

## 2024-04-14 LAB — BACTERIA SPEC CULT: NORMAL

## 2024-04-15 ENCOUNTER — OFFICE VISIT (OUTPATIENT)
Dept: PHARMACY | Facility: PHYSICIAN GROUP | Age: 74
End: 2024-04-15
Payer: COMMERCIAL

## 2024-04-15 VITALS — DIASTOLIC BLOOD PRESSURE: 68 MMHG | SYSTOLIC BLOOD PRESSURE: 142 MMHG | HEART RATE: 67 BPM

## 2024-04-15 DIAGNOSIS — K59.00 CONSTIPATION, UNSPECIFIED CONSTIPATION TYPE: ICD-10-CM

## 2024-04-15 DIAGNOSIS — I16.1 HYPERTENSIVE EMERGENCY: ICD-10-CM

## 2024-04-15 DIAGNOSIS — F32.A DEPRESSION, UNSPECIFIED DEPRESSION TYPE: ICD-10-CM

## 2024-04-15 DIAGNOSIS — K21.00 GASTROESOPHAGEAL REFLUX DISEASE WITH ESOPHAGITIS, UNSPECIFIED WHETHER HEMORRHAGE: ICD-10-CM

## 2024-04-15 DIAGNOSIS — E11.10 TYPE 2 DIABETES MELLITUS WITH KETOACIDOSIS WITHOUT COMA, WITHOUT LONG-TERM CURRENT USE OF INSULIN (H): Primary | ICD-10-CM

## 2024-04-15 DIAGNOSIS — F41.9 ANXIETY: ICD-10-CM

## 2024-04-15 PROCEDURE — 99207 PR NO CHARGE LOS: CPT | Performed by: PHARMACIST

## 2024-04-15 RX ORDER — GLIPIZIDE 2.5 MG/1
2.5 TABLET, EXTENDED RELEASE ORAL EVERY MORNING
COMMUNITY
End: 2024-06-16

## 2024-04-15 NOTE — PATIENT INSTRUCTIONS
"Recommendations from today's MTM visit:                                                        Increase Citalopram back to 40 mg once daily  Increase pantoprazole to 40 mg twice daily  Check your pillbox at home to see if your Senna is being added into the pillbox - if it is in your pillbox you do not need to take another dose of Senna at bedtime    Follow-up: as needed with MTM     It was great speaking with you today.  I value your experience and would be very thankful for your time in providing feedback in our clinic survey. In the next few days, you may receive an email or text message from [employer] with a link to a survey related to your  clinical pharmacist.\"     To schedule another MTM appointment, please call the clinic directly or you may call the MTM scheduling line at [phone].    My Clinical Pharmacist's contact information:                                                      Please feel free to contact me with any questions or concerns you have.   Ophelia Tam, PharmD  Medication Therapy Management Pharmacist           Medication List            Accurate as of April 15, 2024  2:17 PM. If you have any questions, ask your nurse or doctor.                CONTINUE taking these medications        Morning Afternoon Evening Bedtime   acetaminophen 500 MG tablet  Also known as: TYLENOL  Take 2 tablets (1,000 mg) by mouth every 8 hours as needed for mild pain            albuterol 108 (90 Base) MCG/ACT inhaler  Also known as: PROAIR HFA/PROVENTIL HFA/VENTOLIN HFA  Inhale 2 puffs into the lungs every 6 hours as needed            amLODIPine 10 MG tablet  Also known as: NORVASC  Take 10 mg by mouth daily              calcium carbonate 500 MG chewable tablet  Also known as: TUMS  Take 1 chew tab by mouth 2 times daily as needed for heartburn            citalopram 40 MG tablet  Also known as: celeXA  Take 40 mg by mouth daily              clopidogrel 75 MG tablet  Also known as: PLAVIX  Take 75 mg by mouth " daily              furosemide 40 MG tablet  Also known as: LASIX  Take 1 tablet (40 mg) by mouth daily              losartan 50 MG tablet  Also known as: COZAAR  Take 2 tablets (100 mg) by mouth daily              Metoprolol Tartrate 75 MG Tabs  Take 75 mg by mouth 2 times daily  Reason for med: High Blood Pressure Disorder                multivitamin w/minerals tablet  Take 1 tablet by mouth daily              pantoprazole 40 MG EC tablet  Also known as: PROTONIX  Take 1 tablet (40 mg) by mouth 2 times daily (before meals)                senna-docusate 8.6-50 MG tablet  Also known as: SENOKOT-S/PERICOLACE  Take 1 tablet by mouth at bedtime              spironolactone 25 MG tablet  Also known as: ALDACTONE  Take 25 mg by mouth every morning              vitamin E 400 units (180 mg) capsule  Also known as: TOCOPHEROL  Take 400 Units by mouth daily

## 2024-04-15 NOTE — PROGRESS NOTES
Medication Therapy Management (MTM) Encounter    ASSESSMENT:                            Medication Adherence/Access: Recommended assistance to set up medication boxes.  I kept the bubble packs of medications she should not be taking anymore to dispose of them in clinic for patient safety (clonidine, hydralazine, doxazosin, and carvedilol)  .  Diabetes Stable.  .  GERD : Stable.  Could consider reducing dose of pantoprazole in the future.  .  Hypertension:  Blood pressure is above goal <130/80 mmHg.  Working with nephrology.  .  Depression/Anxiety:  Would benefit from resuming previous dose of citalopram due to worsening depression.  Will clarify with Dr. Aponte if she had asked the patient to lower the dose.  .  Constipation: may be doubling up her senna on accident - patient will check at home and stop taking the separate senna if she is.      PLAN:                            Increase pantoprazole to 40 mg twice daily   Increase Citalopram back to 40 mg daily - I will check with Dr. Aponte to see if she had wanted you to reduce the dose of this medication.  Asked patient to call clinic and give us the phone number of her home care RN Lazara    Follow-up: Return in about 2 months (around 6/15/2024) for MTM Follow Up.    After appointment:  - Called Dr. Aponte's RN at [facility] to confirm if they had wanted to reduce her dose of citalopram or if they just had 10 mg listed on their medication list for her.  I called [facility] after my appointment with Josefina, they have Citalopram 40 mg listed.  I am not sure who/where the 10 mg came from but should increase back to previous dose.    SUBJECTIVE/OBJECTIVE:                          Josefina Dumont is a 74 year old female coming in for a follow up visit.  Patient was accompanied by her sister Orin robles.     Reason for visit: follow up on meds    Allergies/ADRs: Reviewed in chart  Past Medical History: Reviewed in chart  Tobacco: She reports that she quit smoking about 12 years  ago. Her smoking use included cigarettes. She has never used smokeless tobacco.  Alcohol: none    Medication Adherence/Access: Patient brought in the medication list for what her home care RN is setting up into her pill box.  Patient doesn't know the RN's phone number, but her RN is Lazara.  She brought in bubble packs from when she was in the hospital/TCU, many of those meds she's no longer on (clonidine, carvedilol, doxazosin, and hydralazine)  - The pantoprazole is listed as 20 mg once daily - we have it as 40 mg twice daily  - the Citalopram was reduced to 10 mg once daily, per patient this was reduced by nephrology provider.  Dr. Aponte's visit note from 4/9 didn't mention anything about wanting her to reduce her dose - but I will call to clarify with her clinic.  It looks like they may have just had the wrong dose listed, and this is the dose the home care RN was using.    Diabetes   - Glipizide 2.5 mg, 1 tablet daily.    Patient has been taking the full tablet  Hasn't been checking her blood glucose at home, denies symptoms of hypoglycemia.  She is trying to get into a better routine about checking, just has had a lot going on last week  Blood sugar monitoring: not monitoring  Current diabetes symptoms: none  Eye exam in the last 12 months? Yes- Date of last eye exam: 4/18/2023,  Location: [facility]  Foot exam: up to date  Urine Albumin: elevated    GERD    - Pantoprazole 40 mg twice daily - per medication list from RN is only getting 20 mg once daily  - TUMs 500 mg twice daily as needed   Patient reports no current symptoms.   Patient feels that current regimen is effective.  The patient does notice symptoms if they miss a dose.    Hypertension   - Losartan 100 mg daily  Metoprolol tartrate 75 mg twice daily   - Furosemide 40 mg daily  - Spironolactone 25 mg daily  - Amlodipine 10 mg daily  - Clopidogrel 75 mg daily  Today we were able to clarify which medication she should be taking per Dr. Aponte.   The RN set up her medications for her last Friday and she is now taking metoprolol.  Her appointment last week was with Dia Chaves-NP at [facility].    [facility] dialysis,  [phone].      Depression/Anxiety:  - Citalopram 40 mg once daily- per patient only taking 10 mg daily per nephrology.  Unclear if the dose of citalopram was reduced on purpose.  Citalopram is not metabolized in the kidneys, only in the liver.  So possibly if she had elevated QT interval.  We will need to clarify with nephology provider.  Since she's been on a lower dose her mood has not been doing well.  Feeling very anxious and she'd like to go back up on her dose.      Constipation:   - Senna S 8.6-50 mg daily at bedtime  From the RN medication list, it looks like they may be putting this in her pillbox.  She said she is also taking this separately from the original bottle.  Reports some loose stools in the last week      Today's Vitals: BP (!) 142/68   Pulse 67   ----------------  Post Discharge Medication Reconciliation Status: discharge medications reconciled and changed, per note/orders.    I spent 60 minutes with this patient today. All changes were made via collaborative practice agreement with COURT SERVIN MD. A copy of the visit note was provided to the patient's provider(s).    A summary of these recommendations was declined by the patient.    Ophelia Tam, ChristineD  Medication Therapy Management Pharmacist       Medication Therapy Recommendations  Depression, unspecified depression type    Current Medication: citalopram (CELEXA) 40 MG tablet   Rationale: Does not understand instructions - Adherence - Adherence   Recommendation: Provide Adherence Intervention   Status: Patient Agreed - Adherence/Education

## 2024-04-29 ENCOUNTER — LAB REQUISITION (OUTPATIENT)
Dept: LAB | Facility: CLINIC | Age: 74
End: 2024-04-29
Payer: COMMERCIAL

## 2024-04-29 DIAGNOSIS — R10.2 PELVIC AND PERINEAL PAIN: ICD-10-CM

## 2024-04-29 PROCEDURE — 87086 URINE CULTURE/COLONY COUNT: CPT | Mod: ORL | Performed by: FAMILY MEDICINE

## 2024-04-30 ENCOUNTER — TELEPHONE (OUTPATIENT)
Dept: INTERVENTIONAL RADIOLOGY/VASCULAR | Facility: CLINIC | Age: 74
End: 2024-04-30
Payer: COMMERCIAL

## 2024-04-30 LAB — BACTERIA UR CULT: NORMAL

## 2024-05-01 ENCOUNTER — HOSPITAL ENCOUNTER (OUTPATIENT)
Dept: INTERVENTIONAL RADIOLOGY/VASCULAR | Facility: CLINIC | Age: 74
Discharge: HOME | End: 2024-05-01
Admitting: RADIOLOGY
Payer: COMMERCIAL

## 2024-05-01 DIAGNOSIS — N18.9 CHRONIC KIDNEY DISEASE: ICD-10-CM

## 2024-05-01 PROCEDURE — 36589 REMOVAL TUNNELED CV CATH: CPT

## 2024-05-01 PROCEDURE — 250N000009 HC RX 250: Performed by: RADIOLOGY

## 2024-05-01 RX ADMIN — LIDOCAINE HYDROCHLORIDE 20 ML: 10 INJECTION, SOLUTION INFILTRATION; PERINEURAL at 13:39

## 2024-05-01 NOTE — IR NOTE
Patient Name: Josefina Dumont  Medical Record Number: [id number]  Today's Date: 5/1/2024    Procedure: Tunneled Central Venous Catheter Removal   Proceduralist: Dr. Bhat  Pathology present: MEIR    : MEIR    Other Notes: Pt arrived to IR room 2 from Radiology waiting room. Consent reviewed. Pt denies any questions or concerns regarding procedure. Patient on plavix, MD aware, ok to proceed. Pt positioned per MD and monitored per protocol. Per MD, patient only has to stay 30 minutes and can discharge.     Right internal jugular tunneled CVC removed by Dr. Bhat. 1% local lidocaine used for removal. Manual pressure held until hemostasis achieved. Patient denies pain post procedure. Gauze and tape applied to site and is clean, dry and intact at time of discharge. Discharge instructions reviewed with patient, AVS provided to patient and sister. Patient ambulatory with walker at discharge.     Patient discharged at 1430.

## 2024-05-01 NOTE — PROCEDURES
[facility]    Procedure: Tunneled dialysis catheter removal    Date/Time: 5/1/2024 1:53 PM    Performed by: Rafat Bhat MD  Authorized by: Rafat Bhat MD      UNIVERSAL PROTOCOL   Site Marked: NA  Prior Images Obtained and Reviewed:  Yes  Required items: Required blood products, implants, devices and special equipment available    Patient identity confirmed:  Verbally with patient, arm band, provided demographic data and hospital-assigned identification number  Patient was reevaluated immediately before administering moderate or deep sedation or anesthesia  Confirmation Checklist:  Patient's identity using two indicators, relevant allergies, procedure was appropriate and matched the consent or emergent situation and correct equipment/implants were available  Time out: Immediately prior to the procedure a time out was called    Universal Protocol: the Joint Commission Universal Protocol was followed    Preparation: Patient was prepped and draped in usual sterile fashion       ANESTHESIA    Anesthesia:  Local infiltration  Local Anesthetic:  Lidocaine 1% without epinephrine      SEDATION    Patient Sedated: No    See dictated procedure note for full details.  Findings: Successful tunneled dialysis catheter removal.    Specimens: none    Complications: None    Condition: Stable      PROCEDURE    Patient Tolerance:  Patient tolerated the procedure well with no immediate complications  Length of time physician/provider present for 1:1 monitoring during sedation: 0    Alfonso Bhat MD  Interventional Radiology

## 2024-06-16 ENCOUNTER — HOSPITAL ENCOUNTER (INPATIENT)
Facility: CLINIC | Age: 74
LOS: 4 days | Discharge: HOME HEALTH | DRG: 291 | End: 2024-06-20
Attending: FAMILY MEDICINE | Admitting: STUDENT IN AN ORGANIZED HEALTH CARE EDUCATION/TRAINING PROGRAM
Payer: COMMERCIAL

## 2024-06-16 ENCOUNTER — APPOINTMENT (OUTPATIENT)
Dept: RADIOLOGY | Facility: CLINIC | Age: 74
DRG: 291 | End: 2024-06-16
Attending: STUDENT IN AN ORGANIZED HEALTH CARE EDUCATION/TRAINING PROGRAM
Payer: COMMERCIAL

## 2024-06-16 ENCOUNTER — APPOINTMENT (OUTPATIENT)
Dept: CT IMAGING | Facility: CLINIC | Age: 74
DRG: 291 | End: 2024-06-16
Attending: FAMILY MEDICINE
Payer: COMMERCIAL

## 2024-06-16 DIAGNOSIS — I10 PRIMARY HYPERTENSION: ICD-10-CM

## 2024-06-16 DIAGNOSIS — I50.31 ACUTE DIASTOLIC HEART FAILURE (H): Primary | ICD-10-CM

## 2024-06-16 DIAGNOSIS — I16.0 HYPERTENSIVE URGENCY: ICD-10-CM

## 2024-06-16 DIAGNOSIS — I50.33 ACUTE ON CHRONIC HEART FAILURE WITH PRESERVED EJECTION FRACTION (H): ICD-10-CM

## 2024-06-16 LAB
ATRIAL RATE - MUSE: 74 BPM
CREAT SERPL-MCNC: 0.83 MG/DL (ref 0.51–0.95)
DIASTOLIC BLOOD PRESSURE - MUSE: NORMAL MMHG
EGFRCR SERPLBLD CKD-EPI 2021: 74 ML/MIN/1.73M2
FLUAV RNA SPEC QL NAA+PROBE: NEGATIVE
FLUBV RNA RESP QL NAA+PROBE: NEGATIVE
HOLD SPECIMEN: NORMAL
INTERPRETATION ECG - MUSE: NORMAL
MAGNESIUM SERPL-MCNC: 1.4 MG/DL (ref 1.7–2.3)
MAGNESIUM SERPL-MCNC: 1.7 MG/DL (ref 1.7–2.3)
P AXIS - MUSE: 56 DEGREES
PLATELET # BLD AUTO: 131 10E3/UL (ref 150–450)
POTASSIUM SERPL-SCNC: 2.4 MMOL/L (ref 3.4–5.3)
POTASSIUM SERPL-SCNC: 3.2 MMOL/L (ref 3.4–5.3)
PR INTERVAL - MUSE: 188 MS
QRS DURATION - MUSE: 106 MS
QT - MUSE: 400 MS
QTC - MUSE: 444 MS
R AXIS - MUSE: -22 DEGREES
RSV RNA SPEC NAA+PROBE: NEGATIVE
SARS-COV-2 RNA RESP QL NAA+PROBE: NEGATIVE
SYSTOLIC BLOOD PRESSURE - MUSE: NORMAL MMHG
T AXIS - MUSE: 132 DEGREES
T4 FREE SERPL-MCNC: 1.03 NG/DL (ref 0.9–1.7)
TROPONIN T SERPL HS-MCNC: 21 NG/L
TROPONIN T SERPL HS-MCNC: 24 NG/L
TSH SERPL DL<=0.005 MIU/L-ACNC: 6.75 UIU/ML (ref 0.3–4.2)
VENTRICULAR RATE- MUSE: 74 BPM

## 2024-06-16 PROCEDURE — 120N000004 HC R&B MS OVERFLOW

## 2024-06-16 PROCEDURE — 84484 ASSAY OF TROPONIN QUANT: CPT | Performed by: FAMILY MEDICINE

## 2024-06-16 PROCEDURE — 36415 COLL VENOUS BLD VENIPUNCTURE: CPT | Performed by: FAMILY MEDICINE

## 2024-06-16 PROCEDURE — 85049 AUTOMATED PLATELET COUNT: CPT | Performed by: STUDENT IN AN ORGANIZED HEALTH CARE EDUCATION/TRAINING PROGRAM

## 2024-06-16 PROCEDURE — 250N000011 HC RX IP 250 OP 636: Mod: JZ | Performed by: FAMILY MEDICINE

## 2024-06-16 PROCEDURE — 84484 ASSAY OF TROPONIN QUANT: CPT | Performed by: STUDENT IN AN ORGANIZED HEALTH CARE EDUCATION/TRAINING PROGRAM

## 2024-06-16 PROCEDURE — 250N000011 HC RX IP 250 OP 636: Mod: JZ | Performed by: STUDENT IN AN ORGANIZED HEALTH CARE EDUCATION/TRAINING PROGRAM

## 2024-06-16 PROCEDURE — 84443 ASSAY THYROID STIM HORMONE: CPT | Performed by: STUDENT IN AN ORGANIZED HEALTH CARE EDUCATION/TRAINING PROGRAM

## 2024-06-16 PROCEDURE — 87641 MR-STAPH DNA AMP PROBE: CPT | Performed by: STUDENT IN AN ORGANIZED HEALTH CARE EDUCATION/TRAINING PROGRAM

## 2024-06-16 PROCEDURE — 71275 CT ANGIOGRAPHY CHEST: CPT

## 2024-06-16 PROCEDURE — 82565 ASSAY OF CREATININE: CPT | Performed by: EMERGENCY MEDICINE

## 2024-06-16 PROCEDURE — 93005 ELECTROCARDIOGRAM TRACING: CPT | Performed by: FAMILY MEDICINE

## 2024-06-16 PROCEDURE — 250N000009 HC RX 250: Performed by: FAMILY MEDICINE

## 2024-06-16 PROCEDURE — 84132 ASSAY OF SERUM POTASSIUM: CPT | Performed by: STUDENT IN AN ORGANIZED HEALTH CARE EDUCATION/TRAINING PROGRAM

## 2024-06-16 PROCEDURE — 99223 1ST HOSP IP/OBS HIGH 75: CPT | Performed by: STUDENT IN AN ORGANIZED HEALTH CARE EDUCATION/TRAINING PROGRAM

## 2024-06-16 PROCEDURE — 36415 COLL VENOUS BLD VENIPUNCTURE: CPT | Performed by: STUDENT IN AN ORGANIZED HEALTH CARE EDUCATION/TRAINING PROGRAM

## 2024-06-16 PROCEDURE — 84439 ASSAY OF FREE THYROXINE: CPT | Performed by: STUDENT IN AN ORGANIZED HEALTH CARE EDUCATION/TRAINING PROGRAM

## 2024-06-16 PROCEDURE — 87637 SARSCOV2&INF A&B&RSV AMP PRB: CPT | Performed by: FAMILY MEDICINE

## 2024-06-16 PROCEDURE — 83735 ASSAY OF MAGNESIUM: CPT | Performed by: STUDENT IN AN ORGANIZED HEALTH CARE EDUCATION/TRAINING PROGRAM

## 2024-06-16 PROCEDURE — 73140 X-RAY EXAM OF FINGER(S): CPT | Mod: RT

## 2024-06-16 PROCEDURE — 99285 EMERGENCY DEPT VISIT HI MDM: CPT | Mod: 25

## 2024-06-16 PROCEDURE — 250N000013 HC RX MED GY IP 250 OP 250 PS 637: Performed by: STUDENT IN AN ORGANIZED HEALTH CARE EDUCATION/TRAINING PROGRAM

## 2024-06-16 RX ORDER — POTASSIUM CHLORIDE 7.45 MG/ML
10 INJECTION INTRAVENOUS
Status: COMPLETED | OUTPATIENT
Start: 2024-06-16 | End: 2024-06-16

## 2024-06-16 RX ORDER — FUROSEMIDE 10 MG/ML
40 INJECTION INTRAMUSCULAR; INTRAVENOUS EVERY 8 HOURS
Status: DISCONTINUED | OUTPATIENT
Start: 2024-06-16 | End: 2024-06-19

## 2024-06-16 RX ORDER — CALCIUM CARBONATE 500 MG/1
1000 TABLET, CHEWABLE ORAL 4 TIMES DAILY PRN
Status: DISCONTINUED | OUTPATIENT
Start: 2024-06-16 | End: 2024-06-20 | Stop reason: HOSPADM

## 2024-06-16 RX ORDER — METOPROLOL TARTRATE 75 MG/1
75 TABLET, FILM COATED ORAL 2 TIMES DAILY
Status: DISCONTINUED | OUTPATIENT
Start: 2024-06-16 | End: 2024-06-16 | Stop reason: DRUGHIGH

## 2024-06-16 RX ORDER — ENOXAPARIN SODIUM 100 MG/ML
40 INJECTION SUBCUTANEOUS EVERY 24 HOURS
Status: DISCONTINUED | OUTPATIENT
Start: 2024-06-16 | End: 2024-06-20 | Stop reason: HOSPADM

## 2024-06-16 RX ORDER — GUAIFENESIN 200 MG/10ML
200 LIQUID ORAL EVERY 4 HOURS PRN
Status: DISCONTINUED | OUTPATIENT
Start: 2024-06-16 | End: 2024-06-20 | Stop reason: HOSPADM

## 2024-06-16 RX ORDER — ONDANSETRON 2 MG/ML
4 INJECTION INTRAMUSCULAR; INTRAVENOUS EVERY 6 HOURS PRN
Status: DISCONTINUED | OUTPATIENT
Start: 2024-06-16 | End: 2024-06-20 | Stop reason: HOSPADM

## 2024-06-16 RX ORDER — LABETALOL HYDROCHLORIDE 5 MG/ML
10 INJECTION, SOLUTION INTRAVENOUS ONCE
Status: COMPLETED | OUTPATIENT
Start: 2024-06-16 | End: 2024-06-16

## 2024-06-16 RX ORDER — FUROSEMIDE 10 MG/ML
60 INJECTION INTRAMUSCULAR; INTRAVENOUS EVERY 8 HOURS
Status: DISCONTINUED | OUTPATIENT
Start: 2024-06-16 | End: 2024-06-16

## 2024-06-16 RX ORDER — LIDOCAINE 40 MG/G
CREAM TOPICAL
Status: DISCONTINUED | OUTPATIENT
Start: 2024-06-16 | End: 2024-06-20 | Stop reason: HOSPADM

## 2024-06-16 RX ORDER — AMLODIPINE BESYLATE 5 MG/1
10 TABLET ORAL DAILY
Status: DISCONTINUED | OUTPATIENT
Start: 2024-06-16 | End: 2024-06-20 | Stop reason: HOSPADM

## 2024-06-16 RX ORDER — CITALOPRAM HYDROBROMIDE 10 MG/1
40 TABLET ORAL DAILY
Status: DISCONTINUED | OUTPATIENT
Start: 2024-06-16 | End: 2024-06-20 | Stop reason: HOSPADM

## 2024-06-16 RX ORDER — ACETAMINOPHEN 325 MG/1
650 TABLET ORAL EVERY 6 HOURS PRN
Status: DISCONTINUED | OUTPATIENT
Start: 2024-06-16 | End: 2024-06-20 | Stop reason: HOSPADM

## 2024-06-16 RX ORDER — SPIRONOLACTONE 25 MG/1
25 TABLET ORAL EVERY MORNING
Status: DISCONTINUED | OUTPATIENT
Start: 2024-06-16 | End: 2024-06-18

## 2024-06-16 RX ORDER — CLINDAMYCIN PHOSPHATE 900 MG/50ML
900 INJECTION, SOLUTION INTRAVENOUS EVERY 8 HOURS
Status: DISCONTINUED | OUTPATIENT
Start: 2024-06-16 | End: 2024-06-19

## 2024-06-16 RX ORDER — LOSARTAN POTASSIUM 25 MG/1
100 TABLET ORAL DAILY
Status: DISCONTINUED | OUTPATIENT
Start: 2024-06-16 | End: 2024-06-20 | Stop reason: HOSPADM

## 2024-06-16 RX ORDER — LORAZEPAM 2 MG/ML
0.5 INJECTION INTRAMUSCULAR ONCE
Status: COMPLETED | OUTPATIENT
Start: 2024-06-16 | End: 2024-06-16

## 2024-06-16 RX ORDER — ONDANSETRON 4 MG/1
4 TABLET, ORALLY DISINTEGRATING ORAL EVERY 6 HOURS PRN
Status: DISCONTINUED | OUTPATIENT
Start: 2024-06-16 | End: 2024-06-20 | Stop reason: HOSPADM

## 2024-06-16 RX ORDER — SULFAMETHOXAZOLE/TRIMETHOPRIM 800-160 MG
1 TABLET ORAL 2 TIMES DAILY
Status: DISCONTINUED | OUTPATIENT
Start: 2024-06-16 | End: 2024-06-16

## 2024-06-16 RX ORDER — METOPROLOL SUCCINATE 50 MG/1
50 TABLET, EXTENDED RELEASE ORAL DAILY
COMMUNITY

## 2024-06-16 RX ORDER — IPRATROPIUM BROMIDE AND ALBUTEROL SULFATE 2.5; .5 MG/3ML; MG/3ML
3 SOLUTION RESPIRATORY (INHALATION) ONCE
Status: COMPLETED | OUTPATIENT
Start: 2024-06-16 | End: 2024-06-16

## 2024-06-16 RX ORDER — IOPAMIDOL 755 MG/ML
100 INJECTION, SOLUTION INTRAVASCULAR ONCE
Status: COMPLETED | OUTPATIENT
Start: 2024-06-16 | End: 2024-06-16

## 2024-06-16 RX ORDER — METOPROLOL SUCCINATE 25 MG/1
50 TABLET, EXTENDED RELEASE ORAL DAILY
Status: DISCONTINUED | OUTPATIENT
Start: 2024-06-17 | End: 2024-06-20 | Stop reason: HOSPADM

## 2024-06-16 RX ORDER — AMOXICILLIN 250 MG
1 CAPSULE ORAL AT BEDTIME
Status: DISCONTINUED | OUTPATIENT
Start: 2024-06-16 | End: 2024-06-20 | Stop reason: HOSPADM

## 2024-06-16 RX ORDER — CLOPIDOGREL BISULFATE 75 MG/1
75 TABLET ORAL DAILY
Status: DISCONTINUED | OUTPATIENT
Start: 2024-06-16 | End: 2024-06-20 | Stop reason: HOSPADM

## 2024-06-16 RX ORDER — MAGNESIUM SULFATE 4 G/50ML
4 INJECTION INTRAVENOUS ONCE
Status: COMPLETED | OUTPATIENT
Start: 2024-06-16 | End: 2024-06-16

## 2024-06-16 RX ORDER — POTASSIUM CHLORIDE 1500 MG/1
40 TABLET, EXTENDED RELEASE ORAL ONCE
Status: COMPLETED | OUTPATIENT
Start: 2024-06-16 | End: 2024-06-16

## 2024-06-16 RX ORDER — ALBUTEROL SULFATE 90 UG/1
2 AEROSOL, METERED RESPIRATORY (INHALATION) EVERY 6 HOURS PRN
Status: DISCONTINUED | OUTPATIENT
Start: 2024-06-16 | End: 2024-06-20 | Stop reason: HOSPADM

## 2024-06-16 RX ORDER — PANTOPRAZOLE SODIUM 40 MG/1
40 TABLET, DELAYED RELEASE ORAL
Status: DISCONTINUED | OUTPATIENT
Start: 2024-06-16 | End: 2024-06-20 | Stop reason: HOSPADM

## 2024-06-16 RX ORDER — ACETAMINOPHEN 650 MG/1
650 SUPPOSITORY RECTAL EVERY 6 HOURS PRN
Status: DISCONTINUED | OUTPATIENT
Start: 2024-06-16 | End: 2024-06-20 | Stop reason: HOSPADM

## 2024-06-16 RX ORDER — POLYETHYLENE GLYCOL 3350 17 G/17G
17 POWDER, FOR SOLUTION ORAL 2 TIMES DAILY PRN
Status: DISCONTINUED | OUTPATIENT
Start: 2024-06-16 | End: 2024-06-20 | Stop reason: HOSPADM

## 2024-06-16 RX ADMIN — ACETAMINOPHEN 650 MG: 325 TABLET ORAL at 22:00

## 2024-06-16 RX ADMIN — IOPAMIDOL 75 ML: 755 INJECTION, SOLUTION INTRAVENOUS at 13:33

## 2024-06-16 RX ADMIN — ENOXAPARIN SODIUM 40 MG: 100 INJECTION SUBCUTANEOUS at 17:30

## 2024-06-16 RX ADMIN — LOSARTAN POTASSIUM 100 MG: 25 TABLET, FILM COATED ORAL at 17:30

## 2024-06-16 RX ADMIN — SENNOSIDES AND DOCUSATE SODIUM 1 TABLET: 50; 8.6 TABLET ORAL at 21:56

## 2024-06-16 RX ADMIN — POTASSIUM CHLORIDE 10 MEQ: 7.46 INJECTION, SOLUTION INTRAVENOUS at 17:30

## 2024-06-16 RX ADMIN — LABETALOL HYDROCHLORIDE 10 MG: 5 INJECTION, SOLUTION INTRAVENOUS at 15:19

## 2024-06-16 RX ADMIN — MAGNESIUM SULFATE HEPTAHYDRATE 4 G: 4 INJECTION, SOLUTION INTRAVENOUS at 21:24

## 2024-06-16 RX ADMIN — CLOPIDOGREL BISULFATE 75 MG: 75 TABLET ORAL at 17:30

## 2024-06-16 RX ADMIN — CLINDAMYCIN PHOSPHATE 900 MG: 900 INJECTION, SOLUTION INTRAVENOUS at 20:42

## 2024-06-16 RX ADMIN — PANTOPRAZOLE SODIUM 40 MG: 40 TABLET, DELAYED RELEASE ORAL at 19:41

## 2024-06-16 RX ADMIN — Medication 1 MG: at 22:00

## 2024-06-16 RX ADMIN — POTASSIUM CHLORIDE 10 MEQ: 7.46 INJECTION, SOLUTION INTRAVENOUS at 16:13

## 2024-06-16 RX ADMIN — CITALOPRAM HYDROBROMIDE 40 MG: 10 TABLET ORAL at 17:29

## 2024-06-16 RX ADMIN — POTASSIUM CHLORIDE 10 MEQ: 7.46 INJECTION, SOLUTION INTRAVENOUS at 19:39

## 2024-06-16 RX ADMIN — POTASSIUM CHLORIDE 10 MEQ: 7.46 INJECTION, SOLUTION INTRAVENOUS at 18:39

## 2024-06-16 RX ADMIN — IPRATROPIUM BROMIDE AND ALBUTEROL SULFATE 3 ML: .5; 3 SOLUTION RESPIRATORY (INHALATION) at 14:05

## 2024-06-16 RX ADMIN — AMLODIPINE BESYLATE 10 MG: 5 TABLET ORAL at 17:30

## 2024-06-16 RX ADMIN — LORAZEPAM 0.5 MG: 2 INJECTION INTRAMUSCULAR; INTRAVENOUS at 15:19

## 2024-06-16 RX ADMIN — SPIRONOLACTONE 25 MG: 25 TABLET, FILM COATED ORAL at 19:41

## 2024-06-16 RX ADMIN — POTASSIUM CHLORIDE 40 MEQ: 1500 TABLET, EXTENDED RELEASE ORAL at 16:13

## 2024-06-16 RX ADMIN — LABETALOL HYDROCHLORIDE 10 MG: 5 INJECTION, SOLUTION INTRAVENOUS at 14:32

## 2024-06-16 ASSESSMENT — ACTIVITIES OF DAILY LIVING (ADL)
ADLS_ACUITY_SCORE: 31
ADLS_ACUITY_SCORE: 38
DEPENDENT_IADLS:: COOKING;SHOPPING;TRANSPORTATION
ADLS_ACUITY_SCORE: 31
ADLS_ACUITY_SCORE: 38
ADLS_ACUITY_SCORE: 33
ADLS_ACUITY_SCORE: 33
ADLS_ACUITY_SCORE: 31
ADLS_ACUITY_SCORE: 38
ADLS_ACUITY_SCORE: 33
ADLS_ACUITY_SCORE: 33
ADLS_ACUITY_SCORE: 38

## 2024-06-16 NOTE — PHARMACY-ADMISSION MEDICATION HISTORY
Pharmacist Admission Medication History    Admission medication history is complete. The information provided in this note is only as accurate as the sources available at the time of the update.    Information Source(s): Patient and CareEverywhere/SureScripts via in-person    Pertinent Information:     Changes made to PTA medication list:  Added: None  Deleted: glipizide - was stopped about a month ago.    Changed: Metoprolol from Tartrate 75 mg BID to XL 50 mg daily    Allergies reviewed with patient and updates made in EHR: yes    Medication History Completed By: Koko Arango [facility] 6/16/2024 3:59 PM    PTA Med List   Medication Sig Last Dose    acetaminophen (TYLENOL) 500 MG tablet Take 2 tablets (1,000 mg) by mouth every 8 hours as needed for mild pain 6/15/2024    albuterol (PROAIR HFA/PROVENTIL HFA/VENTOLIN HFA) 108 (90 Base) MCG/ACT inhaler Inhale 2 puffs into the lungs every 6 hours as needed Past Week at am    amLODIPine (NORVASC) 10 MG tablet Take 10 mg by mouth daily 6/15/2024 at am    calcium carbonate (TUMS) 500 MG chewable tablet Take 1 chew tab by mouth 2 times daily as needed for heartburn Past Week    citalopram (CELEXA) 40 MG tablet Take 40 mg by mouth daily 6/15/2024 at am    clopidogrel (PLAVIX) 75 MG tablet Take 75 mg by mouth daily 6/15/2024 at am    furosemide (LASIX) 40 MG tablet Take 1 tablet (40 mg) by mouth daily 6/15/2024 at am    losartan (COZAAR) 50 MG tablet Take 2 tablets (100 mg) by mouth daily 6/15/2024 at am    metoprolol succinate ER (TOPROL XL) 50 MG 24 hr tablet Take 50 mg by mouth daily 6/15/2024 at am    multivitamin w/minerals (THERA-VIT-M) tablet Take 1 tablet by mouth daily 6/15/2024 at am    pantoprazole (PROTONIX) 40 MG EC tablet Take 1 tablet (40 mg) by mouth 2 times daily (before meals) 6/15/2024 at pm    senna-docusate (SENOKOT-S/PERICOLACE) 8.6-50 MG tablet Take 1 tablet by mouth at bedtime 6/15/2024 at pm    spironolactone (ALDACTONE) 25 MG tablet Take 25 mg by  mouth every morning 6/15/2024 at am    vitamin E (TOCOPHEROL) 400 units (180 mg) capsule Take 400 Units by mouth daily 6/15/2024 at am

## 2024-06-16 NOTE — ED TRIAGE NOTES
Breathing labored for a couple months.  Was seen at [facility].  Sent for CHF exacerbation     Triage Assessment (Adult)       Row Name 06/16/24 1300          Triage Assessment    Airway WDL WDL        Respiratory WDL    Respiratory WDL X;rhythm/pattern;expansion/retractions     Rhythm/Pattern, Respiratory dyspnea upon exertion;deep;labored;shortness of breath;tachypneic        Skin Circulation/Temperature WDL    Skin Circulation/Temperature WDL WDL        Cardiac WDL    Cardiac WDL WDL        Peripheral/Neurovascular WDL    Peripheral Neurovascular WDL WDL

## 2024-06-16 NOTE — ED NOTES
Expected Patient Referral to ED  11:31 AM    Referring Clinic/Provider:  [facility]    Reason for referral/Clinical facts:  Scented for sore throat but found to have hypoxia and concern for fluid overload with BNP 5010, pancytopenia, negative troponin, pancytopenia which was seen in February 2024 as well    Recommendations provided:  Contact inpatient service to discuss direct admission    Caller was informed that this institution does possess the capabilities and/or resources to provide for patient and should be transferred to our facility.    Discussed that if direct admit is sought and any hurdles are encountered, this ED would be happy to see the patient and evaluate.    Informed caller that recommendations provided are recommendations based only on the facts provided and that they responsible to accept or reject the advice, or to seek a formal in person consultation as needed and that this ED will see/treat patient should they arrive.      Félix Lin MD  [facility] EMERGENCY ROOM  [address] [id number]  [phone]     Félix Lin MD  06/16/24 1134

## 2024-06-16 NOTE — CONSULTS
Care Management Initial Consult    General Information  Assessment completed with: Patient, VM-chart review,    Type of CM/SW Visit: Initial Assessment    Primary Care Provider verified and updated as needed: Yes   Readmission within the last 30 days: no previous admission in last 30 days      Reason for Consult: discharge planning  Advance Care Planning: Advance Care Planning Reviewed: no concerns identified     General Information Comments: Lives alone in apartment    Communication Assessment  Patient's communication style: spoken language (English or Bilingual)    Hearing Difficulty or Deaf: no   Wear Glasses or Blind: yes    Cognitive  Cognitive/Neuro/Behavioral: WDL                      Living Environment:   People in home: alone     Current living Arrangements: apartment      Able to return to prior arrangements: yes  Living Arrangement Comments: Lives alone    Family/Social Support:  Care provided by: self, homecare agency, other (see comments) (Sibing)  Provides care for: no one  Marital Status:   Sibling(s), Other (specify) (Niece)          Description of Support System: Supportive, Involved    Support Assessment: Adequate family and caregiver support    Current Resources:   Patient receiving home care services: Yes  Skilled Home Care Services: Skilled Nursing, Physical Therapy, Occupational Therapy  Community Resources: Home Care  Equipment currently used at home: walker, rolling, walker, standard  Supplies currently used at home: None    Employment/Financial:  Employment Status: retired        Financial Concerns: none   Referral to Financial Worker: No       Does the patient's insurance plan have a 3 day qualifying hospital stay waiver?  No    Lifestyle & Psychosocial Needs:  Social Determinants of Health     Food Insecurity: Not on file   Depression: Not at risk (5/1/2023)    PHQ-2     PHQ-2 Score: 2   Housing Stability: Not on file   Tobacco Use: Medium Risk (4/15/2024)    Patient History      Smoking Tobacco Use: Former     Smokeless Tobacco Use: Never     Passive Exposure: Not on file   Financial Resource Strain: Not on file   Alcohol Use: Not on file   Transportation Needs: Not on file   Physical Activity: Not on file   Interpersonal Safety: Not on file   Stress: Not on file   Social Connections: Not on file   Health Literacy: Not on file       Functional Status:  Prior to admission patient needed assistance:   Dependent ADLs:: Ambulation-walker  Dependent IADLs:: Cooking, Shopping, Transportation  Assesssment of Functional Status: Not at  functional baseline    Mental Health Status:          Chemical Dependency Status:              Values/Beliefs:  Spiritual, Cultural Beliefs, Rastafari Practices, Values that affect care:                 Additional Information:   Patient seen and examined, presents today with shortness of breath.  This been going on for several months but worse in the last couple of days.  No chest pain, does feel her abdomen is little distended, denies nausea or vomiting, denies lower extremity swelling.  Labs done at outside facility show elevated BNP, chest x-ray with cardiac enlargement and tiny bilateral pleural effusions.  Patient is quite hypertensive here and this will be treated given her findings of heart failure, BMP today with creatinine 0.91 .    Reports she lives alone in a senior apartment. Uses both standard and roller-walkers; doesn't drive anymore. Has PT/OT and RN visits for medication education/set-up. Not sure of agency name; no information in Chart Review. Patient will have sister look up information and bring in. Declined Honoring Choices form.    Patient's sister Orin lives upstairs in same building. Orin assists patient with meals, laundry, shopping, and transportation. Plan is for patient to return home with continued Home Care services on discharge. Orin will transport patient. Has pending [facility] Evaluation Consult order.    ADOLFO LEMA, RN/CM

## 2024-06-16 NOTE — H&P
[facility] MEDICINE ADMISSION HISTORY AND PHYSICAL   Date of Admission: 6/16/2024  Josefina Dumont, 1950, [id number]    Identification/Summary:   Josefina Dumont is a 74 year old female with PMH signficant for .  Presented with hypoxia from urgency room.  Patient initially presented to the emergency room today for sore throat and cough for the past few days.  She had a low saturation of low 80s at the urgency room with send BNP greater than 5000 so transferred to [facility] for possible heart failure.  In the ED, patient has blood pressure highest at 133/100, satting well on 1 L of oxygen.  COVID/flu/RSV screen negative.  CT PE did not reveal PE but has possible interstitial edema small bilateral pleural effusion.  In the UR, she also has mild pancytopenia hemoglobin 10.8, platelet 130, white count 3.7 all better than prior and creatinine 0.91(eGFR 67), potassium 3.0.  She was given IV Lasix 40 mg as an emergency room.  Troponin was found to be mildly elevated in our ER.  She was given 2 doses IV labetalol in our ED.    Assessment and Plan:  Acute hypoxic respiratory failure  Acute on chronic heart failure with preserved ejection fraction  Hypertension urgency/emergency with pulmonary edema  Left ventricular hypertrophy due to chronic hypertension  Status post 40mg IV Lasix in the urgency room  Order 40mg IV Lasix for later today  Order echocardiogram to check ejection fraction, pericardial effusion, left ventricular hypertrophy  Strict I's and O's  Mag and K RN protocol  S/p 2 doses of IV labetalol in the ED  Order home medicine metoprolol 50 mg daily, amlodipine 10 mg, losartan 100 mg, spironolactone 25 mg  If blood pressures not controlled to around 160 mmHg it with home medicine, will start other agents  Consider secondary hypertension workup, including sleep study outpatient  Consider LFT as her symptoms improved with albuterol  Order TSH    Severe hypokalemia  K was 3.0 at  "the emergency room  Check a K now - 2.4    Troponin elevation  Asymptomatic, EKG without ST-T change   Trend    Right middle finger cellulitis  No fluid collection.    She is allergic to penicillin. Start IV clindamycin.  MRSA swab  May need for follow up with  for the health of the nail    LFT elevation  , AST 51 and urgency room, which is new to her.  Could this be congestive hepatopathy?  Monitor LFT change    Pancytopenia  On admission hemoglobin 10.8, white blood cells 3.7, platelet 130  Monitor    Raynaud phenomena  Fingers turn purple after washing hands with cold water  Bilateral radial pulses 2+, extremities well-perfused except right middle finger    CKD  Monitor Cr when diuresing  On admission, Cr  0.91(eGFR 67)    Code Status: Full Code    IVF:    FoleyNot present    Disposition: Inpatient     Clinically Significant Risk Factors Present on Admission                # Drug Induced Platelet Defect: home medication list includes an antiplatelet medication   # Hypertension: Noted on problem list  # Chronic heart failure with preserved ejection fraction: heart failure noted on problem list and last echo with EF >50%           # DMII: A1C = N/A within past 6 months    # Obesity: Estimated body mass index is 35.19 kg/m  as calculated from the following:    Height as of this encounter: 1.626 m (5' 4\").    Weight as of this encounter: 93 kg (205 lb).       # Financial/Environmental Concerns:           Chief Complaint Hypoxia     HISTORY     Josefina Dumont is a 74 year old female with PMH signficant for .  Presented with hypoxia from urgency room.  Patient initially presented to the emergency room today for sore throat and cough for the past few days.      She reports having sore throat and cough with intermittent production the past few days.  She denies chest pain, fever, chills. She denies headache, muscle sores.  She denies extremity edema.  She has been short of breath since she was " discharged from hospital in February which slowly worsened over the past few months but much worsened in the past few days.   It is difficult to evaluate if she has orthopnea because she has been staying in a recliner due to weakness and back pain.  Her blood pressure has been noticed to be elevated in 2 20-2 30s in the ED.  At home her blood pressure is usually in the 160s to 180s range.  She reports being compliant with her blood pressure medicine and there was a recent increase of her losartan.  She did not use inhalers which sometimes help with the shortness of breath.  She denies formal diagnosis of COPD or asthma.  She believes she has sleep apnea because she snores.  She used to be a smoker but quit 10 years ago.  An echocardiogram in February, she had mild pericardial effusion.  Her EF was 70% at that time with moderate concentric increased thickness of the left ventricular wall.  She went to the emergency room today. She had a low saturation of low 80s at the urgency room with send BNP greater than 5000 so transferred to [facility] for possible heart failure.  In the ED, patient has blood pressure highest at 133/100, satting well on 1 L of oxygen.  COVID/flu/RSV screen negative.  CT PE did not reveal PE but has possible interstitial edema small bilateral pleural effusion.  In the UR, she also has mild pancytopenia hemoglobin 10.8, platelet 130, white count 3.7 all better than prior and creatinine 0.91(eGFR 67), potassium 3.0.  She was given IV Lasix 40 mg as an emergency room.  Troponin was found to be mildly elevated in our ER.  She was given 2 doses IV labetalol in our ED.  She also has a right middle finger piercing into her nail when she is doing Accu-Chek at home.  Its painful and putting out small discharge.  Nail is also pale and fingertip is slightly swelling.  She was also noticed to have purple fingers especially lateral middle fingers.  Nurse reported, when she was washing her hands, her skin  turned white.  Per patient, she was diagnosed with Raynaud phenomenon, which also presents in her father.    Past Medical History     Past Medical History:  No date: Chronic kidney disease  No date: Diabetes (H)  No date: Hypertension  No date: Obese     Patient Active Problem List    Diagnosis Date Noted     Hypertensive urgency 06/16/2024     Priority: Medium     Acute on chronic heart failure with preserved ejection fraction (H) 06/16/2024     Priority: Medium     Acute kidney failure, unspecified (H) 02/14/2024     Priority: Medium     Acute diastolic heart failure (H) 02/02/2024     Priority: Medium     Fever, unspecified fever cause 02/02/2024     Priority: Medium     FELIPA (acute kidney injury) (H24) 12/30/2023     Priority: Medium     Hypertensive emergency 12/29/2023     Priority: Medium     Demand ischemia (H) 12/29/2023     Priority: Medium     Morbid obesity (H) 04/20/2022     Priority: Medium     Mitral valve stenosis 03/29/2022     Priority: Medium     Febrile illness, acute 02/04/2022     Priority: Medium     Urinary tract infection without hematuria, site unspecified 02/04/2022     Priority: Medium     Type 2 diabetes mellitus, without long-term current use of insulin (H) 01/13/2022     Priority: Medium     Primary hypertension 01/12/2022     Priority: Medium     Meningioma (H) 01/12/2022     Priority: Medium     Injury of head, initial encounter 01/12/2022     Priority: Medium     Syncope, unspecified syncope type 01/12/2022     Priority: Medium     Community acquired pneumonia of left lung 01/12/2022     Priority: Medium     Surgical History     Past Surgical History:   Procedure Laterality Date     IR CVC TUNNEL PLACEMENT > 5 YRS OF AGE  2/14/2024     IR CVC TUNNEL REMOVAL RIGHT  5/1/2024     Family History    No family history on file.   Social History      Social History     Tobacco Use     Smoking status: Former     Current packs/day: 0.00     Types: Cigarettes     Quit date: 2012     Years since  quittin.4     Smokeless tobacco: Never      Allergies     Allergies   Allergen Reactions     Aspirin      Other reaction(s): stomach upset     Atenolol Other (See Comments)     abd pain     Lisinopril      Other reaction(s): stomach aches     Nsaids Nephrotoxicity     Penicillins Hives     Tolerated ceftriaxone     Statins      Other reaction(s): myalgias     Prior to Admission Medications      Prior to Admission Medications   Prescriptions Last Dose Informant Patient Reported? Taking?   Metoprolol Tartrate 75 MG TABS   Yes No   Sig: Take 75 mg by mouth 2 times daily   acetaminophen (TYLENOL) 500 MG tablet   No No   Sig: Take 2 tablets (1,000 mg) by mouth every 8 hours as needed for mild pain   albuterol (PROAIR HFA/PROVENTIL HFA/VENTOLIN HFA) 108 (90 Base) MCG/ACT inhaler   Yes No   Sig: Inhale 2 puffs into the lungs every 6 hours as needed   amLODIPine (NORVASC) 10 MG tablet   Yes No   Sig: Take 10 mg by mouth daily   calcium carbonate (TUMS) 500 MG chewable tablet   Yes No   Sig: Take 1 chew tab by mouth 2 times daily as needed for heartburn   citalopram (CELEXA) 40 MG tablet   Yes No   Sig: Take 40 mg by mouth daily   clopidogrel (PLAVIX) 75 MG tablet   Yes No   Sig: Take 75 mg by mouth daily   furosemide (LASIX) 40 MG tablet   Yes No   Sig: Take 1 tablet (40 mg) by mouth daily   glipiZIDE (GLUCOTROL XL) 2.5 MG 24 hr tablet   Yes No   Sig: Take 2.5 mg by mouth every morning   losartan (COZAAR) 50 MG tablet   No No   Sig: Take 2 tablets (100 mg) by mouth daily   multivitamin w/minerals (THERA-VIT-M) tablet   Yes No   Sig: Take 1 tablet by mouth daily   pantoprazole (PROTONIX) 40 MG EC tablet   No No   Sig: Take 1 tablet (40 mg) by mouth 2 times daily (before meals)   senna-docusate (SENOKOT-S/PERICOLACE) 8.6-50 MG tablet   Yes No   Sig: Take 1 tablet by mouth at bedtime   spironolactone (ALDACTONE) 25 MG tablet   Yes No   Sig: Take 25 mg by mouth every morning   vitamin E (TOCOPHEROL) 400 units (180 mg)  capsule   Yes No   Sig: Take 400 Units by mouth daily      Facility-Administered Medications: None      Review of Systems     A 12 point comprehensive review of systems was negative except as noted above in HPI.    PHYSICAL EXAMINATION     Vitals      Temp:  [98.3  F (36.8  C)] 98.3  F (36.8  C)  Pulse:  [72-76] 72  Resp:  [26] 26  BP: (184-223)/(90-93) 223/93  SpO2:  [92 %-96 %] 94 %    Examination     General Appearance: Alert and wake, not in distress  Respiratory: Lung sounds coarse, no crackles or wheezing  Cardiovascular: rhythmic, normal S1 and S2, no murmur, minimal lower extremity edema  GI: soft, non-tender, normal bowel sound  Neurology: oriented x 3  Psych: cooperative and calm, normal affect  Skin: Bilateral middle finger tip purpleish, other fingers had mild purpleish hue at the tip of the fingers and there is some purpleish on the palm bilaterally.  Radial pulses are 2+ bilaterally.  Right middle finger also looks pale to first interphalangeal joint from the fingertip, putting out discharge on her bandage.  Skin on this finger is not tense, there is no underlying fluid collection.  This finger feels cold.      Pertinent Lab     Results for orders placed or performed during the hospital encounter of 06/16/24 (from the past 24 hour(s))   CT Chest Pulmonary Embolism w Contrast    Narrative    EXAM: CT CHEST PULMONARY EMBOLISM W CONTRAST  LOCATION: [facility]  DATE: 6/16/2024    INDICATION: Dyspnea, cough, hypoxia, creatinine 0.91 today  COMPARISON: CT 2/8/2024  TECHNIQUE: CT chest pulmonary angiogram during arterial phase injection of IV contrast. Multiplanar reformats and MIP reconstructions were performed. Dose reduction techniques were used.   CONTRAST: Isovue 370 75mL    FINDINGS:  ANGIOGRAM CHEST: Pulmonary arteries are normal caliber and negative for pulmonary emboli. Thoracic aorta is not well opacified and is indeterminate for dissection. No CT evidence of right heart  strain.    LUNGS AND PLEURA: Subtle interlobular septal thickening throughout both lungs with patchy groundglass opacities and some subtle areas of air trapping. No aline airspace consolidation. Small bilateral pleural effusions.    MEDIASTINUM/AXILLAE: Large heart with likely left ventricular hypertrophy. Small pericardial effusion.    CORONARY ARTERY CALCIFICATION: Moderate.    UPPER ABDOMEN: Nodular contour of liver with widening of the fissures. Likely splenomegaly. No drainable ascites.    MUSCULOSKELETAL: No acute bony abnormality.      Impression    IMPRESSION:  1.  Findings compatible with heart failure, including cardiomegaly with mild interstitial edema and small bilateral pleural effusions.  2.  No pulmonary embolus.  3.  Morphologic changes suggestive of chronic liver disease/cirrhosis. No drainable ascites in the upper abdomen.   Troponin T, High Sensitivity   Result Value Ref Range    Troponin T, High Sensitivity 21 (H) <=14 ng/L   Symptomatic Influenza A/B, RSV, & SARS-CoV2 PCR (COVID-19) Nasopharyngeal    Specimen: Nasopharyngeal; Swab   Result Value Ref Range    Influenza A PCR Negative Negative    Influenza B PCR Negative Negative    RSV PCR Negative Negative    SARS CoV2 PCR Negative Negative    Narrative    Testing was performed using the Xpert Xpress CoV2/Flu/RSV Assay on the Cepheid GeneXpert Instrument. This test should be ordered for the detection of SARS-CoV-2, influenza, and RSV viruses in individuals who meet clinical and/or epidemiological criteria. Test performance is unknown in asymptomatic patients. This test is for in vitro diagnostic use under the FDA EUA for laboratories certified under CLIA to perform high or moderate complexity testing. This test has not been FDA cleared or approved. A negative result does not rule out the presence of PCR inhibitors in the specimen or target RNA in concentration below the limit of detection for the assay. If only one viral target is positive but  coinfection with multiple targets is suspected, the sample should be re-tested with another FDA cleared, approved, or authorized test, if coinfection would change clinical management. This test was validated by the [facility]. These laboratories are certified under the Clinical Laboratory Improvement Amendments of 1988 (CLIA-88) as qualified to perform high complexity laboratory testing.       Pertinent Radiology     Radiology Results:   Recent Results (from the past 24 hour(s))   XR Chest B Read 2 views    Narrative    For Patients: As a result of the 21st Century Cures Act, medical imaging exams and procedure reports are released immediately into your electronic medical record. You may view this report before your referring provider. If you have questions, please contact your health care provider.    EXAM: XR CHEST 2 VIEWS PA AND LATERAL  LOCATION: THE URGENCY ROOM CAT  DATE: 06/16/2024    INDICATION: Evaluate lung infiltrate. Shortness of breath.  COMPARISON: 02/07/2024.    Impression    Cardiac enlargement, decreased in size since previous examination. Minimal atelectasis left lower lung. Probable tiny bilateral pleural effusions. Hypertrophic changes thoracic spine.   CT Chest Pulmonary Embolism w Contrast    Narrative    EXAM: CT CHEST PULMONARY EMBOLISM W CONTRAST  LOCATION: [facility]  DATE: 6/16/2024    INDICATION: Dyspnea, cough, hypoxia, creatinine 0.91 today  COMPARISON: CT 2/8/2024  TECHNIQUE: CT chest pulmonary angiogram during arterial phase injection of IV contrast. Multiplanar reformats and MIP reconstructions were performed. Dose reduction techniques were used.   CONTRAST: Isovue 370 75mL    FINDINGS:  ANGIOGRAM CHEST: Pulmonary arteries are normal caliber and negative for pulmonary emboli. Thoracic aorta is not well opacified and is indeterminate for dissection. No CT evidence of right heart strain.    LUNGS AND PLEURA: Subtle interlobular septal  thickening throughout both lungs with patchy groundglass opacities and some subtle areas of air trapping. No aline airspace consolidation. Small bilateral pleural effusions.    MEDIASTINUM/AXILLAE: Large heart with likely left ventricular hypertrophy. Small pericardial effusion.    CORONARY ARTERY CALCIFICATION: Moderate.    UPPER ABDOMEN: Nodular contour of liver with widening of the fissures. Likely splenomegaly. No drainable ascites.    MUSCULOSKELETAL: No acute bony abnormality.      Impression    IMPRESSION:  1.  Findings compatible with heart failure, including cardiomegaly with mild interstitial edema and small bilateral pleural effusions.  2.  No pulmonary embolus.  3.  Morphologic changes suggestive of chronic liver disease/cirrhosis. No drainable ascites in the upper abdomen.       EKG Results:  Personally interpreted, left ventricular hypertrophy    I spoke with ED doctor Dr. Wray for patient's care.    AYAD MEDRANO MD  [facility]  [facility]   Phone: #[phone]

## 2024-06-16 NOTE — ED PROVIDER NOTES
EMERGENCY DEPARTMENT ENCOUNTER      NAME: Josefina Dumont  AGE: 74 year old female  YOB: 1950  MRN: [id number]  EVALUATION DATE & TIME: 6/16/2024 12:41 PM    PCP: Rafat Lee    ED PROVIDER: Félix Lin M.D.    Chief Complaint   Patient presents with    Shortness of Breath       FINAL IMPRESSION:  1. Acute on chronic heart failure with preserved ejection fraction (H)    2. Hypertensive urgency        ED COURSE & MEDICAL DECISION MAKING:    Pertinent Labs & Imaging studies independently interpreted by me. (See chart for details)  Reviewed urgent care visit from earlier today when patient was diagnosed with acute congestive heart failure.  Per their report, oxygen saturation 83% on room air.    ED Course as of 06/16/24 1518   Sun Jun 16, 2024   1308 Patient seen and examined, presents today with shortness of breath.  This been going on for several months but worse in the last couple of days.  No chest pain, does feel her abdomen is little distended, denies nausea or vomiting, denies lower extremity swelling.  Labs done at outside facility show elevated BNP, chest x-ray with cardiac enlargement and tiny bilateral pleural effusions.  Patient is quite hypertensive here and this will be treated given her findings of heart failure, BMP today with creatinine 0.91.   1339 CT PE study independently interpreted by me with cardiomegaly, small bilateral pleural effusions, no acute pulmonary embolism noted.   1343 EKG:    Independently reviewed and interpreted by me  Performed at: 1:42 PM  Impression: LVH, no acute ischemic changes  Rate: 74  Rhythm: Sinus  Axis: Normal  WV Interval: 188  QRS Interval: 106  QTc Interval: 444  ST Changes: No acute ischemic changes  Comparison: February 2024, no change     1352 Waiting to hear back regarding bed status versus need for transfer.  Reviewed most recent echocardiogram from February 2024 which showed ejection fraction of 70% and no right ventricular  "dysfunction.   1409 Patient back on cardiac monitor and is significantly hypertensive, family says this is not unusual for her.  She is currently on amlodipine, losartan, hydrochlorothiazide, and spironolactone as well as Lasix.   1415 Patient rechecked, she says the nebulizer treatment helped a little bit but still very breathless after coming back from the emergency department.  Discussed diagnosis and plan with patient.  She is significantly hypertensive in the emergency department, denies missing medications although sister who is with her says she misses her medicines \"all the time.\"  Patient says she has a pill box that she uses.   1453 Off nasal cannula, oxygen saturation drops to 87 to 88%.   1516 Discussed with Dr. Griffin, hospitalist for admission         At the conclusion of the encounter I discussed the results of all of the tests and the disposition. The questions were answered. The patient or family acknowledged understanding and was agreeable with the care plan.     Medical Decision Making  Obtained supplemental history:Supplemental history obtained?: Documented in chart  Reviewed external records: External records reviewed?: Documented in chart  Care impacted by chronic illness:Diabetes and Hypertension  Care significantly affected by social determinants of health:Access to Affordable Health Care  Did you consider but not order tests?: Work up considered but not performed and documented in chart, if applicable  Did you interpret images independently?: Independent interpretation of ECG and images noted in documentation, when applicable.  Consultation discussion with other provider:Did you involve another provider (consultant, , pharmacy, etc.)?: I discussed the care with another health care provider, see documentation for details.  Admit.    PROCEDURES:       MEDICATIONS GIVEN IN THE EMERGENCY:  Medications   LORazepam (ATIVAN) injection 0.5 mg (has no administration in time range)   labetalol " (NORMODYNE/TRANDATE) injection 10 mg (has no administration in time range)   lidocaine 1 % 0.1-1 mL (has no administration in time range)   lidocaine (LMX4) cream (has no administration in time range)   sodium chloride (PF) 0.9% PF flush 3 mL (has no administration in time range)   sodium chloride (PF) 0.9% PF flush 3 mL (has no administration in time range)   acetaminophen (TYLENOL) tablet 650 mg (has no administration in time range)     Or   acetaminophen (TYLENOL) Suppository 650 mg (has no administration in time range)   melatonin tablet 1 mg (has no administration in time range)   polyethylene glycol (MIRALAX) Packet 17 g (has no administration in time range)   ondansetron (ZOFRAN ODT) ODT tab 4 mg (has no administration in time range)     Or   ondansetron (ZOFRAN) injection 4 mg (has no administration in time range)   calcium carbonate (TUMS) chewable tablet 1,000 mg (has no administration in time range)   benzocaine-menthol (CEPACOL) 15-3.6 MG lozenge 1 lozenge (has no administration in time range)   miconazole (MICATIN) 2 % powder (has no administration in time range)   guaiFENesin (ROBITUSSIN) 20 mg/mL solution 200 mg (has no administration in time range)   enoxaparin ANTICOAGULANT (LOVENOX) injection 40 mg (has no administration in time range)   furosemide (LASIX) injection 60 mg (has no administration in time range)   amLODIPine (NORVASC) tablet 10 mg (has no administration in time range)   losartan (COZAAR) tablet 100 mg (has no administration in time range)   Metoprolol Tartrate TABS 75 mg (has no administration in time range)   spironolactone (ALDACTONE) tablet 25 mg (has no administration in time range)   iopamidol (ISOVUE-370) solution 100 mL (75 mLs Intravenous $Given 6/16/24 1333)   ipratropium - albuterol 0.5 mg/2.5 mg/3 mL (DUONEB) neb solution 3 mL (3 mLs Nebulization $Given 6/16/24 1405)   labetalol (NORMODYNE/TRANDATE) injection 10 mg (10 mg Intravenous $Given 6/16/24 1432)       NEW  PRESCRIPTIONS STARTED AT TODAY'S ER VISIT  New Prescriptions    No medications on file       =================================================================    HPI    Patient information was obtained from: patient and sister.       Josefina Dumont is a 74 year old female with a pertinent history of hypertension, CKD, obesity, type 2 diabetes, demand ischemia, who presents to this ED by EMS for evaluation of shortness of breath.     Per chart review: Patient was seen on 6/16/24 at [facility] [place] for shortness of breath. Procedures done at the appointment: XR chest, urinalysis microscopic, US with sediment exam reflexed per criteria, troponin 1, CBC with PLT no different, comp metabolic panel, strep a molecular AFF only, and EKG. Patient was admitted.     Patient reports to the ED for shortness of breath. Patient states she has had shortness of breath for a while but it has gotten worse these past few days. She mentions she has slight coughs that are beginning to be more present. Assumed she had pneumonia or strep but tests came out negative at Urgent Care. Patient states she has heart failure. Allergic to penicillin.      Patient is not on blood thinners. Denies any other complaints or concerns at this time.     Per patient's sister: Patient was seen at [facility] ED in December of 2023 for shortness of breath. She was admitted for ~1 week. She then went into [facility]'s ED again in January of 2024 for unidentified infection, she was diagnosed with kidney failure. States that the patient has been short of breath for a long while now. States that the patient uses an inhaler when walking, walking from her apartment to her garage makes her short of breath. She also mentions that the patient's legs are swollen.     REVIEW OF SYSTEMS   Review of Systems   All other systems reviewed and negative    PAST MEDICAL HISTORY:  Past Medical History:   Diagnosis Date    Chronic kidney disease     Diabetes (H)      Hypertension     Obese        PAST SURGICAL HISTORY:  Past Surgical History:   Procedure Laterality Date    IR CVC TUNNEL PLACEMENT > 5 YRS OF AGE  2/14/2024    IR CVC TUNNEL REMOVAL RIGHT  5/1/2024       CURRENT MEDICATIONS:    Current Facility-Administered Medications   Medication Dose Route Frequency Provider Last Rate Last Admin    acetaminophen (TYLENOL) tablet 650 mg  650 mg Oral Q6H PRN Joyce Kumar MD        Or    acetaminophen (TYLENOL) Suppository 650 mg  650 mg Rectal Q6H PRN Joyce Kumar MD        amLODIPine (NORVASC) tablet 10 mg  10 mg Oral Daily Joyce Kumar MD        benzocaine-menthol (CEPACOL) 15-3.6 MG lozenge 1 lozenge  1 lozenge Buccal Q1H PRN Joyce Kumar MD        calcium carbonate (TUMS) chewable tablet 1,000 mg  1,000 mg Oral 4x Daily PRN Joyce Kumar MD        enoxaparin ANTICOAGULANT (LOVENOX) injection 40 mg  40 mg Subcutaneous Q24H Joyce Kumar MD        furosemide (LASIX) injection 60 mg  60 mg Intravenous Q8H Joyce Kumar MD        guaiFENesin (ROBITUSSIN) 20 mg/mL solution 200 mg  200 mg Oral Q4H PRN Joyce Kumar MD        labetalol (NORMODYNE/TRANDATE) injection 10 mg  10 mg Intravenous Once Félix Lin MD        lidocaine (LMX4) cream   Topical Q1H PRN Joyce Kumar MD        lidocaine 1 % 0.1-1 mL  0.1-1 mL Other Q1H PRN Joyce Kumar MD        LORazepam (ATIVAN) injection 0.5 mg  0.5 mg Intravenous Once Félix Lin MD        losartan (COZAAR) tablet 100 mg  100 mg Oral Daily Joyce Kumar MD        melatonin tablet 1 mg  1 mg Oral At Bedtime PRN Joyce Kumar MD        Metoprolol Tartrate TABS 75 mg  75 mg Oral BID Joyce Kumar MD        miconazole (MICATIN) 2 % powder   Topical BID PRN Joyce Kumar MD        ondansetron (ZOFRAN ODT) ODT tab 4 mg  4 mg Oral Q6H PRN Joyce Kumar MD        Or    ondansetron (ZOFRAN) injection 4 mg  4 mg Intravenous Q6H PRN Joyce Kumar MD        polyethylene glycol (MIRALAX) Packet 17 g  17 g Oral BID PRN Joyce Kumar MD         sodium chloride (PF) 0.9% PF flush 3 mL  3 mL Intracatheter Q8H Joyce Kumar MD        sodium chloride (PF) 0.9% PF flush 3 mL  3 mL Intracatheter q1 min prn Joyce Kumar MD        spironolactone (ALDACTONE) tablet 25 mg  25 mg Oral QAM Jyoce Kumar MD         Current Outpatient Medications   Medication Sig Dispense Refill    acetaminophen (TYLENOL) 500 MG tablet Take 2 tablets (1,000 mg) by mouth every 8 hours as needed for mild pain      albuterol (PROAIR HFA/PROVENTIL HFA/VENTOLIN HFA) 108 (90 Base) MCG/ACT inhaler Inhale 2 puffs into the lungs every 6 hours as needed      amLODIPine (NORVASC) 10 MG tablet Take 10 mg by mouth daily      calcium carbonate (TUMS) 500 MG chewable tablet Take 1 chew tab by mouth 2 times daily as needed for heartburn      citalopram (CELEXA) 40 MG tablet Take 40 mg by mouth daily      clopidogrel (PLAVIX) 75 MG tablet Take 75 mg by mouth daily      furosemide (LASIX) 40 MG tablet Take 1 tablet (40 mg) by mouth daily      glipiZIDE (GLUCOTROL XL) 2.5 MG 24 hr tablet Take 2.5 mg by mouth every morning      losartan (COZAAR) 50 MG tablet Take 2 tablets (100 mg) by mouth daily 60 tablet 0    Metoprolol Tartrate 75 MG TABS Take 75 mg by mouth 2 times daily      multivitamin w/minerals (THERA-VIT-M) tablet Take 1 tablet by mouth daily      pantoprazole (PROTONIX) 40 MG EC tablet Take 1 tablet (40 mg) by mouth 2 times daily (before meals) 60 tablet 0    senna-docusate (SENOKOT-S/PERICOLACE) 8.6-50 MG tablet Take 1 tablet by mouth at bedtime      spironolactone (ALDACTONE) 25 MG tablet Take 25 mg by mouth every morning      vitamin E (TOCOPHEROL) 400 units (180 mg) capsule Take 400 Units by mouth daily         ALLERGIES:  Allergies   Allergen Reactions    Aspirin      Other reaction(s): stomach upset    Atenolol Other (See Comments)     abd pain    Lisinopril      Other reaction(s): stomach aches    Nsaids Nephrotoxicity    Penicillins Hives     Tolerated ceftriaxone    Statins       "Other reaction(s): myalgias       FAMILY HISTORY:  No family history on file.    SOCIAL HISTORY:   Social History     Socioeconomic History    Marital status:    Tobacco Use    Smoking status: Former     Current packs/day: 0.00     Types: Cigarettes     Quit date:      Years since quittin.4    Smokeless tobacco: Never       VITALS:  BP (!) 223/93   Pulse 72   Temp 98.3  F (36.8  C) (Oral)   Resp 26   Ht 1.626 m (5' 4\")   Wt 93 kg (205 lb)   SpO2 94%   BMI 35.19 kg/m      PHYSICAL EXAM:  Physical Exam  Vitals and nursing note reviewed.   Constitutional:       Appearance: Normal appearance.   HENT:      Head: Normocephalic and atraumatic.      Right Ear: External ear normal.      Left Ear: External ear normal.      Nose: Nose normal.      Mouth/Throat:      Mouth: Mucous membranes are moist.   Eyes:      Extraocular Movements: Extraocular movements intact.      Conjunctiva/sclera: Conjunctivae normal.      Pupils: Pupils are equal, round, and reactive to light.   Cardiovascular:      Rate and Rhythm: Normal rate and regular rhythm.   Pulmonary:      Effort: Pulmonary effort is normal.      Breath sounds: Normal breath sounds. No wheezing or rales.      Comments: Expiratory bilateral wheezing. Bilateral crackling on right side. Trace bilateral, no extremity edema.   Abdominal:      General: Abdomen is flat. There is no distension.      Palpations: Abdomen is soft.      Tenderness: There is no abdominal tenderness. There is no guarding.   Musculoskeletal:         General: Normal range of motion.      Cervical back: Normal range of motion and neck supple.      Right lower leg: No edema.      Left lower leg: No edema.   Lymphadenopathy:      Cervical: No cervical adenopathy.   Skin:     General: Skin is warm and dry.   Neurological:      General: No focal deficit present.      Mental Status: She is alert and oriented to person, place, and time. Mental status is at baseline.      Comments: No gross " focal neurologic deficits   Psychiatric:         Mood and Affect: Mood normal.         Behavior: Behavior normal.         Thought Content: Thought content normal.          LAB:  All pertinent labs reviewed and interpreted.  Results for orders placed or performed during the hospital encounter of 06/16/24   CT Chest Pulmonary Embolism w Contrast    Impression    IMPRESSION:  1.  Findings compatible with heart failure, including cardiomegaly with mild interstitial edema and small bilateral pleural effusions.  2.  No pulmonary embolus.  3.  Morphologic changes suggestive of chronic liver disease/cirrhosis. No drainable ascites in the upper abdomen.   Result Value Ref Range    Troponin T, High Sensitivity 21 (H) <=14 ng/L       RADIOLOGY:  Reviewed all pertinent imaging. Please see official radiology report.  CT Chest Pulmonary Embolism w Contrast   Final Result   IMPRESSION:   1.  Findings compatible with heart failure, including cardiomegaly with mild interstitial edema and small bilateral pleural effusions.   2.  No pulmonary embolus.   3.  Morphologic changes suggestive of chronic liver disease/cirrhosis. No drainable ascites in the upper abdomen.      Echocardiogram Complete    (Results Pending)       I, Mook Mcduffie, am serving as a scribe to document services personally performed by Dr. Lin based on my observation and the provider's statements to me. I, Félix Lin MD attest that Mook Mcduffie is acting in a scribe capacity, has observed my performance of the services and has documented them in accordance with my direction.    Félix Lin M.D.  Emergency Medicine  [facility] EMERGENCY ROOM  [address] [address]  [phone]  Dept: [phone]      Félix Lin MD  06/16/24 2319

## 2024-06-17 ENCOUNTER — APPOINTMENT (OUTPATIENT)
Dept: PHYSICAL THERAPY | Facility: CLINIC | Age: 74
DRG: 291 | End: 2024-06-17
Attending: STUDENT IN AN ORGANIZED HEALTH CARE EDUCATION/TRAINING PROGRAM
Payer: COMMERCIAL

## 2024-06-17 ENCOUNTER — TELEPHONE (OUTPATIENT)
Dept: CARDIOLOGY | Facility: CLINIC | Age: 74
End: 2024-06-17

## 2024-06-17 ENCOUNTER — APPOINTMENT (OUTPATIENT)
Dept: CARDIOLOGY | Facility: CLINIC | Age: 74
DRG: 291 | End: 2024-06-17
Attending: STUDENT IN AN ORGANIZED HEALTH CARE EDUCATION/TRAINING PROGRAM
Payer: COMMERCIAL

## 2024-06-17 ENCOUNTER — APPOINTMENT (OUTPATIENT)
Dept: OCCUPATIONAL THERAPY | Facility: CLINIC | Age: 74
DRG: 291 | End: 2024-06-17
Attending: STUDENT IN AN ORGANIZED HEALTH CARE EDUCATION/TRAINING PROGRAM
Payer: COMMERCIAL

## 2024-06-17 DIAGNOSIS — I50.9 ACUTE DECOMPENSATED HEART FAILURE (H): Primary | ICD-10-CM

## 2024-06-17 LAB
ALBUMIN SERPL BCG-MCNC: 3.3 G/DL (ref 3.5–5.2)
ALP SERPL-CCNC: 122 U/L (ref 40–150)
ALT SERPL W P-5'-P-CCNC: 6 U/L (ref 0–50)
ANION GAP SERPL CALCULATED.3IONS-SCNC: 12 MMOL/L (ref 7–15)
AST SERPL W P-5'-P-CCNC: 27 U/L (ref 0–45)
BILIRUB DIRECT SERPL-MCNC: 0.27 MG/DL (ref 0–0.3)
BILIRUB SERPL-MCNC: 0.6 MG/DL
BUN SERPL-MCNC: 14.3 MG/DL (ref 8–23)
CALCIUM SERPL-MCNC: 8.8 MG/DL (ref 8.8–10.2)
CHLORIDE SERPL-SCNC: 108 MMOL/L (ref 98–107)
CREAT SERPL-MCNC: 1.08 MG/DL (ref 0.51–0.95)
DEPRECATED HCO3 PLAS-SCNC: 21 MMOL/L (ref 22–29)
EGFRCR SERPLBLD CKD-EPI 2021: 54 ML/MIN/1.73M2
ERYTHROCYTE [DISTWIDTH] IN BLOOD BY AUTOMATED COUNT: 16.1 % (ref 10–15)
GLUCOSE SERPL-MCNC: 102 MG/DL (ref 70–99)
HCT VFR BLD AUTO: 31.3 % (ref 35–47)
HGB BLD-MCNC: 10 G/DL (ref 11.7–15.7)
HOLD SPECIMEN: NORMAL
LVEF ECHO: NORMAL
MAGNESIUM SERPL-MCNC: 2.5 MG/DL (ref 1.7–2.3)
MCH RBC QN AUTO: 30 PG (ref 26.5–33)
MCHC RBC AUTO-ENTMCNC: 31.9 G/DL (ref 31.5–36.5)
MCV RBC AUTO: 94 FL (ref 78–100)
MRSA DNA SPEC QL NAA+PROBE: NEGATIVE
PLATELET # BLD AUTO: 117 10E3/UL (ref 150–450)
POTASSIUM SERPL-SCNC: 3.2 MMOL/L (ref 3.4–5.3)
POTASSIUM SERPL-SCNC: 3.4 MMOL/L (ref 3.4–5.3)
POTASSIUM SERPL-SCNC: 3.6 MMOL/L (ref 3.4–5.3)
PROT SERPL-MCNC: 7.1 G/DL (ref 6.4–8.3)
RBC # BLD AUTO: 3.33 10E6/UL (ref 3.8–5.2)
SA TARGET DNA: NEGATIVE
SODIUM SERPL-SCNC: 141 MMOL/L (ref 135–145)
TROPONIN T SERPL HS-MCNC: 26 NG/L
TROPONIN T SERPL HS-MCNC: 28 NG/L
WBC # BLD AUTO: 3.5 10E3/UL (ref 4–11)

## 2024-06-17 PROCEDURE — 97161 PT EVAL LOW COMPLEX 20 MIN: CPT | Mod: GP

## 2024-06-17 PROCEDURE — 36415 COLL VENOUS BLD VENIPUNCTURE: CPT | Performed by: STUDENT IN AN ORGANIZED HEALTH CARE EDUCATION/TRAINING PROGRAM

## 2024-06-17 PROCEDURE — 255N000002 HC RX 255 OP 636: Performed by: HOSPITALIST

## 2024-06-17 PROCEDURE — 36415 COLL VENOUS BLD VENIPUNCTURE: CPT | Performed by: HOSPITALIST

## 2024-06-17 PROCEDURE — 80053 COMPREHEN METABOLIC PANEL: CPT | Performed by: STUDENT IN AN ORGANIZED HEALTH CARE EDUCATION/TRAINING PROGRAM

## 2024-06-17 PROCEDURE — 85027 COMPLETE CBC AUTOMATED: CPT | Performed by: STUDENT IN AN ORGANIZED HEALTH CARE EDUCATION/TRAINING PROGRAM

## 2024-06-17 PROCEDURE — 97166 OT EVAL MOD COMPLEX 45 MIN: CPT | Mod: GO

## 2024-06-17 PROCEDURE — 84484 ASSAY OF TROPONIN QUANT: CPT | Performed by: STUDENT IN AN ORGANIZED HEALTH CARE EDUCATION/TRAINING PROGRAM

## 2024-06-17 PROCEDURE — 97530 THERAPEUTIC ACTIVITIES: CPT | Mod: GP

## 2024-06-17 PROCEDURE — 250N000011 HC RX IP 250 OP 636: Mod: JZ | Performed by: STUDENT IN AN ORGANIZED HEALTH CARE EDUCATION/TRAINING PROGRAM

## 2024-06-17 PROCEDURE — 97535 SELF CARE MNGMENT TRAINING: CPT | Mod: GO

## 2024-06-17 PROCEDURE — 83735 ASSAY OF MAGNESIUM: CPT | Performed by: STUDENT IN AN ORGANIZED HEALTH CARE EDUCATION/TRAINING PROGRAM

## 2024-06-17 PROCEDURE — 999N000208 ECHOCARDIOGRAM COMPLETE

## 2024-06-17 PROCEDURE — 99233 SBSQ HOSP IP/OBS HIGH 50: CPT | Performed by: HOSPITALIST

## 2024-06-17 PROCEDURE — 97116 GAIT TRAINING THERAPY: CPT | Mod: GP

## 2024-06-17 PROCEDURE — 82248 BILIRUBIN DIRECT: CPT | Performed by: STUDENT IN AN ORGANIZED HEALTH CARE EDUCATION/TRAINING PROGRAM

## 2024-06-17 PROCEDURE — 84132 ASSAY OF SERUM POTASSIUM: CPT | Performed by: HOSPITALIST

## 2024-06-17 PROCEDURE — 250N000013 HC RX MED GY IP 250 OP 250 PS 637: Performed by: HOSPITALIST

## 2024-06-17 PROCEDURE — 250N000013 HC RX MED GY IP 250 OP 250 PS 637: Performed by: STUDENT IN AN ORGANIZED HEALTH CARE EDUCATION/TRAINING PROGRAM

## 2024-06-17 PROCEDURE — 93306 TTE W/DOPPLER COMPLETE: CPT | Mod: 26 | Performed by: INTERNAL MEDICINE

## 2024-06-17 PROCEDURE — 120N000004 HC R&B MS OVERFLOW

## 2024-06-17 RX ORDER — POTASSIUM CHLORIDE 1500 MG/1
20 TABLET, EXTENDED RELEASE ORAL ONCE
Status: COMPLETED | OUTPATIENT
Start: 2024-06-17 | End: 2024-06-17

## 2024-06-17 RX ORDER — POTASSIUM CHLORIDE 1500 MG/1
40 TABLET, EXTENDED RELEASE ORAL ONCE
Status: COMPLETED | OUTPATIENT
Start: 2024-06-17 | End: 2024-06-17

## 2024-06-17 RX ORDER — HYDRALAZINE HYDROCHLORIDE 10 MG/1
10 TABLET, FILM COATED ORAL 4 TIMES DAILY PRN
Status: DISCONTINUED | OUTPATIENT
Start: 2024-06-17 | End: 2024-06-19

## 2024-06-17 RX ADMIN — PANTOPRAZOLE SODIUM 40 MG: 40 TABLET, DELAYED RELEASE ORAL at 08:33

## 2024-06-17 RX ADMIN — AMLODIPINE BESYLATE 10 MG: 5 TABLET ORAL at 08:33

## 2024-06-17 RX ADMIN — ACETAMINOPHEN 650 MG: 325 TABLET ORAL at 21:08

## 2024-06-17 RX ADMIN — CLOPIDOGREL BISULFATE 75 MG: 75 TABLET ORAL at 08:34

## 2024-06-17 RX ADMIN — ENOXAPARIN SODIUM 40 MG: 100 INJECTION SUBCUTANEOUS at 16:43

## 2024-06-17 RX ADMIN — FUROSEMIDE 40 MG: 10 INJECTION, SOLUTION INTRAMUSCULAR; INTRAVENOUS at 00:03

## 2024-06-17 RX ADMIN — PERFLUTREN 3 ML: 6.52 INJECTION, SUSPENSION INTRAVENOUS at 12:12

## 2024-06-17 RX ADMIN — CLINDAMYCIN PHOSPHATE 900 MG: 900 INJECTION, SOLUTION INTRAVENOUS at 22:42

## 2024-06-17 RX ADMIN — CITALOPRAM HYDROBROMIDE 40 MG: 10 TABLET ORAL at 08:33

## 2024-06-17 RX ADMIN — FUROSEMIDE 40 MG: 10 INJECTION, SOLUTION INTRAMUSCULAR; INTRAVENOUS at 16:43

## 2024-06-17 RX ADMIN — SENNOSIDES AND DOCUSATE SODIUM 1 TABLET: 50; 8.6 TABLET ORAL at 21:08

## 2024-06-17 RX ADMIN — PANTOPRAZOLE SODIUM 40 MG: 40 TABLET, DELAYED RELEASE ORAL at 16:42

## 2024-06-17 RX ADMIN — POTASSIUM CHLORIDE 40 MEQ: 1500 TABLET, EXTENDED RELEASE ORAL at 16:42

## 2024-06-17 RX ADMIN — METOPROLOL SUCCINATE 50 MG: 25 TABLET, EXTENDED RELEASE ORAL at 08:33

## 2024-06-17 RX ADMIN — POTASSIUM CHLORIDE 20 MEQ: 1500 TABLET, EXTENDED RELEASE ORAL at 21:08

## 2024-06-17 RX ADMIN — CLINDAMYCIN PHOSPHATE 900 MG: 900 INJECTION, SOLUTION INTRAVENOUS at 06:35

## 2024-06-17 RX ADMIN — LOSARTAN POTASSIUM 100 MG: 25 TABLET, FILM COATED ORAL at 08:33

## 2024-06-17 RX ADMIN — POTASSIUM CHLORIDE 40 MEQ: 1500 TABLET, EXTENDED RELEASE ORAL at 08:33

## 2024-06-17 RX ADMIN — HYDRALAZINE HYDROCHLORIDE 10 MG: 10 TABLET ORAL at 16:43

## 2024-06-17 RX ADMIN — SPIRONOLACTONE 25 MG: 25 TABLET, FILM COATED ORAL at 08:34

## 2024-06-17 RX ADMIN — FUROSEMIDE 40 MG: 10 INJECTION, SOLUTION INTRAMUSCULAR; INTRAVENOUS at 08:34

## 2024-06-17 RX ADMIN — HYDRALAZINE HYDROCHLORIDE 10 MG: 10 TABLET ORAL at 21:08

## 2024-06-17 RX ADMIN — CLINDAMYCIN PHOSPHATE 900 MG: 900 INJECTION, SOLUTION INTRAVENOUS at 14:37

## 2024-06-17 ASSESSMENT — ACTIVITIES OF DAILY LIVING (ADL)
ADLS_ACUITY_SCORE: 33
ADLS_ACUITY_SCORE: 33
ADLS_ACUITY_SCORE: 34
ADLS_ACUITY_SCORE: 34
ADLS_ACUITY_SCORE: 33
ADLS_ACUITY_SCORE: 34
ADLS_ACUITY_SCORE: 33
ADLS_ACUITY_SCORE: 34
ADLS_ACUITY_SCORE: 34
ADLS_ACUITY_SCORE: 33
ADLS_ACUITY_SCORE: 34
ADLS_ACUITY_SCORE: 33

## 2024-06-17 NOTE — PROGRESS NOTES
06/17/24 1015   Appointment Info   Signing Clinician's Name / Credentials (PT) Eamon Nevarez, PT, DPT   Living Environment   People in Home alone   Current Living Arrangements apartment   Home Accessibility no concerns   Self-Care   Usual Activity Tolerance good   Current Activity Tolerance moderate   Equipment Currently Used at Home walker, rolling   Fall history within last six months no   General Information   Onset of Illness/Injury or Date of Surgery 06/16/24   Referring Physician Dr. Jose De Jesus Christie   Patient/Family Therapy Goals Statement (PT) Improve activity tolerance   Pertinent History of Current Problem (include personal factors and/or comorbidities that impact the POC) CHF   Existing Precautions/Restrictions fall   Weight-Bearing Status - LLE full weight-bearing   Weight-Bearing Status - RLE full weight-bearing   Range of Motion (ROM)   ROM Comment WFL   Strength (Manual Muscle Testing)   Strength Comments WFL   Bed Mobility   Bed Mobility supine-sit   Supine-Sit [place] (Bed Mobility) modified independence   Impairments Contributing to Impaired Bed Mobility decreased strength   Assistive Device (Bed Mobility) bed rails   Transfers   Transfers sit-stand transfer   Sit-Stand Transfer   Sit-Stand [place] (Transfers) contact guard   Assistive Device (Sit-Stand Transfers) walker, front-wheeled   Gait/Stairs (Locomotion)   [place] Level (Gait) contact guard   Assistive Device (Gait) walker, front-wheeled   Distance in Feet (Gait) 10'   Pattern (Gait) step-through   Deviations/Abnormal Patterns (Gait) radha decreased;stride length decreased   Clinical Impression   Criteria for Skilled Therapeutic Intervention Yes, treatment indicated   PT Diagnosis (PT) impaired functional mobility   Influenced by the following impairments weakness   Functional limitations due to impairments gait, transfers   Clinical Presentation (PT Evaluation Complexity) stable   Clinical Presentation Rationale pt presents  as medically diagnosed   Clinical Decision Making (Complexity) low complexity   Planned Therapy Interventions (PT) gait training;home exercise program;patient/family education;ROM (range of motion);strengthening;transfer training   Risk & Benefits of therapy have been explained care plan/treatment goals reviewed;patient   PT Total Evaluation Time   PT Eval, Low Complexity Minutes (59469) 10   Physical Therapy Goals   PT Frequency Daily   PT Predicted Duration/Target Date for Goal Attainment 06/21/24   PT Goals Transfers;Gait   PT: Transfers Supervision/stand-by assist;Sit to/from stand   PT: Gait Supervision/stand-by assist;Rolling walker;150 feet   Interventions   Interventions Quick Adds Gait Training;Therapeutic Activity   Therapeutic Activity   Therapeutic Activities: dynamic activities to improve functional performance Minutes (46883) 10   Symptoms Noted During/After Treatment Fatigue   Treatment Detail/Skilled Intervention Supine to sit Mod I with HOB elevated and use of bed rail, SBA at EOB. Sit<>stand x 2 CGA and independent with toileting. Increased time for set up, education on POC, role of therapies.   Gait Training   Gait Training Minutes (86612) 10   Symptoms Noted During/After Treatment (Gait Training) fatigue   Treatment Detail/Skilled Intervention Pt amb to the bathroom and in the halls SBA with FWW, was on 2L initially going into bathroom but on RA for rest of session, sats in low 90's after activity, left off O2 and notified RN. Cues for navigation, PLB, and safety. One short standing rest break. Encouraged amb with nursing as able.   Distance in Feet 100'   [place] Level (Gait Training) stand-by assist   Physical Assistance Level (Gait Training) verbal cues;supervision   Weight Bearing (Gait Training) full weight-bearing   Assistive Device (Gait Training) rolling walker   Pattern Analysis (Gait Training) swing-through gait   Gait Analysis Deviations decreased radha;decreased step length    Impairments (Gait Analysis/Training) strength decreased   PT Discharge Planning   PT Plan Increase activity tolerance   PT Discharge Recommendation (DC Rec) (S)  home with home care physical therapy   PT Rationale for DC Rec Has sister in building if needing assist, good home set up, pt mobilizing well but limited with activity tolerance, could use  PT to continue strengthening.   PT Brief overview of current status SBA transfers and amb 100' with FWW

## 2024-06-17 NOTE — PROGRESS NOTES
06/17/24 1350   Appointment Info   Signing Clinician's Name / Credentials (OT) Ruthann Milner, OTR/L   Living Environment   People in Home alone   Current Living Arrangements apartment;independent living facility   Living Environment Comments has a rts with vanity, tub shower with shower chair   Self-Care   Activity/Exercise/Self-Care Comment pt has been receiving home PT and RN visits.  She has been ind with adls   Instrumental Activities of Daily Living (IADL)   IADL Comments pts sister does most IADLS.  She lives in the same building.  The home care agency who sets up her meds has had some staffing issues and hasn't been consistently coming per the pts sisters report   General Information   Onset of Illness/Injury or Date of Surgery 06/16/24   Referring Physician Joyce Kumar   Patient/Family Therapy Goal Statement (OT) get stronger   Additional Occupational Profile Info/Pertinent History of Current Problem Josefina Dumont is a 74 year old female with a pertinent history of hypertension, CKD, obesity, type 2 diabetes, demand ischemia, who presents to this ED by EMS for evaluation of shortness of breath.   Cognitive Status Examination   Affect/Mental Status (Cognitive) confused   Cognitive Status Comments Pt mildly confused at times during session.  Recommend doing a slums tomorrow.   Range of Motion Comprehensive   General Range of Motion no range of motion deficits identified   Strength Comprehensive (MMT)   General Manual Muscle Testing (MMT) Assessment no strength deficits identified   Bed Mobility   Comment (Bed Mobility) min   Transfers   Transfer Comments min   Activities of Daily Living   BADL Assessment/Intervention lower body dressing;toileting;grooming   Lower Body Dressing Assessment/Training   [place] Level (Lower Body Dressing) minimum assist (75% patient effort)   Grooming Assessment/Training   [place] Level (Grooming) minimum assist (75% patient effort)   Toileting   [place] Level  (Toileting) minimum assist (75% patient effort)   Clinical Impression   Criteria for Skilled Therapeutic Interventions Met (OT) Yes, treatment indicated   OT Diagnosis decreased ind with adls   OT Problem List-Impairments impacting ADL activity tolerance impaired;cognition;mobility;strength;pain   Assessment of Occupational Performance 3-5 Performance Deficits   Identified Performance Deficits dressing, toileting, transfers, bed mobility, cognition   Planned Therapy Interventions (OT) ADL retraining;bed mobility training;transfer training;cognition   Clinical Decision Making Complexity (OT) detailed assessment/moderate complexity   Risk & Benefits of therapy have been explained evaluation/treatment results reviewed;care plan/treatment goals reviewed;risks/benefits reviewed;current/potential barriers reviewed;participants voiced agreement with care plan;participants included;patient   OT Total Evaluation Time   OT Eval, Moderate Complexity Minutes (29644) 15   OT Goals   Therapy Frequency (OT) 5 times/week   OT Predicted Duration/Target Date for Goal Attainment 06/21/24   OT Goals Lower Body Dressing;Bed Mobility;Toilet Transfer/Toileting;Hygiene/Grooming   OT: Hygiene/Grooming modified independent;while standing   OT: Lower Body Dressing Modified independent   OT: Bed Mobility Modified independent;supine to/from sitting;rolling   OT: Toilet Transfer/Toileting Modified independent;toilet transfer;cleaning and garment management   Interventions   Interventions Quick Adds Self-Care/Home Management   Self-Care/Home Management   Self-Care/Home Mgmt/ADL, Compensatory, Meal Prep Minutes (02007) 23   Symptoms Noted During/After Treatment (Meal Preparation/Planning Training) fatigue   Treatment Detail/Skilled Intervention The pt. was agreeable to OT.  She was on room air, and sats were 88% at rest.  She was able to increase her sats to 92% with PLB after OT demonstration and verbal cues.  She ambulated to the bathroom with  the FWW and SBA.  She fatigued quickly.  Began teaching ec/ws principles to use with adls, especially PLB and taking frequent rest breaks.  She needed verbal cues for safe technique and hand placement for getting off the toilet.  She stood at the sink and washed her hands.  When asked if she d like to brush her teeth, she said she was too tired and couldn t stand that long.  Praised her for not pushing herself and for knowing that she needed to sit down and rest.  Her sats were 86%.  She increased her sats within one minute with seated rest and PLB.  Pt denied feeling SOB.  Notified RN of pts sats on room air.   OT Discharge Planning   OT Plan slums, adls with EC/WS/PLB, activity tolerance, standing   OT Discharge Recommendation (DC Rec) (S)  home with home care occupational therapy   OT Rationale for DC Rec Pt will benefit from OT for activity tolerance within ADLs   OT Brief overview of current status On room air, fatigues quickly.  Sats 88% at rest and after activity.  Can increase to better than 90% with PLB and seated rest.  SBA-min with adls   Total Session Time   Timed Code Treatment Minutes 23   Total Session Time (sum of timed and untimed services) 38

## 2024-06-17 NOTE — PROGRESS NOTES
NUTRITION EDUCATION      REASON FOR ASSESSMENT:  2g Na education per CHF protocol     NUTRITION HISTORY:  Information obtained from pt    Pt has already learned about a 2g Na diet prior. She lives in an apartment. Her sister lives in the same building and they help one another with meals. They both follow a low sodium diet. Pt doesn't use a salt shaker. She said recently she decided to use up some canned foods to make a hot dish. She strained it hoping to rossi some sodium out. However typically she doesn't eat much canned or processed foods. She states she rarely eats out.       CURRENT DIET:  Combination Diet Low Saturated Fat Na <2400mg Diet, No Caffeine Diet;  Fluid restriction 1800 ML FLUID       INTERVENTIONS:  Pt appears to know her diet and follows it majority of the time. We did discuss how straining canned items can help but ultimately they are still salty foods. She states she has a lot of canned items and was trying to use them up, but will donate them instead. She has no further questions on her diet.     Goals:      *  Patient will verbalize understanding of diet -met      *  All of the above goals met during the education session    Follow Up/Monitoring:      *  Provided RD contact information for future questions      *  Recommended Out-Patient Nutrition Referral, if further diet instructions are needed

## 2024-06-17 NOTE — UTILIZATION REVIEW
Admission Status; Secondary Review Determination   Under the authority of the Utilization Management Committee, the utilization review process indicated a secondary review on Josefina Dumont. The review outcome is based on review of the medical records, discussions with staff, and applying clinical experience noted on the date of the review.   (x) Inpatient Status Appropriate - This patient's medical care is consistent with medical management for inpatient care and reasonable inpatient medical practice.     RATIONALE FOR DETERMINATION   74-year-old female admitted with acute hypoxic respiratory failure secondary to acute heart failure exacerbation with pulmonary edema and hypertensive urgency/emergency.  Started on IV Lasix, echocardiogram ordered.  Given IV labetalol and multiple home medications reordered with potential adjustments.  Also had hypokalemia and troponin elevation.  Has right middle finger cellulitis and started on IV antibiotics.  Was pancytopenic, this is being monitored.  Has new mild FELIPA today.    At the time of admission with the information available to the attending physician more than 2 nights Hospital complex care was anticipated, based on patient risk of adverse outcome if treated as outpatient and complex care required. Inpatient admission is appropriate based on the Medicare guidelines.   The information on this document is developed by the utilization review team in order for the business office to ensure compliance. This only denotes the appropriateness of proper admission status and does not reflect the quality of care rendered.   The definitions of Inpatient Status and Observation Status used in making the determination above are those provided in the CMS Coverage Manual, Chapter 1 and Chapter 6, section 70.4.   Sincerely,   Refugio Gómez MD  Utilization Review  Physician Advisor  [facility]

## 2024-06-17 NOTE — PLAN OF CARE
Goal Outcome Evaluation:         Problem: Adult Inpatient Plan of Care  Goal: Plan of Care Review  Description: The Plan of Care Review/Shift note should be completed every shift.  The Outcome Evaluation is a brief statement about your assessment that the patient is improving, declining, or no change.  This information will be displayed automatically on your shift  note.  Outcome: Progressing     Problem: Heart Failure  Goal: Effective Oxygenation and Ventilation  Outcome: Progressing    I Illness Severity: Watcher    PPatient Summary:      Telemetry Orders: Yes    Interpretation of Cardiac Rhythm: SR with 1st degree AV Block, BBB and prolonged QT    Dyspnea improved and O2 sats >88% at RA or at prior home O2 therapy level:  Yes    Last Bowel Movement:     Acute Symptoms or Concerns: elevated BP and shortness of breath     Is this a new or worsening symptom or concern? Yes    Is this symptom or concern being adequately managed? Yes    Shift Summary: Pt on 2L O2 NC. BP continues to be elevated 160-170's systolic.  Denies pain.       AActions:    Diagnostic testing and/or procedures completed, and results are available for discharge:  Met    SSituational Awareness:      Anticipated post discharge treatment plan formulated and the following needs have been identified:   None identified    SSynthesis:     Was bedside rounding completed on your shift? No     What team members present during bedside rounds?   NA

## 2024-06-17 NOTE — PROGRESS NOTES
[facility]    Medicine Progress Note - Hospitalist Service    Date of Admission:  6/16/2024    Assessment & Plan   Identification/Summary:   Josefina Dumont is a 74 year old female with PMH signficant for .  Presented with hypoxia from urgency room.  Patient initially presented to the emergency room today for sore throat and cough for the past few days.  She had a low saturation of low 80s at the urgency room with send BNP greater than 5000 so transferred to [facility] for possible heart failure.  In the ED, patient has blood pressure highest at 133/100, satting well on 1 L of oxygen.  COVID/flu/RSV screen negative.  CT PE did not reveal PE but has possible interstitial edema small bilateral pleural effusion.  In the UR, she also has mild pancytopenia hemoglobin 10.8, platelet 130, white count 3.7 all better than prior and creatinine 0.91(eGFR 67), potassium 3.0.  She was given IV Lasix 40 mg as an emergency room.  Troponin was found to be mildly elevated in our ER.  She was given 2 doses IV labetalol in our ED.     BP now 160s/70s. Added hydralazine PRN, and may continue to titrate to achieve goal BP. Continue IV lasix 40 mg q8h per protocol for ADHF. Follow-up TTE.     Assessment and Plan:  Acute hypoxic respiratory failure  Acute on chronic heart failure with preserved ejection fraction  Hypertension urgency/emergency with pulmonary edema  Left ventricular hypertrophy due to chronic hypertension  Order echocardiogram to check ejection fraction, pericardial effusion, left ventricular hypertrophy  Strict I's and O's  Mag and K RN protocol  S/p 2 doses of IV labetalol in the ED  Order home medicine metoprolol 50 mg daily, amlodipine 10 mg, losartan 100 mg, spironolactone 25 mg  Consider secondary hypertension workup, including sleep study outpatient  Follow-up TSH/reflex  Continue IV lasix 40 mg IV q8h     Severe hypokalemia  K was 3.0 at the emergency room  Replacement protocol      Troponin elevation  Asymptomatic, EKG without ST-T change   Trend     Right middle finger cellulitis  No fluid collection.    She is allergic to penicillin. Start IV clindamycin.  MRSA swab  May need for follow up with  for the health of the nail     LFT elevation  , AST 51 and urgency room, which is new to her.  Could this be congestive hepatopathy?  Monitor LFT change  Trend and repeat LFT tomorrow AM     Pancytopenia  On admission hemoglobin 10.8, white blood cells 3.7, platelet 130  Monitor     Raynaud phenomena  Fingers turn purple after washing hands with cold water  Bilateral radial pulses 2+, extremities well-perfused except right middle finger     CKD  Monitor Cr when diuresing  On admission, Cr  0.91(eGFR 67)         Diet: Fluid restriction 1800 ML FLUID (and additional linked orders)  Combination Diet Low Saturated Fat Na <2400mg Diet, No Caffeine Diet    DVT Prophylaxis: Enoxaparin (Lovenox) SQ, Pneumatic Compression Devices, Anti-embolisim stockings (TEDs), and Ambulate every shift  [place] Catheter: Not present  Lines: None     Cardiac Monitoring: ACTIVE order. Indication: Acute decompensated heart failure (48 hours)  Code Status: Full Code      Clinically Significant Risk Factors Present on Admission        # Hypokalemia: Lowest K = 2.4 mmol/L in last 2 days, will replace as needed     # Hypomagnesemia: Lowest Mg = 1.4 mg/dL in last 2 days, will replace as needed   # Hypoalbuminemia: Lowest albumin = 3.3 g/dL at 6/17/2024  4:22 AM, will monitor as appropriate   # Drug Induced Platelet Defect: home medication list includes an antiplatelet medication   # Hypertension: Noted on problem list  # Acute heart failure with preserved ejection fraction: heart failure noted on problem list, last echo with EF >50%, and receiving IV diuretics   # Anemia: based on hgb <11          # DMII: A1C = N/A within past 6 months    # Obesity: Estimated body mass index is 35.89 kg/m  as calculated from  "the following:    Height as of this encounter: 1.626 m (5' 4\").    Weight as of this encounter: 94.8 kg (209 lb 1.6 oz).       # Financial/Environmental Concerns: none         Disposition Plan     Medically Ready for Discharge: Anticipated in 2-4 Days             Jose De Jesus Christie MD  Hospitalist Service  [facility]  Securely message with [employer] (more info)  Text page via [employer] Paging/Directory   ______________________________________________________________________    Interval History   No acute events overnight.    Confirms she has had progressive dyspnea on exertion and orthopena. No report of chest pain, shortness of breath, or other new complaints. Discussed ADHF pathophysiology. Discussed plan of care with her. Answered all questions to patient's verbalized understanding and satisfaction.    Physical Exam   Vital Signs: Temp: 98  F (36.7  C) Temp src: Oral BP: (!) 176/77 Pulse: 62   Resp: 20 SpO2: 98 % O2 Device: Nasal cannula Oxygen Delivery: 2 LPM  Weight: 209 lbs 1.6 oz    GENERAL:  Alert, appears comfortable, in no acute distress, appears stated age, on ambient room air   HEAD:  Normocephalic, without obvious abnormality, atraumatic   EYES:  Conjunctiva/corneas clear, no scleral icterus, EOM's appears intact grossly   NOSE: Nares normal, septum midline, mucosa normal, no drainage   THROAT: Lips, mucosa, and tongue appear normal   NECK: Symmetrical, trachea appears midline   BACK:   Symmetric, no curvature noted   LUNGS:   Symmetric chest rise on inhalation, respirations unlabored   CHEST WALL:  No apparent deformity   HEART:  No thrills or heaves visualized   ABDOMEN:   No masses or organomegaly apparent; non-scaphoid   EXTREMITIES: Extremities appear normal, atraumatic, no cyanosis, b/l mild pitting edema   SKIN: No exanthems in the visualized areas   NEURO: Alert and oriented x3, moves all four extremities freely and spontaneously   PSYCH: Cooperative, behavior is appropriate "       Medical Decision Making       52 MINUTES SPENT BY ME on the date of service doing chart review, history, exam, documentation & further activities per the note.      Data     I have personally reviewed the following data over the past 24 hrs:    3.5 (L)  \   10.0 (L)   / 117 (L)     141 108 (H) 14.3 /  102 (H)   3.2 (L) 21 (L) 1.08 (H) \     ALT: 6 AST: 27 AP: 122 TBILI: 0.6   ALB: 3.3 (L) TOT PROTEIN: 7.1 LIPASE: N/A     Trop: 28 (H) BNP: N/A     TSH: 6.75 (H) T4: 1.03 A1C: N/A       Imaging results reviewed over the past 24 hrs:   Recent Results (from the past 24 hour(s))   XR Chest B Read 2 views    Narrative    For Patients: As a result of the 21st Century Cures Act, medical imaging exams and procedure reports are released immediately into your electronic medical record. You may view this report before your referring provider. If you have questions, please contact your health care provider.    EXAM: XR CHEST 2 VIEWS PA AND LATERAL  LOCATION: THE URGENCY ROOM CAT  DATE: 06/16/2024    INDICATION: Evaluate lung infiltrate. Shortness of breath.  COMPARISON: 02/07/2024.    Impression    Cardiac enlargement, decreased in size since previous examination. Minimal atelectasis left lower lung. Probable tiny bilateral pleural effusions. Hypertrophic changes thoracic spine.   CT Chest Pulmonary Embolism w Contrast    Narrative    EXAM: CT CHEST PULMONARY EMBOLISM W CONTRAST  LOCATION: [facility]  DATE: 6/16/2024    INDICATION: Dyspnea, cough, hypoxia, creatinine 0.91 today  COMPARISON: CT 2/8/2024  TECHNIQUE: CT chest pulmonary angiogram during arterial phase injection of IV contrast. Multiplanar reformats and MIP reconstructions were performed. Dose reduction techniques were used.   CONTRAST: Isovue 370 75mL    FINDINGS:  ANGIOGRAM CHEST: Pulmonary arteries are normal caliber and negative for pulmonary emboli. Thoracic aorta is not well opacified and is indeterminate for dissection. No CT  evidence of right heart strain.    LUNGS AND PLEURA: Subtle interlobular septal thickening throughout both lungs with patchy groundglass opacities and some subtle areas of air trapping. No aline airspace consolidation. Small bilateral pleural effusions.    MEDIASTINUM/AXILLAE: Large heart with likely left ventricular hypertrophy. Small pericardial effusion.    CORONARY ARTERY CALCIFICATION: Moderate.    UPPER ABDOMEN: Nodular contour of liver with widening of the fissures. Likely splenomegaly. No drainable ascites.    MUSCULOSKELETAL: No acute bony abnormality.      Impression    IMPRESSION:  1.  Findings compatible with heart failure, including cardiomegaly with mild interstitial edema and small bilateral pleural effusions.  2.  No pulmonary embolus.  3.  Morphologic changes suggestive of chronic liver disease/cirrhosis. No drainable ascites in the upper abdomen.   XR Finger Right G/E 2 Views    Narrative    EXAM: XR FINGER RIGHT G/E 2 VIEWS  LOCATION: [facility]  DATE: 6/16/2024    INDICATION: Right middle finger cellulitis c f osteomyelitis.  COMPARISON: None.      Impression    IMPRESSION: Moderate multifocal osteoarthrosis. The bones are diffusely demineralized. There is a tiny calcification along the dorsal ulnar aspect of the distal end of the middle phalanx of the long finger consistent with an age-indeterminate fracture.   Soft tissue swelling in the long finger. No soft tissue gas or radiopaque foreign body. No erosions or other signs of osteomyelitis.

## 2024-06-17 NOTE — PLAN OF CARE
I Illness Severity: Stable    PPatient Summary:      Telemetry Orders: Yes    Interpretation of Cardiac Rhythm: SB/SR  w/1AVB    Dyspnea improved and O2 sats >88% at RA or at prior home O2 therapy level:  Yes    Last Bowel Movement: 6/17    Acute Symptoms or Concerns: SOB resolved    Is this a new or worsening symptom or concern? Yes    Is this symptom or concern being adequately managed? Yes    Shift Summary: Pt has been weaned from 2L O2 to RA. Improved SOB. Up to BR with SBA.    AActions:    Diagnostic testing and/or procedures completed, and results are available for discharge:  Not Met    SSituational Awareness:      Anticipated post discharge treatment plan formulated and the following needs have been identified:   homecare  Pt would like to     SSynthesis:     Was bedside rounding completed on your shift? Yes     What team members present during bedside rounds?  RN        Goal Outcome Evaluation:  Problem: Risk for Delirium  Goal: Improved Attention and Thought Clarity  Intervention: Maximize Cognitive Function    HLM Admission: 7- Walk 25 feet or more  HLM Daily7-Walk 25 feet or more

## 2024-06-18 ENCOUNTER — APPOINTMENT (OUTPATIENT)
Dept: PHYSICAL THERAPY | Facility: CLINIC | Age: 74
DRG: 291 | End: 2024-06-18
Payer: COMMERCIAL

## 2024-06-18 ENCOUNTER — APPOINTMENT (OUTPATIENT)
Dept: OCCUPATIONAL THERAPY | Facility: CLINIC | Age: 74
DRG: 291 | End: 2024-06-18
Payer: COMMERCIAL

## 2024-06-18 LAB
ANION GAP SERPL CALCULATED.3IONS-SCNC: 10 MMOL/L (ref 7–15)
ANION GAP SERPL CALCULATED.3IONS-SCNC: 11 MMOL/L (ref 7–15)
BUN SERPL-MCNC: 13.5 MG/DL (ref 8–23)
BUN SERPL-MCNC: 14.1 MG/DL (ref 8–23)
CALCIUM SERPL-MCNC: 8.8 MG/DL (ref 8.8–10.2)
CALCIUM SERPL-MCNC: 9 MG/DL (ref 8.8–10.2)
CHLORIDE SERPL-SCNC: 105 MMOL/L (ref 98–107)
CHLORIDE SERPL-SCNC: 107 MMOL/L (ref 98–107)
CREAT SERPL-MCNC: 1.1 MG/DL (ref 0.51–0.95)
CREAT SERPL-MCNC: 1.1 MG/DL (ref 0.51–0.95)
DEPRECATED HCO3 PLAS-SCNC: 23 MMOL/L (ref 22–29)
DEPRECATED HCO3 PLAS-SCNC: 24 MMOL/L (ref 22–29)
EGFRCR SERPLBLD CKD-EPI 2021: 53 ML/MIN/1.73M2
EGFRCR SERPLBLD CKD-EPI 2021: 53 ML/MIN/1.73M2
ERYTHROCYTE [DISTWIDTH] IN BLOOD BY AUTOMATED COUNT: 16.2 % (ref 10–15)
GLUCOSE SERPL-MCNC: 120 MG/DL (ref 70–99)
GLUCOSE SERPL-MCNC: 149 MG/DL (ref 70–99)
HCT VFR BLD AUTO: 31.8 % (ref 35–47)
HGB BLD-MCNC: 9.9 G/DL (ref 11.7–15.7)
MAGNESIUM SERPL-MCNC: 2 MG/DL (ref 1.7–2.3)
MCH RBC QN AUTO: 29.6 PG (ref 26.5–33)
MCHC RBC AUTO-ENTMCNC: 31.1 G/DL (ref 31.5–36.5)
MCV RBC AUTO: 95 FL (ref 78–100)
PLATELET # BLD AUTO: 125 10E3/UL (ref 150–450)
POTASSIUM SERPL-SCNC: 3.7 MMOL/L (ref 3.4–5.3)
POTASSIUM SERPL-SCNC: 3.9 MMOL/L (ref 3.4–5.3)
RBC # BLD AUTO: 3.34 10E6/UL (ref 3.8–5.2)
SODIUM SERPL-SCNC: 139 MMOL/L (ref 135–145)
SODIUM SERPL-SCNC: 141 MMOL/L (ref 135–145)
WBC # BLD AUTO: 3 10E3/UL (ref 4–11)

## 2024-06-18 PROCEDURE — 99233 SBSQ HOSP IP/OBS HIGH 50: CPT | Performed by: HOSPITALIST

## 2024-06-18 PROCEDURE — 80048 BASIC METABOLIC PNL TOTAL CA: CPT | Performed by: HOSPITALIST

## 2024-06-18 PROCEDURE — 97116 GAIT TRAINING THERAPY: CPT | Mod: GP

## 2024-06-18 PROCEDURE — 80048 BASIC METABOLIC PNL TOTAL CA: CPT | Performed by: STUDENT IN AN ORGANIZED HEALTH CARE EDUCATION/TRAINING PROGRAM

## 2024-06-18 PROCEDURE — 250N000011 HC RX IP 250 OP 636: Mod: JZ | Performed by: STUDENT IN AN ORGANIZED HEALTH CARE EDUCATION/TRAINING PROGRAM

## 2024-06-18 PROCEDURE — 36415 COLL VENOUS BLD VENIPUNCTURE: CPT | Performed by: STUDENT IN AN ORGANIZED HEALTH CARE EDUCATION/TRAINING PROGRAM

## 2024-06-18 PROCEDURE — 85027 COMPLETE CBC AUTOMATED: CPT | Performed by: STUDENT IN AN ORGANIZED HEALTH CARE EDUCATION/TRAINING PROGRAM

## 2024-06-18 PROCEDURE — 250N000011 HC RX IP 250 OP 636: Mod: JZ | Performed by: INTERNAL MEDICINE

## 2024-06-18 PROCEDURE — 250N000013 HC RX MED GY IP 250 OP 250 PS 637: Performed by: STUDENT IN AN ORGANIZED HEALTH CARE EDUCATION/TRAINING PROGRAM

## 2024-06-18 PROCEDURE — 83735 ASSAY OF MAGNESIUM: CPT | Performed by: HOSPITALIST

## 2024-06-18 PROCEDURE — 250N000013 HC RX MED GY IP 250 OP 250 PS 637: Performed by: HOSPITALIST

## 2024-06-18 PROCEDURE — 97129 THER IVNTJ 1ST 15 MIN: CPT | Mod: GO

## 2024-06-18 PROCEDURE — 36415 COLL VENOUS BLD VENIPUNCTURE: CPT | Performed by: HOSPITALIST

## 2024-06-18 PROCEDURE — 250N000013 HC RX MED GY IP 250 OP 250 PS 637: Performed by: INTERNAL MEDICINE

## 2024-06-18 PROCEDURE — 97530 THERAPEUTIC ACTIVITIES: CPT | Mod: GP

## 2024-06-18 PROCEDURE — 97130 THER IVNTJ EA ADDL 15 MIN: CPT | Mod: GO

## 2024-06-18 PROCEDURE — 120N000004 HC R&B MS OVERFLOW

## 2024-06-18 RX ORDER — SPIRONOLACTONE 25 MG/1
50 TABLET ORAL EVERY MORNING
Status: DISCONTINUED | OUTPATIENT
Start: 2024-06-18 | End: 2024-06-19

## 2024-06-18 RX ORDER — HYDRALAZINE HYDROCHLORIDE 10 MG/1
10 TABLET, FILM COATED ORAL 4 TIMES DAILY
Status: DISCONTINUED | OUTPATIENT
Start: 2024-06-18 | End: 2024-06-19

## 2024-06-18 RX ORDER — HYDRALAZINE HYDROCHLORIDE 20 MG/ML
10 INJECTION INTRAMUSCULAR; INTRAVENOUS ONCE
Status: COMPLETED | OUTPATIENT
Start: 2024-06-18 | End: 2024-06-18

## 2024-06-18 RX ORDER — POTASSIUM CHLORIDE 1500 MG/1
20 TABLET, EXTENDED RELEASE ORAL ONCE
Status: COMPLETED | OUTPATIENT
Start: 2024-06-18 | End: 2024-06-18

## 2024-06-18 RX ORDER — MAGNESIUM OXIDE 400 MG/1
400 TABLET ORAL EVERY 4 HOURS
Status: COMPLETED | OUTPATIENT
Start: 2024-06-18 | End: 2024-06-18

## 2024-06-18 RX ORDER — HYDRALAZINE HYDROCHLORIDE 10 MG/1
10 TABLET, FILM COATED ORAL 3 TIMES DAILY
Status: DISCONTINUED | OUTPATIENT
Start: 2024-06-18 | End: 2024-06-18

## 2024-06-18 RX ADMIN — LOSARTAN POTASSIUM 100 MG: 25 TABLET, FILM COATED ORAL at 09:13

## 2024-06-18 RX ADMIN — PANTOPRAZOLE SODIUM 40 MG: 40 TABLET, DELAYED RELEASE ORAL at 04:32

## 2024-06-18 RX ADMIN — CLINDAMYCIN PHOSPHATE 900 MG: 900 INJECTION, SOLUTION INTRAVENOUS at 22:02

## 2024-06-18 RX ADMIN — POTASSIUM CHLORIDE 20 MEQ: 1500 TABLET, EXTENDED RELEASE ORAL at 09:13

## 2024-06-18 RX ADMIN — PANTOPRAZOLE SODIUM 40 MG: 40 TABLET, DELAYED RELEASE ORAL at 15:49

## 2024-06-18 RX ADMIN — Medication 400 MG: at 12:42

## 2024-06-18 RX ADMIN — HYDRALAZINE HYDROCHLORIDE 10 MG: 20 INJECTION, SOLUTION INTRAMUSCULAR; INTRAVENOUS at 20:45

## 2024-06-18 RX ADMIN — ENOXAPARIN SODIUM 40 MG: 100 INJECTION SUBCUTANEOUS at 16:42

## 2024-06-18 RX ADMIN — FUROSEMIDE 40 MG: 10 INJECTION, SOLUTION INTRAMUSCULAR; INTRAVENOUS at 15:49

## 2024-06-18 RX ADMIN — Medication 400 MG: at 09:14

## 2024-06-18 RX ADMIN — SPIRONOLACTONE 50 MG: 25 TABLET, FILM COATED ORAL at 09:18

## 2024-06-18 RX ADMIN — CLINDAMYCIN PHOSPHATE 900 MG: 900 INJECTION, SOLUTION INTRAVENOUS at 15:49

## 2024-06-18 RX ADMIN — CLINDAMYCIN PHOSPHATE 900 MG: 900 INJECTION, SOLUTION INTRAVENOUS at 04:33

## 2024-06-18 RX ADMIN — METOPROLOL SUCCINATE 50 MG: 25 TABLET, EXTENDED RELEASE ORAL at 09:13

## 2024-06-18 RX ADMIN — AMLODIPINE BESYLATE 10 MG: 5 TABLET ORAL at 09:13

## 2024-06-18 RX ADMIN — FUROSEMIDE 40 MG: 10 INJECTION, SOLUTION INTRAMUSCULAR; INTRAVENOUS at 09:14

## 2024-06-18 RX ADMIN — FUROSEMIDE 40 MG: 10 INJECTION, SOLUTION INTRAMUSCULAR; INTRAVENOUS at 00:27

## 2024-06-18 RX ADMIN — HYDRALAZINE HYDROCHLORIDE 10 MG: 10 TABLET ORAL at 22:02

## 2024-06-18 RX ADMIN — ACETAMINOPHEN 650 MG: 325 TABLET ORAL at 22:02

## 2024-06-18 RX ADMIN — HYDRALAZINE HYDROCHLORIDE 10 MG: 10 TABLET ORAL at 04:32

## 2024-06-18 RX ADMIN — HYDRALAZINE HYDROCHLORIDE 10 MG: 10 TABLET ORAL at 16:42

## 2024-06-18 RX ADMIN — CITALOPRAM HYDROBROMIDE 40 MG: 10 TABLET ORAL at 09:14

## 2024-06-18 RX ADMIN — HYDRALAZINE HYDROCHLORIDE 10 MG: 10 TABLET ORAL at 12:42

## 2024-06-18 RX ADMIN — CLOPIDOGREL BISULFATE 75 MG: 75 TABLET ORAL at 09:13

## 2024-06-18 ASSESSMENT — ACTIVITIES OF DAILY LIVING (ADL)
ADLS_ACUITY_SCORE: 34
ADLS_ACUITY_SCORE: 37
ADLS_ACUITY_SCORE: 34
ADLS_ACUITY_SCORE: 39
ADLS_ACUITY_SCORE: 34
ADLS_ACUITY_SCORE: 37
ADLS_ACUITY_SCORE: 34
ADLS_ACUITY_SCORE: 37
ADLS_ACUITY_SCORE: 34
ADLS_ACUITY_SCORE: 37
ADLS_ACUITY_SCORE: 34
ADLS_ACUITY_SCORE: 37
ADLS_ACUITY_SCORE: 34
ADLS_ACUITY_SCORE: 37

## 2024-06-18 NOTE — CARE PLAN
I Illness Severity: Watcher    PPatient Summary:      Telemetry Orders: Yes    Interpretation of Cardiac Rhythm: Sinus ayo     Dyspnea improved and O2 sats >88% at RA or at prior home O2 therapy level:  Yes    Last Bowel Movement: 6/18/24    Acute Symptoms or Concerns: No    Is this a new or worsening symptom or concern?  NA    Is this symptom or concern being adequately managed?  NA    Shift Summary: Pt alert and oriented. Sleeping in recliner. Denies pain or discomfort. Bed alarm on for safety. No concerns at this time.     AActions:    Diagnostic testing and/or procedures completed, and results are available for discharge:  Met    SSituational Awareness:      Anticipated post discharge treatment plan formulated and the following needs have been identified:   None identified    SSynthesis:     Was bedside rounding completed on your shift? Yes     What team members present during bedside rounds?  RN    HLM Daily7-Walk 25 feet or more

## 2024-06-18 NOTE — PROGRESS NOTES
Care Management Follow Up    Length of Stay (days): 2    Expected Discharge Date: 06/19/2024     Concerns to be Addressed: discharge planning     Patient plan of care discussed at interdisciplinary rounds: Yes    Anticipated Discharge Disposition: Home, Home Care     Anticipated Discharge Services: Home Care  Anticipated Discharge DME: Other (see comment) (Pending clinical progress)    Patient/family educated on Medicare website which has current facility and service quality ratings:    Education Provided on the Discharge Plan:    Patient/Family in Agreement with the Plan: yes    Referrals Placed by CM/SW:    Private pay costs discussed: Not applicable    Additional Information:  Call placed to [facility] to verify services. Pt does receive home care services for Skilled Nursing, PT, and OT.     Sanjeev John RN

## 2024-06-18 NOTE — PROGRESS NOTES
"[facility]    Medicine Progress Note - Hospitalist Service    Date of Admission:  6/16/2024    Assessment & Plan   Identification/Summary:   Josefina Dumont is a 74 year old female with PMH signficant for .  Presented with hypoxia from urgency room.  Patient initially presented to the emergency room for sore throat and cough for the past few days.  She had a low saturation of low 80s at the urgency room with send BNP greater than 5000 so transferred to [facility] for acute decompensated heart failure. Her course was complicated by hypertensive urgency and acute hypoxic respiratory failure requiring 2 L of O2. She was given 2 doses IV labetalol in our ED. BP improved to 160s/70s on 6/17 but has since worsened. Ordered scheduled hydralazine with hold parameters (and continuing PRN hydralazine with administration parameters), and also will increase spironolactone from 25 mg to 50 mg; recheck BMP this afternoon. Losartan already at max dosing. Continue IV lasix 40 mg IV q8h - appears to be effective dosing, patient net negative 1.5 L and oxygen weaned from 2 L down to ambient room air at rest, and she feels \"about 50%\" back to baseline but still quite dyspneic getting up to go to the bathroom and also with improved but persistent orthopnea. Anticipate further improvement with another 24-48 hours of IV diuresis.       Assessment and Plan:  Acute hypoxic respiratory failure  Acute on chronic heart failure with preserved ejection fraction  Hypertension urgency/emergency with pulmonary edema  Left ventricular hypertrophy due to chronic hypertension  Order echocardiogram to check ejection fraction, pericardial effusion, left ventricular hypertrophy.  TTE shows preserved LVEF, LV hypertrophy, mild AS, pulmonary HTN, and small pericardial effusion without any echocardiographic indication of tamponade physiology.   Strict I's and O's - and AM weights, which has not been done and re-ordered and " discussed with team   and SUSIE RN protocol  S/p 2 doses of IV labetalol in the ED  Order home medicine metoprolol 50 mg daily, amlodipine 10 mg, losartan 100 mg, spironolactone 25 mg  Consider secondary hypertension workup, including sleep study outpatient  Follow-up TSH/reflex --> TSH is 6.75 but Free T4 is WNL, c/w subclinical hypothyroidism. PCP follow-up.   Continue IV lasix 40 mg IV q8h - appears to be effective dose, patient net negative 1.5 L and oxygen weaned down from 2 L down to ambient room air at rest.      Severe hypokalemia  K was 3.0 at the emergency room  Replacement protocol  Improved. K 3.7 and Mag 2.0 on 6/18  Would continue protocols while actively diuresing.      Troponin elevation  Asymptomatic, EKG without ST-T change   Trend     Right middle finger cellulitis  No fluid collection.    She is allergic to penicillin. Started IV clindamycin.  MRSA swab negative for MRSA. Continue clindamycin for now given response and penicillin allergy.  May need for follow up with  for the health of the nail     LFT elevation  , AST 51 and urgency room, which is new to her.  Could this be congestive hepatopathy?  Monitor LFT change  Trend and repeat LFT tomorrow AM     Pancytopenia  On admission hemoglobin 10.8, white blood cells 3.7, platelet 130  Monitor     Raynaud phenomena  Fingers turn purple after washing hands with cold water  Bilateral radial pulses 2+, extremities well-perfused except right middle finger     CKD3a  Monitor Cr when diuresing  On admission, Cr  0.91(eGFR 67)   Cr and GFR remains in baseline CKD3a range. CTM.           Diet: Fluid restriction 1800 ML FLUID (and additional linked orders)  Combination Diet Low Saturated Fat Na <2400mg Diet, No Caffeine Diet    DVT Prophylaxis: Enoxaparin (Lovenox) SQ, Pneumatic Compression Devices, Anti-embolisim stockings (TEDs), and Ambulate every shift  Machuca Catheter: Not present  Lines: None     Cardiac Monitoring: ACTIVE order.  "Indication: Acute decompensated heart failure (48 hours)  Code Status: Full Code      Clinically Significant Risk Factors        # Hypokalemia: Lowest K = 2.4 mmol/L in last 2 days, will replace as needed     # Hypomagnesemia: Lowest Mg = 1.4 mg/dL in last 2 days, will replace as needed   # Hypoalbuminemia: Lowest albumin = 3.3 g/dL at 6/17/2024  4:22 AM, will monitor as appropriate   # Thrombocytopenia: Lowest platelets = 117 in last 2 days, will monitor for bleeding   # Hypertension: Noted on problem list    # Acute heart failure with preserved ejection fraction: heart failure noted on problem list, last echo with EF >50%, and receiving IV diuretics              # DMII: A1C = N/A within past 6 months, PRESENT ON ADMISSION  # Obesity: Estimated body mass index is 35.89 kg/m  as calculated from the following:    Height as of this encounter: 1.626 m (5' 4\").    Weight as of this encounter: 94.8 kg (209 lb 1.6 oz)., PRESENT ON ADMISSION       # Financial/Environmental Concerns: none         Disposition Plan     Medically Ready for Discharge: Anticipated in 2-4 Days             Jose De Jesus Christie MD  Hospitalist Service  [facility]  Securely message with [employer] (more info)  Text page via [facility] Paging/Directory   ______________________________________________________________________    Interval History   No acute events overnight.    Weaned off O2. Still has dyspnea exerting to the bathroom. Improved SOB at rest and lying flat, but still with some orthopnea. She feels \"about 50%\" improved to baseline. No report of chest pain, shortness of breath, or other new complaints. Discussed plan of care with patient. Answered all questions to patient's verbalized understanding and satisfaction.    Physical Exam   Vital Signs: Temp: 97.3  F (36.3  C) Temp src: Oral BP: (!) 192/81 Pulse: 67   Resp: 18 SpO2: 90 % O2 Device: None (Room air) Oxygen Delivery: 2 LPM  Weight: 209 lbs 1.6 oz    GENERAL:  Alert, " appears comfortable, in no acute distress, appears stated age, now off O2, on room air   HEAD:  Normocephalic, without obvious abnormality, atraumatic   EYES:  Conjunctiva/corneas clear, no scleral icterus, EOM's appears intact grossly   NOSE: Nares normal, septum midline, mucosa normal, no drainage   THROAT: Lips, mucosa, and tongue appear normal   NECK: Symmetrical, trachea appears midline   BACK:   Symmetric, no curvature noted   LUNGS:   Symmetric chest rise on inhalation, respirations unlabored   CHEST WALL:  No apparent deformity   HEART:  No thrills or heaves visualized   ABDOMEN:   No masses or organomegaly apparent; non-scaphoid   EXTREMITIES: Extremities appear normal, atraumatic, no cyanosis, minimal edema now   SKIN: No exanthems in the visualized areas   NEURO: Alert and oriented x3, moves all four extremities freely and spontaneously   PSYCH: Cooperative, behavior is appropriate       Medical Decision Making   53 MINUTES SPENT BY ME on the date of service doing chart review, history, exam, documentation & further activities per the note.       Data     I have personally reviewed the following data over the past 24 hrs:    3.0 (L)  \   9.9 (L)   / 125 (L)     141 107 14.1 /  120 (H)   3.7 23 1.10 (H) \       Imaging results reviewed over the past 24 hrs:   Recent Results (from the past 24 hour(s))   Echocardiogram Complete   Result Value    LVEF  > 65%    Narrative    [id number]  [id number]  [id number]  [id number]^MARCELA^[place] [address]     Name: SHAKIRA COBB  MRN: [id number]  : 1950  Study Date: 2024 09:17 AM  Age: 74 yrs  Gender: Female  Patient Location: [facility]  Reason For Study: Heart Failure  Ordering Physician: AYAD MEDRANO  Performed By: ARTHUR     BSA: 2.0 m2  Height: 64 in  Weight: 209 lb  HR: 62  BP: 166/72 mmHg  ______________________________________________________________________________  Procedure  Complete Portable Echo Adult.  Definity (NDC #64893-141) given intravenously.  ______________________________________________________________________________  Interpretation Summary     Left ventricular function is normal.The ejection fraction is > 65%.  There is moderate to severe concentric left ventricular hypertrophy.  Normal right ventricle size and systolic function.  The left atrium is severely dilated.  There is moderate mitral annular calcification.  Mild valvular aortic stenosis.  IVC diameter >2.1 cm collapsing <50% with sniff suggests a high RA pressure  estimated at 15 mmHg or greater.  Small pericardial effusion  There are no echocardiographic indications of cardiac tamponade.  There is mild mitral stenosis.  ______________________________________________________________________________  Left Ventricle  The left ventricle is normal in size. Left ventricular function is normal.The  ejection fraction is > 65%. There is moderate to severe concentric left  ventricular hypertrophy. Hyperdynamic left ventricular function. Diastolic  function not assessed due to significant mitral annular calcification. No  regional wall motion abnormalities noted.     Right Ventricle  Normal right ventricle size and systolic function.     Atria  The left atrium is severely dilated. The right atrium is mildly dilated. There  is no color Doppler evidence of an atrial shunt.     Mitral Valve  There is moderate mitral annular calcification. There is trace mitral  regurgitation. There is mild mitral stenosis.     Tricuspid Valve  The tricuspid valve is not well visualized. Right ventricular systolic  pressure could not be approximated due to inadequate tricuspid regurgitation.     Aortic Valve  The aortic valve is not well visualized. Moderate aortic valve calcification  is present. Mild valvular aortic stenosis.     Pulmonic Valve  The pulmonic valve is not well visualized.     Vessels  The aorta root is normal. IVC diameter >2.1 cm collapsing <50% with  sniff  suggests a high RA pressure estimated at 15 mmHg or greater.     Pericardium  Small pericardial effusion. There are no echocardiographic indications of  cardiac tamponade.     ______________________________________________________________________________  MMode/2D Measurements & Calculations  IVSd: 2.0 cm  LVIDd: 4.4 cm  LVIDs: 2.7 cm  LVPWd: 1.6 cm  FS: 39.5 %     LV mass(C)d: 355.7 grams  LV mass(C)dI: 178.5 grams/m2  Ao root diam: 3.2 cm  LVOT diam: 2.0 cm  LVOT area: 3.3 cm2  Ao root diam index Ht(cm/m): 1.9  Ao root diam index BSA (cm/m2): 1.6  EF Biplane: 59.4 %  LA Volume (BP): 171.0 ml  LA Volume Index (BP): 85.9 ml/m2     LA Volume Indexed (AL/bp): 91.2 ml/m2  RWT: 0.73  TAPSE: 2.2 cm     Time Measurements  MM HR: 60.0 BPM     Doppler Measurements & Calculations  MV E max aditya: 151.0 cm/sec  MV A max aditya: 138.8 cm/sec  MV E/A: 1.1  MV max P.1 mmHg  MV mean P.0 mmHg  MV V2 VTI: 57.0 cm  MVA(VTI): 1.8 cm2  MV dec time: 0.35 sec     Ao V2 max: 220.2 cm/sec  Ao max P.0 mmHg  Ao V2 mean: 154.1 cm/sec  Ao mean PG: 10.7 mmHg  Ao V2 VTI: 50.4 cm  ROLAND(I,D): 2.1 cm2  ROLAND(V,D): 2.1 cm2  LV V1 max P.1 mmHg  LV V1 max: 141.4 cm/sec  LV V1 VTI: 32.2 cm  SV(LVOT): 105.3 ml  SI(LVOT): 52.8 ml/m2  PA acc time: 0.06 sec  AV Aditya Ratio (DI): 0.64  ROLAND Index (cm2/m2): 1.0  E/E': 28.0  E/E' av.2  Lateral E/e': 28.6  Medial E/e': 27.8  Peak E' Aditya: 5.4 cm/sec  RV S Aditya: 15.9 cm/sec     ______________________________________________________________________________  Report approved by: Naina Obregon 2024 12:59 PM

## 2024-06-18 NOTE — PLAN OF CARE
Problem: Hypertension Acute  Goal: Blood Pressure Within Desired Range  Outcome: Not Progressing  Intervention: Normalize Blood Pressure  Recent Flowsheet Documentation  Taken 6/18/2024 0430 by Carolyn Alarcon RN  Medication Review/Management: medications reviewed  Taken 6/18/2024 0030 by Carolyn Alarcon RN  Medication Review/Management: medications reviewed     Problem: Heart Failure  Goal: Stable Heart Rate and Rhythm  Outcome: Progressing  Goal: Effective Oxygenation and Ventilation  Outcome: Progressing     Problem: Adult Inpatient Plan of Care  Goal: Absence of Hospital-Acquired Illness or Injury  Intervention: Identify and Manage Fall Risk  Recent Flowsheet Documentation  Taken 6/18/2024 0430 by Carolyn Alarcon, RN  Safety Promotion/Fall Prevention:   safety round/check completed   activity supervised   nonskid shoes/slippers when out of bed   patient and family education  Taken 6/18/2024 0030 by Carolyn Alarcon RN  Safety Promotion/Fall Prevention:   safety round/check completed   activity supervised   nonskid shoes/slippers when out of bed   patient and family education     Problem: Risk for Delirium  Goal: Improved Behavioral Control  Intervention: Minimize Safety Risk  Recent Flowsheet Documentation  Taken 6/18/2024 0430 by Carolyn Alarcon RN  Enhanced Safety Measures: patient/family teach back on injury risk  Taken 6/18/2024 0030 by Carolyn Alarcon RN  Enhanced Safety Measures: patient/family teach back on injury risk     Problem: Heart Failure  Goal: Effective Breathing Pattern During Sleep  Intervention: Monitor and Manage Obstructive Sleep Apnea  Recent Flowsheet Documentation  Taken 6/18/2024 0430 by Carolyn Alarcon RN  Medication Review/Management: medications reviewed  Taken 6/18/2024 0030 by Carolyn Alarcon RN  Medication Review/Management: medications reviewed

## 2024-06-18 NOTE — PLAN OF CARE
I Illness Severity: Stable    PPatient Summary:      Telemetry Orders: Yes    Interpretation of Cardiac Rhythm: SR w/1AVB,prolonged QT    Dyspnea improved and O2 sats >88% at RA or at prior home O2 therapy level:  Yes    Last Bowel Movement: 6/18    Acute Symptoms or Concerns: Still episodes of SOB, O2 sats remain WNL. BP continues to be elevated.    Is this a new or worsening symptom or concern? Yes    Is this symptom or concern being adequately managed? No    Shift Summary: Pt continues with elev Bps, medications changed and PRNs utilized.     AActions:    Diagnostic testing and/or procedures completed, and results are available for discharge:  Met    SSituational Awareness:      Anticipated post discharge treatment plan formulated and the following needs have been identified:   homecare, needs med set-up weekly as is unable and has not had homecare R consistently for med set-up.    SSynthesis:     Was bedside rounding completed on your shift? Yes     What team members present during bedside rounds?  RN        Goal Outcome Evaluation:  Problem: Hypertension Acute  Goal: Blood Pressure Within Desired Range  Intervention: Normalize Blood Pressure    Problem: Heart Failure  Goal: Effective Oxygenation and Ventilation  Outcome: Progressing  Intervention: Promote Airway Secretion Clearance

## 2024-06-18 NOTE — PROGRESS NOTES
I Illness Severity: Stable    PPatient Summary:      Telemetry Orders: Yes    Interpretation of Cardiac Rhythm: Sinus ayo with prolong QT    Dyspnea improved and O2 sats >88% at RA or at prior home O2 therapy level:  Yes    Last Bowel Movement: today    Acute Symptoms or Concerns: Hypertension     Is this a new or worsening symptom or concern? Yes    Is this symptom or concern being adequately managed? PRN hydralazine given x2 this shift    Shift Summary: Pt A & O, forgetful at times. On RA. BP elevated this shift. MD aware, given PRN hydralazine, pt denies headache, chest pain, vision changes or any other acute changes. C/o of consistent pain in finger that gets worse when bumped. Given tylenol at [facility] per pt request.     AActions:    Diagnostic testing and/or procedures completed, and results are available for discharge:  Met    SSituational Awareness:      Anticipated post discharge treatment plan formulated and the following needs have been identified:   None identified    SSynthesis:     Was bedside rounding completed on your shift? No     What team members present during bedside rounds?  RN  HLM admission 7- walk 25 feet or more  HRM daily 7- walk 25 feet or more

## 2024-06-19 ENCOUNTER — APPOINTMENT (OUTPATIENT)
Dept: PHYSICAL THERAPY | Facility: CLINIC | Age: 74
DRG: 291 | End: 2024-06-19
Payer: COMMERCIAL

## 2024-06-19 ENCOUNTER — APPOINTMENT (OUTPATIENT)
Dept: OCCUPATIONAL THERAPY | Facility: CLINIC | Age: 74
DRG: 291 | End: 2024-06-19
Payer: COMMERCIAL

## 2024-06-19 LAB
ANION GAP SERPL CALCULATED.3IONS-SCNC: 11 MMOL/L (ref 7–15)
BUN SERPL-MCNC: 13.6 MG/DL (ref 8–23)
CALCIUM SERPL-MCNC: 9.2 MG/DL (ref 8.8–10.2)
CHLORIDE SERPL-SCNC: 104 MMOL/L (ref 98–107)
CREAT SERPL-MCNC: 1.1 MG/DL (ref 0.51–0.95)
DEPRECATED HCO3 PLAS-SCNC: 25 MMOL/L (ref 22–29)
EGFRCR SERPLBLD CKD-EPI 2021: 53 ML/MIN/1.73M2
ERYTHROCYTE [DISTWIDTH] IN BLOOD BY AUTOMATED COUNT: 16 % (ref 10–15)
GLUCOSE SERPL-MCNC: 116 MG/DL (ref 70–99)
HCT VFR BLD AUTO: 32.6 % (ref 35–47)
HGB BLD-MCNC: 10.4 G/DL (ref 11.7–15.7)
MAGNESIUM SERPL-MCNC: 1.8 MG/DL (ref 1.7–2.3)
MCH RBC QN AUTO: 30.4 PG (ref 26.5–33)
MCHC RBC AUTO-ENTMCNC: 31.9 G/DL (ref 31.5–36.5)
MCV RBC AUTO: 95 FL (ref 78–100)
PLATELET # BLD AUTO: 123 10E3/UL (ref 150–450)
POTASSIUM SERPL-SCNC: 3.6 MMOL/L (ref 3.4–5.3)
RBC # BLD AUTO: 3.42 10E6/UL (ref 3.8–5.2)
SODIUM SERPL-SCNC: 140 MMOL/L (ref 135–145)
WBC # BLD AUTO: 3.6 10E3/UL (ref 4–11)

## 2024-06-19 PROCEDURE — 83735 ASSAY OF MAGNESIUM: CPT | Performed by: HOSPITALIST

## 2024-06-19 PROCEDURE — 97116 GAIT TRAINING THERAPY: CPT | Mod: GP

## 2024-06-19 PROCEDURE — 36415 COLL VENOUS BLD VENIPUNCTURE: CPT | Performed by: HOSPITALIST

## 2024-06-19 PROCEDURE — 250N000013 HC RX MED GY IP 250 OP 250 PS 637: Performed by: STUDENT IN AN ORGANIZED HEALTH CARE EDUCATION/TRAINING PROGRAM

## 2024-06-19 PROCEDURE — 97530 THERAPEUTIC ACTIVITIES: CPT | Mod: GP

## 2024-06-19 PROCEDURE — 250N000013 HC RX MED GY IP 250 OP 250 PS 637: Performed by: HOSPITALIST

## 2024-06-19 PROCEDURE — 85027 COMPLETE CBC AUTOMATED: CPT | Performed by: STUDENT IN AN ORGANIZED HEALTH CARE EDUCATION/TRAINING PROGRAM

## 2024-06-19 PROCEDURE — 250N000009 HC RX 250: Performed by: INTERNAL MEDICINE

## 2024-06-19 PROCEDURE — 97110 THERAPEUTIC EXERCISES: CPT | Mod: GO

## 2024-06-19 PROCEDURE — 250N000011 HC RX IP 250 OP 636: Performed by: STUDENT IN AN ORGANIZED HEALTH CARE EDUCATION/TRAINING PROGRAM

## 2024-06-19 PROCEDURE — 99233 SBSQ HOSP IP/OBS HIGH 50: CPT | Performed by: INTERNAL MEDICINE

## 2024-06-19 PROCEDURE — 120N000004 HC R&B MS OVERFLOW

## 2024-06-19 PROCEDURE — 80048 BASIC METABOLIC PNL TOTAL CA: CPT | Performed by: INTERNAL MEDICINE

## 2024-06-19 PROCEDURE — 97535 SELF CARE MNGMENT TRAINING: CPT | Mod: GO

## 2024-06-19 PROCEDURE — 250N000013 HC RX MED GY IP 250 OP 250 PS 637: Performed by: INTERNAL MEDICINE

## 2024-06-19 RX ORDER — POTASSIUM CHLORIDE 1500 MG/1
20 TABLET, EXTENDED RELEASE ORAL ONCE
Status: COMPLETED | OUTPATIENT
Start: 2024-06-19 | End: 2024-06-19

## 2024-06-19 RX ORDER — CLONIDINE HYDROCHLORIDE 0.1 MG/1
0.1 TABLET ORAL 3 TIMES DAILY PRN
Status: DISCONTINUED | OUTPATIENT
Start: 2024-06-19 | End: 2024-06-20 | Stop reason: HOSPADM

## 2024-06-19 RX ORDER — FUROSEMIDE 40 MG
40 TABLET ORAL
Status: DISCONTINUED | OUTPATIENT
Start: 2024-06-19 | End: 2024-06-20 | Stop reason: HOSPADM

## 2024-06-19 RX ORDER — SPIRONOLACTONE 25 MG/1
25 TABLET ORAL
Status: DISCONTINUED | OUTPATIENT
Start: 2024-06-19 | End: 2024-06-20 | Stop reason: HOSPADM

## 2024-06-19 RX ORDER — HYDRALAZINE HYDROCHLORIDE 20 MG/ML
20 INJECTION INTRAMUSCULAR; INTRAVENOUS EVERY 6 HOURS PRN
Status: DISCONTINUED | OUTPATIENT
Start: 2024-06-19 | End: 2024-06-20 | Stop reason: HOSPADM

## 2024-06-19 RX ORDER — GINSENG 100 MG
CAPSULE ORAL 2 TIMES DAILY
Status: DISCONTINUED | OUTPATIENT
Start: 2024-06-19 | End: 2024-06-20 | Stop reason: HOSPADM

## 2024-06-19 RX ORDER — CLONIDINE HYDROCHLORIDE 0.1 MG/1
0.1 TABLET, EXTENDED RELEASE ORAL 3 TIMES DAILY PRN
Status: DISCONTINUED | OUTPATIENT
Start: 2024-06-19 | End: 2024-06-19

## 2024-06-19 RX ORDER — MAGNESIUM OXIDE 400 MG/1
400 TABLET ORAL EVERY 4 HOURS
Status: COMPLETED | OUTPATIENT
Start: 2024-06-19 | End: 2024-06-19

## 2024-06-19 RX ORDER — HYDRALAZINE HYDROCHLORIDE 25 MG/1
25 TABLET, FILM COATED ORAL EVERY 8 HOURS SCHEDULED
Status: DISCONTINUED | OUTPATIENT
Start: 2024-06-19 | End: 2024-06-20 | Stop reason: HOSPADM

## 2024-06-19 RX ADMIN — CITALOPRAM HYDROBROMIDE 40 MG: 10 TABLET ORAL at 08:08

## 2024-06-19 RX ADMIN — AMLODIPINE BESYLATE 10 MG: 5 TABLET ORAL at 08:08

## 2024-06-19 RX ADMIN — HYDRALAZINE HYDROCHLORIDE 10 MG: 10 TABLET ORAL at 00:50

## 2024-06-19 RX ADMIN — FUROSEMIDE 40 MG: 40 TABLET ORAL at 17:55

## 2024-06-19 RX ADMIN — HYDRALAZINE HYDROCHLORIDE 25 MG: 25 TABLET, FILM COATED ORAL at 15:12

## 2024-06-19 RX ADMIN — FUROSEMIDE 40 MG: 10 INJECTION, SOLUTION INTRAMUSCULAR; INTRAVENOUS at 00:32

## 2024-06-19 RX ADMIN — Medication 400 MG: at 08:08

## 2024-06-19 RX ADMIN — CLINDAMYCIN PHOSPHATE 900 MG: 900 INJECTION, SOLUTION INTRAVENOUS at 06:13

## 2024-06-19 RX ADMIN — SPIRONOLACTONE 25 MG: 25 TABLET, FILM COATED ORAL at 08:08

## 2024-06-19 RX ADMIN — BACITRACIN: 500 OINTMENT TOPICAL at 12:00

## 2024-06-19 RX ADMIN — CLOPIDOGREL BISULFATE 75 MG: 75 TABLET ORAL at 08:08

## 2024-06-19 RX ADMIN — ENOXAPARIN SODIUM 40 MG: 100 INJECTION SUBCUTANEOUS at 17:55

## 2024-06-19 RX ADMIN — HYDRALAZINE HYDROCHLORIDE 25 MG: 25 TABLET, FILM COATED ORAL at 21:45

## 2024-06-19 RX ADMIN — POTASSIUM CHLORIDE 20 MEQ: 1500 TABLET, EXTENDED RELEASE ORAL at 08:08

## 2024-06-19 RX ADMIN — PANTOPRAZOLE SODIUM 40 MG: 40 TABLET, DELAYED RELEASE ORAL at 17:55

## 2024-06-19 RX ADMIN — HYDRALAZINE HYDROCHLORIDE 25 MG: 25 TABLET, FILM COATED ORAL at 08:10

## 2024-06-19 RX ADMIN — PANTOPRAZOLE SODIUM 40 MG: 40 TABLET, DELAYED RELEASE ORAL at 06:13

## 2024-06-19 RX ADMIN — METOPROLOL SUCCINATE 50 MG: 25 TABLET, EXTENDED RELEASE ORAL at 08:08

## 2024-06-19 RX ADMIN — ACETAMINOPHEN 650 MG: 325 TABLET ORAL at 04:51

## 2024-06-19 RX ADMIN — SPIRONOLACTONE 25 MG: 25 TABLET, FILM COATED ORAL at 17:55

## 2024-06-19 RX ADMIN — Medication 400 MG: at 12:00

## 2024-06-19 RX ADMIN — CLONIDINE HYDROCHLORIDE 0.1 MG: 0.1 TABLET ORAL at 06:22

## 2024-06-19 RX ADMIN — BACITRACIN: 500 OINTMENT TOPICAL at 21:45

## 2024-06-19 RX ADMIN — FUROSEMIDE 40 MG: 10 INJECTION, SOLUTION INTRAMUSCULAR; INTRAVENOUS at 09:46

## 2024-06-19 RX ADMIN — LOSARTAN POTASSIUM 100 MG: 25 TABLET, FILM COATED ORAL at 08:08

## 2024-06-19 ASSESSMENT — ACTIVITIES OF DAILY LIVING (ADL)
ADLS_ACUITY_SCORE: 41
ADLS_ACUITY_SCORE: 37
ADLS_ACUITY_SCORE: 41
ADLS_ACUITY_SCORE: 37
ADLS_ACUITY_SCORE: 37
ADLS_ACUITY_SCORE: 41
ADLS_ACUITY_SCORE: 41
ADLS_ACUITY_SCORE: 37
ADLS_ACUITY_SCORE: 41
ADLS_ACUITY_SCORE: 36
ADLS_ACUITY_SCORE: 41
ADLS_ACUITY_SCORE: 36
ADLS_ACUITY_SCORE: 37
ADLS_ACUITY_SCORE: 41
ADLS_ACUITY_SCORE: 41
ADLS_ACUITY_SCORE: 36
ADLS_ACUITY_SCORE: 37

## 2024-06-19 NOTE — PROGRESS NOTES
[facility] was introduced to the patient today including our role in the home management of their heart failure.  Heart Failure Education Materials were shared today with the patient. HF is not a brand new topic for pt, but we reviewed key points.   Introduction to the expectations including daily weight tracking using the Daily Weight Log. Pt does have a scale at home, encouraged the use of the weight log for monitoring and to bring to clinic appts.   Low Sodium diet of 2000mg or less daily, review of label reading, aim for fresh and avoid processed items. Pt and her sister live in the same senior building, she reports that they tend to share meals/ grocery shopping. She is familiar w/ trying to adhere to a low sodium diet, she does not add salt to her food but recognizes could likely do better as was not really reading labels. Provided [employer] application.   Daily fluid restriction of 2000ml considerations.   Monitor and report s/s of heart failure. How to report these changes.  Follow up appointments and testing expectations.    PADMINI Mirza RN     [facility]   [phone] Nurse [facility]   Heart [facility]   [address] [place] [address]

## 2024-06-19 NOTE — PROGRESS NOTES
Care Management Follow Up    Length of Stay (days): 3    Expected Discharge Date: 06/21/2024     Concerns to be Addressed: discharge planning     Patient plan of care discussed at interdisciplinary rounds: Yes    Anticipated Discharge Disposition: Home, Home Care     Anticipated Discharge Services: Home Care  Anticipated Discharge DME: Other (see comment) (Pending clinical progress)    Patient/family educated on Medicare website which has current facility and service quality ratings:    Education Provided on the Discharge Plan:  Yes   Patient/Family in Agreement with the Plan: yes    Referrals Placed by CM/SW:  Home Care   Private pay costs discussed: Not applicable    Additional Information:  [facility] met with Pt and sister who was visiting.  Pt currently open to [facility]. [facility] and [facility] would like [facility] see if [facility] has openings.  [facility] sent referral to [facility].  Pt not medically ready to discharge until tomorrow.     2:32 PM  [facility]     4:25 PM  [facility] notified that [facility] is out of their network and cannot take Pt for home Care. Pt is updated.     IVON Bennett

## 2024-06-19 NOTE — PROGRESS NOTES
[facility]    Medicine Progress Note - Hospitalist Service    Date of Admission:  6/16/2024    Assessment & Plan   74-year-old female with past medical history of right-sided heart failure, pancytopenia, obesity, poorly controlled hypertension, recent hospitalization with acute renal failure, onychomycosis, possible DAVE without formal diagnosis  1/.  Hypertension clearly inadequately controlled longstanding issue with the patient.  Beta-blocker cannot be increased because of relative bradycardia with current dose of beta-blocker.  Will add hydralazine on a scheduled basis.  She had really good response to hydralazine IV which is very encouraging.  Has a nephrology appointment coming up in the next 1 week.  She does need a better home blood pressure monitor currently she uses a wrist monitor    2/.  Heart failure longstanding problem as evidenced by hypertrophy of the left ventricle.  Seen on cardiac echogram during this hospitalization.  Excellent response to IV diuretics can be safely switched over to Lasix 40 twice daily as well as Aldactone 25 twice daily.  Will need to be enrolled in the core clinic  3/.  Onychomycosis/hangnail infection:  I evaluated the nail very carefully, was able to trim off the overhanging and overgrown nails.  No evidence of purulence or streaking, discontinue IV clindamycin no indication of IV antibiotics at this time, I think the matters became worse over the last several weeks because she was using topical Neosporin which probably aggravated the overall situation and likely developed an allergic reaction topically.  Cover with bacitracin and a loose dressing no Band-Aids, avoid getting the area wet.  No indication of additional oral antibiotics she has received quite a bit of IV antibiotics at this point in time which are no longer needed  No indication for oral antibiotics either  4/.  Deconditioning significant in nature patient at baseline is not very  "mobile.  Will need home PT at discharge.  Will also require home care to be set up for medication management because patient does not seem to be very  knowledgeable about her meds.            Diet: Fluid restriction 1800 ML FLUID (and additional linked orders)  Combination Diet Low Saturated Fat Na <2400mg Diet, No Caffeine Diet    DVT Prophylaxis: Enoxaparin (Lovenox) SQ  Machuca Catheter: Not present  Lines: None     Cardiac Monitoring: ACTIVE order. Indication: Acute decompensated heart failure (48 hours)  Code Status: Full Code      Clinically Significant Risk Factors              # Hypoalbuminemia: Lowest albumin = 3.3 g/dL at 6/17/2024  4:22 AM, will monitor as appropriate   # Thrombocytopenia: Lowest platelets = 123 in last 2 days, will monitor for bleeding   # Hypertension: Noted on problem list    # Acute heart failure with preserved ejection fraction: heart failure noted on problem list, last echo with EF >50%, and receiving IV diuretics            # DMII: A1C = N/A within past 6 months, PRESENT ON ADMISSION  # Obesity: Estimated body mass index is 35.02 kg/m  as calculated from the following:    Height as of this encounter: 1.626 m (5' 4\").    Weight as of this encounter: 92.5 kg (204 lb)., PRESENT ON ADMISSION     # Financial/Environmental Concerns: none         Disposition Plan     Medically Ready for Discharge: Anticipated Tomorrow             Lor Kwok MD  Hospitalist Service  [facility]  Securely message with [employer] (more info)  Text page via [facility] Paging/Directory   ______________________________________________________________________    Interval History   Continues to have significantly elevated blood pressures overnight required one-time hydralazine.  Continues to complain of pain and discomfort at the nailbed of the right middle finger.  Antibiotics were continued.  Patient tells me that she overall feels better today but she feels tired    Physical Exam   Vital " Signs: Temp: 98.2  F (36.8  C) Temp src: Oral BP: (!) 207/83 Pulse: 67   Resp: 18 SpO2: 92 % O2 Device: None (Room air)    Weight: 204 lbs 0 oz    General Appearance: Alert awake oriented x 3 appears chronically unwell and older than stated age  Respiratory: Clear to auscultation  Cardiovascular: S1-S2  GI: Obese nontender bowel sounds are present  Skin: See below  Other: 1+ lower extremity edema  Gait is slow but steady.  No obvious neurological deficits  Evaluation of the right middle finger nailbed undertaken using a magnifier, there was evidence of some blistering lesions dried up, overhanging needle which was clipped using nail clippers.  No evidence of purulence or hematoma.      Medical Decision Making       55 MINUTES SPENT BY ME on the date of service doing chart review, history, exam, documentation & further activities per the note.  NOTE(S)/MEDICAL RECORDS REVIEWED over the past 24 hours:         Data   Current Facility-Administered Medications   Medication Dose Route Frequency Provider Last Rate Last Admin    amLODIPine (NORVASC) tablet 10 mg  10 mg Oral Daily Joyce Kumar MD   10 mg at 06/19/24 0808    bacitracin ointment   Topical BID Lor Kwok MD        citalopram (celeXA) tablet 40 mg  40 mg Oral Daily Joyce Kumar MD   40 mg at 06/19/24 0808    clopidogrel (PLAVIX) tablet 75 mg  75 mg Oral Daily Joyce Kumar MD   75 mg at 06/19/24 0808    enoxaparin ANTICOAGULANT (LOVENOX) injection 40 mg  40 mg Subcutaneous Q24H Joyce Kumar MD   40 mg at 06/18/24 1642    furosemide (LASIX) tablet 40 mg  40 mg Oral BID Lor Kwok MD        hydrALAZINE (APRESOLINE) tablet 25 mg  25 mg Oral Q8H [facility] Lor Kwok MD   25 mg at 06/19/24 0810    losartan (COZAAR) tablet 100 mg  100 mg Oral Daily Joyce Kumar MD   100 mg at 06/19/24 0808    magnesium oxide (MAG-OX) tablet 400 mg  400 mg Oral Q4H Lor Kwok MD   400 mg at 06/19/24 0808    metoprolol succinate ER (TOPROL XL) 24 hr tablet 50 mg  50 mg  Oral Daily Joyce Kumar MD   50 mg at 06/19/24 0808    pantoprazole (PROTONIX) EC tablet 40 mg  40 mg Oral BID AC Joyce Kumar MD   40 mg at 06/19/24 0613    senna-docusate (SENOKOT-S/PERICOLACE) 8.6-50 MG per tablet 1 tablet  1 tablet Oral At Bedtime Joyce Kumar MD   1 tablet at 06/17/24 2108    sodium chloride (PF) 0.9% PF flush 3 mL  3 mL Intracatheter Q8H Joyce Kumar MD   3 mL at 06/19/24 0613    spironolactone (ALDACTONE) tablet 25 mg  25 mg Oral BID Lor Kwok MD   25 mg at 06/19/24 0808       ------------------------- PAST 24 HR DATA REVIEWED -----------------------------------------------    I have personally reviewed the following data over the past 24 hrs:    3.6 (L)  \   10.4 (L)   / 123 (L)     140 104 13.6 /  116 (H)   3.6 25 1.10 (H) \       Imaging results reviewed over the past 24 hrs:   No results found for this or any previous visit (from the past 24 hour(s)).

## 2024-06-19 NOTE — PROGRESS NOTES
Paged by nurse regarding patient's persistently elevated blood pressure.  Patient is asymptomatic at present.  Medication list reviewed.  Instead of oral hydralazine, give 10 mg IV instead this time.  Will increase oral hydralazine frequency from 3 times daily to 4 times daily.  Patient already maxed out on several antihypertensive medications.  Pulse is borderline, making an increase in metoprolol less preferable.  Please continue to closely monitor blood pressure.  Daytime hospitalist will continue to follow.

## 2024-06-19 NOTE — PROGRESS NOTES
Physical Therapy Discharge Summary    Reason for therapy discharge:    All goals and outcomes met, no further needs identified.    Progress towards therapy goal(s). See goals on Care Plan in Epic electronic health record for goal details.  Goals met    Therapy recommendation(s):    Defer to OT for CHF education and endurance training while inpatient.  Recommend home PT for continued endurance and strength training.

## 2024-06-19 NOTE — PROGRESS NOTES
I Illness Severity: Stable    PPatient Summary:      Telemetry Orders: Yes    Interpretation of Cardiac Rhythm: Sinus Truman w/ Prolonged Qt    Dyspnea improved and O2 sats >88% at RA or at prior home O2 therapy level:  Yes    Last Bowel Movement: 6/18     Acute Symptoms or Concerns: SOB, O2 sats are 92-94% on RA. BP is elevated.     Is this a new or worsening symptom or concern? Yes    Is this symptom or concern being adequately managed? No    Shift Summary: Patient denies any pain throughout shift. BP is still elevated. PRN hydralazine was given.      AActions:    Diagnostic testing and/or procedures completed, and results are available for discharge:  Met    SSituational Awareness:      Anticipated post discharge treatment plan formulated and the following needs have been identified:      SSynthesis:     Was bedside rounding completed on your shift? Yes     What team members present during bedside rounds?  RN      Paged MD regarding patients high BP overnight. 0.1mg clonidine ordered PRN for systolic >180

## 2024-06-20 VITALS
RESPIRATION RATE: 20 BRPM | SYSTOLIC BLOOD PRESSURE: 168 MMHG | OXYGEN SATURATION: 94 % | DIASTOLIC BLOOD PRESSURE: 72 MMHG | BODY MASS INDEX: 34.83 KG/M2 | WEIGHT: 204 LBS | HEART RATE: 71 BPM | HEIGHT: 64 IN | TEMPERATURE: 98.3 F

## 2024-06-20 LAB
ANION GAP SERPL CALCULATED.3IONS-SCNC: 12 MMOL/L (ref 7–15)
BUN SERPL-MCNC: 14.8 MG/DL (ref 8–23)
CALCIUM SERPL-MCNC: 9.2 MG/DL (ref 8.8–10.2)
CHLORIDE SERPL-SCNC: 103 MMOL/L (ref 98–107)
CREAT SERPL-MCNC: 1.16 MG/DL (ref 0.51–0.95)
DEPRECATED HCO3 PLAS-SCNC: 24 MMOL/L (ref 22–29)
EGFRCR SERPLBLD CKD-EPI 2021: 50 ML/MIN/1.73M2
ERYTHROCYTE [DISTWIDTH] IN BLOOD BY AUTOMATED COUNT: 16 % (ref 10–15)
GLUCOSE SERPL-MCNC: 117 MG/DL (ref 70–99)
HCT VFR BLD AUTO: 33.1 % (ref 35–47)
HGB BLD-MCNC: 10.6 G/DL (ref 11.7–15.7)
MCH RBC QN AUTO: 29.9 PG (ref 26.5–33)
MCHC RBC AUTO-ENTMCNC: 32 G/DL (ref 31.5–36.5)
MCV RBC AUTO: 94 FL (ref 78–100)
PLATELET # BLD AUTO: 127 10E3/UL (ref 150–450)
POTASSIUM SERPL-SCNC: 3.7 MMOL/L (ref 3.4–5.3)
RBC # BLD AUTO: 3.54 10E6/UL (ref 3.8–5.2)
SODIUM SERPL-SCNC: 139 MMOL/L (ref 135–145)
WBC # BLD AUTO: 3.6 10E3/UL (ref 4–11)

## 2024-06-20 PROCEDURE — 99239 HOSP IP/OBS DSCHRG MGMT >30: CPT | Performed by: STUDENT IN AN ORGANIZED HEALTH CARE EDUCATION/TRAINING PROGRAM

## 2024-06-20 PROCEDURE — 250N000013 HC RX MED GY IP 250 OP 250 PS 637: Performed by: INTERNAL MEDICINE

## 2024-06-20 PROCEDURE — 80048 BASIC METABOLIC PNL TOTAL CA: CPT | Performed by: STUDENT IN AN ORGANIZED HEALTH CARE EDUCATION/TRAINING PROGRAM

## 2024-06-20 PROCEDURE — 250N000013 HC RX MED GY IP 250 OP 250 PS 637: Performed by: STUDENT IN AN ORGANIZED HEALTH CARE EDUCATION/TRAINING PROGRAM

## 2024-06-20 PROCEDURE — 36415 COLL VENOUS BLD VENIPUNCTURE: CPT | Performed by: STUDENT IN AN ORGANIZED HEALTH CARE EDUCATION/TRAINING PROGRAM

## 2024-06-20 PROCEDURE — 85027 COMPLETE CBC AUTOMATED: CPT | Performed by: STUDENT IN AN ORGANIZED HEALTH CARE EDUCATION/TRAINING PROGRAM

## 2024-06-20 PROCEDURE — 250N000009 HC RX 250: Performed by: INTERNAL MEDICINE

## 2024-06-20 RX ORDER — HYDRALAZINE HYDROCHLORIDE 25 MG/1
25 TABLET, FILM COATED ORAL EVERY 8 HOURS
Qty: 90 TABLET | Refills: 0 | Status: SHIPPED | OUTPATIENT
Start: 2024-06-20 | End: 2024-07-01

## 2024-06-20 RX ORDER — FUROSEMIDE 40 MG
40 TABLET ORAL
Qty: 60 TABLET | Refills: 0 | Status: SHIPPED | OUTPATIENT
Start: 2024-06-20 | End: 2024-07-20

## 2024-06-20 RX ORDER — SPIRONOLACTONE 25 MG/1
25 TABLET ORAL
Qty: 60 TABLET | Refills: 0 | Status: SHIPPED | OUTPATIENT
Start: 2024-06-20 | End: 2024-07-20

## 2024-06-20 RX ADMIN — SPIRONOLACTONE 25 MG: 25 TABLET, FILM COATED ORAL at 09:56

## 2024-06-20 RX ADMIN — HYDRALAZINE HYDROCHLORIDE 25 MG: 25 TABLET, FILM COATED ORAL at 13:26

## 2024-06-20 RX ADMIN — CITALOPRAM HYDROBROMIDE 40 MG: 10 TABLET ORAL at 09:52

## 2024-06-20 RX ADMIN — CLOPIDOGREL BISULFATE 75 MG: 75 TABLET ORAL at 09:54

## 2024-06-20 RX ADMIN — AMLODIPINE BESYLATE 10 MG: 5 TABLET ORAL at 09:56

## 2024-06-20 RX ADMIN — METOPROLOL SUCCINATE 50 MG: 25 TABLET, EXTENDED RELEASE ORAL at 09:56

## 2024-06-20 RX ADMIN — LOSARTAN POTASSIUM 100 MG: 25 TABLET, FILM COATED ORAL at 09:56

## 2024-06-20 RX ADMIN — BACITRACIN: 500 OINTMENT TOPICAL at 10:01

## 2024-06-20 RX ADMIN — CALCIUM CARBONATE (ANTACID) CHEW TAB 500 MG 1000 MG: 500 CHEW TAB at 10:00

## 2024-06-20 RX ADMIN — HYDRALAZINE HYDROCHLORIDE 25 MG: 25 TABLET, FILM COATED ORAL at 06:05

## 2024-06-20 RX ADMIN — PANTOPRAZOLE SODIUM 40 MG: 40 TABLET, DELAYED RELEASE ORAL at 06:06

## 2024-06-20 RX ADMIN — FUROSEMIDE 40 MG: 40 TABLET ORAL at 09:56

## 2024-06-20 ASSESSMENT — ACTIVITIES OF DAILY LIVING (ADL)
ADLS_ACUITY_SCORE: 36

## 2024-06-20 NOTE — PLAN OF CARE
Pt discharging to home w/ home care. Confirmed w/ SW that home care is set up. Confirmed HF appt is set up. Printed application for [facility] and sent with pt to assist with CHF meal delivery. Family at bedside and updated. Discharge teaching provided especially regarding new medication and medication changes; questions asked and answered to pt satisfaction. Confirmed w/ sister that she would be picking up new prescriptions today and that pharmacy was correct. Confirmed all belongings transported with pt; pt wheeled to main entrance by writer and transported home by sister.

## 2024-06-20 NOTE — PLAN OF CARE
I Illness Severity: Stable    PPatient Summary:      Telemetry Orders: No    Interpretation of Cardiac Rhythm: N/A    Dyspnea improved and O2 sats >88% at RA or at prior home O2 therapy level:  Yes    Last Bowel Movement: 6/19    Acute Symptoms or Concerns: fatigue and finger pain    Is this a new or worsening symptom or concern? No    Is this symptom or concern being adequately managed? Yes    Shift Summary: Patient ambulated in the lan x2 during shift today. Successful bowel movement. Up in chair for all of shift and sleeps in the chair. Up to the bathroom with minimal assistance. Blood pressures elevated but much improved. Denied headache pain. Right hand finger painful with dressings changed x2 during shift with application of bacitracin.     AActions:    Diagnostic testing and/or procedures completed, and results are available for discharge:  Met    SSituational Awareness:      Anticipated post discharge treatment plan formulated and the following needs have been identified:   homecare and rehab (PT, OT, ST)    SSynthesis:     Was bedside rounding completed on your shift? Yes     What team members present during bedside rounds?  RN and MD

## 2024-06-20 NOTE — PROGRESS NOTES
Occupational Therapy Discharge Summary    Reason for therapy discharge:    Discharged to home with home therapy.    Progress towards therapy goal(s). See goals on Care Plan in [facility] electronic health record for goal details.  Goals partially met.  Barriers to achieving goals:   discharge from facility.    Therapy recommendation(s):    Continued therapy is recommended.  Rationale/Recommendations:  home care for ADLs.

## 2024-06-20 NOTE — PROGRESS NOTES
Care Management Discharge Note    Discharge Date: 06/20/2024       Discharge Disposition: Home, Home Care    Discharge Services: Home Care    Discharge DME: None    Discharge Transportation: family or friend will provide    Private pay costs discussed: Not applicable    Does the patient's insurance plan have a 3 day qualifying hospital stay waiver?  No    PAS Confirmation Code: N/A  Patient/family educated on Medicare website which has current facility and service quality ratings: yes    Education Provided on the Discharge Plan: Yes  Persons Notified of Discharge Plans: Pt and HC   Patient/Family in Agreement with the Plan: yes    Handoff Referral Completed: Yes    Additional Information:  Pt is discharging home with resumption of Home Care for PT, OT and RN through [facility].  Family will transport Pt home.     2:16 PM  [facility] faxed resumption of orders to [facility].     IVON Bennett

## 2024-06-20 NOTE — PLAN OF CARE
Pt Aox4. Denies pain. SBP 170s overnight. Up SBA to bathroom. No significant events this shift. Pending home care. Pt able to make needs known. Call light within reach and purposeful rounding performed. Macarena Zamora RN      Problem: Adult Inpatient Plan of Care  Goal: Optimal Comfort and Wellbeing  Outcome: Progressing     Problem: Heart Failure  Goal: Stable Heart Rate and Rhythm  Outcome: Progressing     Problem: Heart Failure  Goal: Effective Oxygenation and Ventilation  Outcome: Progressing

## 2024-06-21 NOTE — DISCHARGE SUMMARY
"[facility]  Hospitalist Discharge Summary      Date of Admission:  6/16/2024  Date of Discharge:  6/20/2024  2:34 PM  Discharging Provider: Gigi Hutchinson MD  Discharge Service: Hospitalist Service    Discharge Diagnoses   CHF, diastolic dysfunction  Labile creatinine    Clinically Significant Risk Factors     # DMII: A1C = N/A within past 6 months  # Obesity: Estimated body mass index is 35.02 kg/m  as calculated from the following:    Height as of this encounter: 1.626 m (5' 4\").    Weight as of this encounter: 92.5 kg (204 lb).       Follow-ups Needed After Discharge   Follow-up Appointments     Follow-up and recommended labs and tests       Follow up with primary care provider, COURT SERVIN, within 3-5 days to   evaluate medication change, for hospital follow- up, and regarding new   diagnosis.  The following labs/tests are recommended: BMP, recheck   creatinine ensure not still rising. .        {Additional follow-up instructions/to-do's for PCP       Unresulted Labs Ordered in the Past 30 Days of this Admission       No orders found from 5/17/2024 to 6/17/2024.        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  Hypertension clearly inadequately controlled longstanding issue with the patient.  Beta-blocker cannot be increased because of relative bradycardia with current dose of beta-blocker.  Will add hydralazine on a scheduled basis.  She had really good response to hydralazine IV which is very encouraging.  Has a nephrology appointment coming up in the next 1 week.  She does need a better home blood pressure monitor currently she uses a wrist monitor    Labile " Cr  CHF  HTN-Furosemide increased to 40mg  BID, spironolactone also increased to BID. Creatinine is quite labile, she did meet criteria for FELIPA however suspect her normal Cr. Was an outlier. Previously Cr. 1.88, as higher as greater than 2 at times. Cr. Elevation this stay could be multifactorial she was hypertensive, but also her diuretics were increased. Discussed keeping her inpatient 1 more day to trend creatinine, however patient wished to discharge and follow up outpatient, given the minimal increase and her confirmed scheduled appointment on 6/23 felt comfortable with this plan. If dizzy or lightheaded instructed to hold lasix and contact PCP for management.    Consultations This Hospital Stay   PHYSICAL THERAPY ADULT IP CONSULT  OCCUPATIONAL THERAPY ADULT IP CONSULT  CORE CLINIC EVALUATION IP CONSULT  OCCUPATIONAL THERAPY ADULT IP CONSULT  NUTRITION SERVICES ADULT IP CONSULT  CARE MANAGEMENT / SOCIAL WORK IP CONSULT    Code Status   Prior    Time Spent on this Encounter   IGigi MD, personally saw the patient today and spent greater than 30 minutes discharging this patient.       iGgi Hutchinson MD  [facility]  [address] [place] [id number]  Phone: [phone]  Fax: [phone]  ______________________________________________________________________    Physical Exam   Vital Signs:                    Weight: 204 lbs 0 oz  Gen: Appears well, NAD, on RA  Card:Warm well perfused, pulses assumed patient talking  Pulm: Normal I/E effort on RA  Abd:Non distended  Skin:No obvious rashes or lesions on exposed areas of skin  Neuro AxOx4, S/S grossly intact, no obvious FND,   Psych:Pleasant, answering questions appropriately, insight good, judgement good, does not appear to be responding to I/E stimuli        Primary Care Physician   [facility]    Discharge Orders      Primary Care - Care Coordination Referral      Reason for your hospital stay     Hypertensive urgency     Follow-up and recommended labs and tests     Follow up with primary care provider, COURT SERVIN, within 3-5 days to evaluate medication change, for hospital follow- up, and regarding new diagnosis.  The following labs/tests are recommended: BMP, recheck creatinine ensure not still rising. .     Activity    Your activity upon discharge: activity as tolerated     Resume Home Care Services     Discharge Instructions    Be sure to follow up with your PCP in the next few days, you will need to recheck your creatinine, if dizzy or lightheaded you can hold your lasix and reach out to your PCP     Diet    Follow this diet upon discharge: Orders Placed This Encounter      Fluid restriction 1800 ML FLUID      Combination Diet Low Saturated Fat Na <2400mg Diet, No Caffeine Diet       Significant Results and Procedures   Results for orders placed or performed during the hospital encounter of 06/16/24   CT Chest Pulmonary Embolism w Contrast    Narrative    EXAM: CT CHEST PULMONARY EMBOLISM W CONTRAST  LOCATION: [facility]  DATE: 6/16/2024    INDICATION: Dyspnea, cough, hypoxia, creatinine 0.91 today  COMPARISON: CT 2/8/2024  TECHNIQUE: CT chest pulmonary angiogram during arterial phase injection of IV contrast. Multiplanar reformats and MIP reconstructions were performed. Dose reduction techniques were used.   CONTRAST: Isovue 370 75mL    FINDINGS:  ANGIOGRAM CHEST: Pulmonary arteries are normal caliber and negative for pulmonary emboli. Thoracic aorta is not well opacified and is indeterminate for dissection. No CT evidence of right heart strain.    LUNGS AND PLEURA: Subtle interlobular septal thickening throughout both lungs with patchy groundglass opacities and some subtle areas of air trapping. No aline airspace consolidation. Small bilateral pleural effusions.    MEDIASTINUM/AXILLAE: Large heart with likely left ventricular hypertrophy. Small pericardial  effusion.    CORONARY ARTERY CALCIFICATION: Moderate.    UPPER ABDOMEN: Nodular contour of liver with widening of the fissures. Likely splenomegaly. No drainable ascites.    MUSCULOSKELETAL: No acute bony abnormality.      Impression    IMPRESSION:  1.  Findings compatible with heart failure, including cardiomegaly with mild interstitial edema and small bilateral pleural effusions.  2.  No pulmonary embolus.  3.  Morphologic changes suggestive of chronic liver disease/cirrhosis. No drainable ascites in the upper abdomen.   XR Finger Right G/E 2 Views    Narrative    EXAM: XR FINGER RIGHT G/E 2 VIEWS  LOCATION: [facility]  DATE: 2024    INDICATION: Right middle finger cellulitis c f osteomyelitis.  COMPARISON: None.      Impression    IMPRESSION: Moderate multifocal osteoarthrosis. The bones are diffusely demineralized. There is a tiny calcification along the dorsal ulnar aspect of the distal end of the middle phalanx of the long finger consistent with an age-indeterminate fracture.   Soft tissue swelling in the long finger. No soft tissue gas or radiopaque foreign body. No erosions or other signs of osteomyelitis.     Echocardiogram Complete     Value    LVEF  > 65%    Narrative    [id number]  [id number]  [id number]ARCEL[place] [address]     Name: SHAKIRA COBB  MRN: [id number]  : 1950  Study Date: 2024 09:17 AM  Age: 74 yrs  Gender: Female  Patient Location: [facility]  Reason For Study: Heart Failure  Ordering Physician: AYAD MEDRANO  Performed By: ARTHUR     BSA: 2.0 m2  Height: 64 in  Weight: 209 lb  HR: 62  BP: 166/72 mmHg  ______________________________________________________________________________  Procedure  Complete Portable Echo Adult. Definity (NDC #20349-683) given intravenously.  ______________________________________________________________________________  Interpretation Summary     Left ventricular  function is normal.The ejection fraction is > 65%.  There is moderate to severe concentric left ventricular hypertrophy.  Normal right ventricle size and systolic function.  The left atrium is severely dilated.  There is moderate mitral annular calcification.  Mild valvular aortic stenosis.  IVC diameter >2.1 cm collapsing <50% with sniff suggests a high RA pressure  estimated at 15 mmHg or greater.  Small pericardial effusion  There are no echocardiographic indications of cardiac tamponade.  There is mild mitral stenosis.  ______________________________________________________________________________  Left Ventricle  The left ventricle is normal in size. Left ventricular function is normal.The  ejection fraction is > 65%. There is moderate to severe concentric left  ventricular hypertrophy. Hyperdynamic left ventricular function. Diastolic  function not assessed due to significant mitral annular calcification. No  regional wall motion abnormalities noted.     Right Ventricle  Normal right ventricle size and systolic function.     Atria  The left atrium is severely dilated. The right atrium is mildly dilated. There  is no color Doppler evidence of an atrial shunt.     Mitral Valve  There is moderate mitral annular calcification. There is trace mitral  regurgitation. There is mild mitral stenosis.     Tricuspid Valve  The tricuspid valve is not well visualized. Right ventricular systolic  pressure could not be approximated due to inadequate tricuspid regurgitation.     Aortic Valve  The aortic valve is not well visualized. Moderate aortic valve calcification  is present. Mild valvular aortic stenosis.     Pulmonic Valve  The pulmonic valve is not well visualized.     Vessels  The aorta root is normal. IVC diameter >2.1 cm collapsing <50% with sniff  suggests a high RA pressure estimated at 15 mmHg or greater.     Pericardium  Small pericardial effusion. There are no echocardiographic indications of  cardiac  tamponade.     ______________________________________________________________________________  MMode/2D Measurements & Calculations  IVSd: 2.0 cm  LVIDd: 4.4 cm  LVIDs: 2.7 cm  LVPWd: 1.6 cm  FS: 39.5 %     LV mass(C)d: 355.7 grams  LV mass(C)dI: 178.5 grams/m2  Ao root diam: 3.2 cm  LVOT diam: 2.0 cm  LVOT area: 3.3 cm2  Ao root diam index Ht(cm/m): 1.9  Ao root diam index BSA (cm/m2): 1.6  EF Biplane: 59.4 %  LA Volume (BP): 171.0 ml  LA Volume Index (BP): 85.9 ml/m2     LA Volume Indexed (AL/bp): 91.2 ml/m2  RWT: 0.73  TAPSE: 2.2 cm     Time Measurements  MM HR: 60.0 BPM     Doppler Measurements & Calculations  MV E max aditya: 151.0 cm/sec  MV A max aditya: 138.8 cm/sec  MV E/A: 1.1  MV max P.1 mmHg  MV mean P.0 mmHg  MV V2 VTI: 57.0 cm  MVA(VTI): 1.8 cm2  MV dec time: 0.35 sec     Ao V2 max: 220.2 cm/sec  Ao max P.0 mmHg  Ao V2 mean: 154.1 cm/sec  Ao mean PG: 10.7 mmHg  Ao V2 VTI: 50.4 cm  ROLAND(I,D): 2.1 cm2  ROLAND(V,D): 2.1 cm2  LV V1 max P.1 mmHg  LV V1 max: 141.4 cm/sec  LV V1 VTI: 32.2 cm  SV(LVOT): 105.3 ml  SI(LVOT): 52.8 ml/m2  PA acc time: 0.06 sec  AV Aditya Ratio (DI): 0.64  ROLAND Index (cm2/m2): 1.0  E/E': 28.0  E/E' av.2  Lateral E/e': 28.6  Medial E/e': 27.8  Peak E' Aditya: 5.4 cm/sec  RV S Aditya: 15.9 cm/sec     ______________________________________________________________________________  Report approved by: Naina Obregon 2024 12:59 PM             Discharge Medications   Discharge Medication List as of 2024  1:44 PM        START taking these medications    Details   hydrALAZINE (APRESOLINE) 25 MG tablet Take 1 tablet (25 mg) by mouth every 8 hours for 30 days, Disp-90 tablet, R-0, E-Prescribe           CONTINUE these medications which have CHANGED    Details   furosemide (LASIX) 40 MG tablet Take 1 tablet (40 mg) by mouth 2 times daily for 30 days, Disp-60 tablet, R-0, E-Prescribe      spironolactone (ALDACTONE) 25 MG tablet Take 1 tablet (25 mg) by mouth 2 times daily for  30 days, Disp-60 tablet, R-0, E-Prescribe           CONTINUE these medications which have NOT CHANGED    Details   acetaminophen (TYLENOL) 500 MG tablet Take 2 tablets (1,000 mg) by mouth every 8 hours as needed for mild pain, OTC      albuterol (PROAIR HFA/PROVENTIL HFA/VENTOLIN HFA) 108 (90 Base) MCG/ACT inhaler Inhale 2 puffs into the lungs every 6 hours as needed, Historical      amLODIPine (NORVASC) 10 MG tablet Take 10 mg by mouth daily, Historical      calcium carbonate (TUMS) 500 MG chewable tablet Take 1 chew tab by mouth 2 times daily as needed for heartburn, Historical      citalopram (CELEXA) 40 MG tablet Take 40 mg by mouth daily, Historical      clopidogrel (PLAVIX) 75 MG tablet Take 75 mg by mouth daily, Historical      losartan (COZAAR) 50 MG tablet Take 2 tablets (100 mg) by mouth daily, Disp-60 tablet, R-0, E-Prescribe      metoprolol succinate ER (TOPROL XL) 50 MG 24 hr tablet Take 50 mg by mouth daily, Historical      multivitamin w/minerals (THERA-VIT-M) tablet Take 1 tablet by mouth daily, Historical      pantoprazole (PROTONIX) 40 MG EC tablet Take 1 tablet (40 mg) by mouth 2 times daily (before meals), Disp-60 tablet, R-0, E-Prescribe      senna-docusate (SENOKOT-S/PERICOLACE) 8.6-50 MG tablet Take 1 tablet by mouth at bedtime, Historical      vitamin E (TOCOPHEROL) 400 units (180 mg) capsule Take 400 Units by mouth daily, Historical           Allergies   Allergies   Allergen Reactions    Aspirin      Other reaction(s): stomach upset    Atenolol Other (See Comments)     abd pain    Lisinopril      Other reaction(s): stomach aches    Nsaids Nephrotoxicity    Penicillins Hives     Tolerated ceftriaxone    Statins      Other reaction(s): myalgias

## 2024-06-24 ENCOUNTER — TRANSFERRED RECORDS (OUTPATIENT)
Dept: HEALTH INFORMATION MANAGEMENT | Facility: CLINIC | Age: 74
End: 2024-06-24

## 2024-06-24 ENCOUNTER — LAB REQUISITION (OUTPATIENT)
Dept: LAB | Facility: CLINIC | Age: 74
End: 2024-06-24
Payer: COMMERCIAL

## 2024-06-24 DIAGNOSIS — I13.0 HYPERTENSIVE HEART AND CHRONIC KIDNEY DISEASE WITH HEART FAILURE AND STAGE 1 THROUGH STAGE 4 CHRONIC KIDNEY DISEASE, OR UNSPECIFIED CHRONIC KIDNEY DISEASE (H): ICD-10-CM

## 2024-06-24 DIAGNOSIS — E78.2 MIXED HYPERLIPIDEMIA: ICD-10-CM

## 2024-06-24 LAB
ANION GAP SERPL CALCULATED.3IONS-SCNC: 13 MMOL/L (ref 7–15)
BUN SERPL-MCNC: 19.1 MG/DL (ref 8–23)
CALCIUM SERPL-MCNC: 8.8 MG/DL (ref 8.8–10.2)
CHLORIDE SERPL-SCNC: 104 MMOL/L (ref 98–107)
CHOLEST SERPL-MCNC: 133 MG/DL
CREAT SERPL-MCNC: 1.42 MG/DL (ref 0.51–0.95)
DEPRECATED HCO3 PLAS-SCNC: 21 MMOL/L (ref 22–29)
EGFRCR SERPLBLD CKD-EPI 2021: 39 ML/MIN/1.73M2
FASTING STATUS PATIENT QL REPORTED: ABNORMAL
FASTING STATUS PATIENT QL REPORTED: ABNORMAL
GLUCOSE SERPL-MCNC: 173 MG/DL (ref 70–99)
HDLC SERPL-MCNC: 25 MG/DL
LDLC SERPL CALC-MCNC: 63 MG/DL
NONHDLC SERPL-MCNC: 108 MG/DL
POTASSIUM SERPL-SCNC: 3.9 MMOL/L (ref 3.4–5.3)
SODIUM SERPL-SCNC: 138 MMOL/L (ref 135–145)
TRIGL SERPL-MCNC: 225 MG/DL

## 2024-06-24 PROCEDURE — 80048 BASIC METABOLIC PNL TOTAL CA: CPT | Mod: ORL | Performed by: FAMILY MEDICINE

## 2024-06-24 PROCEDURE — 80061 LIPID PANEL: CPT | Mod: ORL | Performed by: FAMILY MEDICINE

## 2024-06-26 NOTE — PROGRESS NOTES
Medication Therapy Management (MTM) Encounter    ASSESSMENT:                            Medication Adherence/Access: Improved since a RN is setting up her meds now.    Diabetes Stable.  If needed would be a good candidate for SGLT2 inhibitor for congestive heart failure diagnosis and for renal protection- defer to upcoming cardiology appointment.     Hypertension/HFpEF:  Blood pressure is above goal <130/80 mmHg.  Working with nephrology.   May benefit from taking prazosin with food, may help with side effects      PLAN:                            Try taking the prazosin with a small snack at bedtime to see if this would help with the diarrhea.  2.  Or you could retry the probiotic supplement once daily to see if this helps.    Follow-up: 2-3 months with MTM pharmacist      SUBJECTIVE/OBJECTIVE:                          Josefina Dumont is a 74 year old female coming in for a follow up visit.  Patient was accompanied by her sister Orin robles.     Reason for visit: follow up on meds, blood pressure     Allergies/ADRs: Reviewed in chart  Past Medical History: Reviewed in chart  Tobacco: She reports that she quit smoking about 12 years ago. Her smoking use included cigarettes. She has never used smokeless tobacco.  Alcohol: none    Medication Adherence/Access: She is in the process of getting a new RN to set up her medications.  Right now has a community paramedic that's been setting them up for her and they will be able to assist her for another 4 weeks.     HPI: Patient was admitted on 6/16/2024 and discharged on 6/20/2024 from [facility]. Patient was discharged with diagnosis of congestive heart failure, diastolic dysfunction, and labile creatinine. Hospital course patient was admitted due to hypertensive concern and worsening symptoms. Patient was given IV hydralazine which did bring her blood pressure down. Patient was given hydralazine on a scheduled basis. Patient's furosemide was increased to 40 mg  twice daily. Spironolactone was also increased to twice daily. Patient's creatinine had was noted to be quite labile and it was noted to have criteria for acute kidney injury though likely has some underlying chronic kidney disease. Patient was discharged in stable condition and told to follow-up with her PCP.    Diabetes   No longer on glipizide, has appointment later this month for diabetes check  Hasn't been checking her blood glucose at home, denies symptoms of hypoglycemia.   Blood sugar monitoring: not monitoring  Current diabetes symptoms: none  Eye exam in the last 12 months? Yes- Date of last eye exam: 4/18/2023,  Location: [facility]  Foot exam: up to date  Urine Albumin: elevated    Hypertension /HFpEF:  - Losartan 100 mg daily  - Metoprolol succinate 50 mg once daily   - Furosemide 40 mg twice daily  - Spironolactone 25 mg twice daily  - Amlodipine 10 mg daily  - Prazosin 1 mg daily at bedtime  - Clopidogrel 75 mg daily  Her home blood pressure was very high when she was missing the hydralazine, although a lot better at home on the prazosin.  Is still noticing some mild diarrhea the mornings after taking prazosin, and some mild blurry vision.  She will monitor and schedule a follow up with her eye doctor if the blurry vision persists.  Could potentially be a side effect of prazosin, can sometimes see blurry vision with many blood pressure medications.  Now blood pressure at home is 140's/70-80  Denies dyspnea, chest pains, edema.  Weight is stable.    Blood pressure managed by nephrology- Dr. Aponte.    Past meds:  Hydralazine- diarrhea, blurry vision    Today's Vitals: BP (!) 144/68   Pulse 55   SpO2 96%   ----------------      I spent 50 minutes with this patient today. All changes were made via collaborative practice agreement with COURT SERVIN MD. A copy of the visit note was provided to the patient's provider(s).    A summary of these recommendations was given to the patient.    Ophelia  Brennan Tam  Medication Therapy Management Pharmacist       Medication Therapy Recommendations  No medication therapy recommendations to display       Post Discharge Medication Reconciliation Status: discharge medications reconciled and changed, per note/orders.

## 2024-07-01 ENCOUNTER — OFFICE VISIT (OUTPATIENT)
Dept: PHARMACY | Facility: PHYSICIAN GROUP | Age: 74
End: 2024-07-01
Payer: COMMERCIAL

## 2024-07-01 VITALS — HEART RATE: 55 BPM | SYSTOLIC BLOOD PRESSURE: 144 MMHG | OXYGEN SATURATION: 96 % | DIASTOLIC BLOOD PRESSURE: 68 MMHG

## 2024-07-01 DIAGNOSIS — I50.33 ACUTE ON CHRONIC HEART FAILURE WITH PRESERVED EJECTION FRACTION (H): ICD-10-CM

## 2024-07-01 DIAGNOSIS — I10 ESSENTIAL HYPERTENSION: ICD-10-CM

## 2024-07-01 DIAGNOSIS — E11.10 TYPE 2 DIABETES MELLITUS WITH KETOACIDOSIS WITHOUT COMA, WITHOUT LONG-TERM CURRENT USE OF INSULIN (H): Primary | ICD-10-CM

## 2024-07-01 PROCEDURE — 99207 PR NO CHARGE LOS: CPT | Performed by: PHARMACIST

## 2024-07-01 RX ORDER — PRAZOSIN HYDROCHLORIDE 1 MG/1
1 CAPSULE ORAL AT BEDTIME
COMMUNITY

## 2024-07-01 NOTE — PATIENT INSTRUCTIONS
"Recommendations from today's MTM visit:                                                        Continue current medications    Follow-up: 2-3 months with MTM pharmacist    It was great speaking with you today.  I value your experience and would be very thankful for your time in providing feedback in our clinic survey. In the next few days, you may receive an email or text message from [employer] with a link to a survey related to your  clinical pharmacist.\"     To schedule another MTM appointment, please call the clinic directly or you may call the MTM scheduling line at [phone].    My Clinical Pharmacist's contact information:                                                      Please feel free to contact me with any questions or concerns you have.      Ophelia Tam, PharmD  Medication Therapy Management Pharmacist            Medication List            Accurate as of July 1, 2024 10:47 AM. If you have any questions, ask your nurse or doctor.                CONTINUE taking these medications        Morning Afternoon Evening Bedtime   acetaminophen 500 MG tablet  Also known as: TYLENOL  Take 2 tablets (1,000 mg) by mouth every 8 hours as needed for mild pain            albuterol 108 (90 Base) MCG/ACT inhaler  Also known as: PROAIR HFA/PROVENTIL HFA/VENTOLIN HFA  Inhale 2 puffs into the lungs every 6 hours as needed            amLODIPine 10 MG tablet  Also known as: NORVASC  Take 10 mg by mouth daily              calcium carbonate 500 MG chewable tablet  Also known as: TUMS  Take 1 chew tab by mouth 2 times daily as needed for heartburn            citalopram 40 MG tablet  Also known as: celeXA  Take 40 mg by mouth daily              clopidogrel 75 MG tablet  Also known as: PLAVIX  Take 75 mg by mouth daily              furosemide 40 MG tablet  Also known as: LASIX  Take 1 tablet (40 mg) by mouth 2 times daily for 30 days                losartan 50 MG tablet  Also known as: COZAAR  Take 2 tablets (100 mg) by mouth " daily              metoprolol succinate ER 50 MG 24 hr tablet  Also known as: TOPROL XL  Take 50 mg by mouth daily              multivitamin w/minerals tablet  Take 1 tablet by mouth daily              pantoprazole 40 MG EC tablet  Also known as: PROTONIX  Take 1 tablet (40 mg) by mouth 2 times daily (before meals)                prazosin 1 MG capsule  Also known as: MINIPRESS  Take 1 mg by mouth at bedtime  Reason for med: High Blood Pressure Disorder              senna-docusate 8.6-50 MG tablet  Also known as: SENOKOT-S/PERICOLACE  Take 1 tablet by mouth nightly as needed for constipation              spironolactone 25 MG tablet  Also known as: ALDACTONE  Take 1 tablet (25 mg) by mouth 2 times daily for 30 days                vitamin E 400 units (180 mg) capsule  Also known as: TOCOPHEROL  Take 400 Units by mouth daily

## 2024-07-11 PROBLEM — I16.0 HYPERTENSIVE URGENCY: Status: RESOLVED | Noted: 2024-06-16 | Resolved: 2024-07-11

## 2024-07-11 PROBLEM — I50.31 ACUTE DIASTOLIC HEART FAILURE (H): Status: RESOLVED | Noted: 2024-02-02 | Resolved: 2024-07-11

## 2024-07-11 PROBLEM — N17.9 ACUTE KIDNEY FAILURE, UNSPECIFIED (H): Status: RESOLVED | Noted: 2024-02-14 | Resolved: 2024-07-11

## 2024-07-11 NOTE — PROGRESS NOTES
Assessment/Recommendations   Assessment:    1.   Acute on chronic diastolic heart failure, NYHA Class I-II:  Patient was hospitalized from June 16 through 20 for congestive heart failure and poorly controlled blood pressure.    proBNP was found elevated in 5000's-done at [facility] emergency room    Discharge weight was 204 pounds.  Current home weight is 201 pounds.    Patient is well compensated on exam.    On furosemide 40 mg twice a day  On spironolactone 25 mg twice a day    We discussed and reviewed about heart failure, medication management, and lifestyle management including low sodium diet <2 g/day, daily weight, and staying physically active as tolerated. Patient met Bebeto nurse clinician for heart failure education in hospital in June 2024.    2. Mild to moderate Coronary artery disease: No chest pain.  On Plavix.  Not on statin therapy-history of statin intolerance.  Most recent LDL is at goal.  Triglycerides elevated in 200s.  Patient states  she was not fasting for the lab work.    3.  Obesity with a BMI of 37.6:     4.  Hypertensive Emergency: Recent hospitalization with hypertensive emergency.  Blood pressure today is 150/60.-improved. She reports her home blood pressure is staying less than 150/90.    5.  Chronic kidney disease stage III/Hypokalemia: BMP on 6/24/2024 showed sodium 138, potassium 3.3, and creatinine 1.26.    Plan/Recommendation:  -Patient was offered to have BMP checked to reassess kidney function and potassium.  Patient declined stating that she is seeing nephrology next week.  -  Encouraged high potassium diet.  -Continue on low-sodium diet <2000 mg per day, daily weight monitoring, and maintain fluid intake at 50 to 60 ounces per day    Follow up with Dr. Melgar in 2-3 months. Follow up with Jorge Alberto in 5-6 months in Heart Failure clinic     The longitudinal plan of care for acute on chronic heart failure with preserved ejection fraction, hypertension, and chronic kidney  "disease was addressed during this visit.?Due to the added complexity in care, I will continue to support Josefina Dumont in the subsequent management of this condition(s) and with the ongoing continuity of care of this condition(s)\".     History of Present Illness/Subjective    Ms. Josefina Dumont is a 74 year old female with a past medical history of hypertension, obesity, diabetes mellitus type 2, chronic kidney disease stage III, mild mitral stenosis, mild to moderate coronary artery disease per CT angiogram of coronary artery in January 2024, and recent hospitalization with heart failure exacerbation and hypertensive emergency who is seen at [facility] for post hospitalization heart failure follow up.     Patient had a follow-up with PCP and Pharm.D. since recent hospitalization.  Unfortunately, patient had a mechanical fall and got evaluated in the ED and reports that she has vertebral fracture.    Today, Josefina is here accompanied by her sister.She denies fatigue, lightheadedness, shortness of breath, dyspnea on exertion, orthopnea, PND, palpitations, chest pain, abdominal fullness/bloating, and lower extremity edema.      Patient states that she has  enough refills for her diuretic.  She states she received it from PCP office.    Reports following low-sodium diet.  She reports adequate fluid intake.    ECHO on 6/16/24-Reviewed:   Interpretation Summary   Left ventricular function is normal.The ejection fraction is > 65%.  There is moderate to severe concentric left ventricular hypertrophy.  Normal right ventricle size and systolic function.  The left atrium is severely dilated.  There is moderate mitral annular calcification.  Mild valvular aortic stenosis.  IVC diameter >2.1 cm collapsing <50% with sniff suggests a high RA pressure  estimated at 15 mmHg or greater.  Small pericardial effusion  There are no echocardiographic indications of cardiac tamponade.  There is " mild mitral stenosis.    CT Angio of Coronary Artery in 1/2024-reviewed:  Interpretation Summary  Angiography: codominant system - noted RCA small but feeds small PDA  LM mild calcification ostium with min narrowing  LAD proximal calcification mild with mild plaque  Circ proximal to mid moderate calcification and mild to mod plaque  RCA normal, calcified section of aorta near RCA ostium     Non coronary:  No mass/thrombus in TAMMIE, LV or PA  Normal aortic valve   Normal aorta with min plaque small area sinus valsalva and in thoracic descending aorta  Moderate mitral annular calcification  Normal pericardium   Imp:  Mild to moderate plaque/coronary artery disease of Circ and mild in proximal LAD  Moderate mitral annular calcification but normal appearing mitral valve leaflets     Physical Examination Review of Systems   BP (!) 150/60 (BP Location: Left arm, Patient Position: Sitting, Cuff Size: Adult Large)   Pulse 60   Resp 14   Wt 91.6 kg (202 lb)   BMI 34.67 kg/m    Body mass index is 34.67 kg/m .  Wt Readings from Last 3 Encounters:   07/12/24 91.6 kg (202 lb)   06/19/24 92.5 kg (204 lb)   02/28/24 104.8 kg (231 lb)     General Appearance:   no distress, normal body habitus   ENT/Mouth: membranes moist, no oral lesions or bleeding gums.      EYES:  no scleral icterus, normal conjunctivae   Neck: no carotid bruits or thyromegaly   Chest/Lungs:   lungs are clear to auscultation, no rales or wheezing, equal chest wall expansion    Cardiovascular:   Heart rate regular. Normal first and second heart sounds with 1/6 systolic murmurs, rubs, or gallops; Jugular venous pressure flat with no edema bilaterally    Abdomen:  no organomegaly, masses, bruits, or tenderness; bowel sounds are present   Extremities   no cyanosis or clubbing    CMS intact.   Skin: no xanthelasma, warm.    Neurologic: Alert and oriented X 3 no tremors   Psychiatric: calm and cooperative                                                   Negative  unless noted in HPI     Medical History  Surgical History Family History Social History   Past Medical History:   Diagnosis Date    Chronic kidney disease     Diabetes (H)     Hypertension     Obese     Past Surgical History:   Procedure Laterality Date    IR CVC TUNNEL PLACEMENT > 5 YRS OF AGE  2024    IR CVC TUNNEL REMOVAL RIGHT  2024    No family history on file. Social History     Socioeconomic History    Marital status:      Spouse name: Not on file    Number of children: Not on file    Years of education: Not on file    Highest education level: Not on file   Occupational History    Not on file   Tobacco Use    Smoking status: Former     Current packs/day: 0.00     Types: Cigarettes     Quit date:      Years since quittin.5    Smokeless tobacco: Never   Substance and Sexual Activity    Alcohol use: Not on file    Drug use: Not on file    Sexual activity: Not on file   Other Topics Concern    Not on file   Social History Narrative    Not on file     Social Determinants of Health     Financial Resource Strain: Not on file   Food Insecurity: Not on file   Transportation Needs: Not on file   Physical Activity: Not on file   Stress: Not on file   Social Connections: Not on file   Interpersonal Safety: Unknown (2024)    Received from [facility]    Humiliation, Afraid, Rape, and Kick questionnaire     Fear of Current or Ex-Partner: Not on file     Emotionally Abused: Patient declined     Physically Abused: Patient declined     Sexually Abused: Patient declined   Housing Stability: Not on file          Medications  Allergies   Current Outpatient Medications   Medication Sig Dispense Refill    acetaminophen (TYLENOL) 500 MG tablet Take 2 tablets (1,000 mg) by mouth every 8 hours as needed for mild pain      albuterol (PROAIR HFA/PROVENTIL HFA/VENTOLIN HFA) 108 (90 Base) MCG/ACT inhaler Inhale 2 puffs into the lungs every 6 hours as needed      amLODIPine (NORVASC) 10 MG tablet Take 10  mg by mouth daily      calcium carbonate (TUMS) 500 MG chewable tablet Take 1 chew tab by mouth 2 times daily as needed for heartburn      citalopram (CELEXA) 40 MG tablet Take 40 mg by mouth daily      clopidogrel (PLAVIX) 75 MG tablet Take 75 mg by mouth daily      furosemide (LASIX) 40 MG tablet Take 1 tablet (40 mg) by mouth 2 times daily for 30 days 60 tablet 0    losartan (COZAAR) 50 MG tablet Take 2 tablets (100 mg) by mouth daily 60 tablet 0    metoprolol succinate ER (TOPROL XL) 50 MG 24 hr tablet Take 50 mg by mouth daily      multivitamin w/minerals (THERA-VIT-M) tablet Take 1 tablet by mouth daily      pantoprazole (PROTONIX) 40 MG EC tablet Take 1 tablet (40 mg) by mouth 2 times daily (before meals) 60 tablet 0    prazosin (MINIPRESS) 1 MG capsule Take 1 mg by mouth at bedtime      senna-docusate (SENOKOT-S/PERICOLACE) 8.6-50 MG tablet Take 1 tablet by mouth nightly as needed for constipation      spironolactone (ALDACTONE) 25 MG tablet Take 1 tablet (25 mg) by mouth 2 times daily for 30 days 60 tablet 0    vitamin E (TOCOPHEROL) 400 units (180 mg) capsule Take 400 Units by mouth daily      Allergies   Allergen Reactions    Atorvastatin Muscle Pain (Myalgia)    Hydralazine Diarrhea    Aspirin      Other reaction(s): stomach upset    Atenolol Other (See Comments)     abd pain    Lisinopril      Other reaction(s): stomach aches    Nsaids Nephrotoxicity    Penicillins Hives     Tolerated ceftriaxone    Statins      Other reaction(s): myalgias         Lab Results    Chemistry/lipid CBC Cardiac Enzymes/BNP/TSH/INR   Lab Results   Component Value Date    CHOL 133 06/24/2024    HDL 25 (L) 06/24/2024    TRIG 225 (H) 06/24/2024    BUN 19.1 06/24/2024     06/24/2024    CO2 21 (L) 06/24/2024    Lab Results   Component Value Date    WBC 3.6 (L) 06/20/2024    HGB 10.6 (L) 06/20/2024    HCT 33.1 (L) 06/20/2024    MCV 94 06/20/2024     (L) 06/20/2024    Lab Results   Component Value Date    TROPONINI 0.03  01/17/2022     (H) 02/04/2022    TSH 6.75 (H) 06/16/2024    INR 1.25 (H) 02/10/2024        51  minutes spent on the date of encounter doing chart review, review of outside records, review of test results, interpretation with above tests, patient visit, documentation, and discussion with family.        This note has been dictated using voice recognition software. Any grammatical, typographical, or context distortions are unintentional and inherent to the software

## 2024-07-12 ENCOUNTER — OFFICE VISIT (OUTPATIENT)
Dept: CARDIOLOGY | Facility: CLINIC | Age: 74
End: 2024-07-12
Payer: COMMERCIAL

## 2024-07-12 VITALS
BODY MASS INDEX: 34.67 KG/M2 | WEIGHT: 202 LBS | DIASTOLIC BLOOD PRESSURE: 60 MMHG | HEART RATE: 60 BPM | RESPIRATION RATE: 14 BRPM | SYSTOLIC BLOOD PRESSURE: 150 MMHG

## 2024-07-12 DIAGNOSIS — I16.1 HYPERTENSIVE EMERGENCY: ICD-10-CM

## 2024-07-12 DIAGNOSIS — I10 PRIMARY HYPERTENSION: ICD-10-CM

## 2024-07-12 DIAGNOSIS — N18.31 STAGE 3A CHRONIC KIDNEY DISEASE (H): ICD-10-CM

## 2024-07-12 DIAGNOSIS — E66.01 MORBID OBESITY (H): ICD-10-CM

## 2024-07-12 DIAGNOSIS — I50.33 ACUTE ON CHRONIC HEART FAILURE WITH PRESERVED EJECTION FRACTION (H): Primary | ICD-10-CM

## 2024-07-12 DIAGNOSIS — E87.6 HYPOKALEMIA: ICD-10-CM

## 2024-07-12 PROCEDURE — 99215 OFFICE O/P EST HI 40 MIN: CPT | Performed by: NURSE PRACTITIONER

## 2024-07-12 PROCEDURE — G2211 COMPLEX E/M VISIT ADD ON: HCPCS | Performed by: NURSE PRACTITIONER

## 2024-07-12 NOTE — LETTER
7/12/2024    COURT SERVIN MD  [address]  [place] [facility] [address]    RE: Josefina Dumont       Dear Colleague,     I had the pleasure of seeing Josefina Dumont in the [facility].          Assessment/Recommendations   Assessment:    1.   Acute on chronic diastolic heart failure, NYHA Class I-II:  Patient was hospitalized from June 16 through 20 for congestive heart failure and poorly controlled blood pressure.    proBNP was found elevated in 5000's-done at [facility] room    Discharge weight was 204 pounds.  Current home weight is 201 pounds.    Patient is well compensated on exam.    On furosemide 40 mg twice a day  On spironolactone 25 mg twice a day    We discussed and reviewed about heart failure, medication management, and lifestyle management including low sodium diet <2 g/day, daily weight, and staying physically active as tolerated. Patient met Bebeto, nurse clinician for heart failure education in hospital in June 2024.    2. Mild to moderate Coronary artery disease: No chest pain.  On Plavix.  Not on statin therapy-history of statin intolerance.  Most recent LDL is at goal.  Triglycerides elevated in 200s.  Patient states  she was not fasting for the lab work.    3.  Obesity with a BMI of 37.6:     4.  Hypertensive Emergency: Recent hospitalization with hypertensive emergency.  Blood pressure today is 150/60.-improved. She reports her home blood pressure is staying less than 150/90.    5.  Chronic kidney disease stage III/Hypokalemia: BMP on 6/24/2024 showed sodium 138, potassium 3.3, and creatinine 1.26.    Plan/Recommendation:  -Patient was offered to have BMP checked to reassess kidney function and potassium.  Patient declined stating that she is seeing nephrology next week.  -  Encouraged high potassium diet.  -Continue on low-sodium diet <2000 mg per day, daily weight monitoring, and maintain fluid intake at 50 to 60 ounces per day    Follow up with Dr. Melgar in 2-3  "months. Follow up with Jorge Alberto in 5-6 months in Heart Failure clinic     The longitudinal plan of care for acute on chronic heart failure with preserved ejection fraction, hypertension, and chronic kidney disease was addressed during this visit.?Due to the added complexity in care, I will continue to support Josefina Dumont in the subsequent management of this condition(s) and with the ongoing continuity of care of this condition(s)\".     History of Present Illness/Subjective    Ms. Josefina Dumont is a 74 year old female with a past medical history of hypertension, obesity, diabetes mellitus type 2, chronic kidney disease stage III, mild mitral stenosis, mild to moderate coronary artery disease per CT angiogram of coronary artery in January 2024, and recent hospitalization with heart failure exacerbation and hypertensive emergency who is seen at [facility] for post hospitalization heart failure follow up.     Patient had a follow-up with PCP and Pharm.D. since recent hospitalization.  Unfortunately, patient had a mechanical fall and got evaluated in the ED and reports that she has vertebral fracture.    Today, Josefina is here accompanied by her sister.She denies fatigue, lightheadedness, shortness of breath, dyspnea on exertion, orthopnea, PND, palpitations, chest pain, abdominal fullness/bloating, and lower extremity edema.      Patient states that she has  enough refills for her diuretic.  She states she received it from PCP office.    Reports following low-sodium diet.  She reports adequate fluid intake.    ECHO on 6/16/24-Reviewed:   Interpretation Summary   Left ventricular function is normal.The ejection fraction is > 65%.  There is moderate to severe concentric left ventricular hypertrophy.  Normal right ventricle size and systolic function.  The left atrium is severely dilated.  There is moderate mitral annular calcification.  Mild valvular aortic stenosis.  IVC diameter " >2.1 cm collapsing <50% with sniff suggests a high RA pressure  estimated at 15 mmHg or greater.  Small pericardial effusion  There are no echocardiographic indications of cardiac tamponade.  There is mild mitral stenosis.    CT Angio of Coronary Artery in 1/2024-reviewed:  Interpretation Summary  Angiography: codominant system - noted RCA small but feeds small PDA  LM mild calcification ostium with min narrowing  LAD proximal calcification mild with mild plaque  Circ proximal to mid moderate calcification and mild to mod plaque  RCA normal, calcified section of aorta near RCA ostium     Non coronary:  No mass/thrombus in TAMMIE, LV or PA  Normal aortic valve   Normal aorta with min plaque small area sinus valsalva and in thoracic descending aorta  Moderate mitral annular calcification  Normal pericardium   Imp:  Mild to moderate plaque/coronary artery disease of Circ and mild in proximal LAD  Moderate mitral annular calcification but normal appearing mitral valve leaflets     Physical Examination Review of Systems   BP (!) 150/60 (BP Location: Left arm, Patient Position: Sitting, Cuff Size: Adult Large)   Pulse 60   Resp 14   Wt 91.6 kg (202 lb)   BMI 34.67 kg/m    Body mass index is 34.67 kg/m .  Wt Readings from Last 3 Encounters:   07/12/24 91.6 kg (202 lb)   06/19/24 92.5 kg (204 lb)   02/28/24 104.8 kg (231 lb)     General Appearance:   no distress, normal body habitus   ENT/Mouth: membranes moist, no oral lesions or bleeding gums.      EYES:  no scleral icterus, normal conjunctivae   Neck: no carotid bruits or thyromegaly   Chest/Lungs:   lungs are clear to auscultation, no rales or wheezing, equal chest wall expansion    Cardiovascular:   Heart rate regular. Normal first and second heart sounds with 1/6 systolic murmurs, rubs, or gallops; Jugular venous pressure flat with no edema bilaterally    Abdomen:  no organomegaly, masses, bruits, or tenderness; bowel sounds are present   Extremities   no cyanosis or  clubbing    CMS intact.   Skin: no xanthelasma, warm.    Neurologic: Alert and oriented X 3 no tremors   Psychiatric: calm and cooperative                                                   Negative unless noted in HPI     Medical History  Surgical History Family History Social History   Past Medical History:   Diagnosis Date    Chronic kidney disease     Diabetes (H)     Hypertension     Obese     Past Surgical History:   Procedure Laterality Date    IR CVC TUNNEL PLACEMENT > 5 YRS OF AGE  2024    IR CVC TUNNEL REMOVAL RIGHT  2024    No family history on file. Social History     Socioeconomic History    Marital status:      Spouse name: Not on file    Number of children: Not on file    Years of education: Not on file    Highest education level: Not on file   Occupational History    Not on file   Tobacco Use    Smoking status: Former     Current packs/day: 0.00     Types: Cigarettes     Quit date:      Years since quittin.5    Smokeless tobacco: Never   Substance and Sexual Activity    Alcohol use: Not on file    Drug use: Not on file    Sexual activity: Not on file   Other Topics Concern    Not on file   Social History Narrative    Not on file     Social Determinants of Health     Financial Resource Strain: Not on file   Food Insecurity: Not on file   Transportation Needs: Not on file   Physical Activity: Not on file   Stress: Not on file   Social Connections: Not on file   Interpersonal Safety: Unknown (2024)    Received from [facility]    Humiliation, Afraid, Rape, and Kick questionnaire     Fear of Current or Ex-Partner: Not on file     Emotionally Abused: Patient declined     Physically Abused: Patient declined     Sexually Abused: Patient declined   Housing Stability: Not on file          Medications  Allergies   Current Outpatient Medications   Medication Sig Dispense Refill    acetaminophen (TYLENOL) 500 MG tablet Take 2 tablets (1,000 mg) by mouth every 8 hours as needed  for mild pain      albuterol (PROAIR HFA/PROVENTIL HFA/VENTOLIN HFA) 108 (90 Base) MCG/ACT inhaler Inhale 2 puffs into the lungs every 6 hours as needed      amLODIPine (NORVASC) 10 MG tablet Take 10 mg by mouth daily      calcium carbonate (TUMS) 500 MG chewable tablet Take 1 chew tab by mouth 2 times daily as needed for heartburn      citalopram (CELEXA) 40 MG tablet Take 40 mg by mouth daily      clopidogrel (PLAVIX) 75 MG tablet Take 75 mg by mouth daily      furosemide (LASIX) 40 MG tablet Take 1 tablet (40 mg) by mouth 2 times daily for 30 days 60 tablet 0    losartan (COZAAR) 50 MG tablet Take 2 tablets (100 mg) by mouth daily 60 tablet 0    metoprolol succinate ER (TOPROL XL) 50 MG 24 hr tablet Take 50 mg by mouth daily      multivitamin w/minerals (THERA-VIT-M) tablet Take 1 tablet by mouth daily      pantoprazole (PROTONIX) 40 MG EC tablet Take 1 tablet (40 mg) by mouth 2 times daily (before meals) 60 tablet 0    prazosin (MINIPRESS) 1 MG capsule Take 1 mg by mouth at bedtime      senna-docusate (SENOKOT-S/PERICOLACE) 8.6-50 MG tablet Take 1 tablet by mouth nightly as needed for constipation      spironolactone (ALDACTONE) 25 MG tablet Take 1 tablet (25 mg) by mouth 2 times daily for 30 days 60 tablet 0    vitamin E (TOCOPHEROL) 400 units (180 mg) capsule Take 400 Units by mouth daily      Allergies   Allergen Reactions    Atorvastatin Muscle Pain (Myalgia)    Hydralazine Diarrhea    Aspirin      Other reaction(s): stomach upset    Atenolol Other (See Comments)     abd pain    Lisinopril      Other reaction(s): stomach aches    Nsaids Nephrotoxicity    Penicillins Hives     Tolerated ceftriaxone    Statins      Other reaction(s): myalgias         Lab Results    Chemistry/lipid CBC Cardiac Enzymes/BNP/TSH/INR   Lab Results   Component Value Date    CHOL 133 06/24/2024    HDL 25 (L) 06/24/2024    TRIG 225 (H) 06/24/2024    BUN 19.1 06/24/2024     06/24/2024    CO2 21 (L) 06/24/2024    Lab Results    Component Value Date    WBC 3.6 (L) 06/20/2024    HGB 10.6 (L) 06/20/2024    HCT 33.1 (L) 06/20/2024    MCV 94 06/20/2024     (L) 06/20/2024    Lab Results   Component Value Date    TROPONINI 0.03 01/17/2022     (H) 02/04/2022    TSH 6.75 (H) 06/16/2024    INR 1.25 (H) 02/10/2024        51  minutes spent on the date of encounter doing chart review, review of outside records, review of test results, interpretation with above tests, patient visit, documentation, and discussion with family.        This note has been dictated using voice recognition software. Any grammatical, typographical, or context distortions are unintentional and inherent to the software        Thank you for allowing me to participate in the care of your patient.      Sincerely,     THAD Underwood [facility]  cc:   Lilian Olivares MD  [address]  [place] [address]

## 2024-07-12 NOTE — PATIENT INSTRUCTIONS
Josefina Dumont,    It was a pleasure to see you today at the [facility].     My recommendations after this visit include:    - No medications changes made today    - Try to eat food rich in potassium such as oranges, cantaloupe, and water melon, tomato and potato    - Follow up with Jorge Alberto in 5-6 months     - Follow up with Dr. Melgar in 2-3 months     - Please call Heart Failure Nurse Line at [phone], if you have any questions or concerns      Jorge Alberto Jung CNP       What is the [facility]?     The [facility] is aheart failure specialty clinic within [facility].  You will work with your cardiologist, nurse practitioner, and nurses to carefully adjust medications and learn how to live with heart failure.  The Heart FailureProgram will help you:    Better understand your chronic heart condition  Feel better and avoid hospital stays    Monitoring for Symptoms      Call the Heart Failure Phone Line ([phone]) if you have any of these symptoms:   Increased shortness of breath/shortness ofbreath at rest  Waking up at night with difficulty breathing  Unable to lie down for sleep due to symptoms or needing to sit uprightfor sleep  Weight gain of 2 pounds a day for 2 days in a row OR 5 pounds in 1 week  Increased swelling in your ankles or legs  Dizziness or lightheadedness    Medications     Take your medications as prescribed  Bring all your medications in their original bottles to every appointment  Avoid non-steroidal anti-inflammatory medications (Advil,Aleve, Ibuprofen, Naprosyn, Naproxen, Celebrex)  Do not stop taking your medications or begin taking over-the-counter or herbal medications without first talking to your doctor ornurse practitioner    Diet and Lifestyle     Limit sodium/salt to 2000 mg daily   Read food labels for sodium content  Do not add salt when cooking or add salt at the table  Weigh yourself every day  and record in your daily weight log   Call if you gain 2 pounds a day for 2 days in a row OR 5 pounds in 1 week  Bring daily weight log to every appointment  Stay active, pace yourself, listen to yourbody, and rest when tired  Elevate your legs if they are swollen. Ask about using compression/support stockings  Stop smoking  Lose weight if you are overweight  Avoid drinking alcohol or limit amount  Stay updated on your immunizations including flu and pneumonia vaccines

## 2024-08-05 ENCOUNTER — TRANSFERRED RECORDS (OUTPATIENT)
Dept: HEALTH INFORMATION MANAGEMENT | Facility: CLINIC | Age: 74
End: 2024-08-05
Payer: COMMERCIAL

## 2024-08-06 ENCOUNTER — TRANSCRIBE ORDERS (OUTPATIENT)
Dept: OTHER | Age: 74
End: 2024-08-06

## 2024-08-06 DIAGNOSIS — S22.039D CLOSED FRACTURE OF THIRD THORACIC VERTEBRA WITH ROUTINE HEALING, UNSPECIFIED FRACTURE MORPHOLOGY, SUBSEQUENT ENCOUNTER: Primary | ICD-10-CM

## 2024-08-07 ENCOUNTER — TELEPHONE (OUTPATIENT)
Dept: NEUROSURGERY | Facility: CLINIC | Age: 74
End: 2024-08-07
Payer: COMMERCIAL

## 2024-08-07 DIAGNOSIS — D32.9 MENINGIOMA (H): Primary | ICD-10-CM

## 2024-08-07 DIAGNOSIS — S22.039A: ICD-10-CM

## 2024-08-07 NOTE — TELEPHONE ENCOUNTER
[facility] Call Center    Phone Message    May a detailed message be left on voicemail: yes     Reason for Call: Other: Pt called to schedule with Dr. Cullen, Pt hasn't seen  Since November 2023. Pt states it is for something else this time for her vertabrae, writer not sure how to schedule Pt if this would need to be reviewed. Please call back to advise. Pt states someone called her earlier today. No notes or encounter for writer to know who Pt spoke to or how they were going to schedule. Pt states she will be available if the call back is before 4pm.      Action Taken: Message routed to:  Other: [facility]    Travel Screening: Not Applicable     Date of Service:

## 2024-08-07 NOTE — TELEPHONE ENCOUNTER
Scheduled x-ray, mri, and follow up with Dr. Cullen. Patient is claustrophobic and will need Rx for MRI

## 2024-08-07 NOTE — TELEPHONE ENCOUNTER
Per discussion with Dr. Cullen, pt should follow up with him with brain MRI (for meningioma) and thoracic xray (for T3 fracture).  Michelle Leos, RN, CNRN

## 2024-08-08 ENCOUNTER — ANCILLARY PROCEDURE (OUTPATIENT)
Dept: GENERAL RADIOLOGY | Facility: CLINIC | Age: 74
End: 2024-08-08
Attending: SURGERY
Payer: COMMERCIAL

## 2024-08-08 DIAGNOSIS — S22.039A: ICD-10-CM

## 2024-08-08 PROCEDURE — 72070 X-RAY EXAM THORAC SPINE 2VWS: CPT | Mod: TC | Performed by: RADIOLOGY

## 2024-08-09 ENCOUNTER — MEDICAL CORRESPONDENCE (OUTPATIENT)
Dept: HEALTH INFORMATION MANAGEMENT | Facility: CLINIC | Age: 74
End: 2024-08-09
Payer: COMMERCIAL

## 2024-08-26 ENCOUNTER — HOSPITAL ENCOUNTER (OUTPATIENT)
Dept: MRI IMAGING | Facility: CLINIC | Age: 74
Discharge: HOME | End: 2024-08-26
Attending: SURGERY | Admitting: SURGERY
Payer: COMMERCIAL

## 2024-08-26 DIAGNOSIS — D32.9 MENINGIOMA (H): ICD-10-CM

## 2024-08-26 PROCEDURE — 255N000002 HC RX 255 OP 636: Performed by: SURGERY

## 2024-08-26 PROCEDURE — 70553 MRI BRAIN STEM W/O & W/DYE: CPT

## 2024-08-26 PROCEDURE — A9585 GADOBUTROL INJECTION: HCPCS | Performed by: SURGERY

## 2024-08-26 RX ORDER — GADOBUTROL 604.72 MG/ML
9 INJECTION INTRAVENOUS ONCE
Status: COMPLETED | OUTPATIENT
Start: 2024-08-26 | End: 2024-08-26

## 2024-08-26 RX ADMIN — GADOBUTROL 9 ML: 604.72 INJECTION INTRAVENOUS at 18:53

## 2024-09-04 ENCOUNTER — PATIENT OUTREACH (OUTPATIENT)
Dept: ONCOLOGY | Facility: CLINIC | Age: 74
End: 2024-09-04

## 2024-09-04 ENCOUNTER — OFFICE VISIT (OUTPATIENT)
Dept: NEUROSURGERY | Facility: CLINIC | Age: 74
End: 2024-09-04
Payer: COMMERCIAL

## 2024-09-04 VITALS — DIASTOLIC BLOOD PRESSURE: 80 MMHG | OXYGEN SATURATION: 93 % | HEART RATE: 66 BPM | SYSTOLIC BLOOD PRESSURE: 193 MMHG

## 2024-09-04 DIAGNOSIS — S22.039G: ICD-10-CM

## 2024-09-04 DIAGNOSIS — D32.9 MENINGIOMA (H): Primary | ICD-10-CM

## 2024-09-04 PROCEDURE — 99214 OFFICE O/P EST MOD 30 MIN: CPT | Performed by: SURGERY

## 2024-09-04 ASSESSMENT — PAIN SCALES - GENERAL
PAINLEVEL: MILD PAIN (3)
PAINLEVEL: MILD PAIN (3)

## 2024-09-04 NOTE — PROGRESS NOTES
"New Patient Radiation Oncology Nurse Navigator Note     Referral Date: 9/4/24    Referring provider:   Abhishek Cullen MD   [facility] NEUROSURGERY   [address]   [facility] [address]   Phone: [phone]   Fax: [phone]       Referring Clinic/Organization: [facility]     Referred to: Radiation Oncology    Requested provider (if applicable): First available - did not specify     Evaluation for : Meningioma     Clinical History (per Nurse review of records provided):    Office Visit today with Neurosurgery:  \"Her second problem is a left frontal meningioma which is growing. Her sister says that the patient is too sick for surgery. She has congestive heart failure and kidney disease. She is interested in the possibility of focused radiation. Radiation oncology consult ordered. The patient and her sister are satisfied with the plan. \" -- BOOKMARKED  8/26/24 Brain MRI:  IMPRESSION:  1.  Unchanged 2.9 x 1.7 x 3.5 cm anterior left parafalcine meningioma since 2/4/2024 although slightly enlarged since 1/12/2022, previously measuring 2.7 x 1.5 x 3.2 cm. Slightly increased surrounding vasogenic edema since 2022 with similar mild regional   mass effect. No midline shift or hydrocephalus.  2.  No superimposed acute intracranial abnormality. -- BOOKMARKED      Clinical Assessment / Barriers to Care (Per Nurse):  Pt lives in [facility]     Records Location: [facility]     Records Needed:   N/A    Additional testing needed prior to consult:   N/A    Referral updates and Plan:   Referral received, chart reviewed by nursing, scheduling instructions updated and routed to NPS as urgent, requesting appointment within 1-2 weeks.     Ashlee Krause, REBECCAN, RN, PHN, OCN  Hematology/Oncology Nurse Navigator  [facility]  [phone]                "

## 2024-09-04 NOTE — LETTER
9/4/2024      Josefina Dumont  [address]  [facility] [address]      Dear Colleague,    Thank you for referring your patient, Josefina Dumont, to the [facility]. Please see a copy of my visit note below.    The patient is a 74-year-old female.  She is here with her sister.  She has 2 problems.  1 is a T3 fracture which she sustained falling 6/26/2024.  Plan thoracic CT to assess healing.  Her second problem is a left frontal meningioma which is growing.  Her sister says that the patient is too sick for surgery.  She has congestive heart failure and kidney disease.  She is interested in the possibility of focused radiation.  Radiation oncology consult ordered.  The patient and her sister are satisfied with the plan.  Total time 25 minutes, more than 50% spent counseling and/or coordinating care.      Again, thank you for allowing me to participate in the care of your patient.        Sincerely,        Abhishek Cullen MD

## 2024-09-04 NOTE — PROGRESS NOTES
The patient is a 74-year-old female.  She is here with her sister.  She has 2 problems.  1 is a T3 fracture which she sustained falling 6/26/2024.  Plan thoracic CT to assess healing.  Her second problem is a left frontal meningioma which is growing.  Her sister says that the patient is too sick for surgery.  She has congestive heart failure and kidney disease.  She is interested in the possibility of focused radiation.  Radiation oncology consult ordered.  The patient and her sister are satisfied with the plan.  Total time 25 minutes, more than 50% spent counseling and/or coordinating care.

## 2024-09-17 NOTE — TELEPHONE ENCOUNTER
Action 2024 8:07 AM BWTODD   Action Taken Images from [facility] received and resolved in PACS on 2024         MEDICAL RECORDS REQUEST   Radiation Oncology  [address] [place] [address]  Fax: [phone]          FUTURE VISIT INFORMATION                                                   Josefina Dumont, : 1950 scheduled for future visit at [facility] Radiation Oncology    RECORDS REQUESTED FOR VISIT                                                     BRAIN STATUS DETAILS   OFFICE NOTE from PCP [facility]  2024 - Dr. Rafat Lee    OFFICE NOTE from neuro/neuro surg Epic Neurosurgery: 2024 - Dr. Abhishek Cullen    Neurology: 2023 - Dr. Jacob Villegas    MEDICATION LIST Epic    LABS     PATHOLOGY REPORTS     ANYTHING RELATED TO DIAGNOSIS [facility] 2024   IMAGING (NEED IMAGES & REPORT)     CT SCANS PACS CT T Spine: 2024  CTA Head Neck: 2024-2022  CT Head: 2024-2022  CT C Spine: 2022    [facility]:  CT T Spine: 2024  CT Head: 2024  CT C Spine: 2024   MRI PACS MR Brain: 2024-2022   [facility] PACS Xray T Spine: 2024    [facility]:  Xray T Spine: 2024

## 2024-09-18 ENCOUNTER — HOSPITAL ENCOUNTER (OUTPATIENT)
Dept: CT IMAGING | Facility: CLINIC | Age: 74
Discharge: HOME | End: 2024-09-18
Attending: SURGERY | Admitting: SURGERY
Payer: COMMERCIAL

## 2024-09-18 DIAGNOSIS — S22.039G: ICD-10-CM

## 2024-09-18 PROCEDURE — 72128 CT CHEST SPINE W/O DYE: CPT

## 2024-09-19 ENCOUNTER — OFFICE VISIT (OUTPATIENT)
Dept: RADIATION ONCOLOGY | Facility: CLINIC | Age: 74
End: 2024-09-19
Attending: SURGERY
Payer: COMMERCIAL

## 2024-09-19 ENCOUNTER — PRE VISIT (OUTPATIENT)
Dept: RADIATION ONCOLOGY | Facility: CLINIC | Age: 74
End: 2024-09-19
Payer: COMMERCIAL

## 2024-09-19 VITALS
BODY MASS INDEX: 36.25 KG/M2 | TEMPERATURE: 97.8 F | DIASTOLIC BLOOD PRESSURE: 93 MMHG | RESPIRATION RATE: 18 BRPM | WEIGHT: 211.2 LBS | OXYGEN SATURATION: 93 % | HEART RATE: 73 BPM | SYSTOLIC BLOOD PRESSURE: 224 MMHG

## 2024-09-19 DIAGNOSIS — D32.9 MENINGIOMA (H): ICD-10-CM

## 2024-09-19 PROCEDURE — G0463 HOSPITAL OUTPT CLINIC VISIT: HCPCS | Performed by: STUDENT IN AN ORGANIZED HEALTH CARE EDUCATION/TRAINING PROGRAM

## 2024-09-19 PROCEDURE — 99205 OFFICE O/P NEW HI 60 MIN: CPT | Mod: 25 | Performed by: STUDENT IN AN ORGANIZED HEALTH CARE EDUCATION/TRAINING PROGRAM

## 2024-09-19 PROCEDURE — 77263 THER RADIOLOGY TX PLNG CPLX: CPT | Performed by: STUDENT IN AN ORGANIZED HEALTH CARE EDUCATION/TRAINING PROGRAM

## 2024-09-19 ASSESSMENT — PAIN SCALES - GENERAL: PAINLEVEL: MILD PAIN (2)

## 2024-09-19 NOTE — LETTER
9/19/2024      Josefina Dumont  [address]  [facility] [place] [address]      Dear Colleague,    Thank you for referring your patient, Josefina Dumont, to the [facility] RADIATION ONCOLOGY [place]. Please see a copy of my visit note below.    [facility] Radiation Oncology Consult Note     Patient: Josefina Dumont  MRN: [id number]  Date of Service: 09/19/2024          Abhishek Cullen MD  [facility] NEUROSURGERY  [address] [place] [address]       Dear Dr. Cullen:    Thank you very much for referring this patient for consideration of radiotherapy. As you know Ms. Dumont is a 74 year old female with a diagnosis of a slowly growing meningioma. She was seen today for consideration of definitive RT.     HISTORY OF PRESENT ILLNESS:   Ms. Dumont is a 74 year old female who had trauma which lead to head CT.    1/12/2024 CT head: Partially calcified left anterior parafalcine extra-axial mass lesion measuring 1.5 x 2.6 x 3.0 cm, with edema, appearance most suggestive of meningioma.     1/12/2022 MRI brain: 1.9 x 2.1 x 3.2 cm pedunculated dural-based solid enhancing calcified mass overlying the anterior left frontal convexity and extending into the interhemispheric fissure, consistent with a meningioma. There is underlying T2 hyperintense   vasogenic edema within the anterior parasagittal left frontal lobe. Mild associated localized mass effect with sulcal effacement.    4/18/2022 MRI brain:  no change in size of left anterior inferior paramedian mass most suggestive of a meningioma. It continues to show intense homogenous enhancement. It measures approximately 2.3 x 1.2 x 2.6 cm, stable. There is small amount of localized vasogenic edema in the anterior parasagittal left frontal lobe, stable from before.    10/14/2022 MRI brain: Overall stable appearance 33 x 15 x 25 mm extra-axial dural based homogeneous enhancing mass compatible with meningioma within the anterior cranial  "fossa left paramedian aspect superiorly     4/25/2023 MRI brain: 1.9 cm AP by 3.5 cm transverse by 3.7 cm craniocaudal homogeneously enhancing extra-axial mass at the anterior left cranial fossa with mild mass effect upon the underlying left frontal lobe with associated vasogenic edema, similar to prior.     5/1/2023 neurology consult due to persistent left frontal headache and concern for potential seizure.  EEG completed 6/16/2023 was not suggestive for focal slowing or epileptiform activity.     10/29/2023 MRI brain: 1.9 cm AP by 2.3 cm transverse by 3.3 cm   craniocaudal. This is slightly increased from multiple priors measuring 1.8 x 2.2 x 3.4 cm April 2023, 1.7 x 2.1 x 3 cm October 2022, and 1.8 x 2 x 3.1 cm April 2022.    2/4/2024 MRI brain:  Enhancing extra-axial mass along the left anterior medial frontal convexity extending to the falx and slightly to the right of midline, measuring up to 1.5 x 2.7 x 2.7 cm, previously 1.6 x 2.8 x 3.1     8/26/2024 MRI brain:  no significant increase in size of a 2.9 x 1.7 x 3.5 cm anterior left parafalcine meningioma since 2/4/2024 slightly extending across the   midline, it has slightly enlarged since 1/12/2022 previously measuring 2.7 x 1.5 x 3.2 cm     She has been following with neurosurgery with preference to avoid surgery if possible.    She has had left frontal headaches that have been present for years.  Her headaches come and go and she uses Tylenol as needed.  Currently only taking Tylenol at night.  She feels overall her headaches have been stable and are not debilitating.  She does not wake up in the morning with headaches.  She has some dizziness that comes and goes but not associated with standing.  She thinks this may be related to some of her medications.  Overall her balance is good she uses her walker at all times for assistance.  She has had 1 fall this year after her walker got caught.  No concerning symptoms for seizure but she has occasional \"body " "twitches\" which happen a couple times per week and are not changing.  Her sister also reports a little confusion at times.  Potentially some difficulty with short-term memory.    CHEMOTHERAPY HISTORY: Concurrent Chemotherapy: No    RADIATION THERAPY HISTORY: Prior Radiation: No    IMPLANTED CARDIAC DEVICE: none     PREGNANCY: N/A    Current Outpatient Medications   Medication Sig Dispense Refill     acetaminophen (TYLENOL) 500 MG tablet Take 2 tablets (1,000 mg) by mouth every 8 hours as needed for mild pain       albuterol (PROAIR HFA/PROVENTIL HFA/VENTOLIN HFA) 108 (90 Base) MCG/ACT inhaler Inhale 2 puffs into the lungs every 6 hours as needed       amLODIPine (NORVASC) 10 MG tablet Take 10 mg by mouth daily       calcium carbonate (TUMS) 500 MG chewable tablet Take 1 chew tab by mouth 2 times daily as needed for heartburn       citalopram (CELEXA) 40 MG tablet Take 40 mg by mouth daily       clopidogrel (PLAVIX) 75 MG tablet Take 75 mg by mouth daily       losartan (COZAAR) 50 MG tablet Take 2 tablets (100 mg) by mouth daily 60 tablet 0     metoprolol succinate ER (TOPROL XL) 50 MG 24 hr tablet Take 50 mg by mouth daily       multivitamin w/minerals (THERA-VIT-M) tablet Take 1 tablet by mouth daily       pantoprazole (PROTONIX) 40 MG EC tablet Take 1 tablet (40 mg) by mouth 2 times daily (before meals) 60 tablet 0     prazosin (MINIPRESS) 1 MG capsule Take 1 mg by mouth at bedtime       senna-docusate (SENOKOT-S/PERICOLACE) 8.6-50 MG tablet Take 1 tablet by mouth nightly as needed for constipation       vitamin E (TOCOPHEROL) 400 units (180 mg) capsule Take 400 Units by mouth daily       furosemide (LASIX) 40 MG tablet Take 1 tablet (40 mg) by mouth 2 times daily for 30 days 60 tablet 0     spironolactone (ALDACTONE) 25 MG tablet Take 1 tablet (25 mg) by mouth 2 times daily for 30 days 60 tablet 0     Past Medical History:   Diagnosis Date     Chronic kidney disease      Diabetes (H)      Hypertension      Obese  "     Past Surgical History:   Procedure Laterality Date     IR CVC TUNNEL PLACEMENT > 5 YRS OF AGE  2024     IR CVC TUNNEL REMOVAL RIGHT  2024     Allergies   Allergen Reactions     Atorvastatin Muscle Pain (Myalgia)     Hydralazine Diarrhea     Aspirin      Other reaction(s): stomach upset     Atenolol Other (See Comments)     abd pain     Lisinopril      Other reaction(s): stomach aches     Nsaids Nephrotoxicity     Penicillins Hives     Tolerated ceftriaxone     Statins      Other reaction(s): myalgias     No family history on file.  Social History     Socioeconomic History     Marital status:      Spouse name: Not on file     Number of children: Not on file     Years of education: Not on file     Highest education level: Not on file   Occupational History     Not on file   Tobacco Use     Smoking status: Former     Current packs/day: 0.00     Types: Cigarettes     Quit date:      Years since quittin.     Smokeless tobacco: Never   Substance and Sexual Activity     Alcohol use: Not on file     Drug use: Not on file     Sexual activity: Not on file   Other Topics Concern     Not on file   Social History Narrative     Not on file     Social Determinants of Health     Financial Resource Strain: Not on file   Food Insecurity: Not on file   Transportation Needs: Not on file   Physical Activity: Not on file   Stress: Not on file   Social Connections: Not on file   Interpersonal Safety: Unknown (2024)    Received from [facility]    Humiliation, Afraid, Rape, and Kick questionnaire      Fear of Current or Ex-Partner: Not on file      Emotionally Abused: Patient declined      Physically Abused: Patient declined      Sexually Abused: Patient declined   Housing Stability: Not on file        REVIEW OF SYMPTOMS:  A full 14-point review of systems was performed. Pertinent findings are noted in the HPI.    General  Constitutional  Constitutional (WDL): Exceptions to WDL  Fatigue: Fatigue not  relieved by rest OR limiting instrumental ADL  EENT  Eye Disorders  Eye Disorder (WDL): All eye disorder elements are within defined limits (wears glasses)  Ear Disorders  Ear Disorder (WDL): Exceptions to WDL  Tinnitus: Mild symptoms OR intervention not indicated (throbbing bilateral)  Respiratory  Respiratory  Respiratory (WDL): Exceptions to WDL  Cough: Mild symptoms OR nonprescription intervention indicated (clear/white phlegm)  Dyspnea: Shortness of breath with moderate exertion  Cardiovascular  Cardiovascular  Cardiovascular (WDL): Exceptions to WDL (no pacemaker, uncontrolled BP, hx CHF)  Edema: Yes  Edema Limbs: 5 - 10% inter-limb discrepancy in volume or circumference at point of greatest visible difference OR swelling or obscuration of anatomic architecture on close inspection (better with water pill)  Gastrointestinal  Gastrointestinal  Gastrointestinal (WDL): Exceptions to WDL  Dysgeusia: Altered taste but no change in diet (on & off with meds)  Constipation: Occasional or intermittent symptoms OR occasional use of stool softeners, laxatives, dietary modification, or enema  Musculoskeletal  Musculoskeletal and Connective Tissue Disorders  Musculoskeletal & Connective (WDL): Exceptions to WDL  Generalized Muscle Weakness: Symptomatic OR perceived by patient but not evident on physical exam (uses wheeled walker)  Integumentary  Integumentary  Integumentary (WDL): All integumentary elements are within defined limits  Neurological  Neurosensory  Neurosensory (WDL): Exceptions to WDL  Ataxia: Moderate symptoms OR limiting instrumental ADL (uses wheeled walker)  Dizziness: Mild unsteadiness or sensation of movement (occasionally)  Genitourinary/Reproductive  Genitourinary  Genitourinary (WDL): Exceptions to WDL  Urinary Frequency: Present (nocturia x1-2)  Lymphatic  Lymph System Disorders  Lymph (WDL): All lymph elements are within defined limits  Pain  Pain Score: Mild Pain (2)  AUA Assessment                                                               Accompanied by  Accompanied By: family (sister Orin)    ECOG Status: 2 - Ambulatory and independent in all ADLs; cannot work; up > 50% of the time    KPS Score: 70% Cannot do active work, but can care for self    Pain Management Plan: Tylenol     Recent Labs: No results found for this or any previous visit (from the past 168 hour(s)).    Personally reviewed MRI images  Imaging: Imaging results 6 weeks:CT Thoracic Spine w/o Contrast    Result Date: 9/18/2024  EXAM: CT THORACIC SPINE W/O CONTRAST LOCATION: [facility] DATE: 9/18/2024 INDICATION: Compare T3 fracture to previous CT done 6 26 24 COMPARISON: 6/26/2024 TECHNIQUE: Routine CT Thoracic Spine without IV contrast. Multiplanar reformats. Dose reduction techniques were used. FINDINGS: Chronic compression deformity of the superior endplate of T3 is able from the prior study. No traumatic listhesis. No bony retropulsion findings as result. No new compression fracture findings. Mild multilevel loss of disc height. Multilevel lateral osteophytic changes and multilevel anterior osteophytic changes.     IMPRESSION: 1.  Stable mild compression deformity of the T3 vertebral superior endplate. This is not significantly changed from the prior study.     MR Brain w/o & w Contrast    Result Date: 8/27/2024  EXAM: MR BRAIN W/O and W CONTRAST LOCATION: [facility] DATE: 8/26/2024 INDICATION: Meningioma COMPARISON: MRI dated 1/12/2022 and 2/4/2024 CONTRAST: 9mL Gadavist TECHNIQUE: Routine multiplanar multisequence head MRI without and with intravenous contrast. FINDINGS: INTRACRANIAL CONTENTS: No acute/subacute infarct, acute hemorrhage, or extra-axial fluid collection. While there is no significant increase in size of a 2.9 x 1.7 x 3.5 cm anterior left parafalcine meningioma since 2/4/2024 slightly extending across the midline, it has slightly enlarged since 1/12/2022 previously  measuring 2.7 x 1.5 x 3.2 cm when remeasured in a similar fashion. Minimally increased mild surrounding vasogenic edema involving the parasagittal left frontal lobe anteriorly since 2022, although without significant change since 2/4/2024. Similar mild regional mass effect. No midline shift or hydrocephalus. No new intracranial mass or abnormal enhancement. Several subtle developmental venous anomaly, most notably involving the left periatrial white matter. Similar scattered patchy and punctate foci of T2 prolongation within the bilateral cerebral white matter as well as the nathaniel, nonspecific although most likely the sequela of moderate chronic microvascular ischemia. Mild generalized cerebral parenchymal volume loss. Normal position of the cerebellar tonsils. SELLA: Within technique limitations, no gross abnormality. OSSEOUS STRUCTURES/SOFT TISSUES: No suspicious bone marrow signal intensity. The major intracranial vascular flow voids are maintained. ORBITS: Within technique limitations, no significant abnormality. SINUSES/MASTOIDS: No evidence of significant paranasal sinus or mastoid mucosal disease.      IMPRESSION: 1.  Unchanged 2.9 x 1.7 x 3.5 cm anterior left parafalcine meningioma since 2/4/2024 although slightly enlarged since 1/12/2022, previously measuring 2.7 x 1.5 x 3.2 cm. Slightly increased surrounding vasogenic edema since 2022 with similar mild regional  mass effect. No midline shift or hydrocephalus. 2.  No superimposed acute intracranial abnormality.     Pathology:   No results found for this or any previous visit (from the past 8760 hour(s)).              Objective:          PHYSICAL EXAMINATION:    BP (!) 224/93 (BP Location: Right arm, Patient Position: Sitting, Cuff Size: Adult Regular)   Pulse 73   Temp 97.8  F (36.6  C) (Oral)   Resp 18   Wt 95.8 kg (211 lb 3.2 oz)   SpO2 93%   BMI 36.25 kg/m      Gen: Alert, in NAD pleasant interactive, well nourished  Eyes: EOMI, sclera  anicteric  HENT     Head: NC/AT  Pulm: No wheezing, stridor or respiratory distress  Musculoskeletal: Normal muscle bulk and tone  Skin: Normal tone and turgor, warm, dry, intact  Neurologic: A/Ox3, CN II-XII intact, face symmetric, speech fluent, no focal motor deficits, strength is 5/5 and symmetric bilaterally. Gait normal and unaided  Psychiatric: Appropriate mood and affect     Intent of Therapy: Local control  Side effects that may occur during or within weeks after Radiation Therapy    Fatigue and general weakness  Headache and scalp irritation  Loss of hair  Nausea, vomiting, and decrease in appetite  Darkening, irritation, itchiness, redness, dryness, peeling, thickening, scabbing, and ulceration of the scalp, neck and forehead    Side effects that may occur months or years after Radiation Therapy    Development of another tumor or cancer         Dizziness and balance problems  Decrease in hormone production  Brain inflammation or necrosis that may cause various neurologic symptoms  Seizures  Decrease in memory and thinking abilities  Decrease in hearing and feeling of ear congestion  Eye dryness and irritation  Loss of vision  Cataracts in the eyes  Poor healing after a trauma or surgery in the irradiated area  Facial numbness, pain and weakness    The risks, benefits and alternatives to radiation therapy were outlined with the patient. All questions were answered and a consent was signed.     Impression   73 y/o with slowly growing left frontal parafalcine meningioma, not a good surgical candidate     Visit Dx:  (D32.9) Meningioma (H)       Cancer Staging   No matching staging information was found for the patient.      Assessment & Plan:   Discussed definitive radiation therapy with patient and her sister.  They have declined surgery as intervention but are interested in considering intervention given ongoing symptoms and evidence of slow growth.    Reviewed definitive radiation therapy conventional and  SBRT with patient and her sister.  Given size, surrounding edema and headache symptoms discussed conventionally fractionated radiation therapy.    The indications for, process of, alternatives, potential side effects and complications of RT to the left frontal brain were discussed.    They asked multiple questions which were answered to their satisfaction and they verbalized understanding.    Her sister has received external beam radiation therapy in the past as well.  They wish to proceed with radiation therapy and consent is signed today.  They will return to clinic next week for CT simulation for RT planning.     Anticipate 5400 cGy in 27 fractions.    Thank you for allowing me to participate in the care of this patient. Feel free to contact me with all questions or concerns.      Total time of this visit, including time spent face-to-face with patient and or via video/audio, and also in preparing for today's visit for MDM and documentation. Medical decision-making included consideration and possible diagnoses, management options, complex record review, review of diagnostic tests and information, consideration and discussion of significant complications based on comorbidities, discussion with providers involved in the care of the patient.     70 Minutes spent.     This note has been dictated using voice recognition software and as a result may contain minor grammatical errors and unintended word substitutions.      Sincerely,      Jennifer Amato MD  Department of Radiation Oncology   [facility] Radiation Oncology  Tel: [phone]  Page: [phone]    [facility]  [address] [place] [address]     [address] [place] [address]    CC:  Patient Care Team:  Rafat Lee MD as PCP - General (Family Medicine)  Abhishek Cullen MD as Assigned Neuroscience Provider  Ophelia Tam RPH as Assigned MTM Pharmacist  Lay Gordon RPH as Pharmacist  "(Pharmacist)  Jorge Alberto Jung APRN CNP as Assigned Heart and Vascular Provider  Jennifer Amato MD as MD (Radiation Oncology)        Considerations for radiation treatment   Pregnancy status: Female age 55+   Implanted Cardiac Devices: No   Any previous radiation therapy: No    Oncology Rooming Note    September 19, 2024 11:39 AM   Josefina Dumont is a 74 year old female who presents for:    Chief Complaint   Patient presents with     Oncology Clinic Visit     Consult with Dr. Amato     Initial Vitals: BP (!) 224/93 (BP Location: Right arm, Patient Position: Sitting, Cuff Size: Adult Regular)   Pulse 73   Temp 97.8  F (36.6  C) (Oral)   Resp 18   Wt 95.8 kg (211 lb 3.2 oz)   SpO2 93%   BMI 36.25 kg/m   Estimated body mass index is 36.25 kg/m  as calculated from the following:    Height as of 6/16/24: 1.626 m (5' 4\").    Weight as of this encounter: 95.8 kg (211 lb 3.2 oz). Body surface area is 2.08 meters squared.  Mild Pain (2) Comment: Data Unavailable   No LMP recorded. Patient is postmenopausal.  Allergies reviewed: Yes  Medications reviewed: Yes    Medications: Medication refills not needed today.  Pharmacy name entered into [employer]:    [facility] [address] [place] [address]    Frailty Screening:   Is the patient here for a new oncology consult visit in cancer care? 2. No      Clinical concerns: Patient here ambulatory with wheeled walker accompanied by sister for radiation consult for her meningioma.  Patient states she has occasional dizziness that she has noted over the past month with adjustments in her blood pressure medications.  Patient has a very slight headache on and off but does not require any medication for this.  Patient is claustrophobic and had a recent MRI with Valium premedicated.  Patient seen by Dr. Amato.  Plan return to clinic for follow-up and/or CT simulation as directed by " provider.   Dr. Amato was notified.    Radiation Therapy Patient Education    Person involved with teaching: Patient and sister    Patient educational needs for self management of treatment-related side effects assessment completed.  EPIC Patient Ed tab contains Patient Learning Assessment    Education Materials Given  Managing Side Effects of Radiation Therapy: Care Instructions, Learning About External Beam Radiation Treatment, Dealing With Being Tired From Cancer Treatment: Care Instructions, Radiation Treatment For Cancer, Radiation Therapy to the Brain Guidelines, Oncology Supportive Care Services , Welcome Letter, Insurance PA Information, and Map [employer] Cover Lockscreen    Educational Topics Discussed  Side effects expected, Skin care, and When to call MD/RN    Response To Teaching  More review necessary    GYN Only  Vaginal Dilator-given and educated: N/A    Referrals sent: None    Chemotherapy?  No, Dr. Cullen neurosurgery          Celina Varela, FRANCIS[place]                  Again, thank you for allowing me to participate in the care of your patient.        Sincerely,        Jennifer Amato MD

## 2024-09-19 NOTE — PROGRESS NOTES
[facility] Radiation Oncology Consult Note     Patient: Josefina Dumont  MRN: [id number]  Date of Service: 09/19/2024          Abhishek Cullen MD  [facility] NEUROSURGERY  [address] [place] [id number]       Dear Dr. Cullen:    Thank you very much for referring this patient for consideration of radiotherapy. As you know Ms. Dumont is a 74 year old female with a diagnosis of a slowly growing meningioma. She was seen today for consideration of definitive RT.     HISTORY OF PRESENT ILLNESS:   Ms. Dumont is a 74 year old female who had trauma which lead to head CT.    1/12/2024 CT head: Partially calcified left anterior parafalcine extra-axial mass lesion measuring 1.5 x 2.6 x 3.0 cm, with edema, appearance most suggestive of meningioma.     1/12/2022 MRI brain: 1.9 x 2.1 x 3.2 cm pedunculated dural-based solid enhancing calcified mass overlying the anterior left frontal convexity and extending into the interhemispheric fissure, consistent with a meningioma. There is underlying T2 hyperintense   vasogenic edema within the anterior parasagittal left frontal lobe. Mild associated localized mass effect with sulcal effacement.    4/18/2022 MRI brain:  no change in size of left anterior inferior paramedian mass most suggestive of a meningioma. It continues to show intense homogenous enhancement. It measures approximately 2.3 x 1.2 x 2.6 cm, stable. There is small amount of localized vasogenic edema in the anterior parasagittal left frontal lobe, stable from before.    10/14/2022 MRI brain: Overall stable appearance 33 x 15 x 25 mm extra-axial dural based homogeneous enhancing mass compatible with meningioma within the anterior cranial fossa left paramedian aspect superiorly     4/25/2023 MRI brain: 1.9 cm AP by 3.5 cm transverse by 3.7 cm craniocaudal homogeneously enhancing extra-axial mass at the anterior left cranial fossa with mild mass effect upon the underlying left frontal lobe with  "associated vasogenic edema, similar to prior.     5/1/2023 neurology consult due to persistent left frontal headache and concern for potential seizure.  EEG completed 6/16/2023 was not suggestive for focal slowing or epileptiform activity.     10/29/2023 MRI brain: 1.9 cm AP by 2.3 cm transverse by 3.3 cm   craniocaudal. This is slightly increased from multiple priors measuring 1.8 x 2.2 x 3.4 cm April 2023, 1.7 x 2.1 x 3 cm October 2022, and 1.8 x 2 x 3.1 cm April 2022.    2/4/2024 MRI brain:  Enhancing extra-axial mass along the left anterior medial frontal convexity extending to the falx and slightly to the right of midline, measuring up to 1.5 x 2.7 x 2.7 cm, previously 1.6 x 2.8 x 3.1     8/26/2024 MRI brain:  no significant increase in size of a 2.9 x 1.7 x 3.5 cm anterior left parafalcine meningioma since 2/4/2024 slightly extending across the   midline, it has slightly enlarged since 1/12/2022 previously measuring 2.7 x 1.5 x 3.2 cm     She has been following with neurosurgery with preference to avoid surgery if possible.    She has had left frontal headaches that have been present for years.  Her headaches come and go and she uses Tylenol as needed.  Currently only taking Tylenol at night.  She feels overall her headaches have been stable and are not debilitating.  She does not wake up in the morning with headaches.  She has some dizziness that comes and goes but not associated with standing.  She thinks this may be related to some of her medications.  Overall her balance is good she uses her walker at all times for assistance.  She has had 1 fall this year after her walker got caught.  No concerning symptoms for seizure but she has occasional \"body twitches\" which happen a couple times per week and are not changing.  Her sister also reports a little confusion at times.  Potentially some difficulty with short-term memory.    CHEMOTHERAPY HISTORY: Concurrent Chemotherapy: No    RADIATION THERAPY HISTORY: " Prior Radiation: No    IMPLANTED CARDIAC DEVICE: none     PREGNANCY: N/A    Current Outpatient Medications   Medication Sig Dispense Refill    acetaminophen (TYLENOL) 500 MG tablet Take 2 tablets (1,000 mg) by mouth every 8 hours as needed for mild pain      albuterol (PROAIR HFA/PROVENTIL HFA/VENTOLIN HFA) 108 (90 Base) MCG/ACT inhaler Inhale 2 puffs into the lungs every 6 hours as needed      amLODIPine (NORVASC) 10 MG tablet Take 10 mg by mouth daily      calcium carbonate (TUMS) 500 MG chewable tablet Take 1 chew tab by mouth 2 times daily as needed for heartburn      citalopram (CELEXA) 40 MG tablet Take 40 mg by mouth daily      clopidogrel (PLAVIX) 75 MG tablet Take 75 mg by mouth daily      losartan (COZAAR) 50 MG tablet Take 2 tablets (100 mg) by mouth daily 60 tablet 0    metoprolol succinate ER (TOPROL XL) 50 MG 24 hr tablet Take 50 mg by mouth daily      multivitamin w/minerals (THERA-VIT-M) tablet Take 1 tablet by mouth daily      pantoprazole (PROTONIX) 40 MG EC tablet Take 1 tablet (40 mg) by mouth 2 times daily (before meals) 60 tablet 0    prazosin (MINIPRESS) 1 MG capsule Take 1 mg by mouth at bedtime      senna-docusate (SENOKOT-S/PERICOLACE) 8.6-50 MG tablet Take 1 tablet by mouth nightly as needed for constipation      vitamin E (TOCOPHEROL) 400 units (180 mg) capsule Take 400 Units by mouth daily      furosemide (LASIX) 40 MG tablet Take 1 tablet (40 mg) by mouth 2 times daily for 30 days 60 tablet 0    spironolactone (ALDACTONE) 25 MG tablet Take 1 tablet (25 mg) by mouth 2 times daily for 30 days 60 tablet 0     Past Medical History:   Diagnosis Date    Chronic kidney disease     Diabetes (H)     Hypertension     Obese      Past Surgical History:   Procedure Laterality Date    IR CVC TUNNEL PLACEMENT > 5 YRS OF AGE  2/14/2024    IR CVC TUNNEL REMOVAL RIGHT  5/1/2024     Allergies   Allergen Reactions    Atorvastatin Muscle Pain (Myalgia)    Hydralazine Diarrhea    Aspirin      Other  reaction(s): stomach upset    Atenolol Other (See Comments)     abd pain    Lisinopril      Other reaction(s): stomach aches    Nsaids Nephrotoxicity    Penicillins Hives     Tolerated ceftriaxone    Statins      Other reaction(s): myalgias     No family history on file.  Social History     Socioeconomic History    Marital status:      Spouse name: Not on file    Number of children: Not on file    Years of education: Not on file    Highest education level: Not on file   Occupational History    Not on file   Tobacco Use    Smoking status: Former     Current packs/day: 0.00     Types: Cigarettes     Quit date:      Years since quittin.7    Smokeless tobacco: Never   Substance and Sexual Activity    Alcohol use: Not on file    Drug use: Not on file    Sexual activity: Not on file   Other Topics Concern    Not on file   Social History Narrative    Not on file     Social Determinants of Health     Financial Resource Strain: Not on file   Food Insecurity: Not on file   Transportation Needs: Not on file   Physical Activity: Not on file   Stress: Not on file   Social Connections: Not on file   Interpersonal Safety: Unknown (2024)    Received from [facility]    Humiliation, Afraid, Rape, and Kick questionnaire     Fear of Current or Ex-Partner: Not on file     Emotionally Abused: Patient declined     Physically Abused: Patient declined     Sexually Abused: Patient declined   Housing Stability: Not on file        REVIEW OF SYMPTOMS:  A full 14-point review of systems was performed. Pertinent findings are noted in the HPI.    General  Constitutional  Constitutional (WDL): Exceptions to WDL  Fatigue: Fatigue not relieved by rest OR limiting instrumental ADL  EENT  Eye Disorders  Eye Disorder (WDL): All eye disorder elements are within defined limits (wears glasses)  Ear Disorders  Ear Disorder (WDL): Exceptions to WDL  Tinnitus: Mild symptoms OR intervention not indicated (throbbing  bilateral)  Respiratory  Respiratory  Respiratory (WDL): Exceptions to WDL  Cough: Mild symptoms OR nonprescription intervention indicated (clear/white phlegm)  Dyspnea: Shortness of breath with moderate exertion  Cardiovascular  Cardiovascular  Cardiovascular (WDL): Exceptions to WDL (no pacemaker, uncontrolled BP, hx CHF)  Edema: Yes  Edema Limbs: 5 - 10% inter-limb discrepancy in volume or circumference at point of greatest visible difference OR swelling or obscuration of anatomic architecture on close inspection (better with water pill)  Gastrointestinal  Gastrointestinal  Gastrointestinal (WDL): Exceptions to WDL  Dysgeusia: Altered taste but no change in diet (on & off with meds)  Constipation: Occasional or intermittent symptoms OR occasional use of stool softeners, laxatives, dietary modification, or enema  Musculoskeletal  Musculoskeletal and Connective Tissue Disorders  Musculoskeletal & Connective (WDL): Exceptions to WDL  Generalized Muscle Weakness: Symptomatic OR perceived by patient but not evident on physical exam (uses wheeled walker)  Integumentary  Integumentary  Integumentary (WDL): All integumentary elements are within defined limits  Neurological  Neurosensory  Neurosensory (WDL): Exceptions to WDL  Ataxia: Moderate symptoms OR limiting instrumental ADL (uses wheeled walker)  Dizziness: Mild unsteadiness or sensation of movement (occasionally)  Genitourinary/Reproductive  Genitourinary  Genitourinary (WDL): Exceptions to WDL  Urinary Frequency: Present (nocturia x1-2)  Lymphatic  Lymph System Disorders  Lymph (WDL): All lymph elements are within defined limits  Pain  Pain Score: Mild Pain (2)  AUA Assessment                                                              Accompanied by  Accompanied By: family (sister Orin)    ECOG Status: 2 - Ambulatory and independent in all ADLs; cannot work; up > 50% of the time    KPS Score: 70% Cannot do active work, but can care for self    Pain Management  Plan: Tylenol     Recent Labs: No results found for this or any previous visit (from the past 168 hour(s)).    Personally reviewed MRI images  Imaging: Imaging results 6 weeks:CT Thoracic Spine w/o Contrast    Result Date: 9/18/2024  EXAM: CT THORACIC SPINE W/O CONTRAST LOCATION: [facility] DATE: 9/18/2024 INDICATION: Compare T3 fracture to previous CT done 6 26 24 COMPARISON: 6/26/2024 TECHNIQUE: Routine CT Thoracic Spine without IV contrast. Multiplanar reformats. Dose reduction techniques were used. FINDINGS: Chronic compression deformity of the superior endplate of T3 is able from the prior study. No traumatic listhesis. No bony retropulsion findings as result. No new compression fracture findings. Mild multilevel loss of disc height. Multilevel lateral osteophytic changes and multilevel anterior osteophytic changes.     IMPRESSION: 1.  Stable mild compression deformity of the T3 vertebral superior endplate. This is not significantly changed from the prior study.     MR Brain w/o & w Contrast    Result Date: 8/27/2024  EXAM: MR BRAIN W/O and W CONTRAST LOCATION: [facility] DATE: 8/26/2024 INDICATION: Meningioma COMPARISON: MRI dated 1/12/2022 and 2/4/2024 CONTRAST: 9mL Gadavist TECHNIQUE: Routine multiplanar multisequence head MRI without and with intravenous contrast. FINDINGS: INTRACRANIAL CONTENTS: No acute/subacute infarct, acute hemorrhage, or extra-axial fluid collection. While there is no significant increase in size of a 2.9 x 1.7 x 3.5 cm anterior left parafalcine meningioma since 2/4/2024 slightly extending across the midline, it has slightly enlarged since 1/12/2022 previously measuring 2.7 x 1.5 x 3.2 cm when remeasured in a similar fashion. Minimally increased mild surrounding vasogenic edema involving the parasagittal left frontal lobe anteriorly since 2022, although without significant change since 2/4/2024. Similar mild regional mass effect. No  midline shift or hydrocephalus. No new intracranial mass or abnormal enhancement. Several subtle developmental venous anomaly, most notably involving the left periatrial white matter. Similar scattered patchy and punctate foci of T2 prolongation within the bilateral cerebral white matter as well as the nathaniel, nonspecific although most likely the sequela of moderate chronic microvascular ischemia. Mild generalized cerebral parenchymal volume loss. Normal position of the cerebellar tonsils. SELLA: Within technique limitations, no gross abnormality. OSSEOUS STRUCTURES/SOFT TISSUES: No suspicious bone marrow signal intensity. The major intracranial vascular flow voids are maintained. ORBITS: Within technique limitations, no significant abnormality. SINUSES/MASTOIDS: No evidence of significant paranasal sinus or mastoid mucosal disease.      IMPRESSION: 1.  Unchanged 2.9 x 1.7 x 3.5 cm anterior left parafalcine meningioma since 2/4/2024 although slightly enlarged since 1/12/2022, previously measuring 2.7 x 1.5 x 3.2 cm. Slightly increased surrounding vasogenic edema since 2022 with similar mild regional  mass effect. No midline shift or hydrocephalus. 2.  No superimposed acute intracranial abnormality.     Pathology:   No results found for this or any previous visit (from the past 8760 hour(s)).              Objective:          PHYSICAL EXAMINATION:    BP (!) 224/93 (BP Location: Right arm, Patient Position: Sitting, Cuff Size: Adult Regular)   Pulse 73   Temp 97.8  F (36.6  C) (Oral)   Resp 18   Wt 95.8 kg (211 lb 3.2 oz)   SpO2 93%   BMI 36.25 kg/m      Gen: Alert, in NAD pleasant interactive, well nourished  Eyes: EOMI, sclera anicteric  HENT     Head: NC/AT  Pulm: No wheezing, stridor or respiratory distress  Musculoskeletal: Normal muscle bulk and tone  Skin: Normal tone and turgor, warm, dry, intact  Neurologic: A/Ox3, CN II-XII intact, face symmetric, speech fluent, no focal motor deficits, strength is 5/5  and symmetric bilaterally. Gait normal and unaided  Psychiatric: Appropriate mood and affect     Intent of Therapy: Local control  Side effects that may occur during or within weeks after Radiation Therapy    Fatigue and general weakness  Headache and scalp irritation  Loss of hair  Nausea, vomiting, and decrease in appetite  Darkening, irritation, itchiness, redness, dryness, peeling, thickening, scabbing, and ulceration of the scalp, neck and forehead    Side effects that may occur months or years after Radiation Therapy    Development of another tumor or cancer         Dizziness and balance problems  Decrease in hormone production  Brain inflammation or necrosis that may cause various neurologic symptoms  Seizures  Decrease in memory and thinking abilities  Decrease in hearing and feeling of ear congestion  Eye dryness and irritation  Loss of vision  Cataracts in the eyes  Poor healing after a trauma or surgery in the irradiated area  Facial numbness, pain and weakness    The risks, benefits and alternatives to radiation therapy were outlined with the patient. All questions were answered and a consent was signed.     Impression   75 y/o with slowly growing left frontal parafalcine meningioma, not a good surgical candidate     Visit Dx:  (D32.9) Meningioma (H)       Cancer Staging   No matching staging information was found for the patient.      Assessment & Plan:   Discussed definitive radiation therapy with patient and her sister.  They have declined surgery as intervention but are interested in considering intervention given ongoing symptoms and evidence of slow growth.    Reviewed definitive radiation therapy conventional and SBRT with patient and her sister.  Given size, surrounding edema and headache symptoms discussed conventionally fractionated radiation therapy.    The indications for, process of, alternatives, potential side effects and complications of RT to the left frontal brain were discussed.     They asked multiple questions which were answered to their satisfaction and they verbalized understanding.    Her sister has received external beam radiation therapy in the past as well.  They wish to proceed with radiation therapy and consent is signed today.  They will return to clinic next week for CT simulation for RT planning.     Anticipate 5400 cGy in 27 fractions.    Thank you for allowing me to participate in the care of this patient. Feel free to contact me with all questions or concerns.      Total time of this visit, including time spent face-to-face with patient and or via video/audio, and also in preparing for today's visit for MDM and documentation. Medical decision-making included consideration and possible diagnoses, management options, complex record review, review of diagnostic tests and information, consideration and discussion of significant complications based on comorbidities, discussion with providers involved in the care of the patient.     70 Minutes spent.     This note has been dictated using voice recognition software and as a result may contain minor grammatical errors and unintended word substitutions.      Sincerely,      Jennifer Amato MD  Department of Radiation Oncology   [facility] Radiation Oncology  Tel: [phone]  Page: [phone]    [facility]  [address] [place] [address]     [address] [place] [address]    CC:  Patient Care Team:  Rafat Lee MD as PCP - General (Family Medicine)  Abhishek Cullen MD as Assigned Neuroscience Provider  Ophelia Tam RP as Assigned MTM Pharmacist  Lay Gordon [facility] as Pharmacist (Pharmacist)  Jorge Alberto Jung APRN CNP as Assigned Heart and Vascular Provider  Jennifer Amato MD as MD (Radiation Oncology)

## 2024-09-19 NOTE — PROGRESS NOTES
"Considerations for radiation treatment   Pregnancy status: Female age 55+   Implanted Cardiac Devices: No   Any previous radiation therapy: No    Oncology Rooming Note    September 19, 2024 11:39 AM   Josefina Dumont is a 74 year old female who presents for:    Chief Complaint   Patient presents with    Oncology Clinic Visit     Consult with Dr. Amato     Initial Vitals: BP (!) 224/93 (BP Location: Right arm, Patient Position: Sitting, Cuff Size: Adult Regular)   Pulse 73   Temp 97.8  F (36.6  C) (Oral)   Resp 18   Wt 95.8 kg (211 lb 3.2 oz)   SpO2 93%   BMI 36.25 kg/m   Estimated body mass index is 36.25 kg/m  as calculated from the following:    Height as of 6/16/24: 1.626 m (5' 4\").    Weight as of this encounter: 95.8 kg (211 lb 3.2 oz). Body surface area is 2.08 meters squared.  Mild Pain (2) Comment: Data Unavailable   No LMP recorded. Patient is postmenopausal.  Allergies reviewed: Yes  Medications reviewed: Yes    Medications: Medication refills not needed today.  Pharmacy name entered into [employer]:    [facility] [address] [place], [place] [id number] [address]    Frailty Screening:   Is the patient here for a new oncology consult visit in cancer care? 2. No      Clinical concerns: Patient here ambulatory with wheeled walker accompanied by sister for radiation consult for her meningioma.  Patient states she has occasional dizziness that she has noted over the past month with adjustments in her blood pressure medications.  Patient has a very slight headache on and off but does not require any medication for this.  Patient is claustrophobic and had a recent MRI with Valium premedicated.  Patient seen by Dr. Amato.  Plan return to clinic for follow-up and/or CT simulation as directed by provider.   Dr. Amato was notified.    Radiation Therapy Patient Education    Person involved with teaching: Patient and sister    Patient " educational needs for self management of treatment-related side effects assessment completed.  [facility] Patient Ed tab contains Patient Learning Assessment    Education Materials Given  Managing Side Effects of Radiation Therapy: Care Instructions, Learning About External Beam Radiation Treatment, Dealing With Being Tired From Cancer Treatment: Care Instructions, Radiation Treatment For Cancer, Radiation Therapy to the Brain Guidelines, Oncology Supportive Care Services , Welcome Letter, Insurance PA Information, and Map [place]    Educational Topics Discussed  Side effects expected, Skin care, and When to call MD/RN    Response To Teaching  More review necessary    GYN Only  Vaginal Dilator-given and educated: N/A    Referrals sent: None    Chemotherapy?  No, Dr. Cullen neurosurgery          Celina Varela RN

## 2024-10-02 ENCOUNTER — APPOINTMENT (OUTPATIENT)
Dept: RADIATION ONCOLOGY | Facility: HOSPITAL | Age: 74
End: 2024-10-02
Attending: STUDENT IN AN ORGANIZED HEALTH CARE EDUCATION/TRAINING PROGRAM
Payer: COMMERCIAL

## 2024-10-02 PROCEDURE — 77334 RADIATION TREATMENT AID(S): CPT | Performed by: RADIOLOGY

## 2024-10-02 PROCEDURE — 77334 RADIATION TREATMENT AID(S): CPT | Mod: 26 | Performed by: RADIOLOGY

## 2024-10-10 ENCOUNTER — APPOINTMENT (OUTPATIENT)
Dept: RADIATION ONCOLOGY | Facility: CLINIC | Age: 74
End: 2024-10-10
Attending: STUDENT IN AN ORGANIZED HEALTH CARE EDUCATION/TRAINING PROGRAM
Payer: COMMERCIAL

## 2024-10-10 PROCEDURE — 77338 DESIGN MLC DEVICE FOR IMRT: CPT | Mod: 26 | Performed by: STUDENT IN AN ORGANIZED HEALTH CARE EDUCATION/TRAINING PROGRAM

## 2024-10-10 PROCEDURE — 77300 RADIATION THERAPY DOSE PLAN: CPT | Performed by: STUDENT IN AN ORGANIZED HEALTH CARE EDUCATION/TRAINING PROGRAM

## 2024-10-10 PROCEDURE — 77301 RADIOTHERAPY DOSE PLAN IMRT: CPT | Performed by: STUDENT IN AN ORGANIZED HEALTH CARE EDUCATION/TRAINING PROGRAM

## 2024-10-10 PROCEDURE — 77300 RADIATION THERAPY DOSE PLAN: CPT | Mod: 26 | Performed by: STUDENT IN AN ORGANIZED HEALTH CARE EDUCATION/TRAINING PROGRAM

## 2024-10-10 PROCEDURE — 77338 DESIGN MLC DEVICE FOR IMRT: CPT | Performed by: STUDENT IN AN ORGANIZED HEALTH CARE EDUCATION/TRAINING PROGRAM

## 2024-10-10 PROCEDURE — 77301 RADIOTHERAPY DOSE PLAN IMRT: CPT | Mod: 26 | Performed by: STUDENT IN AN ORGANIZED HEALTH CARE EDUCATION/TRAINING PROGRAM

## 2024-10-17 ENCOUNTER — APPOINTMENT (OUTPATIENT)
Dept: RADIATION ONCOLOGY | Facility: CLINIC | Age: 74
End: 2024-10-17
Attending: STUDENT IN AN ORGANIZED HEALTH CARE EDUCATION/TRAINING PROGRAM
Payer: COMMERCIAL

## 2024-10-17 PROCEDURE — 77014 PR CT GUIDE FOR PLACEMENT RADIATION THERAPY FIELDS: CPT | Mod: 26 | Performed by: RADIOLOGY

## 2024-10-17 PROCEDURE — 77386 HC IMRT TREATMENT DELIVERY, COMPLEX: CPT | Performed by: RADIOLOGY

## 2024-10-18 ENCOUNTER — OFFICE VISIT (OUTPATIENT)
Dept: RADIATION ONCOLOGY | Facility: CLINIC | Age: 74
End: 2024-10-18
Attending: STUDENT IN AN ORGANIZED HEALTH CARE EDUCATION/TRAINING PROGRAM
Payer: COMMERCIAL

## 2024-10-18 VITALS
SYSTOLIC BLOOD PRESSURE: 233 MMHG | RESPIRATION RATE: 16 BRPM | TEMPERATURE: 98.2 F | DIASTOLIC BLOOD PRESSURE: 93 MMHG | OXYGEN SATURATION: 94 % | BODY MASS INDEX: 36.73 KG/M2 | WEIGHT: 214 LBS | HEART RATE: 85 BPM

## 2024-10-18 DIAGNOSIS — D32.9 MENINGIOMA (H): Primary | ICD-10-CM

## 2024-10-18 PROCEDURE — 77386 HC IMRT TREATMENT DELIVERY, COMPLEX: CPT | Performed by: STUDENT IN AN ORGANIZED HEALTH CARE EDUCATION/TRAINING PROGRAM

## 2024-10-18 PROCEDURE — 77014 PR CT GUIDE FOR PLACEMENT RADIATION THERAPY FIELDS: CPT | Mod: 26 | Performed by: RADIOLOGY

## 2024-10-18 RX ORDER — CARVEDILOL 6.25 MG/1
1 TABLET ORAL
COMMUNITY
Start: 2024-09-19

## 2024-10-18 ASSESSMENT — PAIN SCALES - GENERAL: PAINLEVEL: NO PAIN (0)

## 2024-10-18 NOTE — PROGRESS NOTES
SPECIALIZING IN [facility] Radiation Oncology    On Treatment Visit Note      Josefina Dumont        MRN: [id number]   : 1950  Date of Service: 10/18/2024     Diagnosis: Visit Dx:  (D32.9) Meningioma (H)    Impression   73 y/o with slowly growing left frontal parafalcine meningioma, not a good surgical candidate   Has dull headache    Treatment Summary to Date   Lt partial Brain Today/total dose 400/5400   cGy   Fx:      Subjective: No changes    Nursing ROS:  Per Epic     Objective:   BP (!) 233/93 (BP Location: Right arm, Patient Position: Sitting, Cuff Size: Adult Regular)   Pulse 85   Temp 98.2  F (36.8  C) (Oral)   Resp 16   Wt 97.1 kg (214 lb)   SpO2 94%   BMI 36.73 kg/m      Gen: Appears well without new changes. Wheel chair depend.  Skin: no erythema    Assessment:    Had only 2 Fx, too early to  have any reaction.  Tolerating radiation therapy well.  All questions and concerns addressed.      Treatment-related toxicities (CTCAE v4.0):  0    Plan:   Continue current therapy.      Treat ment chart and setup information reviewed  Treatment image checked.      Lamonte Bartholomew MD  Radiation Oncology

## 2024-10-18 NOTE — LETTER
10/18/2024      Josefina Dumont  [address]  [place] [facility] [place] [address]      Dear Colleague,    Thank you for referring your patient, Josefina Dumont, to the [facility] RADIATION ONCOLOGY [place]. Please see a copy of my visit note below.         SPECIALIZING IN [facility]  [facility] Radiation Oncology    On Treatment Visit Note      Josefina Dumont        MRN: [id number]   : 1950  Date of Service: 10/18/2024     Diagnosis: Visit Dx:  (D32.9) Meningioma (H)    Impression   75 y/o with slowly growing left frontal parafalcine meningioma, not a good surgical candidate   Has dull headache    Treatment Summary to Date   Lt partial Brain Today/total dose 400/5400   cGy   Fx:      Subjective: No changes    Nursing ROS:  Per Epic     Objective:   BP (!) 233/93 (BP Location: Right arm, Patient Position: Sitting, Cuff Size: Adult Regular)   Pulse 85   Temp 98.2  F (36.8  C) (Oral)   Resp 16   Wt 97.1 kg (214 lb)   SpO2 94%   BMI 36.73 kg/m    Gen: Appears well, NAD  Skin: no erythema    Assessment:    Had only 2 Fx, too early to  have any reaction.  Tolerating radiation therapy well.  All questions and concerns addressed.      Treatment-related toxicities (CTCAE v4.0):  0    Plan:   Continue current therapy.      Treat ment chart and setup information reviewed  Treatment image checked.      Lamonte Bartholomew MD  Radiation Oncology                Again, thank you for allowing me to participate in the care of your patient.        Sincerely,        Lamonte Barhtolomew MD

## 2024-10-21 PROCEDURE — 77386 HC IMRT TREATMENT DELIVERY, COMPLEX: CPT | Performed by: RADIOLOGY

## 2024-10-22 ENCOUNTER — APPOINTMENT (OUTPATIENT)
Dept: RADIATION ONCOLOGY | Facility: CLINIC | Age: 74
End: 2024-10-22
Attending: STUDENT IN AN ORGANIZED HEALTH CARE EDUCATION/TRAINING PROGRAM
Payer: COMMERCIAL

## 2024-10-22 PROCEDURE — 77386 HC IMRT TREATMENT DELIVERY, COMPLEX: CPT | Performed by: RADIOLOGY

## 2024-10-22 PROCEDURE — 77014 PR CT GUIDE FOR PLACEMENT RADIATION THERAPY FIELDS: CPT | Mod: 26 | Performed by: RADIOLOGY

## 2024-10-23 ENCOUNTER — APPOINTMENT (OUTPATIENT)
Dept: RADIATION ONCOLOGY | Facility: CLINIC | Age: 74
End: 2024-10-23
Attending: STUDENT IN AN ORGANIZED HEALTH CARE EDUCATION/TRAINING PROGRAM
Payer: COMMERCIAL

## 2024-10-23 PROCEDURE — 77386 HC IMRT TREATMENT DELIVERY, COMPLEX: CPT | Performed by: RADIOLOGY

## 2024-10-23 PROCEDURE — 77014 PR CT GUIDE FOR PLACEMENT RADIATION THERAPY FIELDS: CPT | Mod: 26 | Performed by: RADIOLOGY

## 2024-10-24 ENCOUNTER — APPOINTMENT (OUTPATIENT)
Dept: RADIATION ONCOLOGY | Facility: CLINIC | Age: 74
End: 2024-10-24
Attending: STUDENT IN AN ORGANIZED HEALTH CARE EDUCATION/TRAINING PROGRAM
Payer: COMMERCIAL

## 2024-10-24 PROCEDURE — 77014 PR CT GUIDE FOR PLACEMENT RADIATION THERAPY FIELDS: CPT | Mod: 26 | Performed by: STUDENT IN AN ORGANIZED HEALTH CARE EDUCATION/TRAINING PROGRAM

## 2024-10-24 PROCEDURE — 77336 RADIATION PHYSICS CONSULT: CPT | Performed by: STUDENT IN AN ORGANIZED HEALTH CARE EDUCATION/TRAINING PROGRAM

## 2024-10-24 PROCEDURE — 77427 RADIATION TX MANAGEMENT X5: CPT | Performed by: RADIOLOGY

## 2024-10-24 PROCEDURE — 77386 HC IMRT TREATMENT DELIVERY, COMPLEX: CPT | Performed by: STUDENT IN AN ORGANIZED HEALTH CARE EDUCATION/TRAINING PROGRAM

## 2024-10-25 ENCOUNTER — APPOINTMENT (OUTPATIENT)
Dept: RADIATION ONCOLOGY | Facility: CLINIC | Age: 74
End: 2024-10-25
Attending: STUDENT IN AN ORGANIZED HEALTH CARE EDUCATION/TRAINING PROGRAM
Payer: COMMERCIAL

## 2024-10-25 VITALS
TEMPERATURE: 97.7 F | WEIGHT: 212 LBS | RESPIRATION RATE: 16 BRPM | DIASTOLIC BLOOD PRESSURE: 90 MMHG | SYSTOLIC BLOOD PRESSURE: 222 MMHG | BODY MASS INDEX: 36.39 KG/M2 | HEART RATE: 69 BPM

## 2024-10-25 DIAGNOSIS — D32.9 MENINGIOMA (H): Primary | ICD-10-CM

## 2024-10-25 PROCEDURE — 77386 HC IMRT TREATMENT DELIVERY, COMPLEX: CPT | Performed by: RADIOLOGY

## 2024-10-25 PROCEDURE — 77014 PR CT GUIDE FOR PLACEMENT RADIATION THERAPY FIELDS: CPT | Mod: 26 | Performed by: RADIOLOGY

## 2024-10-25 ASSESSMENT — PAIN SCALES - GENERAL: PAINLEVEL_OUTOF10: NO PAIN (0)

## 2024-10-25 NOTE — PROGRESS NOTES
SPECIALIZING IN [facility] Radiation Oncology     On Treatment Visit Note          Josefina Dumont                                                                                    MRN: [id number]   : 1950  Date of Service: 10/25/2024             Diagnosis: Visit Dx:  (D32.9) Meningioma (H) Left  frontal area     Impression   75 y/o with slowly growing left frontal parafalcine meningioma, not a good surgical candidate   Has dull headache     Treatment Summary to Date   Lt partial Brain Today/total dose 1200/5400   cGy   Fx:       Subjective: No changes     Nursing ROS:  Per Epic     Objective:   BP (!) 222/90  (BP Location: Right arm, Patient Position: Sitting, Cuff Size: Adult Regular)   Pulse 69   Temp 97.7  F (36.8  C) (Oral)   Resp 16   Wt 97.1 kg (214 lb)   SpO2 96%   BMI 36.73 kg/m  Gen: Appears well without new changes.   Using walker   Skin: no erythema     Assessment:      Tolerating radiation therapy well.  All questions and concerns addressed.        Treatment-related toxicities (CTCAE v4.0):  0     Plan:   Continue current therapy.       ARIA chart and setup information reviewed  Treatment image checked.        Lamonte Bartholomew MD  Radiation Oncology

## 2024-10-25 NOTE — LETTER
10/25/2024      Josefina Dumont  [address]  [facility] [place] [address]      Dear Colleague,    Thank you for referring your patient, Josefina Dumont, to the [facility] RADIATION ONCOLOGY [place]. Please see a copy of my visit note below.    SPECIALIZING IN [facility]  [facility] Radiation Oncology     On Treatment Visit Note          Josefina Dumont                                                                                    MRN: [id number]   : 1950  Date of Service: 10/25/2024             Diagnosis: Visit Dx:  (D32.9) Meningioma (H) Left  frontal area     Impression   75 y/o with slowly growing left frontal parafalcine meningioma, not a good surgical candidate   Has dull headache     Treatment Summary to Date   Lt partial Brain Today/total dose 1200/5400   cGy   Fx:       Subjective: No changes     Nursing ROS:  Per Epic     Objective:   BP (!) 222/90  (BP Location: Right arm, Patient Position: Sitting, Cuff Size: Adult Regular)   Pulse 69   Temp 97.7  F (36.8  C) (Oral)   Resp 16   Wt 97.1 kg (214 lb)   SpO2 96%   BMI 36.73 kg/m  Gen: Appears well without new changes.   Using walker   Skin: no erythema     Assessment:      Tolerating radiation therapy well.  All questions and concerns addressed.        Treatment-related toxicities (CTCAE v4.0):  0     Plan:   Continue current therapy.       ARIA chart and setup information reviewed  Treatment image checked.        Lamonte Bartholomew MD  Radiation Oncology               Again, thank you for allowing me to participate in the care of your patient.        Sincerely,        Lamonte Bartholomew MD

## 2024-10-28 ENCOUNTER — APPOINTMENT (OUTPATIENT)
Dept: RADIATION ONCOLOGY | Facility: CLINIC | Age: 74
End: 2024-10-28
Attending: STUDENT IN AN ORGANIZED HEALTH CARE EDUCATION/TRAINING PROGRAM
Payer: COMMERCIAL

## 2024-10-28 PROCEDURE — 77386 HC IMRT TREATMENT DELIVERY, COMPLEX: CPT | Performed by: RADIOLOGY

## 2024-10-28 PROCEDURE — 77014 PR CT GUIDE FOR PLACEMENT RADIATION THERAPY FIELDS: CPT | Mod: 26 | Performed by: RADIOLOGY

## 2024-10-29 ENCOUNTER — APPOINTMENT (OUTPATIENT)
Dept: RADIATION ONCOLOGY | Facility: CLINIC | Age: 74
End: 2024-10-29
Attending: STUDENT IN AN ORGANIZED HEALTH CARE EDUCATION/TRAINING PROGRAM
Payer: COMMERCIAL

## 2024-10-29 PROCEDURE — 77386 HC IMRT TREATMENT DELIVERY, COMPLEX: CPT | Performed by: RADIOLOGY

## 2024-10-29 PROCEDURE — 77014 PR CT GUIDE FOR PLACEMENT RADIATION THERAPY FIELDS: CPT | Mod: 26 | Performed by: RADIOLOGY

## 2024-10-30 ENCOUNTER — APPOINTMENT (OUTPATIENT)
Dept: RADIATION ONCOLOGY | Facility: CLINIC | Age: 74
End: 2024-10-30
Attending: STUDENT IN AN ORGANIZED HEALTH CARE EDUCATION/TRAINING PROGRAM
Payer: COMMERCIAL

## 2024-10-30 PROCEDURE — 77014 PR CT GUIDE FOR PLACEMENT RADIATION THERAPY FIELDS: CPT | Mod: 26 | Performed by: RADIOLOGY

## 2024-10-30 PROCEDURE — 77386 HC IMRT TREATMENT DELIVERY, COMPLEX: CPT | Performed by: RADIOLOGY

## 2024-10-31 ENCOUNTER — APPOINTMENT (OUTPATIENT)
Dept: RADIATION ONCOLOGY | Facility: CLINIC | Age: 74
End: 2024-10-31
Attending: STUDENT IN AN ORGANIZED HEALTH CARE EDUCATION/TRAINING PROGRAM
Payer: COMMERCIAL

## 2024-10-31 PROCEDURE — 77386 HC IMRT TREATMENT DELIVERY, COMPLEX: CPT | Performed by: RADIOLOGY

## 2024-10-31 PROCEDURE — 77336 RADIATION PHYSICS CONSULT: CPT | Performed by: STUDENT IN AN ORGANIZED HEALTH CARE EDUCATION/TRAINING PROGRAM

## 2024-10-31 PROCEDURE — 77014 PR CT GUIDE FOR PLACEMENT RADIATION THERAPY FIELDS: CPT | Mod: 26 | Performed by: RADIOLOGY

## 2024-10-31 PROCEDURE — 77427 RADIATION TX MANAGEMENT X5: CPT | Performed by: RADIOLOGY

## 2024-11-01 ENCOUNTER — APPOINTMENT (OUTPATIENT)
Dept: RADIATION ONCOLOGY | Facility: CLINIC | Age: 74
End: 2024-11-01
Attending: STUDENT IN AN ORGANIZED HEALTH CARE EDUCATION/TRAINING PROGRAM
Payer: COMMERCIAL

## 2024-11-01 VITALS
TEMPERATURE: 98.1 F | OXYGEN SATURATION: 94 % | BODY MASS INDEX: 36.22 KG/M2 | RESPIRATION RATE: 16 BRPM | SYSTOLIC BLOOD PRESSURE: 181 MMHG | HEART RATE: 71 BPM | DIASTOLIC BLOOD PRESSURE: 75 MMHG | WEIGHT: 211 LBS

## 2024-11-01 DIAGNOSIS — D32.9 MENINGIOMA (H): Primary | ICD-10-CM

## 2024-11-01 PROCEDURE — 77014 PR CT GUIDE FOR PLACEMENT RADIATION THERAPY FIELDS: CPT | Mod: 26 | Performed by: STUDENT IN AN ORGANIZED HEALTH CARE EDUCATION/TRAINING PROGRAM

## 2024-11-01 PROCEDURE — 77386 HC IMRT TREATMENT DELIVERY, COMPLEX: CPT | Performed by: STUDENT IN AN ORGANIZED HEALTH CARE EDUCATION/TRAINING PROGRAM

## 2024-11-01 ASSESSMENT — PAIN SCALES - GENERAL: PAINLEVEL_OUTOF10: NO PAIN (0)

## 2024-11-01 NOTE — PROGRESS NOTES
RADIATION ONCOLOGY WEEKLY TREATMENT VISIT NOTE      Assessment / Impression       Visit Dx:  (D32.9) Meningioma (H)  (primary encounter diagnosis)       Cancer Staging   No matching staging information was found for the patient.       Tolerating radiation therapy well.  All questions and concerns addressed.  Left frontal headache extending behind the eye    Plan:     Continue radiation treatment as prescribed.  Radiation:   Site: Lt Brain/Meningioma  Stereotactic Radiosurgery: No  Concurrent Therapy: No  Today's Dose: 2200  Total Dose for Brain: 5400  Today's Fraction/Total Fraction Brain:     Slight increase in headache responsive to Tylenol    Pain Management Plan: Tylenol as needed, may add Advil as needed    Subjective:      HPI: Josefina Dumont is a 74 year old female with  No primary diagnosis found.    Tolerating radiation therapy well.  She reports headaches primarily in the afternoons that extend across her forehead and behind the left eye.  Rated 5 out of 10 in intensity.  Tylenol Tylenol offers good relief.  Typically taking 1 Tylenol daily.  Notices increase in fatigue for which she is taking naps.  She has some dizziness which is not new.  Uses a walker for ambulation.  No falls.  Dizziness is mostly related to positional changes.  Eating and drinking without difficulty.  Notes some slight skin dryness and itchiness on her left forehead.  Using moisturizer.    The following portions of the patient's history were reviewed and updated as appropriate: allergies, current medications, past family history, past medical history, past social history, past surgical history and problem list.    Assessment                  Body Site:  Brain Site: Lt Brain/Meningioma  Stereotactic Radiosurgery: No  Concurrent Therapy: No  Today's Dose: 2200  Today's Fraction/Total Fraction Brain:   Ocular/Visual - other : 0: Normal  Middle ear/hearin: Normal  Tinnitus: 0: No  Depressed level of consciousness: 0:  Normal  Oriented x of spheres: 3  Neuropathy - motor: 0: Normal  Ataxia: 1: Asymptomatic - abnormal on PE  Speech Impairment (e.g. Dysphasia or aphasia): 0: Normal  Seizures: 0: None  Urinary Incontinence: 0: None  Bowel Incontinence: 0: None  Insomnia: 0: Normal                                   Sexuality Alteration                    Emotional Alteration    Copin: Effective  Comfort Alteration   KPS: 80% Can perform normal activity with effort, some signs of disease  Fatigue (ONS scale): 5: Moderate Fatigue  Pain Location: occasional headache  Pain Intensity. Rate degree of pain ranging from 0 (no pain) to 10 (severe pain): 0-5  Pain Description: Combination - A combination of pain descriptions (note) (aching, shooting pain)  Pain Intervention: 1: Over the counter medications (lotion)  Effectiveness of pain intervention: 4: Pain relieved 100%   Nutrition Alteration   Anorexia: 0: None  Nausea: 0: None  Vomitin: None  Weight: 95.7 kg (211 lb)  Skin Alteration   Skin Sensation: 0: No problem  Skin Reaction: 1: Faint erythema or dry desquamation  Alopecia: 0: Normal  AUA Assessment                                           Accompanied by       Objective:     Exam:     Vitals:    24 1015   BP: (!) 181/75   Pulse: 71   Resp: 16   Temp: 98.1  F (36.7  C)   TempSrc: Oral   SpO2: 94%   Weight: 95.7 kg (211 lb)   PainSc: No Pain (0)       Wt Readings from Last 8 Encounters:   24 95.7 kg (211 lb)   10/25/24 96.2 kg (212 lb)   10/18/24 97.1 kg (214 lb)   24 95.8 kg (211 lb 3.2 oz)   24 91.6 kg (202 lb)   24 92.5 kg (204 lb)   24 104.8 kg (231 lb)   24 99.2 kg (218 lb 11.2 oz)       General: Alert and oriented, in no acute distress  Josefina has no Erythema.    Treatment Summary to Date    Aria chart and setup information reviewed    Jennifer Amato MD

## 2024-11-01 NOTE — LETTER
11/1/2024      Josefina Dumont  [address]  [facility] [place] [address]      Dear Colleague,    Thank you for referring your patient, Josefina Dumont, to the [facility] RADIATION ONCOLOGY [place]. Please see a copy of my visit note below.    RADIATION ONCOLOGY WEEKLY TREATMENT VISIT NOTE      Assessment / Impression       Visit Dx:  (D32.9) Meningioma (H)  (primary encounter diagnosis)       Cancer Staging   No matching staging information was found for the patient.       Tolerating radiation therapy well.  All questions and concerns addressed.  Left frontal headache extending behind the eye    Plan:     Continue radiation treatment as prescribed.  Radiation:   Site: Lt Brain/Meningioma  Stereotactic Radiosurgery: No  Concurrent Therapy: No  Today's Dose: 2200  Total Dose for Brain: 5400  Today's Fraction/Total Fraction Brain: 11/27    Slight increase in headache responsive to Tylenol    Pain Management Plan: Tylenol as needed, may add Advil as needed    Subjective:      HPI: Josefina Dumont is a 74 year old female with  No primary diagnosis found.    Tolerating radiation therapy well.  She reports headaches primarily in the afternoons that extend across her forehead and behind the left eye.  Rated 5 out of 10 in intensity.  Tylenol Tylenol offers good relief.  Typically taking 1 Tylenol daily.  Notices increase in fatigue for which she is taking naps.  She has some dizziness which is not new.  Uses a walker for ambulation.  No falls.  Dizziness is mostly related to positional changes.  Eating and drinking without difficulty.  Notes some slight skin dryness and itchiness on her left forehead.  Using moisturizer.    The following portions of the patient's history were reviewed and updated as appropriate: allergies, current medications, past family history, past medical history, past social history, past surgical history and problem list.    Assessment                  Body Site:  Brain  Site: Lt Brain/Meningioma  Stereotactic Radiosurgery: No  Concurrent Therapy: No  Today's Dose: 2200  Today's Fraction/Total Fraction Brain:   Ocular/Visual - other : 0: Normal  Middle ear/hearin: Normal  Tinnitus: 0: No  Depressed level of consciousness: 0: Normal  Oriented x of spheres: 3  Neuropathy - motor: 0: Normal  Ataxia: 1: Asymptomatic - abnormal on PE  Speech Impairment (e.g. Dysphasia or aphasia): 0: Normal  Seizures: 0: None  Urinary Incontinence: 0: None  Bowel Incontinence: 0: None  Insomnia: 0: Normal                                   Sexuality Alteration                    Emotional Alteration    Copin: Effective  Comfort Alteration   KPS: 80% Can perform normal activity with effort, some signs of disease  Fatigue (ONS scale): 5: Moderate Fatigue  Pain Location: occasional headache  Pain Intensity. Rate degree of pain ranging from 0 (no pain) to 10 (severe pain): 0-5  Pain Description: Combination - A combination of pain descriptions (note) (aching, shooting pain)  Pain Intervention: 1: Over the counter medications (lotion)  Effectiveness of pain intervention: 4: Pain relieved 100%   Nutrition Alteration   Anorexia: 0: None  Nausea: 0: None  Vomitin: None  Weight: 95.7 kg (211 lb)  Skin Alteration   Skin Sensation: 0: No problem  Skin Reaction: 1: Faint erythema or dry desquamation  Alopecia: 0: Normal  AUA Assessment                                           Accompanied by       Objective:     Exam:     Vitals:    24 1015   BP: (!) 181/75   Pulse: 71   Resp: 16   Temp: 98.1  F (36.7  C)   TempSrc: Oral   SpO2: 94%   Weight: 95.7 kg (211 lb)   PainSc: No Pain (0)       Wt Readings from Last 8 Encounters:   24 95.7 kg (211 lb)   10/25/24 96.2 kg (212 lb)   10/18/24 97.1 kg (214 lb)   24 95.8 kg (211 lb 3.2 oz)   24 91.6 kg (202 lb)   24 92.5 kg (204 lb)   24 104.8 kg (231 lb)   24 99.2 kg (218 lb 11.2 oz)       General: Alert and  oriented, in no acute distress  Josefina has no Erythema.    Treatment Summary to Date    Aria chart and setup information reviewed    Jennifer Amato MD      Again, thank you for allowing me to participate in the care of your patient.        Sincerely,        Jennifer Amato MD

## 2024-11-04 ENCOUNTER — APPOINTMENT (OUTPATIENT)
Dept: RADIATION ONCOLOGY | Facility: CLINIC | Age: 74
End: 2024-11-04
Attending: STUDENT IN AN ORGANIZED HEALTH CARE EDUCATION/TRAINING PROGRAM
Payer: COMMERCIAL

## 2024-11-04 PROCEDURE — 77014 PR CT GUIDE FOR PLACEMENT RADIATION THERAPY FIELDS: CPT | Mod: 26 | Performed by: RADIOLOGY

## 2024-11-04 PROCEDURE — 77386 HC IMRT TREATMENT DELIVERY, COMPLEX: CPT | Performed by: RADIOLOGY

## 2024-11-05 ENCOUNTER — APPOINTMENT (OUTPATIENT)
Dept: RADIATION ONCOLOGY | Facility: CLINIC | Age: 74
End: 2024-11-05
Attending: STUDENT IN AN ORGANIZED HEALTH CARE EDUCATION/TRAINING PROGRAM
Payer: COMMERCIAL

## 2024-11-05 PROCEDURE — 77014 PR CT GUIDE FOR PLACEMENT RADIATION THERAPY FIELDS: CPT | Mod: 26 | Performed by: RADIOLOGY

## 2024-11-05 PROCEDURE — 77386 HC IMRT TREATMENT DELIVERY, COMPLEX: CPT | Performed by: RADIOLOGY

## 2024-11-06 ENCOUNTER — APPOINTMENT (OUTPATIENT)
Dept: RADIATION ONCOLOGY | Facility: CLINIC | Age: 74
End: 2024-11-06
Attending: STUDENT IN AN ORGANIZED HEALTH CARE EDUCATION/TRAINING PROGRAM
Payer: COMMERCIAL

## 2024-11-06 PROCEDURE — 77014 PR CT GUIDE FOR PLACEMENT RADIATION THERAPY FIELDS: CPT | Mod: 26 | Performed by: RADIOLOGY

## 2024-11-06 PROCEDURE — 77386 HC IMRT TREATMENT DELIVERY, COMPLEX: CPT | Performed by: RADIOLOGY

## 2024-11-07 ENCOUNTER — APPOINTMENT (OUTPATIENT)
Dept: RADIATION ONCOLOGY | Facility: CLINIC | Age: 74
End: 2024-11-07
Attending: STUDENT IN AN ORGANIZED HEALTH CARE EDUCATION/TRAINING PROGRAM
Payer: COMMERCIAL

## 2024-11-07 LAB — PHQ9 SCORE: 7

## 2024-11-07 PROCEDURE — 77386 HC IMRT TREATMENT DELIVERY, COMPLEX: CPT | Performed by: STUDENT IN AN ORGANIZED HEALTH CARE EDUCATION/TRAINING PROGRAM

## 2024-11-07 PROCEDURE — 77427 RADIATION TX MANAGEMENT X5: CPT | Performed by: STUDENT IN AN ORGANIZED HEALTH CARE EDUCATION/TRAINING PROGRAM

## 2024-11-07 PROCEDURE — 77336 RADIATION PHYSICS CONSULT: CPT | Performed by: STUDENT IN AN ORGANIZED HEALTH CARE EDUCATION/TRAINING PROGRAM

## 2024-11-07 PROCEDURE — 77014 PR CT GUIDE FOR PLACEMENT RADIATION THERAPY FIELDS: CPT | Mod: 26 | Performed by: STUDENT IN AN ORGANIZED HEALTH CARE EDUCATION/TRAINING PROGRAM

## 2024-11-08 ENCOUNTER — OFFICE VISIT (OUTPATIENT)
Dept: RADIATION ONCOLOGY | Facility: CLINIC | Age: 74
End: 2024-11-08
Attending: STUDENT IN AN ORGANIZED HEALTH CARE EDUCATION/TRAINING PROGRAM
Payer: COMMERCIAL

## 2024-11-08 VITALS
SYSTOLIC BLOOD PRESSURE: 210 MMHG | TEMPERATURE: 98.4 F | BODY MASS INDEX: 36.24 KG/M2 | WEIGHT: 211.1 LBS | DIASTOLIC BLOOD PRESSURE: 92 MMHG | HEART RATE: 68 BPM | OXYGEN SATURATION: 94 % | RESPIRATION RATE: 16 BRPM

## 2024-11-08 DIAGNOSIS — D32.9 MENINGIOMA (H): Primary | ICD-10-CM

## 2024-11-08 PROCEDURE — 77014 PR CT GUIDE FOR PLACEMENT RADIATION THERAPY FIELDS: CPT | Mod: 26 | Performed by: STUDENT IN AN ORGANIZED HEALTH CARE EDUCATION/TRAINING PROGRAM

## 2024-11-08 PROCEDURE — 77386 HC IMRT TREATMENT DELIVERY, COMPLEX: CPT | Performed by: STUDENT IN AN ORGANIZED HEALTH CARE EDUCATION/TRAINING PROGRAM

## 2024-11-08 ASSESSMENT — PAIN SCALES - GENERAL: PAINLEVEL_OUTOF10: NO PAIN (0)

## 2024-11-08 NOTE — PROGRESS NOTES
RADIATION ONCOLOGY WEEKLY TREATMENT VISIT NOTE      Assessment / Impression       Visit Dx:  (D32.9) Meningioma (H)  (primary encounter diagnosis)       Cancer Staging   No matching staging information was found for the patient.       Tolerating radiation therapy well.  All questions and concerns addressed.  Ongoing periodic headache responsive to Tylenol/Advil     Plan:     Continue radiation treatment as prescribed.  Radiation:   Site: Lt Brain/Meningioma  Stereotactic Radiosurgery: No  Concurrent Therapy: No  Today's Dose: 3200  Total Dose for Brain: 5400  Today's Fraction/Total Fraction Brain:     Fatigue manageable at this time with resting as needed.  Discussed skin/scalp care.    Pain Management Plan: tylenol, advil     Subjective:      HPI: Josefina Dumont is a 74 year old female with  No primary diagnosis found.    Some scalp irritation/pruritus, ongoing HA behind eyes which is responsive to Tylenol or Advil.  Occasional sharp pains that briefly shoot through her head.  Balance is stable ambulates with walker.  Worsening fatigue.    The following portions of the patient's history were reviewed and updated as appropriate: allergies, current medications, past family history, past medical history, past social history, past surgical history and problem list.    Assessment                  Body Site:  Brain Site: Lt Brain/Meningioma  Stereotactic Radiosurgery: No  Concurrent Therapy: No  Today's Dose: 3200  Today's Fraction/Total Fraction Brain:   Ocular/Visual - other : 0: Normal  Middle ear/hearin: Normal  Tinnitus: 0: No  Depressed level of consciousness: 0: Normal  Oriented x of spheres: 3  Neuropathy - motor: 0: Normal  Ataxia: 1: Asymptomatic - abnormal on PE  Speech Impairment (e.g. Dysphasia or aphasia): 0: Normal  Seizures: 0: None  Urinary Incontinence: 0: None  Bowel Incontinence: 0: None  Insomnia: 0: Normal                                   Sexuality Alteration                     Emotional Alteration    Copin: Effective  Comfort Alteration   KPS: 80% Can perform normal activity with effort, some signs of disease  Fatigue (ONS scale): 7: Extreme Fatigue  Pain Location: denies currently, occasional headaches  Pain Intensity. Rate degree of pain ranging from 0 (no pain) to 10 (severe pain): 0-5  Pain Description: Combination - A combination of pain descriptions (note) (aching, shooting pain)  Pain Intervention: 1: Over the counter medications (lotion)  Effectiveness of pain intervention: 4: Pain relieved 100%   Nutrition Alteration   Anorexia: 0: None  Nausea: 0: None  Vomitin: None  Weight: 95.8 kg (211 lb 1.6 oz)  Skin Alteration   Skin Reaction: 1: Faint erythema or dry desquamation  Alopecia: 0: Normal  AUA Assessment                                           Accompanied by       Objective:     Exam:     Vitals:    24 1028   BP: (!) 210/92   Pulse: 68   Resp: 16   Temp: 98.4  F (36.9  C)   TempSrc: Oral   SpO2: 94%   Weight: 95.8 kg (211 lb 1.6 oz)   PainSc: No Pain (0)       Wt Readings from Last 8 Encounters:   24 95.8 kg (211 lb 1.6 oz)   24 95.7 kg (211 lb)   10/25/24 96.2 kg (212 lb)   10/18/24 97.1 kg (214 lb)   24 95.8 kg (211 lb 3.2 oz)   24 91.6 kg (202 lb)   24 92.5 kg (204 lb)   24 104.8 kg (231 lb)       General: Alert and oriented, in no acute distress  Josefina has no Erythema.    Treatment Summary to Date    Aria chart and setup information reviewed    Jennifer Amato MD

## 2024-11-08 NOTE — LETTER
2024      Josefina Dumont  [address]  [facility] [place] [address]      Dear Colleague,    Thank you for referring your patient, Josefina Dumont, to the [facility] RADIATION ONCOLOGY [place]. Please see a copy of my visit note below.    RADIATION ONCOLOGY WEEKLY TREATMENT VISIT NOTE      Assessment / Impression       Visit Dx:  (D32.9) Meningioma (H)  (primary encounter diagnosis)       Cancer Staging   No matching staging information was found for the patient.       Tolerating radiation therapy well.  All questions and concerns addressed.  Ongoing periodic headache responsive to Tylenol/Advil     Plan:     Continue radiation treatment as prescribed.  Radiation:   Site: Lt Brain/Meningioma  Stereotactic Radiosurgery: No  Concurrent Therapy: No  Today's Dose: 3200  Total Dose for Brain: 5400  Today's Fraction/Total Fraction Brain:     Fatigue manageable at this time with resting as needed.  Discussed skin/scalp care.    Pain Management Plan: tylenol, advil     Subjective:      HPI: Josefina Dumont is a 74 year old female with  No primary diagnosis found.    Some scalp irritation/pruritus, ongoing HA behind eyes which is responsive to Tylenol or Advil.  Occasional sharp pains that briefly shoot through her head.  Balance is stable ambulates with walker.  Worsening fatigue.    The following portions of the patient's history were reviewed and updated as appropriate: allergies, current medications, past family history, past medical history, past social history, past surgical history and problem list.    Assessment                  Body Site:  Brain Site: Lt Brain/Meningioma  Stereotactic Radiosurgery: No  Concurrent Therapy: No  Today's Dose: 3200  Today's Fraction/Total Fraction Brain:   Ocular/Visual - other : 0: Normal  Middle ear/hearin: Normal  Tinnitus: 0: No  Depressed level of consciousness: 0: Normal  Oriented x of spheres: 3  Neuropathy - motor: 0: Normal  Ataxia:  1: Asymptomatic - abnormal on PE  Speech Impairment (e.g. Dysphasia or aphasia): 0: Normal  Seizures: 0: None  Urinary Incontinence: 0: None  Bowel Incontinence: 0: None  Insomnia: 0: Normal                                   Sexuality Alteration                    Emotional Alteration    Copin: Effective  Comfort Alteration   KPS: 80% Can perform normal activity with effort, some signs of disease  Fatigue (ONS scale): 7: Extreme Fatigue  Pain Location: denies currently, occasional headaches  Pain Intensity. Rate degree of pain ranging from 0 (no pain) to 10 (severe pain): 0-5  Pain Description: Combination - A combination of pain descriptions (note) (aching, shooting pain)  Pain Intervention: 1: Over the counter medications (lotion)  Effectiveness of pain intervention: 4: Pain relieved 100%   Nutrition Alteration   Anorexia: 0: None  Nausea: 0: None  Vomitin: None  Weight: 95.8 kg (211 lb 1.6 oz)  Skin Alteration   Skin Reaction: 1: Faint erythema or dry desquamation  Alopecia: 0: Normal  AUA Assessment                                           Accompanied by       Objective:     Exam:     Vitals:    24 1028   BP: (!) 210/92   Pulse: 68   Resp: 16   Temp: 98.4  F (36.9  C)   TempSrc: Oral   SpO2: 94%   Weight: 95.8 kg (211 lb 1.6 oz)   PainSc: No Pain (0)       Wt Readings from Last 8 Encounters:   24 95.8 kg (211 lb 1.6 oz)   24 95.7 kg (211 lb)   10/25/24 96.2 kg (212 lb)   10/18/24 97.1 kg (214 lb)   24 95.8 kg (211 lb 3.2 oz)   24 91.6 kg (202 lb)   24 92.5 kg (204 lb)   24 104.8 kg (231 lb)       General: Alert and oriented, in no acute distress  Josefina has no Erythema.    Treatment Summary to Date    Aria chart and setup information reviewed    Jennifer Amato MD      Again, thank you for allowing me to participate in the care of your patient.        Sincerely,        Jennifer Amato MD

## 2024-11-11 ENCOUNTER — APPOINTMENT (OUTPATIENT)
Dept: RADIATION ONCOLOGY | Facility: CLINIC | Age: 74
End: 2024-11-11
Attending: STUDENT IN AN ORGANIZED HEALTH CARE EDUCATION/TRAINING PROGRAM
Payer: COMMERCIAL

## 2024-11-11 PROCEDURE — 77386 HC IMRT TREATMENT DELIVERY, COMPLEX: CPT | Performed by: RADIOLOGY

## 2024-11-11 PROCEDURE — 77014 PR CT GUIDE FOR PLACEMENT RADIATION THERAPY FIELDS: CPT | Mod: 26 | Performed by: RADIOLOGY

## 2024-11-12 ENCOUNTER — APPOINTMENT (OUTPATIENT)
Dept: RADIATION ONCOLOGY | Facility: CLINIC | Age: 74
End: 2024-11-12
Attending: STUDENT IN AN ORGANIZED HEALTH CARE EDUCATION/TRAINING PROGRAM
Payer: COMMERCIAL

## 2024-11-12 PROCEDURE — 77386 HC IMRT TREATMENT DELIVERY, COMPLEX: CPT | Performed by: RADIOLOGY

## 2024-11-12 PROCEDURE — 77014 PR CT GUIDE FOR PLACEMENT RADIATION THERAPY FIELDS: CPT | Mod: 26 | Performed by: RADIOLOGY

## 2024-11-13 ENCOUNTER — APPOINTMENT (OUTPATIENT)
Dept: RADIATION ONCOLOGY | Facility: CLINIC | Age: 74
End: 2024-11-13
Attending: STUDENT IN AN ORGANIZED HEALTH CARE EDUCATION/TRAINING PROGRAM
Payer: COMMERCIAL

## 2024-11-13 PROCEDURE — 77014 PR CT GUIDE FOR PLACEMENT RADIATION THERAPY FIELDS: CPT | Mod: 26 | Performed by: RADIOLOGY

## 2024-11-13 PROCEDURE — 77386 HC IMRT TREATMENT DELIVERY, COMPLEX: CPT | Performed by: RADIOLOGY

## 2024-11-14 ENCOUNTER — OFFICE VISIT (OUTPATIENT)
Dept: RADIATION ONCOLOGY | Facility: CLINIC | Age: 74
End: 2024-11-14
Attending: STUDENT IN AN ORGANIZED HEALTH CARE EDUCATION/TRAINING PROGRAM
Payer: COMMERCIAL

## 2024-11-14 VITALS
OXYGEN SATURATION: 93 % | SYSTOLIC BLOOD PRESSURE: 178 MMHG | DIASTOLIC BLOOD PRESSURE: 79 MMHG | TEMPERATURE: 98.1 F | BODY MASS INDEX: 36.1 KG/M2 | RESPIRATION RATE: 16 BRPM | WEIGHT: 210.3 LBS | HEART RATE: 62 BPM

## 2024-11-14 DIAGNOSIS — D32.9 MENINGIOMA (H): Primary | ICD-10-CM

## 2024-11-14 PROCEDURE — 77336 RADIATION PHYSICS CONSULT: CPT | Performed by: STUDENT IN AN ORGANIZED HEALTH CARE EDUCATION/TRAINING PROGRAM

## 2024-11-14 PROCEDURE — 77427 RADIATION TX MANAGEMENT X5: CPT | Performed by: STUDENT IN AN ORGANIZED HEALTH CARE EDUCATION/TRAINING PROGRAM

## 2024-11-14 PROCEDURE — 77014 PR CT GUIDE FOR PLACEMENT RADIATION THERAPY FIELDS: CPT | Mod: 26 | Performed by: STUDENT IN AN ORGANIZED HEALTH CARE EDUCATION/TRAINING PROGRAM

## 2024-11-14 PROCEDURE — 77386 HC IMRT TREATMENT DELIVERY, COMPLEX: CPT | Performed by: STUDENT IN AN ORGANIZED HEALTH CARE EDUCATION/TRAINING PROGRAM

## 2024-11-14 ASSESSMENT — PAIN SCALES - GENERAL: PAINLEVEL_OUTOF10: MODERATE PAIN (4)

## 2024-11-14 NOTE — PROGRESS NOTES
RADIATION ONCOLOGY WEEKLY TREATMENT VISIT NOTE      Assessment / Impression       Visit Dx:  (D32.9) Meningioma (H)  (primary encounter diagnosis)       Cancer Staging   No matching staging information was found for the patient.       Tolerating radiation therapy well.  All questions and concerns addressed.  Grade 1 fatigue    Plan:     Continue radiation treatment as prescribed.  Radiation:   Site: Lt Brain/Meningioma  Stereotactic Radiosurgery: No  Concurrent Therapy: No  Today's Dose: 4000  Total Dose for Brain: 5400  Today's Fraction/Total Fraction Brain:       Pain Management Plan: Advil or Tylenol    Subjective:      HPI: Josefina Dumont is a 74 year old female with  Meningioma (H) [D32.9]    She continues to have a frontal headache across her forehead that extends posteriorly.  Does not always require intervention but she will take an Advil as needed which relieves the discomfort.  She has increasing fatigue for which she is napping.  She has some slight pruritus of her scalp which is better with use of moisturizer.  Her scalp can feel somewhat sore.  Balance is stable.  No falls.  Using her walker most of the time only short distances without.    The following portions of the patient's history were reviewed and updated as appropriate: allergies, current medications, past family history, past medical history, past social history, past surgical history and problem list.    Assessment                  Body Site:  Brain Site: Lt Brain/Meningioma  Stereotactic Radiosurgery: No  Concurrent Therapy: No  Today's Dose: 4000  Today's Fraction/Total Fraction Brain:   Ocular/Visual - other : 0: Normal  Middle ear/hearin: Normal  Tinnitus: 0: No  Depressed level of consciousness: 0: Normal  Oriented x of spheres: 3  Neuropathy - motor: 0: Normal  Ataxia: 1: Asymptomatic - abnormal on PE  Speech Impairment (e.g. Dysphasia or aphasia): 0: Normal  Seizures: 0: None  Urinary Incontinence: 0: None  Bowel  Incontinence: 0: None  Insomnia: 0: Normal                                   Sexuality Alteration                    Emotional Alteration    Copin: Effective  Comfort Alteration   KPS: 80% Can perform normal activity with effort, some signs of disease  Fatigue (ONS scale): 7: Extreme Fatigue  Pain Location: occasional headaches  Pain Intensity. Rate degree of pain ranging from 0 (no pain) to 10 (severe pain): 0-5  Pain Description: Combination - A combination of pain descriptions (note) (aching, shooting pain)  Pain Intervention: 1: Over the counter medications (lotion)  Effectiveness of pain intervention: 4: Pain relieved 100%   Nutrition Alteration   Anorexia: 0: None  Nausea: 0: None  Vomitin: None  Weight: 95.4 kg (210 lb 4.8 oz)  Skin Alteration   Skin Reaction: 1: Faint erythema or dry desquamation  Alopecia: 0: Normal  AUA Assessment                                           Accompanied by       Objective:     Exam:     Vitals:    24 1018 24 1029   BP: (!) 225/91 (!) 178/79   Pulse: 81 62   Resp: 16    Temp: 98.1  F (36.7  C)    TempSrc: Oral    SpO2: 93%    Weight: 95.4 kg (210 lb 4.8 oz)    PainSc: Moderate Pain (4)    PainLoc: Head        Wt Readings from Last 8 Encounters:   24 95.4 kg (210 lb 4.8 oz)   24 95.8 kg (211 lb 1.6 oz)   24 95.7 kg (211 lb)   10/25/24 96.2 kg (212 lb)   10/18/24 97.1 kg (214 lb)   24 95.8 kg (211 lb 3.2 oz)   24 91.6 kg (202 lb)   24 92.5 kg (204 lb)       General: Alert and oriented, in no acute distress  Josefina has mild Erythema.    Treatment Summary to Date    Aria chart and setup information reviewed    Jennifer Amato MD

## 2024-11-14 NOTE — LETTER
2024      Josefina Dumont  [address]  [facility] [place] [address]      Dear Colleague,    Thank you for referring your patient, Josefina Dumont, to the [facility] RADIATION ONCOLOGY [place]. Please see a copy of my visit note below.    RADIATION ONCOLOGY WEEKLY TREATMENT VISIT NOTE      Assessment / Impression       Visit Dx:  (D32.9) Meningioma (H)  (primary encounter diagnosis)       Cancer Staging   No matching staging information was found for the patient.       Tolerating radiation therapy well.  All questions and concerns addressed.  Grade 1 fatigue    Plan:     Continue radiation treatment as prescribed.  Radiation:   Site: Lt Brain/Meningioma  Stereotactic Radiosurgery: No  Concurrent Therapy: No  Today's Dose: 4000  Total Dose for Brain: 5400  Today's Fraction/Total Fraction Brain:       Pain Management Plan: Advil or Tylenol    Subjective:      HPI: Josefina Dumont is a 74 year old female with  Meningioma (H) [D32.9]    She continues to have a frontal headache across her forehead that extends posteriorly.  Does not always require intervention but she will take an Advil as needed which relieves the discomfort.  She has increasing fatigue for which she is napping.  She has some slight pruritus of her scalp which is better with use of moisturizer.  Her scalp can feel somewhat sore.  Balance is stable.  No falls.  Using her walker most of the time only short distances without.    The following portions of the patient's history were reviewed and updated as appropriate: allergies, current medications, past family history, past medical history, past social history, past surgical history and problem list.    Assessment                  Body Site:  Brain Site: Lt Brain/Meningioma  Stereotactic Radiosurgery: No  Concurrent Therapy: No  Today's Dose: 4000  Today's Fraction/Total Fraction Brain:   Ocular/Visual - other : 0: Normal  Middle ear/hearin: Normal  Tinnitus:  0: No  Depressed level of consciousness: 0: Normal  Oriented x of spheres: 3  Neuropathy - motor: 0: Normal  Ataxia: 1: Asymptomatic - abnormal on PE  Speech Impairment (e.g. Dysphasia or aphasia): 0: Normal  Seizures: 0: None  Urinary Incontinence: 0: None  Bowel Incontinence: 0: None  Insomnia: 0: Normal                                   Sexuality Alteration                    Emotional Alteration    Copin: Effective  Comfort Alteration   KPS: 80% Can perform normal activity with effort, some signs of disease  Fatigue (ONS scale): 7: Extreme Fatigue  Pain Location: occasional headaches  Pain Intensity. Rate degree of pain ranging from 0 (no pain) to 10 (severe pain): 0-5  Pain Description: Combination - A combination of pain descriptions (note) (aching, shooting pain)  Pain Intervention: 1: Over the counter medications (lotion)  Effectiveness of pain intervention: 4: Pain relieved 100%   Nutrition Alteration   Anorexia: 0: None  Nausea: 0: None  Vomitin: None  Weight: 95.4 kg (210 lb 4.8 oz)  Skin Alteration   Skin Reaction: 1: Faint erythema or dry desquamation  Alopecia: 0: Normal  AUA Assessment                                           Accompanied by       Objective:     Exam:     Vitals:    24 1018 24 1029   BP: (!) 225/91 (!) 178/79   Pulse: 81 62   Resp: 16    Temp: 98.1  F (36.7  C)    TempSrc: Oral    SpO2: 93%    Weight: 95.4 kg (210 lb 4.8 oz)    PainSc: Moderate Pain (4)    PainLoc: Head        Wt Readings from Last 8 Encounters:   24 95.4 kg (210 lb 4.8 oz)   24 95.8 kg (211 lb 1.6 oz)   24 95.7 kg (211 lb)   10/25/24 96.2 kg (212 lb)   10/18/24 97.1 kg (214 lb)   24 95.8 kg (211 lb 3.2 oz)   24 91.6 kg (202 lb)   24 92.5 kg (204 lb)       General: Alert and oriented, in no acute distress  Josefina has mild Erythema.    Treatment Summary to Date    Aria chart and setup information reviewed    Jennifer Amato MD      Again, thank you for  allowing me to participate in the care of your patient.        Sincerely,        Jennifer Amato MD

## 2024-11-15 ENCOUNTER — APPOINTMENT (OUTPATIENT)
Dept: RADIATION ONCOLOGY | Facility: CLINIC | Age: 74
End: 2024-11-15
Attending: STUDENT IN AN ORGANIZED HEALTH CARE EDUCATION/TRAINING PROGRAM
Payer: COMMERCIAL

## 2024-11-15 PROCEDURE — 77386 HC IMRT TREATMENT DELIVERY, COMPLEX: CPT | Performed by: STUDENT IN AN ORGANIZED HEALTH CARE EDUCATION/TRAINING PROGRAM

## 2024-11-15 PROCEDURE — 77014 PR CT GUIDE FOR PLACEMENT RADIATION THERAPY FIELDS: CPT | Mod: 26 | Performed by: STUDENT IN AN ORGANIZED HEALTH CARE EDUCATION/TRAINING PROGRAM

## 2024-11-18 ENCOUNTER — APPOINTMENT (OUTPATIENT)
Dept: RADIATION ONCOLOGY | Facility: CLINIC | Age: 74
End: 2024-11-18
Attending: STUDENT IN AN ORGANIZED HEALTH CARE EDUCATION/TRAINING PROGRAM
Payer: COMMERCIAL

## 2024-11-18 PROCEDURE — 77014 PR CT GUIDE FOR PLACEMENT RADIATION THERAPY FIELDS: CPT | Mod: 26 | Performed by: STUDENT IN AN ORGANIZED HEALTH CARE EDUCATION/TRAINING PROGRAM

## 2024-11-18 PROCEDURE — 77386 HC IMRT TREATMENT DELIVERY, COMPLEX: CPT | Performed by: STUDENT IN AN ORGANIZED HEALTH CARE EDUCATION/TRAINING PROGRAM

## 2024-11-19 ENCOUNTER — APPOINTMENT (OUTPATIENT)
Dept: RADIATION ONCOLOGY | Facility: CLINIC | Age: 74
End: 2024-11-19
Attending: STUDENT IN AN ORGANIZED HEALTH CARE EDUCATION/TRAINING PROGRAM
Payer: COMMERCIAL

## 2024-11-19 PROCEDURE — 77386 HC IMRT TREATMENT DELIVERY, COMPLEX: CPT | Performed by: STUDENT IN AN ORGANIZED HEALTH CARE EDUCATION/TRAINING PROGRAM

## 2024-11-19 PROCEDURE — 77014 PR CT GUIDE FOR PLACEMENT RADIATION THERAPY FIELDS: CPT | Mod: 26 | Performed by: STUDENT IN AN ORGANIZED HEALTH CARE EDUCATION/TRAINING PROGRAM

## 2024-11-20 ENCOUNTER — APPOINTMENT (OUTPATIENT)
Dept: RADIATION ONCOLOGY | Facility: CLINIC | Age: 74
End: 2024-11-20
Attending: STUDENT IN AN ORGANIZED HEALTH CARE EDUCATION/TRAINING PROGRAM
Payer: COMMERCIAL

## 2024-11-20 PROCEDURE — 77386 HC IMRT TREATMENT DELIVERY, COMPLEX: CPT | Performed by: RADIOLOGY

## 2024-11-20 PROCEDURE — 77014 PR CT GUIDE FOR PLACEMENT RADIATION THERAPY FIELDS: CPT | Mod: 26 | Performed by: RADIOLOGY

## 2024-11-21 ENCOUNTER — APPOINTMENT (OUTPATIENT)
Dept: RADIATION ONCOLOGY | Facility: CLINIC | Age: 74
End: 2024-11-21
Attending: STUDENT IN AN ORGANIZED HEALTH CARE EDUCATION/TRAINING PROGRAM
Payer: COMMERCIAL

## 2024-11-21 PROCEDURE — 77336 RADIATION PHYSICS CONSULT: CPT | Performed by: STUDENT IN AN ORGANIZED HEALTH CARE EDUCATION/TRAINING PROGRAM

## 2024-11-21 PROCEDURE — 77386 HC IMRT TREATMENT DELIVERY, COMPLEX: CPT | Performed by: STUDENT IN AN ORGANIZED HEALTH CARE EDUCATION/TRAINING PROGRAM

## 2024-11-21 PROCEDURE — 77014 PR CT GUIDE FOR PLACEMENT RADIATION THERAPY FIELDS: CPT | Mod: 26 | Performed by: STUDENT IN AN ORGANIZED HEALTH CARE EDUCATION/TRAINING PROGRAM

## 2024-11-22 ENCOUNTER — APPOINTMENT (OUTPATIENT)
Dept: RADIATION ONCOLOGY | Facility: CLINIC | Age: 74
End: 2024-11-22
Attending: STUDENT IN AN ORGANIZED HEALTH CARE EDUCATION/TRAINING PROGRAM
Payer: COMMERCIAL

## 2024-11-22 PROCEDURE — 77386 HC IMRT TREATMENT DELIVERY, COMPLEX: CPT | Performed by: STUDENT IN AN ORGANIZED HEALTH CARE EDUCATION/TRAINING PROGRAM

## 2024-11-22 PROCEDURE — 77014 PR CT GUIDE FOR PLACEMENT RADIATION THERAPY FIELDS: CPT | Mod: 26 | Performed by: STUDENT IN AN ORGANIZED HEALTH CARE EDUCATION/TRAINING PROGRAM

## 2024-11-25 ENCOUNTER — APPOINTMENT (OUTPATIENT)
Dept: RADIATION ONCOLOGY | Facility: CLINIC | Age: 74
End: 2024-11-25
Attending: STUDENT IN AN ORGANIZED HEALTH CARE EDUCATION/TRAINING PROGRAM
Payer: COMMERCIAL

## 2024-11-25 DIAGNOSIS — D32.9 MENINGIOMA (H): Primary | ICD-10-CM

## 2024-11-25 PROCEDURE — 77386 HC IMRT TREATMENT DELIVERY, COMPLEX: CPT | Performed by: STUDENT IN AN ORGANIZED HEALTH CARE EDUCATION/TRAINING PROGRAM

## 2024-11-25 PROCEDURE — 77014 PR CT GUIDE FOR PLACEMENT RADIATION THERAPY FIELDS: CPT | Mod: 26 | Performed by: STUDENT IN AN ORGANIZED HEALTH CARE EDUCATION/TRAINING PROGRAM

## 2024-11-25 NOTE — PROGRESS NOTES
RADIATION ONCOLOGY WEEKLY TREATMENT VISIT NOTE      Assessment / Impression       Visit Dx:  (D32.9) Meningioma (H)  (primary encounter diagnosis)       Cancer Staging   No matching staging information was found for the patient.       Tolerating radiation therapy well.  All questions and concerns addressed.  Changes to vision smell and taste    Plan:     Continue radiation treatment as prescribed.  Radiation:   Site: Lt Brain/Meningioma  Stereotactic Radiosurgery: No  Concurrent Therapy: No  Today's Dose: 5200  Total Dose for Brain: 5400  Today's Fraction/Total Fraction Brain: 26/27    She is due for eye exam which encouraged her to have visual acuity testing in addition could be related to cataracts.  Radiation-induced cataracts and vision changes are usually a long-term not short-term side effect    Taste and smell changes are possible during radiation therapy however would unusual permanent consequence of treatment so we will monitor symptoms for now.  Pain Management Plan: N/A    Subjective:      HPI: Josefina Dumont is a 74 year old female with  No primary diagnosis found.    She completed radiation therapy today.  She had additional questions today which she requested to be seen for additional visit for.  She reports vision changes with worsening in vision overall.  No double vision.  She has not had a recent eye exam.  Wears glasses.  She also reports complete loss of taste and smell.  She has not congested.  Never had any other taste changes with COVID.    The following portions of the patient's history were reviewed and updated as appropriate: allergies, current medications, past family history, past medical history, past social history, past surgical history and problem list.    Assessment                  Body Site:  Brain                                     Sexuality Alteration                    Emotional Alteration       Comfort Alteration       Nutrition Alteration      Skin Alteration       AUA Assessment                                           Accompanied by       Objective:     Exam:     There were no vitals filed for this visit.    Wt Readings from Last 8 Encounters:   11/22/24 94.3 kg (207 lb 12.8 oz)   11/14/24 95.4 kg (210 lb 4.8 oz)   11/08/24 95.8 kg (211 lb 1.6 oz)   11/01/24 95.7 kg (211 lb)   10/25/24 96.2 kg (212 lb)   10/18/24 97.1 kg (214 lb)   09/19/24 95.8 kg (211 lb 3.2 oz)   07/12/24 91.6 kg (202 lb)       General: Alert and oriented, in no acute distress  Josefina has no Erythema.    Treatment Summary to Date    Aria chart and setup information reviewed    Jennifer Amato MD

## 2024-11-25 NOTE — LETTER
"11/25/2024      Josefina Dumont  [address]  [facility] [place] [address]      Dear Colleague,    Thank you for referring your patient, Josefina Dumont, to the [facility] RADIATION ONCOLOGY [place]. Please see a copy of my visit note below.    Oncology Rooming Note    November 25, 2024 10:28 AM   Josefina Dumont is a 74 year old female who presents for:    Chief Complaint   Patient presents with     Oncology Clinic Visit     Questions and concerns for physician.     Initial Vitals: There were no vitals taken for this visit. Estimated body mass index is 35.67 kg/m  as calculated from the following:    Height as of 6/16/24: 1.626 m (5' 4\").    Weight as of 11/22/24: 94.3 kg (207 lb 12.8 oz). There is no height or weight on file to calculate BSA.  Data Unavailable Comment: Data Unavailable   No LMP recorded. Patient is postmenopausal.  Allergies reviewed: No  Medications reviewed: No    Medications: Medication refills not needed today.  Pharmacy name entered into [email or web address]:    [id number] - [place] [id number] [facility], [address]    Frailty Screening:   Is the patient here for a new oncology consult visit in cancer care? 2. No      Clinical concerns: Patient had some questions and concerns for Dr regarding vision, taste and smell. See my progress note from today at 1015.  Dr Amato was notified.    Jennifer Engle RN      RADIATION ONCOLOGY WEEKLY TREATMENT VISIT NOTE      Assessment / Impression       Visit Dx:  (D32.9) Meningioma (H)  (primary encounter diagnosis)       Cancer Staging   No matching staging information was found for the patient.       Tolerating radiation therapy well.  All questions and concerns addressed.  Changes to vision smell and taste    Plan:     Continue radiation treatment as prescribed.  Radiation:   Site: Lt Brain/Meningioma  Stereotactic Radiosurgery: No  Concurrent Therapy: " No  Today's Dose: 5200  Total Dose for Brain: 5400  Today's Fraction/Total Fraction Brain: 26/27    She is due for eye exam which encouraged her to have visual acuity testing in addition could be related to cataracts.  Radiation-induced cataracts and vision changes are usually a long-term not short-term side effect    Taste and smell changes are possible during radiation therapy however would unusual permanent consequence of treatment so we will monitor symptoms for now.  Pain Management Plan: N/A    Subjective:      HPI: Josefina Dumont is a 74 year old female with  No primary diagnosis found.    She completed radiation therapy today.  She had additional questions today which she requested to be seen for additional visit for.  She reports vision changes with worsening in vision overall.  No double vision.  She has not had a recent eye exam.  Wears glasses.  She also reports complete loss of taste and smell.  She has not congested.  Never had any other taste changes with COVID.    The following portions of the patient's history were reviewed and updated as appropriate: allergies, current medications, past family history, past medical history, past social history, past surgical history and problem list.    Assessment                  Body Site:  Brain                                     Sexuality Alteration                    Emotional Alteration       Comfort Alteration       Nutrition Alteration      Skin Alteration      AUA Assessment                                           Accompanied by       Objective:     Exam:     There were no vitals filed for this visit.    Wt Readings from Last 8 Encounters:   11/22/24 94.3 kg (207 lb 12.8 oz)   11/14/24 95.4 kg (210 lb 4.8 oz)   11/08/24 95.8 kg (211 lb 1.6 oz)   11/01/24 95.7 kg (211 lb)   10/25/24 96.2 kg (212 lb)   10/18/24 97.1 kg (214 lb)   09/19/24 95.8 kg (211 lb 3.2 oz)   07/12/24 91.6 kg (202 lb)       General: Alert and oriented, in no acute  madisyn Rocha has no Erythema.    Treatment Summary to Date    Aria chart and setup information reviewed    Jennifer Amato MD      Again, thank you for allowing me to participate in the care of your patient.        Sincerely,        Jennifer Amato MD

## 2024-11-25 NOTE — PROGRESS NOTES
"Oncology Rooming Note    November 25, 2024 10:28 AM   Josefina Dumont is a 74 year old female who presents for:    Chief Complaint   Patient presents with    Oncology Clinic Visit     Questions and concerns for physician.     Initial Vitals: There were no vitals taken for this visit. Estimated body mass index is 35.67 kg/m  as calculated from the following:    Height as of 6/16/24: 1.626 m (5' 4\").    Weight as of 11/22/24: 94.3 kg (207 lb 12.8 oz). There is no height or weight on file to calculate BSA.  Data Unavailable Comment: Data Unavailable   No LMP recorded. Patient is postmenopausal.  Allergies reviewed: No  Medications reviewed: No    Medications: Medication refills not needed today.  Pharmacy name entered into [employer]:    [facility] - [place] [id number] [address]    Frailty Screening:   Is the patient here for a new oncology consult visit in cancer care? 2. No      Clinical concerns: Patient had some questions and concerns for Dr regarding vision, taste and smell. See my progress note from today at 1015.  Dr Amato was notified.    Jennifer Engle RN    "

## 2024-11-25 NOTE — PROGRESS NOTES
"Patient here for their final radiation treatment. Discharge instructions given verbally and in writing, patient verbalized understanding.     Encouraged patient to make a 4-6 week follow up appointment with Dr Amato on their way out. Pt verbalized understanding.    Patient concerned about her vision, says that it's \"a little foggy, I don't see as well\" She isn't sure but she thinks that it started before radiation but that it has gotten worse. She reports she recently started a new pill from her kidney doctor, and wasn't sure if that was contributing. She is also concerned about her sense of smell and taste. She thinks this was before radiation but it also has gotten worse.     Dr Amato notified and will see the patient to address questions/concerns.    Jennifer Engle, RN  Radiation Nurse    "

## 2024-12-03 ENCOUNTER — TELEPHONE (OUTPATIENT)
Dept: RADIATION ONCOLOGY | Facility: CLINIC | Age: 74
End: 2024-12-03
Payer: COMMERCIAL

## 2024-12-03 NOTE — TELEPHONE ENCOUNTER
"Received call back from Josefina. She states that she is tired. She also has some \"pains in the head\" that are more of a \"heavy feeling\" and are around a \"5\" on the pain scale. She said that Dr Amato had encouraged her to take Advil which \"helps\". The headache is pretty much every day.     Informed the patient to let us know if the headache doesn't get better with the Advil or if it's ever a severe pain that doesn't go away. Josefina verbalized understanding.    Informed her about fatigue being a normal side effect from Radiation therapy and that it should start to get better in the next week or two. Josefina verbalized understanding and denied further questions or concerns.     Reviewed MRI appointment and follow up appointment with Dr Amato and she verbalized understanding.    Jennifer Engle, RN  Radiation Nurse  "

## 2024-12-03 NOTE — TELEPHONE ENCOUNTER
Rick called patient to check on her post Radiation Therapy. No answer. Left voice mail for patient to call us if she had questions or concerns, otherwise we will see her at her appointment. Office phone number left.     Jennifer Engle RN  Radiation Nurse

## 2025-01-22 ENCOUNTER — HOSPITAL ENCOUNTER (OUTPATIENT)
Dept: MRI IMAGING | Facility: CLINIC | Age: 75
Discharge: HOME | End: 2025-01-22
Attending: STUDENT IN AN ORGANIZED HEALTH CARE EDUCATION/TRAINING PROGRAM
Payer: COMMERCIAL

## 2025-01-22 DIAGNOSIS — D32.9 MENINGIOMA (H): ICD-10-CM

## 2025-01-22 PROCEDURE — A9585 GADOBUTROL INJECTION: HCPCS | Performed by: STUDENT IN AN ORGANIZED HEALTH CARE EDUCATION/TRAINING PROGRAM

## 2025-01-22 PROCEDURE — 70553 MRI BRAIN STEM W/O & W/DYE: CPT

## 2025-01-22 PROCEDURE — 255N000002 HC RX 255 OP 636: Performed by: STUDENT IN AN ORGANIZED HEALTH CARE EDUCATION/TRAINING PROGRAM

## 2025-01-22 RX ORDER — GADOBUTROL 604.72 MG/ML
9.4 INJECTION INTRAVENOUS ONCE
Status: COMPLETED | OUTPATIENT
Start: 2025-01-22 | End: 2025-01-22

## 2025-01-22 RX ADMIN — GADOBUTROL 9.4 ML: 604.72 INJECTION INTRAVENOUS at 11:22

## 2025-01-30 ENCOUNTER — OFFICE VISIT (OUTPATIENT)
Dept: RADIATION ONCOLOGY | Facility: CLINIC | Age: 75
End: 2025-01-30
Attending: PEDIATRICS
Payer: COMMERCIAL

## 2025-01-30 VITALS
OXYGEN SATURATION: 93 % | TEMPERATURE: 98 F | BODY MASS INDEX: 37.02 KG/M2 | DIASTOLIC BLOOD PRESSURE: 88 MMHG | WEIGHT: 215.7 LBS | RESPIRATION RATE: 20 BRPM | SYSTOLIC BLOOD PRESSURE: 203 MMHG | HEART RATE: 70 BPM

## 2025-01-30 DIAGNOSIS — D32.9 MENINGIOMA (H): Primary | ICD-10-CM

## 2025-01-30 RX ORDER — LORAZEPAM 0.5 MG/1
0.5 TABLET ORAL EVERY 6 HOURS PRN
Qty: 2 TABLET | Refills: 0 | Status: SHIPPED | OUTPATIENT
Start: 2025-01-30

## 2025-01-30 RX ORDER — CARVEDILOL 25 MG/1
1 TABLET ORAL
COMMUNITY
Start: 2025-01-25

## 2025-01-30 ASSESSMENT — PAIN SCALES - GENERAL: PAINLEVEL_OUTOF10: NO PAIN (0)

## 2025-01-30 NOTE — PROGRESS NOTES
"Oncology Rooming Note    January 30, 2025 2:50 PM   Josefina Dumont is a 74 year old female who presents for:    Chief Complaint   Patient presents with    Oncology Clinic Visit     Follow up with Dr. Amato    Brain Tumor     Initial Vitals: BP (!) 203/88 (BP Location: Right arm, Patient Position: Sitting, Cuff Size: Adult Regular)   Pulse 70   Temp 98  F (36.7  C) (Oral)   Resp 20   Wt 97.8 kg (215 lb 11.2 oz)   SpO2 93%   BMI 37.02 kg/m   Estimated body mass index is 37.02 kg/m  as calculated from the following:    Height as of 6/16/24: 1.626 m (5' 4\").    Weight as of this encounter: 97.8 kg (215 lb 11.2 oz). Body surface area is 2.1 meters squared.  No Pain (0) Comment: Data Unavailable   No LMP recorded. Patient is postmenopausal.  Allergies reviewed: Yes  Medications reviewed: Yes    Medications: Medication refills not needed today.  Pharmacy name entered into [employer]:    [facility] [address]    Frailty Screening:   Is the patient here for a new oncology consult visit in cancer care? 2. No      Clinical concerns: Patient here ambulatory with wheeled walker for follow-up status post radiation for her meningioma.  Patient denies problems with her meningioma treatment.  Patient states she is doing having blood pressure issues and working with her other doctors on medication adjustment.  MRI done recently and here today for results.  Plan return to clinic for follow-up as directed by provider.   Dr. Amato was notified.      Celina Varela RN              "

## 2025-01-30 NOTE — PROGRESS NOTES
[facility] Radiation Oncology Follow Up     Patient: Josefina Dumont  MRN: [id number]  Date of Service: 01/30/2025       DISEASE TREATED:  Left anterior parafalcine extra-axial meningioma measuring 1.5 x 2.6 x 3.0 cm       TYPE OF RADIATION THERAPY ADMINISTERED:    SITE TREATED: Left frontal  TOTAL DOSE: 5400 cGy  NUMBER OF FRACTIONS: 27  RADIATION TREATMENT START: 10/17/2024 - 11/25/2024   CONCURRENT CHEMOTHERAPY: No  ADJUVANT THERAPY: No     INTERVAL SINCE COMPLETION OF RADIATION THERAPY: 2 mo      SUBJECTIVE:  Ms. Dumont is a 74 year old female who had trauma which lead to head CT     1/12/2024 CT head: Partially calcified left anterior parafalcine extra-axial mass lesion measuring 1.5 x 2.6 x 3.0 cm, with edema, appearance most suggestive of meningioma.      1/12/2022 MRI brain: 1.9 x 2.1 x 3.2 cm pedunculated dural-based solid enhancing calcified mass overlying the anterior left frontal convexity and extending into the interhemispheric fissure, consistent with a meningioma. There is underlying T2 hyperintense   vasogenic edema within the anterior parasagittal left frontal lobe. Mild associated localized mass effect with sulcal effacement.     4/18/2022 MRI brain:  no change in size of left anterior inferior paramedian mass most suggestive of a meningioma. It continues to show intense homogenous enhancement. It measures approximately 2.3 x 1.2 x 2.6 cm, stable. There is small amount of localized vasogenic edema in the anterior parasagittal left frontal lobe, stable from before.     10/14/2022 MRI brain: Overall stable appearance 33 x 15 x 25 mm extra-axial dural based homogeneous enhancing mass compatible with meningioma within the anterior cranial fossa left paramedian aspect superiorly      4/25/2023 MRI brain: 1.9 cm AP by 3.5 cm transverse by 3.7 cm craniocaudal homogeneously enhancing extra-axial mass at the anterior left cranial fossa with mild mass effect upon the underlying left frontal lobe  with associated vasogenic edema, similar to prior.      5/1/2023 neurology consult due to persistent left frontal headache and concern for potential seizure.  EEG completed 6/16/2023 was not suggestive for focal slowing or epileptiform activity.      10/29/2023 MRI brain: 1.9 cm AP by 2.3 cm transverse by 3.3 cm   craniocaudal. This is slightly increased from multiple priors measuring 1.8 x 2.2 x 3.4 cm April 2023, 1.7 x 2.1 x 3 cm October 2022, and 1.8 x 2 x 3.1 cm April 2022.     2/4/2024 MRI brain:  Enhancing extra-axial mass along the left anterior medial frontal convexity extending to the falx and slightly to the right of midline, measuring up to 1.5 x 2.7 x 2.7 cm, previously 1.6 x 2.8 x 3.1      8/26/2024 MRI brain:  no significant increase in size of a 2.9 x 1.7 x 3.5 cm anterior left parafalcine meningioma since 2/4/2024 slightly extending across the   midline, it has slightly enlarged since 1/12/2022 previously measuring 2.7 x 1.5 x 3.2 cm      She has been following with neurosurgery with preference to avoid surgery if possible.      She has had left frontal headaches that have been present for years.  Her headaches come and go and she uses Tylenol as needed.  Currently only taking Tylenol at night.      10/17/2024 - 11/25/2024 Left frontal definitive radiation 5400 cGy in 27 fractions    Returns to clinic today for routine follow-up visit.  She reports resolution of her headaches following completion of radiation therapy that have only recurred a couple times first 1 approximately 1 week ago.  Is similar to the past with extension across her forehead.  Balance is good she continues to ambulate with assistance of her walker.  No falls.  Energy is fair but she is not sleeping well.  She still has enough energy to run errands out of the house and go shopping.  She reports some weight gain.  She continues to take Tylenol as needed in the evenings.  She finds Tylenol seems to help her sleep.  She is  considering melatonin at night to help with sleep per her sister's recommendation.  No new neurologic symptoms or concerns.    Medications were reviewed and are up to date on EPIC.    The following portions of the patient's history were reviewed and updated as appropriate: allergies, current medications, past family history, past medical history, past social history, past surgical history and problem list.    Review of Systems:      General  Constitutional  Constitutional (WDL): Exceptions to WDL  Fatigue: Fatigue relieved by rest  EENT  Eye Disorders  Eye Disorder (WDL): All eye disorder elements are within defined limits (wears glasses)  Ear Disorders  Ear Disorder (WDL): Exceptions to WDL  Tinnitus: Mild symptoms OR intervention not indicated (throbbing bilateral)  Respiratory  Respiratory  Respiratory (WDL): Exceptions to WDL  Cough: Mild symptoms OR nonprescription intervention indicated (clear/white phlegm)  Dyspnea: Shortness of breath with moderate exertion  Cardiovascular  Cardiovascular  Cardiovascular (WDL): Exceptions to WDL (no pacemaker, uncontrolled BP, hx CHF)  Edema: Yes  Edema Limbs: 5 - 10% inter-limb discrepancy in volume or circumference at point of greatest visible difference OR swelling or obscuration of anatomic architecture on close inspection (better with water pill)  Gastrointestinal  Gastrointestinal  Gastrointestinal (WDL): Exceptions to WDL  Dysgeusia: Altered taste but no change in diet (on & off with meds)  Constipation: Occasional or intermittent symptoms OR occasional use of stool softeners, laxatives, dietary modification, or enema  Musculoskeletal  Musculoskeletal and Connective Tissue Disorders  Musculoskeletal & Connective (WDL): Exceptions to WDL  Generalized Muscle Weakness: Symptomatic OR perceived by patient but not evident on physical exam (uses wheeled walker)  Integumentary  Integumentary  Integumentary (WDL): All integumentary elements are within defined  limits  Neurological  Neurosensory  Neurosensory (WDL): Exceptions to WDL  Ataxia: Moderate symptoms OR limiting instrumental ADL (uses wheeled walker)  Dizziness: Mild unsteadiness or sensation of movement (occasionally)  Genitourinary/Reproductive  Genitourinary  Genitourinary (WDL): Exceptions to WDL  Urinary Frequency: Present (nocturia x1-2)  Lymphatic  Lymph System Disorders  Lymph (WDL): All lymph elements are within defined limits  Pain  Pain Score: No Pain (0)  AUA Assessment                                                              Accompanied by  Accompanied By: self only    Objective:     PHYSICAL EXAMINATION:    BP (!) 203/88 (BP Location: Right arm, Patient Position: Sitting, Cuff Size: Adult Regular)   Pulse 70   Temp 98  F (36.7  C) (Oral)   Resp 20   Wt 97.8 kg (215 lb 11.2 oz)   SpO2 93%   BMI 37.02 kg/m      Gen: Alert, in NAD pleasant interactive, well nourished  Eyes:  EOMI, sclera anicteric  HENT     Head: NC/AT  Musculoskeletal: Normal muscle bulk and tone  Skin: Normal tone and turgor, warm, dry, intact  Neurologic: A/Ox3,  face symmetric, speech fluent, no focal motor deficits. Gait normal and unaided  Psychiatric: Appropriate mood and affect      Personally reviewed imaging.  Imaging: Imaging results 6 weeks:MRI Brain w & w/o contrast    Result Date: 1/22/2025  EXAM: MR BRAIN W/O and W CONTRAST LOCATION: [facility] DATE: 1/22/2025 INDICATION:  Meningioma (H) COMPARISON: MRI brain without and with contrast 08/26/2024. CONTRAST: 9.4ml sadaf TECHNIQUE: Routine multiplanar multisequence head MRI without and with intravenous contrast. FINDINGS: INTRACRANIAL CONTENTS: There has been no significant interval change in the previously demonstrated enhancing extra-axial mass overlying the anteromedial aspect of the left frontal pole measuring approximately 28 mm x 17 mm x 34 mm (series 14 image 96, series 15 image 42). As before, there is mild mass effect on the left  frontal lobe. There is mild to moderate presumed vasogenic edema in the left anterior frontal white matter that does not appear significantly changed. No other intracranial mass or abnormal enhancement is identified. The ventricles are normal in size and configuration. No findings to suggest recent infarct. Mild generalized brain parenchymal volume loss. Mild to moderate patchy nonspecific T2/FLAIR hyperintense signal in the cerebral white matter and nathaniel, likely due  to chronic small vessel ischemic disease. A few scattered punctate foci of susceptibility-related signal loss possibly representing chronic microhemorrhage involving the right caudate tail and left temporal lobe, unchanged. No new/acute intracranial hemorrhage, extraaxial fluid collection, or mass effect. SELLA: No abnormality accounting for technique. OSSEOUS STRUCTURES/SOFT TISSUES: Normal marrow signal. The major intracranial vascular flow voids are maintained. ORBITS: No abnormality accounting for technique. SINUSES/MASTOIDS: Mild mucosal thickening in the ethmoid sinuses. No middle ear or mastoid effusion.     IMPRESSION: 1.  No significant interval change in the enhancing extra-axial mass along the anteromedial aspect of the left frontal pole, which may represent a meningioma. Unchanged mild mass effect on the left frontal lobe. Mild to moderate vasogenic edema in the left frontal white matter appears similar to prior. 2.  No acute intracranial process. Otherwise unchanged chronic findings, as detailed.      Impression   73 y/o with slowly growing left frontal parafalcine meningioma status post definitive radiation therapy.    No significant side effects.  Meningioma stable on MRI.  Visit Dx:    No diagnosis found.    Cancer Staging   No matching staging information was found for the patient.      Assessment & Plan:   1.  Return to clinic in 6 months with repeat MRI, sooner if needed      Pain Management Plan: Tylenol as needed    Total time of  this visit, including time spent face-to-face with patient and or via video/audio, and also in preparing for today's visit for MDM and documentation. Medical decision-making included consideration and possible diagnoses, management options, complex record review, review of diagnostic tests and information, consideration and discussion of significant complications based on comorbidities, discussion with providers involved in the care of the patient.     ** Minutes spent.     This note has been dictated using voice recognition software and as a result may contain minor grammatical errors and unintended word substitutions.      Jennifer Amato MD  Department of Radiation Oncology   [facility] Radiation Oncology  Tel: [phone]  Page: [phone]    [facility]  [address] [place] [address]     [address] [place] [address]    CC:  Patient Care Team:  Rafat Lee MD as PCP - General (Family Medicine)  Abhishek Cullen MD as Assigned Neuroscience Provider  Ophelia Tam [facility] as Assigned MTM Pharmacist  Lay Gordon [facility] as Pharmacist (Pharmacist)  Jorge Alberto Jung APRN CNP as Assigned Heart and Vascular Provider  Jennifer Amato MD as MD (Radiation Oncology)  Jennifer Amato MD as Assigned Cancer Care Provider

## 2025-02-24 ENCOUNTER — OFFICE VISIT (OUTPATIENT)
Dept: PHARMACY | Facility: PHYSICIAN GROUP | Age: 75
End: 2025-02-24
Payer: COMMERCIAL

## 2025-02-24 VITALS
HEART RATE: 52 BPM | OXYGEN SATURATION: 96 % | DIASTOLIC BLOOD PRESSURE: 76 MMHG | SYSTOLIC BLOOD PRESSURE: 158 MMHG | BODY MASS INDEX: 36.22 KG/M2 | WEIGHT: 211 LBS

## 2025-02-24 DIAGNOSIS — K59.00 CONSTIPATION, UNSPECIFIED CONSTIPATION TYPE: ICD-10-CM

## 2025-02-24 DIAGNOSIS — F32.A DEPRESSION, UNSPECIFIED DEPRESSION TYPE: ICD-10-CM

## 2025-02-24 DIAGNOSIS — R52 PAIN: ICD-10-CM

## 2025-02-24 DIAGNOSIS — E11.10 TYPE 2 DIABETES MELLITUS WITH KETOACIDOSIS WITHOUT COMA, WITHOUT LONG-TERM CURRENT USE OF INSULIN (H): ICD-10-CM

## 2025-02-24 DIAGNOSIS — I10 ESSENTIAL HYPERTENSION: Primary | ICD-10-CM

## 2025-02-24 DIAGNOSIS — F41.9 ANXIETY: ICD-10-CM

## 2025-02-24 DIAGNOSIS — K21.00 GASTROESOPHAGEAL REFLUX DISEASE WITH ESOPHAGITIS, UNSPECIFIED WHETHER HEMORRHAGE: ICD-10-CM

## 2025-02-24 DIAGNOSIS — I50.33 ACUTE ON CHRONIC HEART FAILURE WITH PRESERVED EJECTION FRACTION (H): ICD-10-CM

## 2025-02-24 DIAGNOSIS — Z78.9 TAKES DIETARY SUPPLEMENTS: ICD-10-CM

## 2025-02-24 DIAGNOSIS — R06.02 SOB (SHORTNESS OF BREATH): ICD-10-CM

## 2025-02-24 PROCEDURE — 99207 PR NO CHARGE LOS: CPT | Performed by: PHARMACIST

## 2025-02-24 NOTE — LETTER
"Recommended To-Do List      Prepared on: Feb 24, 2025       You can get the best results from your medications by completing the items on this \"To-Do List.\"      Bring your To-Do List when you go to your doctor. And, share it with your family or caregivers.    My To-Do List:  What we talked about: What I should do:   The importance of taking your medication as intended    Education: Take Tums twice daily in pill box           What we talked about: What I should do:   An issue with your medication    Stop taking vitamin E (TOCOPHEROL)          What we talked about: What I should do:    Check blood pressure every day    Record blood pressure for Dr Lee and Ivon               "

## 2025-02-24 NOTE — PROGRESS NOTES
Medication Therapy Management (MTM) Encounter    ASSESSMENT:                            Medication Adherence/Access: Recommended assistance to set up med boxes    Diabetes   Last A1c at goal <7% diet controlled    GERD    Reasonable to be on calcium carbonate scheduled, last calcium on low end of normal.     Hypertension   Blood pressure not at goal <140/90. Recommended home monitoring, has short follow up with PCP. Is improved from before, could increase prazosin or spironolactone    Mental Health Anxiety  stable    Constipation:   stable    Supplements:  Reviewed supplement use with patient, including indications, possible benefits and risks. Recommended stopping Vitamin E due to hemorrhagic stroke risk.     Shortness of breath   Stable, we did discuss that this is a short term fix for shortness of breath as symptoms are likely from heart failure.     Pain  Reviewed cardiac and kidney risks of NSAIDs, recommended using acetaminophen instead    PLAN:                            Check blood pressure a every day this week and save these for Dr Lee on Friday  Take Tums twice daily   Stop Vitamin E     Follow-up:  PCP 2/28aLy 1-2 months     SUBJECTIVE/OBJECTIVE:                          Josefina Dumont is a 75 year old female coming in for an initial visit. She was referred to me from Dr. Lee. Patient was accompanied by her sister Orin robles.     Reason for visit: initial medication review.    Allergies/ADRs: Reviewed in chart  Past Medical History: Reviewed in chart  Tobacco: She reports that she quit smoking about 13 years ago. Her smoking use included cigarettes. She has never used smokeless tobacco.  Alcohol: none    Medication Adherence/Access:   Using twice daily pillbox - morning, 2:30-3 pm and 2 pills at bedtime   Using old medication list from previous hospitalization to set up pill box    Diabetes   Hasn't been checking her blood glucose at home, denies symptoms of hypoglycemia.  Blood sugar  monitoring: not monitoring  Current diabetes symptoms: none  Eye exam in the last 12 months? Yes- Date of last eye exam: 4/18/2023,  Location: [facility]  Foot exam: up to date  Urine Albumin: elevated  A1c 6.7 11/4/24         GERD    - Pantoprazole 40 mg twice daily   - TUMs 500 mg twice daily as needed   Was told by Ivon SINGH she should take Tums twice daily but has not made the change   Patient reports no current symptoms. She does forget the evening dose of her pantoprazole sometimes and then heartburn is worse those days.  Patient feels that current regimen is effective.  The patient does notice symptoms if they miss a dose.    Hypertension   - Losartan 2 50 mg tablets 100 mg daily   - Carvedilol 25 mg twice daily   - Furosemide 40 mg twice daily   - Spironolactone 25 mg twice daily   - Amlodipine 10 mg daily AM  - prazosin 2 mg daily PM  - Clopidogrel 75 mg daily AM    Was checking blood pressure for nephrologist, has now stopped as she cannot afford nephrology bills.   Does forget second furosemide dose sometimes - 2 days a week she does forget. Symptoms are worse these days.        Mental Health Anxiety  -Citalopram 40 mg daily     Patient reports no current medication side effects.  Patient reports symptoms are stable.       Constipation:   - Senna S 8.6-50 mg daily at bedtime PM  Bowel movements have been good, not having issues    Supplements:  - Multivitamin Centrum daily  - vitamin E 400 units daily   Denies issues  Told by old OBGYN to take Vitamin E for breast tenderness for her whole life    Shortness of breath   -albuterol HFA 2 puffs every 4-6 hours  Using 3-4 times a day. Does feel like it helps with shortness of breath from activity     Pain  -acetaminophen 2 500 mg at night   Taking these to help fall asleep, calm her body down. Will take advil overnight if she wakes up with pain     Today's Vitals: BP (!) 158/76   Pulse 52   Wt 211 lb (95.7 kg)   SpO2 96%   BMI 36.22 kg/m     ----------------  Post Discharge Medication Reconciliation Status: discharge medications reconciled and changed, per note/orders.    I spent 60 minutes with this patient today. All changes were made via collaborative practice agreement with COURT SERVIN MD. A copy of the visit note was provided to the patient's provider(s).    A summary of these recommendations was declined by the patient.    Lay Gordon, Pharm.D.  Medication Therapy Management Pharmacist         Medication Therapy Recommendations  Gastroesophageal reflux disease with esophagitis, unspecified whether hemorrhage   1 Current Medication: calcium carbonate (TUMS) 500 MG chewable tablet   Current Medication Sig: Take 1 chew tab by mouth 2 times daily.   Rationale: Does not understand instructions - Adherence - Adherence   Recommendation: Provide Education   Status: Patient Agreed - Adherence/Education   Identified Date: 2/24/2025 Completed Date: 2/24/2025         Takes dietary supplements   1 Current Medication: vitamin E (TOCOPHEROL) 400 units (180 mg) capsule (Discontinued)   Current Medication Sig: Take 400 Units by mouth daily   Rationale: Unsafe medication for the patient - Adverse medication event - Safety   Recommendation: Discontinue Medication   Status: Patient Agreed - Adherence/Education   Identified Date: 2/24/2025 Completed Date: 2/24/2025         2 Current Medication: vitamin E (TOCOPHEROL) 400 units (180 mg) capsule (Discontinued)   Current Medication Sig: Take 400 Units by mouth daily   Rationale: Unsafe medication for the patient - Adverse medication event - Safety   Recommendation: Discontinue Medication   Status: Patient Agreed - Adherence/Education   Identified Date: 2/24/2025 Completed Date: 2/24/2025

## 2025-02-24 NOTE — Clinical Note
"Recommended To-Do List      Prepared on: Feb 24, 2025       You can get the best results from your medications by completing the items on this \"To-Do List.\"      Bring your To-Do List when you go to your doctor. And, share it with your family or caregivers.    My To-Do List:  What we talked about: What I should do:                     "

## 2025-02-24 NOTE — LETTER
February 24, 2025  Josefina Dumont  [address]   [place] [facility] [place] [address]    Dear Ms. Dumont, [facility]     Thank you for talking with me on Feb 24, 2025 about your health and medications. As a follow-up to our conversation, I have included two documents:      Your Recommended To-Do List has steps you should take to get the best results from your medications.  Your Medication List will help you keep track of your medications and how to take them.    If you want to talk about these documents, please call Lay Gordon RPH at phone: [phone], Monday-Friday 8-4:30pm.    I look forward to working with you and your doctors to make sure your medications work well for you.    Sincerely,  Lay Gordon Critical access hospital Pharmacist, [facility]

## 2025-02-24 NOTE — PATIENT INSTRUCTIONS
"Recommendations from today's MTM visit:                                                      Check blood pressure a few days per week and save these for when Ivon calls and when you see Dr Lee  Take Tums twice daily in pill box (plus as needed up to 12 per day)   Stop Vitamin E   Change to Tyelnol overnight, not Advil (better for kidneys)    Follow-up: 1-2 months with Lay   It was great speaking with you today.  I value your experience and would be very thankful for your time in providing feedback in our clinic survey. In the next few days, you may receive an email or text message from [employer] with a link to a survey related to your  clinical pharmacist.\"     To schedule another MTM appointment, please call the clinic directly or you may call [phone]    My Clinical Pharmacist's contact information:                                                      Please feel free to contact me with any questions or concerns you have. Use the patient portal - address to your provider with subject line \"to Lay\" or use [employer] to send me a message.         Medication List            Accurate as of February 24, 2025  1:45 PM. If you have any questions, ask your nurse or doctor.                CONTINUE taking these medications          Morning Afternoon Evening Bedtime    acetaminophen 500 MG tablet  Also known as: TYLENOL  Take 2 tablets (1,000 mg) by mouth every 8 hours as needed for mild pain               albuterol 108 (90 Base) MCG/ACT inhaler  Also known as: PROAIR HFA/PROVENTIL HFA/VENTOLIN HFA  Inhale 2 puffs into the lungs every 6 hours as needed         Inhale 2 puffs into the lungs every 6 hours as needed      amLODIPine 10 MG tablet  Also known as: NORVASC  Take 10 mg by mouth daily.  Reason for med: High Blood Pressure        1 tablet         calcium carbonate 500 MG chewable tablet  Also known as: TUMS  Take 1 chew tab by mouth 2 times daily.  Reason for med: Low Amount of Calcium in the " Blood        1 tablet      1 tablet       carvedilol 25 MG tablet  Also known as: COREG  Take 1 tablet by mouth 2 times daily.  Reason for med: High Blood Pressure        1 tablet           citalopram 40 MG tablet  Also known as: celeXA  Take 40 mg by mouth daily.  Reason for med: Major Depressive Disorder, Social Anxiety Disorder        1 tablet         clopidogrel 75 MG tablet  Also known as: PLAVIX  Take 75 mg by mouth daily        1 tablet         furosemide 40 MG tablet  Also known as: LASIX  Take 1 tablet (40 mg) by mouth 2 times daily for 30 days        1 tablet           losartan 50 MG tablet  Also known as: COZAAR  Take 2 tablets (100 mg) by mouth daily        2 tablets         multivitamin w/minerals tablet  Take 1 tablet by mouth daily        1 tablet         pantoprazole 40 MG EC tablet  Also known as: PROTONIX  Take 1 tablet (40 mg) by mouth 2 times daily (before meals)        1 tablet      1 tablet       prazosin 1 MG capsule  Also known as: MINIPRESS  Take 2 mg by mouth at bedtime.  Reason for med: High Blood Pressure           2 capsules      senna-docusate 8.6-50 MG tablet  Also known as: SENOKOT-S/PERICOLACE  Take 1 tablet by mouth at bedtime.           1 tablet      spironolactone 25 MG tablet  Also known as: ALDACTONE  Take 1 tablet (25 mg) by mouth 2 times daily for 30 days        1 tablet      1 tablet             STOP taking these medications       vitamin E 400 units (180 mg) capsule  Also known as: TOCOPHEROL  Stopped by: Lay Gordon

## 2025-04-17 ENCOUNTER — OFFICE VISIT (OUTPATIENT)
Dept: PHARMACY | Facility: PHYSICIAN GROUP | Age: 75
End: 2025-04-17
Payer: COMMERCIAL

## 2025-04-17 VITALS — SYSTOLIC BLOOD PRESSURE: 144 MMHG | BODY MASS INDEX: 36.73 KG/M2 | DIASTOLIC BLOOD PRESSURE: 56 MMHG | WEIGHT: 214 LBS

## 2025-04-17 DIAGNOSIS — I10 ESSENTIAL HYPERTENSION: Primary | ICD-10-CM

## 2025-04-17 DIAGNOSIS — R06.02 SOB (SHORTNESS OF BREATH): ICD-10-CM

## 2025-04-17 NOTE — PROGRESS NOTES
Medication Therapy Management (MTM) Encounter    ASSESSMENT:                            Medication Adherence/Access: no issues identified     Hypertension   Blood pressure very close to goal <140/90. Recommended continued home monitoring, bring blood pressure cuff to next visit to compare. Wrist blood pressure cuff likely higher than arm blood pressure cuff.   Is improved from before, could increase prazosin or spironolactone if still elevated at future visits, patient priority is breathing.     Shortness of breath   Worsened, recommend schedule provider visit for further work up     PLAN:                            Schedule with Dr Lee or Venus or Dr Benjamin about breathing  If you are prescribed an inhaler that is too expensive, I can help    Medication issues to be addressed at a future visit:   -Could increase prazosin to 3 mg nightly    Follow-up:  Lay 1-2 months after you have seen Dr Lee     SUBJECTIVE/OBJECTIVE:                          Josefina Dumont is a 75 year old female coming in for a follow up visit. Patient was accompanied by her sister Orin robles.     Reason for visit: initial medication review.    Allergies/ADRs: Reviewed in chart  Past Medical History: Reviewed in chart  Tobacco: She reports that she quit smoking about 13 years ago. Her smoking use included cigarettes. She has never used smokeless tobacco.  Did quit smoking in 2008   Alcohol: none    Medication Adherence/Access:   Using twice daily pillbox - morning, 2:30-3 pm and 2 pills at bedtime   Using old medication list from previous hospitalization to set up pill box    Hypertension   - Losartan 2 50 mg tablets- 100 mg daily   - Carvedilol 25 mg twice daily   - Furosemide 40 mg twice daily   - Spironolactone 25 mg twice daily   - Amlodipine 10 mg daily AM  - prazosin 2 mg daily PM  - Clopidogrel 75 mg daily AM    Was checking blood pressure for nephrologist, has now stopped as she cannot afford nephrology bills.   Intolerance to  hydralazine, lisinopril     Home blood pressure readings 153/73, 168/90, 146/72 none in the last week   Did not bring Home meter today - does use a wrist cuff at home  Does forget second furosemide dose sometimes - 2 days a week she does forget. Symptoms are worse these days.   Last potassim 4.2 12/9/24 with nephrology   Last Cr 1.08, GFR 54 mL/min 12/9/24       Shortness of breath   -albuterol HFA 2 puffs every 4-6 hours  Using 3-4 times a day. Completed treatment 2-3 weeks ago for Influenza B. Does feel like it helps with shortness of breath slightly but is still having trouble with activity, sister feels like symptoms are worse than with Influenza B infection a few weeks ago. COPD does run in her family. Did smoke, quit about 16 years ago.       Today's Vitals: There were no vitals taken for this visit.  ----------------    I spent 30 minutes with this patient today. All changes were made via collaborative practice agreement with COURT SERVIN MD.    A summary of these recommendations was declined by the patient.    Lay Gordon, Pharm.D.  Medication Therapy Management Pharmacist         Medication Therapy Recommendations  No medication therapy recommendations to display

## 2025-07-09 ENCOUNTER — LAB REQUISITION (OUTPATIENT)
Dept: LAB | Facility: CLINIC | Age: 75
End: 2025-07-09
Payer: COMMERCIAL

## 2025-07-09 ENCOUNTER — TRANSFERRED RECORDS (OUTPATIENT)
Dept: HEALTH INFORMATION MANAGEMENT | Facility: CLINIC | Age: 75
End: 2025-07-09

## 2025-07-09 DIAGNOSIS — R30.0 DYSURIA: ICD-10-CM

## 2025-07-09 PROCEDURE — 87086 URINE CULTURE/COLONY COUNT: CPT | Mod: ORL | Performed by: NURSE PRACTITIONER

## 2025-07-11 LAB — BACTERIA UR CULT: ABNORMAL

## 2025-07-22 ENCOUNTER — LAB REQUISITION (OUTPATIENT)
Dept: LAB | Facility: CLINIC | Age: 75
End: 2025-07-22
Payer: COMMERCIAL

## 2025-07-22 ENCOUNTER — TRANSFERRED RECORDS (OUTPATIENT)
Dept: HEALTH INFORMATION MANAGEMENT | Facility: CLINIC | Age: 75
End: 2025-07-22
Payer: COMMERCIAL

## 2025-07-22 DIAGNOSIS — E11.42 TYPE 2 DIABETES MELLITUS WITH DIABETIC POLYNEUROPATHY (H): ICD-10-CM

## 2025-07-22 LAB — HBA1C MFR BLD: 6.8 % (ref 4.2–6.1)

## 2025-07-23 LAB
CHOLEST SERPL-MCNC: 149 MG/DL
FASTING STATUS PATIENT QL REPORTED: NO
HDLC SERPL-MCNC: 25 MG/DL
LDLC SERPL CALC-MCNC: 92 MG/DL
NONHDLC SERPL-MCNC: 124 MG/DL
TRIGL SERPL-MCNC: 161 MG/DL

## 2025-07-25 ENCOUNTER — HOSPITAL ENCOUNTER (OUTPATIENT)
Dept: MRI IMAGING | Facility: CLINIC | Age: 75
Discharge: HOME | End: 2025-07-25
Attending: STUDENT IN AN ORGANIZED HEALTH CARE EDUCATION/TRAINING PROGRAM | Admitting: STUDENT IN AN ORGANIZED HEALTH CARE EDUCATION/TRAINING PROGRAM
Payer: COMMERCIAL

## 2025-07-25 DIAGNOSIS — D32.9 MENINGIOMA (H): ICD-10-CM

## 2025-07-25 PROCEDURE — 255N000002 HC RX 255 OP 636: Performed by: STUDENT IN AN ORGANIZED HEALTH CARE EDUCATION/TRAINING PROGRAM

## 2025-07-25 PROCEDURE — 70553 MRI BRAIN STEM W/O & W/DYE: CPT

## 2025-07-25 PROCEDURE — A9585 GADOBUTROL INJECTION: HCPCS | Performed by: STUDENT IN AN ORGANIZED HEALTH CARE EDUCATION/TRAINING PROGRAM

## 2025-07-25 RX ORDER — GADOBUTROL 604.72 MG/ML
9.5 INJECTION INTRAVENOUS ONCE
Status: COMPLETED | OUTPATIENT
Start: 2025-07-25 | End: 2025-07-25

## 2025-07-25 RX ADMIN — GADOBUTROL 9.5 ML: 604.72 INJECTION INTRAVENOUS at 11:52

## 2025-07-26 LAB — RADIOLOGIST FLAGS: ABNORMAL

## 2025-07-29 ENCOUNTER — OFFICE VISIT (OUTPATIENT)
Dept: CARDIOLOGY | Facility: CLINIC | Age: 75
End: 2025-07-29
Payer: COMMERCIAL

## 2025-07-29 VITALS
HEART RATE: 56 BPM | SYSTOLIC BLOOD PRESSURE: 140 MMHG | DIASTOLIC BLOOD PRESSURE: 58 MMHG | BODY MASS INDEX: 34.69 KG/M2 | HEIGHT: 64 IN | RESPIRATION RATE: 12 BRPM | WEIGHT: 203.2 LBS

## 2025-07-29 DIAGNOSIS — I10 HYPERTENSION, UNSPECIFIED TYPE: ICD-10-CM

## 2025-07-29 DIAGNOSIS — I05.0 MITRAL VALVE STENOSIS, UNSPECIFIED ETIOLOGY: ICD-10-CM

## 2025-07-29 DIAGNOSIS — I35.0 NONRHEUMATIC AORTIC VALVE STENOSIS: Primary | ICD-10-CM

## 2025-07-29 DIAGNOSIS — E78.5 DYSLIPIDEMIA: ICD-10-CM

## 2025-07-29 DIAGNOSIS — I25.10 CORONARY ARTERY DISEASE INVOLVING NATIVE CORONARY ARTERY OF NATIVE HEART WITHOUT ANGINA PECTORIS: ICD-10-CM

## 2025-07-29 PROCEDURE — 3078F DIAST BP <80 MM HG: CPT | Performed by: INTERNAL MEDICINE

## 2025-07-29 PROCEDURE — 99214 OFFICE O/P EST MOD 30 MIN: CPT | Performed by: INTERNAL MEDICINE

## 2025-07-29 PROCEDURE — 3077F SYST BP >= 140 MM HG: CPT | Performed by: INTERNAL MEDICINE

## 2025-07-29 NOTE — LETTER
"7/29/2025    COURT SERVIN MD  [address] [facility] [address]    RE: Josefina Tesafyestacey       Dear Colleague,     I had the pleasure of seeing Josefina Dumont in the [facility].         [facility] [address], [place] [address]   [email or web address]   OFFICE: [phone]          Impression and Plan     1.  Coronary artery disease.  Josefina has known coronary artery disease by virtue of CT coronary angiogram 16 January 2024 revealing mild to moderate coronary artery disease involving the circumflex and mild disease involving the proximal LAD.    Josefina denies any chest pain symptoms or other symptoms concerning for angina.    2.  Aortic stenosis.  This is felt mild in degree on echocardiogram 17 June 2024.  Will obtain repeat surveillance echocardiogram.    3.  Mitral stenosis.  This was felt mild in degree on echocardiogram 17 June 2024.    4.  Hypertension.  Blood pressure is fairly reasonable in the office today.  Josefina's blood pressure at recent primary follow-up earlier this month was also fairly reasonable at 138/68 mmHg.    5.  Dyslipidemia.  Lipid profile 22 July 2025 revealed LDL 92 mg/dL and HDL 25 mg/dL.    35 minutes spent reviewing prior records (including documentation, laboratory studies, cardiac testing/imaging), interview with patient along with physical exam, planning, and subsequent documentation/crafting of note).     The longitudinal plan of care for coronary artery disease, aortic stenosis, mitral stenosis, hypertension, & dyslipidemia was addressed during this visit.?Due to the added complexity in care, I will continue to support Josefina Dumont in the subsequent management of this condition(s) and with the ongoing continuity of care of this condition(s)\".           History of Present Illness    Once again I would like to thank you again for asking me to participate in the care of your patient, Josefina Dumont. "  As you know, but to reiterate for my own records, Josefina Dumont is a 75 year old female with coronary artery disease by virtue of CT coronary angiogram 16 January 2024 revealing mild to moderate coronary artery disease involving the circumflex and mild disease involving the proximal LAD.    On interview, Josefina states that she has been doing well from a cardiac standpoint.  Her breathing is comfortable.  Her weight is stable and she is actually down approximately 11 pounds from a few months ago.  She denies any chest pain symptoms.  No palpitations or lightheadedness.    Further review of systems is otherwise negative/noncontributory (medical record and 13 point review of systems reviewed as well and pertinent positives noted).         Cardiac Diagnostics      Echocardiogram 17 June 2024:  Normal left ventricular size and systolic performance with ejection fraction greater than 65%.  There is moderate-severe concentric increase in left ventricular wall thickness.  Mild aortic stenosis.  Mild mitral stenosis.  Normal right ventricular size and systolic performance.  Severe left atrial enlargement.  Mild right atrial enlargement.  There is a small pericardial effusion.    Echocardiogram 5 February 2024:  Limited echocardiographic study.  Left ventricular chamber size is normal. Moderate concentric increase in wall thickness. Systolic function is hyperdynamic. The visually estimated left ventricular ejection fraction is greater than 70%.  Right ventricular chamber size and systolic function are normal.  Moderate left atrial enlargement.  Degenerative calcification of the mitral apparatus with borderline mild mitral stenosis.  Trivial-to-small circumferential pericardial effusion.  Compared to the prior study dated 29 December 2023, there has been no significant change. A similar-sized pericardial effusion does appear to have been present on the prior study and is unchanged.    Echocardiogram 29 December 2023:  The  "left ventricle is normal in size.  Left ventricular function is normal.The ejection fraction is 55-60%.  There is moderate concentric left ventricular hypertrophy.  The left atrium is moderately dilated.  There is mild mitral stenosis.  The aortic valve is trileaflet with aortic valve sclerosis.    CT coronary angiogram 16 January 2024:  Angiography: codominant system - noted RCA small but feeds small PDA  LM mild calcification ostium with min narrowing  LAD proximal calcification mild with mild plaque  Circ proximal to mid moderate calcification and mild to mod plaque  RCA normal, calcified section of aorta near RCA ostium  Non coronary:  No mass/thrombus in TAMMIE, LV or PA  Normal aortic valve   Normal aorta with min plaque small area sinus valsalva and in thoracic descending aorta  Moderate mitral annular calcification  Normal pericardium    Twelve-lead ECG (personally reviewed) 28 December 2023: Sinus rhythm.  Left ventricular hypertrophy with associated T wave changes.  There is very subtle ST elevation in lead III.  ECG findings, however, are similar to 12 January 2022 ECG.     Twelve-lead ECG (personally reviewed) 12 January 2022: Sinus rhythm.  Left ventricular hypertrophy with associated T wave changes.  Very slight J-point elevation in leads III and aVF.           Physical Examination       BP (!) 154/52 (BP Location: Left arm, Patient Position: Sitting, Cuff Size: Adult Large)   Pulse 56   Resp 12   Ht 1.626 m (5' 4\")   Wt 92.2 kg (203 lb 3.2 oz)   BMI 34.88 kg/m          Wt Readings from Last 3 Encounters:   07/29/25 92.2 kg (203 lb 3.2 oz)   04/17/25 97.1 kg (214 lb)   02/24/25 95.7 kg (211 lb)       The patient is alert and oriented times three. Sclerae are anicteric. Mucosal membranes are moist. Jugular venous pressure is normal. No significant adenopathy/thyromegally appreciated. Lungs are clear with good expansion. On cardiovascular exam, the patient has a regular S1 and S2.  There is a 4/6 " systolic murmur that is early to mid peaking at the right sternal border.  Abdomen is soft and non-tender. Extremities reveal no clubbing, cyanosis, or significant edema.         Family History/Social History/Risk Factors   Patient does not smoke.  Family history reviewed.         Medical History  Surgical History Family History Social History   Past Medical History:   Diagnosis Date     Chronic kidney disease      Diabetes (H)      Hypertension      Obese      Past Surgical History:   Procedure Laterality Date     IR CVC TUNNEL PLACEMENT > 5 YRS OF AGE  2024     IR CVC TUNNEL REMOVAL RIGHT  2024     No family history on file.     Social History     Socioeconomic History     Marital status:      Spouse name: Not on file     Number of children: Not on file     Years of education: Not on file     Highest education level: Not on file   Occupational History     Not on file   Tobacco Use     Smoking status: Former     Current packs/day: 0.00     Types: Cigarettes     Quit date:      Years since quittin.5     Smokeless tobacco: Never   Vaping Use     Vaping status: Never Used   Substance and Sexual Activity     Alcohol use: Not on file     Drug use: Not on file     Sexual activity: Not on file   Other Topics Concern     Not on file   Social History Narrative     Not on file     Social Drivers of Health     Financial Resource Strain: Not on file   Food Insecurity: Not on file   Transportation Needs: Not on file   Physical Activity: Not on file   Stress: Not on file   Social Connections: Not on file   Interpersonal Safety: Unknown (2024)    Received from [facility]    Humiliation, Afraid, Rape, and Kick questionnaire      Fear of Current or Ex-Partner: Not on file      Emotionally Abused: Patient declined      Physically Abused: Patient declined      Sexually Abused: Patient declined   Housing Stability: Not on file           Medications  Allergies   Current Outpatient Medications    Medication Sig Dispense Refill     acetaminophen (TYLENOL) 500 MG tablet Take 2 tablets (1,000 mg) by mouth every 8 hours as needed for mild pain       albuterol (PROAIR HFA/PROVENTIL HFA/VENTOLIN HFA) 108 (90 Base) MCG/ACT inhaler Inhale 2 puffs into the lungs every 6 hours as needed       amLODIPine (NORVASC) 10 MG tablet Take 10 mg by mouth daily.       calcium carbonate (TUMS) 500 MG chewable tablet Take 1 chew tab by mouth 2 times daily.       carvedilol (COREG) 25 MG tablet Take 1 tablet by mouth 2 times daily.       citalopram (CELEXA) 40 MG tablet Take 40 mg by mouth daily.       clopidogrel (PLAVIX) 75 MG tablet Take 75 mg by mouth daily       furosemide (LASIX) 40 MG tablet Take 1 tablet (40 mg) by mouth 2 times daily for 30 days 60 tablet 0     losartan (COZAAR) 50 MG tablet Take 2 tablets (100 mg) by mouth daily 60 tablet 0     multivitamin w/minerals (THERA-VIT-M) tablet Take 1 tablet by mouth daily       pantoprazole (PROTONIX) 40 MG EC tablet Take 1 tablet (40 mg) by mouth 2 times daily (before meals) 60 tablet 0     prazosin (MINIPRESS) 1 MG capsule Take 2 mg by mouth at bedtime.       senna-docusate (SENOKOT-S/PERICOLACE) 8.6-50 MG tablet Take 1 tablet by mouth at bedtime.       spironolactone (ALDACTONE) 25 MG tablet Take 1 tablet (25 mg) by mouth 2 times daily for 30 days 60 tablet 0       Allergies   Allergen Reactions     Atorvastatin Muscle Pain (Myalgia)     Hydralazine Diarrhea     Aspirin      Other reaction(s): stomach upset     Atenolol Other (See Comments)     abd pain     Lisinopril      Other reaction(s): stomach aches     Nsaids Nephrotoxicity     Penicillins Hives     Tolerated ceftriaxone     Statins      Other reaction(s): myalgias          Lab Results    Chemistry/lipid CBC Cardiac Enzymes/BNP/TSH/INR   Recent Labs   Lab Test 07/22/25  1621   CHOL 149   HDL 25*   LDL 92   TRIG 161*     Recent Labs   Lab Test 07/22/25  1621 06/24/24  1611 12/28/23  2352   LDL 92 63 94     Recent  Labs   Lab Test 06/24/24  1611      POTASSIUM 3.9   CHLORIDE 104   CO2 21*   *   BUN 19.1   CR 1.42*   GFRESTIMATED 39*   ALBARO 8.8     Recent Labs   Lab Test 06/24/24  1611 06/20/24  0423 06/19/24  0513   CR 1.42* 1.16* 1.10*     Recent Labs   Lab Test 12/28/23  1052 09/12/22  1200 01/12/22  1534   A1C 6.6* 7.3* 7.0*          Recent Labs   Lab Test 06/20/24  0423   WBC 3.6*   HGB 10.6*   HCT 33.1*   MCV 94   *     Recent Labs   Lab Test 06/20/24  0423 06/19/24  0513 06/18/24  0459   HGB 10.6* 10.4* 9.9*    Recent Labs   Lab Test 01/17/22  1024 01/12/22  1534   TROPONINI 0.03 0.03     Recent Labs   Lab Test 02/03/24  1430 02/02/24  1843 12/28/23  1052 02/04/22  1810   BNP  --   --   --  184*   NTBNPI  --  1,579* 1,982*  --    NTBNP 4,474*  --   --   --      Recent Labs   Lab Test 06/16/24  1426   TSH 6.75*     Recent Labs   Lab Test 02/10/24  0623 02/04/24  1854 02/04/22  1810   INR 1.25* 1.23* 1.15          Medications  Allergies   Current Outpatient Medications   Medication Sig Dispense Refill     acetaminophen (TYLENOL) 500 MG tablet Take 2 tablets (1,000 mg) by mouth every 8 hours as needed for mild pain       albuterol (PROAIR HFA/PROVENTIL HFA/VENTOLIN HFA) 108 (90 Base) MCG/ACT inhaler Inhale 2 puffs into the lungs every 6 hours as needed       amLODIPine (NORVASC) 10 MG tablet Take 10 mg by mouth daily.       calcium carbonate (TUMS) 500 MG chewable tablet Take 1 chew tab by mouth 2 times daily.       carvedilol (COREG) 25 MG tablet Take 1 tablet by mouth 2 times daily.       citalopram (CELEXA) 40 MG tablet Take 40 mg by mouth daily.       clopidogrel (PLAVIX) 75 MG tablet Take 75 mg by mouth daily       furosemide (LASIX) 40 MG tablet Take 1 tablet (40 mg) by mouth 2 times daily for 30 days 60 tablet 0     losartan (COZAAR) 50 MG tablet Take 2 tablets (100 mg) by mouth daily 60 tablet 0     multivitamin w/minerals (THERA-VIT-M) tablet Take 1 tablet by mouth daily       pantoprazole  (PROTONIX) 40 MG EC tablet Take 1 tablet (40 mg) by mouth 2 times daily (before meals) 60 tablet 0     prazosin (MINIPRESS) 1 MG capsule Take 2 mg by mouth at bedtime.       senna-docusate (SENOKOT-S/PERICOLACE) 8.6-50 MG tablet Take 1 tablet by mouth at bedtime.       spironolactone (ALDACTONE) 25 MG tablet Take 1 tablet (25 mg) by mouth 2 times daily for 30 days 60 tablet 0      Allergies   Allergen Reactions     Atorvastatin Muscle Pain (Myalgia)     Hydralazine Diarrhea     Aspirin      Other reaction(s): stomach upset     Atenolol Other (See Comments)     abd pain     Lisinopril      Other reaction(s): stomach aches     Nsaids Nephrotoxicity     Penicillins Hives     Tolerated ceftriaxone     Statins      Other reaction(s): myalgias          Lab Results   Lab Results   Component Value Date     06/24/2024    CO2 21 06/24/2024    CO2 21 02/10/2022    BUN 19.1 06/24/2024    BUN 13 02/10/2022     Lab Results   Component Value Date    WBC 3.6 06/20/2024    HGB 10.6 06/20/2024    HCT 33.1 06/20/2024    MCV 94 06/20/2024     06/20/2024     Lab Results   Component Value Date    CHOL 149 07/22/2025    TRIG 161 07/22/2025    HDL 25 07/22/2025     Lab Results   Component Value Date    INR 1.25 02/10/2024     Lab Results   Component Value Date     02/04/2022     Lab Results   Component Value Date    TROPONINI 0.03 01/17/2022    TROPONINI 0.03 01/12/2022     Lab Results   Component Value Date    TSH 6.75 06/16/2024    TSH 4.64 01/15/2022                    Thank you for allowing me to participate in the care of your patient.      Sincerely,     Alice Melgar MD     [facility]  cc:   Referred Self, MD  No address on file

## 2025-07-29 NOTE — PROGRESS NOTES
"       [facility] [address], [place] [address]   [email or web address]   OFFICE: [phone]          Impression and Plan     1.  Coronary artery disease.  Josefina has known coronary artery disease by virtue of CT coronary angiogram 16 January 2024 revealing mild to moderate coronary artery disease involving the circumflex and mild disease involving the proximal LAD.    Josefina denies any chest pain symptoms or other symptoms concerning for angina.    2.  Aortic stenosis.  This is felt mild in degree on echocardiogram 17 June 2024.  Will obtain repeat surveillance echocardiogram.    3.  Mitral stenosis.  This was felt mild in degree on echocardiogram 17 June 2024.    4.  Hypertension.  Blood pressure is fairly reasonable in the office today.  Josefina's blood pressure at recent primary follow-up earlier this month was also fairly reasonable at 138/68 mmHg.    5.  Dyslipidemia.  Lipid profile 22 July 2025 revealed LDL 92 mg/dL and HDL 25 mg/dL.    35 minutes spent reviewing prior records (including documentation, laboratory studies, cardiac testing/imaging), interview with patient along with physical exam, planning, and subsequent documentation/crafting of note).     The longitudinal plan of care for coronary artery disease, aortic stenosis, mitral stenosis, hypertension, & dyslipidemia was addressed during this visit.?Due to the added complexity in care, I will continue to support Josefina Dumont in the subsequent management of this condition(s) and with the ongoing continuity of care of this condition(s)\".           History of Present Illness    Once again I would like to thank you again for asking me to participate in the care of your patient, Josefina Dumont.  As you know, but to reiterate for my own records, Josefina Dumont is a 75 year old female with coronary artery disease by virtue of CT coronary angiogram 16 January 2024 revealing mild to moderate " coronary artery disease involving the circumflex and mild disease involving the proximal LAD.    On interview, Josefina states that she has been doing well from a cardiac standpoint.  Her breathing is comfortable.  Her weight is stable and she is actually down approximately 11 pounds from a few months ago.  She denies any chest pain symptoms.  No palpitations or lightheadedness.    Further review of systems is otherwise negative/noncontributory (medical record and 13 point review of systems reviewed as well and pertinent positives noted).         Cardiac Diagnostics      Echocardiogram 17 June 2024:  Normal left ventricular size and systolic performance with ejection fraction greater than 65%.  There is moderate-severe concentric increase in left ventricular wall thickness.  Mild aortic stenosis.  Mild mitral stenosis.  Normal right ventricular size and systolic performance.  Severe left atrial enlargement.  Mild right atrial enlargement.  There is a small pericardial effusion.    Echocardiogram 5 February 2024:  Limited echocardiographic study.  Left ventricular chamber size is normal. Moderate concentric increase in wall thickness. Systolic function is hyperdynamic. The visually estimated left ventricular ejection fraction is greater than 70%.  Right ventricular chamber size and systolic function are normal.  Moderate left atrial enlargement.  Degenerative calcification of the mitral apparatus with borderline mild mitral stenosis.  Trivial-to-small circumferential pericardial effusion.  Compared to the prior study dated 29 December 2023, there has been no significant change. A similar-sized pericardial effusion does appear to have been present on the prior study and is unchanged.    Echocardiogram 29 December 2023:  The left ventricle is normal in size.  Left ventricular function is normal.The ejection fraction is 55-60%.  There is moderate concentric left ventricular hypertrophy.  The left atrium is moderately  "dilated.  There is mild mitral stenosis.  The aortic valve is trileaflet with aortic valve sclerosis.    CT coronary angiogram 16 January 2024:  Angiography: codominant system - noted RCA small but feeds small PDA  LM mild calcification ostium with min narrowing  LAD proximal calcification mild with mild plaque  Circ proximal to mid moderate calcification and mild to mod plaque  RCA normal, calcified section of aorta near RCA ostium  Non coronary:  No mass/thrombus in TAMMIE, LV or PA  Normal aortic valve   Normal aorta with min plaque small area sinus valsalva and in thoracic descending aorta  Moderate mitral annular calcification  Normal pericardium    Twelve-lead ECG (personally reviewed) 28 December 2023: Sinus rhythm.  Left ventricular hypertrophy with associated T wave changes.  There is very subtle ST elevation in lead III.  ECG findings, however, are similar to 12 January 2022 ECG.     Twelve-lead ECG (personally reviewed) 12 January 2022: Sinus rhythm.  Left ventricular hypertrophy with associated T wave changes.  Very slight J-point elevation in leads III and aVF.           Physical Examination       BP (!) 154/52 (BP Location: Left arm, Patient Position: Sitting, Cuff Size: Adult Large)   Pulse 56   Resp 12   Ht 1.626 m (5' 4\")   Wt 92.2 kg (203 lb 3.2 oz)   BMI 34.88 kg/m          Wt Readings from Last 3 Encounters:   07/29/25 92.2 kg (203 lb 3.2 oz)   04/17/25 97.1 kg (214 lb)   02/24/25 95.7 kg (211 lb)       The patient is alert and oriented times three. Sclerae are anicteric. Mucosal membranes are moist. Jugular venous pressure is normal. No significant adenopathy/thyromegally appreciated. Lungs are clear with good expansion. On cardiovascular exam, the patient has a regular S1 and S2.  There is a 4/6 systolic murmur that is early to mid peaking at the right sternal border.  Abdomen is soft and non-tender. Extremities reveal no clubbing, cyanosis, or significant edema.         Family History/Social " History/Risk Factors   Patient does not smoke.  Family history reviewed.         Medical History  Surgical History Family History Social History   Past Medical History:   Diagnosis Date    Chronic kidney disease     Diabetes (H)     Hypertension     Obese      Past Surgical History:   Procedure Laterality Date    IR CVC TUNNEL PLACEMENT > 5 YRS OF AGE  2024    IR CVC TUNNEL REMOVAL RIGHT  2024     No family history on file.     Social History     Socioeconomic History    Marital status:      Spouse name: Not on file    Number of children: Not on file    Years of education: Not on file    Highest education level: Not on file   Occupational History    Not on file   Tobacco Use    Smoking status: Former     Current packs/day: 0.00     Types: Cigarettes     Quit date:      Years since quittin.5    Smokeless tobacco: Never   Vaping Use    Vaping status: Never Used   Substance and Sexual Activity    Alcohol use: Not on file    Drug use: Not on file    Sexual activity: Not on file   Other Topics Concern    Not on file   Social History Narrative    Not on file     Social Drivers of Health     Financial Resource Strain: Not on file   Food Insecurity: Not on file   Transportation Needs: Not on file   Physical Activity: Not on file   Stress: Not on file   Social Connections: Not on file   Interpersonal Safety: Unknown (2024)    Received from [facility]    Humiliation, Afraid, Rape, and Kick questionnaire     Fear of Current or Ex-Partner: Not on file     Emotionally Abused: Patient declined     Physically Abused: Patient declined     Sexually Abused: Patient declined   Housing Stability: Not on file           Medications  Allergies   Current Outpatient Medications   Medication Sig Dispense Refill    acetaminophen (TYLENOL) 500 MG tablet Take 2 tablets (1,000 mg) by mouth every 8 hours as needed for mild pain      albuterol (PROAIR HFA/PROVENTIL HFA/VENTOLIN HFA) 108 (90 Base) MCG/ACT inhaler  Inhale 2 puffs into the lungs every 6 hours as needed      amLODIPine (NORVASC) 10 MG tablet Take 10 mg by mouth daily.      calcium carbonate (TUMS) 500 MG chewable tablet Take 1 chew tab by mouth 2 times daily.      carvedilol (COREG) 25 MG tablet Take 1 tablet by mouth 2 times daily.      citalopram (CELEXA) 40 MG tablet Take 40 mg by mouth daily.      clopidogrel (PLAVIX) 75 MG tablet Take 75 mg by mouth daily      furosemide (LASIX) 40 MG tablet Take 1 tablet (40 mg) by mouth 2 times daily for 30 days 60 tablet 0    losartan (COZAAR) 50 MG tablet Take 2 tablets (100 mg) by mouth daily 60 tablet 0    multivitamin w/minerals (THERA-VIT-M) tablet Take 1 tablet by mouth daily      pantoprazole (PROTONIX) 40 MG EC tablet Take 1 tablet (40 mg) by mouth 2 times daily (before meals) 60 tablet 0    prazosin (MINIPRESS) 1 MG capsule Take 2 mg by mouth at bedtime.      senna-docusate (SENOKOT-S/PERICOLACE) 8.6-50 MG tablet Take 1 tablet by mouth at bedtime.      spironolactone (ALDACTONE) 25 MG tablet Take 1 tablet (25 mg) by mouth 2 times daily for 30 days 60 tablet 0       Allergies   Allergen Reactions    Atorvastatin Muscle Pain (Myalgia)    Hydralazine Diarrhea    Aspirin      Other reaction(s): stomach upset    Atenolol Other (See Comments)     abd pain    Lisinopril      Other reaction(s): stomach aches    Nsaids Nephrotoxicity    Penicillins Hives     Tolerated ceftriaxone    Statins      Other reaction(s): myalgias          Lab Results    Chemistry/lipid CBC Cardiac Enzymes/BNP/TSH/INR   Recent Labs   Lab Test 07/22/25  1621   CHOL 149   HDL 25*   LDL 92   TRIG 161*     Recent Labs   Lab Test 07/22/25  1621 06/24/24  1611 12/28/23  2352   LDL 92 63 94     Recent Labs   Lab Test 06/24/24  1611      POTASSIUM 3.9   CHLORIDE 104   CO2 21*   *   BUN 19.1   CR 1.42*   GFRESTIMATED 39*   ALBARO 8.8     Recent Labs   Lab Test 06/24/24  1611 06/20/24  0423 06/19/24  0513   CR 1.42* 1.16* 1.10*     Recent Labs    Lab Test 12/28/23  1052 09/12/22  1200 01/12/22  1534   A1C 6.6* 7.3* 7.0*          Recent Labs   Lab Test 06/20/24  0423   WBC 3.6*   HGB 10.6*   HCT 33.1*   MCV 94   *     Recent Labs   Lab Test 06/20/24  0423 06/19/24  0513 06/18/24  0459   HGB 10.6* 10.4* 9.9*    Recent Labs   Lab Test 01/17/22  1024 01/12/22  1534   TROPONINI 0.03 0.03     Recent Labs   Lab Test 02/03/24  1430 02/02/24  1843 12/28/23  1052 02/04/22  1810   BNP  --   --   --  184*   NTBNPI  --  1,579* 1,982*  --    NTBNP 4,474*  --   --   --      Recent Labs   Lab Test 06/16/24  1426   TSH 6.75*     Recent Labs   Lab Test 02/10/24  0623 02/04/24  1854 02/04/22  1810   INR 1.25* 1.23* 1.15          Medications  Allergies   Current Outpatient Medications   Medication Sig Dispense Refill    acetaminophen (TYLENOL) 500 MG tablet Take 2 tablets (1,000 mg) by mouth every 8 hours as needed for mild pain      albuterol (PROAIR HFA/PROVENTIL HFA/VENTOLIN HFA) 108 (90 Base) MCG/ACT inhaler Inhale 2 puffs into the lungs every 6 hours as needed      amLODIPine (NORVASC) 10 MG tablet Take 10 mg by mouth daily.      calcium carbonate (TUMS) 500 MG chewable tablet Take 1 chew tab by mouth 2 times daily.      carvedilol (COREG) 25 MG tablet Take 1 tablet by mouth 2 times daily.      citalopram (CELEXA) 40 MG tablet Take 40 mg by mouth daily.      clopidogrel (PLAVIX) 75 MG tablet Take 75 mg by mouth daily      furosemide (LASIX) 40 MG tablet Take 1 tablet (40 mg) by mouth 2 times daily for 30 days 60 tablet 0    losartan (COZAAR) 50 MG tablet Take 2 tablets (100 mg) by mouth daily 60 tablet 0    multivitamin w/minerals (THERA-VIT-M) tablet Take 1 tablet by mouth daily      pantoprazole (PROTONIX) 40 MG EC tablet Take 1 tablet (40 mg) by mouth 2 times daily (before meals) 60 tablet 0    prazosin (MINIPRESS) 1 MG capsule Take 2 mg by mouth at bedtime.      senna-docusate (SENOKOT-S/PERICOLACE) 8.6-50 MG tablet Take 1 tablet by mouth at bedtime.       spironolactone (ALDACTONE) 25 MG tablet Take 1 tablet (25 mg) by mouth 2 times daily for 30 days 60 tablet 0      Allergies   Allergen Reactions    Atorvastatin Muscle Pain (Myalgia)    Hydralazine Diarrhea    Aspirin      Other reaction(s): stomach upset    Atenolol Other (See Comments)     abd pain    Lisinopril      Other reaction(s): stomach aches    Nsaids Nephrotoxicity    Penicillins Hives     Tolerated ceftriaxone    Statins      Other reaction(s): myalgias          Lab Results   Lab Results   Component Value Date     06/24/2024    CO2 21 06/24/2024    CO2 21 02/10/2022    BUN 19.1 06/24/2024    BUN 13 02/10/2022     Lab Results   Component Value Date    WBC 3.6 06/20/2024    HGB 10.6 06/20/2024    HCT 33.1 06/20/2024    MCV 94 06/20/2024     06/20/2024     Lab Results   Component Value Date    CHOL 149 07/22/2025    TRIG 161 07/22/2025    HDL 25 07/22/2025     Lab Results   Component Value Date    INR 1.25 02/10/2024     Lab Results   Component Value Date     02/04/2022     Lab Results   Component Value Date    TROPONINI 0.03 01/17/2022    TROPONINI 0.03 01/12/2022     Lab Results   Component Value Date    TSH 6.75 06/16/2024    TSH 4.64 01/15/2022

## 2025-08-07 ENCOUNTER — TELEPHONE (OUTPATIENT)
Dept: CARDIOLOGY | Facility: CLINIC | Age: 75
End: 2025-08-07
Payer: COMMERCIAL

## (undated) RX ORDER — CEFAZOLIN SODIUM/WATER 2 G/20 ML
SYRINGE (ML) INTRAVENOUS
Status: DISPENSED
Start: 2024-02-14

## (undated) RX ORDER — HEPARIN SODIUM 1000 [USP'U]/ML
INJECTION, SOLUTION INTRAVENOUS; SUBCUTANEOUS
Status: DISPENSED
Start: 2024-02-14

## (undated) RX ORDER — LIDOCAINE HYDROCHLORIDE 10 MG/ML
INJECTION, SOLUTION INFILTRATION; PERINEURAL
Status: DISPENSED
Start: 2024-05-01

## (undated) RX ORDER — LIDOCAINE HYDROCHLORIDE 10 MG/ML
INJECTION, SOLUTION INFILTRATION; PERINEURAL
Status: DISPENSED
Start: 2024-02-14

## (undated) RX ORDER — FENTANYL CITRATE 50 UG/ML
INJECTION, SOLUTION INTRAMUSCULAR; INTRAVENOUS
Status: DISPENSED
Start: 2024-02-14